# Patient Record
Sex: MALE | URBAN - METROPOLITAN AREA
[De-identification: names, ages, dates, MRNs, and addresses within clinical notes are randomized per-mention and may not be internally consistent; named-entity substitution may affect disease eponyms.]

---

## 2017-01-07 ENCOUNTER — EMERGENCY (EMERGENCY)
Facility: HOSPITAL | Age: 77
LOS: 0 days | Discharge: HOME | End: 2017-01-07
Admitting: EMERGENCY MEDICINE
Payer: MEDICARE

## 2017-01-07 DIAGNOSIS — Z91.81 HISTORY OF FALLING: ICD-10-CM

## 2017-01-07 DIAGNOSIS — M70.50 OTHER BURSITIS OF KNEE, UNSPECIFIED KNEE: ICD-10-CM

## 2017-01-07 DIAGNOSIS — I50.9 HEART FAILURE, UNSPECIFIED: ICD-10-CM

## 2017-01-07 DIAGNOSIS — M70.52 OTHER BURSITIS OF KNEE, LEFT KNEE: ICD-10-CM

## 2017-01-07 DIAGNOSIS — Z86.711 PERSONAL HISTORY OF PULMONARY EMBOLISM: ICD-10-CM

## 2017-01-07 DIAGNOSIS — S09.90XA UNSPECIFIED INJURY OF HEAD, INITIAL ENCOUNTER: ICD-10-CM

## 2017-01-07 DIAGNOSIS — C91.10 CHRONIC LYMPHOCYTIC LEUKEMIA OF B-CELL TYPE NOT HAVING ACHIEVED REMISSION: ICD-10-CM

## 2017-01-07 DIAGNOSIS — S42.254A NONDISPLACED FRACTURE OF GREATER TUBEROSITY OF RIGHT HUMERUS, INITIAL ENCOUNTER FOR CLOSED FRACTURE: ICD-10-CM
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
## 2017-01-07 DIAGNOSIS — S00.90XA UNSPECIFIED SUPERFICIAL INJURY OF UNSPECIFIED PART OF HEAD, INITIAL ENCOUNTER: ICD-10-CM

## 2017-01-07 DIAGNOSIS — S42.253A DISPLACED FRACTURE OF GREATER TUBEROSITY OF UNSPECIFIED HUMERUS, INITIAL ENCOUNTER FOR CLOSED FRACTURE: ICD-10-CM
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
## 2017-01-07 PROCEDURE — 70450 CT HEAD/BRAIN W/O DYE: CPT | Mod: 26

## 2017-01-07 PROCEDURE — 74177 CT ABD & PELVIS W/CONTRAST: CPT | Mod: 26

## 2017-01-07 PROCEDURE — 71260 CT THORAX DX C+: CPT | Mod: 26

## 2017-01-07 PROCEDURE — 72170 X-RAY EXAM OF PELVIS: CPT | Mod: 26

## 2017-01-07 PROCEDURE — 71010: CPT | Mod: 26

## 2017-01-07 PROCEDURE — 73060 X-RAY EXAM OF HUMERUS: CPT | Mod: 26,RT

## 2017-01-07 PROCEDURE — 73030 X-RAY EXAM OF SHOULDER: CPT | Mod: 26,RT

## 2017-01-07 PROCEDURE — 72125 CT NECK SPINE W/O DYE: CPT | Mod: 26

## 2017-01-07 PROCEDURE — 99285 EMERGENCY DEPT VISIT HI MDM: CPT

## 2017-01-07 PROCEDURE — 73562 X-RAY EXAM OF KNEE 3: CPT | Mod: 26,LT

## 2017-01-07 PROCEDURE — 93010 ELECTROCARDIOGRAM REPORT: CPT

## 2017-01-19 ENCOUNTER — OUTPATIENT (OUTPATIENT)
Dept: OUTPATIENT SERVICES | Facility: HOSPITAL | Age: 77
LOS: 1 days | Discharge: HOME | End: 2017-01-19

## 2017-01-25 DIAGNOSIS — D80.1 NONFAMILIAL HYPOGAMMAGLOBULINEMIA: ICD-10-CM

## 2017-02-17 ENCOUNTER — INPATIENT (INPATIENT)
Facility: HOSPITAL | Age: 77
LOS: 1 days | Discharge: HOME | End: 2017-02-19
Attending: FAMILY MEDICINE | Admitting: STUDENT IN AN ORGANIZED HEALTH CARE EDUCATION/TRAINING PROGRAM
Payer: MEDICARE

## 2017-02-17 VITALS — HEIGHT: 65 IN | WEIGHT: 190.04 LBS

## 2017-02-17 DIAGNOSIS — J44.9 CHRONIC OBSTRUCTIVE PULMONARY DISEASE, UNSPECIFIED: ICD-10-CM

## 2017-02-17 DIAGNOSIS — I48.91 UNSPECIFIED ATRIAL FIBRILLATION: ICD-10-CM

## 2017-02-17 DIAGNOSIS — C85.90 NON-HODGKIN LYMPHOMA, UNSPECIFIED, UNSPECIFIED SITE: ICD-10-CM

## 2017-02-17 LAB
ALBUMIN SERPL ELPH-MCNC: 3.3 G/DL — SIGNIFICANT CHANGE UP (ref 3.3–5)
ALBUMIN SERPL ELPH-MCNC: 3.4 G/DL — SIGNIFICANT CHANGE UP (ref 3.3–5)
ALP SERPL-CCNC: 120 U/L — SIGNIFICANT CHANGE UP (ref 40–120)
ALP SERPL-CCNC: 121 U/L — HIGH (ref 40–120)
ALT FLD-CCNC: 30 U/L — SIGNIFICANT CHANGE UP (ref 12–78)
ALT FLD-CCNC: 33 U/L — SIGNIFICANT CHANGE UP (ref 12–78)
ANION GAP SERPL CALC-SCNC: 10 MMOL/L — SIGNIFICANT CHANGE UP (ref 5–17)
ANION GAP SERPL CALC-SCNC: 11 MMOL/L — SIGNIFICANT CHANGE UP (ref 5–17)
ANION GAP SERPL CALC-SCNC: 9 MMOL/L — SIGNIFICANT CHANGE UP (ref 5–17)
ANISOCYTOSIS BLD QL: SLIGHT — SIGNIFICANT CHANGE UP
APTT BLD: 39.1 SEC — HIGH (ref 27.5–37.4)
APTT BLD: 42 SEC — HIGH (ref 27.5–37.4)
AST SERPL-CCNC: 27 U/L — SIGNIFICANT CHANGE UP (ref 15–37)
AST SERPL-CCNC: 30 U/L — SIGNIFICANT CHANGE UP (ref 15–37)
BASE EXCESS BLDV CALC-SCNC: -2.9 MMOL/L — LOW (ref -2–2)
BASOPHILS NFR BLD AUTO: 1 % — SIGNIFICANT CHANGE UP (ref 0–2)
BILIRUB SERPL-MCNC: 0.5 MG/DL — SIGNIFICANT CHANGE UP (ref 0.2–1.2)
BILIRUB SERPL-MCNC: 0.5 MG/DL — SIGNIFICANT CHANGE UP (ref 0.2–1.2)
BUN SERPL-MCNC: 14 MG/DL — SIGNIFICANT CHANGE UP (ref 7–23)
BUN SERPL-MCNC: 15 MG/DL — SIGNIFICANT CHANGE UP (ref 7–23)
BUN SERPL-MCNC: 17 MG/DL — SIGNIFICANT CHANGE UP (ref 7–23)
CALCIUM SERPL-MCNC: 8 MG/DL — LOW (ref 8.5–10.1)
CALCIUM SERPL-MCNC: 8.2 MG/DL — LOW (ref 8.5–10.1)
CALCIUM SERPL-MCNC: 8.2 MG/DL — LOW (ref 8.5–10.1)
CHLORIDE SERPL-SCNC: 103 MMOL/L — SIGNIFICANT CHANGE UP (ref 96–108)
CHLORIDE SERPL-SCNC: 106 MMOL/L — SIGNIFICANT CHANGE UP (ref 96–108)
CHLORIDE SERPL-SCNC: 106 MMOL/L — SIGNIFICANT CHANGE UP (ref 96–108)
CO2 SERPL-SCNC: 23 MMOL/L — SIGNIFICANT CHANGE UP (ref 22–31)
CREAT SERPL-MCNC: 1.1 MG/DL — SIGNIFICANT CHANGE UP (ref 0.5–1.3)
CREAT SERPL-MCNC: 1.13 MG/DL — SIGNIFICANT CHANGE UP (ref 0.5–1.3)
CREAT SERPL-MCNC: 1.26 MG/DL — SIGNIFICANT CHANGE UP (ref 0.5–1.3)
EOSINOPHIL NFR BLD AUTO: 3 % — SIGNIFICANT CHANGE UP (ref 0–6)
GLUCOSE SERPL-MCNC: 203 MG/DL — HIGH (ref 70–99)
GLUCOSE SERPL-MCNC: 257 MG/DL — HIGH (ref 70–99)
GLUCOSE SERPL-MCNC: 405 MG/DL — HIGH (ref 70–99)
HCO3 BLDV-SCNC: 23 MMOL/L — SIGNIFICANT CHANGE UP (ref 21–29)
HCT VFR BLD CALC: 43 % — SIGNIFICANT CHANGE UP (ref 39–50)
HGB BLD-MCNC: 14.3 G/DL — SIGNIFICANT CHANGE UP (ref 13–17)
HYPERCHROMIA BLD QL AUTO: SLIGHT — SIGNIFICANT CHANGE UP
HYPOCHROMIA BLD QL: SLIGHT — SIGNIFICANT CHANGE UP
INR BLD: 3.76 RATIO — HIGH (ref 0.88–1.16)
LACTATE SERPL-SCNC: 1.8 MMOL/L — SIGNIFICANT CHANGE UP (ref 0.7–2)
LYMPHOCYTES # BLD AUTO: 9 % — LOW (ref 13–44)
MANUAL DIF COMMENT BLD-IMP: SIGNIFICANT CHANGE UP
MANUAL SMEAR VERIFICATION: SIGNIFICANT CHANGE UP
MCHC RBC-ENTMCNC: 30.2 PG — SIGNIFICANT CHANGE UP (ref 27–34)
MCHC RBC-ENTMCNC: 33.3 GM/DL — SIGNIFICANT CHANGE UP (ref 32–36)
MCV RBC AUTO: 90.8 FL — SIGNIFICANT CHANGE UP (ref 80–100)
MICROCYTES BLD QL: SLIGHT — SIGNIFICANT CHANGE UP
MONOCYTES NFR BLD AUTO: 2 % — SIGNIFICANT CHANGE UP (ref 2–14)
NEUTROPHILS NFR BLD AUTO: 52 % — SIGNIFICANT CHANGE UP (ref 43–77)
NEUTS BAND # BLD: 2 % — SIGNIFICANT CHANGE UP (ref 0–8)
PCO2 BLDV: 46 MMHG — SIGNIFICANT CHANGE UP (ref 35–50)
PH BLDV: 7.32 — LOW (ref 7.35–7.45)
PLAT MORPH BLD: NORMAL — SIGNIFICANT CHANGE UP
PLATELET # BLD AUTO: 173 K/UL — SIGNIFICANT CHANGE UP (ref 150–400)
PO2 BLDV: 40 MMHG — SIGNIFICANT CHANGE UP (ref 25–45)
POIKILOCYTOSIS BLD QL AUTO: SLIGHT — SIGNIFICANT CHANGE UP
POLYCHROMASIA BLD QL SMEAR: SLIGHT — SIGNIFICANT CHANGE UP
POTASSIUM SERPL-MCNC: 4.3 MMOL/L — SIGNIFICANT CHANGE UP (ref 3.5–5.3)
POTASSIUM SERPL-MCNC: 4.8 MMOL/L — SIGNIFICANT CHANGE UP (ref 3.5–5.3)
POTASSIUM SERPL-MCNC: 5 MMOL/L — SIGNIFICANT CHANGE UP (ref 3.5–5.3)
POTASSIUM SERPL-SCNC: 4.3 MMOL/L — SIGNIFICANT CHANGE UP (ref 3.5–5.3)
POTASSIUM SERPL-SCNC: 4.8 MMOL/L — SIGNIFICANT CHANGE UP (ref 3.5–5.3)
POTASSIUM SERPL-SCNC: 5 MMOL/L — SIGNIFICANT CHANGE UP (ref 3.5–5.3)
PROT SERPL-MCNC: 6.3 GM/DL — SIGNIFICANT CHANGE UP (ref 6–8.3)
PROT SERPL-MCNC: 6.5 GM/DL — SIGNIFICANT CHANGE UP (ref 6–8.3)
PROTHROM AB SERPL-ACNC: 41.9 SEC — HIGH (ref 10–13.1)
RBC # BLD: 4.74 M/UL — SIGNIFICANT CHANGE UP (ref 4.2–5.8)
RBC # FLD: 13.3 % — SIGNIFICANT CHANGE UP (ref 10.3–14.5)
RBC BLD AUTO: NORMAL — SIGNIFICANT CHANGE UP
SAO2 % BLDV: 67 % — SIGNIFICANT CHANGE UP (ref 67–88)
SCHISTOCYTES BLD QL AUTO: SLIGHT — SIGNIFICANT CHANGE UP
SODIUM SERPL-SCNC: 137 MMOL/L — SIGNIFICANT CHANGE UP (ref 135–145)
SODIUM SERPL-SCNC: 138 MMOL/L — SIGNIFICANT CHANGE UP (ref 135–145)
SODIUM SERPL-SCNC: 139 MMOL/L — SIGNIFICANT CHANGE UP (ref 135–145)
TSH SERPL-MCNC: 0.35 UU/ML — LOW (ref 0.36–3.74)
VARIANT LYMPHS # BLD: 31 % — HIGH (ref 0–6)
WBC # BLD: 12.6 K/UL — HIGH (ref 3.8–10.5)
WBC # FLD AUTO: 12.6 K/UL — HIGH (ref 3.8–10.5)

## 2017-02-17 PROCEDURE — 71010: CPT | Mod: 26

## 2017-02-17 PROCEDURE — 93010 ELECTROCARDIOGRAM REPORT: CPT

## 2017-02-17 PROCEDURE — 99285 EMERGENCY DEPT VISIT HI MDM: CPT

## 2017-02-17 RX ORDER — VANCOMYCIN HCL 1 G
1000 VIAL (EA) INTRAVENOUS ONCE
Qty: 0 | Refills: 0 | Status: DISCONTINUED | OUTPATIENT
Start: 2017-02-17 | End: 2017-02-17

## 2017-02-17 RX ORDER — AMIODARONE HYDROCHLORIDE 400 MG/1
100 TABLET ORAL DAILY
Qty: 0 | Refills: 0 | Status: DISCONTINUED | OUTPATIENT
Start: 2017-02-17 | End: 2017-02-19

## 2017-02-17 RX ORDER — AMIODARONE HYDROCHLORIDE 400 MG/1
100 TABLET ORAL DAILY
Qty: 0 | Refills: 0 | Status: DISCONTINUED | OUTPATIENT
Start: 2017-02-17 | End: 2017-02-17

## 2017-02-17 RX ORDER — CEFEPIME 1 G/1
1000 INJECTION, POWDER, FOR SOLUTION INTRAMUSCULAR; INTRAVENOUS ONCE
Qty: 0 | Refills: 0 | Status: DISCONTINUED | OUTPATIENT
Start: 2017-02-17 | End: 2017-02-17

## 2017-02-17 RX ORDER — ATORVASTATIN CALCIUM 80 MG/1
10 TABLET, FILM COATED ORAL DAILY
Qty: 0 | Refills: 0 | Status: DISCONTINUED | OUTPATIENT
Start: 2017-02-17 | End: 2017-02-17

## 2017-02-17 RX ORDER — CEFEPIME 1 G/1
INJECTION, POWDER, FOR SOLUTION INTRAMUSCULAR; INTRAVENOUS
Qty: 0 | Refills: 0 | Status: DISCONTINUED | OUTPATIENT
Start: 2017-02-17 | End: 2017-02-17

## 2017-02-17 RX ORDER — CEFEPIME 1 G/1
1000 INJECTION, POWDER, FOR SOLUTION INTRAMUSCULAR; INTRAVENOUS EVERY 12 HOURS
Qty: 0 | Refills: 0 | Status: DISCONTINUED | OUTPATIENT
Start: 2017-02-17 | End: 2017-02-18

## 2017-02-17 RX ORDER — ATORVASTATIN CALCIUM 80 MG/1
10 TABLET, FILM COATED ORAL AT BEDTIME
Qty: 0 | Refills: 0 | Status: DISCONTINUED | OUTPATIENT
Start: 2017-02-17 | End: 2017-02-19

## 2017-02-17 RX ORDER — SODIUM CHLORIDE 9 MG/ML
500 INJECTION INTRAMUSCULAR; INTRAVENOUS; SUBCUTANEOUS
Qty: 0 | Refills: 0 | Status: DISCONTINUED | OUTPATIENT
Start: 2017-02-17 | End: 2017-02-18

## 2017-02-17 RX ORDER — IPRATROPIUM/ALBUTEROL SULFATE 18-103MCG
3 AEROSOL WITH ADAPTER (GRAM) INHALATION ONCE
Qty: 0 | Refills: 0 | Status: COMPLETED | OUTPATIENT
Start: 2017-02-17 | End: 2017-02-17

## 2017-02-17 RX ORDER — ALBUTEROL 90 UG/1
2 AEROSOL, METERED ORAL EVERY 6 HOURS
Qty: 0 | Refills: 0 | Status: DISCONTINUED | OUTPATIENT
Start: 2017-02-17 | End: 2017-02-18

## 2017-02-17 RX ORDER — SODIUM CHLORIDE 9 MG/ML
3 INJECTION INTRAMUSCULAR; INTRAVENOUS; SUBCUTANEOUS ONCE
Qty: 0 | Refills: 0 | Status: COMPLETED | OUTPATIENT
Start: 2017-02-17 | End: 2017-02-17

## 2017-02-17 RX ORDER — GABAPENTIN 400 MG/1
300 CAPSULE ORAL THREE TIMES A DAY
Qty: 0 | Refills: 0 | Status: DISCONTINUED | OUTPATIENT
Start: 2017-02-17 | End: 2017-02-19

## 2017-02-17 RX ORDER — CEFEPIME 1 G/1
2000 INJECTION, POWDER, FOR SOLUTION INTRAMUSCULAR; INTRAVENOUS ONCE
Qty: 0 | Refills: 0 | Status: DISCONTINUED | OUTPATIENT
Start: 2017-02-17 | End: 2017-02-17

## 2017-02-17 RX ORDER — VANCOMYCIN HCL 1 G
1000 VIAL (EA) INTRAVENOUS ONCE
Qty: 0 | Refills: 0 | Status: COMPLETED | OUTPATIENT
Start: 2017-02-17 | End: 2017-02-17

## 2017-02-17 RX ORDER — CEFEPIME 1 G/1
2000 INJECTION, POWDER, FOR SOLUTION INTRAMUSCULAR; INTRAVENOUS ONCE
Qty: 0 | Refills: 0 | Status: COMPLETED | OUTPATIENT
Start: 2017-02-17 | End: 2017-02-17

## 2017-02-17 RX ORDER — TIOTROPIUM BROMIDE 18 UG/1
1 CAPSULE ORAL; RESPIRATORY (INHALATION) DAILY
Qty: 0 | Refills: 0 | Status: DISCONTINUED | OUTPATIENT
Start: 2017-02-17 | End: 2017-02-18

## 2017-02-17 RX ORDER — SODIUM CHLORIDE 9 MG/ML
1500 INJECTION INTRAMUSCULAR; INTRAVENOUS; SUBCUTANEOUS ONCE
Qty: 0 | Refills: 0 | Status: COMPLETED | OUTPATIENT
Start: 2017-02-17 | End: 2017-02-17

## 2017-02-17 RX ORDER — LOSARTAN POTASSIUM 100 MG/1
100 TABLET, FILM COATED ORAL DAILY
Qty: 0 | Refills: 0 | Status: DISCONTINUED | OUTPATIENT
Start: 2017-02-17 | End: 2017-02-19

## 2017-02-17 RX ORDER — ACETAMINOPHEN 500 MG
650 TABLET ORAL ONCE
Qty: 0 | Refills: 0 | Status: COMPLETED | OUTPATIENT
Start: 2017-02-17 | End: 2017-02-17

## 2017-02-17 RX ORDER — INSULIN GLARGINE 100 [IU]/ML
15 INJECTION, SOLUTION SUBCUTANEOUS AT BEDTIME
Qty: 0 | Refills: 0 | Status: DISCONTINUED | OUTPATIENT
Start: 2017-02-17 | End: 2017-02-18

## 2017-02-17 RX ADMIN — CEFEPIME 100 MILLIGRAM(S): 1 INJECTION, POWDER, FOR SOLUTION INTRAMUSCULAR; INTRAVENOUS at 13:36

## 2017-02-17 RX ADMIN — SODIUM CHLORIDE 1500 MILLILITER(S): 9 INJECTION INTRAMUSCULAR; INTRAVENOUS; SUBCUTANEOUS at 10:40

## 2017-02-17 RX ADMIN — Medication 650 MILLIGRAM(S): at 14:39

## 2017-02-17 RX ADMIN — SODIUM CHLORIDE 3 MILLILITER(S): 9 INJECTION INTRAMUSCULAR; INTRAVENOUS; SUBCUTANEOUS at 13:37

## 2017-02-17 RX ADMIN — SODIUM CHLORIDE 3 MILLILITER(S): 9 INJECTION INTRAMUSCULAR; INTRAVENOUS; SUBCUTANEOUS at 10:35

## 2017-02-17 RX ADMIN — Medication 250 MILLIGRAM(S): at 14:39

## 2017-02-17 RX ADMIN — SODIUM CHLORIDE 2000 MILLILITER(S): 9 INJECTION INTRAMUSCULAR; INTRAVENOUS; SUBCUTANEOUS at 17:57

## 2017-02-17 RX ADMIN — Medication 3 MILLILITER(S): at 13:49

## 2017-02-17 RX ADMIN — Medication 125 MILLIGRAM(S): at 13:15

## 2017-02-17 NOTE — ED PROVIDER NOTE - OBJECTIVE STATEMENT
77 y/o male with PMhx of COPD presents to the ED c/o cough that started 2 weeks ago. Daughter at bedside states that pt usually has a cough but it has become worse starting 2 weeks ago. Pt takes nebulizer txs at home and received his flu shot this year. Pulmonologist Dr. Skinner. Pt was in the ED a few weeks ago after a fall. Currently pt has no other complaints and denies chest pain, abd pain and vomiting. PMD Dr. Orozco.

## 2017-02-17 NOTE — H&P ADULT - NSHPLABSRESULTS_GEN_ALL_CORE
CBC Full  -  ( 17 Feb 2017 10:36 )  WBC Count : 12.6 K/uL  Hemoglobin : 14.3 g/dL  Hematocrit : 43.0 %  Platelet Count - Automated : 173 K/uL    PTT - ( 17 Feb 2017 10:36 )  PTT:39.1 sec    17 Feb 2017 10:36    139    |  106    |  14     ----------------------------<  203    4.3     |  23     |  1.10     Ca    8.2        17 Feb 2017 10:36    TPro  6.3    /  Alb  3.3    /  TBili  0.5    /  DBili  x      /  AST  30     /  ALT  30     /  AlkPhos  120    17 Feb 2017 10:36

## 2017-02-17 NOTE — ED PROVIDER NOTE - NS ED MD SCRIBE ATTENDING SCRIBE SECTIONS
DISPOSITION/HISTORY OF PRESENT ILLNESS/PROGRESS NOTE/PAST MEDICAL/SURGICAL/SOCIAL HISTORY/REVIEW OF SYSTEMS/PHYSICAL EXAM/RESULTS

## 2017-02-17 NOTE — ED STATDOCS - MEDICAL DECISION MAKING DETAILS
I, Stan Todd DO, performed the initial face to face bedside interview with this patient regarding history of present illness and determined that the patient should be seen in the main ED.  The history, was documented by the scribe in my presence and I attest to the accuracy of the documentation.

## 2017-02-17 NOTE — ED PROVIDER NOTE - DETAILS:
I, Mariann Ryan, performed the initial face to face bedside interview with this patient regarding history of present illness, review of symptoms and relevant past medical, social and family history.  I completed an independent physical examination.  I was the initial provider who evaluated this patient. The history, relevant review of systems, past medical and surgical history, medical decision making, and physical examination was documented by the scribe in my presence and I attest to the accuracy of the documentation.

## 2017-02-17 NOTE — H&P ADULT - PMH
Atrial fibrillation, unspecified type    COPD (chronic obstructive pulmonary disease)  Lymphoma   Atrial fibrillation  Lymphoma, unspecified body region, unspecified lymphoma type

## 2017-02-17 NOTE — H&P ADULT - PROBLEM SELECTOR PLAN 1
- admit  - closely monitor  - albuterol/spiriva, will not give steroids since patient is not wheezing  - O2 support (not home O2 dependent or steroid dependent)

## 2017-02-17 NOTE — ED ADULT NURSE REASSESSMENT NOTE - NS ED NURSE REASSESS COMMENT FT1
pt received. alert and oriented. no signs of respiratory distress. breathing even and unlabored. denies SOB or chest pain at this time. resting comfortably. will monitor.

## 2017-02-17 NOTE — H&P ADULT - HISTORY OF PRESENT ILLNESS
Pt is a 75 y/o/m w/ past medical history of lymphoma (last chemo last year), COPD (not home O2, steroid dependance), atrial fibrillation (on coumadin) admitted 2/17 w/ cc of worsening cough and shortness of breath. Patient always has cough but last 2 weeks cough and breathing has worsened. Subjective fevers at home - wife has been having upper respiratory infection. Pt takes nebulizer txs at home and received his flu shot this year.  Currently patient sitting up. denies any HA, CP, SOB. comfortably resting. Labs and vitals reviewed.     tele: NSR, EKG: no acute ischemic changes. patient's medications reviewed.     Follow up in the AM: TSH and Liver enzymes in the setting of amiodarone use.

## 2017-02-17 NOTE — ED PROVIDER NOTE - CARE PLAN
Principal Discharge DX:	Chronic obstructive pulmonary disease with acute lower respiratory infection

## 2017-02-17 NOTE — ED STATDOCS - DETAILS:
I, Stan Todd DO, performed the initial face to face bedside interview with this patient regarding history of present illness and determined that the patient should be seen in the main ED.  The history, was documented by the scribe in my presence and I attest to the accuracy of the documentation.  I, Stan Todd, performed the initial face to face bedside interview with this patient regarding history of present illness, review of symptoms and relevant past medical, social and family history.  I completed an independent physical examination.  I was the initial provider who evaluated this patient.  The history, relevant review of systems, past medical and surgical history, medical decision making, and physical examination was documented by the scribe in my presence and I attest to the accuracy of the documentation.

## 2017-02-17 NOTE — ED STATDOCS - PROGRESS NOTE DETAILS
Lucy Sweeney: 75 y/o M PMHx Lymphoma, was on chemo, DM, presents to the ED c/o cough. The pt provides that he has cough associated with midsternal chest discomfort. No h/o abd pain, nvd, headache, chills, dizziness or urinary incontinence. Pt to be further evaluated in the main ed.

## 2017-02-17 NOTE — H&P ADULT - NSHPREVIEWOFSYSTEMS_GEN_ALL_CORE
REVIEW OF SYSTEMS:  General: NAD, hemodynamically stable, (-)  fever, (-) chills, (-) weakness  HEENT:  Eyes:  No visual loss, blurred vision, double vision or yellow sclerae. Ears, Nose, Throat:  No hearing loss, sneezing, congestion, runny nose or sore throat.  SKIN:  No rash or itching.  CARDIOVASCULAR:  No chest pain, chest pressure or chest discomfort. No palpitations or edema.  RESPIRATORY:  (+) cough  GASTROINTESTINAL:  No anorexia, nausea, vomiting or diarrhea. No abdominal pain or blood.  NEUROLOGICAL:  No headache, dizziness, syncope, paralysis, ataxia, numbness or tingling in the extremities. No change in bowel or bladder control.  MUSCULOSKELETAL:  No muscle, back pain, joint pain or stiffness.

## 2017-02-17 NOTE — H&P ADULT - NSHPPHYSICALEXAM_GEN_ALL_CORE
Physical Exam:   GENERAL APPEARANCE:  NAD, hemodynamically stable  T(C): 38.8, Max: 38.8 (02-17 @ 11:00)  HR: 98 (98 - 115)  BP: 103/66 (103/66 - 103/66)  RR: 18 (18 - 18)  SpO2: 94% (94% - 94%)  HEENT:  Head is normocephalic    Skin:  Warm and dry without any rash   NECK:  Supple without lymphadenopathy.   HEART:  Regular rate and rhythm. normal S1 and S2, No M/R/G  LUNGS:  Good ins/exp effort, no W/R/R/C  ABDOMEN:  Soft, nontender, nondistended with good bowel sounds heard  EXTREMITIES:  Without cyanosis, clubbing or edema.   NEUROLOGICAL:  Gross nonfocal

## 2017-02-18 DIAGNOSIS — Z90.79 ACQUIRED ABSENCE OF OTHER GENITAL ORGAN(S): Chronic | ICD-10-CM

## 2017-02-18 LAB
ANION GAP SERPL CALC-SCNC: 10 MMOL/L — SIGNIFICANT CHANGE UP (ref 5–17)
APPEARANCE UR: CLEAR — SIGNIFICANT CHANGE UP
BACTERIA # UR AUTO: (no result)
BILIRUB UR-MCNC: NEGATIVE — SIGNIFICANT CHANGE UP
BUN SERPL-MCNC: 22 MG/DL — SIGNIFICANT CHANGE UP (ref 7–23)
CALCIUM SERPL-MCNC: 8.4 MG/DL — LOW (ref 8.5–10.1)
CHLORIDE SERPL-SCNC: 107 MMOL/L — SIGNIFICANT CHANGE UP (ref 96–108)
CO2 SERPL-SCNC: 24 MMOL/L — SIGNIFICANT CHANGE UP (ref 22–31)
COLOR SPEC: YELLOW — SIGNIFICANT CHANGE UP
CREAT SERPL-MCNC: 1.21 MG/DL — SIGNIFICANT CHANGE UP (ref 0.5–1.3)
DIFF PNL FLD: (no result)
EPI CELLS # UR: SIGNIFICANT CHANGE UP
GLUCOSE SERPL-MCNC: 304 MG/DL — HIGH (ref 70–99)
GLUCOSE UR QL: 50 MG/DL
HBA1C BLD-MCNC: 9.6 % — HIGH (ref 4–5.6)
HCT VFR BLD CALC: 38.2 % — LOW (ref 39–50)
HGB BLD-MCNC: 12.8 G/DL — LOW (ref 13–17)
KETONES UR-MCNC: NEGATIVE — SIGNIFICANT CHANGE UP
LEUKOCYTE ESTERASE UR-ACNC: NEGATIVE — SIGNIFICANT CHANGE UP
MAGNESIUM SERPL-MCNC: 2.2 MG/DL — SIGNIFICANT CHANGE UP (ref 1.8–2.4)
MCHC RBC-ENTMCNC: 30.3 PG — SIGNIFICANT CHANGE UP (ref 27–34)
MCHC RBC-ENTMCNC: 33.4 GM/DL — SIGNIFICANT CHANGE UP (ref 32–36)
MCV RBC AUTO: 90.5 FL — SIGNIFICANT CHANGE UP (ref 80–100)
NITRITE UR-MCNC: NEGATIVE — SIGNIFICANT CHANGE UP
PH UR: 7 — SIGNIFICANT CHANGE UP (ref 4.8–8)
PHOSPHATE SERPL-MCNC: 3 MG/DL — SIGNIFICANT CHANGE UP (ref 2.5–4.5)
PLATELET # BLD AUTO: 159 K/UL — SIGNIFICANT CHANGE UP (ref 150–400)
POTASSIUM SERPL-MCNC: 4.3 MMOL/L — SIGNIFICANT CHANGE UP (ref 3.5–5.3)
POTASSIUM SERPL-SCNC: 4.3 MMOL/L — SIGNIFICANT CHANGE UP (ref 3.5–5.3)
PROT UR-MCNC: 30 MG/DL
RBC # BLD: 4.22 M/UL — SIGNIFICANT CHANGE UP (ref 4.2–5.8)
RBC # FLD: 13 % — SIGNIFICANT CHANGE UP (ref 10.3–14.5)
RBC CASTS # UR COMP ASSIST: SIGNIFICANT CHANGE UP /HPF (ref 0–4)
SODIUM SERPL-SCNC: 141 MMOL/L — SIGNIFICANT CHANGE UP (ref 135–145)
SP GR SPEC: 1.01 — SIGNIFICANT CHANGE UP (ref 1.01–1.02)
T3 SERPL-MCNC: 88 NG/DL — SIGNIFICANT CHANGE UP (ref 80–200)
T4 AB SER-ACNC: 13.3 UG/DL — HIGH (ref 4.6–12)
UROBILINOGEN FLD QL: NEGATIVE MG/DL — SIGNIFICANT CHANGE UP
WBC # BLD: 11.4 K/UL — HIGH (ref 3.8–10.5)
WBC # FLD AUTO: 11.4 K/UL — HIGH (ref 3.8–10.5)
WBC UR QL: NEGATIVE — SIGNIFICANT CHANGE UP

## 2017-02-18 PROCEDURE — 93010 ELECTROCARDIOGRAM REPORT: CPT

## 2017-02-18 RX ORDER — INSULIN LISPRO 100/ML
VIAL (ML) SUBCUTANEOUS
Qty: 0 | Refills: 0 | Status: DISCONTINUED | OUTPATIENT
Start: 2017-02-18 | End: 2017-02-19

## 2017-02-18 RX ORDER — INSULIN LISPRO 100/ML
VIAL (ML) SUBCUTANEOUS AT BEDTIME
Qty: 0 | Refills: 0 | Status: DISCONTINUED | OUTPATIENT
Start: 2017-02-18 | End: 2017-02-19

## 2017-02-18 RX ORDER — DEXTROSE 50 % IN WATER 50 %
25 SYRINGE (ML) INTRAVENOUS ONCE
Qty: 0 | Refills: 0 | Status: DISCONTINUED | OUTPATIENT
Start: 2017-02-18 | End: 2017-02-19

## 2017-02-18 RX ORDER — AZITHROMYCIN 500 MG/1
500 TABLET, FILM COATED ORAL DAILY
Qty: 0 | Refills: 0 | Status: DISCONTINUED | OUTPATIENT
Start: 2017-02-19 | End: 2017-02-19

## 2017-02-18 RX ORDER — INSULIN GLARGINE 100 [IU]/ML
15 INJECTION, SOLUTION SUBCUTANEOUS AT BEDTIME
Qty: 0 | Refills: 0 | Status: DISCONTINUED | OUTPATIENT
Start: 2017-02-18 | End: 2017-02-19

## 2017-02-18 RX ORDER — DEXTROSE 50 % IN WATER 50 %
1 SYRINGE (ML) INTRAVENOUS ONCE
Qty: 0 | Refills: 0 | Status: DISCONTINUED | OUTPATIENT
Start: 2017-02-18 | End: 2017-02-19

## 2017-02-18 RX ORDER — INSULIN LISPRO 100/ML
2 VIAL (ML) SUBCUTANEOUS ONCE
Qty: 0 | Refills: 0 | Status: COMPLETED | OUTPATIENT
Start: 2017-02-18 | End: 2017-02-18

## 2017-02-18 RX ORDER — GLUCAGON INJECTION, SOLUTION 0.5 MG/.1ML
1 INJECTION, SOLUTION SUBCUTANEOUS ONCE
Qty: 0 | Refills: 0 | Status: DISCONTINUED | OUTPATIENT
Start: 2017-02-18 | End: 2017-02-19

## 2017-02-18 RX ORDER — DEXTROSE 50 % IN WATER 50 %
12.5 SYRINGE (ML) INTRAVENOUS ONCE
Qty: 0 | Refills: 0 | Status: DISCONTINUED | OUTPATIENT
Start: 2017-02-18 | End: 2017-02-19

## 2017-02-18 RX ORDER — SODIUM CHLORIDE 9 MG/ML
1000 INJECTION, SOLUTION INTRAVENOUS
Qty: 0 | Refills: 0 | Status: DISCONTINUED | OUTPATIENT
Start: 2017-02-18 | End: 2017-02-19

## 2017-02-18 RX ORDER — INSULIN GLARGINE 100 [IU]/ML
15 INJECTION, SOLUTION SUBCUTANEOUS ONCE
Qty: 0 | Refills: 0 | Status: COMPLETED | OUTPATIENT
Start: 2017-02-18 | End: 2017-02-18

## 2017-02-18 RX ORDER — IPRATROPIUM/ALBUTEROL SULFATE 18-103MCG
3 AEROSOL WITH ADAPTER (GRAM) INHALATION EVERY 6 HOURS
Qty: 0 | Refills: 0 | Status: DISCONTINUED | OUTPATIENT
Start: 2017-02-18 | End: 2017-02-19

## 2017-02-18 RX ADMIN — CEFEPIME 100 MILLIGRAM(S): 1 INJECTION, POWDER, FOR SOLUTION INTRAMUSCULAR; INTRAVENOUS at 17:26

## 2017-02-18 RX ADMIN — INSULIN GLARGINE 15 UNIT(S): 100 INJECTION, SOLUTION SUBCUTANEOUS at 02:42

## 2017-02-18 RX ADMIN — Medication 200 MILLIGRAM(S): at 23:20

## 2017-02-18 RX ADMIN — Medication 3 MILLILITER(S): at 20:24

## 2017-02-18 RX ADMIN — GABAPENTIN 300 MILLIGRAM(S): 400 CAPSULE ORAL at 23:20

## 2017-02-18 RX ADMIN — CEFEPIME 100 MILLIGRAM(S): 1 INJECTION, POWDER, FOR SOLUTION INTRAMUSCULAR; INTRAVENOUS at 06:24

## 2017-02-18 RX ADMIN — Medication 1: at 23:21

## 2017-02-18 RX ADMIN — ATORVASTATIN CALCIUM 10 MILLIGRAM(S): 80 TABLET, FILM COATED ORAL at 01:18

## 2017-02-18 RX ADMIN — AMIODARONE HYDROCHLORIDE 100 MILLIGRAM(S): 400 TABLET ORAL at 06:23

## 2017-02-18 RX ADMIN — Medication 5: at 18:10

## 2017-02-18 RX ADMIN — Medication 2 UNIT(S): at 02:42

## 2017-02-18 RX ADMIN — INSULIN GLARGINE 15 UNIT(S): 100 INJECTION, SOLUTION SUBCUTANEOUS at 23:21

## 2017-02-18 RX ADMIN — LOSARTAN POTASSIUM 100 MILLIGRAM(S): 100 TABLET, FILM COATED ORAL at 06:24

## 2017-02-18 RX ADMIN — ALBUTEROL 2 PUFF(S): 90 AEROSOL, METERED ORAL at 11:25

## 2017-02-18 RX ADMIN — Medication 3: at 12:23

## 2017-02-18 RX ADMIN — TIOTROPIUM BROMIDE 1 CAPSULE(S): 18 CAPSULE ORAL; RESPIRATORY (INHALATION) at 11:26

## 2017-02-18 RX ADMIN — ATORVASTATIN CALCIUM 10 MILLIGRAM(S): 80 TABLET, FILM COATED ORAL at 23:20

## 2017-02-18 RX ADMIN — GABAPENTIN 300 MILLIGRAM(S): 400 CAPSULE ORAL at 06:24

## 2017-02-18 RX ADMIN — GABAPENTIN 300 MILLIGRAM(S): 400 CAPSULE ORAL at 13:51

## 2017-02-18 RX ADMIN — GABAPENTIN 300 MILLIGRAM(S): 400 CAPSULE ORAL at 01:17

## 2017-02-18 RX ADMIN — Medication 3: at 09:18

## 2017-02-18 NOTE — PROGRESS NOTE ADULT - PROBLEM SELECTOR PLAN 1
- Acute bronchitis  - No evidence of Pneumonia  - albuterol/spiriva on hold while on duoneb- will resume at discharge  - will not give steroids since patient is not wheezing  - O2 support (not home O2 dependent or steroid dependent)  - mucinex  - azithro for bronchitis 1/5- will dc cefepime

## 2017-02-18 NOTE — CONSULT NOTE ADULT - SUBJECTIVE AND OBJECTIVE BOX
HPI:    PAT seen & examine & full consult dictated.    PAST MEDICAL & SURGICAL HISTORY:  Lymphoma, unspecified body region, unspecified lymphoma type  Atrial fibrillation, unspecified type  COPD (chronic obstructive pulmonary disease): Lymphoma   Atrial fibrillation  H/O prostatectomy      MEDICATIONS  (STANDING):  sodium chloride 0.9% Bolus 500milliLiter(s) IV Bolus every 15 minutes  cefepime  IVPB 1000milliGRAM(s) IV Intermittent every 12 hours  ALBUTerol    90 MICROgram(s) HFA Inhaler 2Puff(s) Inhalation every 6 hours  tiotropium 18 MICROgram(s) Capsule 1Capsule(s) Inhalation daily  gabapentin 300milliGRAM(s) Oral three times a day  losartan 100milliGRAM(s) Oral daily  amiodarone    Tablet 100milliGRAM(s) Oral daily  atorvastatin 10milliGRAM(s) Oral at bedtime  insulin glargine Injectable (LANTUS) 15Unit(s) SubCutaneous at bedtime  insulin lispro (HumaLOG) corrective regimen sliding scale  SubCutaneous three times a day before meals  insulin lispro (HumaLOG) corrective regimen sliding scale  SubCutaneous at bedtime  dextrose 5%. 1000milliLiter(s) IV Continuous <Continuous>  dextrose 50% Injectable 12.5Gram(s) IV Push once  dextrose 50% Injectable 25Gram(s) IV Push once  dextrose 50% Injectable 25Gram(s) IV Push once    MEDICATIONS  (PRN):  dextrose Gel 1Dose(s) Oral once PRN Blood Glucose LESS THAN 70 milliGRAM(s)/deciliter  glucagon  Injectable 1milliGRAM(s) IntraMuscular once PRN Glucose LESS THAN 70 milligrams/deciliter      Allergies    Privigen (Other (Severe))    Intolerances        SOCIAL HISTORY: Denies tobacco, etoh abuse or illicit drug use    FAMILY HISTORY:      Vital Signs Last 24 Hrs  T(C): 37.1, Max: 38.8 (02-17 @ 11:00)  T(F): 98.7, Max: 101.8 (02-17 @ 11:00)  HR: 96 (66 - 115)  BP: 131/68 (103/66 - 151/96)  BP(mean): --  RR: 17 (16 - 18)  SpO2: 98% (94% - 100%)    REVIEW OF SYSTEMS:    CONSTITUTIONAL:  As per HPI.  HEENT:  Eyes:  No diplopia or blurred vision. ENT:  No earache, sore throat or runny nose.  CARDIOVASCULAR:  No pressure, squeezing, tightness, heaviness or aching about the chest, neck, axilla or epigastrium.  RESPIRATORY:  No cough, shortness of breath, PND or orthopnea.  GASTROINTESTINAL:  No nausea, vomiting or diarrhea.  GENITOURINARY:  No dysuria, frequency or urgency.  MUSCULOSKELETAL:  As per HPI.  SKIN:  No change in skin, hair or nails.  NEUROLOGIC:  No paresthesias, fasciculations, seizures or weakness.  PSYCHIATRIC:  No disorder of thought or mood.  ENDOCRINE:  No heat or cold intolerance, polyuria or polydipsia.  HEMATOLOGICAL:  No easy bruising or bleedings:  .     PHYSICAL EXAMINATION:    GENERAL APPEARANCE:  Pt. is not currently dyspneic, in no distress. Pt. is alert, oriented, and pleasant.  HEENT:  Pupils are normal and react normally. No icterus. Mucous membranes well colored.  NECK:  Supple. No lymphadenopathy. Jugular venous pressure not elevated. Carotids equal.   HEART:   The cardiac impulse has a normal quality. Regular. Normal S1 and S2. There are no murmurs, rubs or gallops noted  CHEST:  Chest is clear to auscultation. Normal respiratory effort.  ABDOMEN:  Soft and nontender.   EXTREMITIES:  There is no cyanosis, clubbing or edema.   SKIN:  No rash or significant lesions are noted.    LABS:                        12.8   11.4  )-----------( 159      ( 18 Feb 2017 05:55 )             38.2     18 Feb 2017 05:55    141    |  107    |  22     ----------------------------<  304    4.3     |  24     |  1.21     Ca    8.4        18 Feb 2017 05:55  Phos  3.0       18 Feb 2017 05:55  Mg     2.2       18 Feb 2017 05:55    TPro  6.5    /  Alb  3.4    /  TBili  0.5    /  DBili  x      /  AST  27     /  ALT  33     /  AlkPhos  121    17 Feb 2017 19:56    LIVER FUNCTIONS - ( 17 Feb 2017 19:56 )  Alb: 3.4 g/dL / Pro: 6.5 gm/dL / ALK PHOS: 121 U/L / ALT: 33 U/L / AST: 27 U/L / GGT: x           PT/INR - ( 17 Feb 2017 17:35 )   PT: 41.9 sec;   INR: 3.76 ratio         PTT - ( 17 Feb 2017 17:35 )  PTT:42.0 sec            RADIOLOGY & ADDITIONAL STUDIES:     CXR;    Impression: No active pulmonary disease.

## 2017-02-18 NOTE — CONSULT NOTE ADULT - ASSESSMENT
PROBLEMS;    Chronic obstructive pulmonary disease  Atrial fibrillation  Lymphoma    PLAN;    IV cefepime  aerosols  supportive care  discharge planning  dvt prophylasix

## 2017-02-18 NOTE — PROGRESS NOTE ADULT - SUBJECTIVE AND OBJECTIVE BOX
SUBJECTIVE:  No sob, no cp.  C/o cough with thick sputum has difficulty expectorating however after he is able to expectorate he feels significantly better.      REVIEW OF SYSTEMS:  All other review of systems is negative unless indicated above    Vital Signs Last 24 Hrs  T(C): 37.2, Max: 37.2 (02-18 @ 17:15)  T(F): 99, Max: 99 (02-18 @ 17:15)  HR: 71 (71 - 104)  BP: 132/75 (116/73 - 151/96)  BP(mean): --  RR: 18 (17 - 18)  SpO2: 99% (94% - 99%)    I&O's Summary    I & Os for current day (as of 18 Feb 2017 20:05)  =============================================  IN: 1000 ml / OUT: 0 ml / NET: 1000 ml      CAPILLARY BLOOD GLUCOSE  371 (18 Feb 2017 17:30)  267 (18 Feb 2017 12:15)  242 (18 Feb 2017 08:48)      PHYSICAL EXAM:  GENERAL APPEARANCE:  NAD, hemodynamically stable  HEENT:  Head is normocephalic    Skin:  Warm and dry without any rash   NECK:  Supple without lymphadenopathy.   HEART:  Regular rate and rhythm. normal S1 and S2, No M/R/G  LUNGS:  Good ins/exp effort, no W/R/R/C  ABDOMEN:  Soft, nontender, nondistended with good bowel sounds heard  EXTREMITIES:  Without cyanosis, clubbing or edema.   NEUROLOGICAL:  Gross nonfocal    MEDICATIONS:  MEDICATIONS  (STANDING):  gabapentin 300milliGRAM(s) Oral three times a day  losartan 100milliGRAM(s) Oral daily  amiodarone    Tablet 100milliGRAM(s) Oral daily  atorvastatin 10milliGRAM(s) Oral at bedtime  insulin glargine Injectable (LANTUS) 15Unit(s) SubCutaneous at bedtime  insulin lispro (HumaLOG) corrective regimen sliding scale  SubCutaneous three times a day before meals  insulin lispro (HumaLOG) corrective regimen sliding scale  SubCutaneous at bedtime  dextrose 5%. 1000milliLiter(s) IV Continuous <Continuous>  dextrose 50% Injectable 12.5Gram(s) IV Push once  dextrose 50% Injectable 25Gram(s) IV Push once  dextrose 50% Injectable 25Gram(s) IV Push once  ALBUTerol/ipratropium for Nebulization 3milliLiter(s) Nebulizer every 6 hours  guaiFENesin    Syrup 200milliGRAM(s) Oral every 4 hours      LABS: All Labs Reviewed:                        12.8   11.4  )-----------( 159      ( 18 Feb 2017 05:55 )             38.2     18 Feb 2017 05:55    141    |  107    |  22     ----------------------------<  304    4.3     |  24     |  1.21     Ca    8.4        18 Feb 2017 05:55  Phos  3.0       18 Feb 2017 05:55  Mg     2.2       18 Feb 2017 05:55    TPro  6.5    /  Alb  3.4    /  TBili  0.5    /  DBili  x      /  AST  27     /  ALT  33     /  AlkPhos  121    17 Feb 2017 19:56    PT/INR - ( 17 Feb 2017 17:35 )   PT: 41.9 sec;   INR: 3.76 ratio         PTT - ( 17 Feb 2017 17:35 )  PTT:42.0 sec      Blood Culture: 02-17 @ 10:36  Organism --  Gram Stain Blood -- Gram Stain --  Specimen Source .Blood Blood-Peripheral  Culture-Blood --      CXR:  Impression: No active pulmonary disease.

## 2017-02-19 VITALS
TEMPERATURE: 97 F | DIASTOLIC BLOOD PRESSURE: 62 MMHG | OXYGEN SATURATION: 92 % | RESPIRATION RATE: 18 BRPM | HEART RATE: 79 BPM | SYSTOLIC BLOOD PRESSURE: 108 MMHG

## 2017-02-19 PROCEDURE — 93010 ELECTROCARDIOGRAM REPORT: CPT

## 2017-02-19 RX ADMIN — Medication 1: at 08:55

## 2017-02-19 RX ADMIN — Medication 3 MILLILITER(S): at 10:08

## 2017-02-19 RX ADMIN — Medication 200 MILLIGRAM(S): at 06:42

## 2017-02-19 RX ADMIN — GABAPENTIN 300 MILLIGRAM(S): 400 CAPSULE ORAL at 06:42

## 2017-02-19 RX ADMIN — AZITHROMYCIN 500 MILLIGRAM(S): 500 TABLET, FILM COATED ORAL at 12:14

## 2017-02-19 RX ADMIN — GABAPENTIN 300 MILLIGRAM(S): 400 CAPSULE ORAL at 13:14

## 2017-02-19 RX ADMIN — Medication 200 MILLIGRAM(S): at 13:14

## 2017-02-19 RX ADMIN — Medication 4: at 12:13

## 2017-02-19 RX ADMIN — Medication 200 MILLIGRAM(S): at 09:45

## 2017-02-19 RX ADMIN — LOSARTAN POTASSIUM 100 MILLIGRAM(S): 100 TABLET, FILM COATED ORAL at 06:41

## 2017-02-19 RX ADMIN — AMIODARONE HYDROCHLORIDE 100 MILLIGRAM(S): 400 TABLET ORAL at 06:40

## 2017-02-19 NOTE — PROGRESS NOTE ADULT - SUBJECTIVE AND OBJECTIVE BOX
Subjective:    pat better, less sob.    MEDICATIONS  (STANDING):  gabapentin 300milliGRAM(s) Oral three times a day  losartan 100milliGRAM(s) Oral daily  amiodarone    Tablet 100milliGRAM(s) Oral daily  atorvastatin 10milliGRAM(s) Oral at bedtime  insulin glargine Injectable (LANTUS) 15Unit(s) SubCutaneous at bedtime  insulin lispro (HumaLOG) corrective regimen sliding scale  SubCutaneous three times a day before meals  insulin lispro (HumaLOG) corrective regimen sliding scale  SubCutaneous at bedtime  dextrose 5%. 1000milliLiter(s) IV Continuous <Continuous>  dextrose 50% Injectable 12.5Gram(s) IV Push once  dextrose 50% Injectable 25Gram(s) IV Push once  dextrose 50% Injectable 25Gram(s) IV Push once  azithromycin   Tablet 500milliGRAM(s) Oral daily  ALBUTerol/ipratropium for Nebulization 3milliLiter(s) Nebulizer every 6 hours  guaiFENesin    Syrup 200milliGRAM(s) Oral every 4 hours    MEDICATIONS  (PRN):  dextrose Gel 1Dose(s) Oral once PRN Blood Glucose LESS THAN 70 milliGRAM(s)/deciliter  glucagon  Injectable 1milliGRAM(s) IntraMuscular once PRN Glucose LESS THAN 70 milligrams/deciliter      Allergies    Privigen (Other (Severe))    Intolerances        Vital Signs Last 24 Hrs  T(C): 36.3, Max: 37.2 (02-18 @ 17:15)  T(F): 97.4, Max: 99 (02-18 @ 17:15)  HR: 79 (71 - 95)  BP: 108/62 (108/62 - 132/75)  BP(mean): --  RR: 18 (18 - 18)  SpO2: 92% (92% - 99%)    PHYSICAL EXAMINATION:    NECK:  Supple. No lymphadenopathy. Jugular venous pressure not elevated. Carotids equal.   HEART:   The cardiac impulse has a normal quality. Reg., Nl S1 and S2.  There are no murmurs, rubs or gallops noted  CHEST:  Chest is clear to auscultation. Normal respiratory effort.  ABDOMEN:  Soft and nontender.   EXTREMITIES:  There is no edema.       LABS:                        12.8   11.4  )-----------( 159      ( 18 Feb 2017 05:55 )             38.2     18 Feb 2017 05:55    141    |  107    |  22     ----------------------------<  304    4.3     |  24     |  1.21     Ca    8.4        18 Feb 2017 05:55  Phos  3.0       18 Feb 2017 05:55  Mg     2.2       18 Feb 2017 05:55    TPro  6.5    /  Alb  3.4    /  TBili  0.5    /  DBili  x      /  AST  27     /  ALT  33     /  AlkPhos  121    17 Feb 2017 19:56    PT/INR - ( 17 Feb 2017 17:35 )   PT: 41.9 sec;   INR: 3.76 ratio         PTT - ( 17 Feb 2017 17:35 )  PTT:42.0 sec

## 2017-02-19 NOTE — PROGRESS NOTE ADULT - ASSESSMENT
PROBLEMS;    Chronic obstructive pulmonary disease  Atrial fibrillation  Lymphoma    PLAN;    IV cefepime  aerosols  continue current rx  discharge planning  dvt prophylasix

## 2017-02-19 NOTE — DISCHARGE NOTE ADULT - PLAN OF CARE
resolution of bronchitis Take mucinex as needed to thin mucus  take 4 more days of azithromycin- next dose 2/20 10AM  use albuterol/ipratropium at home as needed

## 2017-02-19 NOTE — DISCHARGE NOTE ADULT - CARE PROVIDER_API CALL
Za Orozco), Medicine  [address]  [place] [address]  Phone: [phone]  Fax: [phone]

## 2017-02-19 NOTE — DISCHARGE NOTE ADULT - HOSPITAL COURSE
Pt is a 77 y/o/m w/ past medical history of lymphoma (last chemo last year), COPD (not home O2, steroid dependance), atrial fibrillation (on coumadin) admitted 2/17 w/ cc of worsening cough and shortness of breath. Patient always has cough but last 2 weeks cough and breathing has worsened. Subjective fevers at home - wife has been having upper respiratory infection. Pt takes nebulizer txs at home and received his flu shot this year.  Currently patient sitting up. denies any HA, CP, SOB. comfortably resting. Labs and vitals reviewed.     tele: NSR, EKG: no acute ischemic changes.     Pt had no signs of pneumonia.  sob resolved.  pt c/o cough with thick sputum c/w exacerbation of bronchitis.  On AM of discharge pt symptomatically improving.      Review of systems on day of discharge negative except as above    Vital Signs Last 24 Hrs  T(C): 36.3, Max: 37.2 (02-18 @ 17:15)  T(F): 97.4, Max: 99 (02-18 @ 17:15)  HR: 79 (71 - 95)  BP: 108/62 (108/62 - 132/75)  BP(mean): --  RR: 18 (18 - 18)  SpO2: 92% (92% - 99%)    GENERAL APPEARANCE:  NAD, hemodynamically stable  HEENT:  Head is normocephalic    Skin:  Warm and dry without any rash   NECK:  Supple without lymphadenopathy.   HEART:  Regular rate and rhythm. normal S1 and S2, No M/R/G  LUNGS:  Good ins/exp effort, no W/R/R/C  ABDOMEN:  Soft, nontender, nondistended with good bowel sounds heard  EXTREMITIES:  Without cyanosis, clubbing or edema.   NEUROLOGICAL:  Gross nonfocal        Assessment and Plan at discharge:   Problem/Plan - 1:  ·  Problem: Chronic obstructive pulmonary disease, unspecified COPD type.  Plan: - Acute bronchitis  - No evidence of Pneumonia  - duoneb- will resume at discharge  - will not give steroids since patient is not wheezing  - O2 support (not home O2 dependent or steroid dependent)  - mucinex  - azithro for bronchitis 1/5- will dc cefepime.     Problem/Plan - 2:  ·  Problem: Atrial fibrillation, unspecified type.  Plan: - coumadin/amiodaron.     Problem/Plan - 3:  ·  Problem: Lymphoma, unspecified body region, unspecified lymphoma type.  Plan: - follows up with Dr. Houser.

## 2017-02-19 NOTE — DISCHARGE NOTE ADULT - MEDICATION SUMMARY - MEDICATIONS TO TAKE
I will START or STAY ON the medications listed below when I get home from the hospital:    Cozaar 100 mg oral tablet  -- 1 tab(s) by mouth once a day  -- Indication: For bloos pressure    amiodarone 100 mg oral tablet  -- 100 milligram(s) by mouth once a day  -- Indication: For Afib    Coumadin 4 mg oral tablet  -- 1 tab(s) by mouth once a day  -- Indication: For Afib    gabapentin 300 mg oral capsule  -- 1 cap(s) by mouth 3 times a day  -- Indication: For pain    Lantus 100 units/mL subcutaneous solution  -- 15 unit(s) subcutaneous once a day  -- Indication: For diabetes    Lipitor 10 mg oral tablet  -- 1 tab(s) by mouth once a day  -- Indication: For CHolesterol    tenofovir 300 mg oral tablet  -- 1 tab(s) by mouth once a day  -- Indication: For Home med    ipratropium-albuterol 0.5 mg-2.5 mg/3 mLinhalation solution  -- 3 milliliter(s) inhaled 4 times a day  -- Indication: For COPD/bronchitis    Mucinex 600 mg oral tablet, extended release  -- 1 tab(s) by mouth every 12 hours  -- Medication should be taken with plenty of water.  Swallow whole.  Do not crush.    -- Indication: For bronchitis    montelukast 4 mg oral granule  -- 1 each by mouth once a day  -- Indication: For COPD (chronic obstructive pulmonary disease)    azithromycin 250 mg oral tablet  -- 1 tab(s) by mouth once a day  -- Do not take dairy products, antacids, or iron preparations within one hour of this medication.  Finish all this medication unless otherwise directed by prescriber.    -- Indication: For bronchitis

## 2017-02-19 NOTE — DISCHARGE NOTE ADULT - PATIENT PORTAL LINK FT
“You can access the [facility] Patient Portal, offered by [facility], by registering with the following website: [email or web address]”

## 2017-02-19 NOTE — DISCHARGE NOTE ADULT - NS AS DC VTE INSTRUCTION
Coumadin/Warfarin - Compliance.../Coumadin/Warfarin - Dietary Advice.../Coumadin/Warfarin - Follow-up monitoring.../Coumadin/Warfarin - Potential for adverse drug reactions and interactions

## 2017-02-19 NOTE — DISCHARGE NOTE ADULT - CARE PLAN
Principal Discharge DX:	Bronchitis, chronic obstructive w acute bronchitis  Goal:	resolution of bronchitis  Instructions for follow-up, activity and diet:	Take mucinex as needed to thin mucus  take 4 more days of azithromycin- next dose 2/20 10AM  use albuterol/ipratropium at home as needed

## 2017-02-20 RX ORDER — AZITHROMYCIN 500 MG/1
1 TABLET, FILM COATED ORAL
Qty: 4 | Refills: 0 | OUTPATIENT
Start: 2017-02-20 | End: 2017-02-24

## 2017-02-22 LAB
CULTURE RESULTS: SIGNIFICANT CHANGE UP
CULTURE RESULTS: SIGNIFICANT CHANGE UP
SPECIMEN SOURCE: SIGNIFICANT CHANGE UP
SPECIMEN SOURCE: SIGNIFICANT CHANGE UP

## 2017-02-23 DIAGNOSIS — J44.0 CHRONIC OBSTRUCTIVE PULMONARY DISEASE WITH (ACUTE) LOWER RESPIRATORY INFECTION: ICD-10-CM

## 2017-02-23 DIAGNOSIS — Z92.21 PERSONAL HISTORY OF ANTINEOPLASTIC CHEMOTHERAPY: ICD-10-CM

## 2017-02-23 DIAGNOSIS — Z88.8 ALLERGY STATUS TO OTHER DRUGS, MEDICAMENTS AND BIOLOGICAL SUBSTANCES: ICD-10-CM

## 2017-02-23 DIAGNOSIS — Z85.72 PERSONAL HISTORY OF NON-HODGKIN LYMPHOMAS: ICD-10-CM

## 2017-02-23 DIAGNOSIS — Z79.01 LONG TERM (CURRENT) USE OF ANTICOAGULANTS: ICD-10-CM

## 2017-02-23 DIAGNOSIS — I48.91 UNSPECIFIED ATRIAL FIBRILLATION: ICD-10-CM

## 2017-03-10 ENCOUNTER — APPOINTMENT (OUTPATIENT)
Dept: CARDIOLOGY | Facility: CLINIC | Age: 77
End: 2017-03-10

## 2017-03-16 ENCOUNTER — APPOINTMENT (OUTPATIENT)
Dept: CARDIOLOGY | Facility: CLINIC | Age: 77
End: 2017-03-16

## 2017-03-17 ENCOUNTER — APPOINTMENT (OUTPATIENT)
Dept: CARDIOLOGY | Facility: CLINIC | Age: 77
End: 2017-03-17

## 2017-03-24 ENCOUNTER — APPOINTMENT (OUTPATIENT)
Dept: CARDIOLOGY | Facility: CLINIC | Age: 77
End: 2017-03-24

## 2017-03-30 ENCOUNTER — APPOINTMENT (OUTPATIENT)
Dept: CARDIOLOGY | Facility: CLINIC | Age: 77
End: 2017-03-30

## 2017-04-13 ENCOUNTER — APPOINTMENT (OUTPATIENT)
Dept: CARDIOLOGY | Facility: CLINIC | Age: 77
End: 2017-04-13

## 2017-05-04 ENCOUNTER — APPOINTMENT (OUTPATIENT)
Dept: CARDIOLOGY | Facility: CLINIC | Age: 77
End: 2017-05-04

## 2017-05-10 ENCOUNTER — INPATIENT (INPATIENT)
Facility: HOSPITAL | Age: 77
LOS: 5 days | Discharge: HOME | End: 2017-05-16
Attending: FAMILY MEDICINE | Admitting: INTERNAL MEDICINE
Payer: MEDICARE

## 2017-05-10 VITALS
TEMPERATURE: 99 F | OXYGEN SATURATION: 90 % | HEART RATE: 114 BPM | RESPIRATION RATE: 22 BRPM | DIASTOLIC BLOOD PRESSURE: 55 MMHG | SYSTOLIC BLOOD PRESSURE: 89 MMHG

## 2017-05-10 DIAGNOSIS — Z90.79 ACQUIRED ABSENCE OF OTHER GENITAL ORGAN(S): Chronic | ICD-10-CM

## 2017-05-10 DIAGNOSIS — Z90.49 ACQUIRED ABSENCE OF OTHER SPECIFIED PARTS OF DIGESTIVE TRACT: Chronic | ICD-10-CM

## 2017-05-10 DIAGNOSIS — Z98.890 OTHER SPECIFIED POSTPROCEDURAL STATES: Chronic | ICD-10-CM

## 2017-05-10 LAB
ADD ON TEST-SPECIMEN IN LAB: SIGNIFICANT CHANGE UP
ALBUMIN SERPL ELPH-MCNC: 3.2 G/DL — LOW (ref 3.3–5)
ALP SERPL-CCNC: 110 U/L — SIGNIFICANT CHANGE UP (ref 40–120)
ALT FLD-CCNC: 29 U/L — SIGNIFICANT CHANGE UP (ref 12–78)
ANION GAP SERPL CALC-SCNC: 9 MMOL/L — SIGNIFICANT CHANGE UP (ref 5–17)
ANISOCYTOSIS BLD QL: SLIGHT — SIGNIFICANT CHANGE UP
AST SERPL-CCNC: 16 U/L — SIGNIFICANT CHANGE UP (ref 15–37)
BASE EXCESS BLDA CALC-SCNC: -3 MMOL/L — LOW (ref -2–2)
BASE EXCESS BLDV CALC-SCNC: 1 MMOL/L — SIGNIFICANT CHANGE UP (ref -2–2)
BASOPHILS # BLD AUTO: 0.2 K/UL — SIGNIFICANT CHANGE UP (ref 0–0.2)
BASOPHILS NFR BLD AUTO: 1 % — SIGNIFICANT CHANGE UP (ref 0–2)
BILIRUB SERPL-MCNC: 0.8 MG/DL — SIGNIFICANT CHANGE UP (ref 0.2–1.2)
BLOOD GAS COMMENTS ARTERIAL: SIGNIFICANT CHANGE UP
BUN SERPL-MCNC: 17 MG/DL — SIGNIFICANT CHANGE UP (ref 7–23)
CALCIUM SERPL-MCNC: 8 MG/DL — LOW (ref 8.5–10.1)
CHLORIDE SERPL-SCNC: 103 MMOL/L — SIGNIFICANT CHANGE UP (ref 96–108)
CO2 SERPL-SCNC: 27 MMOL/L — SIGNIFICANT CHANGE UP (ref 22–31)
CREAT SERPL-MCNC: 1.59 MG/DL — HIGH (ref 0.5–1.3)
EOSINOPHIL # BLD AUTO: 0.4 K/UL — SIGNIFICANT CHANGE UP (ref 0–0.5)
EOSINOPHIL NFR BLD AUTO: 2.7 % — SIGNIFICANT CHANGE UP (ref 0–6)
GAS PNL BLDA: SIGNIFICANT CHANGE UP
GLUCOSE SERPL-MCNC: 198 MG/DL — HIGH (ref 70–99)
HCO3 BLDA-SCNC: 22 MMOL/L — SIGNIFICANT CHANGE UP (ref 21–29)
HCO3 BLDV-SCNC: 27 MMOL/L — SIGNIFICANT CHANGE UP (ref 21–29)
HCT VFR BLD CALC: 36.9 % — LOW (ref 39–50)
HGB BLD-MCNC: 12.4 G/DL — LOW (ref 13–17)
INR BLD: 2.76 RATIO — HIGH (ref 0.88–1.16)
LACTATE SERPL-SCNC: 1.4 MMOL/L — SIGNIFICANT CHANGE UP (ref 0.7–2)
LYMPHOCYTES # BLD AUTO: 33 % — SIGNIFICANT CHANGE UP (ref 13–44)
LYMPHOCYTES # BLD AUTO: 5.3 K/UL — HIGH (ref 1–3.3)
MACROCYTES BLD QL: SLIGHT — SIGNIFICANT CHANGE UP
MANUAL DIF COMMENT BLD-IMP: SIGNIFICANT CHANGE UP
MCHC RBC-ENTMCNC: 30.6 PG — SIGNIFICANT CHANGE UP (ref 27–34)
MCHC RBC-ENTMCNC: 33.6 GM/DL — SIGNIFICANT CHANGE UP (ref 32–36)
MCV RBC AUTO: 91.3 FL — SIGNIFICANT CHANGE UP (ref 80–100)
MICROCYTES BLD QL: SLIGHT — SIGNIFICANT CHANGE UP
MONOCYTES # BLD AUTO: 1.3 K/UL — HIGH (ref 0–0.9)
MONOCYTES NFR BLD AUTO: 7.8 % — SIGNIFICANT CHANGE UP (ref 2–14)
NEUTROPHILS # BLD AUTO: 8.9 K/UL — HIGH (ref 1.8–7.4)
NEUTROPHILS NFR BLD AUTO: 55.5 % — SIGNIFICANT CHANGE UP (ref 43–77)
NEUTS BAND # BLD: 7 % — SIGNIFICANT CHANGE UP (ref 0–8)
PCO2 BLDA: 44 MMHG — SIGNIFICANT CHANGE UP (ref 32–46)
PCO2 BLDV: 53 MMHG — HIGH (ref 35–50)
PH BLDA: 7.33 — LOW (ref 7.35–7.45)
PH BLDV: 7.33 — LOW (ref 7.35–7.45)
PLAT MORPH BLD: NORMAL — SIGNIFICANT CHANGE UP
PLATELET # BLD AUTO: 108 K/UL — LOW (ref 150–400)
PLATELET COUNT - ESTIMATE: (no result)
PO2 BLDA: 90 — SIGNIFICANT CHANGE UP
PO2 BLDV: 53 MMHG — HIGH (ref 25–45)
POIKILOCYTOSIS BLD QL AUTO: SLIGHT — SIGNIFICANT CHANGE UP
POLYCHROMASIA BLD QL SMEAR: SLIGHT — SIGNIFICANT CHANGE UP
POTASSIUM SERPL-MCNC: 4.5 MMOL/L — SIGNIFICANT CHANGE UP (ref 3.5–5.3)
POTASSIUM SERPL-SCNC: 4.5 MMOL/L — SIGNIFICANT CHANGE UP (ref 3.5–5.3)
PROT SERPL-MCNC: 6 GM/DL — SIGNIFICANT CHANGE UP (ref 6–8.3)
PROTHROM AB SERPL-ACNC: 30.4 SEC — HIGH (ref 9.8–12.7)
RAPID RVP RESULT: DETECTED
RBC # BLD: 4.04 M/UL — LOW (ref 4.2–5.8)
RBC # FLD: 13.6 % — SIGNIFICANT CHANGE UP (ref 10.3–14.5)
RBC BLD AUTO: (no result)
RV+EV RNA SPEC QL NAA+PROBE: DETECTED
SAO2 % BLDA: 94 — SIGNIFICANT CHANGE UP
SAO2 % BLDV: 82 % — SIGNIFICANT CHANGE UP (ref 67–88)
SODIUM SERPL-SCNC: 139 MMOL/L — SIGNIFICANT CHANGE UP (ref 135–145)
TROPONIN I SERPL-MCNC: <0.015 NG/ML — SIGNIFICANT CHANGE UP (ref 0.01–0.04)
VARIANT LYMPHS # BLD: 4 % — SIGNIFICANT CHANGE UP (ref 0–6)
WBC # BLD: 15.9 K/UL — HIGH (ref 3.8–10.5)
WBC # FLD AUTO: 15.9 K/UL — HIGH (ref 3.8–10.5)

## 2017-05-10 PROCEDURE — 71010: CPT | Mod: 26

## 2017-05-10 PROCEDURE — 93010 ELECTROCARDIOGRAM REPORT: CPT

## 2017-05-10 PROCEDURE — 99285 EMERGENCY DEPT VISIT HI MDM: CPT

## 2017-05-10 RX ORDER — CEFEPIME 1 G/1
1000 INJECTION, POWDER, FOR SOLUTION INTRAMUSCULAR; INTRAVENOUS EVERY 8 HOURS
Qty: 0 | Refills: 0 | Status: DISCONTINUED | OUTPATIENT
Start: 2017-05-10 | End: 2017-05-10

## 2017-05-10 RX ORDER — ONDANSETRON 8 MG/1
4 TABLET, FILM COATED ORAL EVERY 6 HOURS
Qty: 0 | Refills: 0 | Status: DISCONTINUED | OUTPATIENT
Start: 2017-05-10 | End: 2017-05-16

## 2017-05-10 RX ORDER — ATORVASTATIN CALCIUM 80 MG/1
1 TABLET, FILM COATED ORAL
Qty: 0 | Refills: 0 | COMMUNITY

## 2017-05-10 RX ORDER — AMIODARONE HYDROCHLORIDE 400 MG/1
100 TABLET ORAL DAILY
Qty: 0 | Refills: 0 | Status: DISCONTINUED | OUTPATIENT
Start: 2017-05-10 | End: 2017-05-10

## 2017-05-10 RX ORDER — AMIODARONE HYDROCHLORIDE 400 MG/1
100 TABLET ORAL DAILY
Qty: 0 | Refills: 0 | Status: DISCONTINUED | OUTPATIENT
Start: 2017-05-10 | End: 2017-05-16

## 2017-05-10 RX ORDER — DEXTROSE 50 % IN WATER 50 %
12.5 SYRINGE (ML) INTRAVENOUS ONCE
Qty: 0 | Refills: 0 | Status: DISCONTINUED | OUTPATIENT
Start: 2017-05-10 | End: 2017-05-16

## 2017-05-10 RX ORDER — TENOFOVIR DISOPROXIL FUMARATE 300 MG/1
300 TABLET, FILM COATED ORAL DAILY
Qty: 0 | Refills: 0 | Status: DISCONTINUED | OUTPATIENT
Start: 2017-05-10 | End: 2017-05-16

## 2017-05-10 RX ORDER — ACETAMINOPHEN 500 MG
650 TABLET ORAL ONCE
Qty: 0 | Refills: 0 | Status: COMPLETED | OUTPATIENT
Start: 2017-05-10 | End: 2017-05-10

## 2017-05-10 RX ORDER — AZITHROMYCIN 500 MG/1
500 TABLET, FILM COATED ORAL ONCE
Qty: 0 | Refills: 0 | Status: COMPLETED | OUTPATIENT
Start: 2017-05-10 | End: 2017-05-10

## 2017-05-10 RX ORDER — SODIUM CHLORIDE 9 MG/ML
2000 INJECTION INTRAMUSCULAR; INTRAVENOUS; SUBCUTANEOUS ONCE
Qty: 0 | Refills: 0 | Status: COMPLETED | OUTPATIENT
Start: 2017-05-10 | End: 2017-05-10

## 2017-05-10 RX ORDER — WARFARIN SODIUM 2.5 MG/1
3.75 TABLET ORAL ONCE
Qty: 0 | Refills: 0 | Status: COMPLETED | OUTPATIENT
Start: 2017-05-10 | End: 2017-05-10

## 2017-05-10 RX ORDER — CEFEPIME 1 G/1
1000 INJECTION, POWDER, FOR SOLUTION INTRAMUSCULAR; INTRAVENOUS ONCE
Qty: 0 | Refills: 0 | Status: COMPLETED | OUTPATIENT
Start: 2017-05-10 | End: 2017-05-10

## 2017-05-10 RX ORDER — MONTELUKAST 4 MG/1
10 TABLET, CHEWABLE ORAL AT BEDTIME
Qty: 0 | Refills: 0 | Status: DISCONTINUED | OUTPATIENT
Start: 2017-05-10 | End: 2017-05-16

## 2017-05-10 RX ORDER — IPRATROPIUM/ALBUTEROL SULFATE 18-103MCG
3 AEROSOL WITH ADAPTER (GRAM) INHALATION EVERY 6 HOURS
Qty: 0 | Refills: 0 | Status: DISCONTINUED | OUTPATIENT
Start: 2017-05-10 | End: 2017-05-16

## 2017-05-10 RX ORDER — CEFEPIME 1 G/1
1000 INJECTION, POWDER, FOR SOLUTION INTRAMUSCULAR; INTRAVENOUS ONCE
Qty: 0 | Refills: 0 | Status: DISCONTINUED | OUTPATIENT
Start: 2017-05-10 | End: 2017-05-10

## 2017-05-10 RX ORDER — SODIUM CHLORIDE 9 MG/ML
3 INJECTION INTRAMUSCULAR; INTRAVENOUS; SUBCUTANEOUS EVERY 8 HOURS
Qty: 0 | Refills: 0 | Status: DISCONTINUED | OUTPATIENT
Start: 2017-05-10 | End: 2017-05-16

## 2017-05-10 RX ORDER — CEFEPIME 1 G/1
INJECTION, POWDER, FOR SOLUTION INTRAMUSCULAR; INTRAVENOUS
Qty: 0 | Refills: 0 | Status: DISCONTINUED | OUTPATIENT
Start: 2017-05-10 | End: 2017-05-10

## 2017-05-10 RX ORDER — INSULIN LISPRO 100/ML
VIAL (ML) SUBCUTANEOUS
Qty: 0 | Refills: 0 | Status: DISCONTINUED | OUTPATIENT
Start: 2017-05-10 | End: 2017-05-16

## 2017-05-10 RX ORDER — LOSARTAN POTASSIUM 100 MG/1
100 TABLET, FILM COATED ORAL DAILY
Qty: 0 | Refills: 0 | Status: DISCONTINUED | OUTPATIENT
Start: 2017-05-10 | End: 2017-05-16

## 2017-05-10 RX ORDER — IPRATROPIUM/ALBUTEROL SULFATE 18-103MCG
3 AEROSOL WITH ADAPTER (GRAM) INHALATION
Qty: 0 | Refills: 0 | COMMUNITY

## 2017-05-10 RX ORDER — METOPROLOL TARTRATE 50 MG
50 TABLET ORAL
Qty: 0 | Refills: 0 | Status: DISCONTINUED | OUTPATIENT
Start: 2017-05-10 | End: 2017-05-16

## 2017-05-10 RX ORDER — INSULIN GLARGINE 100 [IU]/ML
10 INJECTION, SOLUTION SUBCUTANEOUS AT BEDTIME
Qty: 0 | Refills: 0 | Status: DISCONTINUED | OUTPATIENT
Start: 2017-05-10 | End: 2017-05-10

## 2017-05-10 RX ORDER — SODIUM CHLORIDE 9 MG/ML
1000 INJECTION, SOLUTION INTRAVENOUS
Qty: 0 | Refills: 0 | Status: DISCONTINUED | OUTPATIENT
Start: 2017-05-10 | End: 2017-05-16

## 2017-05-10 RX ORDER — VANCOMYCIN HCL 1 G
1000 VIAL (EA) INTRAVENOUS ONCE
Qty: 0 | Refills: 0 | Status: DISCONTINUED | OUTPATIENT
Start: 2017-05-10 | End: 2017-05-10

## 2017-05-10 RX ORDER — VANCOMYCIN HCL 1 G
VIAL (EA) INTRAVENOUS
Qty: 0 | Refills: 0 | Status: DISCONTINUED | OUTPATIENT
Start: 2017-05-10 | End: 2017-05-16

## 2017-05-10 RX ORDER — VANCOMYCIN HCL 1 G
1250 VIAL (EA) INTRAVENOUS ONCE
Qty: 0 | Refills: 0 | Status: COMPLETED | OUTPATIENT
Start: 2017-05-10 | End: 2017-05-10

## 2017-05-10 RX ORDER — LIDOCAINE 4 G/100G
2 CREAM TOPICAL DAILY
Qty: 0 | Refills: 0 | Status: DISCONTINUED | OUTPATIENT
Start: 2017-05-10 | End: 2017-05-16

## 2017-05-10 RX ORDER — CEFTRIAXONE 500 MG/1
1 INJECTION, POWDER, FOR SOLUTION INTRAMUSCULAR; INTRAVENOUS ONCE
Qty: 0 | Refills: 0 | Status: COMPLETED | OUTPATIENT
Start: 2017-05-10 | End: 2017-05-10

## 2017-05-10 RX ORDER — GLUCAGON INJECTION, SOLUTION 0.5 MG/.1ML
1 INJECTION, SOLUTION SUBCUTANEOUS ONCE
Qty: 0 | Refills: 0 | Status: DISCONTINUED | OUTPATIENT
Start: 2017-05-10 | End: 2017-05-16

## 2017-05-10 RX ORDER — MONTELUKAST 4 MG/1
1 TABLET, CHEWABLE ORAL
Qty: 0 | Refills: 0 | COMMUNITY

## 2017-05-10 RX ORDER — CEFEPIME 1 G/1
1000 INJECTION, POWDER, FOR SOLUTION INTRAMUSCULAR; INTRAVENOUS EVERY 12 HOURS
Qty: 0 | Refills: 0 | Status: DISCONTINUED | OUTPATIENT
Start: 2017-05-11 | End: 2017-05-16

## 2017-05-10 RX ORDER — WARFARIN SODIUM 2.5 MG/1
1 TABLET ORAL
Qty: 0 | Refills: 0 | COMMUNITY

## 2017-05-10 RX ORDER — GABAPENTIN 400 MG/1
300 CAPSULE ORAL THREE TIMES A DAY
Qty: 0 | Refills: 0 | Status: DISCONTINUED | OUTPATIENT
Start: 2017-05-10 | End: 2017-05-16

## 2017-05-10 RX ORDER — DOCUSATE SODIUM 100 MG
100 CAPSULE ORAL THREE TIMES A DAY
Qty: 0 | Refills: 0 | Status: DISCONTINUED | OUTPATIENT
Start: 2017-05-10 | End: 2017-05-16

## 2017-05-10 RX ORDER — DEXTROSE 50 % IN WATER 50 %
1 SYRINGE (ML) INTRAVENOUS ONCE
Qty: 0 | Refills: 0 | Status: DISCONTINUED | OUTPATIENT
Start: 2017-05-10 | End: 2017-05-16

## 2017-05-10 RX ORDER — VANCOMYCIN HCL 1 G
1250 VIAL (EA) INTRAVENOUS EVERY 24 HOURS
Qty: 0 | Refills: 0 | Status: DISCONTINUED | OUTPATIENT
Start: 2017-05-11 | End: 2017-05-16

## 2017-05-10 RX ORDER — SIMVASTATIN 20 MG/1
10 TABLET, FILM COATED ORAL AT BEDTIME
Qty: 0 | Refills: 0 | Status: DISCONTINUED | OUTPATIENT
Start: 2017-05-10 | End: 2017-05-16

## 2017-05-10 RX ORDER — INSULIN GLARGINE 100 [IU]/ML
40 INJECTION, SOLUTION SUBCUTANEOUS
Qty: 0 | Refills: 0 | Status: DISCONTINUED | OUTPATIENT
Start: 2017-05-10 | End: 2017-05-16

## 2017-05-10 RX ORDER — AZITHROMYCIN 500 MG/1
500 TABLET, FILM COATED ORAL EVERY 24 HOURS
Qty: 0 | Refills: 0 | Status: DISCONTINUED | OUTPATIENT
Start: 2017-05-10 | End: 2017-05-11

## 2017-05-10 RX ORDER — CEFEPIME 1 G/1
INJECTION, POWDER, FOR SOLUTION INTRAMUSCULAR; INTRAVENOUS
Qty: 0 | Refills: 0 | Status: DISCONTINUED | OUTPATIENT
Start: 2017-05-10 | End: 2017-05-16

## 2017-05-10 RX ORDER — INSULIN GLARGINE 100 [IU]/ML
15 INJECTION, SOLUTION SUBCUTANEOUS
Qty: 0 | Refills: 0 | COMMUNITY

## 2017-05-10 RX ORDER — IPRATROPIUM/ALBUTEROL SULFATE 18-103MCG
3 AEROSOL WITH ADAPTER (GRAM) INHALATION
Qty: 0 | Refills: 0 | Status: COMPLETED | OUTPATIENT
Start: 2017-05-10 | End: 2017-05-10

## 2017-05-10 RX ADMIN — SODIUM CHLORIDE 3 MILLILITER(S): 9 INJECTION INTRAMUSCULAR; INTRAVENOUS; SUBCUTANEOUS at 21:30

## 2017-05-10 RX ADMIN — GABAPENTIN 300 MILLIGRAM(S): 400 CAPSULE ORAL at 21:49

## 2017-05-10 RX ADMIN — Medication 100 MILLIGRAM(S): at 21:49

## 2017-05-10 RX ADMIN — Medication 3 MILLILITER(S): at 19:40

## 2017-05-10 RX ADMIN — Medication 3 MILLILITER(S): at 10:25

## 2017-05-10 RX ADMIN — WARFARIN SODIUM 3.75 MILLIGRAM(S): 2.5 TABLET ORAL at 21:48

## 2017-05-10 RX ADMIN — Medication 1 TABLET(S): at 16:25

## 2017-05-10 RX ADMIN — AMIODARONE HYDROCHLORIDE 100 MILLIGRAM(S): 400 TABLET ORAL at 17:11

## 2017-05-10 RX ADMIN — MONTELUKAST 10 MILLIGRAM(S): 4 TABLET, CHEWABLE ORAL at 21:49

## 2017-05-10 RX ADMIN — CEFEPIME 100 MILLIGRAM(S): 1 INJECTION, POWDER, FOR SOLUTION INTRAMUSCULAR; INTRAVENOUS at 16:08

## 2017-05-10 RX ADMIN — AZITHROMYCIN 255 MILLIGRAM(S): 500 TABLET, FILM COATED ORAL at 11:15

## 2017-05-10 RX ADMIN — Medication 650 MILLIGRAM(S): at 11:10

## 2017-05-10 RX ADMIN — Medication 3 MILLILITER(S): at 14:29

## 2017-05-10 RX ADMIN — AZITHROMYCIN 255 MILLIGRAM(S): 500 TABLET, FILM COATED ORAL at 16:58

## 2017-05-10 RX ADMIN — LOSARTAN POTASSIUM 100 MILLIGRAM(S): 100 TABLET, FILM COATED ORAL at 21:49

## 2017-05-10 RX ADMIN — Medication 3 MILLILITER(S): at 10:45

## 2017-05-10 RX ADMIN — SODIUM CHLORIDE 2000 MILLILITER(S): 9 INJECTION INTRAMUSCULAR; INTRAVENOUS; SUBCUTANEOUS at 09:55

## 2017-05-10 RX ADMIN — Medication 100 MILLIGRAM(S): at 16:25

## 2017-05-10 RX ADMIN — Medication 2: at 17:12

## 2017-05-10 RX ADMIN — Medication 166.67 MILLIGRAM(S): at 16:13

## 2017-05-10 RX ADMIN — LIDOCAINE 2 PATCH: 4 CREAM TOPICAL at 18:46

## 2017-05-10 RX ADMIN — SODIUM CHLORIDE 3 MILLILITER(S): 9 INJECTION INTRAMUSCULAR; INTRAVENOUS; SUBCUTANEOUS at 16:58

## 2017-05-10 RX ADMIN — Medication 650 MILLIGRAM(S): at 10:25

## 2017-05-10 RX ADMIN — SIMVASTATIN 10 MILLIGRAM(S): 20 TABLET, FILM COATED ORAL at 21:49

## 2017-05-10 RX ADMIN — CEFTRIAXONE 100 GRAM(S): 500 INJECTION, POWDER, FOR SOLUTION INTRAMUSCULAR; INTRAVENOUS at 10:26

## 2017-05-10 RX ADMIN — GABAPENTIN 300 MILLIGRAM(S): 400 CAPSULE ORAL at 17:31

## 2017-05-10 RX ADMIN — Medication 3 MILLILITER(S): at 10:05

## 2017-05-10 RX ADMIN — INSULIN GLARGINE 40 UNIT(S): 100 INJECTION, SOLUTION SUBCUTANEOUS at 21:49

## 2017-05-10 RX ADMIN — TENOFOVIR DISOPROXIL FUMARATE 300 MILLIGRAM(S): 300 TABLET, FILM COATED ORAL at 17:11

## 2017-05-10 NOTE — ED PROVIDER NOTE - OBJECTIVE STATEMENT
76 y/o male with PMhx of PNA, afib, HLD, HTN, COPD, on coumadin presents to the ED c/o cough with SOB that became worse this morning. Family at bedside states that pt has been having subjective fevers for the last 3 days. Pt states that the cough is productive and reports chest pain and back pain with the coughing. Pt was hypoxic to 90% on room air in triage and hypotensive. Currently pt has no other complaints and denies abd pain, n/v/d, dysuria, and hematuria.

## 2017-05-10 NOTE — ED PROVIDER NOTE - FAMILY HISTORY
Mother  Still living? No  Family history of coronary arteriosclerosis, Age at diagnosis: Age Unknown     Father  Still living? No  Family history of liver cancer, Age at diagnosis: Age Unknown

## 2017-05-10 NOTE — H&P ADULT - HISTORY OF PRESENT ILLNESS
77 yr old  male w hx B cell -CLL stage 4/SLL, metastatic NHL, hypogammaglobulinemia (s/p IVIG last ?), AFfib on AC, HTN, HLD, CHF-diastolic, hx PE/DVT, COPD, hx recurrent PNA came to ED w family complaining of tactile temp and productive cough and SOB        There is a 3 day hx of tactile temps, cough productive of white sputum and SOB that progressed to the point that he had to come in today for evaluation.  Has associated chest pain across lower rib cage bilaterally and to back that occurs w cough  Denied recent travel hx or recent sick contacts        He was hypoxic to 90 at room air.  Patient feels a little improved after respiratory treatment and oxygen.        In the ED he received fluids and IV ceftiaxone and zithromax

## 2017-05-10 NOTE — ED ADULT NURSE NOTE - OBJECTIVE STATEMENT
Pt presents to ED c/o cough for 5 years worsening today. Pt's son he has a fever for the last 3 days.

## 2017-05-10 NOTE — H&P ADULT - NSHPPHYSICALEXAM_GEN_ALL_CORE
Vital Signs Last 24 Hrs  T(C): 37.2, Max: 38 (05-10 @ 09:59)  T(F): 98.9, Max: 100.4 (05-10 @ 09:59)  HR: 106 (106 - 114)  BP: 119/69 (89/55 - 119/69)  BP(mean): --  RR: 20 (20 - 22)  SpO2: 98% (90% - 98%) on 2 L     male lying in stretcher w HOB elevated >60 degrees, in mild resp distress wearing nasal cannula  HEENT; pupils reactive, anicteric sclera, dry oral mucosa  Neck: no bruit or JVD  Chest: scattered wheezes bilaterally no rales + occas rhonchi  CW + accessory muscle usage, mild soreness to palpation lower rib cage bilaterally  Cor  S1 + S2  Abd + BS soft, nontender, cannot appreciate organ size to percussion  Extrem no edema  MS nontender  SKin: no rash or lesions seen  breast//rectal deferred as not indicated by current complaint

## 2017-05-10 NOTE — PATIENT PROFILE ADULT. - FUNCTIONAL SCREEN CURRENT LEVEL: COMMUNICATION, MLM
(0) understands/communicates without difficulty (2) difficulty understanding (not related to language barrier)

## 2017-05-10 NOTE — H&P ADULT - NSHPLABSRESULTS_GEN_ALL_CORE
CXR no infiltrate seen    EKG   w/o acute ST-T changes    no additional information on telemetry CXR unofficial ? early RML?RLL infiltrate seen    EKG   w/o acute ST-T changes    no additional information on telemetry

## 2017-05-10 NOTE — H&P ADULT - ASSESSMENT
77 yr old  male w hx B cell -CLL stage 4/SLL, metastatic NHL, hypogammaglobulinemia (s/p IVIG last ?), AFfib on AC, HTN, HLD, CHF-diastolic, hx PE/DVT, COPD, hx recurrent PNA came to ED w family complaining of tactile temp and productive cough and SOB

## 2017-05-10 NOTE — ED PROVIDER NOTE - NS ED MD SCRIBE ATTENDING SCRIBE SECTIONS
PHYSICAL EXAM/DISPOSITION/REVIEW OF SYSTEMS/RESULTS/HISTORY OF PRESENT ILLNESS/PAST MEDICAL/SURGICAL/SOCIAL HISTORY/PROGRESS NOTE

## 2017-05-10 NOTE — ED ADULT NURSE NOTE - PMH
Atrial fibrillation, unspecified type    B-cell lymphoma, unspecified B-cell lymphoma type, unspecified body region  B cell CLL stage 4/SLL; met NHL  Chronic diastolic congestive heart failure    COPD (chronic obstructive pulmonary disease)  Lymphoma   Atrial fibrillation  DM type 2 (diabetes mellitus, type 2)    Essential hypertension    Hepatitis B virus infection, unspecified chronicity  retrovirals  Hypogammaglobulinemia  received IVIG

## 2017-05-10 NOTE — H&P ADULT - ATTENDING COMMENTS
1) Acute bronchitis poss PNA in pt at risk for G+ and G- as well as atypicals  A) Tactile temp, hypoxemia productive cough, SOB and leukocytosis  B) patient is immune compromised by NHL and hypogammaglobulinemia  C) hx recurrent PNAs  D) no recent IVIG  1. D/C ceftriaxone  2. cefepime Sat and q 12  3. vanco x 1  4. continue zithromax for atypicals  5. ID to see  6. Pulm to see  7. monitor O2 sats  8. nebs    2) Thrombocytopenia  A) has been documented in past  B) last plt in  150s  1. monitor plts    3) AF on AC  1. needs PT/INR  2. usual coumadin is 3.75 mg po daily except monday and froday when he gets 2.5 mg    4) hx CHF diastolic  A) LVEF 55-60% in     5) HTN  1. meds as ordered    6) HLD  1. statin    7) HBV retrovirals    8) COPD 1) Acute bronchitis prob early PNA ? R ML in pt at risk for G+ and G- as well as atypicals  A) Tactile temp, hypoxemia productive cough, SOB and leukocytosis w underlying COPD  B) patient is immune compromised by NHL and hypogammaglobulinemia  C) hx recurrent PNAs  D) no recent IVIG  1. D/C ceftriaxone  2. cefepime add  3. vanco x 1  4. continue zithromax for atypicals  5. ID to see  6. Pulm to see  7. monitor O2 sats  8. nebs    2) Thrombocytopenia  A) has been documented in past  B) last plt in  150s  1. monitor plts    3) AF on AC  1. needs PT/INR  2. usual coumadin is 3.75 mg po daily except monday and friday when he gets 2.5 mg    4) hx CHF diastolic  A) LVEF 55-60% in   1, usual meds    5) HTN  1. meds as ordered    6) HLD  1. statin ordered    7) HBV  on retroviral  1. continue tenofivir    8)sw re discharge planning    9) DVT w COumadin pending PT/INR 1) Acute bronchitis prob early PNA ? R ML in pt at risk for G+ and G- as well as atypicals  A) Tactile temp, hypoxemia productive cough, SOB and leukocytosis w underlying COPD  B) patient is immune compromised by NHL and hypogammaglobulinemia  C) hx recurrent PNAs  D) no recent IVIG  1. D/C ceftriaxone  2. cefepime add  3. vanco x 1  4. continue zithromax for atypicals// monitor QT (on EKG tomorrow)  5. ID to see  6. Pulm to see  7. monitor O2 sats  8. nebs    2) Thrombocytopenia  A) has been documented in past  B) last plt in  150s  1. monitor plts    3) AF on AC  1. needs PT/INR  2. usual coumadin is 3.75 mg po daily except monday and friday when he gets 2.5 mg    4) hx CHF diastolic  A) LVEF 55-60% in   1, usual meds    5) HTN  1. meds as ordered    6) HLD  1. statin ordered    7) HBV  on retroviral  1. continue tenofivir    8)sw re discharge planning    9) DVT w COumadin pending PT/INR

## 2017-05-10 NOTE — ED PROVIDER NOTE - MEDICAL DECISION MAKING DETAILS
76 y/o male presents with worsening SOB and cough, new hypoxia here and low grade fever. Will draw labs, cultures, CXR, abx and likely admit secondary to hypoxia. 78 y/o male presents with worsening SOB and cough, new hypoxia here and fever. Will draw labs, cultures, CXR, abx and likely admit secondary to hypoxia.

## 2017-05-10 NOTE — ED ADULT NURSE NOTE - LANGUAGE ASSISTANCE NEEDED
Pt's son Jim prefers to translate/No-Patient/Caregiver offered and refused free interpretation services.

## 2017-05-11 LAB
ANION GAP SERPL CALC-SCNC: 9 MMOL/L — SIGNIFICANT CHANGE UP (ref 5–17)
BUN SERPL-MCNC: 15 MG/DL — SIGNIFICANT CHANGE UP (ref 7–23)
CALCIUM SERPL-MCNC: 8.2 MG/DL — LOW (ref 8.5–10.1)
CHLORIDE SERPL-SCNC: 108 MMOL/L — SIGNIFICANT CHANGE UP (ref 96–108)
CO2 SERPL-SCNC: 26 MMOL/L — SIGNIFICANT CHANGE UP (ref 22–31)
CREAT SERPL-MCNC: 1.21 MG/DL — SIGNIFICANT CHANGE UP (ref 0.5–1.3)
GLUCOSE SERPL-MCNC: 91 MG/DL — SIGNIFICANT CHANGE UP (ref 70–99)
GRAM STN FLD: SIGNIFICANT CHANGE UP
HCT VFR BLD CALC: 36.5 % — LOW (ref 39–50)
HGB BLD-MCNC: 11.7 G/DL — LOW (ref 13–17)
MAGNESIUM SERPL-MCNC: 2.3 MG/DL — SIGNIFICANT CHANGE UP (ref 1.8–2.4)
MCHC RBC-ENTMCNC: 30 PG — SIGNIFICANT CHANGE UP (ref 27–34)
MCHC RBC-ENTMCNC: 32.1 GM/DL — SIGNIFICANT CHANGE UP (ref 32–36)
MCV RBC AUTO: 93.5 FL — SIGNIFICANT CHANGE UP (ref 80–100)
PLATELET # BLD AUTO: 116 K/UL — LOW (ref 150–400)
POTASSIUM SERPL-MCNC: 4.4 MMOL/L — SIGNIFICANT CHANGE UP (ref 3.5–5.3)
POTASSIUM SERPL-SCNC: 4.4 MMOL/L — SIGNIFICANT CHANGE UP (ref 3.5–5.3)
RBC # BLD: 3.9 M/UL — LOW (ref 4.2–5.8)
RBC # FLD: 14.3 % — SIGNIFICANT CHANGE UP (ref 10.3–14.5)
SODIUM SERPL-SCNC: 143 MMOL/L — SIGNIFICANT CHANGE UP (ref 135–145)
SPECIMEN SOURCE: SIGNIFICANT CHANGE UP
WBC # BLD: 13.1 K/UL — HIGH (ref 3.8–10.5)
WBC # FLD AUTO: 13.1 K/UL — HIGH (ref 3.8–10.5)

## 2017-05-11 PROCEDURE — 93010 ELECTROCARDIOGRAM REPORT: CPT

## 2017-05-11 RX ORDER — WARFARIN SODIUM 2.5 MG/1
3.75 TABLET ORAL
Qty: 0 | Refills: 0 | Status: DISCONTINUED | OUTPATIENT
Start: 2017-05-11 | End: 2017-05-13

## 2017-05-11 RX ORDER — IPRATROPIUM/ALBUTEROL SULFATE 18-103MCG
3 AEROSOL WITH ADAPTER (GRAM) INHALATION ONCE
Qty: 0 | Refills: 0 | Status: DISCONTINUED | OUTPATIENT
Start: 2017-05-11 | End: 2017-05-16

## 2017-05-11 RX ORDER — WARFARIN SODIUM 2.5 MG/1
2.5 TABLET ORAL
Qty: 0 | Refills: 0 | Status: COMPLETED | OUTPATIENT
Start: 2017-05-11 | End: 2017-05-12

## 2017-05-11 RX ADMIN — Medication 1 TABLET(S): at 12:13

## 2017-05-11 RX ADMIN — Medication 3 MILLILITER(S): at 07:57

## 2017-05-11 RX ADMIN — Medication 3 MILLILITER(S): at 19:37

## 2017-05-11 RX ADMIN — GABAPENTIN 300 MILLIGRAM(S): 400 CAPSULE ORAL at 21:20

## 2017-05-11 RX ADMIN — LOSARTAN POTASSIUM 100 MILLIGRAM(S): 100 TABLET, FILM COATED ORAL at 05:43

## 2017-05-11 RX ADMIN — CEFEPIME 100 MILLIGRAM(S): 1 INJECTION, POWDER, FOR SOLUTION INTRAMUSCULAR; INTRAVENOUS at 05:43

## 2017-05-11 RX ADMIN — Medication 50 MILLIGRAM(S): at 05:43

## 2017-05-11 RX ADMIN — Medication 4: at 12:11

## 2017-05-11 RX ADMIN — MONTELUKAST 10 MILLIGRAM(S): 4 TABLET, CHEWABLE ORAL at 21:21

## 2017-05-11 RX ADMIN — AMIODARONE HYDROCHLORIDE 100 MILLIGRAM(S): 400 TABLET ORAL at 05:43

## 2017-05-11 RX ADMIN — TENOFOVIR DISOPROXIL FUMARATE 300 MILLIGRAM(S): 300 TABLET, FILM COATED ORAL at 12:19

## 2017-05-11 RX ADMIN — Medication 600 MILLIGRAM(S): at 17:29

## 2017-05-11 RX ADMIN — Medication 3 MILLILITER(S): at 13:45

## 2017-05-11 RX ADMIN — GABAPENTIN 300 MILLIGRAM(S): 400 CAPSULE ORAL at 05:43

## 2017-05-11 RX ADMIN — Medication 50 MILLIGRAM(S): at 20:25

## 2017-05-11 RX ADMIN — GABAPENTIN 300 MILLIGRAM(S): 400 CAPSULE ORAL at 15:04

## 2017-05-11 RX ADMIN — LIDOCAINE 2 PATCH: 4 CREAM TOPICAL at 05:42

## 2017-05-11 RX ADMIN — Medication 100 MILLIGRAM(S): at 21:20

## 2017-05-11 RX ADMIN — LIDOCAINE 2 PATCH: 4 CREAM TOPICAL at 12:17

## 2017-05-11 RX ADMIN — SODIUM CHLORIDE 3 MILLILITER(S): 9 INJECTION INTRAMUSCULAR; INTRAVENOUS; SUBCUTANEOUS at 21:23

## 2017-05-11 RX ADMIN — Medication 100 MILLIGRAM(S): at 05:43

## 2017-05-11 RX ADMIN — Medication 166.67 MILLIGRAM(S): at 15:04

## 2017-05-11 RX ADMIN — INSULIN GLARGINE 40 UNIT(S): 100 INJECTION, SOLUTION SUBCUTANEOUS at 21:28

## 2017-05-11 RX ADMIN — Medication 100 MILLIGRAM(S): at 15:04

## 2017-05-11 RX ADMIN — CEFEPIME 100 MILLIGRAM(S): 1 INJECTION, POWDER, FOR SOLUTION INTRAMUSCULAR; INTRAVENOUS at 17:29

## 2017-05-11 RX ADMIN — WARFARIN SODIUM 3.75 MILLIGRAM(S): 2.5 TABLET ORAL at 21:21

## 2017-05-11 RX ADMIN — SODIUM CHLORIDE 3 MILLILITER(S): 9 INJECTION INTRAMUSCULAR; INTRAVENOUS; SUBCUTANEOUS at 13:55

## 2017-05-11 RX ADMIN — SODIUM CHLORIDE 3 MILLILITER(S): 9 INJECTION INTRAMUSCULAR; INTRAVENOUS; SUBCUTANEOUS at 05:41

## 2017-05-11 RX ADMIN — Medication 2: at 17:25

## 2017-05-11 RX ADMIN — Medication 3 MILLILITER(S): at 02:50

## 2017-05-11 RX ADMIN — SIMVASTATIN 10 MILLIGRAM(S): 20 TABLET, FILM COATED ORAL at 21:20

## 2017-05-11 NOTE — CONSULT NOTE ADULT - ASSESSMENT
77 yr old  male w hx B cell -CLL stage 4/SLL, metastatic NHL, hypogammaglobulinemia (s/p IVIG last ?), AFfib on AC, HTN, HLD, CHF-diastolic, hx PE/DVT, COPD, hx recurrent PNA came to ED w family complaining of tactile temp and productive cough and SOB        There is a 3 day hx of tactile temps, cough productive of white sputum and SOB that progressed to the point that he had to come in today for evaluation.  Has associated chest pain across lower rib cage bilaterally and to back that occurs w cough  Denied recent travel hx or recent sick contacts        He was hypoxic to 90 at room air.  Patient feels a little improved after respiratory treatment and oxygen.        In the ED he received fluids and IV ceftiaxone and zithromax (10 May 2017 12:14)  Recurrent pneumonia  non toxic and hemodynamically stable  Hypoxemia improved  r/o pseudomonas or MRSa with recurrent infections  Hypogamma on Ov Ig  Adeq oxygenation  mild bronchospasm  if not better will get ct chest  agree with broadspectrum antibiotic coverage  incentive  expectotrant  bronchodilator rx  monitor clinically  thank you

## 2017-05-11 NOTE — CONSULT NOTE ADULT - ASSESSMENT
77 yr old  male w hx B cell -CLL stage 4/SLL, metastatic NHL, hypogammaglobulinemia (s/p IVIG last ?), AFfib on AC, HTN, HLD, CHF-diastolic, hx PE/DVT, COPD, hx recurrent PNA admitted 5/10 with subjective fevers, productive cough, worsening sob X 3 days, reports productive cough with white sputum, nasal congestion, and pleuritic chest pain, No recent travel or sick contacts. Here afebrile, O2 saturation 90% on RA, wbc ct 15.9, xray showed infiltrates along RML/RML, RVP positive for entero/rhinovirus, pt was given IV rocephin/azithromycin then vancomycin/cefepime.     1. sepsis/hypoxic resp failure/viral syndrome/entero-rhinovirus/hcap/immunocompromised host/CHRISTINA    - improving    - agree with IV vancomycin 1250mg q24h, renally-dose adjusted, check trough prior to 4th dose for MRSA coverage, continue with IV cefepime, decrease dose to [id number] for resistant gram (-), MSSA coverage    - check sputum culture    - f/u cultures    - on bronchodilators for bronchospam/COPD    - supportive care    - monitor temps    - f/u CBC    -tolerating abx well so far; no side effects noted    -reason for abx use and side effects reviewed with patient; monitor BMP and vancomycin trough levels    2. other issues -  B cell -CLL stage 4/SLL, metastatic NHL, hypogammaglobulinemia (s/p IVIG last ?), AFfib on AC, HTN, HLD, CHF-diastolic, hx PE/DVT, COPD - care per medicine

## 2017-05-11 NOTE — CONSULT NOTE ADULT - SUBJECTIVE AND OBJECTIVE BOX
Patient is a 77y old  Male who presents with a chief complaint of cough/chest pain (10 May 2017 15:07)      HPI:  77 yr old  male w hx B cell -CLL stage 4/SLL, metastatic NHL, hypogammaglobulinemia (s/p IVIG last ?), AFfib on AC, HTN, HLD, CHF-diastolic, hx PE/DVT, COPD, hx recurrent PNA admitted 5/10 with subjective fevers, productive cough, worsening sob X 3 days, reports productive cough with white sputum, nasal congestion, and pleuritic chest pain, No recent travel or sick contacts. Here afebrile, O2 saturation 90% on RA, wbc ct 15.9, xray showed infiltrates along RML/RML, RVP positive for entero/rhinovirus, pt was given IV rocephin/azithromycin then vancomycin/cefepime.       PMH: as above    PSH: as above    Meds: per reconciliation sheet, noted below    MEDICATIONS  (STANDING):  sodium chloride 0.9% lock flush 3milliLiter(s) IV Push every 8 hours  insulin lispro (HumaLOG) corrective regimen sliding scale  SubCutaneous three times a day before meals  dextrose 5%. 1000milliLiter(s) IV Continuous <Continuous>  dextrose 50% Injectable 12.5Gram(s) IV Push once  docusate sodium 100milliGRAM(s) Oral three times a day  losartan 100milliGRAM(s) Oral daily  gabapentin 300milliGRAM(s) Oral three times a day  insulin glargine Injectable (LANTUS) 40Unit(s) SubCutaneous two times a day  simvastatin 10milliGRAM(s) Oral at bedtime  tenofovir 300milliGRAM(s) Oral daily  metoprolol 50milliGRAM(s) Oral two times a day  montelukast 10milliGRAM(s) Oral at bedtime  multivitamin Oral Tab/Cap - Peds 1Tablet(s) Oral daily  amiodarone    Tablet 100milliGRAM(s) Oral daily  ALBUTerol/ipratropium for Nebulization 3milliLiter(s) Nebulizer every 6 hours  vancomycin  IVPB  IV Intermittent   cefepime  IVPB  IV Intermittent   vancomycin  IVPB 1250milliGRAM(s) IV Intermittent every 24 hours  cefepime  IVPB 1000milliGRAM(s) IV Intermittent every 12 hours  lidocaine   Patch 2Patch Transdermal daily  ALBUTerol/ipratropium for Nebulization. 3milliLiter(s) Nebulizer once  guaiFENesin ER 600milliGRAM(s) Oral every 12 hours      Allergies    Privigen (Other (Severe))    Intolerances        Social: former smoker, no alcohol, no illegal drugs; no recent travel, no exposure to TB    Family history:  Family history of liver cancer (Father): father  85 liver CA  Family history of coronary arteriosclerosis (Mother): Mom  of MI age 68      ROS: no HA, no dizziness, no sore throat, no blurry vision, no CP, no palpitations, no abdominal pain, no diarrhea, no N/V, no dysuria, no leg pain, no claudication, no rash, no joint aches, no rectal pain or bleeding, no night sweats    Vital Signs Last 24 Hrs  T(C): 36.4, Max: 38 (05-10 @ 09:59)  T(F): 97.6, Max: 100.4 (05-10 @ 09:59)  HR: 83 (68 - 114)  BP: 131/87 (88/60 - 131/87)  BP(mean): --  RR: 18 (18 - 22)  SpO2: 100% (90% - 100%)      PE:  Constitutional: frail looking  HEENT: NC/AT, EOMI, PERRLA  Neck: supple  Back: no tenderness  Respiratory: decreased breath sounds, scattered rales along bases  Cardiovascular: S1S2 regular, no murmurs  Abdomen: soft, not tender, not distended, positive BS  Genitourinary: deferred  Rectal: deferred  Musculoskeletal: no muscle tenderness, no joint swelling or tenderness  Extremities: no pedal edema, stasis dermatitis  Neurological: no focal deficits  Skin: no rashes    Labs:                        11.7   13.1  )-----------( 116      ( 11 May 2017 07:48 )             36.5     -    143  |  108  |  15  ----------------------------<  91  4.4   |  26  |  1.21    Ca    8.2<L>      11 May 2017 07:48  Mg     2.3         TPro  6.0  /  Alb  3.2<L>  /  TBili  0.8  /  DBili  x   /  AST  16  /  ALT  29  /  AlkPhos  110  05-10     LIVER FUNCTIONS - ( 10 May 2017 10:11 )  Alb: 3.2 g/dL / Pro: 6.0 gm/dL / ALK PHOS: 110 U/L / ALT: 29 U/L / AST: 16 U/L / GGT: x                   Radiology:   EXAM:  CHEST SINGLE VIEW FRONTAL                            PROCEDURE DATE:  05/10/2017        INTERPRETATION:  History: Cough.    Chest:  one view.      Comparison: 2017    AP radiograph of the chest demonstrates faint developing infiltrates in   the RIGHT midlung and RIGHT lower lobe. The cardiac silhouette is normal   in size. Osseous structures are intact.    Impression:faint developing infiltrates in the RIGHT midlung and RIGHT   lower lobe        Advanced directives addressed: full resuscitation

## 2017-05-11 NOTE — PROGRESS NOTE ADULT - ASSESSMENT
*Acute Hypoxic Respiratory failure 2ndry to PNA for suspected G+ and G- as well as atypical with associated vitalsSyndrome  -continue Vanco and Cefepime  -Oxygen Via NC, monitor Pulse ox  -continue Nebs ATC and PRN  - check sputum culture  - f/u cultures  - ID consult appreciated   - Pulm consult appreciated       *Sepsis 2ndry to PNA  -in a patient who is immune compromised by NHL and hypogammaglobulinemia  -continue to monitor     *Thrombocytopenia  -last plt in  150s  -monitor plts    *AF on AC   - monitor PT/INR  -cotninue usual coumadin is 3.75 mg po daily except monday and friday when he gets 2.5 mg    *chronic Diastolic CHF -stable  -LVEF 55-60% in   -Miontor I&O, daily weights    *DM  -continue ISS and Lantus

## 2017-05-11 NOTE — CONSULT NOTE ADULT - SUBJECTIVE AND OBJECTIVE BOX
Patient is a 77y old  Male who presents with a chief complaint of cough/chest pain (10 May 2017 15:07)      HPI:  77 yr old  male w hx B cell -CLL stage 4/SLL, metastatic NHL, hypogammaglobulinemia (s/p IVIG last ?), AFfib on AC, HTN, HLD, CHF-diastolic, hx PE/DVT, COPD, hx recurrent PNA came to ED w family complaining of tactile temp and productive cough and SOB        There is a 3 day hx of tactile temps, cough productive of white sputum and SOB that progressed to the point that he had to come in today for evaluation.  Has associated chest pain across lower rib cage bilaterally and to back that occurs w cough  Denied recent travel hx or recent sick contacts        He was hypoxic to 90 at room air.  Patient feels a little improved after respiratory treatment and oxygen.        In the ED he received fluids and IV ceftiaxone and zithromax (10 May 2017 12:14)  seen and examined with , CNA at bedside  no cp  yellow sputum  no hemoptysis  not using any supplemental o2    PAST MEDICAL & SURGICAL HISTORY:  Hypogammaglobulinemia: received IVIG  Hepatitis B virus infection, unspecified chronicity: retrovirals  Chronic diastolic congestive heart failure  Essential hypertension  DM type 2 (diabetes mellitus, type 2)  B-cell lymphoma, unspecified B-cell lymphoma type, unspecified body region: B cell CLL stage 4/SLL; met NHL  NHL (non-Hodgkin&#x27;s lymphoma)  Lymphoma, unspecified body region, unspecified lymphoma type  Atrial fibrillation, unspecified type  COPD (chronic obstructive pulmonary disease): Lymphoma   Atrial fibrillation  History of esophageal dilatation  S/P cholecystectomy  H/O prostatectomy      PREVIOUS DIAGNOSTIC TESTING:      MEDICATIONS  (STANDING):  sodium chloride 0.9% lock flush 3milliLiter(s) IV Push every 8 hours  insulin lispro (HumaLOG) corrective regimen sliding scale  SubCutaneous three times a day before meals  dextrose 5%. 1000milliLiter(s) IV Continuous <Continuous>  dextrose 50% Injectable 12.5Gram(s) IV Push once  docusate sodium 100milliGRAM(s) Oral three times a day  losartan 100milliGRAM(s) Oral daily  gabapentin 300milliGRAM(s) Oral three times a day  insulin glargine Injectable (LANTUS) 40Unit(s) SubCutaneous two times a day  simvastatin 10milliGRAM(s) Oral at bedtime  tenofovir 300milliGRAM(s) Oral daily  metoprolol 50milliGRAM(s) Oral two times a day  montelukast 10milliGRAM(s) Oral at bedtime  multivitamin Oral Tab/Cap - Peds 1Tablet(s) Oral daily  amiodarone    Tablet 100milliGRAM(s) Oral daily  ALBUTerol/ipratropium for Nebulization 3milliLiter(s) Nebulizer every 6 hours  azithromycin  IVPB 500milliGRAM(s) IV Intermittent every 24 hours  vancomycin  IVPB  IV Intermittent   cefepime  IVPB  IV Intermittent   vancomycin  IVPB 1250milliGRAM(s) IV Intermittent every 24 hours  cefepime  IVPB 1000milliGRAM(s) IV Intermittent every 12 hours  lidocaine   Patch 2Patch Transdermal daily    MEDICATIONS  (PRN):  dextrose Gel 1Dose(s) Oral once PRN Blood Glucose LESS THAN 70 milliGRAM(s)/deciliter  glucagon  Injectable 1milliGRAM(s) IntraMuscular once PRN Glucose LESS THAN 70 milligrams/deciliter  ondansetron Injectable 4milliGRAM(s) IV Push every 6 hours PRN Nausea  aluminum hydroxide/magnesium hydroxide/simethicone Suspension 30milliLiter(s) Oral every 4 hours PRN Dyspepsia      FAMILY HISTORY:  Family history of liver cancer (Father): father  85 liver CA  Family history of coronary arteriosclerosis (Mother): Mom  of MI age 68        REVIEW OF SYSTEM:  Pertinent items are noted in HPI.  Constitutional pos, fevers, sweats and weight loss  throat, and face:  negative for epistaxis, nasal congestion, sore throat and   tinnitus  Respiratory:pos for cough, dyspnea on exertion, pleuritic chest pain  and wheezing  Cardiovascular:  negative for chest pain, dyspnea and palpitations  Gastrointestinal: negative for abdominal pain, diarrhea, nausea and vomiting  Genitourinary: negative for dysuria, frequency and urinary incontinence  Skin:  negative for redness, rash, pruritus, swelling, dryness and   fissures  Hematologic/lymphatic: negative for bleeding and easy bruising  Musculoskeletal: negative for arthralgias, back pain and muscle weakness  Neurological: negative for dizziness, headaches, seizures and tremors  Behavioral/Psych:  negative for mood change, depression, suicidal attempts      Vital Signs Last 24 Hrs  T(C): 36.4, Max: 38 (05-10 @ 09:59)  T(F): 97.6, Max: 100.4 (05-10 @ 09:59)  HR: 83 (68 - 114)  BP: 131/87 (88/60 - 131/87)  BP(mean): --  RR: 18 (18 - 22)  SpO2: 100% (90% - 100%)    I&O's Summary    I & Os for current day (as of 11 May 2017 08:13)  =============================================  IN: 50 ml / OUT: 0 ml / NET: 50 ml    PHYSICAL EXAM  General Appearance: cooperative, no acute distress,   HEENT: PERRL, conjunctiva clear, EOM's intact, non injected pharynx, no exudate, TM   normal  Neck: Supple, , no adenopathy, thyroid: not enlarged, no carotid bruit or JVD  Back: Symmetric, no  tenderness,no soft tissue tenderness  Lungs: bilateral lower lobe rhonchi, mild exp wheeze  Heart: Regular rate and rhythm, S1, S2 normal, no murmur, rub or gallop  Abdomen: Soft, non-tender, bowel sounds active , no hepatosplenomegaly  Extremities: no cyanosis or edema, no joint swelling  Skin: Skin color, texture normal, no rashes   Neurologic: Alert and oriented X3 , cranial nerves intact, sensory and motor normal,    ECG:    LABS:                          12.4   15.9  )-----------( 108      ( 10 May 2017 10:11 )             36.9     05-10    139  |  103  |  17  ----------------------------<  198<H>  4.5   |  27  |  1.59<H>    Ca    8.0<L>      10 May 2017 10:11    TPro  6.0  /  Alb  3.2<L>  /  TBili  0.8  /  DBili  x   /  AST  16  /  ALT  29  /  AlkPhos  110  05-10    CARDIAC MARKERS ( 10 May 2017 10:11 )  <0.015 ng/mL / x     / x     / x     / x              PT/INR - ( 10 May 2017 16:09 )   PT: 30.4 sec;   INR: 2.76 ratio             ABG - ( 10 May 2017 14:26 )  pH: 7.33  /  pCO2: 44    /  pO2: 90    / HCO3: 22    / Base Excess: -3    /  SaO2: 94              PROCEDURE DATE:  05/10/2017        INTERPRETATION:  History: Cough.    Chest:  one view.      Comparison: 2017    AP radiograph of the chest demonstrates faint developing infiltrates in   the RIGHT midlung and RIGHT lower lobe. The cardiac silhouette is normal   in size. Osseous structures are intact.    Impression:faint developing infiltrates in the RIGHT midlung and RIGHT   lower lobe            RADIOLOGY & ADDITIONAL STUDIES:

## 2017-05-12 LAB
ANION GAP SERPL CALC-SCNC: 10 MMOL/L — SIGNIFICANT CHANGE UP (ref 5–17)
BUN SERPL-MCNC: 17 MG/DL — SIGNIFICANT CHANGE UP (ref 7–23)
CALCIUM SERPL-MCNC: 8.2 MG/DL — LOW (ref 8.5–10.1)
CHLORIDE SERPL-SCNC: 105 MMOL/L — SIGNIFICANT CHANGE UP (ref 96–108)
CO2 SERPL-SCNC: 26 MMOL/L — SIGNIFICANT CHANGE UP (ref 22–31)
CREAT SERPL-MCNC: 1.18 MG/DL — SIGNIFICANT CHANGE UP (ref 0.5–1.3)
GLUCOSE SERPL-MCNC: 105 MG/DL — HIGH (ref 70–99)
HCT VFR BLD CALC: 37.1 % — LOW (ref 39–50)
HGB BLD-MCNC: 12 G/DL — LOW (ref 13–17)
INR BLD: 3.47 RATIO — HIGH (ref 0.88–1.16)
MAGNESIUM SERPL-MCNC: 2.3 MG/DL — SIGNIFICANT CHANGE UP (ref 1.6–2.6)
MCHC RBC-ENTMCNC: 30.4 PG — SIGNIFICANT CHANGE UP (ref 27–34)
MCHC RBC-ENTMCNC: 32.4 GM/DL — SIGNIFICANT CHANGE UP (ref 32–36)
MCV RBC AUTO: 93.8 FL — SIGNIFICANT CHANGE UP (ref 80–100)
PHOSPHATE SERPL-MCNC: 3 MG/DL — SIGNIFICANT CHANGE UP (ref 2.5–4.5)
PLATELET # BLD AUTO: 122 K/UL — LOW (ref 150–400)
POTASSIUM SERPL-MCNC: 4.4 MMOL/L — SIGNIFICANT CHANGE UP (ref 3.5–5.3)
POTASSIUM SERPL-SCNC: 4.4 MMOL/L — SIGNIFICANT CHANGE UP (ref 3.5–5.3)
PROTHROM AB SERPL-ACNC: 38.4 SEC — HIGH (ref 9.8–12.7)
RBC # BLD: 3.95 M/UL — LOW (ref 4.2–5.8)
RBC # FLD: 14.2 % — SIGNIFICANT CHANGE UP (ref 10.3–14.5)
SODIUM SERPL-SCNC: 141 MMOL/L — SIGNIFICANT CHANGE UP (ref 135–145)
WBC # BLD: 11.3 K/UL — HIGH (ref 3.8–10.5)
WBC # FLD AUTO: 11.3 K/UL — HIGH (ref 3.8–10.5)

## 2017-05-12 RX ORDER — ACETAMINOPHEN 500 MG
650 TABLET ORAL EVERY 6 HOURS
Qty: 0 | Refills: 0 | Status: DISCONTINUED | OUTPATIENT
Start: 2017-05-12 | End: 2017-05-15

## 2017-05-12 RX ORDER — BENZOCAINE AND MENTHOL 5; 1 G/100ML; G/100ML
1 LIQUID ORAL EVERY 4 HOURS
Qty: 0 | Refills: 0 | Status: DISCONTINUED | OUTPATIENT
Start: 2017-05-12 | End: 2017-05-16

## 2017-05-12 RX ORDER — SODIUM CHLORIDE 9 MG/ML
250 INJECTION INTRAMUSCULAR; INTRAVENOUS; SUBCUTANEOUS ONCE
Qty: 0 | Refills: 0 | Status: COMPLETED | OUTPATIENT
Start: 2017-05-12 | End: 2017-05-12

## 2017-05-12 RX ADMIN — Medication 100 MILLIGRAM(S): at 15:26

## 2017-05-12 RX ADMIN — SODIUM CHLORIDE 3 MILLILITER(S): 9 INJECTION INTRAMUSCULAR; INTRAVENOUS; SUBCUTANEOUS at 05:31

## 2017-05-12 RX ADMIN — MONTELUKAST 10 MILLIGRAM(S): 4 TABLET, CHEWABLE ORAL at 22:19

## 2017-05-12 RX ADMIN — Medication 50 MILLIGRAM(S): at 05:31

## 2017-05-12 RX ADMIN — GABAPENTIN 300 MILLIGRAM(S): 400 CAPSULE ORAL at 15:26

## 2017-05-12 RX ADMIN — SODIUM CHLORIDE 3 MILLILITER(S): 9 INJECTION INTRAMUSCULAR; INTRAVENOUS; SUBCUTANEOUS at 15:46

## 2017-05-12 RX ADMIN — Medication 100 MILLIGRAM(S): at 22:19

## 2017-05-12 RX ADMIN — Medication 600 MILLIGRAM(S): at 05:29

## 2017-05-12 RX ADMIN — Medication 600 MILLIGRAM(S): at 18:10

## 2017-05-12 RX ADMIN — Medication 3 MILLILITER(S): at 02:45

## 2017-05-12 RX ADMIN — Medication 166.67 MILLIGRAM(S): at 15:49

## 2017-05-12 RX ADMIN — Medication 3 MILLILITER(S): at 07:35

## 2017-05-12 RX ADMIN — SIMVASTATIN 10 MILLIGRAM(S): 20 TABLET, FILM COATED ORAL at 22:19

## 2017-05-12 RX ADMIN — Medication 1 TABLET(S): at 13:05

## 2017-05-12 RX ADMIN — LIDOCAINE 2 PATCH: 4 CREAM TOPICAL at 13:03

## 2017-05-12 RX ADMIN — Medication 4: at 17:19

## 2017-05-12 RX ADMIN — Medication 3 MILLILITER(S): at 13:36

## 2017-05-12 RX ADMIN — Medication 3 MILLILITER(S): at 19:47

## 2017-05-12 RX ADMIN — SODIUM CHLORIDE 500 MILLILITER(S): 9 INJECTION INTRAMUSCULAR; INTRAVENOUS; SUBCUTANEOUS at 09:55

## 2017-05-12 RX ADMIN — CEFEPIME 100 MILLIGRAM(S): 1 INJECTION, POWDER, FOR SOLUTION INTRAMUSCULAR; INTRAVENOUS at 05:28

## 2017-05-12 RX ADMIN — Medication 4: at 12:59

## 2017-05-12 RX ADMIN — GABAPENTIN 300 MILLIGRAM(S): 400 CAPSULE ORAL at 05:31

## 2017-05-12 RX ADMIN — TENOFOVIR DISOPROXIL FUMARATE 300 MILLIGRAM(S): 300 TABLET, FILM COATED ORAL at 13:03

## 2017-05-12 RX ADMIN — SODIUM CHLORIDE 3 MILLILITER(S): 9 INJECTION INTRAMUSCULAR; INTRAVENOUS; SUBCUTANEOUS at 22:20

## 2017-05-12 RX ADMIN — LOSARTAN POTASSIUM 100 MILLIGRAM(S): 100 TABLET, FILM COATED ORAL at 05:31

## 2017-05-12 RX ADMIN — LIDOCAINE 2 PATCH: 4 CREAM TOPICAL at 00:06

## 2017-05-12 RX ADMIN — GABAPENTIN 300 MILLIGRAM(S): 400 CAPSULE ORAL at 22:19

## 2017-05-12 RX ADMIN — Medication 100 MILLIGRAM(S): at 05:29

## 2017-05-12 RX ADMIN — AMIODARONE HYDROCHLORIDE 100 MILLIGRAM(S): 400 TABLET ORAL at 05:29

## 2017-05-12 RX ADMIN — CEFEPIME 100 MILLIGRAM(S): 1 INJECTION, POWDER, FOR SOLUTION INTRAMUSCULAR; INTRAVENOUS at 18:09

## 2017-05-12 RX ADMIN — INSULIN GLARGINE 40 UNIT(S): 100 INJECTION, SOLUTION SUBCUTANEOUS at 22:19

## 2017-05-12 RX ADMIN — INSULIN GLARGINE 40 UNIT(S): 100 INJECTION, SOLUTION SUBCUTANEOUS at 08:44

## 2017-05-12 NOTE — PROVIDER CONTACT NOTE (OTHER) - DATE AND TIME:
12-May-2017 14:10
10-May-2017 14:15
10-May-2017 14:19
10-May-2017 14:26
12-May-2017 11:15
12-May-2017 15:37
12-May-2017 17:06

## 2017-05-12 NOTE — PROGRESS NOTE ADULT - ASSESSMENT
*Acute Hypoxic Respiratory failure 2ndry to PNA for suspected G+ and G- as well as atypical with associated Viral syndrome  - +entero-rhinovirus  -continue Vanco and Cefepime  -Oxygen Via NC, monitor Pulse ox  -continue Nebs ATC and PRN  - check sputum culture  - Blood cultures - NGTD  - ID consult appreciated   - Pulm consult appreciated     *Sepsis 2ndry to PNA  -in a patient who is immune compromised by NHL and hypogammaglobulinemia  -continue to monitor     *Thrombocytopenia  -last plt in  150s  -monitor plts    *AF on AC with supratherapeutic INR  - monitor PT/INR  - continue usual coumadin dose 3.75 mg po daily except monday and friday when he gets 2.5 mg; hold for INR >3    *chronic Diastolic CHF -stable  -LVEF 55-60% in   -Miontor I&O, daily weights    *CLL  -Hx of IVIG transfusion  -FU Igg Level  -hemonc consult - Dr Ruiz    *DM  -continue ISS and Lantus

## 2017-05-12 NOTE — PROGRESS NOTE ADULT - ASSESSMENT
77 yr old  male w hx B cell -CLL stage 4/SLL, metastatic NHL, hypogammaglobulinemia (s/p IVIG last ?), AFfib on AC, HTN, HLD, CHF-diastolic, hx PE/DVT, COPD, hx recurrent PNA admitted 5/10 with subjective fevers, productive cough, worsening sob X 3 days, reports productive cough with white sputum, nasal congestion, and pleuritic chest pain, No recent travel or sick contacts. Here afebrile, O2 saturation 90% on RA, wbc ct 15.9, xray showed infiltrates along RML/RML, RVP positive for entero/rhinovirus, pt was given IV rocephin/azithromycin then vancomycin/cefepime.     1. sepsis/hypoxic resp failure/viral syndrome/entero-rhinovirus/hcap/immunocompromised host/CHRISTINA    - improving    - continue with IV vancomycin 1250mg q24h, renally-dose adjusted, check trough prior to 4th dose for MRSA coverage, continue with IV cefepime, decrease dose to [id number] for resistant gram (-), MSSA coverage, day#3      - continue with IV antibiotic regimen through weekend, if improves, will switch to oral regimen monday  - sputum cx nl resp manuel    - blood cx no growth    - on bronchodilators for bronchospam/COPD    - supportive care    - monitor temps    - f/u CBC    -tolerating abx well so far; no side effects noted    -reason for abx use and side effects reviewed with patient; monitor BMP and vancomycin trough levels    2. other issues -  B cell -CLL stage 4/SLL, metastatic NHL, hypogammaglobulinemia (s/p IVIG last ?), AFfib on AC, HTN, HLD, CHF-diastolic, hx PE/DVT, COPD - care per medicine

## 2017-05-12 NOTE — PROVIDER CONTACT NOTE (OTHER) - SITUATION
Called Dr. Kimble's office  regarding consult. office aware.
Call placed to  I.MARQUIS. office aware of consult.
Call placed to  Pulminology office aware of consult.
Maggie Canas  ALYSSA. aware of consult.
Patient blood pressure low. Manual obtain. Called Dr. Jolley and notified
Spoke with Dr. Jolley and let her know that INR is 3.4 holding coumadin today

## 2017-05-13 DIAGNOSIS — J18.9 PNEUMONIA, UNSPECIFIED ORGANISM: ICD-10-CM

## 2017-05-13 DIAGNOSIS — C91.10 CHRONIC LYMPHOCYTIC LEUKEMIA OF B-CELL TYPE NOT HAVING ACHIEVED REMISSION: ICD-10-CM

## 2017-05-13 DIAGNOSIS — D80.1 NONFAMILIAL HYPOGAMMAGLOBULINEMIA: ICD-10-CM

## 2017-05-13 LAB
CULTURE RESULTS: SIGNIFICANT CHANGE UP
HCT VFR BLD CALC: 35.4 % — LOW (ref 39–50)
HGB BLD-MCNC: 11.3 G/DL — LOW (ref 13–17)
INR BLD: 3.59 RATIO — HIGH (ref 0.88–1.16)
MCHC RBC-ENTMCNC: 30.4 PG — SIGNIFICANT CHANGE UP (ref 27–34)
MCHC RBC-ENTMCNC: 31.9 GM/DL — LOW (ref 32–36)
MCV RBC AUTO: 95.2 FL — SIGNIFICANT CHANGE UP (ref 80–100)
PLATELET # BLD AUTO: 122 K/UL — LOW (ref 150–400)
PROTHROM AB SERPL-ACNC: 39.8 SEC — HIGH (ref 9.8–12.7)
RBC # BLD: 3.72 M/UL — LOW (ref 4.2–5.8)
RBC # FLD: 14.4 % — SIGNIFICANT CHANGE UP (ref 10.3–14.5)
SPECIMEN SOURCE: SIGNIFICANT CHANGE UP
VANCOMYCIN TROUGH SERPL-MCNC: 12.1 UG/ML — SIGNIFICANT CHANGE UP (ref 10–20)
WBC # BLD: 8.9 K/UL — SIGNIFICANT CHANGE UP (ref 3.8–10.5)
WBC # FLD AUTO: 8.9 K/UL — SIGNIFICANT CHANGE UP (ref 3.8–10.5)

## 2017-05-13 PROCEDURE — 71010: CPT | Mod: 26

## 2017-05-13 PROCEDURE — 99223 1ST HOSP IP/OBS HIGH 75: CPT

## 2017-05-13 RX ADMIN — Medication 1 TABLET(S): at 11:46

## 2017-05-13 RX ADMIN — TENOFOVIR DISOPROXIL FUMARATE 300 MILLIGRAM(S): 300 TABLET, FILM COATED ORAL at 12:02

## 2017-05-13 RX ADMIN — SODIUM CHLORIDE 3 MILLILITER(S): 9 INJECTION INTRAMUSCULAR; INTRAVENOUS; SUBCUTANEOUS at 05:45

## 2017-05-13 RX ADMIN — Medication 166.67 MILLIGRAM(S): at 18:50

## 2017-05-13 RX ADMIN — INSULIN GLARGINE 40 UNIT(S): 100 INJECTION, SOLUTION SUBCUTANEOUS at 09:50

## 2017-05-13 RX ADMIN — BENZOCAINE AND MENTHOL 1 LOZENGE: 5; 1 LIQUID ORAL at 18:12

## 2017-05-13 RX ADMIN — INSULIN GLARGINE 40 UNIT(S): 100 INJECTION, SOLUTION SUBCUTANEOUS at 21:42

## 2017-05-13 RX ADMIN — Medication 3 MILLILITER(S): at 19:30

## 2017-05-13 RX ADMIN — AMIODARONE HYDROCHLORIDE 100 MILLIGRAM(S): 400 TABLET ORAL at 05:48

## 2017-05-13 RX ADMIN — CEFEPIME 100 MILLIGRAM(S): 1 INJECTION, POWDER, FOR SOLUTION INTRAMUSCULAR; INTRAVENOUS at 05:48

## 2017-05-13 RX ADMIN — Medication 600 MILLIGRAM(S): at 18:12

## 2017-05-13 RX ADMIN — SODIUM CHLORIDE 3 MILLILITER(S): 9 INJECTION INTRAMUSCULAR; INTRAVENOUS; SUBCUTANEOUS at 13:12

## 2017-05-13 RX ADMIN — LIDOCAINE 2 PATCH: 4 CREAM TOPICAL at 00:24

## 2017-05-13 RX ADMIN — LOSARTAN POTASSIUM 100 MILLIGRAM(S): 100 TABLET, FILM COATED ORAL at 09:53

## 2017-05-13 RX ADMIN — LIDOCAINE 2 PATCH: 4 CREAM TOPICAL at 23:43

## 2017-05-13 RX ADMIN — Medication 6: at 12:01

## 2017-05-13 RX ADMIN — Medication 100 MILLIGRAM(S): at 21:42

## 2017-05-13 RX ADMIN — Medication 3 MILLILITER(S): at 13:40

## 2017-05-13 RX ADMIN — MONTELUKAST 10 MILLIGRAM(S): 4 TABLET, CHEWABLE ORAL at 21:42

## 2017-05-13 RX ADMIN — LIDOCAINE 2 PATCH: 4 CREAM TOPICAL at 11:53

## 2017-05-13 RX ADMIN — Medication 50 MILLIGRAM(S): at 18:59

## 2017-05-13 RX ADMIN — GABAPENTIN 300 MILLIGRAM(S): 400 CAPSULE ORAL at 05:48

## 2017-05-13 RX ADMIN — Medication 3 MILLILITER(S): at 01:24

## 2017-05-13 RX ADMIN — BENZOCAINE AND MENTHOL 1 LOZENGE: 5; 1 LIQUID ORAL at 01:05

## 2017-05-13 RX ADMIN — Medication 3 MILLILITER(S): at 07:50

## 2017-05-13 RX ADMIN — SIMVASTATIN 10 MILLIGRAM(S): 20 TABLET, FILM COATED ORAL at 21:42

## 2017-05-13 RX ADMIN — Medication 100 MILLIGRAM(S): at 05:48

## 2017-05-13 RX ADMIN — Medication 600 MILLIGRAM(S): at 05:48

## 2017-05-13 RX ADMIN — GABAPENTIN 300 MILLIGRAM(S): 400 CAPSULE ORAL at 21:42

## 2017-05-13 RX ADMIN — Medication 100 MILLIGRAM(S): at 11:57

## 2017-05-13 RX ADMIN — GABAPENTIN 300 MILLIGRAM(S): 400 CAPSULE ORAL at 11:46

## 2017-05-13 RX ADMIN — SODIUM CHLORIDE 3 MILLILITER(S): 9 INJECTION INTRAMUSCULAR; INTRAVENOUS; SUBCUTANEOUS at 21:53

## 2017-05-13 RX ADMIN — BENZOCAINE AND MENTHOL 1 LOZENGE: 5; 1 LIQUID ORAL at 06:30

## 2017-05-13 RX ADMIN — Medication 50 MILLIGRAM(S): at 09:56

## 2017-05-13 RX ADMIN — CEFEPIME 100 MILLIGRAM(S): 1 INJECTION, POWDER, FOR SOLUTION INTRAMUSCULAR; INTRAVENOUS at 18:12

## 2017-05-13 NOTE — CONSULT NOTE ADULT - PROBLEM SELECTOR RECOMMENDATION 3
secondary to CLL/SLL  pt has severe reactions to all types of IVIG and so shall avoid IVIG.    shall check Immunoglobulin levels  IgA deficiency can cause severe transfusion reactions if anti IgA is present

## 2017-05-13 NOTE — CONSULT NOTE ADULT - SUBJECTIVE AND OBJECTIVE BOX
HPI:  77 yr old  male w hx B cell -CLL stage 4/SLL, metastatic NHL, hypogammaglobulinemia (s/p IVIG last ?), AFfib on AC, HTN, HLD, CHF-diastolic, hx PE/DVT, COPD, hx recurrent PNA came to ED w family complaining of tactile temp and productive cough and SOB        There is a 3 day hx of tactile temps, cough productive of white sputum and SOB that progressed to the point that he had to come in today for evaluation.  Has associated chest pain across lower rib cage bilaterally and to back that occurs w cough  Denied recent travel hx or recent sick contacts        He was hypoxic to 90 at room air.  Patient feels a little improved after respiratory treatment and oxygen.        In the ED he received fluids and IV ceftiaxone and zithromax (10 May 2017 12:14)      Patient is a 77y old  Male who presents with a chief complaint of cough/chest pain (10 May 2017 15:07)  This pt is known to our anticoagulation services SINCE .  Pt is normally stable on 23.75-25mg per week. hx of dvt/pe  and a.fib from .     Consulted by Dr. Evita Mcmanus  for VTE prophylaxis, risk stratification, and anticoagulation management.    PAST MEDICAL & SURGICAL HISTORY:  Hypogammaglobulinemia: received IVIG  Hepatitis B virus infection, unspecified chronicity: retrovirals  Chronic diastolic congestive heart failure  Essential hypertension  DM type 2 (diabetes mellitus, type 2)  B-cell lymphoma, unspecified B-cell lymphoma type, unspecified body region: B cell CLL stage 4/SLL; met NHL  NHL (non-Hodgkin&#x27;s lymphoma)  Lymphoma, unspecified body region, unspecified lymphoma type  Atrial fibrillation, unspecified type  COPD (chronic obstructive pulmonary disease): Lymphoma   Atrial fibrillation  History of esophageal dilatation  S/P cholecystectomy  H/O prostatectomy          IMPROVE VTE Risk Score: 6 high    [id number] Score: 5    IMPROVE Bleeding Risk Score: 2.5 mod    Falls Risk:   High (  )  Mod ( x )  Low (  )    crcl: 63.18  Pt seen at bedside oob in chair, wife present.  Informed them we will be managing his anticoagulation while he is in the hospital, questions answered will reinforce as needed.     Vital Signs Last 24 Hrs  T(C): 36.5, Max: 36.8 ( @ 20:59)  T(F): 97.7, Max: 98.3 (05-12 @ 20:59)  HR: 82 (59 - 101)  BP: 107/60 (102/66 - 109/57)  BP(mean): --  RR: 18 (17 - 18)  SpO2: 18% (18% - 100%)  FAMILY HISTORY:  Family history of liver cancer (Father): father  85 liver CA  Family history of coronary arteriosclerosis (Mother): Mom  of MI age 68    Denies any personal or familial history of clotting or bleeding disorders.    Allergies    Privigen (Other (Severe))    Intolerances        REVIEW OF SYSTEMS    (  )Fever	     (  )Constipation	(  )SOB				(  )Headache	(  )Dysuria  (  )Chills	     (  )Melena	(  )Dyspnea present on exertion	                    (  )Dizziness                    (  )Polyuria  (  )Nausea	     (  )Hematochezia	(  )Cough			                    (  )Syncope   	(  )Hematuria  (  )Vomiting    (  )Chest Pain	(  )Wheezing			(  )Weakness  (  )Diarrhea     (  )Palpitations	(  )Anorexia			(  )Myalgia    All other review of systems negative: Yes    PHYSICAL EXAM:    Constitutional: Appears Well    Neurological: A& O x 3    Skin: Warm    Respiratory and Thorax: normal effort; Breath sounds:rhonchi noted  	  Cardiovascular: S1, S2, regular, NMBR	    Gastrointestinal: BS + x 4Q, nontender	    Genitourinary:  Bladder nondistended, nontender    Musculoskeletal:   General Right:   no muscle/joint tenderness,   normal tone, no joint swelling,   ROM: limited/full	    General Left:   no muscle/joint tenderness,   normal tone, no joint swelling,   ROM: limited/full        Lower extrems:   Right: no calf tenderness              negative adrianna's sign               + pedal pulses, + le edema noted 1+    Left:   no calf tenderness              negative adrianna's sign               + pedal pulses, + le edema 1+                          11.3   8.9   )-----------( 122      ( 13 May 2017 04:59 )             35.4       05-12    141  |  105  |  17  ----------------------------<  105<H>  4.4   |  26  |  1.18    Ca    8.2<L>      12 May 2017 08:13  Phos  3.0     05-12  Mg     2.3     05-12        PT/INR - ( 13 May 2017 04:59 )   PT: 39.8 sec;   INR: 3.59 ratio         				    MEDICATIONS  (STANDING):  sodium chloride 0.9% lock flush 3milliLiter(s) IV Push every 8 hours  insulin lispro (HumaLOG) corrective regimen sliding scale  SubCutaneous three times a day before meals  dextrose 5%. 1000milliLiter(s) IV Continuous <Continuous>  dextrose 50% Injectable 12.5Gram(s) IV Push once  docusate sodium 100milliGRAM(s) Oral three times a day  losartan 100milliGRAM(s) Oral daily  gabapentin 300milliGRAM(s) Oral three times a day  insulin glargine Injectable (LANTUS) 40Unit(s) SubCutaneous two times a day  simvastatin 10milliGRAM(s) Oral at bedtime  tenofovir 300milliGRAM(s) Oral daily  metoprolol 50milliGRAM(s) Oral two times a day  montelukast 10milliGRAM(s) Oral at bedtime  multivitamin Oral Tab/Cap - Peds 1Tablet(s) Oral daily  amiodarone    Tablet 100milliGRAM(s) Oral daily  ALBUTerol/ipratropium for Nebulization 3milliLiter(s) Nebulizer every 6 hours  vancomycin  IVPB  IV Intermittent   cefepime  IVPB  IV Intermittent   vancomycin  IVPB 1250milliGRAM(s) IV Intermittent every 24 hours  cefepime  IVPB 1000milliGRAM(s) IV Intermittent every 12 hours  lidocaine   Patch 2Patch Transdermal daily  ALBUTerol/ipratropium for Nebulization. 3milliLiter(s) Nebulizer once  guaiFENesin ER 600milliGRAM(s) Oral every 12 hours  warfarin 3.75milliGRAM(s) Oral <User Schedule>          DVT Prophylaxis:  LMWH                   (  )  Heparin SQ           (  )  Coumadin             ( x )  Xarelto                  (  )  Eliquis                   (  )  Venodynes           (  )  Ambulation          (  x)  UFH                       (  )  Contraindicated  (  )

## 2017-05-13 NOTE — PROGRESS NOTE ADULT - ASSESSMENT
*Acute Hypoxic Respiratory failure 2ndry to PNA for suspected G+ and G- as well as atypical with associated Viral syndrome  - +entero-rhinovirus  -continue Vanco and Cefepime  - Oxygen Via NC, monitor Pulse ox  - continue Nebs ATC and PRN  - check sputum culture  - Blood cultures - NGTD  - ID consult appreciated   - Pulm consult appreciated   - FU repeat CXR    *Sepsis 2ndry to PNA  -in a patient who is immune compromised by NHL and hypogammaglobulinemia  -continue to monitor     *Thrombocytopenia  -last plt in  150s  -monitor plts    *AF on AC with supratherapeutic INR  - monitor PT/INR  - continue usual coumadin dose 3.75 mg po daily except monday and friday when he gets 2.5 mg; hold for INR >3  - AC services     *chronic Diastolic CHF -stable  -LVEF 55-60% in   -Miontor I&O, daily weights    *CLL  -Hx of IVIG transfusion  -FU Igg Level  -hemonc consult - Dr Ruiz     *DM  -continue ISS and Lantus

## 2017-05-13 NOTE — PROGRESS NOTE ADULT - ASSESSMENT
77 yr old  male w hx B cell -CLL stage 4/SLL, metastatic NHL, hypogammaglobulinemia (s/p IVIG last ?), AFfib on AC, HTN, HLD, CHF-diastolic, hx PE/DVT, COPD, hx recurrent PNA came to ED w family complaining of tactile temp and productive cough and SOB        There is a 3 day hx of tactile temps, cough productive of white sputum and SOB that progressed to the point that he had to come in today for evaluation.  Has associated chest pain across lower rib cage bilaterally and to back that occurs w cough  Denied recent travel hx or recent sick contacts        He was hypoxic to 90 at room air.  Patient feels a little improved after respiratory treatment and oxygen.        In the ED he received fluids and IV ceftiaxone and zithromax (10 May 2017 12:14)  Recurrent pneumonia  non toxic and hemodynamically stable  Hypoxemia improved  r/o pseudomonas or MRSa with recurrent infections  Hypogamma on Ov Ig  Adeq oxygenation  mild bronchospasm  if not better will get ct chest  agree with broadspectrum antibiotic coverage  incentive  expectotrant  bronchodilator rx  monitor clinically  check repeat cxr today  thank you, I shall fu as outpt

## 2017-05-13 NOTE — CONSULT NOTE ADULT - SUBJECTIVE AND OBJECTIVE BOX
Patient is a 77y old  Male who presents with a chief complaint of cough/chest pain diagnosed with pneumonia.       HPI:  77 yr old  male w hx B cell -CLL stage 4/SLL not currently on any treatment as per primary oncologist Dr Enamorado.  He also has hypogammaglobulinemia and has received IVIG of various types and has had immediate type hypersensitivity reactions to all of them and hence not reveived any further IVIG.    He has a history of recurrent penumonia    He also has AFfib on AC, HTN, HLD, CHF-diastolic, hx PE/DVT, COPD, hx recurrent PNA came to ED w family complaining of tactile temp and productive cough and SOB          There is a 3 day hx of tactile temps, cough productive of white sputum and SOB that progressed to the point that he had to come in for evaluation.    Has associated chest pain across lower rib cage bilaterally and to back that occurs w cough  Denied recent travel hx or recent sick contacts        He was hypoxic to 90 at room air.  Patient feels a little improved after respiratory treatment and oxygen.        In the ED he received fluids and IV ceftiaxone and zithromax (10 May 2017 12:14)  seen and examined with , CNA at bedside  no cp  yellow sputum  no hemoptysis  not using any supplemental o2    PAST MEDICAL & SURGICAL HISTORY:  CLL/SLL currently not on threatment.  Hypogammaglobulinemia: No longer on IVIG due to reactions.  Hepatitis B virus infection, unspecified chronicity: retrovirals  Chronic diastolic congestive heart failure  Essential hypertension  DM type 2 (diabetes mellitus, type 2)  B-cell lymphoma, unspecified B-cell lymphoma type, unspecified body region: B cell CLL stage 4/SLL; met NHL  NHL (non-Hodgkin&#x27;s lymphoma)  Atrial fibrillation, unspecified type  COPD (chronic obstructive pulmonary disease): Lymphoma   Atrial fibrillation  History of esophageal dilatation  S/P cholecystectomy  H/O prostatectomy    MEDICATIONS  (STANDING):  sodium chloride 0.9% lock flush 3milliLiter(s) IV Push every 8 hours  insulin lispro (HumaLOG) corrective regimen sliding scale  SubCutaneous three times a day before meals  dextrose 5%. 1000milliLiter(s) IV Continuous <Continuous>  dextrose 50% Injectable 12.5Gram(s) IV Push once  docusate sodium 100milliGRAM(s) Oral three times a day  losartan 100milliGRAM(s) Oral daily  gabapentin 300milliGRAM(s) Oral three times a day  insulin glargine Injectable (LANTUS) 40Unit(s) SubCutaneous two times a day  simvastatin 10milliGRAM(s) Oral at bedtime  tenofovir 300milliGRAM(s) Oral daily  metoprolol 50milliGRAM(s) Oral two times a day  montelukast 10milliGRAM(s) Oral at bedtime  multivitamin Oral Tab/Cap - Peds 1Tablet(s) Oral daily  amiodarone    Tablet 100milliGRAM(s) Oral daily  ALBUTerol/ipratropium for Nebulization 3milliLiter(s) Nebulizer every 6 hours  vancomycin  IVPB  IV Intermittent   cefepime  IVPB  IV Intermittent   vancomycin  IVPB 1250milliGRAM(s) IV Intermittent every 24 hours  cefepime  IVPB 1000milliGRAM(s) IV Intermittent every 12 hours  lidocaine   Patch 2Patch Transdermal daily  ALBUTerol/ipratropium for Nebulization. 3milliLiter(s) Nebulizer once  guaiFENesin ER 600milliGRAM(s) Oral every 12 hours  warfarin 3.75milliGRAM(s) Oral <User Schedule>      FAMILY HISTORY:  Family history of liver cancer (Father): father  85 liver CA  Family history of coronary arteriosclerosis (Mother): Mom  of MI age 68        REVIEW OF SYSTEM:  Pertinent items are noted in HPI.  Constitutional pos, fevers, sweats and weight loss  throat, and face:  negative for epistaxis, nasal congestion, sore throat and   tinnitus  Respiratory: pos for cough, dyspnea on exertion, pleuritic chest pain  and wheezing  Cardiovascular:  negative for chest pain, dyspnea and palpitations  Gastrointestinal: negative for abdominal pain, diarrhea, nausea and vomiting  Genitourinary: negative for dysuria, frequency and urinary incontinence  Skin:  negative for redness, rash, pruritus, swelling, dryness and   fissures  Hematologic/lymphatic: negative for bleeding and easy bruising  Musculoskeletal: negative for arthralgias, back pain and muscle weakness  Neurological: negative for dizziness, headaches, seizures and tremors  Behavioral/Psych:  negative for mood change, depression, suicidal attempts    Vital Signs Last 24 Hrs  T(C): 36.5, Max: 36.8 (05-12 @ 20:59)  T(F): 97.7, Max: 98.3 (05-12 @ 20:59)  HR: 82 (59 - 101)  BP: 107/60 (102/66 - 109/57)  BP(mean): --  RR: 18 (17 - 18)  SpO2: 18% (18% - 100%)        PHYSICAL EXAM  General Appearance: cooperative, no acute distress,   HEENT: PERRL, conjunctiva clear, EOM's intact, non injected pharynx, no exudate, TM   normal  Neck: Supple, , no adenopathy, thyroid: not enlarged, no carotid bruit or JVD  Back: Symmetric, no  tenderness,no soft tissue tenderness  Lungs: bilateral lower lobe rhonchi, mild exp wheeze  Heart: Regular rate and rhythm, S1, S2 normal, no murmur, rub or gallop  Abdomen: Soft, non-tender, bowel sounds active , no hepatosplenomegaly  Extremities: no cyanosis or edema, no joint swelling  Skin: Skin color, texture normal, no rashes   Neurologic: Alert and oriented X3 , cranial nerves intact, sensory and motor normal,    ECG:    LABS:                          12.4   15.9  )-----------( 108      ( 10 May 2017 10:11 )             36.9     05-10    139  |  103  |  17  ----------------------------<  198<H>  4.5   |  27  |  1.59<H>    Ca    8.0<L>      10 May 2017 10:11    TPro  6.0  /  Alb  3.2<L>  /  TBili  0.8  /  DBili  x   /  AST  16  /  ALT  29  /  AlkPhos  110  05-10    CARDIAC MARKERS ( 10 May 2017 10:11 )  <0.015 ng/mL / x     / x     / x     / x              PT/INR - ( 10 May 2017 16:09 )   PT: 30.4 sec;   INR: 2.76 ratio             ABG - ( 10 May 2017 14:26 )  pH: 7.33  /  pCO2: 44    /  pO2: 90    / HCO3: 22    / Base Excess: -3    /  SaO2: 94              PROCEDURE DATE:  05/10/2017        INTERPRETATION:  History: Cough.    Chest:  one view.      Comparison: 2017    AP radiograph of the chest demonstrates faint developing infiltrates in   the RIGHT midlung and RIGHT lower lobe. The cardiac silhouette is normal   in size. Osseous structures are intact.    Impression:faint developing infiltrates in the RIGHT midlung and RIGHT   lower lobe            RADIOLOGY & ADDITIONAL STUDIES:

## 2017-05-13 NOTE — CONSULT NOTE ADULT - ASSESSMENT
This is a 77 year old male admitted with Pneumonia. Hx of DVT/PE and A. Fib. on coumadin [id number] 5.  Pt has high thrombosis risk and requires anticoagulation treatment dose.   Pt has had supertherapeutic INRs while in hosp.  Pt is on antibiotics and not eating as he normally does.  He will require a lower dose then what he normally takes.    :hold coumadin until INR is less then 3.0 then will resume at a lower dose.  :daily cbc/pt, inr  :oob as tolerated  :thanks for consult will f/u

## 2017-05-14 LAB
INR BLD: 3.05 RATIO — HIGH (ref 0.88–1.16)
PLATELET # BLD AUTO: 136 K/UL — LOW (ref 150–400)
PROTHROM AB SERPL-ACNC: 33.7 SEC — HIGH (ref 9.8–12.7)

## 2017-05-14 PROCEDURE — 99233 SBSQ HOSP IP/OBS HIGH 50: CPT

## 2017-05-14 RX ADMIN — SODIUM CHLORIDE 3 MILLILITER(S): 9 INJECTION INTRAMUSCULAR; INTRAVENOUS; SUBCUTANEOUS at 05:57

## 2017-05-14 RX ADMIN — LOSARTAN POTASSIUM 100 MILLIGRAM(S): 100 TABLET, FILM COATED ORAL at 05:47

## 2017-05-14 RX ADMIN — Medication 100 MILLIGRAM(S): at 05:46

## 2017-05-14 RX ADMIN — GABAPENTIN 300 MILLIGRAM(S): 400 CAPSULE ORAL at 13:41

## 2017-05-14 RX ADMIN — Medication 2: at 13:42

## 2017-05-14 RX ADMIN — Medication 100 MILLIGRAM(S): at 21:50

## 2017-05-14 RX ADMIN — SODIUM CHLORIDE 3 MILLILITER(S): 9 INJECTION INTRAMUSCULAR; INTRAVENOUS; SUBCUTANEOUS at 21:51

## 2017-05-14 RX ADMIN — TENOFOVIR DISOPROXIL FUMARATE 300 MILLIGRAM(S): 300 TABLET, FILM COATED ORAL at 10:44

## 2017-05-14 RX ADMIN — CEFEPIME 100 MILLIGRAM(S): 1 INJECTION, POWDER, FOR SOLUTION INTRAMUSCULAR; INTRAVENOUS at 05:53

## 2017-05-14 RX ADMIN — GABAPENTIN 300 MILLIGRAM(S): 400 CAPSULE ORAL at 21:50

## 2017-05-14 RX ADMIN — Medication 50 MILLIGRAM(S): at 18:15

## 2017-05-14 RX ADMIN — INSULIN GLARGINE 40 UNIT(S): 100 INJECTION, SOLUTION SUBCUTANEOUS at 21:50

## 2017-05-14 RX ADMIN — Medication 2: at 18:10

## 2017-05-14 RX ADMIN — SIMVASTATIN 10 MILLIGRAM(S): 20 TABLET, FILM COATED ORAL at 21:50

## 2017-05-14 RX ADMIN — Medication 3 MILLILITER(S): at 20:59

## 2017-05-14 RX ADMIN — Medication 50 MILLIGRAM(S): at 05:47

## 2017-05-14 RX ADMIN — CEFEPIME 100 MILLIGRAM(S): 1 INJECTION, POWDER, FOR SOLUTION INTRAMUSCULAR; INTRAVENOUS at 18:11

## 2017-05-14 RX ADMIN — Medication 100 MILLIGRAM(S): at 13:42

## 2017-05-14 RX ADMIN — Medication 3 MILLILITER(S): at 08:00

## 2017-05-14 RX ADMIN — Medication 3 MILLILITER(S): at 16:49

## 2017-05-14 RX ADMIN — LIDOCAINE 2 PATCH: 4 CREAM TOPICAL at 21:51

## 2017-05-14 RX ADMIN — GABAPENTIN 300 MILLIGRAM(S): 400 CAPSULE ORAL at 05:47

## 2017-05-14 RX ADMIN — MONTELUKAST 10 MILLIGRAM(S): 4 TABLET, CHEWABLE ORAL at 21:50

## 2017-05-14 RX ADMIN — Medication 600 MILLIGRAM(S): at 05:45

## 2017-05-14 RX ADMIN — LIDOCAINE 2 PATCH: 4 CREAM TOPICAL at 10:37

## 2017-05-14 RX ADMIN — INSULIN GLARGINE 40 UNIT(S): 100 INJECTION, SOLUTION SUBCUTANEOUS at 08:02

## 2017-05-14 RX ADMIN — Medication 600 MILLIGRAM(S): at 18:12

## 2017-05-14 RX ADMIN — Medication 166.67 MILLIGRAM(S): at 18:50

## 2017-05-14 RX ADMIN — AMIODARONE HYDROCHLORIDE 100 MILLIGRAM(S): 400 TABLET ORAL at 05:45

## 2017-05-14 RX ADMIN — SODIUM CHLORIDE 3 MILLILITER(S): 9 INJECTION INTRAMUSCULAR; INTRAVENOUS; SUBCUTANEOUS at 18:58

## 2017-05-14 RX ADMIN — Medication 1 TABLET(S): at 10:43

## 2017-05-14 NOTE — PROGRESS NOTE ADULT - ASSESSMENT
*Acute Hypoxic Respiratory failure 2ndry to PNA for suspected G+ and G- as well as atypical with associated Viral syndrome  - +entero-rhinovirus  - continue Vanco and Cefepime  - Oxygen Via NC, monitor Pulse ox  - continue Nebs ATC and PRN  - check sputum culture  - Blood cultures - NGTD  - ID consult appreciated   - Pulm consult appreciated   - FU repeat CXR    *Sepsis 2ndry to PNA  -in a patient who is immune compromised by NHL and hypogammaglobulinemia  -continue to monitor     *AF on AC with supratherapeutic INR  - monitor PT/INR  - continue usual coumadin dose 3.75 mg po daily except monday and friday when he gets 2.5 mg; hold for INR >3  - AC services     *Hypogammaglobulinemia.  - FU IgG and IgA levels  - not on IVIG due to reactions to IVIG  - if IgA level is low , may consider IVIG with low IgA if available.   - Hemeonc consult appreciated     *Thrombocytopenia  -last plt in  150s  -monitor plts    *chronic Diastolic CHF -stable  -LVEF 55-60% in   -Miontor I&O, daily weights    *CLL  -hemonc consult appreciated - No indication for treatment at this time    *DM  -continue ISS and Lantus    *DVT ppx  -heparin SQ

## 2017-05-14 NOTE — PROGRESS NOTE ADULT - PROBLEM SELECTOR PLAN 3
IgG and IgA levels pending  not on IVIG due to reactions to IVIG  if IgA level is low , may consider IVIG with low IgA if available

## 2017-05-14 NOTE — PROGRESS NOTE ADULT - ASSESSMENT
This is a 77 year old male admitted with Pneumonia. Hx of DVT/PE and A. Fib. on coumadin [id number] 5.  Pt has high thrombosis risk and requires anticoagulation treatment dose.   Pt has had supertherapeutic INRs while in hosp.  Pt is on antibiotics and not eating as he normally does.  He will require a lower dose then what he normally takes.    5/14: INR 3.05 today, will hold for one more day then resume coumadin tomorrow- 2.5mg PO daily as per INR    Plan:  :hold coumadin until INR is less then 3.0 then will resume at a lower dose.  :daily cbc/pt, inr  :oob as tolerated    Will continue to follow

## 2017-05-15 LAB
ANION GAP SERPL CALC-SCNC: 9 MMOL/L — SIGNIFICANT CHANGE UP (ref 5–17)
BUN SERPL-MCNC: 20 MG/DL — SIGNIFICANT CHANGE UP (ref 7–23)
CALCIUM SERPL-MCNC: 8.2 MG/DL — LOW (ref 8.5–10.1)
CHLORIDE SERPL-SCNC: 106 MMOL/L — SIGNIFICANT CHANGE UP (ref 96–108)
CO2 SERPL-SCNC: 28 MMOL/L — SIGNIFICANT CHANGE UP (ref 22–31)
CREAT SERPL-MCNC: 1.17 MG/DL — SIGNIFICANT CHANGE UP (ref 0.5–1.3)
CULTURE RESULTS: SIGNIFICANT CHANGE UP
CULTURE RESULTS: SIGNIFICANT CHANGE UP
GLUCOSE SERPL-MCNC: 54 MG/DL — LOW (ref 70–99)
HCT VFR BLD CALC: 37.9 % — LOW (ref 39–50)
HGB BLD-MCNC: 12.2 G/DL — LOW (ref 13–17)
IGA FLD-MCNC: 14 MG/DL — LOW (ref 68–378)
IGG FLD-MCNC: 207 MG/DL — LOW (ref 694–1618)
IGG SERPL-MCNC: 235 MG/DL — LOW (ref 767–1590)
IGG1 SER-MCNC: 154 MG/DL — LOW (ref 341–894)
IGG2 SER-MCNC: 41 MG/DL — LOW (ref 171–632)
IGG3 SER-MCNC: 6.4 MG/DL — LOW (ref 18.4–106)
IGG4 SER-MCNC: 1.9 MG/DL — LOW (ref 2.4–121)
IGM SERPL-MCNC: 13 MG/DL — LOW (ref 40–230)
INR BLD: 1.87 RATIO — HIGH (ref 0.88–1.16)
KAPPA LC SER QL IFE: 11 MG/DL — HIGH (ref 0.33–1.94)
KAPPA/LAMBDA FREE LIGHT CHAIN RATIO, SERUM: 13.92 RATIO — HIGH (ref 0.26–1.65)
LAMBDA LC SER QL IFE: 0.79 MG/DL — SIGNIFICANT CHANGE UP (ref 0.57–2.63)
MAGNESIUM SERPL-MCNC: 2.3 MG/DL — SIGNIFICANT CHANGE UP (ref 1.6–2.6)
MCHC RBC-ENTMCNC: 30.4 PG — SIGNIFICANT CHANGE UP (ref 27–34)
MCHC RBC-ENTMCNC: 32.1 GM/DL — SIGNIFICANT CHANGE UP (ref 32–36)
MCV RBC AUTO: 94.6 FL — SIGNIFICANT CHANGE UP (ref 80–100)
PHOSPHATE SERPL-MCNC: 3.9 MG/DL — SIGNIFICANT CHANGE UP (ref 2.5–4.5)
PLATELET # BLD AUTO: 145 K/UL — LOW (ref 150–400)
POTASSIUM SERPL-MCNC: 4.1 MMOL/L — SIGNIFICANT CHANGE UP (ref 3.5–5.3)
POTASSIUM SERPL-SCNC: 4.1 MMOL/L — SIGNIFICANT CHANGE UP (ref 3.5–5.3)
PROTHROM AB SERPL-ACNC: 20.4 SEC — HIGH (ref 9.8–12.7)
RBC # BLD: 4.01 M/UL — LOW (ref 4.2–5.8)
RBC # FLD: 14.2 % — SIGNIFICANT CHANGE UP (ref 10.3–14.5)
SODIUM SERPL-SCNC: 143 MMOL/L — SIGNIFICANT CHANGE UP (ref 135–145)
SPECIMEN SOURCE: SIGNIFICANT CHANGE UP
SPECIMEN SOURCE: SIGNIFICANT CHANGE UP
WBC # BLD: 13.1 K/UL — HIGH (ref 3.8–10.5)
WBC # FLD AUTO: 13.1 K/UL — HIGH (ref 3.8–10.5)

## 2017-05-15 PROCEDURE — 99233 SBSQ HOSP IP/OBS HIGH 50: CPT

## 2017-05-15 RX ORDER — WARFARIN SODIUM 2.5 MG/1
5 TABLET ORAL ONCE
Qty: 0 | Refills: 0 | Status: COMPLETED | OUTPATIENT
Start: 2017-05-15 | End: 2017-05-15

## 2017-05-15 RX ORDER — ACETAMINOPHEN 500 MG
650 TABLET ORAL EVERY 6 HOURS
Qty: 0 | Refills: 0 | Status: DISCONTINUED | OUTPATIENT
Start: 2017-05-15 | End: 2017-05-16

## 2017-05-15 RX ADMIN — LIDOCAINE 2 PATCH: 4 CREAM TOPICAL at 11:06

## 2017-05-15 RX ADMIN — WARFARIN SODIUM 5 MILLIGRAM(S): 2.5 TABLET ORAL at 22:11

## 2017-05-15 RX ADMIN — Medication 3 MILLILITER(S): at 02:35

## 2017-05-15 RX ADMIN — Medication 3 MILLILITER(S): at 20:35

## 2017-05-15 RX ADMIN — Medication 100 MILLIGRAM(S): at 05:13

## 2017-05-15 RX ADMIN — INSULIN GLARGINE 40 UNIT(S): 100 INJECTION, SOLUTION SUBCUTANEOUS at 22:10

## 2017-05-15 RX ADMIN — INSULIN GLARGINE 40 UNIT(S): 100 INJECTION, SOLUTION SUBCUTANEOUS at 09:55

## 2017-05-15 RX ADMIN — Medication 166.67 MILLIGRAM(S): at 17:49

## 2017-05-15 RX ADMIN — Medication 3 MILLILITER(S): at 13:47

## 2017-05-15 RX ADMIN — Medication 100 MILLIGRAM(S): at 17:49

## 2017-05-15 RX ADMIN — BENZOCAINE AND MENTHOL 1 LOZENGE: 5; 1 LIQUID ORAL at 22:11

## 2017-05-15 RX ADMIN — Medication 2: at 17:49

## 2017-05-15 RX ADMIN — Medication 600 MILLIGRAM(S): at 17:48

## 2017-05-15 RX ADMIN — Medication 600 MILLIGRAM(S): at 05:13

## 2017-05-15 RX ADMIN — GABAPENTIN 300 MILLIGRAM(S): 400 CAPSULE ORAL at 22:12

## 2017-05-15 RX ADMIN — Medication 2: at 12:00

## 2017-05-15 RX ADMIN — MONTELUKAST 10 MILLIGRAM(S): 4 TABLET, CHEWABLE ORAL at 22:12

## 2017-05-15 RX ADMIN — SODIUM CHLORIDE 3 MILLILITER(S): 9 INJECTION INTRAMUSCULAR; INTRAVENOUS; SUBCUTANEOUS at 17:53

## 2017-05-15 RX ADMIN — Medication 100 MILLIGRAM(S): at 22:11

## 2017-05-15 RX ADMIN — Medication 1 TABLET(S): at 11:06

## 2017-05-15 RX ADMIN — CEFEPIME 100 MILLIGRAM(S): 1 INJECTION, POWDER, FOR SOLUTION INTRAMUSCULAR; INTRAVENOUS at 05:12

## 2017-05-15 RX ADMIN — SODIUM CHLORIDE 3 MILLILITER(S): 9 INJECTION INTRAMUSCULAR; INTRAVENOUS; SUBCUTANEOUS at 05:13

## 2017-05-15 RX ADMIN — AMIODARONE HYDROCHLORIDE 100 MILLIGRAM(S): 400 TABLET ORAL at 05:12

## 2017-05-15 RX ADMIN — CEFEPIME 100 MILLIGRAM(S): 1 INJECTION, POWDER, FOR SOLUTION INTRAMUSCULAR; INTRAVENOUS at 19:56

## 2017-05-15 RX ADMIN — GABAPENTIN 300 MILLIGRAM(S): 400 CAPSULE ORAL at 17:49

## 2017-05-15 RX ADMIN — SODIUM CHLORIDE 3 MILLILITER(S): 9 INJECTION INTRAMUSCULAR; INTRAVENOUS; SUBCUTANEOUS at 22:12

## 2017-05-15 RX ADMIN — Medication 50 MILLIGRAM(S): at 05:13

## 2017-05-15 RX ADMIN — SIMVASTATIN 10 MILLIGRAM(S): 20 TABLET, FILM COATED ORAL at 22:12

## 2017-05-15 RX ADMIN — Medication 50 MILLIGRAM(S): at 17:48

## 2017-05-15 RX ADMIN — LIDOCAINE 2 PATCH: 4 CREAM TOPICAL at 22:25

## 2017-05-15 RX ADMIN — GABAPENTIN 300 MILLIGRAM(S): 400 CAPSULE ORAL at 05:12

## 2017-05-15 RX ADMIN — Medication 3 MILLILITER(S): at 08:02

## 2017-05-15 RX ADMIN — LOSARTAN POTASSIUM 100 MILLIGRAM(S): 100 TABLET, FILM COATED ORAL at 05:59

## 2017-05-15 RX ADMIN — TENOFOVIR DISOPROXIL FUMARATE 300 MILLIGRAM(S): 300 TABLET, FILM COATED ORAL at 11:09

## 2017-05-15 NOTE — PROGRESS NOTE ADULT - ASSESSMENT
77 yr old  male w hx B cell -CLL stage 4/SLL, metastatic NHL, hypogammaglobulinemia (s/p IVIG last ?), AFfib on AC, HTN, HLD, CHF-diastolic, hx PE/DVT, COPD, hx recurrent PNA admitted 5/10 with subjective fevers, productive cough, worsening sob X 3 days, reports productive cough with white sputum, nasal congestion, and pleuritic chest pain, No recent travel or sick contacts. Here afebrile, O2 saturation 90% on RA, wbc ct 15.9, xray showed infiltrates along RML/RML, RVP positive for entero/rhinovirus, pt was given IV rocephin/azithromycin then vancomycin/cefepime.     1. sepsis/hypoxic resp failure/viral syndrome/entero-rhinovirus/hcap/immunocompromised host/CHRISTINA    - improving    - continue with IV vancomycin 1250mg q24h, renally-dose adjusted, check trough prior to 4th dose for MRSA coverage, continue with IV cefepime, decrease dose to [id number] for resistant gram (-), MSSA coverage, day#6    - continue with IV antibiotic regimen today, switch to oral ceftin 500mg q12h tomorrow to complete 4 more days for 10 day course    - sputum cx nl resp manuel    - blood cx no growth    - on bronchodilators for bronchospam/COPD    - immunoglobulins pending, may need further IVIG    - supportive care    - monitor temps    - f/u CBC    -tolerating abx well so far; no side effects noted    -reason for abx use and side effects reviewed with patient; monitor BMP and vancomycin trough levels    2. other issues -  B cell -CLL stage 4/SLL, metastatic NHL, hypogammaglobulinemia (s/p IVIG last ?), AFfib on AC, HTN, HLD, CHF-diastolic, hx PE/DVT, COPD - care per medicine

## 2017-05-15 NOTE — PROGRESS NOTE ADULT - ASSESSMENT
*Acute Hypoxic Respiratory failure 2ndry to PNA for suspected G+ and G- as well as atypical with associated Viral syndrome  - +entero-rhinovirus  - continue Vanco and Cefepime  - Oxygen Via NC, monitor Pulse ox  - continue Nebs ATC and PRN  - check sputum culture  - Blood cultures - NGTD  - ID consult appreciated switch to oral ceftin 500mg q12h tomorrow to complete 4 more days for 10 day course  - Pulm consult appreciated     *Sepsis 2ndry to PNA  -in a patient who is immune compromised by NHL and hypogammaglobulinemia  -continue to monitor     *AF on AC with subtherapeutic INR  - monitor PT/INR  - continue coumadin - hold for INR >3  - patients usual home regimen of coumadin dose 3.75 mg po daily except monday and friday when he gets 2.5 mg; - AC services     *Hypogammaglobulinemia.  - FU IgG and IgA levels  - not on IVIG due to reactions to IVIG in the past  - if IgA level is low , may consider IVIG with low IgA if available.   - Hemeonc consult appreciated     *Thrombocytopenia  -last plt in  150s  -monitor plts    *chronic Diastolic CHF -stable  -LVEF 55-60% in   -Miontor I&O, daily weights    *CLL  -hemonc consult appreciated - No indication for treatment at this time    *DM  -continue ISS and Lantus    *DVT ppx  -heparin SQ

## 2017-05-15 NOTE — PROGRESS NOTE ADULT - ASSESSMENT
This is a 77 year old male admitted with Pneumonia. Hx of DVT/PE and A. Fib. on coumadin [id number] 5.  Pt has high thrombosis risk and requires anticoagulation treatment dose.   Pt has had supertherapeutic INRs while in hosp.  Pt is on antibiotics and not eating as he normally does.  He will require a lower dose then what he normally takes.    5/14: INR 3.05 today, will hold for one more day then resume coumadin tomorrow- 2.5mg PO daily as per INR  5-15 brisk drop inr 1.87 from 3.05 yesterday.   Plan:  : coumadin 5mg today adjust with inr  :oob as tolerated    Will continue to follow

## 2017-05-16 ENCOUNTER — TRANSCRIPTION ENCOUNTER (OUTPATIENT)
Age: 77
End: 2017-05-16

## 2017-05-16 VITALS — OXYGEN SATURATION: 92 %

## 2017-05-16 LAB
ANION GAP SERPL CALC-SCNC: 10 MMOL/L — SIGNIFICANT CHANGE UP (ref 5–17)
BUN SERPL-MCNC: 31 MG/DL — HIGH (ref 7–23)
CALCIUM SERPL-MCNC: 7.9 MG/DL — LOW (ref 8.5–10.1)
CHLORIDE SERPL-SCNC: 106 MMOL/L — SIGNIFICANT CHANGE UP (ref 96–108)
CO2 SERPL-SCNC: 24 MMOL/L — SIGNIFICANT CHANGE UP (ref 22–31)
CREAT SERPL-MCNC: 1.22 MG/DL — SIGNIFICANT CHANGE UP (ref 0.5–1.3)
GLUCOSE SERPL-MCNC: 161 MG/DL — HIGH (ref 70–99)
HCT VFR BLD CALC: 35.9 % — LOW (ref 39–50)
HGB BLD-MCNC: 11.9 G/DL — LOW (ref 13–17)
INR BLD: 1.73 RATIO — HIGH (ref 0.88–1.16)
MCHC RBC-ENTMCNC: 30.5 PG — SIGNIFICANT CHANGE UP (ref 27–34)
MCHC RBC-ENTMCNC: 33.2 GM/DL — SIGNIFICANT CHANGE UP (ref 32–36)
MCV RBC AUTO: 91.9 FL — SIGNIFICANT CHANGE UP (ref 80–100)
PLATELET # BLD AUTO: 126 K/UL — LOW (ref 150–400)
POTASSIUM SERPL-MCNC: 4.7 MMOL/L — SIGNIFICANT CHANGE UP (ref 3.5–5.3)
POTASSIUM SERPL-SCNC: 4.7 MMOL/L — SIGNIFICANT CHANGE UP (ref 3.5–5.3)
PROTHROM AB SERPL-ACNC: 18.9 SEC — HIGH (ref 9.8–12.7)
RBC # BLD: 3.91 M/UL — LOW (ref 4.2–5.8)
RBC # FLD: 14 % — SIGNIFICANT CHANGE UP (ref 10.3–14.5)
SODIUM SERPL-SCNC: 140 MMOL/L — SIGNIFICANT CHANGE UP (ref 135–145)
WBC # BLD: 9.9 K/UL — SIGNIFICANT CHANGE UP (ref 3.8–10.5)
WBC # FLD AUTO: 9.9 K/UL — SIGNIFICANT CHANGE UP (ref 3.8–10.5)

## 2017-05-16 PROCEDURE — 71010: CPT | Mod: 26

## 2017-05-16 PROCEDURE — 99233 SBSQ HOSP IP/OBS HIGH 50: CPT

## 2017-05-16 RX ORDER — WARFARIN SODIUM 2.5 MG/1
1 TABLET ORAL
Qty: 0 | Refills: 0 | COMMUNITY
Start: 2017-05-16

## 2017-05-16 RX ORDER — ALBUTEROL 90 UG/1
2 AEROSOL, METERED ORAL
Qty: 0 | Refills: 0 | COMMUNITY

## 2017-05-16 RX ORDER — WARFARIN SODIUM 2.5 MG/1
1 TABLET ORAL
Qty: 0 | Refills: 0 | COMMUNITY

## 2017-05-16 RX ORDER — WARFARIN SODIUM 2.5 MG/1
3.75 TABLET ORAL
Qty: 0 | Refills: 0 | COMMUNITY

## 2017-05-16 RX ORDER — WARFARIN SODIUM 2.5 MG/1
5 TABLET ORAL DAILY
Qty: 0 | Refills: 0 | Status: DISCONTINUED | OUTPATIENT
Start: 2017-05-16 | End: 2017-05-16

## 2017-05-16 RX ORDER — CEFOXITIN 1 G/1
1 INJECTION, POWDER, FOR SOLUTION INTRAVENOUS
Qty: 8 | Refills: 0 | OUTPATIENT
Start: 2017-05-16 | End: 2017-05-20

## 2017-05-16 RX ADMIN — CEFEPIME 100 MILLIGRAM(S): 1 INJECTION, POWDER, FOR SOLUTION INTRAMUSCULAR; INTRAVENOUS at 05:39

## 2017-05-16 RX ADMIN — GABAPENTIN 300 MILLIGRAM(S): 400 CAPSULE ORAL at 05:40

## 2017-05-16 RX ADMIN — Medication 100 MILLIGRAM(S): at 05:40

## 2017-05-16 RX ADMIN — TENOFOVIR DISOPROXIL FUMARATE 300 MILLIGRAM(S): 300 TABLET, FILM COATED ORAL at 11:57

## 2017-05-16 RX ADMIN — Medication 3 MILLILITER(S): at 08:37

## 2017-05-16 RX ADMIN — AMIODARONE HYDROCHLORIDE 100 MILLIGRAM(S): 400 TABLET ORAL at 05:40

## 2017-05-16 RX ADMIN — Medication 50 MILLIGRAM(S): at 05:40

## 2017-05-16 RX ADMIN — SODIUM CHLORIDE 3 MILLILITER(S): 9 INJECTION INTRAMUSCULAR; INTRAVENOUS; SUBCUTANEOUS at 05:44

## 2017-05-16 RX ADMIN — INSULIN GLARGINE 40 UNIT(S): 100 INJECTION, SOLUTION SUBCUTANEOUS at 09:10

## 2017-05-16 RX ADMIN — LOSARTAN POTASSIUM 100 MILLIGRAM(S): 100 TABLET, FILM COATED ORAL at 05:40

## 2017-05-16 RX ADMIN — Medication 650 MILLIGRAM(S): at 09:12

## 2017-05-16 RX ADMIN — Medication 3 MILLILITER(S): at 13:50

## 2017-05-16 RX ADMIN — LIDOCAINE 2 PATCH: 4 CREAM TOPICAL at 11:56

## 2017-05-16 RX ADMIN — Medication 600 MILLIGRAM(S): at 05:40

## 2017-05-16 RX ADMIN — Medication 1 TABLET(S): at 11:56

## 2017-05-16 NOTE — DISCHARGE NOTE ADULT - PATIENT PORTAL LINK FT
“You can access the [facility] Patient Portal, offered by [facility], by registering with the following website: [email or web address]”

## 2017-05-16 NOTE — PROGRESS NOTE ADULT - ASSESSMENT
This is a 77 year old male admitted with Pneumonia. Hx of DVT/PE and A. Fib. on coumadin [id number] 5.  Pt has high thrombosis risk and requires anticoagulation treatment dose.   Pt has had supertherapeutic INRs while in hosp.  Pt is on antibiotics and not eating as he normally does.  He will require a lower dose then what he normally takes.    5/14: INR 3.05 today, will hold for one more day then resume coumadin tomorrow- 2.5mg PO daily as per INR  5-15 brisk drop inr 1.87 from 3.05 yesterday.   Plan:  : coumadin 5mg today adjust with inr  :oob as tolerated  : pt given instruction for coumadin dosing for the next three days.  Will  f/u on Friday in the anticoagulation center.    Will continue to follow

## 2017-05-16 NOTE — DISCHARGE NOTE ADULT - PLAN OF CARE
Acute Hypoxic Respiratory failure 2ndry to PNA D/C patient home ceftin 500mg q12h to complete 4 more daysFU with Pulm in 3-4 days Hypogammaglobulinemia with Low IGG and IGA levels; FU with Dr Houser in 3-4 days Hypogammaglobulinemia with hx of CLL continue to monitor; Discussed with Dr Houser and no further infusions at this time

## 2017-05-16 NOTE — DISCHARGE NOTE ADULT - MEDICATION SUMMARY - MEDICATIONS TO TAKE
I will START or STAY ON the medications listed below when I get home from the hospital:    Cozaar 100 mg oral tablet  -- 1 tab(s) by mouth once a day  -- Indication: For Hypertension    amiodarone 100 mg oral tablet  -- 100 milligram(s) by mouth once a day  -- Indication: For AFIB    Coumadin 5 mg oral tablet  -- 1 tab(s) by mouth once a day X 2 days then take 3.75mg X 1 day. then follow up with  services  -- Indication: For AFIB    gabapentin 300 mg oral capsule  -- 1 cap(s) by mouth 3 times a day  -- Indication: For Neuropathy    Lantus  -- 40 unit(s) subcutaneous 2 times a day  -- Indication: For DM type 2 (diabetes mellitus, type 2)    simvastatin 10 mg oral tablet  -- 1 tab(s) by mouth once a day (at bedtime)  -- Indication: For Hyperlipidemia    tenofovir 300 mg oral tablet  -- 1 tab(s) by mouth once a day  -- Indication: For Hep B    metoprolol tartrate 50 mg oral tablet  -- 1 tab(s) by mouth 2 times a day  -- Indication: For HTN    albuterol 2.5 mg/3 mL (0.083%) inhalation solution  -- 3 milliliter(s) inhaled once a day  -- Indication: For COPD    Ceftin 500 mg oral tablet  -- 1 tab(s) by mouth every 12 hours  -- Finish all this medication unless otherwise directed by prescriber.  Medication should be taken with plenty of water.  Take with food or milk.    -- Indication: For PNEUMONIA    montelukast 10 mg oral tablet  -- 1 tab(s) by mouth once a day (at bedtime)  -- Indication: For COPD    multivitamin  -- 1 tab(s) by mouth once a day  -- Indication: For Vitamin

## 2017-05-16 NOTE — DISCHARGE NOTE ADULT - HOSPITAL COURSE
HOSPITALIST PROGRESS NOTE:  SUBJECTIVE:  PCP: PCP  Dr. Ernesto  Pulm  Dr. Hourizadeh  Onc  Dr. Houser  ID  Dr. Junior  Chief Complaint: Patient is a 77y old  Male who presents with a chief complaint of cough/chest pain (10 May 2017 15:07)    HPI:  77 yr old  male w hx B cell -CLL stage 4/SLL, metastatic NHL, hypogammaglobulinemia (s/p IVIG last ?), AFfib on AC, HTN, HLD, CHF-diastolic, hx PE/DVT, COPD, hx recurrent PNA came to ED w family complaining of tactile temp and productive cough and SOB. There is a 3 day hx of tactile temps, cough productive of white sputum and SOB that progressed to the point that he had to come in today for evaluation.  Has associated chest pain across lower rib cage bilaterally and to back that occurs w cough. Denied recent travel hx or recent sick contacts. He was hypoxic to 90 at room air.  Patient feels a little improved after respiratory treatment and oxygen.In the ED he received fluids and IV ceftiaxone and zithromax (10 May 2017 12:14)    5/11: continues to have pleuritic chest pain and cough. dyspnea improving. No other overnight events.   5/12: Improving. Still having a lot of cough. No other events overnight   5/13: Translating services used ID [id number] - Patient has no complaints. would like something for the persistent cough.    5/14:  Patient has no complaints. Cough improving. No dyspnea or wheezing. Seen by [facility] this morning.   5/15:  Patient has no complaints. No CP,dyspnea. Still having cough. No palpitations  5/16:  Patient has no complaints. Denies any dyspnea, CP,  wheezing or palpitations; Family at bedside translated    *Acute Hypoxic Respiratory failure 2ndry to PNA for suspected G+ and G- as well as atypical with associated Viral syndrome  - +entero-rhinovirus  - continue Vanco and Cefepime  - Oxygen Via NC, monitor Pulse ox  - continue Nebs ATC and PRN  - check sputum culture  - Blood cultures - NGTD  - ID consult appreciated switch to oral ceftin 500mg q12h tomorrow to complete 4 more days for 10 day course  - Pulm consult appreciated     *Sepsis 2ndry to PNA  -in a patient who is immune compromised by NHL and hypogammaglobulinemia  -continue to monitor     *AF on AC with subtherapeutic INR  - monitor PT/INR  - continue coumadin - hold for INR >3   - patients usual home regimen of coumadin dose 3.75 mg po daily except monday and friday when he gets 2.5 mg; - AC services     *Hypogammaglobulinemia.  - IgG and IgA levels - low  - not on IVIG due to reactions to IVIG in the past  - if IgA level is low , may consider IVIG with low IgA if available.   - Hemeonc consult appreciated     *Thrombocytopenia  -last plt in  150s  -monitor plts    *chronic Diastolic CHF -stable  -LVEF 55-60% in   -Miontor I&O, daily weights    *CLL  -hemonc consult appreciated - No indication for treatment at this time    *DM  -continue ISS and Lantus    *DVT ppx  -heparin SQ    *Discharge Instructions/Follow up/Pending labs:  D/C patient home ceftin 500mg q12h to complete 4 more; FU with Pulm in 3-4 days  Hypogammaglobulinemia with Low IGG and IGA levels; FU with Dr Houser in 3-4 days    Message left for Dr Orozco  Total Time: 45 minutes

## 2017-05-16 NOTE — PROGRESS NOTE ADULT - ASSESSMENT
*Acute Hypoxic Respiratory failure 2ndry to PNA for suspected G+ and G- as well as atypical with associated Viral syndrome  - +entero-rhinovirus  - continue Vanco and Cefepime  - Oxygen Via NC, monitor Pulse ox  - continue Nebs ATC and PRN  - check sputum culture  - Blood cultures - NGTD  - ID consult appreciated switch to oral ceftin 500mg q12h tomorrow to complete 4 more days for 10 day course  - Pulm consult appreciated     *Sepsis 2ndry to PNA  -in a patient who is immune compromised by NHL and hypogammaglobulinemia  -continue to monitor     *AF on AC with subtherapeutic INR  - monitor PT/INR  - continue coumadin - hold for INR >3   - patients usual home regimen of coumadin dose 3.75 mg po daily except monday and friday when he gets 2.5 mg; - AC services     *Hypogammaglobulinemia.  - IgG and IgA levels - low  - not on IVIG due to reactions to IVIG in the past  - if IgA level is low , may consider IVIG with low IgA if available.   - Hemeonc consult appreciated     *Thrombocytopenia  -last plt in  150s  -monitor plts    *chronic Diastolic CHF -stable  -LVEF 55-60% in   -Miontor I&O, daily weights    *CLL  -hemonc consult appreciated - No indication for treatment at this time    *DM  -continue ISS and Lantus    *DVT ppx  -heparin SQ    *Discharge Instructions/Follow up/Pending labs:  D/C patient home ceftin 500mg q12h to complete 4 more; FU with Pulm in 3-4 days  Hypogammaglobulinemia with Low IGG and IGA levels; FU with Dr Houser in 3-4 days    Message left for Dr Orozco  Total Time: 45 minutes *Acute Hypoxic Respiratory failure 2ndry to PNA for suspected G+ and G- as well as atypical with associated Viral syndrome  - +entero-rhinovirus  - continue Vanco and Cefepime  - Oxygen Via NC, monitor Pulse ox  - continue Nebs ATC and PRN  - check sputum culture  - Blood cultures - NGTD  - ID consult appreciated switch to oral ceftin 500mg q12h tomorrow to complete 4 more days for 10 day course  - Pulm consult appreciated     *Sepsis 2ndry to PNA  -in a patient who is immune compromised by NHL and hypogammaglobulinemia  -continue to monitor     *AF on AC with subtherapeutic INR  - monitor PT/INR  - continue coumadin - hold for INR >3   - patients usual home regimen of coumadin dose 3.75 mg po daily except monday and friday when he gets 2.5 mg; - AC services     *Hypogammaglobulinemia.  - not on IVIG due to multiple reactions to IVIG in the past  - Hemeonc consult appreciated   - IgG and IgA levels - low - Discussed with Dr Houser, no need for IVIG transfusion    *Thrombocytopenia  -last plt in  150s  -monitor plts    *chronic Diastolic CHF -stable  -LVEF 55-60% in   -Miontor I&O, daily weights    *CLL  -hemonc consult appreciated - No indication for treatment at this time    *DM  -continue ISS and Lantus    *DVT ppx  -heparin SQ    *Discharge Instructions/Follow up/Pending labs:  D/C patient home ceftin 500mg q12h to complete 4 more; FU with Pulm in 3-4 days    Message left for Dr Orozco  Total Time: 45 minutes

## 2017-05-16 NOTE — DISCHARGE NOTE ADULT - ADDITIONAL INSTRUCTIONS
*Discharge Instructions/Follow up/Pending labs:  D/C patient home ceftin 500mg q12h to complete 4 more; FU with Pulm in 3-4 days  Hypogammaglobulinemia with Low IGG and IGA levels; FU with Dr Houser in 3-4 days  AFIB - Coumadin 5mg PO X 2 days then 3.75mg X 1 day- FU with AC services *Discharge Instructions/Follow up/Pending labs:  D/C patient home ceftin 500mg q12h to complete 4 more; FU with Pulm in 3-4 days  AFIB - Coumadin 5mg PO X 2 days then 3.75mg X 1 day- FU with AC services

## 2017-05-16 NOTE — PROGRESS NOTE ADULT - SUBJECTIVE AND OBJECTIVE BOX
HOSPITALIST PROGRESS NOTE:  SUBJECTIVE:  PCP: PCP  Dr. Ernesto  Pulm  Dr. Hourizadeh  Onc  Dr. Corrina Junior  Chief Complaint: Patient is a 77y old  Male who presents with a chief complaint of cough/chest pain (10 May 2017 15:07)    HPI:  77 yr old  male w hx B cell -CLL stage 4/SLL, metastatic NHL, hypogammaglobulinemia (s/p IVIG last ?), AFfib on AC, HTN, HLD, CHF-diastolic, hx PE/DVT, COPD, hx recurrent PNA came to ED w family complaining of tactile temp and productive cough and SOB. There is a 3 day hx of tactile temps, cough productive of white sputum and SOB that progressed to the point that he had to come in today for evaluation.  Has associated chest pain across lower rib cage bilaterally and to back that occurs w cough. Denied recent travel hx or recent sick contacts. He was hypoxic to 90 at room air.  Patient feels a little improved after respiratory treatment and oxygen.In the ED he received fluids and IV ceftiaxone and zithromax (10 May 2017 12:14)    5/11: continues to have pleuritic chest pain and cough. dyspnea improving. No other overnight events.     Allergies:  Privigen (Other (Severe))    REVIEW OF SYSTEMS:  See HPI. All other review of systems is negative unless indicated above.     OBJECTIVE  Physical Exam:  Vital Signs:    Vital Signs Last 24 Hrs  T(C): 36.6, Max: 36.7 (05-11 @ 11:34)  T(F): 97.8, Max: 98 (05-11 @ 11:34)  HR: 109 (68 - 109)  BP: 105/63 (105/63 - 131/87)  BP(mean): --  RR: 18 (18 - 19)  SpO2: 91% (91% - 100%)  I&O's Summary    I & Os for current day (as of 11 May 2017 17:55)  =============================================  IN: 50 ml / OUT: 0 ml / NET: 50 ml      Constitutional: NAD, awake and alert, well-developed   male lying in stretcher w HOB elevated >60 degrees, in mild resp distress wearing nasal cannula  HEENT; pupils reactive, anicteric sclera, dry oral mucosa  Neck: no bruit or JVD  Chest: scattered wheezes bilaterally no rales + occas rhonchi  CW + accessory muscle usage, mild soreness to palpation lower rib cage bilaterally  Cor  S1 + S2  Abd + BS soft, nontender, cannot appreciate organ size to percussion  Extrem no edema  MS nontender  SKin: no rash or lesions seen  breast//rectal deferred as not indicated by current complaint    MEDICATIONS  (STANDING):  sodium chloride 0.9% lock flush 3milliLiter(s) IV Push every 8 hours  insulin lispro (HumaLOG) corrective regimen sliding scale  SubCutaneous three times a day before meals  dextrose 5%. 1000milliLiter(s) IV Continuous <Continuous>  dextrose 50% Injectable 12.5Gram(s) IV Push once  docusate sodium 100milliGRAM(s) Oral three times a day  losartan 100milliGRAM(s) Oral daily  gabapentin 300milliGRAM(s) Oral three times a day  insulin glargine Injectable (LANTUS) 40Unit(s) SubCutaneous two times a day  simvastatin 10milliGRAM(s) Oral at bedtime  tenofovir 300milliGRAM(s) Oral daily  metoprolol 50milliGRAM(s) Oral two times a day  montelukast 10milliGRAM(s) Oral at bedtime  multivitamin Oral Tab/Cap - Peds 1Tablet(s) Oral daily  amiodarone    Tablet 100milliGRAM(s) Oral daily  ALBUTerol/ipratropium for Nebulization 3milliLiter(s) Nebulizer every 6 hours  vancomycin  IVPB  IV Intermittent   cefepime  IVPB  IV Intermittent   vancomycin  IVPB 1250milliGRAM(s) IV Intermittent every 24 hours  cefepime  IVPB 1000milliGRAM(s) IV Intermittent every 12 hours  lidocaine   Patch 2Patch Transdermal daily  ALBUTerol/ipratropium for Nebulization. 3milliLiter(s) Nebulizer once  guaiFENesin ER 600milliGRAM(s) Oral every 12 hours      LABS: All Labs Reviewed:                        11.7   13.1  )-----------( 116      ( 11 May 2017 07:48 )             36.5     05-11    143  |  108  |  15  ----------------------------<  91  4.4   |  26  |  1.21    Ca    8.2<L>      11 May 2017 07:48  Mg     2.3     05-11    TPro  6.0  /  Alb  3.2<L>  /  TBili  0.8  /  DBili  x   /  AST  16  /  ALT  29  /  AlkPhos  110  05-10    PT/INR - ( 10 May 2017 16:09 )   PT: 30.4 sec;   INR: 2.76 ratio           CARDIAC MARKERS ( 10 May 2017 10:11 )  <0.015 ng/mL / x     / x     / x     / x          Blood Culture:   05-11 @ 09:40  Organism --  Gram Stain Blood -- Gram Stain   Moderate polymorphonuclear leukocytes per low power field  Few Squamous epithelial cells per low power field  Moderate Gram Positive Cocci in Clusters per oil power field  Few Gram Positive Cocci in Pairs and Chains per oil power field  Few Gram Neg  Specimen Source .Sputum Sputum  Culture-Blood --    05-10 @ 10:27  Organism --  Gram Stain Blood -- Gram Stain --  Specimen Source .Blood None  Culture-Blood --    05-10 @ 10:11  Organism --  Gram Stain Blood -- Gram Stain --  Specimen Source .Blood None  Culture-Blood --        RADIOLOGY/EKG:    EXAM:  CHEST SINGLE VIEW FRONTAL  05/10/2017    Impression:faint developing infiltrates in the RIGHT midlung and RIGHT   lower lobe
Patient is a 77y old  Male who presents with a chief complaint of cough/chest pain diagnosed with pneumonia.       HPI:  77 yr old  male w hx B cell -CLL stage 4/SLL not currently on any treatment as per primary oncologist Dr Enamorado.  He also has hypogammaglobulinemia and has received IVIG of various types and has had immediate type hypersensitivity reactions to all of them and hence not reveived any further IVIG.    He has a history of recurrent penumonia    He also has AFfib on AC, HTN, HLD, CHF-diastolic, hx PE/DVT, COPD, hx recurrent PNA came to ED w family complaining of tactile temp and productive cough and SOB          There is a 3 day hx of tactile temps, cough productive of white sputum and SOB that progressed to the point that he had to come in for evaluation.    Has associated chest pain across lower rib cage bilaterally and to back that occurs w cough    Denied recent travel hx or recent sick contacts        He was hypoxic to 90 at room air.  Patient feels a little improved after respiratory treatment and oxygen.        In the ED he received fluids and IV ceftiaxone and zithromax (10 May 2017 12:14)  seen and examined with , CNA at bedside  no cp  yellow sputum  no hemoptysis  not using any supplemental o2    Feeling better today. not febrile   still producing sputum.    PAST MEDICAL & SURGICAL HISTORY:  CLL/SLL currently not on threatment.  Hypogammaglobulinemia: No longer on IVIG due to reactions.  Hepatitis B virus infection, unspecified chronicity: retrovirals  Chronic diastolic congestive heart failure  Essential hypertension  DM type 2 (diabetes mellitus, type 2)  B-cell lymphoma, unspecified B-cell lymphoma type, unspecified body region: B cell CLL stage 4/SLL; met NHL  NHL (non-Hodgkin&#x27;s lymphoma)  Atrial fibrillation, unspecified type  COPD (chronic obstructive pulmonary disease): Lymphoma   Atrial fibrillation  History of esophageal dilatation  S/P cholecystectomy  H/O prostatectomy    MEDICATIONS  (STANDING):  sodium chloride 0.9% lock flush 3milliLiter(s) IV Push every 8 hours  insulin lispro (HumaLOG) corrective regimen sliding scale  SubCutaneous three times a day before meals  dextrose 5%. 1000milliLiter(s) IV Continuous <Continuous>  dextrose 50% Injectable 12.5Gram(s) IV Push once  docusate sodium 100milliGRAM(s) Oral three times a day  losartan 100milliGRAM(s) Oral daily  gabapentin 300milliGRAM(s) Oral three times a day  insulin glargine Injectable (LANTUS) 40Unit(s) SubCutaneous two times a day  simvastatin 10milliGRAM(s) Oral at bedtime  tenofovir 300milliGRAM(s) Oral daily  metoprolol 50milliGRAM(s) Oral two times a day  montelukast 10milliGRAM(s) Oral at bedtime  multivitamin Oral Tab/Cap - Peds 1Tablet(s) Oral daily  amiodarone    Tablet 100milliGRAM(s) Oral daily  ALBUTerol/ipratropium for Nebulization 3milliLiter(s) Nebulizer every 6 hours  vancomycin  IVPB  IV Intermittent   cefepime  IVPB  IV Intermittent   vancomycin  IVPB 1250milliGRAM(s) IV Intermittent every 24 hours  cefepime  IVPB 1000milliGRAM(s) IV Intermittent every 12 hours  lidocaine   Patch 2Patch Transdermal daily  ALBUTerol/ipratropium for Nebulization. 3milliLiter(s) Nebulizer once  guaiFENesin ER 600milliGRAM(s) Oral every 12 hours      FAMILY HISTORY:  Family history of liver cancer (Father): father  85 liver CA  Family history of coronary arteriosclerosis (Mother): Mom  of MI age 68        REVIEW OF SYSTEM:  Pertinent items are noted in HPI.  Respiratory: pos for cough, dyspnea on exertion, pleuritic chest pain  and wheezing  Cardiovascular:  negative for chest pain, dyspnea and palpitations  Gastrointestinal: negative for abdominal pain, diarrhea, nausea and vomiting  Genitourinary: negative for dysuria, frequency and urinary incontinence  Skin:  negative for redness, rash, pruritus, swelling, dryness and   fissures  Hematologic/lymphatic: negative for bleeding and easy bruising  Musculoskeletal: negative for arthralgias, back pain and muscle weakness  Neurological: negative for dizziness, headaches, seizures and tremors  Behavioral/Psych:  negative for mood change, depression, suicidal attempts    Vital Signs Last 24 Hrs  T(C): 36.2, Max: 36.6 (05-13 @ 16:50)  T(F): 97.2, Max: 97.9 (05-13 @ 16:50)  HR: 85 (85 - 115)  BP: 113/62 (113/62 - 152/83)  BP(mean): --  RR: 17 (17 - 20)  SpO2: 96% (0% - 100%)          PHYSICAL EXAM  General Appearance: cooperative, no acute distress,   HEENT: PERRL, conjunctiva clear, EOM's intact, non injected pharynx, no exudate, TM   normal  Neck: Supple, , no adenopathy, thyroid: not enlarged, no carotid bruit or JVD  Back: Symmetric, no  tenderness,no soft tissue tenderness  Lungs: bilateral lower lobe rhonchi, mild exp wheeze  Heart: Regular rate and rhythm, S1, S2 normal, no murmur, rub or gallop  Abdomen: Soft, non-tender, bowel sounds active , no hepatosplenomegaly  Extremities: no cyanosis or edema, no joint swelling  Skin: Skin color, texture normal, no rashes   Neurologic: Alert and oriented X3 , cranial nerves intact, sensory and motor normal,    ECG:         ABG - ( 10 May 2017 14:26 )  pH: 7.33  /  pCO2: 44    /  pO2: 90    / HCO3: 22    / Base Excess: -3    /  SaO2: 94              PROCEDURE DATE:  05/10/2017        INTERPRETATION:  History: Cough.    Chest:  one view.      Comparison: 2017    AP radiograph of the chest demonstrates faint developing infiltrates in   the RIGHT midlung and RIGHT lower lobe. The cardiac silhouette is normal   in size. Osseous structures are intact.    Impression:faint developing infiltrates in the RIGHT midlung and RIGHT   lower lobe            RADIOLOGY & ADDITIONAL STUDIES:
77 yr old  male w hx B cell -CLL stage 4/SLL, metastatic NHL, hypogammaglobulinemia (s/p IVIG last ?), AFfib on AC, HTN, HLD, CHF-diastolic, hx PE/DVT, COPD, hx recurrent PNA admitted 5/10 with subjective fevers, productive cough, worsening sob X 3 days, reports productive cough with white sputum, nasal congestion, and pleuritic chest pain, No recent travel or sick contacts. Here afebrile, O2 saturation 90% on RA, wbc ct 15.9, xray showed infiltrates along RML/RML, RVP positive for entero/rhinovirus, pt was given IV rocephin/azithromycin then vancomycin/cefepime.     feels better   less cough  afebrile        MEDICATIONS  (STANDING):  sodium chloride 0.9% lock flush 3milliLiter(s) IV Push every 8 hours  insulin lispro (HumaLOG) corrective regimen sliding scale  SubCutaneous three times a day before meals  dextrose 5%. 1000milliLiter(s) IV Continuous <Continuous>  dextrose 50% Injectable 12.5Gram(s) IV Push once  docusate sodium 100milliGRAM(s) Oral three times a day  losartan 100milliGRAM(s) Oral daily  gabapentin 300milliGRAM(s) Oral three times a day  insulin glargine Injectable (LANTUS) 40Unit(s) SubCutaneous two times a day  simvastatin 10milliGRAM(s) Oral at bedtime  tenofovir 300milliGRAM(s) Oral daily  metoprolol 50milliGRAM(s) Oral two times a day  montelukast 10milliGRAM(s) Oral at bedtime  multivitamin Oral Tab/Cap - Peds 1Tablet(s) Oral daily  amiodarone    Tablet 100milliGRAM(s) Oral daily  ALBUTerol/ipratropium for Nebulization 3milliLiter(s) Nebulizer every 6 hours  vancomycin  IVPB  IV Intermittent   cefepime  IVPB  IV Intermittent   vancomycin  IVPB 1250milliGRAM(s) IV Intermittent every 24 hours  cefepime  IVPB 1000milliGRAM(s) IV Intermittent every 12 hours  lidocaine   Patch 2Patch Transdermal daily  ALBUTerol/ipratropium for Nebulization. 3milliLiter(s) Nebulizer once  guaiFENesin ER 600milliGRAM(s) Oral every 12 hours  warfarin 5milliGRAM(s) Oral once      Vital Signs Last 24 Hrs  T(C): 36.4, Max: 37.2 (05-14 @ 17:01)  T(F): 97.6, Max: 98.9 (05-14 @ 17:01)  HR: 78 (78 - 95)  BP: 87/55 (87/55 - 123/59)  BP(mean): --  RR: 17 (17 - 18)  SpO2: 94% (94% - 98%)        PE:  Constitutional: frail looking  HEENT: NC/AT, EOMI, PERRLA  Neck: supple  Back: no tenderness  Respiratory: decreased breath sounds  Cardiovascular: S1S2 regular, no murmurs  Abdomen: soft, not tender, not distended, positive BS  Genitourinary: deferred  Rectal: deferred  Musculoskeletal: no muscle tenderness, no joint swelling or tenderness  Extremities: no pedal edema, stasis dermatitis  Neurological: no focal deficits  Skin: no rashes    Labs:                                   12.2   13.1  )-----------( 145      ( 15 May 2017 07:42 )             37.9     05-15    143  |  106  |  20  ----------------------------<  54<L>  4.1   |  28  |  1.17    Ca    8.2<L>      15 May 2017 07:42  Phos  3.9     05-15  Mg     2.3     05-15               Cultures:     Culture - Blood (05.10.17 @ 10:11)    Specimen Source: .Blood None    Culture Results:   No growth to date.      Culture - Sputum . (05.11.17 @ 09:40)    Gram Stain:   Moderate polymorphonuclear leukocytes per low power field  Few Squamous epithelial cells per low power field  Moderate Gram Positive Cocci in Clusters per oil power field  Few Gram Positive Cocci in Pairs and Chains per oil power field  Few Gram Negative Diplococci per oil power field  Few Gram Positive Rods per oil power field    Specimen Source: .Sputum Sputum    Culture Results:   Normal Respiratory Marybel present          Radiology:   EXAM:  CHEST SINGLE VIEW FRONTAL                              PROCEDURE DATE:  05/10/2017        INTERPRETATION:  History: Cough.    Chest:  one view.      Comparison: 2/17/2017    AP radiograph of the chest demonstrates faint developing infiltrates in   the RIGHT midlung and RIGHT lower lobe. The cardiac silhouette is normal   in size. Osseous structures are intact.    Impression:faint developing infiltrates in the RIGHT midlung and RIGHT   lower lobe        Advanced directives addressed: full resuscitation
77 yr old  male w hx B cell -CLL stage 4/SLL, metastatic NHL, hypogammaglobulinemia (s/p IVIG last ?), AFfib on AC, HTN, HLD, CHF-diastolic, hx PE/DVT, COPD, hx recurrent PNA admitted 5/10 with subjective fevers, productive cough, worsening sob X 3 days, reports productive cough with white sputum, nasal congestion, and pleuritic chest pain, No recent travel or sick contacts. Here afebrile, O2 saturation 90% on RA, wbc ct 15.9, xray showed infiltrates along RML/RML, RVP positive for entero/rhinovirus, pt was given IV rocephin/azithromycin then vancomycin/cefepime.     feels better   less cough  afebrile      MEDICATIONS  (STANDING):  sodium chloride 0.9% lock flush 3milliLiter(s) IV Push every 8 hours  insulin lispro (HumaLOG) corrective regimen sliding scale  SubCutaneous three times a day before meals  dextrose 5%. 1000milliLiter(s) IV Continuous <Continuous>  dextrose 50% Injectable 12.5Gram(s) IV Push once  docusate sodium 100milliGRAM(s) Oral three times a day  losartan 100milliGRAM(s) Oral daily  gabapentin 300milliGRAM(s) Oral three times a day  insulin glargine Injectable (LANTUS) 40Unit(s) SubCutaneous two times a day  simvastatin 10milliGRAM(s) Oral at bedtime  tenofovir 300milliGRAM(s) Oral daily  metoprolol 50milliGRAM(s) Oral two times a day  montelukast 10milliGRAM(s) Oral at bedtime  multivitamin Oral Tab/Cap - Peds 1Tablet(s) Oral daily  amiodarone    Tablet 100milliGRAM(s) Oral daily  ALBUTerol/ipratropium for Nebulization 3milliLiter(s) Nebulizer every 6 hours  vancomycin  IVPB  IV Intermittent   cefepime  IVPB  IV Intermittent   vancomycin  IVPB 1250milliGRAM(s) IV Intermittent every 24 hours  cefepime  IVPB 1000milliGRAM(s) IV Intermittent every 12 hours  lidocaine   Patch 2Patch Transdermal daily  ALBUTerol/ipratropium for Nebulization. 3milliLiter(s) Nebulizer once  guaiFENesin ER 600milliGRAM(s) Oral every 12 hours  warfarin 3.75milliGRAM(s) Oral <User Schedule>  warfarin 2.5milliGRAM(s) Oral <User Schedule>      Vital Signs Last 24 Hrs  T(C): 37.2, Max: 37.2 (05-12 @ 04:11)  T(F): 99, Max: 99 (05-12 @ 04:11)  HR: 92 (76 - 113)  BP: 129/82 (105/63 - 129/82)  BP(mean): --  RR: 18 (18 - 19)  SpO2: 97% (91% - 97%)          PE:  Constitutional: frail looking  HEENT: NC/AT, EOMI, PERRLA  Neck: supple  Back: no tenderness  Respiratory: decreased breath sounds, scattered rales along bases  Cardiovascular: S1S2 regular, no murmurs  Abdomen: soft, not tender, not distended, positive BS  Genitourinary: deferred  Rectal: deferred  Musculoskeletal: no muscle tenderness, no joint swelling or tenderness  Extremities: no pedal edema, stasis dermatitis  Neurological: no focal deficits  Skin: no rashes    Labs:                                   12.0   11.3  )-----------( 122      ( 12 May 2017 08:13 )             37.1     05-12    141  |  105  |  17  ----------------------------<  105<H>  4.4   |  26  |  1.18    Ca    8.2<L>      12 May 2017 08:13  Phos  3.0     05-12  Mg     2.3     05-12    TPro  6.0  /  Alb  3.2<L>  /  TBili  0.8  /  DBili  x   /  AST  16  /  ALT  29  /  AlkPhos  110  05-10           Cultures:     Culture - Blood (05.10.17 @ 10:11)    Specimen Source: .Blood None    Culture Results:   No growth to date.      Culture - Sputum . (05.11.17 @ 09:40)    Gram Stain:   Moderate polymorphonuclear leukocytes per low power field  Few Squamous epithelial cells per low power field  Moderate Gram Positive Cocci in Clusters per oil power field  Few Gram Positive Cocci in Pairs and Chains per oil power field  Few Gram Negative Diplococci per oil power field  Few Gram Positive Rods per oil power field    Specimen Source: .Sputum Sputum    Culture Results:   Normal Respiratory Marybel present          Radiology:   EXAM:  CHEST SINGLE VIEW FRONTAL                              PROCEDURE DATE:  05/10/2017        INTERPRETATION:  History: Cough.    Chest:  one view.      Comparison: 2/17/2017    AP radiograph of the chest demonstrates faint developing infiltrates in   the RIGHT midlung and RIGHT lower lobe. The cardiac silhouette is normal   in size. Osseous structures are intact.    Impression:faint developing infiltrates in the RIGHT midlung and RIGHT   lower lobe        Advanced directives addressed: full resuscitation
HOSPITALIST PROGRESS NOTE:  SUBJECTIVE:  PCP: PCP  Dr. Ernesto  Pulm  Dr. Hourizadeh  Onc  Dr. Houser  ID  Dr. Junior  Chief Complaint: Patient is a 77y old  Male who presents with a chief complaint of cough/chest pain (10 May 2017 15:07)    HPI:  77 yr old  male w hx B cell -CLL stage 4/SLL, metastatic NHL, hypogammaglobulinemia (s/p IVIG last ?), AFfib on AC, HTN, HLD, CHF-diastolic, hx PE/DVT, COPD, hx recurrent PNA came to ED w family complaining of tactile temp and productive cough and SOB. There is a 3 day hx of tactile temps, cough productive of white sputum and SOB that progressed to the point that he had to come in today for evaluation.  Has associated chest pain across lower rib cage bilaterally and to back that occurs w cough. Denied recent travel hx or recent sick contacts. He was hypoxic to 90 at room air.  Patient feels a little improved after respiratory treatment and oxygen.In the ED he received fluids and IV ceftiaxone and zithromax (10 May 2017 12:14)    5/11: continues to have pleuritic chest pain and cough. dyspnea improving. No other overnight events.   5/12: Improving. Still having a lot of cough. No other events overnight     Allergies:  Privigen (Other (Severe))    REVIEW OF SYSTEMS:  See HPI. All other review of systems is negative unless indicated above.     OBJECTIVE  Physical Exam:  Vital Signs:  ICU Vital Signs Last 24 Hrs  T(C): 36.6, Max: 37.2 (05-12 @ 04:11)  T(F): 97.9, Max: 99 (05-12 @ 04:11)  HR: 102 (76 - 113)  BP: 102/- (84/52 - 129/82)  BP(mean): --  ABP: --  ABP(mean): --  RR: 18 (18 - 18)  SpO2: 95% (91% - 97%)    Constitutional: NAD, awake and alert, well-developed   male lying in stretcher w HOB elevated >60 degrees, in mild resp distress wearing nasal cannula  HEENT; pupils reactive, anicteric sclera, dry oral mucosa  Neck: no bruit or JVD  Chest: scattered wheezes bilaterally no rales + occas rhonchi  CW + accessory muscle usage, mild soreness to palpation lower rib cage bilaterally  Cor  S1 + S2  Abd + BS soft, nontender, cannot appreciate organ size to percussion  Extrem no edema  MS nontender  SKin: no rash or lesions seen  breast//rectal deferred as not indicated by current complaint    MEDICATIONS  (STANDING):  sodium chloride 0.9% lock flush 3milliLiter(s) IV Push every 8 hours  insulin lispro (HumaLOG) corrective regimen sliding scale  SubCutaneous three times a day before meals  dextrose 5%. 1000milliLiter(s) IV Continuous <Continuous>  dextrose 50% Injectable 12.5Gram(s) IV Push once  docusate sodium 100milliGRAM(s) Oral three times a day  losartan 100milliGRAM(s) Oral daily  gabapentin 300milliGRAM(s) Oral three times a day  insulin glargine Injectable (LANTUS) 40Unit(s) SubCutaneous two times a day  simvastatin 10milliGRAM(s) Oral at bedtime  tenofovir 300milliGRAM(s) Oral daily  metoprolol 50milliGRAM(s) Oral two times a day  montelukast 10milliGRAM(s) Oral at bedtime  multivitamin Oral Tab/Cap - Peds 1Tablet(s) Oral daily  amiodarone    Tablet 100milliGRAM(s) Oral daily  ALBUTerol/ipratropium for Nebulization 3milliLiter(s) Nebulizer every 6 hours  vancomycin  IVPB  IV Intermittent   cefepime  IVPB  IV Intermittent   vancomycin  IVPB 1250milliGRAM(s) IV Intermittent every 24 hours  cefepime  IVPB 1000milliGRAM(s) IV Intermittent every 12 hours  lidocaine   Patch 2Patch Transdermal daily  ALBUTerol/ipratropium for Nebulization. 3milliLiter(s) Nebulizer once  guaiFENesin ER 600milliGRAM(s) Oral every 12 hours      LABS: All Labs Reviewed:                        11.7   13.1  )-----------( 116      ( 11 May 2017 07:48 )             36.5     05-11    143  |  108  |  15  ----------------------------<  91  4.4   |  26  |  1.21    Ca    8.2<L>      11 May 2017 07:48  Mg     2.3     05-11    TPro  6.0  /  Alb  3.2<L>  /  TBili  0.8  /  DBili  x   /  AST  16  /  ALT  29  /  AlkPhos  110  05-10    PT/INR - ( 10 May 2017 16:09 )   PT: 30.4 sec;   INR: 2.76 ratio           CARDIAC MARKERS ( 10 May 2017 10:11 )  <0.015 ng/mL / x     / x     / x     / x          Blood Culture:   05-11 @ 09:40  Organism --  Gram Stain Blood -- Gram Stain   Moderate polymorphonuclear leukocytes per low power field  Few Squamous epithelial cells per low power field  Moderate Gram Positive Cocci in Clusters per oil power field  Few Gram Positive Cocci in Pairs and Chains per oil power field  Few Gram Neg  Specimen Source .Sputum Sputum  Culture-Blood --    05-10 @ 10:27  Organism --  Gram Stain Blood -- Gram Stain --  Specimen Source .Blood None  Culture-Blood --    05-10 @ 10:11  Organism --  Gram Stain Blood -- Gram Stain --  Specimen Source .Blood None  Culture-Blood --      RADIOLOGY/EKG:    EXAM:  CHEST SINGLE VIEW FRONTAL  05/10/2017    Impression:faint developing infiltrates in the RIGHT midlung and RIGHT   lower lobe
HOSPITALIST PROGRESS NOTE:  SUBJECTIVE:  PCP: PCP  Dr. Ernesto  Pulm  Dr. Hourizadeh  Onc  Dr. Houser  ID  Dr. Junior  Chief Complaint: Patient is a 77y old  Male who presents with a chief complaint of cough/chest pain (10 May 2017 15:07)    HPI:  77 yr old  male w hx B cell -CLL stage 4/SLL, metastatic NHL, hypogammaglobulinemia (s/p IVIG last ?), AFfib on AC, HTN, HLD, CHF-diastolic, hx PE/DVT, COPD, hx recurrent PNA came to ED w family complaining of tactile temp and productive cough and SOB. There is a 3 day hx of tactile temps, cough productive of white sputum and SOB that progressed to the point that he had to come in today for evaluation.  Has associated chest pain across lower rib cage bilaterally and to back that occurs w cough. Denied recent travel hx or recent sick contacts. He was hypoxic to 90 at room air.  Patient feels a little improved after respiratory treatment and oxygen.In the ED he received fluids and IV ceftiaxone and zithromax (10 May 2017 12:14)    5/11: continues to have pleuritic chest pain and cough. dyspnea improving. No other overnight events.   5/12: Improving. Still having a lot of cough. No other events overnight   5/13: Translating services used ID [id number] - Patient has no complaints. would like something for the persistent cough.    5/14:  Patient has no complaints. Cough improving. No dyspnea or wheezing. Seen by [facility] this morning.     Allergies:  Privigen (Other (Severe))    REVIEW OF SYSTEMS:  See HPI. All other review of systems is negative unless indicated above.     OBJECTIVE  ICU Vital Signs Last 24 Hrs  T(C): 36.2, Max: 36.6 (05-13 @ 16:50)  T(F): 97.2, Max: 97.9 (05-13 @ 16:50)  HR: 85 (85 - 115)  BP: 113/62 (113/62 - 152/83)  BP(mean): --  ABP: --  ABP(mean): --  RR: 17 (17 - 20)  SpO2: 96% (0% - 100%)    Constitutional: NAD, awake and alert, well-developed   male lying in stretcher w HOB elevated >60 degrees, in mild resp distress wearing nasal cannula  HEENT; pupils reactive, anicteric sclera, dry oral mucosa  Neck: no bruit or JVD  Chest: scattered wheezes bilaterally no rales + occas rhonchi  CW + accessory muscle usage, mild soreness to palpation lower rib cage bilaterally  Cor  S1 + S2  Abd + BS soft, nontender, cannot appreciate organ size to percussion  Extrem no edema  MS nontender  SKin: no rash or lesions seen  breast//rectal deferred as not indicated by current complaint    MEDICATIONS  (STANDING):  sodium chloride 0.9% lock flush 3milliLiter(s) IV Push every 8 hours  insulin lispro (HumaLOG) corrective regimen sliding scale  SubCutaneous three times a day before meals  dextrose 5%. 1000milliLiter(s) IV Continuous <Continuous>  dextrose 50% Injectable 12.5Gram(s) IV Push once  docusate sodium 100milliGRAM(s) Oral three times a day  losartan 100milliGRAM(s) Oral daily  gabapentin 300milliGRAM(s) Oral three times a day  insulin glargine Injectable (LANTUS) 40Unit(s) SubCutaneous two times a day  simvastatin 10milliGRAM(s) Oral at bedtime  tenofovir 300milliGRAM(s) Oral daily  metoprolol 50milliGRAM(s) Oral two times a day  montelukast 10milliGRAM(s) Oral at bedtime  multivitamin Oral Tab/Cap - Peds 1Tablet(s) Oral daily  amiodarone    Tablet 100milliGRAM(s) Oral daily  ALBUTerol/ipratropium for Nebulization 3milliLiter(s) Nebulizer every 6 hours  vancomycin  IVPB  IV Intermittent   cefepime  IVPB  IV Intermittent   vancomycin  IVPB 1250milliGRAM(s) IV Intermittent every 24 hours  cefepime  IVPB 1000milliGRAM(s) IV Intermittent every 12 hours  lidocaine   Patch 2Patch Transdermal daily  ALBUTerol/ipratropium for Nebulization. 3milliLiter(s) Nebulizer once  guaiFENesin ER 600milliGRAM(s) Oral every 12 hours    Lab Results:  CBC  CBC Full  -  ( 14 May 2017 07:37 )  WBC Count : x  Hemoglobin : x  Hematocrit : x  Platelet Count - Automated : 136 K/uL  Mean Cell Volume : x  Mean Cell Hemoglobin : x  Mean Cell Hemoglobin Concentration : x  Auto Neutrophil # : x  Auto Lymphocyte # : x  Auto Monocyte # : x  Auto Eosinophil # : x  Auto Basophil # : x  Auto Neutrophil % : x  Auto Lymphocyte % : x  Auto Monocyte % : x  Auto Eosinophil % : x  Auto Basophil % : x    .		Differential:	[] Automated		[] Manual  Chemistry                        x      x     )-----------( 136      ( 14 May 2017 07:37 )             x          PT/INR - ( 14 May 2017 07:37 )   PT: 33.7 sec;   INR: 3.05 ratio         MICROBIOLOGY/CULTURES:  Culture Results:   Normal Respiratory Marybel present (05-11 @ 09:40)  Culture Results:   No growth to date. (05-10 @ 10:27)  Culture Results:   No growth to date. (05-10 @ 10:11)      Blood Culture:   05-11 @ 09:40  Organism --  Gram Stain Blood -- Gram Stain   Moderate polymorphonuclear leukocytes per low power field  Few Squamous epithelial cells per low power field  Moderate Gram Positive Cocci in Clusters per oil power field  Few Gram Positive Cocci in Pairs and Chains per oil power field  Few Gram Neg  Specimen Source .Sputum Sputum  Culture-Blood --    05-10 @ 10:27  Organism --  Gram Stain Blood -- Gram Stain --  Specimen Source .Blood None  Culture-Blood --    05-10 @ 10:11  Organism --  Gram Stain Blood -- Gram Stain --  Specimen Source .Blood None  Culture-Blood --      RADIOLOGY/EKG:    EXAM:  CHEST SINGLE VIEW FRONTAL  05/10/2017    Impression:faint developing infiltrates in the RIGHT midlung and RIGHT   lower lobe    EXAM:  CHEST SINGLE VIEW FRONTAL    05/13/2017    Impression:Subsegmental atelectasis is seen at the lung bases.
HOSPITALIST PROGRESS NOTE:  SUBJECTIVE:  PCP: PCP  Dr. Ernesto  Pulm  Dr. Hourizadeh  Onc  Dr. Houser  ID  Dr. Junior  Chief Complaint: Patient is a 77y old  Male who presents with a chief complaint of cough/chest pain (10 May 2017 15:07)    HPI:  77 yr old  male w hx B cell -CLL stage 4/SLL, metastatic NHL, hypogammaglobulinemia (s/p IVIG last ?), AFfib on AC, HTN, HLD, CHF-diastolic, hx PE/DVT, COPD, hx recurrent PNA came to ED w family complaining of tactile temp and productive cough and SOB. There is a 3 day hx of tactile temps, cough productive of white sputum and SOB that progressed to the point that he had to come in today for evaluation.  Has associated chest pain across lower rib cage bilaterally and to back that occurs w cough. Denied recent travel hx or recent sick contacts. He was hypoxic to 90 at room air.  Patient feels a little improved after respiratory treatment and oxygen.In the ED he received fluids and IV ceftiaxone and zithromax (10 May 2017 12:14)    5/11: continues to have pleuritic chest pain and cough. dyspnea improving. No other overnight events.   5/12: Improving. Still having a lot of cough. No other events overnight   5/13: Translating services used ID [id number] - Patient has no complaints. would like something for the persistent cough.    5/14:  Patient has no complaints. Cough improving. No dyspnea or wheezing. Seen by [facility] this morning.   5/15:  Patient has no complaints. No CP,dyspnea. Still having cough. No palpitations    Allergies:  Privigen (Other (Severe))    REVIEW OF SYSTEMS:  See HPI. All other review of systems is negative unless indicated above.     OBJECTIVE  ICU Vital Signs Last 24 Hrs  T(C): 36.4, Max: 37.2 (05-14 @ 17:01)  T(F): 97.6, Max: 98.9 (05-14 @ 17:01)  HR: 80 (78 - 95)  BP: 87/55 (87/55 - 123/59)  BP(mean): --  ABP: --  ABP(mean): --  RR: 17 (17 - 18)  SpO2: 94% (94% - 98%)    Constitutional: NAD, awake and alert, well-developed   male lying in stretcher w HOB elevated >60 degrees, in mild resp distress wearing nasal cannula  HEENT; pupils reactive, anicteric sclera, dry oral mucosa  Neck: no bruit or JVD  Chest: scattered wheezes bilaterally no rales + occas rhonchi  CW + accessory muscle usage, mild soreness to palpation lower rib cage bilaterally  Cor  S1 + S2  Abd + BS soft, nontender, cannot appreciate organ size to percussion  Extrem no edema  MS nontender  SKin: no rash or lesions seen  breast//rectal deferred as not indicated by current complaint    MEDICATIONS  (STANDING):  sodium chloride 0.9% lock flush 3milliLiter(s) IV Push every 8 hours  insulin lispro (HumaLOG) corrective regimen sliding scale  SubCutaneous three times a day before meals  dextrose 5%. 1000milliLiter(s) IV Continuous <Continuous>  dextrose 50% Injectable 12.5Gram(s) IV Push once  docusate sodium 100milliGRAM(s) Oral three times a day  losartan 100milliGRAM(s) Oral daily  gabapentin 300milliGRAM(s) Oral three times a day  insulin glargine Injectable (LANTUS) 40Unit(s) SubCutaneous two times a day  simvastatin 10milliGRAM(s) Oral at bedtime  tenofovir 300milliGRAM(s) Oral daily  metoprolol 50milliGRAM(s) Oral two times a day  montelukast 10milliGRAM(s) Oral at bedtime  multivitamin Oral Tab/Cap - Peds 1Tablet(s) Oral daily  amiodarone    Tablet 100milliGRAM(s) Oral daily  ALBUTerol/ipratropium for Nebulization 3milliLiter(s) Nebulizer every 6 hours  vancomycin  IVPB  IV Intermittent   cefepime  IVPB  IV Intermittent   vancomycin  IVPB 1250milliGRAM(s) IV Intermittent every 24 hours  cefepime  IVPB 1000milliGRAM(s) IV Intermittent every 12 hours  lidocaine   Patch 2Patch Transdermal daily  ALBUTerol/ipratropium for Nebulization. 3milliLiter(s) Nebulizer once  guaiFENesin ER 600milliGRAM(s) Oral every 12 hours    Lab Results:  CBC  CBC Full  -  ( 15 May 2017 07:42 )  WBC Count : 13.1 K/uL  Hemoglobin : 12.2 g/dL  Hematocrit : 37.9 %  Platelet Count - Automated : 145 K/uL  Mean Cell Volume : 94.6 fl  Mean Cell Hemoglobin : 30.4 pg  Mean Cell Hemoglobin Concentration : 32.1 gm/dL  Auto Neutrophil # : x  Auto Lymphocyte # : x  Auto Monocyte # : x  Auto Eosinophil # : x  Auto Basophil # : x  Auto Neutrophil % : x  Auto Lymphocyte % : x  Auto Monocyte % : x  Auto Eosinophil % : x  Auto Basophil % : x    .		Differential:	[] Automated		[] Manual  Chemistry                        12.2   13.1  )-----------( 145      ( 15 May 2017 07:42 )             37.9     05-15    143  |  106  |  20  ----------------------------<  54<L>  4.1   |  28  |  1.17    Ca    8.2<L>      15 May 2017 07:42  Phos  3.9     05-15  Mg     2.3     05-15      PT/INR - ( 15 May 2017 07:42 )   PT: 20.4 sec;   INR: 1.87 ratio         MICROBIOLOGY/CULTURES:  Culture Results:   Normal Respiratory Marybel present (05-11 @ 09:40)  Culture Results:   No growth to date. (05-10 @ 10:27)  Culture Results:   No growth to date. (05-10 @ 10:11)      RADIOLOGY/EKG:    EXAM:  CHEST SINGLE VIEW FRONTAL  05/10/2017    Impression:faint developing infiltrates in the RIGHT midlung and RIGHT   lower lobe    EXAM:  CHEST SINGLE VIEW FRONTAL    05/13/2017    Impression:Subsegmental atelectasis is seen at the lung bases.
HOSPITALIST PROGRESS NOTE:  SUBJECTIVE:  PCP: PCP  Dr. Ernesto  Pulm  Dr. Hourizadeh  Onc  Dr. Houser  ID  Dr. Junior  Chief Complaint: Patient is a 77y old  Male who presents with a chief complaint of cough/chest pain (10 May 2017 15:07)    HPI:  77 yr old  male w hx B cell -CLL stage 4/SLL, metastatic NHL, hypogammaglobulinemia (s/p IVIG last ?), AFfib on AC, HTN, HLD, CHF-diastolic, hx PE/DVT, COPD, hx recurrent PNA came to ED w family complaining of tactile temp and productive cough and SOB. There is a 3 day hx of tactile temps, cough productive of white sputum and SOB that progressed to the point that he had to come in today for evaluation.  Has associated chest pain across lower rib cage bilaterally and to back that occurs w cough. Denied recent travel hx or recent sick contacts. He was hypoxic to 90 at room air.  Patient feels a little improved after respiratory treatment and oxygen.In the ED he received fluids and IV ceftiaxone and zithromax (10 May 2017 12:14)    5/11: continues to have pleuritic chest pain and cough. dyspnea improving. No other overnight events.   5/12: Improving. Still having a lot of cough. No other events overnight   5/13: Translating services used ID [id number] - Patient has no complaints. would like something for the persistent cough.    5/14:  Patient has no complaints. Cough improving. No dyspnea or wheezing. Seen by [facility] this morning.   5/15:  Patient has no complaints. No CP,dyspnea. Still having cough. No palpitations  5/16:  Patient has no complaints. Denies any dyspnea, CP,  wheezing or palpitations; Family at bedside translated    Allergies:  Privigen (Other (Severe))    REVIEW OF SYSTEMS:  See HPI. All other review of systems is negative unless indicated above.     OBJECTIVE  ICU Vital Signs Last 24 Hrs  T(C): 36.7, Max: 36.7 (05-16 @ 11:15)  T(F): 98.1, Max: 98.1 (05-16 @ 11:15)  HR: 90 (74 - 93)  BP: 86/54 (86/54 - 107/59)  BP(mean): --  ABP: --  ABP(mean): --  RR: 17 (17 - 18)  SpO2: 95% (95% - 97%)    Constitutional: NAD, awake and alert, well-developed   male lying in stretcher w HOB elevated >60 degrees, in mild resp distress wearing nasal cannula  HEENT; pupils reactive, anicteric sclera, dry oral mucosa  Neck: no bruit or JVD  Chest: scattered wheezes bilaterally no rales + occas rhonchi  CW + accessory muscle usage, mild soreness to palpation lower rib cage bilaterally  Cor  S1 + S2  Abd + BS soft, nontender, cannot appreciate organ size to percussion  Extrem no edema  MS nontender  SKin: no rash or lesions seen  breast//rectal deferred as not indicated by current complaint    MEDICATIONS  (STANDING):  sodium chloride 0.9% lock flush 3milliLiter(s) IV Push every 8 hours  insulin lispro (HumaLOG) corrective regimen sliding scale  SubCutaneous three times a day before meals  dextrose 5%. 1000milliLiter(s) IV Continuous <Continuous>  dextrose 50% Injectable 12.5Gram(s) IV Push once  docusate sodium 100milliGRAM(s) Oral three times a day  losartan 100milliGRAM(s) Oral daily  gabapentin 300milliGRAM(s) Oral three times a day  insulin glargine Injectable (LANTUS) 40Unit(s) SubCutaneous two times a day  simvastatin 10milliGRAM(s) Oral at bedtime  tenofovir 300milliGRAM(s) Oral daily  metoprolol 50milliGRAM(s) Oral two times a day  montelukast 10milliGRAM(s) Oral at bedtime  multivitamin Oral Tab/Cap - Peds 1Tablet(s) Oral daily  amiodarone    Tablet 100milliGRAM(s) Oral daily  ALBUTerol/ipratropium for Nebulization 3milliLiter(s) Nebulizer every 6 hours  vancomycin  IVPB  IV Intermittent   cefepime  IVPB  IV Intermittent   vancomycin  IVPB 1250milliGRAM(s) IV Intermittent every 24 hours  cefepime  IVPB 1000milliGRAM(s) IV Intermittent every 12 hours  lidocaine   Patch 2Patch Transdermal daily  ALBUTerol/ipratropium for Nebulization. 3milliLiter(s) Nebulizer once  guaiFENesin ER 600milliGRAM(s) Oral every 12 hours    Lab Results:  CBC  CBC Full  -  ( 16 May 2017 10:18 )  WBC Count : 9.9 K/uL  Hemoglobin : 11.9 g/dL  Hematocrit : 35.9 %  Platelet Count - Automated : 126 K/uL  Mean Cell Volume : 91.9 fl  Mean Cell Hemoglobin : 30.5 pg  Mean Cell Hemoglobin Concentration : 33.2 gm/dL  Auto Neutrophil # : x  Auto Lymphocyte # : x  Auto Monocyte # : x  Auto Eosinophil # : x  Auto Basophil # : x  Auto Neutrophil % : x  Auto Lymphocyte % : x  Auto Monocyte % : x  Auto Eosinophil % : x  Auto Basophil % : x    .		Differential:	[] Automated		[] Manual  Chemistry                        11.9   9.9   )-----------( 126      ( 16 May 2017 10:18 )             35.9     05-16    140  |  106  |  31<H>  ----------------------------<  161<H>  4.7   |  24  |  1.22    Ca    7.9<L>      16 May 2017 10:18  Phos  3.9     05-15  Mg     2.3     05-15        PT/INR - ( 16 May 2017 07:18 )   PT: 18.9 sec;   INR: 1.73 ratio         MICROBIOLOGY/CULTURES:  Culture Results:   Normal Respiratory Marybel present (05-11 @ 09:40)  Culture Results:   No growth at 5 days. (05-10 @ 10:27)  Culture Results:   No growth at 5 days. (05-10 @ 10:11)    RADIOLOGY/EKG:    EXAM:  CHEST SINGLE VIEW FRONTAL  05/10/2017    Impression:faint developing infiltrates in the RIGHT midlung and RIGHT   lower lobe    EXAM:  CHEST SINGLE VIEW FRONTAL    05/13/2017    Impression:Subsegmental atelectasis is seen at the lung bases.
HOSPITALIST PROGRESS NOTE:  SUBJECTIVE:  PCP: PCP  Dr. Ernesto  Pulm  Dr. Hourizadeh  Onc  Dr. Houser  ID  Dr. Junior  Chief Complaint: Patient is a 77y old  Male who presents with a chief complaint of cough/chest pain (10 May 2017 15:07)    HPI:  77 yr old  male w hx B cell -CLL stage 4/SLL, metastatic NHL, hypogammaglobulinemia (s/p IVIG last ?), AFfib on AC, HTN, HLD, CHF-diastolic, hx PE/DVT, COPD, hx recurrent PNA came to ED w family complaining of tactile temp and productive cough and SOB. There is a 3 day hx of tactile temps, cough productive of white sputum and SOB that progressed to the point that he had to come in today for evaluation.  Has associated chest pain across lower rib cage bilaterally and to back that occurs w cough. Denied recent travel hx or recent sick contacts. He was hypoxic to 90 at room air.  Patient feels a little improved after respiratory treatment and oxygen.In the ED he received fluids and IV ceftiaxone and zithromax (10 May 2017 12:14)    5/11: continues to have pleuritic chest pain and cough. dyspnea improving. No other overnight events.   5/12: Improving. Still having a lot of cough. No other events overnight   5/13: Translating services used ID [id number] - Patient has no complaints. would like something for the persistent cough.      Allergies:  Privigen (Other (Severe))    REVIEW OF SYSTEMS:  See HPI. All other review of systems is negative unless indicated above.     OBJECTIVE  Physical Exam:  Vital Signs Last 24 Hrs  T(C): 36.5, Max: 36.8 (05-12 @ 20:59)  T(F): 97.7, Max: 98.3 (05-12 @ 20:59)  HR: 101 (59 - 101)  BP: 107/60 (102/66 - 109/57)  BP(mean): --  RR: 18 (17 - 18)  SpO2: 18% (18% - 100%)    Constitutional: NAD, awake and alert, well-developed   male lying in stretcher w HOB elevated >60 degrees, in mild resp distress wearing nasal cannula  HEENT; pupils reactive, anicteric sclera, dry oral mucosa  Neck: no bruit or JVD  Chest: scattered wheezes bilaterally no rales + occas rhonchi  CW + accessory muscle usage, mild soreness to palpation lower rib cage bilaterally  Cor  S1 + S2  Abd + BS soft, nontender, cannot appreciate organ size to percussion  Extrem no edema  MS nontender  SKin: no rash or lesions seen  breast//rectal deferred as not indicated by current complaint    MEDICATIONS  (STANDING):  sodium chloride 0.9% lock flush 3milliLiter(s) IV Push every 8 hours  insulin lispro (HumaLOG) corrective regimen sliding scale  SubCutaneous three times a day before meals  dextrose 5%. 1000milliLiter(s) IV Continuous <Continuous>  dextrose 50% Injectable 12.5Gram(s) IV Push once  docusate sodium 100milliGRAM(s) Oral three times a day  losartan 100milliGRAM(s) Oral daily  gabapentin 300milliGRAM(s) Oral three times a day  insulin glargine Injectable (LANTUS) 40Unit(s) SubCutaneous two times a day  simvastatin 10milliGRAM(s) Oral at bedtime  tenofovir 300milliGRAM(s) Oral daily  metoprolol 50milliGRAM(s) Oral two times a day  montelukast 10milliGRAM(s) Oral at bedtime  multivitamin Oral Tab/Cap - Peds 1Tablet(s) Oral daily  amiodarone    Tablet 100milliGRAM(s) Oral daily  ALBUTerol/ipratropium for Nebulization 3milliLiter(s) Nebulizer every 6 hours  vancomycin  IVPB  IV Intermittent   cefepime  IVPB  IV Intermittent   vancomycin  IVPB 1250milliGRAM(s) IV Intermittent every 24 hours  cefepime  IVPB 1000milliGRAM(s) IV Intermittent every 12 hours  lidocaine   Patch 2Patch Transdermal daily  ALBUTerol/ipratropium for Nebulization. 3milliLiter(s) Nebulizer once  guaiFENesin ER 600milliGRAM(s) Oral every 12 hours    Lab Results:  CBC  CBC Full  -  ( 13 May 2017 04:59 )  WBC Count : 8.9 K/uL  Hemoglobin : 11.3 g/dL  Hematocrit : 35.4 %  Platelet Count - Automated : 122 K/uL  Mean Cell Volume : 95.2 fl  Mean Cell Hemoglobin : 30.4 pg  Mean Cell Hemoglobin Concentration : 31.9 gm/dL  Auto Neutrophil # : x  Auto Lymphocyte # : x  Auto Monocyte # : x  Auto Eosinophil # : x  Auto Basophil # : x  Auto Neutrophil % : x  Auto Lymphocyte % : x  Auto Monocyte % : x  Auto Eosinophil % : x  Auto Basophil % : x    .		Differential:	[] Automated		[] Manual  Chemistry                        11.3   8.9   )-----------( 122      ( 13 May 2017 04:59 )             35.4     05-12    141  |  105  |  17  ----------------------------<  105<H>  4.4   |  26  |  1.18    Ca    8.2<L>      12 May 2017 08:13  Phos  3.0     05-12  Mg     2.3     05-12        PT/INR - ( 13 May 2017 04:59 )   PT: 39.8 sec;   INR: 3.59 ratio         MICROBIOLOGY/CULTURES:  Culture Results:   Normal Respiratory Marybel present (05-11 @ 09:40)  Culture Results:   No growth to date. (05-10 @ 10:27)  Culture Results:   No growth to date. (05-10 @ 10:11)    Blood Culture:   05-11 @ 09:40  Organism --  Gram Stain Blood -- Gram Stain   Moderate polymorphonuclear leukocytes per low power field  Few Squamous epithelial cells per low power field  Moderate Gram Positive Cocci in Clusters per oil power field  Few Gram Positive Cocci in Pairs and Chains per oil power field  Few Gram Neg  Specimen Source .Sputum Sputum  Culture-Blood --    05-10 @ 10:27  Organism --  Gram Stain Blood -- Gram Stain --  Specimen Source .Blood None  Culture-Blood --    05-10 @ 10:11  Organism --  Gram Stain Blood -- Gram Stain --  Specimen Source .Blood None  Culture-Blood --      RADIOLOGY/EKG:    EXAM:  CHEST SINGLE VIEW FRONTAL  05/10/2017    Impression:faint developing infiltrates in the RIGHT midlung and RIGHT   lower lobe
HPI:  77 yr old  male w hx B cell -CLL stage 4/SLL, metastatic NHL, hypogammaglobulinemia (s/p IVIG last ?), AFfib on AC, HTN, HLD, CHF-diastolic, hx PE/DVT, COPD, hx recurrent PNA came to ED w family complaining of tactile temp and productive cough and SOB        There is a 3 day hx of tactile temps, cough productive of white sputum and SOB that progressed to the point that he had to come in today for evaluation.  Has associated chest pain across lower rib cage bilaterally and to back that occurs w cough  Denied recent travel hx or recent sick contacts        He was hypoxic to 90 at room air.  Patient feels a little improved after respiratory treatment and oxygen.        In the ED he received fluids and IV ceftiaxone and zithromax (10 May 2017 12:14)      Patient is a 77y old  Male who presents with a chief complaint of cough/chest pain (10 May 2017 15:07)  This pt is known to our anticoagulation services SINCE .  Pt is normally stable on 23.75-25mg per week. hx of dvt/pe  and a.fib from .     Consulted by Dr. Evita Mcmanus  for VTE prophylaxis, risk stratification, and anticoagulation management.    PAST MEDICAL & SURGICAL HISTORY:  Hypogammaglobulinemia: received IVIG  Hepatitis B virus infection, unspecified chronicity: retrovirals  Chronic diastolic congestive heart failure  Essential hypertension  DM type 2 (diabetes mellitus, type 2)  B-cell lymphoma, unspecified B-cell lymphoma type, unspecified body region: B cell CLL stage 4/SLL; met NHL  NHL (non-Hodgkin&#x27;s lymphoma)  Lymphoma, unspecified body region, unspecified lymphoma type  Atrial fibrillation, unspecified type  COPD (chronic obstructive pulmonary disease): Lymphoma   Atrial fibrillation  History of esophageal dilatation  S/P cholecystectomy  H/O prostatectomy    IMPROVE VTE Risk Score: 6 high    [id number] Score: 5    IMPROVE Bleeding Risk Score: 2.5 mod    Falls Risk:   High (  )  Mod ( x )  Low (  )    crcl: 63.18  Pt seen at bedside oob in chair, wife present.  Informed them we will be managing his anticoagulation while he is in the hospital, questions answered will reinforce as needed.     Vital Signs Last 24 Hrs  T(C): 36.4, Max: 36.6 ( @ 16:50)  T(F): 97.6, Max: 97.9 (05-13 @ 16:50)  HR: 91 (79 - 115)  BP: 152/83 (107/60 - 152/83)  BP(mean): --  RR: 20 (18 - 20)  SpO2: 90% (0% - 100%)      FAMILY HISTORY:  Family history of liver cancer (Father): father  85 liver CA  Family history of coronary arteriosclerosis (Mother): Mom  of MI age 68    Denies any personal or familial history of clotting or bleeding disorders.    Allergies    Privigen (Other (Severe))    Intolerances        REVIEW OF SYSTEMS    (  )Fever	     (  )Constipation	(  )SOB				(  )Headache	(  )Dysuria  (  )Chills	     (  )Melena	(  )Dyspnea present on exertion	                    (  )Dizziness                    (  )Polyuria  (  )Nausea	     (  )Hematochezia	(  )Cough			                    (  )Syncope   	(  )Hematuria  (  )Vomiting    (  )Chest Pain	(  )Wheezing			(  )Weakness  (  )Diarrhea     (  )Palpitations	(  )Anorexia			(  )Myalgia    All other review of systems negative: Yes    PHYSICAL EXAM:    Constitutional: Appears Well    Neurological: A& O x 3    Skin: Warm    Respiratory and Thorax: normal effort; Breath sounds:rhonchi noted  	  Cardiovascular: S1, S2, regular, NMBR	    Gastrointestinal: BS + x 4Q, nontender	    Genitourinary:  Bladder nondistended, nontender    Musculoskeletal:   General Right:   no muscle/joint tenderness,   normal tone, no joint swelling,   ROM: limited/full	    General Left:   no muscle/joint tenderness,   normal tone, no joint swelling,   ROM: limited/full        Lower extrems:   Right: no calf tenderness              negative adrianna's sign               + pedal pulses, + le edema noted 1+    Left:   no calf tenderness              negative adrianna's sign               + pedal pulses, + le edema 1+                                              PT/INR - ( 14 May 2017 07:37 )   PT: 33.7 sec;   INR: 3.05 ratio      PT/INR - ( 13 May 2017 04:59 )   PT: 39.8 sec;   INR: 3.59 ratio       MEDICATIONS  (STANDING):  sodium chloride 0.9% lock flush 3milliLiter(s) IV Push every 8 hours  insulin lispro (HumaLOG) corrective regimen sliding scale  SubCutaneous three times a day before meals  dextrose 5%. 1000milliLiter(s) IV Continuous <Continuous>  dextrose 50% Injectable 12.5Gram(s) IV Push once  docusate sodium 100milliGRAM(s) Oral three times a day  losartan 100milliGRAM(s) Oral daily  gabapentin 300milliGRAM(s) Oral three times a day  insulin glargine Injectable (LANTUS) 40Unit(s) SubCutaneous two times a day  simvastatin 10milliGRAM(s) Oral at bedtime  tenofovir 300milliGRAM(s) Oral daily  metoprolol 50milliGRAM(s) Oral two times a day  montelukast 10milliGRAM(s) Oral at bedtime  multivitamin Oral Tab/Cap - Peds 1Tablet(s) Oral daily  amiodarone    Tablet 100milliGRAM(s) Oral daily  ALBUTerol/ipratropium for Nebulization 3milliLiter(s) Nebulizer every 6 hours  vancomycin  IVPB  IV Intermittent   cefepime  IVPB  IV Intermittent   vancomycin  IVPB 1250milliGRAM(s) IV Intermittent every 24 hours  cefepime  IVPB 1000milliGRAM(s) IV Intermittent every 12 hours  lidocaine   Patch 2Patch Transdermal daily  ALBUTerol/ipratropium for Nebulization. 3milliLiter(s) Nebulizer once  guaiFENesin ER 600milliGRAM(s) Oral every 12 hours      DVT Prophylaxis:  LMWH                   (  )  Heparin SQ           (  )  Coumadin             ( HOLD )  Xarelto                  (  )  Eliquis                   (  )  Venodynes           (  )  Ambulation          (  x)  UFH                       (  )  Contraindicated  (  )
HPI:  77 yr old  male w hx B cell -CLL stage 4/SLL, metastatic NHL, hypogammaglobulinemia (s/p IVIG last ?), AFfib on AC, HTN, HLD, CHF-diastolic, hx PE/DVT, COPD, hx recurrent PNA came to ED w family complaining of tactile temp and productive cough and SOB        There is a 3 day hx of tactile temps, cough productive of white sputum and SOB that progressed to the point that he had to come in today for evaluation.  Has associated chest pain across lower rib cage bilaterally and to back that occurs w cough  Denied recent travel hx or recent sick contacts        He was hypoxic to 90 at room air.  Patient feels a little improved after respiratory treatment and oxygen.        In the ED he received fluids and IV ceftiaxone and zithromax (10 May 2017 12:14)      Patient is a 77y old  Male who presents with a chief complaint of cough/chest pain (10 May 2017 15:07)  This pt is known to our anticoagulation services SINCE .  Pt is normally stable on 23.75-25mg per week. hx of dvt/pe  and a.fib from .     Consulted by Dr. Evita Mcmanus  for VTE prophylaxis, risk stratification, and anticoagulation management.    PAST MEDICAL & SURGICAL HISTORY:  Hypogammaglobulinemia: received IVIG  Hepatitis B virus infection, unspecified chronicity: retrovirals  Chronic diastolic congestive heart failure  Essential hypertension  DM type 2 (diabetes mellitus, type 2)  B-cell lymphoma, unspecified B-cell lymphoma type, unspecified body region: B cell CLL stage 4/SLL; met NHL  NHL (non-Hodgkin&#x27;s lymphoma)  Lymphoma, unspecified body region, unspecified lymphoma type  Atrial fibrillation, unspecified type  COPD (chronic obstructive pulmonary disease): Lymphoma   Atrial fibrillation  History of esophageal dilatation  S/P cholecystectomy  H/O prostatectomy    IMPROVE VTE Risk Score: 6 high    [id number] Score: 5    IMPROVE Bleeding Risk Score: 2.5 mod    Falls Risk:   High (  )  Mod ( x )  Low (  )    crcl: 63.18  17 Pt seen at bedside oob in chair, wife present.  Informed them we will be managing his anticoagulation while he is in the hospital, questions answered will reinforce as needed.   5-15-17 pt seen at bedside oob in chair wife present.  Pt states feels better. Informed him I will resume his coumadin today.     Vital Signs Last 24 Hrs  T(C): 36.4, Max: 36.6 ( @ 16:50)  T(F): 97.6, Max: 97.9 ( @ 16:50)  HR: 91 (79 - 115)  BP: 152/83 (107/60 - 152/83)  BP(mean): --  RR: 20 (18 - 20)  SpO2: 90% (0% - 100%)      FAMILY HISTORY:  Family history of liver cancer (Father): father  85 liver CA  Family history of coronary arteriosclerosis (Mother): Mom  of MI age 68    Denies any personal or familial history of clotting or bleeding disorders.    Allergies    Privigen (Other (Severe))    Intolerances        REVIEW OF SYSTEMS    (  )Fever	     (  )Constipation	(  )SOB				(  )Headache	(  )Dysuria  (  )Chills	     (  )Melena	(  )Dyspnea present on exertion	                    (  )Dizziness                    (  )Polyuria  (  )Nausea	     (  )Hematochezia	(  )Cough			                    (  )Syncope   	(  )Hematuria  (  )Vomiting    (  )Chest Pain	(  )Wheezing			(  )Weakness  (  )Diarrhea     (  )Palpitations	(  )Anorexia			(  )Myalgia    All other review of systems negative: Yes    PHYSICAL EXAM:    Constitutional: Appears Well    Neurological: A& O x 3    Skin: Warm    Respiratory and Thorax: normal effort; Breath sounds:rhonchi noted  	  Cardiovascular: S1, S2, regular, NMBR	    Gastrointestinal: BS + x 4Q, nontender	    Genitourinary:  Bladder nondistended, nontender    Musculoskeletal:   General Right:   no muscle/joint tenderness,   normal tone, no joint swelling,   ROM: limited/full	    General Left:   no muscle/joint tenderness,   normal tone, no joint swelling,   ROM: limited/full        Lower extrems:   Right: no calf tenderness              negative adrianna's sign               + pedal pulses, + le edema noted 1+    Left:   no calf tenderness              negative adrianna's sign               + pedal pulses, + le edema 1+                                                      12.2   13.1  )-----------( 145      ( 15 May 2017 07:42 )             37.9       05-15    143  |  106  |  20  ----------------------------<  54<L>  4.1   |  28  |  1.17    Ca    8.2<L>      15 May 2017 07:42  Phos  3.9     05-15  Mg     2.3     05-15        PT/INR - ( 15 May 2017 07:42 )   PT: 20.4 sec;   INR: 1.87 ratio              PT/INR - ( 14 May 2017 07:37 )   PT: 33.7 sec;   INR: 3.05 ratio      PT/INR - ( 13 May 2017 04:59 )   PT: 39.8 sec;   INR: 3.59 ratio       MEDICATIONS  (STANDING):  sodium chloride 0.9% lock flush 3milliLiter(s) IV Push every 8 hours  insulin lispro (HumaLOG) corrective regimen sliding scale  SubCutaneous three times a day before meals  dextrose 5%. 1000milliLiter(s) IV Continuous <Continuous>  dextrose 50% Injectable 12.5Gram(s) IV Push once  docusate sodium 100milliGRAM(s) Oral three times a day  losartan 100milliGRAM(s) Oral daily  gabapentin 300milliGRAM(s) Oral three times a day  insulin glargine Injectable (LANTUS) 40Unit(s) SubCutaneous two times a day  simvastatin 10milliGRAM(s) Oral at bedtime  tenofovir 300milliGRAM(s) Oral daily  metoprolol 50milliGRAM(s) Oral two times a day  montelukast 10milliGRAM(s) Oral at bedtime  multivitamin Oral Tab/Cap - Peds 1Tablet(s) Oral daily  amiodarone    Tablet 100milliGRAM(s) Oral daily  ALBUTerol/ipratropium for Nebulization 3milliLiter(s) Nebulizer every 6 hours  vancomycin  IVPB  IV Intermittent   cefepime  IVPB  IV Intermittent   vancomycin  IVPB 1250milliGRAM(s) IV Intermittent every 24 hours  cefepime  IVPB 1000milliGRAM(s) IV Intermittent every 12 hours  lidocaine   Patch 2Patch Transdermal daily  ALBUTerol/ipratropium for Nebulization. 3milliLiter(s) Nebulizer once  guaiFENesin ER 600milliGRAM(s) Oral every 12 hours  warfarin 5milliGRAM(s) Oral once    DVT Prophylaxis:  LMWH                   (  )  Heparin SQ           (  )  Coumadin             ( x )  Xarelto                  (  )  Eliquis                   (  )  Venodynes           (  )  Ambulation          (  x)  UFH                       (  )  Contraindicated  (  )
HPI:  77 yr old  male w hx B cell -CLL stage 4/SLL, metastatic NHL, hypogammaglobulinemia (s/p IVIG last ?), AFfib on AC, HTN, HLD, CHF-diastolic, hx PE/DVT, COPD, hx recurrent PNA came to ED w family complaining of tactile temp and productive cough and SOB        There is a 3 day hx of tactile temps, cough productive of white sputum and SOB that progressed to the point that he had to come in today for evaluation.  Has associated chest pain across lower rib cage bilaterally and to back that occurs w cough  Denied recent travel hx or recent sick contacts        He was hypoxic to 90 at room air.  Patient feels a little improved after respiratory treatment and oxygen.        In the ED he received fluids and IV ceftiaxone and zithromax (10 May 2017 12:14)      Patient is a 77y old  Male who presents with a chief complaint of cough/chest pain (10 May 2017 15:07)  This pt is known to our anticoagulation services SINCE .  Pt is normally stable on 23.75-25mg per week. hx of dvt/pe  and a.fib from .     Consulted by Dr. Evita Mcmanus  for VTE prophylaxis, risk stratification, and anticoagulation management.    PAST MEDICAL & SURGICAL HISTORY:  Hypogammaglobulinemia: received IVIG  Hepatitis B virus infection, unspecified chronicity: retrovirals  Chronic diastolic congestive heart failure  Essential hypertension  DM type 2 (diabetes mellitus, type 2)  B-cell lymphoma, unspecified B-cell lymphoma type, unspecified body region: B cell CLL stage 4/SLL; met NHL  NHL (non-Hodgkin&#x27;s lymphoma)  Lymphoma, unspecified body region, unspecified lymphoma type  Atrial fibrillation, unspecified type  COPD (chronic obstructive pulmonary disease): Lymphoma   Atrial fibrillation  History of esophageal dilatation  S/P cholecystectomy  H/O prostatectomy    IMPROVE VTE Risk Score: 6 high    [id number] Score: 5    IMPROVE Bleeding Risk Score: 2.5 mod    Falls Risk:   High (  )  Mod ( x )  Low (  )    crcl: 63.18  17 Pt seen at bedside oob in chair, wife present.  Informed them we will be managing his anticoagulation while he is in the hospital, questions answered will reinforce as needed.   5-15-17 pt seen at bedside oob in chair wife present.  Pt states feels better. Informed him I will resume his coumadin today.   17 pt seen at bedside oob in chair wife present. RN  present.  used vesara for translation. Pt possibly going home today.  given instructions to take 5mg today and tomorrow of coumadin and 3.75 on Thursday will f/u in the anticoagulation clinic on Friday.  He v/u of the above instructions.  Vital Signs Last 24 Hrs  T(C): 36.4, Max: 36.6 ( @ 16:50)  T(F): 97.6, Max: 97.9 (05 @ 16:50)  HR: 91 (79 - 115)  BP: 152/83 (107/60 - 152/83)  BP(mean): --  RR: 20 (18 - 20)  SpO2: 90% (0% - 100%)      FAMILY HISTORY:  Family history of liver cancer (Father): father  85 liver CA  Family history of coronary arteriosclerosis (Mother): Mom  of MI age 68    Denies any personal or familial history of clotting or bleeding disorders.    Allergies    Privigen (Other (Severe))    Intolerances        REVIEW OF SYSTEMS    (  )Fever	     (  )Constipation	(  )SOB				(  )Headache	(  )Dysuria  (  )Chills	     (  )Melena	(  )Dyspnea present on exertion	                    (  )Dizziness                    (  )Polyuria  (  )Nausea	     (  )Hematochezia	(  )Cough			                    (  )Syncope   	(  )Hematuria  (  )Vomiting    (  )Chest Pain	(  )Wheezing			(  )Weakness  (  )Diarrhea     (  )Palpitations	(  )Anorexia			(  )Myalgia    All other review of systems negative: Yes    PHYSICAL EXAM:    Constitutional: Appears Well    Neurological: A& O x 3    Skin: Warm    Respiratory and Thorax: normal effort; Breath sounds:rhonchi noted  	  Cardiovascular: S1, S2, regular, NMBR	    Gastrointestinal: BS + x 4Q, nontender	    Genitourinary:  Bladder nondistended, nontender    Musculoskeletal:   General Right:   no muscle/joint tenderness,   normal tone, no joint swelling,   ROM: limited/full	    General Left:   no muscle/joint tenderness,   normal tone, no joint swelling,   ROM: limited/full        Lower extrems:   Right: no calf tenderness              negative adrianna's sign               + pedal pulses, + le edema noted 1+    Left:   no calf tenderness              negative adrianna's sign               + pedal pulses, + le edema 1+                           12.2   13.1  )-----------( 145      ( 15 May 2017 07:42 )             37.9       05-15    143  |  106  |  20  ----------------------------<  54<L>  4.1   |  28  |  1.17    Ca    8.2<L>      15 May 2017 07:42  Phos  3.9     05-15  Mg     2.3     05-15                                                    12.2   13.1  )-----------( 145      ( 15 May 2017 07:42 )             37.9       05-15    143  |  106  |  20  ----------------------------<  54<L>  4.1   |  28  |  1.17    Ca    8.2<L>      15 May 2017 07:42  Phos  3.9     05-15  Mg     2.3     05-15    PT/INR - ( 16 May 2017 07:18 )   PT: 18.9 sec;   INR: 1.73 ratio     PT/INR - ( 15 May 2017 07:42 )   PT: 20.4 sec;   INR: 1.87 ratio              PT/INR - ( 14 May 2017 07:37 )   PT: 33.7 sec;   INR: 3.05 ratio      PT/INR - ( 13 May 2017 04:59 )   PT: 39.8 sec;   INR: 3.59 ratio                             12.2   13.1  )-----------( 145      ( 15 May 2017 07:42 )             37.9       05-15    143  |  106  |  20  ----------------------------<  54<L>  4.1   |  28  |  1.17    Ca    8.2<L>      15 May 2017 07:42  Phos  3.9     05-15  Mg     2.3     05-15  MEDICATIONS  (STANDING):  sodium chloride 0.9% lock flush 3milliLiter(s) IV Push every 8 hours  insulin lispro (HumaLOG) corrective regimen sliding scale  SubCutaneous three times a day before meals  dextrose 5%. 1000milliLiter(s) IV Continuous <Continuous>  dextrose 50% Injectable 12.5Gram(s) IV Push once  docusate sodium 100milliGRAM(s) Oral three times a day  losartan 100milliGRAM(s) Oral daily  gabapentin 300milliGRAM(s) Oral three times a day  insulin glargine Injectable (LANTUS) 40Unit(s) SubCutaneous two times a day  simvastatin 10milliGRAM(s) Oral at bedtime  tenofovir 300milliGRAM(s) Oral daily  metoprolol 50milliGRAM(s) Oral two times a day  montelukast 10milliGRAM(s) Oral at bedtime  multivitamin Oral Tab/Cap - Peds 1Tablet(s) Oral daily  amiodarone    Tablet 100milliGRAM(s) Oral daily  ALBUTerol/ipratropium for Nebulization 3milliLiter(s) Nebulizer every 6 hours  vancomycin  IVPB  IV Intermittent   cefepime  IVPB  IV Intermittent   vancomycin  IVPB 1250milliGRAM(s) IV Intermittent every 24 hours  cefepime  IVPB 1000milliGRAM(s) IV Intermittent every 12 hours  lidocaine   Patch 2Patch Transdermal daily  ALBUTerol/ipratropium for Nebulization. 3milliLiter(s) Nebulizer once  guaiFENesin ER 600milliGRAM(s) Oral every 12 hours  warfarin 5milliGRAM(s) Oral daily    DVT Prophylaxis:  LMWH                   (  )  Heparin SQ           (  )  Coumadin             ( x )  Xarelto                  (  )  Eliquis                   (  )  Venodynes           (  )  Ambulation          (  x)  UFH                       (  )  Contraindicated  (  )
Patient is a 77y old  Male who presents with a chief complaint of cough/chest pain (10 May 2017 15:07)      HPI:  77 yr old  male w hx B cell -CLL stage 4/SLL, metastatic NHL, hypogammaglobulinemia (s/p IVIG last ?), AFfib on AC, HTN, HLD, CHF-diastolic, hx PE/DVT, COPD, hx recurrent PNA came to ED w family complaining of tactile temp and productive cough and SOB        There is a 3 day hx of tactile temps, cough productive of white sputum and SOB that progressed to the point that he had to come in today for evaluation.  Has associated chest pain across lower rib cage bilaterally and to back that occurs w cough  Denied recent travel hx or recent sick contacts        He was hypoxic to 90 at room air.  Patient feels a little improved after respiratory treatment and oxygen.        In the ED he received fluids and IV ceftiaxone and zithromax (10 May 2017 12:14)  seen and examined with , CNA at bedside  no cp  yellow sputum  no hemoptysis  not using any supplemental o2      feels better  his data rev with pt by  on the fkloor  also his son was notofied reg status yesterday in my office  OOb in chair      slept better last night  less congested  no cp  decresaed cough    feels better  resting comf  no use o2  data rev  dec cough and ciongestion  PAST MEDICAL & SURGICAL HISTORY:  Hypogammaglobulinemia: received IVIG  Hepatitis B virus infection, unspecified chronicity: retrovirals  Chronic diastolic congestive heart failure  Essential hypertension  DM type 2 (diabetes mellitus, type 2)  B-cell lymphoma, unspecified B-cell lymphoma type, unspecified body region: B cell CLL stage 4/SLL; met NHL  NHL (non-Hodgkin&#x27;s lymphoma)  Lymphoma, unspecified body region, unspecified lymphoma type  Atrial fibrillation, unspecified type  COPD (chronic obstructive pulmonary disease): Lymphoma   Atrial fibrillation  History of esophageal dilatation  S/P cholecystectomy  H/O prostatectomy      PREVIOUS DIAGNOSTIC TESTING:        MEDICATIONS  (STANDING):  sodium chloride 0.9% lock flush 3milliLiter(s) IV Push every 8 hours  insulin lispro (HumaLOG) corrective regimen sliding scale  SubCutaneous three times a day before meals  dextrose 5%. 1000milliLiter(s) IV Continuous <Continuous>  dextrose 50% Injectable 12.5Gram(s) IV Push once  docusate sodium 100milliGRAM(s) Oral three times a day  losartan 100milliGRAM(s) Oral daily  gabapentin 300milliGRAM(s) Oral three times a day  insulin glargine Injectable (LANTUS) 40Unit(s) SubCutaneous two times a day  simvastatin 10milliGRAM(s) Oral at bedtime  tenofovir 300milliGRAM(s) Oral daily  metoprolol 50milliGRAM(s) Oral two times a day  montelukast 10milliGRAM(s) Oral at bedtime  multivitamin Oral Tab/Cap - Peds 1Tablet(s) Oral daily  amiodarone    Tablet 100milliGRAM(s) Oral daily  ALBUTerol/ipratropium for Nebulization 3milliLiter(s) Nebulizer every 6 hours  vancomycin  IVPB  IV Intermittent   cefepime  IVPB  IV Intermittent   vancomycin  IVPB 1250milliGRAM(s) IV Intermittent every 24 hours  cefepime  IVPB 1000milliGRAM(s) IV Intermittent every 12 hours  lidocaine   Patch 2Patch Transdermal daily  ALBUTerol/ipratropium for Nebulization. 3milliLiter(s) Nebulizer once  guaiFENesin ER 600milliGRAM(s) Oral every 12 hours    MEDICATIONS  (PRN):  dextrose Gel 1Dose(s) Oral once PRN Blood Glucose LESS THAN 70 milliGRAM(s)/deciliter  glucagon  Injectable 1milliGRAM(s) IntraMuscular once PRN Glucose LESS THAN 70 milligrams/deciliter  ondansetron Injectable 4milliGRAM(s) IV Push every 6 hours PRN Nausea  aluminum hydroxide/magnesium hydroxide/simethicone Suspension 30milliLiter(s) Oral every 4 hours PRN Dyspepsia  benzocaine 15 mG/menthol 3.6 mG Lozenge 1Lozenge Oral every 4 hours PRN Sore Throat  HYDROcodone/homatropine Syrup 5milliLiter(s) Oral every 6 hours PRN Cough  acetaminophen   Tablet. 650milliGRAM(s) Oral every 6 hours PRN Mild Pain (1 - 3)        FAMILY HISTORY:  Family history of liver cancer (Father): father  85 liver CA  Family history of coronary arteriosclerosis (Mother): Mom  of MI age 68        REVIEW OF SYSTEM:  Pertinent items are noted in HPI.  Constitutional pos, fevers, sweats and weight loss  throat, and face:  negative for epistaxis, nasal congestion, sore throat and   tinnitus  Respiratory:pos for cough, dyspnea on exertion, pleuritic chest pain  and wheezing  Cardiovascular:  negative for chest pain, dyspnea and palpitations  Gastrointestinal: negative for abdominal pain, diarrhea, nausea and vomiting  Genitourinary: negative for dysuria, frequency and urinary incontinence  Skin:  negative for redness, rash, pruritus, swelling, dryness and   fissures  Hematologic/lymphatic: negative for bleeding and easy bruising  Musculoskeletal: negative for arthralgias, back pain and muscle weakness  Neurological: negative for dizziness, headaches, seizures and tremors  Behavioral/Psych:  negative for mood change, depression, suicidal attempts    Vital Signs Last 24 Hrs  T(C): 36.5, Max: 36.6 (05-15 @ 17:15)  T(F): 97.7, Max: 97.9 (05-15 @ 17:15)  HR: 93 (74 - 93)  BP: 99/59 (87/55 - 107/59)  BP(mean): --  RR: 18 (17 - 18)  SpO2: 97% (94% - 97%)    I&O's Summary    I & Os for current day (as of 11 May 2017 08:13)  =============================================  IN: 50 ml / OUT: 0 ml / NET: 50 ml    PHYSICAL EXAM  General Appearance: cooperative, no acute distress,   HEENT: PERRL, conjunctiva clear, EOM's intact, non injected pharynx, no exudate, TM   normal  Neck: Supple, , no adenopathy, thyroid: not enlarged, no carotid bruit or JVD  Back: Symmetric, no  tenderness,no soft tissue tenderness  Lungs: bilateral lower lobe rhonchi, mild exp wheeze improved  Heart: Regular rate and rhythm, S1, S2 normal, no murmur, rub or gallop  Abdomen: Soft, non-tender, bowel sounds active , no hepatosplenomegaly  Extremities: no cyanosis or edema, no joint swelling  Skin: Skin color, texture normal, no rashes   Neurologic: Alert and oriented X3 , cranial nerves intact, sensory and motor normal,    ECG:    LABS:                          12.2   13.1  )-----------( 145      ( 15 May 2017 07:42 )             37.9                             12.4   15.9  )-----------( 108      ( 10 May 2017 10:11 )             36.9     05-10    139  |  103  |  17  ----------------------------<  198<H>  4.5   |  27  |  1.59<H>  05-15    143  |  106  |  20  ----------------------------<  54<L>  4.1   |  28  |  1.17    Ca    8.2<L>      15 May 2017 07:42  Phos  3.9     05-15  Mg     2.3     05-15    05-15    143  |  106  |  20  ----------------------------<  54<L>  4.1   |  28  |  1.17    Ca    8.2<L>      15 May 2017 07:42  Phos  3.9     05-15  Mg     2.3     05-15    05-15    143  |  106  |  20  ----------------------------<  54<L>  4.1   |  28  |  1.17    Ca    8.2<L>      15 May 2017 07:42  Phos  3.9     05-15  Mg     2.3     05-15      Ca    8.0<L>      10 May 2017 10:11    TPro  6.0  /  Alb  3.2<L>  /  TBili  0.8  /  DBili  x   /  AST  16  /  ALT  29  /  AlkPhos  110  05-10    CARDIAC MARKERS ( 10 May 2017 10:11 )  <0.015 ng/mL / x     / x     / x     / x              PT/INR - ( 10 May 2017 16:09 )   PT: 30.4 sec;   INR: 2.76 ratio             ABG - ( 10 May 2017 14:26 )  pH: 7.33  /  pCO2: 44    /  pO2: 90    / HCO3: 22    / Base Excess: -3    /  SaO2: 94              PROCEDURE DATE:  05/10/2017        INTERPRETATION:  History: Cough.    Chest:  one view.      Comparison: 2017    AP radiograph of the chest demonstrates faint developing infiltrates in   the RIGHT midlung and RIGHT lower lobe. The cardiac silhouette is normal   in size. Osseous structures are intact.    Impression:faint developing infiltrates in the RIGHT midlung and RIGHT   lower lobe      cxr noted and doscussed      RADIOLOGY & ADDITIONAL STUDIES:
Patient is a 77y old  Male who presents with a chief complaint of cough/chest pain (10 May 2017 15:07)      HPI:  77 yr old  male w hx B cell -CLL stage 4/SLL, metastatic NHL, hypogammaglobulinemia (s/p IVIG last ?), AFfib on AC, HTN, HLD, CHF-diastolic, hx PE/DVT, COPD, hx recurrent PNA came to ED w family complaining of tactile temp and productive cough and SOB        There is a 3 day hx of tactile temps, cough productive of white sputum and SOB that progressed to the point that he had to come in today for evaluation.  Has associated chest pain across lower rib cage bilaterally and to back that occurs w cough  Denied recent travel hx or recent sick contacts        He was hypoxic to 90 at room air.  Patient feels a little improved after respiratory treatment and oxygen.        In the ED he received fluids and IV ceftiaxone and zithromax (10 May 2017 12:14)  seen and examined with , CNA at bedside  no cp  yellow sputum  no hemoptysis  not using any supplemental o2      feels better  his data rev with pt by  on the fkloor  also his son was notofied reg status yesterday in my office  OOb in chair      slept better last night  less congested  no cp  decresaed cough  PAST MEDICAL & SURGICAL HISTORY:  Hypogammaglobulinemia: received IVIG  Hepatitis B virus infection, unspecified chronicity: retrovirals  Chronic diastolic congestive heart failure  Essential hypertension  DM type 2 (diabetes mellitus, type 2)  B-cell lymphoma, unspecified B-cell lymphoma type, unspecified body region: B cell CLL stage 4/SLL; met NHL  NHL (non-Hodgkin&#x27;s lymphoma)  Lymphoma, unspecified body region, unspecified lymphoma type  Atrial fibrillation, unspecified type  COPD (chronic obstructive pulmonary disease): Lymphoma   Atrial fibrillation  History of esophageal dilatation  S/P cholecystectomy  H/O prostatectomy      PREVIOUS DIAGNOSTIC TESTING:      MEDICATIONS  (STANDING):  sodium chloride 0.9% lock flush 3milliLiter(s) IV Push every 8 hours  insulin lispro (HumaLOG) corrective regimen sliding scale  SubCutaneous three times a day before meals  dextrose 5%. 1000milliLiter(s) IV Continuous <Continuous>  dextrose 50% Injectable 12.5Gram(s) IV Push once  docusate sodium 100milliGRAM(s) Oral three times a day  losartan 100milliGRAM(s) Oral daily  gabapentin 300milliGRAM(s) Oral three times a day  insulin glargine Injectable (LANTUS) 40Unit(s) SubCutaneous two times a day  simvastatin 10milliGRAM(s) Oral at bedtime  tenofovir 300milliGRAM(s) Oral daily  metoprolol 50milliGRAM(s) Oral two times a day  montelukast 10milliGRAM(s) Oral at bedtime  multivitamin Oral Tab/Cap - Peds 1Tablet(s) Oral daily  amiodarone    Tablet 100milliGRAM(s) Oral daily  ALBUTerol/ipratropium for Nebulization 3milliLiter(s) Nebulizer every 6 hours  vancomycin  IVPB  IV Intermittent   cefepime  IVPB  IV Intermittent   vancomycin  IVPB 1250milliGRAM(s) IV Intermittent every 24 hours  cefepime  IVPB 1000milliGRAM(s) IV Intermittent every 12 hours  lidocaine   Patch 2Patch Transdermal daily  ALBUTerol/ipratropium for Nebulization. 3milliLiter(s) Nebulizer once  guaiFENesin ER 600milliGRAM(s) Oral every 12 hours  warfarin 3.75milliGRAM(s) Oral <User Schedule>  warfarin 2.5milliGRAM(s) Oral <User Schedule>    MEDICATIONS  (PRN):  dextrose Gel 1Dose(s) Oral once PRN Blood Glucose LESS THAN 70 milliGRAM(s)/deciliter  glucagon  Injectable 1milliGRAM(s) IntraMuscular once PRN Glucose LESS THAN 70 milligrams/deciliter  ondansetron Injectable 4milliGRAM(s) IV Push every 6 hours PRN Nausea  aluminum hydroxide/magnesium hydroxide/simethicone Suspension 30milliLiter(s) Oral every 4 hours PRN Dyspepsia      FAMILY HISTORY:  Family history of liver cancer (Father): father  85 liver CA  Family history of coronary arteriosclerosis (Mother): Mom  of MI age 68        REVIEW OF SYSTEM:  Pertinent items are noted in HPI.  Constitutional pos, fevers, sweats and weight loss  throat, and face:  negative for epistaxis, nasal congestion, sore throat and   tinnitus  Respiratory:pos for cough, dyspnea on exertion, pleuritic chest pain  and wheezing  Cardiovascular:  negative for chest pain, dyspnea and palpitations  Gastrointestinal: negative for abdominal pain, diarrhea, nausea and vomiting  Genitourinary: negative for dysuria, frequency and urinary incontinence  Skin:  negative for redness, rash, pruritus, swelling, dryness and   fissures  Hematologic/lymphatic: negative for bleeding and easy bruising  Musculoskeletal: negative for arthralgias, back pain and muscle weakness  Neurological: negative for dizziness, headaches, seizures and tremors  Behavioral/Psych:  negative for mood change, depression, suicidal attempts    Vital Signs Last 24 Hrs  T(C): 37.2, Max: 37.2 ( @ 04:11)  T(F): 99, Max: 99 ( @ 04:11)  HR: 92 (76 - 113)  BP: 129/82 (105/63 - 129/82)  BP(mean): --  RR: 18 (18 - 19)  SpO2: 97% (91% - 97%)    I&O's Summary    I & Os for current day (as of 11 May 2017 08:13)  =============================================  IN: 50 ml / OUT: 0 ml / NET: 50 ml    PHYSICAL EXAM  General Appearance: cooperative, no acute distress,   HEENT: PERRL, conjunctiva clear, EOM's intact, non injected pharynx, no exudate, TM   normal  Neck: Supple, , no adenopathy, thyroid: not enlarged, no carotid bruit or JVD  Back: Symmetric, no  tenderness,no soft tissue tenderness  Lungs: bilateral lower lobe rhonchi, mild exp wheeze  Heart: Regular rate and rhythm, S1, S2 normal, no murmur, rub or gallop  Abdomen: Soft, non-tender, bowel sounds active , no hepatosplenomegaly  Extremities: no cyanosis or edema, no joint swelling  Skin: Skin color, texture normal, no rashes   Neurologic: Alert and oriented X3 , cranial nerves intact, sensory and motor normal,    ECG:    LABS:                          12.4   15.9  )-----------( 108      ( 10 May 2017 10:11 )             36.9     05-10    139  |  103  |  17  ----------------------------<  198<H>  4.5   |  27  |  1.59<H>    Ca    8.0<L>      10 May 2017 10:11    TPro  6.0  /  Alb  3.2<L>  /  TBili  0.8  /  DBili  x   /  AST  16  /  ALT  29  /  AlkPhos  110  05-10    CARDIAC MARKERS ( 10 May 2017 10:11 )  <0.015 ng/mL / x     / x     / x     / x              PT/INR - ( 10 May 2017 16:09 )   PT: 30.4 sec;   INR: 2.76 ratio             ABG - ( 10 May 2017 14:26 )  pH: 7.33  /  pCO2: 44    /  pO2: 90    / HCO3: 22    / Base Excess: -3    /  SaO2: 94              PROCEDURE DATE:  05/10/2017        INTERPRETATION:  History: Cough.    Chest:  one view.      Comparison: 2017    AP radiograph of the chest demonstrates faint developing infiltrates in   the RIGHT midlung and RIGHT lower lobe. The cardiac silhouette is normal   in size. Osseous structures are intact.    Impression:faint developing infiltrates in the RIGHT midlung and RIGHT   lower lobe      cxr noted and doscussed      RADIOLOGY & ADDITIONAL STUDIES:
Patient is a 77y old  Male who presents with a chief complaint of cough/chest pain (10 May 2017 15:07)      HPI:  77 yr old  male w hx B cell -CLL stage 4/SLL, metastatic NHL, hypogammaglobulinemia (s/p IVIG last ?), AFfib on AC, HTN, HLD, CHF-diastolic, hx PE/DVT, COPD, hx recurrent PNA came to ED w family complaining of tactile temp and productive cough and SOB        There is a 3 day hx of tactile temps, cough productive of white sputum and SOB that progressed to the point that he had to come in today for evaluation.  Has associated chest pain across lower rib cage bilaterally and to back that occurs w cough  Denied recent travel hx or recent sick contacts        He was hypoxic to 90 at room air.  Patient feels a little improved after respiratory treatment and oxygen.        In the ED he received fluids and IV ceftiaxone and zithromax (10 May 2017 12:14)  seen and examined with , CNA at bedside  no cp  yellow sputum  no hemoptysis  not using any supplemental o2      feels better  his data rev with pt by  on the fkloor  also his son was notofied reg status yesterday in my office  OOb in chair    PAST MEDICAL & SURGICAL HISTORY:  Hypogammaglobulinemia: received IVIG  Hepatitis B virus infection, unspecified chronicity: retrovirals  Chronic diastolic congestive heart failure  Essential hypertension  DM type 2 (diabetes mellitus, type 2)  B-cell lymphoma, unspecified B-cell lymphoma type, unspecified body region: B cell CLL stage 4/SLL; met NHL  NHL (non-Hodgkin&#x27;s lymphoma)  Lymphoma, unspecified body region, unspecified lymphoma type  Atrial fibrillation, unspecified type  COPD (chronic obstructive pulmonary disease): Lymphoma   Atrial fibrillation  History of esophageal dilatation  S/P cholecystectomy  H/O prostatectomy      PREVIOUS DIAGNOSTIC TESTING:      MEDICATIONS  (STANDING):  sodium chloride 0.9% lock flush 3milliLiter(s) IV Push every 8 hours  insulin lispro (HumaLOG) corrective regimen sliding scale  SubCutaneous three times a day before meals  dextrose 5%. 1000milliLiter(s) IV Continuous <Continuous>  dextrose 50% Injectable 12.5Gram(s) IV Push once  docusate sodium 100milliGRAM(s) Oral three times a day  losartan 100milliGRAM(s) Oral daily  gabapentin 300milliGRAM(s) Oral three times a day  insulin glargine Injectable (LANTUS) 40Unit(s) SubCutaneous two times a day  simvastatin 10milliGRAM(s) Oral at bedtime  tenofovir 300milliGRAM(s) Oral daily  metoprolol 50milliGRAM(s) Oral two times a day  montelukast 10milliGRAM(s) Oral at bedtime  multivitamin Oral Tab/Cap - Peds 1Tablet(s) Oral daily  amiodarone    Tablet 100milliGRAM(s) Oral daily  ALBUTerol/ipratropium for Nebulization 3milliLiter(s) Nebulizer every 6 hours  vancomycin  IVPB  IV Intermittent   cefepime  IVPB  IV Intermittent   vancomycin  IVPB 1250milliGRAM(s) IV Intermittent every 24 hours  cefepime  IVPB 1000milliGRAM(s) IV Intermittent every 12 hours  lidocaine   Patch 2Patch Transdermal daily  ALBUTerol/ipratropium for Nebulization. 3milliLiter(s) Nebulizer once  guaiFENesin ER 600milliGRAM(s) Oral every 12 hours  warfarin 3.75milliGRAM(s) Oral <User Schedule>  warfarin 2.5milliGRAM(s) Oral <User Schedule>    MEDICATIONS  (PRN):  dextrose Gel 1Dose(s) Oral once PRN Blood Glucose LESS THAN 70 milliGRAM(s)/deciliter  glucagon  Injectable 1milliGRAM(s) IntraMuscular once PRN Glucose LESS THAN 70 milligrams/deciliter  ondansetron Injectable 4milliGRAM(s) IV Push every 6 hours PRN Nausea  aluminum hydroxide/magnesium hydroxide/simethicone Suspension 30milliLiter(s) Oral every 4 hours PRN Dyspepsia      FAMILY HISTORY:  Family history of liver cancer (Father): father  85 liver CA  Family history of coronary arteriosclerosis (Mother): Mom  of MI age 68        REVIEW OF SYSTEM:  Pertinent items are noted in HPI.  Constitutional pos, fevers, sweats and weight loss  throat, and face:  negative for epistaxis, nasal congestion, sore throat and   tinnitus  Respiratory:pos for cough, dyspnea on exertion, pleuritic chest pain  and wheezing  Cardiovascular:  negative for chest pain, dyspnea and palpitations  Gastrointestinal: negative for abdominal pain, diarrhea, nausea and vomiting  Genitourinary: negative for dysuria, frequency and urinary incontinence  Skin:  negative for redness, rash, pruritus, swelling, dryness and   fissures  Hematologic/lymphatic: negative for bleeding and easy bruising  Musculoskeletal: negative for arthralgias, back pain and muscle weakness  Neurological: negative for dizziness, headaches, seizures and tremors  Behavioral/Psych:  negative for mood change, depression, suicidal attempts    Vital Signs Last 24 Hrs  T(C): 37.2, Max: 37.2 ( @ 04:11)  T(F): 99, Max: 99 ( @ 04:11)  HR: 92 (76 - 113)  BP: 129/82 (105/63 - 129/82)  BP(mean): --  RR: 18 (18 - 19)  SpO2: 97% (91% - 97%)    I&O's Summary    I & Os for current day (as of 11 May 2017 08:13)  =============================================  IN: 50 ml / OUT: 0 ml / NET: 50 ml    PHYSICAL EXAM  General Appearance: cooperative, no acute distress,   HEENT: PERRL, conjunctiva clear, EOM's intact, non injected pharynx, no exudate, TM   normal  Neck: Supple, , no adenopathy, thyroid: not enlarged, no carotid bruit or JVD  Back: Symmetric, no  tenderness,no soft tissue tenderness  Lungs: bilateral lower lobe rhonchi, mild exp wheeze  Heart: Regular rate and rhythm, S1, S2 normal, no murmur, rub or gallop  Abdomen: Soft, non-tender, bowel sounds active , no hepatosplenomegaly  Extremities: no cyanosis or edema, no joint swelling  Skin: Skin color, texture normal, no rashes   Neurologic: Alert and oriented X3 , cranial nerves intact, sensory and motor normal,    ECG:    LABS:                          12.4   15.9  )-----------( 108      ( 10 May 2017 10:11 )             36.9     05-10    139  |  103  |  17  ----------------------------<  198<H>  4.5   |  27  |  1.59<H>    Ca    8.0<L>      10 May 2017 10:11    TPro  6.0  /  Alb  3.2<L>  /  TBili  0.8  /  DBili  x   /  AST  16  /  ALT  29  /  AlkPhos  110  05-10    CARDIAC MARKERS ( 10 May 2017 10:11 )  <0.015 ng/mL / x     / x     / x     / x              PT/INR - ( 10 May 2017 16:09 )   PT: 30.4 sec;   INR: 2.76 ratio             ABG - ( 10 May 2017 14:26 )  pH: 7.33  /  pCO2: 44    /  pO2: 90    / HCO3: 22    / Base Excess: -3    /  SaO2: 94              PROCEDURE DATE:  05/10/2017        INTERPRETATION:  History: Cough.    Chest:  one view.      Comparison: 2017    AP radiograph of the chest demonstrates faint developing infiltrates in   the RIGHT midlung and RIGHT lower lobe. The cardiac silhouette is normal   in size. Osseous structures are intact.    Impression:faint developing infiltrates in the RIGHT midlung and RIGHT   lower lobe            RADIOLOGY & ADDITIONAL STUDIES:

## 2017-05-16 NOTE — DISCHARGE NOTE ADULT - CARE PLAN
Principal Discharge DX:	Pneumonia due to infectious organism, unspecified laterality, unspecified part of lung  Goal:	Acute Hypoxic Respiratory failure 2ndry to PNA  Instructions for follow-up, activity and diet:	D/C patient home ceftin 500mg q12h to complete 4 more daysFU with Pulm in 3-4 days  Secondary Diagnosis:	Hypogammaglobulinemia  Goal:	Hypogammaglobulinemia with Low IGG and IGA levels;  Instructions for follow-up, activity and diet:	FU with Dr Houser in 3-4 days Principal Discharge DX:	Pneumonia due to infectious organism, unspecified laterality, unspecified part of lung  Goal:	Acute Hypoxic Respiratory failure 2ndry to PNA  Instructions for follow-up, activity and diet:	D/C patient home ceftin 500mg q12h to complete 4 more daysFU with Pulm in 3-4 days  Secondary Diagnosis:	Hypogammaglobulinemia  Goal:	Hypogammaglobulinemia with hx of CLL  Instructions for follow-up, activity and diet:	continue to monitor; Discussed with Dr Houser and no further infusions at this time

## 2017-05-16 NOTE — DISCHARGE NOTE ADULT - OTHER SIGNIFICANT FINDINGS
Lab Results:  CBC  CBC Full  -  ( 16 May 2017 10:18 )  WBC Count : 9.9 K/uL  Hemoglobin : 11.9 g/dL  Hematocrit : 35.9 %  Platelet Count - Automated : 126 K/uL  Mean Cell Volume : 91.9 fl  Mean Cell Hemoglobin : 30.5 pg  Mean Cell Hemoglobin Concentration : 33.2 gm/dL  Auto Neutrophil # : x  Auto Lymphocyte # : x  Auto Monocyte # : x  Auto Eosinophil # : x  Auto Basophil # : x  Auto Neutrophil % : x  Auto Lymphocyte % : x  Auto Monocyte % : x  Auto Eosinophil % : x  Auto Basophil % : x    .		Differential:	[] Automated		[] Manual  Chemistry                        11.9   9.9   )-----------( 126      ( 16 May 2017 10:18 )             35.9     05-16    140  |  106  |  31<H>  ----------------------------<  161<H>  4.7   |  24  |  1.22    Ca    7.9<L>      16 May 2017 10:18  Phos  3.9     05-15  Mg     2.3     05-15        PT/INR - ( 16 May 2017 07:18 )   PT: 18.9 sec;   INR: 1.73 ratio         MICROBIOLOGY/CULTURES:  Culture Results:   Normal Respiratory Marybel present (05-11 @ 09:40)  Culture Results:   No growth at 5 days. (05-10 @ 10:27)  Culture Results:   No growth at 5 days. (05-10 @ 10:11)    RADIOLOGY/EKG:    EXAM:  CHEST SINGLE VIEW FRONTAL  05/10/2017    Impression:faint developing infiltrates in the RIGHT midlung and RIGHT   lower lobe    EXAM:  CHEST SINGLE VIEW FRONTAL    05/13/2017    Impression:Subsegmental atelectasis is seen at the lung bases.

## 2017-05-16 NOTE — PROGRESS NOTE ADULT - PROVIDER SPECIALTY LIST ADULT
Anticoag Management
Hospitalist
Infectious Disease
Infectious Disease
Pulmonology
Hospitalist
Heme/Onc

## 2017-05-16 NOTE — DISCHARGE NOTE ADULT - CARE PROVIDER_API CALL
Za Orozco), Medicine  [address]  [place] [address]  Phone: [phone]  Fax: [phone]    KELLY Lakhani (), Pulmonary Disease; Sleep Medicine  [address] [place] [address]  Phone: [phone]  Fax: [phone]    Ale Houser), Hematology; Internal Medicine; Medical Oncology  [address] [place] [id number]  Phone: [phone]  Fax: [phone]

## 2017-05-16 NOTE — PROGRESS NOTE ADULT - ASSESSMENT
77 yr old  male w hx B cell -CLL stage 4/SLL, metastatic NHL, hypogammaglobulinemia (s/p IVIG last ?), AFfib on AC, HTN, HLD, CHF-diastolic, hx PE/DVT, COPD, hx recurrent PNA came to ED w family complaining of tactile temp and productive cough and SOB        There is a 3 day hx of tactile temps, cough productive of white sputum and SOB that progressed to the point that he had to come in today for evaluation.  Has associated chest pain across lower rib cage bilaterally and to back that occurs w cough  Denied recent travel hx or recent sick contacts        He was hypoxic to 90 at room air.  Patient feels a little improved after respiratory treatment and oxygen.        In the ED he received fluids and IV ceftiaxone and zithromax (10 May 2017 12:14)  Recurrent pneumonia  non toxic and hemodynamically stable  Hypoxemia improved  r/o pseudomonas or MRSa with recurrent infections  Hypogamma on Ov Ig  Adeq oxygenation  mild bronchospasm  if not better will get ct chest  agree with broadspectrum antibiotic coverage  incentive  expectotrant  bronchodilator rx  monitor clinically  check repeat cxr today  mobilize OOB  will need IV IG therapy   fu labs

## 2017-05-19 ENCOUNTER — APPOINTMENT (OUTPATIENT)
Dept: CARDIOLOGY | Facility: CLINIC | Age: 77
End: 2017-05-19

## 2017-05-19 DIAGNOSIS — I48.91 UNSPECIFIED ATRIAL FIBRILLATION: ICD-10-CM

## 2017-05-19 DIAGNOSIS — Z88.8 ALLERGY STATUS TO OTHER DRUGS, MEDICAMENTS AND BIOLOGICAL SUBSTANCES STATUS: ICD-10-CM

## 2017-05-19 DIAGNOSIS — Z90.49 ACQUIRED ABSENCE OF OTHER SPECIFIED PARTS OF DIGESTIVE TRACT: ICD-10-CM

## 2017-05-19 DIAGNOSIS — J16.8 PNEUMONIA DUE TO OTHER SPECIFIED INFECTIOUS ORGANISMS: ICD-10-CM

## 2017-05-19 DIAGNOSIS — E78.5 HYPERLIPIDEMIA, UNSPECIFIED: ICD-10-CM

## 2017-05-19 DIAGNOSIS — I11.0 HYPERTENSIVE HEART DISEASE WITH HEART FAILURE: ICD-10-CM

## 2017-05-19 DIAGNOSIS — Z86.711 PERSONAL HISTORY OF PULMONARY EMBOLISM: ICD-10-CM

## 2017-05-19 DIAGNOSIS — J96.01 ACUTE RESPIRATORY FAILURE WITH HYPOXIA: ICD-10-CM

## 2017-05-19 DIAGNOSIS — C85.90 NON-HODGKIN LYMPHOMA, UNSPECIFIED, UNSPECIFIED SITE: ICD-10-CM

## 2017-05-19 DIAGNOSIS — I50.32 CHRONIC DIASTOLIC (CONGESTIVE) HEART FAILURE: ICD-10-CM

## 2017-05-19 DIAGNOSIS — D69.6 THROMBOCYTOPENIA, UNSPECIFIED: ICD-10-CM

## 2017-05-19 DIAGNOSIS — A41.9 SEPSIS, UNSPECIFIED ORGANISM: ICD-10-CM

## 2017-05-19 DIAGNOSIS — J44.9 CHRONIC OBSTRUCTIVE PULMONARY DISEASE, UNSPECIFIED: ICD-10-CM

## 2017-05-19 DIAGNOSIS — Z86.718 PERSONAL HISTORY OF OTHER VENOUS THROMBOSIS AND EMBOLISM: ICD-10-CM

## 2017-05-19 DIAGNOSIS — J18.9 PNEUMONIA, UNSPECIFIED ORGANISM: ICD-10-CM

## 2017-05-19 DIAGNOSIS — B19.10 UNSPECIFIED VIRAL HEPATITIS B WITHOUT HEPATIC COMA: ICD-10-CM

## 2017-05-19 DIAGNOSIS — B97.89 OTHER VIRAL AGENTS AS THE CAUSE OF DISEASES CLASSIFIED ELSEWHERE: ICD-10-CM

## 2017-05-19 DIAGNOSIS — D80.1 NONFAMILIAL HYPOGAMMAGLOBULINEMIA: ICD-10-CM

## 2017-05-19 DIAGNOSIS — B34.1 ENTEROVIRUS INFECTION, UNSPECIFIED: ICD-10-CM

## 2017-05-19 DIAGNOSIS — N17.9 ACUTE KIDNEY FAILURE, UNSPECIFIED: ICD-10-CM

## 2017-05-19 DIAGNOSIS — C91.10 CHRONIC LYMPHOCYTIC LEUKEMIA OF B-CELL TYPE NOT HAVING ACHIEVED REMISSION: ICD-10-CM

## 2017-05-26 ENCOUNTER — APPOINTMENT (OUTPATIENT)
Dept: CARDIOLOGY | Facility: CLINIC | Age: 77
End: 2017-05-26

## 2017-06-02 ENCOUNTER — APPOINTMENT (OUTPATIENT)
Dept: CARDIOLOGY | Facility: CLINIC | Age: 77
End: 2017-06-02

## 2017-06-08 ENCOUNTER — INPATIENT (INPATIENT)
Facility: HOSPITAL | Age: 77
LOS: 3 days | Discharge: HOME HEALTH | End: 2017-06-12
Attending: HOSPITALIST | Admitting: INTERNAL MEDICINE
Payer: MEDICARE

## 2017-06-08 VITALS
HEART RATE: 95 BPM | TEMPERATURE: 101 F | DIASTOLIC BLOOD PRESSURE: 81 MMHG | OXYGEN SATURATION: 96 % | SYSTOLIC BLOOD PRESSURE: 135 MMHG | HEIGHT: 68 IN | RESPIRATION RATE: 20 BRPM | WEIGHT: 190.04 LBS

## 2017-06-08 DIAGNOSIS — Z90.79 ACQUIRED ABSENCE OF OTHER GENITAL ORGAN(S): Chronic | ICD-10-CM

## 2017-06-08 DIAGNOSIS — J18.1 LOBAR PNEUMONIA, UNSPECIFIED ORGANISM: ICD-10-CM

## 2017-06-08 DIAGNOSIS — J15.6 PNEUMONIA DUE TO OTHER GRAM-NEGATIVE BACTERIA: ICD-10-CM

## 2017-06-08 DIAGNOSIS — I48.91 UNSPECIFIED ATRIAL FIBRILLATION: ICD-10-CM

## 2017-06-08 DIAGNOSIS — Z90.49 ACQUIRED ABSENCE OF OTHER SPECIFIED PARTS OF DIGESTIVE TRACT: Chronic | ICD-10-CM

## 2017-06-08 DIAGNOSIS — Z98.890 OTHER SPECIFIED POSTPROCEDURAL STATES: Chronic | ICD-10-CM

## 2017-06-08 DIAGNOSIS — R79.1 ABNORMAL COAGULATION PROFILE: ICD-10-CM

## 2017-06-08 DIAGNOSIS — I10 ESSENTIAL (PRIMARY) HYPERTENSION: ICD-10-CM

## 2017-06-08 DIAGNOSIS — B19.10 UNSPECIFIED VIRAL HEPATITIS B WITHOUT HEPATIC COMA: ICD-10-CM

## 2017-06-08 DIAGNOSIS — Z29.9 ENCOUNTER FOR PROPHYLACTIC MEASURES, UNSPECIFIED: ICD-10-CM

## 2017-06-08 DIAGNOSIS — E11.9 TYPE 2 DIABETES MELLITUS WITHOUT COMPLICATIONS: ICD-10-CM

## 2017-06-08 DIAGNOSIS — J44.9 CHRONIC OBSTRUCTIVE PULMONARY DISEASE, UNSPECIFIED: ICD-10-CM

## 2017-06-08 LAB
ALBUMIN SERPL ELPH-MCNC: 3.3 G/DL — SIGNIFICANT CHANGE UP (ref 3.3–5)
ALP SERPL-CCNC: 118 U/L — SIGNIFICANT CHANGE UP (ref 40–120)
ALT FLD-CCNC: 19 U/L — SIGNIFICANT CHANGE UP (ref 12–78)
ANION GAP SERPL CALC-SCNC: 8 MMOL/L — SIGNIFICANT CHANGE UP (ref 5–17)
APPEARANCE UR: CLEAR — SIGNIFICANT CHANGE UP
APTT BLD: 34.8 SEC — SIGNIFICANT CHANGE UP (ref 27.5–37.4)
AST SERPL-CCNC: 15 U/L — SIGNIFICANT CHANGE UP (ref 15–37)
BASOPHILS # BLD AUTO: 0.4 K/UL — HIGH (ref 0–0.2)
BASOPHILS NFR BLD AUTO: 1 % — SIGNIFICANT CHANGE UP (ref 0–2)
BILIRUB SERPL-MCNC: 0.6 MG/DL — SIGNIFICANT CHANGE UP (ref 0.2–1.2)
BILIRUB UR-MCNC: NEGATIVE — SIGNIFICANT CHANGE UP
BUN SERPL-MCNC: 17 MG/DL — SIGNIFICANT CHANGE UP (ref 7–23)
CALCIUM SERPL-MCNC: 8 MG/DL — LOW (ref 8.5–10.1)
CHLORIDE SERPL-SCNC: 104 MMOL/L — SIGNIFICANT CHANGE UP (ref 96–108)
CO2 SERPL-SCNC: 23 MMOL/L — SIGNIFICANT CHANGE UP (ref 22–31)
COLOR SPEC: YELLOW — SIGNIFICANT CHANGE UP
CREAT SERPL-MCNC: 1.32 MG/DL — HIGH (ref 0.5–1.3)
DIFF PNL FLD: NEGATIVE — SIGNIFICANT CHANGE UP
EOSINOPHIL # BLD AUTO: 0.4 K/UL — SIGNIFICANT CHANGE UP (ref 0–0.5)
EOSINOPHIL NFR BLD AUTO: 2 % — SIGNIFICANT CHANGE UP (ref 0–6)
GLUCOSE SERPL-MCNC: 321 MG/DL — HIGH (ref 70–99)
GLUCOSE UR QL: 1000 MG/DL
HCT VFR BLD CALC: 38.1 % — LOW (ref 39–50)
HGB BLD-MCNC: 12.6 G/DL — LOW (ref 13–17)
INR BLD: 3.8 RATIO — HIGH (ref 0.88–1.16)
INR BLD: 3.98 RATIO — HIGH (ref 0.88–1.16)
KETONES UR-MCNC: NEGATIVE — SIGNIFICANT CHANGE UP
LACTATE SERPL-SCNC: 0.8 MMOL/L — SIGNIFICANT CHANGE UP (ref 0.7–2)
LACTATE SERPL-SCNC: 2.3 MMOL/L — HIGH (ref 0.7–2)
LEUKOCYTE ESTERASE UR-ACNC: NEGATIVE — SIGNIFICANT CHANGE UP
LIDOCAIN IGE QN: 150 U/L — SIGNIFICANT CHANGE UP (ref 73–393)
LYMPHOCYTES # BLD AUTO: 31 % — SIGNIFICANT CHANGE UP (ref 13–44)
LYMPHOCYTES # BLD AUTO: 5.6 K/UL — HIGH (ref 1–3.3)
MCHC RBC-ENTMCNC: 30.6 PG — SIGNIFICANT CHANGE UP (ref 27–34)
MCHC RBC-ENTMCNC: 33.2 GM/DL — SIGNIFICANT CHANGE UP (ref 32–36)
MCV RBC AUTO: 92.3 FL — SIGNIFICANT CHANGE UP (ref 80–100)
MONOCYTES # BLD AUTO: 2 K/UL — HIGH (ref 0–0.9)
MONOCYTES NFR BLD AUTO: 8 % — SIGNIFICANT CHANGE UP (ref 2–14)
NEUTROPHILS # BLD AUTO: 7.4 K/UL — SIGNIFICANT CHANGE UP (ref 1.8–7.4)
NEUTROPHILS NFR BLD AUTO: 41 % — LOW (ref 43–77)
NITRITE UR-MCNC: NEGATIVE — SIGNIFICANT CHANGE UP
PH UR: 7 — SIGNIFICANT CHANGE UP (ref 5–8)
PLATELET # BLD AUTO: 102 K/UL — LOW (ref 150–400)
POTASSIUM SERPL-MCNC: 4.8 MMOL/L — SIGNIFICANT CHANGE UP (ref 3.5–5.3)
POTASSIUM SERPL-SCNC: 4.8 MMOL/L — SIGNIFICANT CHANGE UP (ref 3.5–5.3)
PROT SERPL-MCNC: 6.1 GM/DL — SIGNIFICANT CHANGE UP (ref 6–8.3)
PROT UR-MCNC: 30 MG/DL
PROTHROM AB SERPL-ACNC: 42.2 SEC — HIGH (ref 9.8–12.7)
PROTHROM AB SERPL-ACNC: 44.2 SEC — HIGH (ref 9.8–12.7)
RBC # BLD: 4.13 M/UL — LOW (ref 4.2–5.8)
RBC # FLD: 13.9 % — SIGNIFICANT CHANGE UP (ref 10.3–14.5)
SODIUM SERPL-SCNC: 135 MMOL/L — SIGNIFICANT CHANGE UP (ref 135–145)
SP GR SPEC: 1 — LOW (ref 1.01–1.02)
UROBILINOGEN FLD QL: 1 MG/DL
WBC # BLD: 15.8 K/UL — HIGH (ref 3.8–10.5)
WBC # FLD AUTO: 15.8 K/UL — HIGH (ref 3.8–10.5)

## 2017-06-08 PROCEDURE — 71010: CPT | Mod: 26

## 2017-06-08 PROCEDURE — 93010 ELECTROCARDIOGRAM REPORT: CPT

## 2017-06-08 PROCEDURE — 99285 EMERGENCY DEPT VISIT HI MDM: CPT

## 2017-06-08 RX ORDER — TENOFOVIR DISOPROXIL FUMARATE 300 MG/1
300 TABLET, FILM COATED ORAL DAILY
Qty: 0 | Refills: 0 | Status: DISCONTINUED | OUTPATIENT
Start: 2017-06-09 | End: 2017-06-12

## 2017-06-08 RX ORDER — LOSARTAN POTASSIUM 100 MG/1
100 TABLET, FILM COATED ORAL DAILY
Qty: 0 | Refills: 0 | Status: DISCONTINUED | OUTPATIENT
Start: 2017-06-08 | End: 2017-06-09

## 2017-06-08 RX ORDER — DEXTROSE 50 % IN WATER 50 %
25 SYRINGE (ML) INTRAVENOUS ONCE
Qty: 0 | Refills: 0 | Status: DISCONTINUED | OUTPATIENT
Start: 2017-06-08 | End: 2017-06-12

## 2017-06-08 RX ORDER — INSULIN GLARGINE 100 [IU]/ML
40 INJECTION, SOLUTION SUBCUTANEOUS
Qty: 0 | Refills: 0 | COMMUNITY

## 2017-06-08 RX ORDER — SIMVASTATIN 20 MG/1
10 TABLET, FILM COATED ORAL AT BEDTIME
Qty: 0 | Refills: 0 | Status: DISCONTINUED | OUTPATIENT
Start: 2017-06-08 | End: 2017-06-12

## 2017-06-08 RX ORDER — DOCUSATE SODIUM 100 MG
100 CAPSULE ORAL THREE TIMES A DAY
Qty: 0 | Refills: 0 | Status: DISCONTINUED | OUTPATIENT
Start: 2017-06-08 | End: 2017-06-12

## 2017-06-08 RX ORDER — VANCOMYCIN HCL 1 G
1000 VIAL (EA) INTRAVENOUS ONCE
Qty: 0 | Refills: 0 | Status: COMPLETED | OUTPATIENT
Start: 2017-06-08 | End: 2017-06-08

## 2017-06-08 RX ORDER — INSULIN GLARGINE 100 [IU]/ML
12 INJECTION, SOLUTION SUBCUTANEOUS AT BEDTIME
Qty: 0 | Refills: 0 | Status: DISCONTINUED | OUTPATIENT
Start: 2017-06-08 | End: 2017-06-12

## 2017-06-08 RX ORDER — ACETAMINOPHEN 500 MG
650 TABLET ORAL EVERY 6 HOURS
Qty: 0 | Refills: 0 | Status: DISCONTINUED | OUTPATIENT
Start: 2017-06-08 | End: 2017-06-12

## 2017-06-08 RX ORDER — DEXTROSE 50 % IN WATER 50 %
1 SYRINGE (ML) INTRAVENOUS ONCE
Qty: 0 | Refills: 0 | Status: DISCONTINUED | OUTPATIENT
Start: 2017-06-08 | End: 2017-06-12

## 2017-06-08 RX ORDER — AMIODARONE HYDROCHLORIDE 400 MG/1
100 TABLET ORAL DAILY
Qty: 0 | Refills: 0 | Status: DISCONTINUED | OUTPATIENT
Start: 2017-06-08 | End: 2017-06-12

## 2017-06-08 RX ORDER — SODIUM CHLORIDE 9 MG/ML
1000 INJECTION INTRAMUSCULAR; INTRAVENOUS; SUBCUTANEOUS ONCE
Qty: 0 | Refills: 0 | Status: COMPLETED | OUTPATIENT
Start: 2017-06-08 | End: 2017-06-08

## 2017-06-08 RX ORDER — ONDANSETRON 8 MG/1
4 TABLET, FILM COATED ORAL EVERY 6 HOURS
Qty: 0 | Refills: 0 | Status: DISCONTINUED | OUTPATIENT
Start: 2017-06-08 | End: 2017-06-12

## 2017-06-08 RX ORDER — METOPROLOL TARTRATE 50 MG
50 TABLET ORAL
Qty: 0 | Refills: 0 | Status: DISCONTINUED | OUTPATIENT
Start: 2017-06-08 | End: 2017-06-11

## 2017-06-08 RX ORDER — SODIUM CHLORIDE 9 MG/ML
1000 INJECTION, SOLUTION INTRAVENOUS
Qty: 0 | Refills: 0 | Status: DISCONTINUED | OUTPATIENT
Start: 2017-06-08 | End: 2017-06-12

## 2017-06-08 RX ORDER — GLUCAGON INJECTION, SOLUTION 0.5 MG/.1ML
1 INJECTION, SOLUTION SUBCUTANEOUS ONCE
Qty: 0 | Refills: 0 | Status: DISCONTINUED | OUTPATIENT
Start: 2017-06-08 | End: 2017-06-12

## 2017-06-08 RX ORDER — SENNA PLUS 8.6 MG/1
2 TABLET ORAL AT BEDTIME
Qty: 0 | Refills: 0 | Status: DISCONTINUED | OUTPATIENT
Start: 2017-06-08 | End: 2017-06-12

## 2017-06-08 RX ORDER — MONTELUKAST 4 MG/1
10 TABLET, CHEWABLE ORAL AT BEDTIME
Qty: 0 | Refills: 0 | Status: DISCONTINUED | OUTPATIENT
Start: 2017-06-08 | End: 2017-06-12

## 2017-06-08 RX ORDER — PIPERACILLIN AND TAZOBACTAM 4; .5 G/20ML; G/20ML
3.38 INJECTION, POWDER, LYOPHILIZED, FOR SOLUTION INTRAVENOUS EVERY 8 HOURS
Qty: 0 | Refills: 0 | Status: DISCONTINUED | OUTPATIENT
Start: 2017-06-08 | End: 2017-06-12

## 2017-06-08 RX ORDER — INSULIN LISPRO 100/ML
VIAL (ML) SUBCUTANEOUS
Qty: 0 | Refills: 0 | Status: DISCONTINUED | OUTPATIENT
Start: 2017-06-08 | End: 2017-06-12

## 2017-06-08 RX ORDER — PIPERACILLIN AND TAZOBACTAM 4; .5 G/20ML; G/20ML
3.38 INJECTION, POWDER, LYOPHILIZED, FOR SOLUTION INTRAVENOUS ONCE
Qty: 0 | Refills: 0 | Status: COMPLETED | OUTPATIENT
Start: 2017-06-08 | End: 2017-06-08

## 2017-06-08 RX ORDER — IPRATROPIUM/ALBUTEROL SULFATE 18-103MCG
3 AEROSOL WITH ADAPTER (GRAM) INHALATION EVERY 6 HOURS
Qty: 0 | Refills: 0 | Status: DISCONTINUED | OUTPATIENT
Start: 2017-06-08 | End: 2017-06-12

## 2017-06-08 RX ORDER — INSULIN GLARGINE 100 [IU]/ML
20 INJECTION, SOLUTION SUBCUTANEOUS AT BEDTIME
Qty: 0 | Refills: 0 | Status: DISCONTINUED | OUTPATIENT
Start: 2017-06-08 | End: 2017-06-08

## 2017-06-08 RX ORDER — GABAPENTIN 400 MG/1
300 CAPSULE ORAL THREE TIMES A DAY
Qty: 0 | Refills: 0 | Status: DISCONTINUED | OUTPATIENT
Start: 2017-06-08 | End: 2017-06-12

## 2017-06-08 RX ORDER — DEXTROSE 50 % IN WATER 50 %
12.5 SYRINGE (ML) INTRAVENOUS ONCE
Qty: 0 | Refills: 0 | Status: DISCONTINUED | OUTPATIENT
Start: 2017-06-08 | End: 2017-06-12

## 2017-06-08 RX ADMIN — PIPERACILLIN AND TAZOBACTAM 25 GRAM(S): 4; .5 INJECTION, POWDER, LYOPHILIZED, FOR SOLUTION INTRAVENOUS at 23:01

## 2017-06-08 RX ADMIN — SODIUM CHLORIDE 4000 MILLILITER(S): 9 INJECTION INTRAMUSCULAR; INTRAVENOUS; SUBCUTANEOUS at 12:58

## 2017-06-08 RX ADMIN — GABAPENTIN 300 MILLIGRAM(S): 400 CAPSULE ORAL at 22:47

## 2017-06-08 RX ADMIN — SIMVASTATIN 10 MILLIGRAM(S): 20 TABLET, FILM COATED ORAL at 22:46

## 2017-06-08 RX ADMIN — Medication 50 MILLIGRAM(S): at 22:47

## 2017-06-08 RX ADMIN — SODIUM CHLORIDE 3000 MILLILITER(S): 9 INJECTION INTRAMUSCULAR; INTRAVENOUS; SUBCUTANEOUS at 13:00

## 2017-06-08 RX ADMIN — Medication 250 MILLIGRAM(S): at 16:08

## 2017-06-08 RX ADMIN — PIPERACILLIN AND TAZOBACTAM 200 GRAM(S): 4; .5 INJECTION, POWDER, LYOPHILIZED, FOR SOLUTION INTRAVENOUS at 12:58

## 2017-06-08 RX ADMIN — SODIUM CHLORIDE 3000 MILLILITER(S): 9 INJECTION INTRAMUSCULAR; INTRAVENOUS; SUBCUTANEOUS at 12:34

## 2017-06-08 RX ADMIN — INSULIN GLARGINE 12 UNIT(S): 100 INJECTION, SOLUTION SUBCUTANEOUS at 22:47

## 2017-06-08 RX ADMIN — MONTELUKAST 10 MILLIGRAM(S): 4 TABLET, CHEWABLE ORAL at 22:47

## 2017-06-08 RX ADMIN — Medication 650 MILLIGRAM(S): at 19:55

## 2017-06-08 NOTE — H&P ADULT - PROBLEM SELECTOR PLAN 1
~admit to Medicine  ~cont. supplemental O2  ~f/u w/ Pulmonary  ~etiology unclear bacterial vs. viral  ~cont. IV abx  ~f/u w/ ID   ~f/u PAN C+S  ~cont. anti-pyretics  ~cont. anti-tussives

## 2017-06-08 NOTE — H&P ADULT - NSHPPHYSICALEXAM_GEN_ALL_CORE
Vital Signs Last 24 Hrs  T(C): 37.7, Max: 38.8 (06-08 @ 12:36)  T(F): 99.9, Max: 101.8 (06-08 @ 12:36)  HR: 100 (95 - 100)  BP: 116/88 (116/88 - 135/81)  RR: 18 (18 - 20)  SpO2: 94% (94% - 99%)

## 2017-06-08 NOTE — ED PROVIDER NOTE - OBJECTIVE STATEMENT
76 y/o male with PMHx of Afib, DM, Lymphoma, CHF, HTN presents to the ED with wife c/o cough x1 week and fever x1day. Pt states that the cough has been worsening. Currently pt has no other complaints at this time and denies HA or neck pain. no chest pain or sob. no abd pain. no n/v/d. no urinary f/u/d. no back pain. no motor or sensory deficits. denies drug use. no recent travel. no rash. no other acute issues symptoms or concerns

## 2017-06-08 NOTE — ED PROVIDER NOTE - NEUROLOGICAL, MLM
CN II - XII intact, EOMI, PERRL, no papilledema, 5/5 muscle strength x 4 extremities, no sensory deficits, 2+ dtr globally, negative babinski, no ataxic gait, normal CLOTILDE and FNT, normal romberg

## 2017-06-08 NOTE — ED ADULT NURSE REASSESSMENT NOTE - NS ED NURSE REASSESS COMMENT FT1
Pt alert and oriented. Resting on stretcher. VS as charted. Family at bedside. 2L NC placed on pt as per hospitalist. No acute distress noted. Pt febrile, pt medicated as per orders. Report given to Yana STOCK.

## 2017-06-08 NOTE — H&P ADULT - PROBLEM SELECTOR PLAN 5
~FS qAC/HS  ~cont. Basal/Bolus insulin regimen ~FS qAC/HS  ~cont. Basal/Bolus insulin regimen  ~advance Basal rate as appropriate ~cont. Losartan 100mg po daily

## 2017-06-08 NOTE — H&P ADULT - ASSESSMENT
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 Of note the patient was admitted from 5/10 - 5/16/17 when he was treated for acute hypoxic respiratory failure due to pneumonia. In the ED the patietn was given Zosyn 3.375g IV x 1, Vancomycin 1g IV x 1. Labs -> WBC 15.8, Hgb/Hct 12.6/38.1, , Blasts 1%, INR 3.8, LA 2.3 -> 0.8, . 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 The patient denies any recent sick contacts, or denies recent travel. Of note the patient was admitted from 5/10 - 5/16/17 when he was treated for acute hypoxic respiratory failure due to pneumonia. In the ED the patietn was given Zosyn 3.375g IV x 1, Vancomycin 1g IV x 1. Labs -> WBC 15.8, Hgb/Hct 12.6/38.1, , Blasts 1%, INR 3.8, LA 2.3 -> 0.8, .

## 2017-06-08 NOTE — H&P ADULT - PROBLEM SELECTOR PLAN 4
~cont. Losartan 100mg po daily ~INR is 3.8  ~will hold tonight's dose  ~please followup repeat INR on 6/9 and dose appropriately  ~f/u w/ anticoagulation services

## 2017-06-08 NOTE — PATIENT PROFILE ADULT. - PMH
Atrial fibrillation, unspecified type    B-cell lymphoma, unspecified B-cell lymphoma type, unspecified body region  CLL stage 4/SLL; met NHL  Chronic diastolic congestive heart failure    COPD (chronic obstructive pulmonary disease)    DM type 2 (diabetes mellitus, type 2)    Essential hypertension    Hepatitis B virus infection, unspecified chronicity  retrovirals  Hypogammaglobulinemia  received IVIG

## 2017-06-08 NOTE — H&P ADULT - PROBLEM SELECTOR PLAN 6
~cont. Tenofovir 300mg po daily ~FS qAC/HS  ~cont. Basal/Bolus insulin regimen  ~advance Basal rate as appropriate

## 2017-06-08 NOTE — ED PROVIDER NOTE - NS ED MD SCRIBE ATTENDING SCRIBE SECTIONS
PROGRESS NOTE/RESULTS/DISPOSITION/REVIEW OF SYSTEMS/PAST MEDICAL/SURGICAL/SOCIAL HISTORY/PHYSICAL EXAM/HISTORY OF PRESENT ILLNESS

## 2017-06-08 NOTE — ED ADULT NURSE NOTE - ED STAT RN HANDOFF DETAILS
Pt alert and oriented. VS as charted. No acute distress noted at this time. Family at bedside. Report given to Yana STOCK.

## 2017-06-08 NOTE — H&P ADULT - HISTORY OF PRESENT ILLNESS
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 Of note the patient was admitted from 5/10 - 5/16/17 when he was treated for acute hypoxic respiratory failure due to pneumonia. In the ED the patietn was given Zosyn 3.375g IV x 1, Vancomycin 1g IV x 1. Labs -> WBC 15.8, Hgb/Hct 12.6/38.1, , Blasts 1%, INR 3.8, LA 2.3 -> 0.8, . 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 The patient denies any recent sick contacts, or denies recent travel. Of note the patient was admitted from 5/10 - 5/16/17 when he was treated for acute hypoxic respiratory failure due to pneumonia. In the ED the patietn was given Zosyn 3.375g IV x 1, Vancomycin 1g IV x 1. Labs -> WBC 15.8, Hgb/Hct 12.6/38.1, , Blasts 1%, INR 3.8, LA 2.3 -> 0.8, . 78 y/o M PMHx significant for Atrial fibrillation (on VKA therapy), B-cell CLL stage 4/SLL not currently on any treatment, hx of hypogammaglobulinemia s/p failed IVIG infusions 2/2 to immediate type hypersensitivity reactions, chronic diastolic congestive heart failure (LVEF 55-60% in ), COPD, DM type 2, Essential hypertension, Hepatitis B, hx of PE/DVT, and recurrent PNA who presents to the ED for further evaluation of progressively worsening cough over the past week, and notable intermittent subjective fevers today Tmax in the ED was 101.8'F). The patient denies any recent sick contacts, or denies recent travel. Of note the patient was admitted from 5/10 - 5/16/17 when he was treated for acute hypoxic respiratory failure due to pneumonia. In the ED the patient was given Zosyn 3.375g IV x 1, Vancomycin 1g IV x 1. Labs -> WBC 15.8, Hgb/Hct 12.6/38.1, , Blasts 1%, INR 3.8, LA 2.3 -> 0.8, . 76 y/o M PMHx significant for Atrial fibrillation (on VKA therapy), B-cell CLL stage 4/SLL not currently on any treatment, hx of hypogammaglobulinemia s/p failed IVIG infusions 2/2 to immediate type hypersensitivity reactions, chronic diastolic congestive heart failure (LVEF 55-60% in ), COPD, DM type 2, Essential hypertension, Hepatitis B, hx of PE/DVT, and recurrent PNA who presents to the ED for further evaluation of progressively worsening productive cough over the past week, and notable intermittent subjective fevers today Tmax in the ED was 101.8'F). The patient denies any recent sick contacts, or denies recent travel but states that since discharge his "cough" has not improved "much." Of note the patient was admitted from 5/10 - 5/16/17 when he was treated for acute hypoxic respiratory failure due to pneumonia. In the ED the patient was given Zosyn 3.375g IV x 1, Vancomycin 1g IV x 1. Labs -> WBC 15.8, Hgb/Hct 12.6/38.1, , Blasts 1%, INR 3.8, LA 2.3 -> 0.8, .

## 2017-06-08 NOTE — H&P ADULT - PMH
Atrial fibrillation, unspecified type    B-cell lymphoma, unspecified B-cell lymphoma type, unspecified body region  B cell CLL stage 4/SLL; met NHL  Chronic diastolic congestive heart failure    COPD (chronic obstructive pulmonary disease)  Lymphoma   Atrial fibrillation  DM type 2 (diabetes mellitus, type 2)    Essential hypertension    Hepatitis B virus infection, unspecified chronicity  retrovirals  Hypogammaglobulinemia  received IVIG Atrial fibrillation, unspecified type    B-cell lymphoma, unspecified B-cell lymphoma type, unspecified body region  CLL stage 4/SLL; met NHL  Chronic diastolic congestive heart failure    COPD (chronic obstructive pulmonary disease)    DM type 2 (diabetes mellitus, type 2)    Essential hypertension    Hepatitis B virus infection, unspecified chronicity  retrovirals  Hypogammaglobulinemia  received IVIG

## 2017-06-08 NOTE — ED ADULT NURSE REASSESSMENT NOTE - NS ED NURSE REASSESS COMMENT FT1
Dr. Subramanian made aware of temp of 100.4, new orders acknowledged and carried out Dr. Subramanian made aware of temp of 100.4

## 2017-06-09 ENCOUNTER — APPOINTMENT (OUTPATIENT)
Dept: CARDIOLOGY | Facility: CLINIC | Age: 77
End: 2017-06-09

## 2017-06-09 DIAGNOSIS — D80.1 NONFAMILIAL HYPOGAMMAGLOBULINEMIA: ICD-10-CM

## 2017-06-09 LAB
ALBUMIN SERPL ELPH-MCNC: 3.3 G/DL — SIGNIFICANT CHANGE UP (ref 3.3–5)
ALP SERPL-CCNC: 104 U/L — SIGNIFICANT CHANGE UP (ref 40–120)
ALT FLD-CCNC: 18 U/L — SIGNIFICANT CHANGE UP (ref 12–78)
ANION GAP SERPL CALC-SCNC: 7 MMOL/L — SIGNIFICANT CHANGE UP (ref 5–17)
ANISOCYTOSIS BLD QL: SLIGHT — SIGNIFICANT CHANGE UP
AST SERPL-CCNC: 15 U/L — SIGNIFICANT CHANGE UP (ref 15–37)
BASOPHILS # BLD AUTO: 0.3 K/UL — HIGH (ref 0–0.2)
BASOPHILS NFR BLD AUTO: 1.7 % — SIGNIFICANT CHANGE UP (ref 0–2)
BILIRUB SERPL-MCNC: 0.7 MG/DL — SIGNIFICANT CHANGE UP (ref 0.2–1.2)
BUN SERPL-MCNC: 14 MG/DL — SIGNIFICANT CHANGE UP (ref 7–23)
CALCIUM SERPL-MCNC: 8.2 MG/DL — LOW (ref 8.5–10.1)
CHLORIDE SERPL-SCNC: 107 MMOL/L — SIGNIFICANT CHANGE UP (ref 96–108)
CO2 SERPL-SCNC: 27 MMOL/L — SIGNIFICANT CHANGE UP (ref 22–31)
CREAT SERPL-MCNC: 1.16 MG/DL — SIGNIFICANT CHANGE UP (ref 0.5–1.3)
CULTURE RESULTS: NO GROWTH — SIGNIFICANT CHANGE UP
EOSINOPHIL # BLD AUTO: 0.7 K/UL — HIGH (ref 0–0.5)
EOSINOPHIL NFR BLD AUTO: 4 % — SIGNIFICANT CHANGE UP (ref 0–6)
GLUCOSE SERPL-MCNC: 126 MG/DL — HIGH (ref 70–99)
HBA1C BLD-MCNC: 9.2 % — HIGH (ref 4–5.6)
HCT VFR BLD CALC: 37.4 % — LOW (ref 39–50)
HGB BLD-MCNC: 12.8 G/DL — LOW (ref 13–17)
INR BLD: 2.69 RATIO — HIGH (ref 0.88–1.16)
INR BLD: 3.27 RATIO — HIGH (ref 0.88–1.16)
LYMPHOCYTES # BLD AUTO: 55 % — HIGH (ref 13–44)
LYMPHOCYTES # BLD AUTO: 8 K/UL — HIGH (ref 1–3.3)
MAGNESIUM SERPL-MCNC: 2.1 MG/DL — SIGNIFICANT CHANGE UP (ref 1.6–2.6)
MANUAL DIF COMMENT BLD-IMP: SIGNIFICANT CHANGE UP
MCHC RBC-ENTMCNC: 31.1 PG — SIGNIFICANT CHANGE UP (ref 27–34)
MCHC RBC-ENTMCNC: 34.2 GM/DL — SIGNIFICANT CHANGE UP (ref 32–36)
MCV RBC AUTO: 90.8 FL — SIGNIFICANT CHANGE UP (ref 80–100)
MONOCYTES # BLD AUTO: 1.4 K/UL — HIGH (ref 0–0.9)
MONOCYTES NFR BLD AUTO: 7 % — SIGNIFICANT CHANGE UP (ref 2–14)
NEUTROPHILS # BLD AUTO: 5.3 K/UL — SIGNIFICANT CHANGE UP (ref 1.8–7.4)
NEUTROPHILS NFR BLD AUTO: 33 % — LOW (ref 43–77)
NEUTS BAND # BLD: 1 % — SIGNIFICANT CHANGE UP (ref 0–8)
OVALOCYTES BLD QL SMEAR: SLIGHT — SIGNIFICANT CHANGE UP
PLAT MORPH BLD: NORMAL — SIGNIFICANT CHANGE UP
PLATELET # BLD AUTO: 111 K/UL — LOW (ref 150–400)
POIKILOCYTOSIS BLD QL AUTO: SLIGHT — SIGNIFICANT CHANGE UP
POLYCHROMASIA BLD QL SMEAR: SLIGHT — SIGNIFICANT CHANGE UP
POTASSIUM SERPL-MCNC: 3.9 MMOL/L — SIGNIFICANT CHANGE UP (ref 3.5–5.3)
POTASSIUM SERPL-SCNC: 3.9 MMOL/L — SIGNIFICANT CHANGE UP (ref 3.5–5.3)
PROT SERPL-MCNC: 5.9 GM/DL — LOW (ref 6–8.3)
PROTHROM AB SERPL-ACNC: 29.7 SEC — HIGH (ref 9.8–12.7)
PROTHROM AB SERPL-ACNC: 36.2 SEC — HIGH (ref 9.8–12.7)
RBC # BLD: 4.12 M/UL — LOW (ref 4.2–5.8)
RBC # FLD: 13.8 % — SIGNIFICANT CHANGE UP (ref 10.3–14.5)
RBC BLD AUTO: SIGNIFICANT CHANGE UP
SODIUM SERPL-SCNC: 141 MMOL/L — SIGNIFICANT CHANGE UP (ref 135–145)
SPECIMEN SOURCE: SIGNIFICANT CHANGE UP
WBC # BLD: 15.7 K/UL — HIGH (ref 3.8–10.5)
WBC # FLD AUTO: 15.7 K/UL — HIGH (ref 3.8–10.5)

## 2017-06-09 PROCEDURE — 71250 CT THORAX DX C-: CPT | Mod: 26

## 2017-06-09 PROCEDURE — 70486 CT MAXILLOFACIAL W/O DYE: CPT | Mod: 26

## 2017-06-09 PROCEDURE — 99223 1ST HOSP IP/OBS HIGH 75: CPT

## 2017-06-09 RX ORDER — LOSARTAN POTASSIUM 100 MG/1
50 TABLET, FILM COATED ORAL DAILY
Qty: 0 | Refills: 0 | Status: DISCONTINUED | OUTPATIENT
Start: 2017-06-10 | End: 2017-06-11

## 2017-06-09 RX ORDER — IPRATROPIUM/ALBUTEROL SULFATE 18-103MCG
3 AEROSOL WITH ADAPTER (GRAM) INHALATION ONCE
Qty: 0 | Refills: 0 | Status: COMPLETED | OUTPATIENT
Start: 2017-06-09 | End: 2017-06-09

## 2017-06-09 RX ADMIN — Medication 2: at 17:17

## 2017-06-09 RX ADMIN — AMIODARONE HYDROCHLORIDE 100 MILLIGRAM(S): 400 TABLET ORAL at 06:15

## 2017-06-09 RX ADMIN — GABAPENTIN 300 MILLIGRAM(S): 400 CAPSULE ORAL at 14:56

## 2017-06-09 RX ADMIN — Medication 1 TABLET(S): at 11:33

## 2017-06-09 RX ADMIN — Medication 3: at 12:10

## 2017-06-09 RX ADMIN — Medication 50 MILLIGRAM(S): at 18:20

## 2017-06-09 RX ADMIN — PIPERACILLIN AND TAZOBACTAM 25 GRAM(S): 4; .5 INJECTION, POWDER, LYOPHILIZED, FOR SOLUTION INTRAVENOUS at 06:18

## 2017-06-09 RX ADMIN — TENOFOVIR DISOPROXIL FUMARATE 300 MILLIGRAM(S): 300 TABLET, FILM COATED ORAL at 12:12

## 2017-06-09 RX ADMIN — SIMVASTATIN 10 MILLIGRAM(S): 20 TABLET, FILM COATED ORAL at 22:30

## 2017-06-09 RX ADMIN — PIPERACILLIN AND TAZOBACTAM 25 GRAM(S): 4; .5 INJECTION, POWDER, LYOPHILIZED, FOR SOLUTION INTRAVENOUS at 14:56

## 2017-06-09 RX ADMIN — Medication 200 MILLIGRAM(S): at 14:56

## 2017-06-09 RX ADMIN — LOSARTAN POTASSIUM 100 MILLIGRAM(S): 100 TABLET, FILM COATED ORAL at 06:18

## 2017-06-09 RX ADMIN — MONTELUKAST 10 MILLIGRAM(S): 4 TABLET, CHEWABLE ORAL at 22:30

## 2017-06-09 RX ADMIN — Medication 200 MILLIGRAM(S): at 18:19

## 2017-06-09 RX ADMIN — GABAPENTIN 300 MILLIGRAM(S): 400 CAPSULE ORAL at 06:18

## 2017-06-09 RX ADMIN — INSULIN GLARGINE 12 UNIT(S): 100 INJECTION, SOLUTION SUBCUTANEOUS at 22:30

## 2017-06-09 RX ADMIN — GABAPENTIN 300 MILLIGRAM(S): 400 CAPSULE ORAL at 22:30

## 2017-06-09 RX ADMIN — PIPERACILLIN AND TAZOBACTAM 25 GRAM(S): 4; .5 INJECTION, POWDER, LYOPHILIZED, FOR SOLUTION INTRAVENOUS at 22:30

## 2017-06-09 NOTE — CONSULT NOTE ADULT - ASSESSMENT
78 y/o M PMHx significant for Atrial fibrillation (on VKA therapy), B-cell CLL stage 4/SLL not currently on any treatment, hx of hypogammaglobulinemia s/p failed IVIG infusions 2/2 to immediate type hypersensitivity reactions, chronic diastolic congestive heart failure (LVEF 55-60% in ), COPD, DM type 2, Essential hypertension, Hepatitis B, hx of PE/DVT, and recurrent PNA who presents admitted 6/8 with progressively worsening productive cough over the past week with fevers with subjective fevers, patient recently admitted from 5/10 - 5/16/17 when he was treated for acute hypoxic respiratory failure/pna, in ER pt febrile to 101.8, wbc ct 15, xray with RLL PNA but improved since prior imaging 5/16, pt reports last IVIG 1-2 months ago.     1. sepsis/RLL PNA/HCAP/immunocompromised host/CHRISTINA    - agree with IV zosyn 3.375q8h for resistant gram (-)/anaroebic coverage, #2    - f/u cultures    - onc eval for possible IVIG ?    - monitor temps    - on viread for hep b treatment    - f/u cbc    - supportive care    - -tolerating abx well so far; no side effects noted    -reason for abx use and side effects reviewed with patient    2. 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

## 2017-06-09 NOTE — CONSULT NOTE ADULT - SUBJECTIVE AND OBJECTIVE BOX
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, hx of hypogammaglobulinemia s/p failed IVIG infusions 2/2 to immediate type hypersensitivity reactions, chronic diastolic congestive heart failure (LVEF 55-60% in ), COPD, DM type 2, Essential hypertension, Hepatitis B, hx of PE/DVT, and recurrent PNA who presents admitted  with progressively worsening productive cough over the past week with fevers with subjective fevers, patient recently admitted from 5/10 - 17 when he was treated for acute hypoxic respiratory failure/pna, in ER pt febrile to 101.8, wbc ct 15, xray with RLL PNA but improved since prior imaging , pt reports last IVIG 1-2 months ago.       PMH: as above    PSH: as above    Meds: per reconciliation sheet, noted below    MEDICATIONS  (STANDING):  multivitamin 1Tablet(s) Oral daily  montelukast 10milliGRAM(s) Oral at bedtime  metoprolol 50milliGRAM(s) Oral two times a day  simvastatin 10milliGRAM(s) Oral at bedtime  losartan 100milliGRAM(s) Oral daily  amiodarone    Tablet 100milliGRAM(s) Oral daily  gabapentin 300milliGRAM(s) Oral three times a day  tenofovir 300milliGRAM(s) Oral daily  insulin lispro (HumaLOG) corrective regimen sliding scale  SubCutaneous three times a day before meals  dextrose 5%. 1000milliLiter(s) IV Continuous <Continuous>  dextrose 50% Injectable 12.5Gram(s) IV Push once  dextrose 50% Injectable 25Gram(s) IV Push once  dextrose 50% Injectable 25Gram(s) IV Push once  piperacillin/tazobactam IVPB. 3.375Gram(s) IV Intermittent every 8 hours  insulin glargine Injectable (LANTUS) 12Unit(s) SubCutaneous at bedtime  ALBUTerol/ipratropium for Nebulization. 3milliLiter(s) Nebulizer once      Allergies    Privigen (Other (Severe))    Intolerances        Social: no smoking, no alcohol, no illegal drugs; no recent travel, no exposure to TB    Family history:  Family history of liver cancer (Father): father  85 liver CA  Family history of coronary arteriosclerosis (Mother): Mom  of MI age 68      ROS:  no HA, no dizziness, no sore throat, no blurry vision, no CP, no palpitations, no SOB, no cough, no abdominal pain, no diarrhea, no N/V, no dysuria, no leg pain, no claudication, no rash, no joint aches, no rectal pain or bleeding, no night sweats    Vital Signs Last 24 Hrs  T(C): 36.9, Max: 38.8 ( @ 12:36)  T(F): 98.4, Max: 101.8 ( @ 12:36)  HR: 92 (92 - 107)  BP: 96/59 (96/59 - 139/67)  BP(mean): --  RR: 18 (17 - 20)  SpO2: 94% (94% - 100%)      PE:  Constitutional: frail looking  HEENT: NC/AT, EOMI, PERRLA  Neck: supple  Back: no tenderness  Respiratory: scattered rhonchi R lung base, decreased breath sounds  Cardiovascular: S1S2 regular, no murmurs  Abdomen: soft, not tender, not distended, positive BS  Genitourinary: deferred  Rectal: deferred  Musculoskeletal: no muscle tenderness, no joint swelling or tenderness  Extremities: no pedal edema  Neurological:  no focal deficits  Skin: no rashes    Labs:                        12.8   15.7  )-----------( 111      ( 2017 05:39 )             37.4         141  |  107  |  14  ----------------------------<  126<H>  3.9   |  27  |  1.16    Ca    8.2<L>      2017 05:39  Mg     2.1         TPro  5.9<L>  /  Alb  3.3  /  TBili  0.7  /  DBili  x   /  AST  15  /  ALT  18  /  AlkPhos  104       LIVER FUNCTIONS - ( 2017 05:39 )  Alb: 3.3 g/dL / Pro: 5.9 gm/dL / ALK PHOS: 104 U/L / ALT: 18 U/L / AST: 15 U/L / GGT: x           Urinalysis Basic - ( 2017 12:16 )    Color: Yellow / Appearance: Clear / S.005 / pH: x  Gluc: x / Ketone: Negative  / Bili: Negative / Urobili: 1 mg/dL   Blood: x / Protein: 30 mg/dL / Nitrite: Negative   Leuk Esterase: Negative / RBC: x / WBC 0-2   Sq Epi: x / Non Sq Epi: x / Bacteria: Occasional            Radiology: EXAM:  CHEST SINGLE VIEW FRONTAL                            PROCEDURE DATE:  2017        INTERPRETATION:  Clinical Indication: Cough. Shortness of breath.    Technique: Single Frontal view of the chest    Comparison: May 16, 2017    Findings/  IMPRESSION:      Mild interval improvement in the previously visualized patchy airspace   opacity at the right lung base. Lungs are otherwise clear. No pleural   effusions. Cardiomediastinal silhouette is indeterminate in size due to   technique. Bones are stable. Chronic right humeral neck fracture.    Advanced directives addressed: full resuscitation

## 2017-06-09 NOTE — CONSULT NOTE ADULT - SUBJECTIVE AND OBJECTIVE BOX
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 The patient denies any recent sick contacts, or denies recent travel but states that since discharge his "cough" has not improved "much." Of note the patient was admitted from 5/10 - 17 when he was treated for acute hypoxic respiratory failure due to pneumonia.     Patient familiar to our service- have been managing coumadin as outpatient. Usual dose is 3.75mg daily except 2.5mg on 's.  INR's have been relatively stable. Currently supratherapeutic in setting of acute infection (pneumonia) and antibiotic treatment with Zosyn.      Patient is a 77y old  Male who presents with a chief complaint of coughing history of pneumonia fever for 4 days and worse cough (2017 21:44)      Consulted by Dr. Subramanian for VTE prophylaxis, risk stratification, and anticoagulation management.    PAST MEDICAL & SURGICAL HISTORY:  Hypogammaglobulinemia: received IVIG  Hepatitis B virus infection, unspecified chronicity: retrovirals  Chronic diastolic congestive heart failure  Essential hypertension  DM type 2 (diabetes mellitus, type 2)  B-cell lymphoma, unspecified B-cell lymphoma type, unspecified body region: CLL stage 4/SLL; met NHL  NHL (non-Hodgkin&#x27;s lymphoma)  Lymphoma, unspecified body region, unspecified lymphoma type  Atrial fibrillation, unspecified type  COPD (chronic obstructive pulmonary disease)  History of esophageal dilatation  S/P cholecystectomy  H/O prostatectomy      BMI:  29.3    CrCl: 39.7    [id number] Score: 6 (high)    IMPROVE Bleeding Risk Score: 4.5 (mod/high)    Falls Risk:   High (  )  Mod (  )  Low ( x )    : Patient seen walking in hallway with RN. No obvious FRANZ. Discussed Coumadin and INR elevated at 3.27. Currently on Zosyn IV- explained that this may impact INR and can elevate. Will hold coumadin. Spoke to wife in room and explained INR and coumadin being held.      FAMILY HISTORY:  Family history of liver cancer (Father): father  85 liver CA  Family history of coronary arteriosclerosis (Mother): Mom  of MI age 68    Denies any personal or familial history of clotting or bleeding disorders.    Allergies    Privigen (Other (Severe))    Intolerances      REVIEW OF SYSTEMS    (  )Fever	     (  )Constipation	(  )SOB				(  )Headache	(  )Dysuria  (  )Chills	     (  )Melena	(  )Dyspnea present on exertion	                    (  )Dizziness                    (  )Polyuria  (  )Nausea	     (  )Hematochezia	(  )Cough			                    (  )Syncope   	(  )Hematuria  (  )Vomiting    (  )Chest Pain	(  )Wheezing			(  )Weakness  (  )Diarrhea     (  )Palpitations	(  )Anorexia			(  )Myalgia    All other review of systems negative: Yes    Unable to obtain review of systems due to:      PHYSICAL EXAM:    Constitutional: Appears Well    Neurological: A& O x 3    Skin: Warm    Respiratory and Thorax: normal effort; Breath sounds: decreased  	  Cardiovascular: S1, S2, regular, NMBR	    Gastrointestinal: BS + x 4Q, nontender	    Genitourinary:  Bladder nondistended, nontender    Musculoskeletal:   General Right:   no muscle/joint tenderness,   normal tone, no joint swelling,   ROM: full	    General Left:   no muscle/joint tenderness,   normal tone, no joint swelling,   ROM: full      Lower extrems:   Right: no calf tenderness              negative adrianna's sign               + pedal pulses    Left:   no calf tenderness              negative adrianna's sign               + pedal pulses                          12.8   15.7  )-----------( 111      ( 2017 05:39 )             37.4           141  |  107  |  14  ----------------------------<  126<H>  3.9   |  27  |  1.16    Ca    8.2<L>      2017 05:39  Mg     2.1         TPro  5.9<L>  /  Alb  3.3  /  TBili  0.7  /  DBili  x   /  AST  15  /  ALT  18  /  AlkPhos  104  06-      PT/INR - ( 2017 05:39 )   PT: 36.2 sec;   INR: 3.27 ratio         PTT - ( 2017 12:16 )  PTT:34.8 sec				    MEDICATIONS  (STANDING):  multivitamin 1Tablet(s) Oral daily  montelukast 10milliGRAM(s) Oral at bedtime  metoprolol 50milliGRAM(s) Oral two times a day  simvastatin 10milliGRAM(s) Oral at bedtime  losartan 100milliGRAM(s) Oral daily  amiodarone    Tablet 100milliGRAM(s) Oral daily  gabapentin 300milliGRAM(s) Oral three times a day  tenofovir 300milliGRAM(s) Oral daily  insulin lispro (HumaLOG) corrective regimen sliding scale  SubCutaneous three times a day before meals  dextrose 5%. 1000milliLiter(s) IV Continuous <Continuous>  dextrose 50% Injectable 12.5Gram(s) IV Push once  dextrose 50% Injectable 25Gram(s) IV Push once  dextrose 50% Injectable 25Gram(s) IV Push once  piperacillin/tazobactam IVPB. 3.375Gram(s) IV Intermittent every 8 hours  insulin glargine Injectable (LANTUS) 12Unit(s) SubCutaneous at bedtime  guaiFENesin    Syrup 200milliGRAM(s) Oral every 6 hours      Vital Signs Last 24 Hrs  T(C): 36.8, Max: 38.7 (06-08 @ 19:45)  T(F): 98.3, Max: 101.6 (06-08 @ 19:45)  HR: 91 (91 - 107)  BP: 110/61 (96/59 - 139/67)  BP(mean): --  RR: 18 (17 - 20)  SpO2: 96% (94% - 100%)    DVT Prophylaxis:  LMWH                   (  )  Heparin SQ           (  )  Coumadin             (hold  )  Xarelto                  (  )  Eliquis                   (  )  Venodynes           (x  )  Ambulation          ( x )  UFH                       (  )  Contraindicated  (  )

## 2017-06-09 NOTE — CONSULT NOTE ADULT - SUBJECTIVE AND OBJECTIVE BOX
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 The patient denies any recent sick contacts, or denies recent travel but states that since discharge his "cough" has not improved "much." Of note the patient was admitted from 5/10 - 17 when he was treated for acute hypoxic respiratory failure due to pneumonia. In the ED the patient was given Zosyn 3.375g IV x 1, Vancomycin 1g IV x 1. Labs -> WBC 15.8, Hgb/Hct 12.6/38.1, , Blasts 1%, INR 3.8, LA 2.3 -> 0.8, . (2017 17:59)  pt is seen and examined by RN staff and Dr Boss at bedside  feels better today  no hemoptysis  last IV IG 2 months ago    PAST MEDICAL & SURGICAL HISTORY:  Hypogammaglobulinemia: received IVIG  Hepatitis B virus infection, unspecified chronicity: retrovirals  Chronic diastolic congestive heart failure  Essential hypertension  DM type 2 (diabetes mellitus, type 2)  B-cell lymphoma, unspecified B-cell lymphoma type, unspecified body region: CLL stage 4/SLL; met NHL  NHL (non-Hodgkin&#x27;s lymphoma)  Lymphoma, unspecified body region, unspecified lymphoma type  Atrial fibrillation, unspecified type  COPD (chronic obstructive pulmonary disease)  History of esophageal dilatation  S/P cholecystectomy  H/O prostatectomy      PREVIOUS DIAGNOSTIC TESTING:      MEDICATIONS  (STANDING):  multivitamin 1Tablet(s) Oral daily  montelukast 10milliGRAM(s) Oral at bedtime  metoprolol 50milliGRAM(s) Oral two times a day  simvastatin 10milliGRAM(s) Oral at bedtime  losartan 100milliGRAM(s) Oral daily  amiodarone    Tablet 100milliGRAM(s) Oral daily  gabapentin 300milliGRAM(s) Oral three times a day  tenofovir 300milliGRAM(s) Oral daily  insulin lispro (HumaLOG) corrective regimen sliding scale  SubCutaneous three times a day before meals  dextrose 5%. 1000milliLiter(s) IV Continuous <Continuous>  dextrose 50% Injectable 12.5Gram(s) IV Push once  dextrose 50% Injectable 25Gram(s) IV Push once  dextrose 50% Injectable 25Gram(s) IV Push once  piperacillin/tazobactam IVPB. 3.375Gram(s) IV Intermittent every 8 hours  insulin glargine Injectable (LANTUS) 12Unit(s) SubCutaneous at bedtime    MEDICATIONS  (PRN):  acetaminophen   Tablet 650milliGRAM(s) Oral every 6 hours PRN For Temp greater than 38 C (100.4 F)  acetaminophen   Tablet. 650milliGRAM(s) Oral every 6 hours PRN Mild Pain (1 - 3)  ondansetron Injectable 4milliGRAM(s) IV Push every 6 hours PRN Nausea  senna 2Tablet(s) Oral at bedtime PRN Constipation  docusate sodium 100milliGRAM(s) Oral three times a day PRN Constipation  dextrose Gel 1Dose(s) Oral once PRN Blood Glucose LESS THAN 70 milliGRAM(s)/deciliter  glucagon  Injectable 1milliGRAM(s) IntraMuscular once PRN Glucose LESS THAN 70 milligrams/deciliter  guaiFENesin/dextromethorphan  Syrup 10milliLiter(s) Oral every 6 hours PRN Cough  ALBUTerol/ipratropium for Nebulization 3milliLiter(s) Nebulizer every 6 hours PRN Shortness of Breath and/or Wheezing      FAMILY HISTORY:  Family history of liver cancer (Father): father  85 liver CA  Family history of coronary arteriosclerosis (Mother): Mom  of MI age 68      SOCIAL HISTORY:  ***    REVIEW OF SYSTEM:  Pertinent items are noted in HPI.  Constitutional negative for chills,pos  fevers, sweats and weight loss  throat, and face:  negative for epistaxis, nasal congestion, sore throat and   tinnitus  Respiratory:pos for cough, dyspnea on exertion, pleuritic chest pain  and wheezing  Cardiovascular:  negative for chest pain, dyspnea and palpitations  Gastrointestinal: negative for abdominal pain, diarrhea, nausea and vomiting  Genitourinary: negative for dysuria, frequency and urinary incontinence  Skin:  negative for redness, rash, pruritus, swelling, dryness and   fissures  Hematologic/lymphatic: negative for bleeding and easy bruising  Musculoskeletal: negative for arthralgias, back pain and muscle weakness  Neurological: negative for dizziness, headaches, seizures and tremors  Behavioral/Psych:  negative for mood change, depression, suicidal attempts    Allergic/Immunologic: negative for anaphylaxis, angioedema and urticaria    Vital Signs Last 24 Hrs  T(C): 36.9, Max: 38.8 ( @ 12:36)  T(F): 98.4, Max: 101.8 ( @ 12:36)  HR: 92 (92 - 107)  BP: 96/59 (96/59 - 139/67)  BP(mean): --  RR: 18 (17 - 20)  SpO2: 94% (94% - 100%)    I&O's Summary    PHYSICAL EXAM  General Appearance: cooperative, no acute distress,   HEENT: PERRL, conjunctiva clear, EOM's intact, non injected pharynx, no exudate, TM   normal  Neck: Supple, , no adenopathy, thyroid: not enlarged, no carotid bruit or JVD  Back: Symmetric, no  tenderness,no soft tissue tenderness  Lungs: lower lobe rhonchi, mild end exp wheeze  Heart: Regular rate and rhythm, S1, S2 normal, no murmur, rub or gallop  Abdomen: Soft, non-tender, bowel sounds active , no hepatosplenomegaly  Extremities: no cyanosis or edema, no joint swelling  Skin: Skin color, texture normal, no rashes   Neurologic: Alert and oriented X3 , cranial nerves intact, sensory and motor normal,    ECG:    LABS:                          12.8   15.7  )-----------( 111      ( 2017 05:39 )             37.4     -    141  |  107  |  14  ----------------------------<  126<H>  3.9   |  27  |  1.16    Ca    8.2<L>      2017 05:39  Mg     2.1         TPro  5.9<L>  /  Alb  3.3  /  TBili  0.7  /  DBili  x   /  AST  15  /  ALT  18  /  AlkPhos  104  06-09    CARDIAC MARKERS ( 2017 12:16 )  <0.015 ng/mL / x     / x     / x     / x            Pro BNP  445  @ 12:16  D Dimer  --  @ 12:16    PT/INR - ( 2017 05:39 )   PT: 36.2 sec;   INR: 3.27 ratio         PTT - ( 2017 12:16 )  PTT:34.8 sec  Urinalysis Basic - ( 2017 12:16 )    Color: Yellow / Appearance: Clear / S.005 / pH: x  Gluc: x / Ketone: Negative  / Bili: Negative / Urobili: 1 mg/dL   Blood: x / Protein: 30 mg/dL / Nitrite: Negative   Leuk Esterase: Negative / RBC: x / WBC 0-2   Sq Epi: x / Non Sq Epi: x / Bacteria: Occasional        PROCEDURE DATE:  2017        INTERPRETATION:  Clinical Indication: Cough. Shortness of breath.    Technique: Single Frontal view of the chest    Comparison: May 16, 2017    Findings/  IMPRESSION:      Mild interval improvement in the previously visualized patchy airspace   opacity at the right lung base. Lungs are otherwise clear. No pleural   effusions. Cardiomediastinal silhouette is indeterminate in size due to   technique. Bones are stable. Chronic right humeral neck fracture.    RADIOLOGY & ADDITIONAL STUDIES:

## 2017-06-09 NOTE — CONSULT NOTE ADULT - ASSESSMENT
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 The patient denies any recent sick contacts, or denies recent travel but states that since discharge his "cough" has not improved "much." Of note the patient was admitted from 5/10 - 5/16/17 when he was treated for acute hypoxic respiratory failure due to pneumonia. In the ED the patient was given Zosyn 3.375g IV x 1, Vancomycin 1g IV x 1. Labs -> WBC 15.8, Hgb/Hct 12.6/38.1, , Blasts 1%, INR 3.8, LA 2.3 -> 0.8, .    RLL pneumonia  COPD  mild bronchospasm  Immunoglobulin deficiency  needs antibiotics  get ct scan chest  bronchodialtor rx  sputum cx  mobilize OOB

## 2017-06-09 NOTE — DIETITIAN INITIAL EVALUATION ADULT. - OTHER INFO
Nutrition consult for unintentional weight loss, however current weight is patients baseline with no significant changes as per spouse/son. PO intake good ~75% intake noted. PTA followed liberal diet "watched simple carbohydrates, and did not add salt to foods". Labs: HgA1c pending  (-165mg/dl). Skin intact, w/ no edema at this time. Denies any difficulty chewing or swallowing. Primary language [place]- Son at bedside and translated. Diet instruction provided on DASH/TLC diet. Suggest adding Consistent carbohydrate diet w/ no snacks secondary to history of DM. RD contact information provided for further questions and encouraged to order meals at  for optimal intake.

## 2017-06-09 NOTE — CONSULT NOTE ADULT - ASSESSMENT
This is a 77 year old male presenting with pneumonia, history of AF, DVT, PE with [id number] score 6- high risk thrombosis and mod risk bleeding due to cancer- ALL and age.    6/9:  Supratherapeutic INR 3.98 yesterday, dose held, and today 3.27, will hold Coumadin again today.    Plan:  INR 3.27, hold Coumadin tonight 6/9  Daily PT/INR  Daily CBC/BMP  Venodynes  Enc ambulation    Thank you for this consult, will continue to follow.

## 2017-06-10 DIAGNOSIS — C85.10 UNSPECIFIED B-CELL LYMPHOMA, UNSPECIFIED SITE: ICD-10-CM

## 2017-06-10 LAB
ANION GAP SERPL CALC-SCNC: 8 MMOL/L — SIGNIFICANT CHANGE UP (ref 5–17)
BUN SERPL-MCNC: 19 MG/DL — SIGNIFICANT CHANGE UP (ref 7–23)
CALCIUM SERPL-MCNC: 8.6 MG/DL — SIGNIFICANT CHANGE UP (ref 8.5–10.1)
CHLORIDE SERPL-SCNC: 107 MMOL/L — SIGNIFICANT CHANGE UP (ref 96–108)
CO2 SERPL-SCNC: 27 MMOL/L — SIGNIFICANT CHANGE UP (ref 22–31)
CREAT SERPL-MCNC: 1.23 MG/DL — SIGNIFICANT CHANGE UP (ref 0.5–1.3)
GLUCOSE SERPL-MCNC: 147 MG/DL — HIGH (ref 70–99)
HCT VFR BLD CALC: 35.6 % — LOW (ref 39–50)
HGB BLD-MCNC: 12.3 G/DL — LOW (ref 13–17)
INR BLD: 2.17 RATIO — HIGH (ref 0.88–1.16)
MCHC RBC-ENTMCNC: 31.3 PG — SIGNIFICANT CHANGE UP (ref 27–34)
MCHC RBC-ENTMCNC: 34.5 GM/DL — SIGNIFICANT CHANGE UP (ref 32–36)
MCV RBC AUTO: 90.7 FL — SIGNIFICANT CHANGE UP (ref 80–100)
PLATELET # BLD AUTO: 111 K/UL — LOW (ref 150–400)
POTASSIUM SERPL-MCNC: 4 MMOL/L — SIGNIFICANT CHANGE UP (ref 3.5–5.3)
POTASSIUM SERPL-SCNC: 4 MMOL/L — SIGNIFICANT CHANGE UP (ref 3.5–5.3)
PROTHROM AB SERPL-ACNC: 23.8 SEC — HIGH (ref 9.8–12.7)
RBC # BLD: 3.92 M/UL — LOW (ref 4.2–5.8)
RBC # FLD: 13.9 % — SIGNIFICANT CHANGE UP (ref 10.3–14.5)
SODIUM SERPL-SCNC: 142 MMOL/L — SIGNIFICANT CHANGE UP (ref 135–145)
WBC # BLD: 10.7 K/UL — HIGH (ref 3.8–10.5)
WBC # FLD AUTO: 10.7 K/UL — HIGH (ref 3.8–10.5)

## 2017-06-10 PROCEDURE — 99233 SBSQ HOSP IP/OBS HIGH 50: CPT

## 2017-06-10 RX ORDER — WARFARIN SODIUM 2.5 MG/1
2.5 TABLET ORAL ONCE
Qty: 0 | Refills: 0 | Status: COMPLETED | OUTPATIENT
Start: 2017-06-10 | End: 2017-06-10

## 2017-06-10 RX ORDER — INSULIN LISPRO 100/ML
VIAL (ML) SUBCUTANEOUS AT BEDTIME
Qty: 0 | Refills: 0 | Status: DISCONTINUED | OUTPATIENT
Start: 2017-06-10 | End: 2017-06-12

## 2017-06-10 RX ORDER — WARFARIN SODIUM 2.5 MG/1
3.75 TABLET ORAL DAILY
Qty: 0 | Refills: 0 | Status: DISCONTINUED | OUTPATIENT
Start: 2017-06-10 | End: 2017-06-10

## 2017-06-10 RX ADMIN — GABAPENTIN 300 MILLIGRAM(S): 400 CAPSULE ORAL at 05:30

## 2017-06-10 RX ADMIN — GABAPENTIN 300 MILLIGRAM(S): 400 CAPSULE ORAL at 13:32

## 2017-06-10 RX ADMIN — Medication 2: at 17:14

## 2017-06-10 RX ADMIN — Medication 1 TABLET(S): at 13:32

## 2017-06-10 RX ADMIN — Medication 1: at 21:27

## 2017-06-10 RX ADMIN — Medication 4: at 13:31

## 2017-06-10 RX ADMIN — Medication 200 MILLIGRAM(S): at 13:31

## 2017-06-10 RX ADMIN — Medication 50 MILLIGRAM(S): at 17:15

## 2017-06-10 RX ADMIN — Medication 200 MILLIGRAM(S): at 17:16

## 2017-06-10 RX ADMIN — INSULIN GLARGINE 12 UNIT(S): 100 INJECTION, SOLUTION SUBCUTANEOUS at 21:27

## 2017-06-10 RX ADMIN — PIPERACILLIN AND TAZOBACTAM 25 GRAM(S): 4; .5 INJECTION, POWDER, LYOPHILIZED, FOR SOLUTION INTRAVENOUS at 05:30

## 2017-06-10 RX ADMIN — SIMVASTATIN 10 MILLIGRAM(S): 20 TABLET, FILM COATED ORAL at 21:27

## 2017-06-10 RX ADMIN — Medication 200 MILLIGRAM(S): at 05:30

## 2017-06-10 RX ADMIN — Medication 200 MILLIGRAM(S): at 23:12

## 2017-06-10 RX ADMIN — PIPERACILLIN AND TAZOBACTAM 25 GRAM(S): 4; .5 INJECTION, POWDER, LYOPHILIZED, FOR SOLUTION INTRAVENOUS at 21:26

## 2017-06-10 RX ADMIN — Medication 1: at 08:36

## 2017-06-10 RX ADMIN — AMIODARONE HYDROCHLORIDE 100 MILLIGRAM(S): 400 TABLET ORAL at 06:09

## 2017-06-10 RX ADMIN — MONTELUKAST 10 MILLIGRAM(S): 4 TABLET, CHEWABLE ORAL at 21:27

## 2017-06-10 RX ADMIN — GABAPENTIN 300 MILLIGRAM(S): 400 CAPSULE ORAL at 21:27

## 2017-06-10 RX ADMIN — WARFARIN SODIUM 2.5 MILLIGRAM(S): 2.5 TABLET ORAL at 21:27

## 2017-06-10 RX ADMIN — PIPERACILLIN AND TAZOBACTAM 25 GRAM(S): 4; .5 INJECTION, POWDER, LYOPHILIZED, FOR SOLUTION INTRAVENOUS at 13:33

## 2017-06-10 RX ADMIN — TENOFOVIR DISOPROXIL FUMARATE 300 MILLIGRAM(S): 300 TABLET, FILM COATED ORAL at 13:33

## 2017-06-10 NOTE — CONSULT NOTE ADULT - PROBLEM SELECTOR RECOMMENDATION 3
recurrent due to hypogammaglobinemia and bronchiectesis  on antibiotics  ? role of prophylactic antibiotics

## 2017-06-10 NOTE — PROVIDER CONTACT NOTE (OTHER) - ACTION/TREATMENT ORDERED:
MD came to assess patient with RN.  phone used ID #[id number]. Encouraged patient to drink water. BP rechecked and now 105/70. Will continue to monitor. Will provide education in Thai for meds.

## 2017-06-10 NOTE — CONSULT NOTE ADULT - SUBJECTIVE AND OBJECTIVE BOX
Patient is a 77y old  Male who presents with a chief complaint of cough/chest pain diagnosed with pneumonia.       HPI:  77 yr old  male w hx B cell -CLL stage 4/SLL not currently on any treatment as per primary oncologist Dr Enamorado.  He also has hypogammaglobulinemia and has received IVIG of various types and has had immediate type hypersensitivity reactions to all of them and hence not reveived any further IVIG.    He has a history of recurrent pneumonia and is frequently admitted for this.    He also has AFfib on AC, HTN, HLD, CHF-diastolic, hx PE/DVT, COPD, hx recurrent PNA came to ED w family complaining of tactile temp and productive cough and SOB    he was admitted with few days of cough and increased shortness of breath.    PAST MEDICAL & SURGICAL HISTORY:  CLL/SLL currently not on threatment.  Hypogammaglobulinemia: No longer on IVIG due to reactions.  Hepatitis B virus infection, unspecified chronicity: retrovirals  Chronic diastolic congestive heart failure  Essential hypertension  DM type 2 (diabetes mellitus, type 2)  B-cell lymphoma, unspecified B-cell lymphoma type, unspecified body region: B cell CLL stage 4/SLL; met NHL  NHL (non-Hodgkin&#x27;s lymphoma)  Atrial fibrillation, unspecified type  COPD (chronic obstructive pulmonary disease): Lymphoma   Atrial fibrillation  History of esophageal dilatation  S/P cholecystectomy  H/O prostatectomy    MEDICATIONS  (STANDING):  multivitamin 1Tablet(s) Oral daily  montelukast 10milliGRAM(s) Oral at bedtime  metoprolol 50milliGRAM(s) Oral two times a day  simvastatin 10milliGRAM(s) Oral at bedtime  amiodarone    Tablet 100milliGRAM(s) Oral daily  gabapentin 300milliGRAM(s) Oral three times a day  tenofovir 300milliGRAM(s) Oral daily  insulin lispro (HumaLOG) corrective regimen sliding scale  SubCutaneous three times a day before meals  dextrose 5%. 1000milliLiter(s) IV Continuous <Continuous>  dextrose 50% Injectable 12.5Gram(s) IV Push once  dextrose 50% Injectable 25Gram(s) IV Push once  dextrose 50% Injectable 25Gram(s) IV Push once  piperacillin/tazobactam IVPB. 3.375Gram(s) IV Intermittent every 8 hours  insulin glargine Injectable (LANTUS) 12Unit(s) SubCutaneous at bedtime  guaiFENesin    Syrup 200milliGRAM(s) Oral every 6 hours  losartan 50milliGRAM(s) Oral daily  warfarin 2.5milliGRAM(s) Oral once        FAMILY HISTORY:  Family history of liver cancer (Father): father  85 liver CA  Family history of coronary arteriosclerosis (Mother): Mom  of MI age 68        REVIEW OF SYSTEM:  Pertinent items are noted in HPI.  Constitutional , sweats and weight loss  throat, and face:  negative for epistaxis, nasal congestion, sore throat and   tinnitus  Respiratory: pos for cough, dyspnea on exertion, pleuritic chest pain  and wheezing  Cardiovascular:  negative for chest pain, dyspnea and palpitations  Gastrointestinal: negative for abdominal pain, diarrhea, nausea and vomiting  Genitourinary: negative for dysuria, frequency and urinary incontinence  Skin:  negative for redness, rash, pruritus, swelling, dryness and   fissures  Hematologic/lymphatic: negative for bleeding and easy bruising  Musculoskeletal: negative for arthralgias, back pain and muscle weakness  Neurological: negative for dizziness, headaches, seizures and tremors  Behavioral/Psych:  negative for mood change, depression, suicidal attempts    Vital Signs Last 24 Hrs  T(C): 36.6, Max: 37.2 (06-09 @ 21:18)  T(F): 97.8, Max: 99 (06-09 @ 21:18)  HR: 91 (75 - 91)  BP: 105/70 (85/52 - 110/61)  BP(mean): --  RR: 18 (18 - 18)  SpO2: 96% (94% - 96%)          PHYSICAL EXAM  General Appearance: cooperative, no acute distress,   HEENT: PERRL, conjunctiva clear, EOM's intact, non injected pharynx, no exudate, TM   normal  Neck: Supple, , no adenopathy, thyroid: not enlarged, no carotid bruit or JVD  Lungs: right lower lobe rhonchi and crackles, mild exp wheeze  Heart: Regular rate and rhythm, S1, S2 normal, no murmur, rub or gallop  Abdomen: Soft, non-tender, bowel sounds active , no hepatosplenomegaly  Extremities: no cyanosis or edema, no joint swelling  Skin: Skin color, texture normal, no rashes   Neurologic: Alert and oriented X3 , cranial nerves intact, sensory and motor normal,    EC.3   10.7  )-----------( 111      ( 10 Víctor 2017 05:52 )             35.6     06-10    142  |  107  |  19  ----------------------------<  147<H>  4.0   |  27  |  1.23    Ca    8.6      10 Víctor 2017 05:52  Mg     2.1     -    TPro  5.9<L>  /  Alb  3.3  /  TBili  0.7  /  DBili  x   /  AST  15  /  ALT  18  /  AlkPhos  104  06-    LIVER FUNCTIONS - ( 2017 05:39 )  Alb: 3.3 g/dL / Pro: 5.9 gm/dL / ALK PHOS: 104 U/L / ALT: 18 U/L / AST: 15 U/L / GGT: x           Last IgG level 207 decreased.        RADIOLOGY & ADDITIONAL STUDIES:    right LL pneumonia

## 2017-06-10 NOTE — CONSULT NOTE ADULT - PROBLEM SELECTOR RECOMMENDATION 9
secondary to longstanding CLL/SLL  the patient has had reactions to all forms of IVIG so shall not administer at this time  DW Dr Houser

## 2017-06-11 LAB
ANION GAP SERPL CALC-SCNC: 6 MMOL/L — SIGNIFICANT CHANGE UP (ref 5–17)
BUN SERPL-MCNC: 19 MG/DL — SIGNIFICANT CHANGE UP (ref 7–23)
CALCIUM SERPL-MCNC: 8.2 MG/DL — LOW (ref 8.5–10.1)
CHLORIDE SERPL-SCNC: 104 MMOL/L — SIGNIFICANT CHANGE UP (ref 96–108)
CO2 SERPL-SCNC: 28 MMOL/L — SIGNIFICANT CHANGE UP (ref 22–31)
CREAT SERPL-MCNC: 1.48 MG/DL — HIGH (ref 0.5–1.3)
GLUCOSE SERPL-MCNC: 269 MG/DL — HIGH (ref 70–99)
HCT VFR BLD CALC: 37.5 % — LOW (ref 39–50)
HGB BLD-MCNC: 12.5 G/DL — LOW (ref 13–17)
INR BLD: 1.95 RATIO — HIGH (ref 0.88–1.16)
MCHC RBC-ENTMCNC: 30.7 PG — SIGNIFICANT CHANGE UP (ref 27–34)
MCHC RBC-ENTMCNC: 33.3 GM/DL — SIGNIFICANT CHANGE UP (ref 32–36)
MCV RBC AUTO: 92.1 FL — SIGNIFICANT CHANGE UP (ref 80–100)
PLATELET # BLD AUTO: 121 K/UL — LOW (ref 150–400)
POTASSIUM SERPL-MCNC: 4.7 MMOL/L — SIGNIFICANT CHANGE UP (ref 3.5–5.3)
POTASSIUM SERPL-SCNC: 4.7 MMOL/L — SIGNIFICANT CHANGE UP (ref 3.5–5.3)
PROTHROM AB SERPL-ACNC: 21.4 SEC — HIGH (ref 9.8–12.7)
RBC # BLD: 4.07 M/UL — LOW (ref 4.2–5.8)
RBC # FLD: 14.1 % — SIGNIFICANT CHANGE UP (ref 10.3–14.5)
SODIUM SERPL-SCNC: 138 MMOL/L — SIGNIFICANT CHANGE UP (ref 135–145)
WBC # BLD: 10.9 K/UL — HIGH (ref 3.8–10.5)
WBC # FLD AUTO: 10.9 K/UL — HIGH (ref 3.8–10.5)

## 2017-06-11 PROCEDURE — 99233 SBSQ HOSP IP/OBS HIGH 50: CPT

## 2017-06-11 RX ORDER — METOPROLOL TARTRATE 50 MG
25 TABLET ORAL
Qty: 0 | Refills: 0 | Status: DISCONTINUED | OUTPATIENT
Start: 2017-06-11 | End: 2017-06-12

## 2017-06-11 RX ORDER — WARFARIN SODIUM 2.5 MG/1
3.75 TABLET ORAL DAILY
Qty: 0 | Refills: 0 | Status: DISCONTINUED | OUTPATIENT
Start: 2017-06-11 | End: 2017-06-12

## 2017-06-11 RX ORDER — LOSARTAN POTASSIUM 100 MG/1
25 TABLET, FILM COATED ORAL DAILY
Qty: 0 | Refills: 0 | Status: DISCONTINUED | OUTPATIENT
Start: 2017-06-12 | End: 2017-06-12

## 2017-06-11 RX ADMIN — PIPERACILLIN AND TAZOBACTAM 25 GRAM(S): 4; .5 INJECTION, POWDER, LYOPHILIZED, FOR SOLUTION INTRAVENOUS at 21:37

## 2017-06-11 RX ADMIN — PIPERACILLIN AND TAZOBACTAM 25 GRAM(S): 4; .5 INJECTION, POWDER, LYOPHILIZED, FOR SOLUTION INTRAVENOUS at 13:37

## 2017-06-11 RX ADMIN — AMIODARONE HYDROCHLORIDE 100 MILLIGRAM(S): 400 TABLET ORAL at 05:28

## 2017-06-11 RX ADMIN — Medication 200 MILLIGRAM(S): at 05:28

## 2017-06-11 RX ADMIN — TENOFOVIR DISOPROXIL FUMARATE 300 MILLIGRAM(S): 300 TABLET, FILM COATED ORAL at 11:50

## 2017-06-11 RX ADMIN — PIPERACILLIN AND TAZOBACTAM 25 GRAM(S): 4; .5 INJECTION, POWDER, LYOPHILIZED, FOR SOLUTION INTRAVENOUS at 05:28

## 2017-06-11 RX ADMIN — GABAPENTIN 300 MILLIGRAM(S): 400 CAPSULE ORAL at 05:28

## 2017-06-11 RX ADMIN — SIMVASTATIN 10 MILLIGRAM(S): 20 TABLET, FILM COATED ORAL at 21:35

## 2017-06-11 RX ADMIN — Medication 1: at 08:28

## 2017-06-11 RX ADMIN — WARFARIN SODIUM 2.5 MILLIGRAM(S): 2.5 TABLET ORAL at 21:42

## 2017-06-11 RX ADMIN — Medication 1: at 21:36

## 2017-06-11 RX ADMIN — Medication 1 TABLET(S): at 11:49

## 2017-06-11 RX ADMIN — GABAPENTIN 300 MILLIGRAM(S): 400 CAPSULE ORAL at 13:37

## 2017-06-11 RX ADMIN — LOSARTAN POTASSIUM 50 MILLIGRAM(S): 100 TABLET, FILM COATED ORAL at 05:28

## 2017-06-11 RX ADMIN — Medication 200 MILLIGRAM(S): at 11:50

## 2017-06-11 RX ADMIN — Medication 50 MILLIGRAM(S): at 05:28

## 2017-06-11 RX ADMIN — INSULIN GLARGINE 12 UNIT(S): 100 INJECTION, SOLUTION SUBCUTANEOUS at 21:36

## 2017-06-11 RX ADMIN — MONTELUKAST 10 MILLIGRAM(S): 4 TABLET, CHEWABLE ORAL at 21:35

## 2017-06-11 RX ADMIN — GABAPENTIN 300 MILLIGRAM(S): 400 CAPSULE ORAL at 21:36

## 2017-06-11 RX ADMIN — Medication 3: at 11:49

## 2017-06-11 RX ADMIN — Medication 3: at 17:44

## 2017-06-11 RX ADMIN — Medication 200 MILLIGRAM(S): at 17:12

## 2017-06-11 NOTE — PROGRESS NOTE ADULT - PROBLEM SELECTOR PROBLEM 1
R/O HCAP (healthcare-associated pneumonia)

## 2017-06-11 NOTE — PROGRESS NOTE ADULT - PROBLEM SELECTOR PLAN 6
low BP  lower dose of losartan 100--> 50
low BP  lower dose of losartan 100--> 50
low BP, worsening of renal function  lower dose of losartan 100--> 50--> 25

## 2017-06-11 NOTE — PROGRESS NOTE ADULT - PROBLEM SELECTOR PLAN 5
~INR is 3.8--> 3.2  ~will hold tonight's dose  ~f/u w/ anticoagulation services
~INR is 3.8--> 3.2--. 2.17  ~anticoagulation services input appretiated  - restart coumadin
~INR is 3.8--> 3.2--. 2.17--> 1.95  ~anticoagulation services input appreciated  - daily coumadin as per AC

## 2017-06-11 NOTE — PROGRESS NOTE ADULT - PROBLEM SELECTOR PLAN 9
oncology consult for possible role of IGG

## 2017-06-11 NOTE — PROGRESS NOTE ADULT - PROBLEM SELECTOR PROBLEM 8
Hepatitis B virus infection, unspecified chronicity

## 2017-06-11 NOTE — PROGRESS NOTE ADULT - PROBLEM SELECTOR PLAN 3
~cont. nebs prn  ~cont. Montelukast  ~cont. supplemental O2

## 2017-06-11 NOTE — PROGRESS NOTE ADULT - PROBLEM SELECTOR PROBLEM 4
Atrial fibrillation, unspecified type

## 2017-06-11 NOTE — PROGRESS NOTE ADULT - PROBLEM SELECTOR PLAN 4
~cont. VKA therapy per protocol  ~cont. Amiodarone  ~cont. Metoprolol

## 2017-06-11 NOTE — PROGRESS NOTE ADULT - PROBLEM SELECTOR PLAN 7
~FS qAC/HS  ~cont. Basal/Bolus insulin regimen  ~advance Basal rate as appropriate
~FS qAC/HS  ~cont. Basal/Bolus insulin regimen  - add bedtime coverage
~FS qAC/HS  ~cont. Basal/Bolus insulin regimen  - add bedtime coverage

## 2017-06-11 NOTE — PROGRESS NOTE ADULT - PROBLEM SELECTOR PLAN 1
risk of resistant bacteria, GNR  - IV zosyn  - ID and pulmonology consult input appretiated  - mucolytics, nebs, blood cx, sputum cx
risk of resistant bacteria, GNR  - IV zosyn  - ID and pulmonology consult input appreciated  - mucolytics, nebs, blood cx, sputum cx
risk of resistant bacteria, GNR  - IV zosyn  - ID and pulmonology consult input appreciated  - mucolytics, nebs, blood cx, sputum cx  - WBC trending down  - check O2 sats on ambulation

## 2017-06-12 VITALS
DIASTOLIC BLOOD PRESSURE: 61 MMHG | SYSTOLIC BLOOD PRESSURE: 118 MMHG | OXYGEN SATURATION: 93 % | TEMPERATURE: 98 F | HEART RATE: 90 BPM | RESPIRATION RATE: 18 BRPM

## 2017-06-12 LAB
ANION GAP SERPL CALC-SCNC: 8 MMOL/L — SIGNIFICANT CHANGE UP (ref 5–17)
BASOPHILS # BLD AUTO: 0.1 K/UL — SIGNIFICANT CHANGE UP (ref 0–0.2)
BASOPHILS NFR BLD AUTO: 1.5 % — SIGNIFICANT CHANGE UP (ref 0–2)
BUN SERPL-MCNC: 20 MG/DL — SIGNIFICANT CHANGE UP (ref 7–23)
CALCIUM SERPL-MCNC: 8.2 MG/DL — LOW (ref 8.5–10.1)
CHLORIDE SERPL-SCNC: 104 MMOL/L — SIGNIFICANT CHANGE UP (ref 96–108)
CO2 SERPL-SCNC: 28 MMOL/L — SIGNIFICANT CHANGE UP (ref 22–31)
CREAT SERPL-MCNC: 1.44 MG/DL — HIGH (ref 0.5–1.3)
EOSINOPHIL # BLD AUTO: 0.6 K/UL — HIGH (ref 0–0.5)
EOSINOPHIL NFR BLD AUTO: 5.9 % — SIGNIFICANT CHANGE UP (ref 0–6)
GLUCOSE SERPL-MCNC: 161 MG/DL — HIGH (ref 70–99)
HCT VFR BLD CALC: 35.1 % — LOW (ref 39–50)
HGB BLD-MCNC: 11.8 G/DL — LOW (ref 13–17)
INR BLD: 2 RATIO — HIGH (ref 0.88–1.16)
LYMPHOCYTES # BLD AUTO: 4.9 K/UL — HIGH (ref 1–3.3)
LYMPHOCYTES # BLD AUTO: 50.9 % — HIGH (ref 13–44)
MCHC RBC-ENTMCNC: 31.3 PG — SIGNIFICANT CHANGE UP (ref 27–34)
MCHC RBC-ENTMCNC: 33.7 GM/DL — SIGNIFICANT CHANGE UP (ref 32–36)
MCV RBC AUTO: 92.8 FL — SIGNIFICANT CHANGE UP (ref 80–100)
MONOCYTES # BLD AUTO: 0.8 K/UL — SIGNIFICANT CHANGE UP (ref 0–0.9)
MONOCYTES NFR BLD AUTO: 8 % — SIGNIFICANT CHANGE UP (ref 2–14)
NEUTROPHILS # BLD AUTO: 3.3 K/UL — SIGNIFICANT CHANGE UP (ref 1.8–7.4)
NEUTROPHILS NFR BLD AUTO: 33.8 % — LOW (ref 43–77)
PLATELET # BLD AUTO: 116 K/UL — LOW (ref 150–400)
POTASSIUM SERPL-MCNC: 4.4 MMOL/L — SIGNIFICANT CHANGE UP (ref 3.5–5.3)
POTASSIUM SERPL-SCNC: 4.4 MMOL/L — SIGNIFICANT CHANGE UP (ref 3.5–5.3)
PROTHROM AB SERPL-ACNC: 21.9 SEC — HIGH (ref 9.8–12.7)
RBC # BLD: 3.78 M/UL — LOW (ref 4.2–5.8)
RBC # FLD: 13.7 % — SIGNIFICANT CHANGE UP (ref 10.3–14.5)
SODIUM SERPL-SCNC: 140 MMOL/L — SIGNIFICANT CHANGE UP (ref 135–145)
WBC # BLD: 9.7 K/UL — SIGNIFICANT CHANGE UP (ref 3.8–10.5)
WBC # FLD AUTO: 9.7 K/UL — SIGNIFICANT CHANGE UP (ref 3.8–10.5)

## 2017-06-12 RX ORDER — WARFARIN SODIUM 2.5 MG/1
1.5 TABLET ORAL
Qty: 0 | Refills: 0 | COMMUNITY
Start: 2017-06-12

## 2017-06-12 RX ORDER — DOCUSATE SODIUM 100 MG
1 CAPSULE ORAL
Qty: 0 | Refills: 0 | COMMUNITY
Start: 2017-06-12

## 2017-06-12 RX ORDER — CEFOXITIN 1 G/1
1 INJECTION, POWDER, FOR SOLUTION INTRAVENOUS
Qty: 30 | Refills: 0 | OUTPATIENT
Start: 2017-06-12 | End: 2017-06-19

## 2017-06-12 RX ORDER — LOSARTAN POTASSIUM 100 MG/1
1 TABLET, FILM COATED ORAL
Qty: 30 | Refills: 0 | OUTPATIENT
Start: 2017-06-12 | End: 2017-07-12

## 2017-06-12 RX ORDER — ALBUTEROL 90 UG/1
3 AEROSOL, METERED ORAL
Qty: 0 | Refills: 0 | COMMUNITY

## 2017-06-12 RX ORDER — SENNA PLUS 8.6 MG/1
2 TABLET ORAL
Qty: 0 | Refills: 0 | COMMUNITY
Start: 2017-06-12

## 2017-06-12 RX ORDER — WARFARIN SODIUM 2.5 MG/1
1 TABLET ORAL
Qty: 0 | Refills: 0 | COMMUNITY

## 2017-06-12 RX ORDER — WARFARIN SODIUM 2.5 MG/1
1.5 TABLET ORAL
Qty: 0 | Refills: 0 | COMMUNITY

## 2017-06-12 RX ORDER — METOPROLOL TARTRATE 50 MG
1 TABLET ORAL
Qty: 0 | Refills: 0 | COMMUNITY

## 2017-06-12 RX ORDER — WARFARIN SODIUM 2.5 MG/1
1 TABLET ORAL
Qty: 0 | Refills: 0 | COMMUNITY
Start: 2017-06-12

## 2017-06-12 RX ORDER — WARFARIN SODIUM 2.5 MG/1
3.75 TABLET ORAL
Qty: 0 | Refills: 0 | COMMUNITY
Start: 2017-06-12

## 2017-06-12 RX ORDER — LOSARTAN POTASSIUM 100 MG/1
1 TABLET, FILM COATED ORAL
Qty: 0 | Refills: 0 | COMMUNITY

## 2017-06-12 RX ORDER — ALBUTEROL 90 UG/1
0 AEROSOL, METERED ORAL
Qty: 0 | Refills: 0 | COMMUNITY

## 2017-06-12 RX ADMIN — Medication 200 MILLIGRAM(S): at 05:02

## 2017-06-12 RX ADMIN — Medication 1 TABLET(S): at 12:34

## 2017-06-12 RX ADMIN — TENOFOVIR DISOPROXIL FUMARATE 300 MILLIGRAM(S): 300 TABLET, FILM COATED ORAL at 12:34

## 2017-06-12 RX ADMIN — Medication 200 MILLIGRAM(S): at 00:02

## 2017-06-12 RX ADMIN — GABAPENTIN 300 MILLIGRAM(S): 400 CAPSULE ORAL at 05:02

## 2017-06-12 RX ADMIN — Medication 200 MILLIGRAM(S): at 12:34

## 2017-06-12 RX ADMIN — PIPERACILLIN AND TAZOBACTAM 25 GRAM(S): 4; .5 INJECTION, POWDER, LYOPHILIZED, FOR SOLUTION INTRAVENOUS at 05:01

## 2017-06-12 RX ADMIN — Medication: at 12:15

## 2017-06-12 RX ADMIN — AMIODARONE HYDROCHLORIDE 100 MILLIGRAM(S): 400 TABLET ORAL at 05:01

## 2017-06-12 RX ADMIN — Medication 1: at 08:46

## 2017-06-12 NOTE — DISCHARGE NOTE ADULT - HOSPITAL COURSE
78 y/o M PMHx significant for Atrial fibrillation (on VKA therapy), B-cell CLL stage 4/SLL not currently on any treatment, hx of hypogammaglobulinemia s/p failed IVIG infusions 2/2 to immediate type hypersensitivity reactions, chronic diastolic congestive heart failure (LVEF 55-60% in ), COPD, DM type 2, Essential hypertension, Hepatitis B, hx of PE/DVT, and recurrent PNA who admitted on 6/8/17 with  progressively worsening productive cough over the past week, and notable intermittent subjective fevers today Tmax in the ED was 101.8'F). The patient denies any recent sick contacts, or denies recent travel but states that since discharge his "cough" has not improved "much."     Patient was admitted from 5/10 - 5/16/17 when he was treated for acute hypoxic respiratory failure due to pneumonia. In the ED the patient was given Zosyn 3.375g IV x 1, Vancomycin 1g IV x 1. Labs -> WBC 15.8, Hgb/Hct 12.6/38.1, , Blasts 1%, INR 3.8, LA 2.3 -> 0.8, .     6/9 - Patient seen and examined at bedside earlier today, reports productive cough with thick mucus, dyspnea, and pleuritic chest pain, denies dizziness, HA, abd pain, other sx  6/10 cough and dyspnea improving, pleuritic CP better, denies abd pain, other sx, afebrile  6/11 cough minimal, denies dyspnea , denies pleuritic chest pain, afebrile, tolerates Abx  6/12 - minimal cough, denies dyspnea, O2 sat on ambulation wnl,      Review of system- Rest of the review of system are negative except mentioned in HPI  Vital Signs Last 24 Hrs  T(C): 36.9, Max: 37.1 (06-11 @ 12:46)  T(F): 98.5, Max: 98.7 (06-11 @ 12:46)  HR: 90 (79 - 100)  BP: 118/61 (90/57 - 133/60)  BP(mean): --  RR: 18 (18 - 18)  SpO2: 93% (91% - 94%)  PHYSICAL EXAM:  GENERAL: NAD  NERVOUS SYSTEM:  Alert & Oriented X3, non- focal exam, Motor Strength 5/5 B/L upper and lower extremities; DTRs 2+ intact and symmetric  HEAD:  Atraumatic, Normocephalic  EYES: EOMI, PERRLA, conjunctiva and sclera clear  HEENT: Moist mucous membranes  NECK: Supple, No JVD  CHEST/LUNG: BS decreased b/l, occasional wheezing, No rales, no rhonchi.   HEART: Regular rate and rhythm; No murmurs, rubs, or gallops  ABDOMEN: Soft, Nontender, Nondistended; Bowel sounds present  GENITOURINARY- Voiding, no suprapubic tenderness  EXTREMITIES:  2+ Peripheral Pulses, No clubbing, cyanosis, or edema  MUSCULOSKELETAL:- No muscle tenderness, Muscle tone normal, No joint tenderness, no Joint swelling, Joint range of motion-normal  SKIN-no rash, no lesion    Problem/Plan - 1:  ·  Problem: R/O HCAP (healthcare-associated pneumonia).  Plan: risk of resistant bacteria, GNR  - IV zosyn--. PO ceftin 500 bid for 7 days than once a day  - ID and pulmonology consult input appreciated - may need long term prophylactic abx  - mucolytics, nebs, blood cx, sputum cx  - WBC trending down--> wnl  - check O2 sats on ambulation. 92%    Problem/Plan - 2:  ·  Problem: R/O Acute recurrent pansinusitis.  Plan: c/w IV abx--> PO  as per ID.     Problem/Plan - 3:  ·  Problem: COPD (chronic obstructive pulmonary disease).  Plan: ~cont. nebs prn  ~cont. Montelukast  ~cont. supplemental O2.     Problem/Plan - 4:  ·  Problem: Atrial fibrillation, unspecified type.  Plan: ~cont. VKA therapy per protocol  ~cont. Amiodarone  ~cont. Metoprolol.     Problem/Plan - 5:  ·  Problem: Supratherapeutic INR.  Plan: ~INR is 3.8--> 3.2--. 2.17--> 1.95--> 2  ~anticoagulation services input appreciated  - daily coumadin as per AC.     Problem/Plan - 6:  Problem: Essential hypertension. Plan: low BP, worsening of renal function  lower dose of losartan 100--> 50--> 25.  metoprolol 25 BID    Problem/Plan - 7:  ·  Problem: DM type 2 (diabetes mellitus, type 2).  Plan: ~FS qAC/HS  ~cont. Basal/Bolus insulin regimen  - add bedtime coverage.     Problem/Plan - 8:  ·  Problem: Hepatitis B virus infection, unspecified chronicity.  Plan: ~cont. Tenofovir 300mg po daily.     Problem/Plan - 9:  ·  Problem: Hypogammaglobulinemia.  Plan: oncology consult for possible role of IGG.     CKD stage 3  - monitor as o/p, repeat BMP in 1 week    Attending Attestation:   Disposition - medically optimized to be discharged home with close follow up with PCP, oncology , pulmonology within 1 week  complete antibiotics  return to ED if fever, abdominal pain, nausea, vomiting, chest pain, dyspnea  Discharge plan discussed with patient, RN  Patient advised to follow up with PCP within 3-7 days  time spend 40 min 76 y/o M PMHx significant for Atrial fibrillation (on VKA therapy), B-cell CLL stage 4/SLL not currently on any treatment, hx of hypogammaglobulinemia s/p failed IVIG infusions 2/2 to immediate type hypersensitivity reactions, chronic diastolic congestive heart failure (LVEF 55-60% in ), COPD, DM type 2, Essential hypertension, Hepatitis B, hx of PE/DVT, and recurrent PNA who admitted on 6/8/17 with  progressively worsening productive cough over the past week, and notable intermittent subjective fevers today Tmax in the ED was 101.8'F). The patient denies any recent sick contacts, or denies recent travel but states that since discharge his "cough" has not improved "much."     Patient was admitted from 5/10 - 5/16/17 when he was treated for acute hypoxic respiratory failure due to pneumonia. In the ED the patient was given Zosyn 3.375g IV x 1, Vancomycin 1g IV x 1. Labs -> WBC 15.8, Hgb/Hct 12.6/38.1, , Blasts 1%, INR 3.8, LA 2.3 -> 0.8, .     6/9 - Patient seen and examined at bedside earlier today, reports productive cough with thick mucus, dyspnea, and pleuritic chest pain, denies dizziness, HA, abd pain, other sx  6/10 cough and dyspnea improving, pleuritic CP better, denies abd pain, other sx, afebrile  6/11 cough minimal, denies dyspnea , denies pleuritic chest pain, afebrile, tolerates Abx  6/12 - minimal cough, denies dyspnea, O2 sat on ambulation wnl,      Review of system- Rest of the review of system are negative except mentioned in HPI  Vital Signs Last 24 Hrs  T(C): 36.9, Max: 37.1 (06-11 @ 12:46)  T(F): 98.5, Max: 98.7 (06-11 @ 12:46)  HR: 90 (79 - 100)  BP: 118/61 (90/57 - 133/60)  BP(mean): --  RR: 18 (18 - 18)  SpO2: 93% (91% - 94%)  PHYSICAL EXAM:  GENERAL: NAD  NERVOUS SYSTEM:  Alert & Oriented X3, non- focal exam, Motor Strength 5/5 B/L upper and lower extremities; DTRs 2+ intact and symmetric  HEAD:  Atraumatic, Normocephalic  EYES: EOMI, PERRLA, conjunctiva and sclera clear  HEENT: Moist mucous membranes  NECK: Supple, No JVD  CHEST/LUNG: BS decreased b/l, occasional wheezing, No rales, no rhonchi.   HEART: Regular rate and rhythm; No murmurs, rubs, or gallops  ABDOMEN: Soft, Nontender, Nondistended; Bowel sounds present  GENITOURINARY- Voiding, no suprapubic tenderness  EXTREMITIES:  2+ Peripheral Pulses, No clubbing, cyanosis, or edema  MUSCULOSKELETAL:- No muscle tenderness, Muscle tone normal, No joint tenderness, no Joint swelling, Joint range of motion-normal  SKIN-no rash, no lesion    Problem/Plan - 1:  ·  Problem: R/O HCAP (healthcare-associated pneumonia).  Plan: Sepsis was present on admission   risk of resistant bacteria, GNR  - IV zosyn--. PO ceftin 500 bid for 7 days than once a day  - ID and pulmonology consult input appreciated - may need long term prophylactic abx  - mucolytics, nebs, blood cx, sputum cx  - WBC trending down--> wnl  - check O2 sats on ambulation. 92%    Problem/Plan - 2:  ·  Problem: R/O Acute recurrent pansinusitis.  Plan: c/w IV abx--> PO  as per ID.     Problem/Plan - 3:  ·  Problem: COPD (chronic obstructive pulmonary disease).  Plan: ~cont. nebs prn  ~cont. Montelukast  ~cont. supplemental O2.     Problem/Plan - 4:  ·  Problem: Atrial fibrillation, unspecified type.  Plan: ~cont. VKA therapy per protocol  ~cont. Amiodarone  ~cont. Metoprolol.     Problem/Plan - 5:  ·  Problem: Supratherapeutic INR.  Plan: ~INR is 3.8--> 3.2--. 2.17--> 1.95--> 2  ~anticoagulation services input appreciated  - daily coumadin as per AC.     Problem/Plan - 6:  Problem: Essential hypertension. Plan: low BP, worsening of renal function  lower dose of losartan 100--> 50--> 25.  metoprolol 25 BID    Problem/Plan - 7:  ·  Problem: DM type 2 (diabetes mellitus, type 2).  Plan: ~FS qAC/HS  ~cont. Basal/Bolus insulin regimen  - add bedtime coverage.     Problem/Plan - 8:  ·  Problem: Hepatitis B virus infection, unspecified chronicity.  Plan: ~cont. Tenofovir 300mg po daily.     Problem/Plan - 9:  ·  Problem: Hypogammaglobulinemia.  Plan: oncology consult for possible role of IGG.     CKD stage 3  - monitor as o/p, repeat BMP in 1 week    Attending Attestation:   Disposition - medically optimized to be discharged home with close follow up with PCP, oncology , pulmonology within 1 week  complete antibiotics  return to ED if fever, abdominal pain, nausea, vomiting, chest pain, dyspnea  Discharge plan discussed with patient, RN  Patient advised to follow up with PCP within 3-7 days  time spend 40 min

## 2017-06-12 NOTE — DISCHARGE NOTE ADULT - PLAN OF CARE
resolve complete antibiotics, follow up with oncologist and PCP within 1 week for further management and recommendations follow up with oncologist take coumadin daily follow up with AC services continue home medications lower dose of losartan 25 mg daily and metopolrol 25 twice a day monitor renal function, repeat BMP in 1 week

## 2017-06-12 NOTE — DISCHARGE NOTE ADULT - OTHER SIGNIFICANT FINDINGS
Complete Blood Count + Automated Diff in AM (06.12.17 @ 05:45)    WBC Count: 9.7 K/uL    RBC Count: 3.78 M/uL    Hemoglobin: 11.8 g/dL    Hematocrit: 35.1 %    Mean Cell Volume: 92.8 fl    Mean Cell Hemoglobin: 31.3 pg    Mean Cell Hemoglobin Conc: 33.7 gm/dL    Red Cell Distrib Width: 13.7 %    Platelet Count - Automated: 116 K/uL    Auto Neutrophil #: 3.3 K/uL    Auto Lymphocyte #: 4.9 K/uL    Auto Monocyte #: 0.8 K/uL    Auto Eosinophil #: 0.6 K/uL    Auto Basophil #: 0.1 K/uL    Auto Neutrophil %: 33.8: Differential percentages must be correlated with absolute numbers for  clinical significance. %    Auto Lymphocyte %: 50.9 %    Auto Monocyte %: 8.0 %    Auto Eosinophil %: 5.9 %    Auto Basophil %: 1.5 %    Basic Metabolic Panel in AM (06.12.17 @ 05:45)    Sodium, Serum: 140 mmol/L    Potassium, Serum: 4.4 mmol/L    Chloride, Serum: 104 mmol/L    Carbon Dioxide, Serum: 28 mmol/L    Anion Gap, Serum: 8 mmol/L    Blood Urea Nitrogen, Serum: 20 mg/dL    Creatinine, Serum: 1.44 mg/dL    Glucose, Serum: 161 mg/dL    Calcium, Total Serum: 8.2 mg/dL      Prothrombin Time and INR, Plasma (06.12.17 @ 05:45)    Prothrombin Time, Plasma: 21.9: Effective March 21st, the reference range for PT has changed. sec    INR: 2.00 ratio    Hemoglobin A1C, Whole Blood (06.09.17 @ 05:39)    Hemoglobin A1C, Whole Blood: 9.2:     RADIOLOGY & ADDITIONAL TESTS:  CT CHEST        06/09/2017    Right lower and right middle lobe pneumonia with endobronchial spread.  Diffuse mediastinal, bilateral axillary, and upper abdominal   lymphadenopathy consistent with lymphoma.  CT SINUSES        6/09/2017    o prior similar studies are available for review.    There is a large amount of peripheral mucosal inflammation within the   maxillary sinuses and a moderate amount of mucosal inflammation within   the frontal ethmoid and sphenoid sinuses. There is obstruction of the   bilateral ostiomeatal units, sphenoethmoidal recesses, and obstruction of   the left frontal outflow tract.  No hyperdense secretions are identified.     The orbits are unremarkable.      The deep facial spaces are intact.   No radiopaque foreign body is seen.    The nasopharynx is symmetric.  The central skull base is intact.  The   visualized intracranial contents appear unremarkable.         0.9 x 0.5 cm calcified sebaceous cyst left frontal scalp.      IMPRESSION:       Extensive pansinusitis as above.

## 2017-06-12 NOTE — DISCHARGE NOTE ADULT - CARE PROVIDER_API CALL
Za Orozco), Medicine  [address]  [place] [address]  Phone: [phone]  Fax: [phone]    KELLY Lakhani (), Pulmonary Disease; Sleep Medicine  [address] [place] [address]  Phone: [phone]  Fax: [phone]    Ioana Kimble), Hematology; Internal Medicine; Medical Oncology  [address] [place] [address]  Phone: [phone]  Fax: [phone]

## 2017-06-12 NOTE — DISCHARGE NOTE ADULT - MEDICATION SUMMARY - MEDICATIONS TO CHANGE
I will SWITCH the dose or number of times a day I take the medications listed below when I get home from the hospital:    Cozaar 100 mg oral tablet  -- 1 tab(s) by mouth once a day    metoprolol tartrate 50 mg oral tablet  -- 1 tab(s) by mouth 2 times a day    warfarin 2.5 mg oral tablet  -- 1 tab(s) by mouth once a week MONDAY

## 2017-06-12 NOTE — PROGRESS NOTE ADULT - ASSESSMENT
78 y/o M PMHx significant for Atrial fibrillation (on VKA therapy), B-cell CLL stage 4/SLL not currently on any treatment, hx of hypogammaglobulinemia s/p failed IVIG infusions 2/2 to immediate type hypersensitivity reactions, chronic diastolic congestive heart failure (LVEF 55-60% in ), COPD, DM type 2, Essential hypertension, Hepatitis B, hx of PE/DVT, and recurrent PNA who admitted on 6/8/17 with  progressively worsening productive cough over the past week, and notable intermittent subjective fevers today Tmax in the ED was 101.8'F
This is a 77 year old male presenting with pneumonia, history of AF, DVT, PE with [id number] score 6- high risk thrombosis and mod risk bleeding due to cancer- ALL and age.    6/9:  Supratherapeutic INR 3.98 yesterday, dose held, and today 3.27, will hold Coumadin again today.  6/10: INR 2.19 will resume coumadin today, takes 3.75mg all days and 2.5 one day a week. Given current IV abx- will give 2.5mg tonight and reassess INR tomorrow am.  6/11: INR 1.95- possibel to go home today- discussed to continue regular dose 3.75mg daily and come into clinic this week with wife.  6/12 INR  2.0 will conton 3.75mg daily repeat inr in anticoagulation office on thursday.  Plan:  Coumadin 3.75mg PO daily (hold for INR 3.0 or >)  repeat  PT/INR Thursday in office    Venodynes  Enc ambulation    Will continue to follow.
This is a 77 year old male presenting with pneumonia, history of AF, DVT, PE with [id number] score 6- high risk thrombosis and mod risk bleeding due to cancer- ALL and age.    6/9:  Supratherapeutic INR 3.98 yesterday, dose held, and today 3.27, will hold Coumadin again today.  6/10: INR 2.19 will resume coumadin today, takes 3.75mg all days and 2.5 one day a week. Given current IV abx- will give 2.5mg tonight and reassess INR tomorrow am.    Plan:  Coumadin 2.5mg PO tonight  Daily PT/INR  Daily CBC/BMP  Venodynes  Enc ambulation    Thank you for this consult, will continue to follow.
This is a 77 year old male presenting with pneumonia, history of AF, DVT, PE with [id number]-[id number] score 6- high risk thrombosis and mod risk bleeding due to cancer- ALL and age.    6/9:  Supratherapeutic INR 3.98 yesterday, dose held, and today 3.27, will hold Coumadin again today.  6/10: INR 2.19 will resume coumadin today, takes 3.75mg all days and 2.5 one day a week. Given current IV abx- will give 2.5mg tonight and reassess INR tomorrow am.  6/11: INR 1.95- possibel to go home today- discussed to continue regular dose 3.75mg daily and come into clinic this week with wife.    Plan:  Coumadin 3.75mg PO daily (hold for INR 3.0 or >)  Daily PT/INR  Daily CBC/BMP  Venodynes  Enc ambulation    Will continue to follow.

## 2017-06-12 NOTE — DISCHARGE NOTE ADULT - CARE PLAN
Principal Discharge DX:	Pneumonia of right lower lobe due to infectious organism  Goal:	resolve  Instructions for follow-up, activity and diet:	complete antibiotics, follow up with oncologist and PCP within 1 week for further management and recommendations  Secondary Diagnosis:	B-cell lymphoma, unspecified B-cell lymphoma type, unspecified body region  Instructions for follow-up, activity and diet:	follow up with oncologist  Secondary Diagnosis:	Atrial fibrillation, unspecified type  Instructions for follow-up, activity and diet:	take coumadin daily follow up with [facility] services  Secondary Diagnosis:	DM type 2 (diabetes mellitus, type 2)  Instructions for follow-up, activity and diet:	continue home medications  Secondary Diagnosis:	COPD (chronic obstructive pulmonary disease)  Instructions for follow-up, activity and diet:	continue home medications  Secondary Diagnosis:	Essential hypertension  Instructions for follow-up, activity and diet:	lower dose of losartan 25 mg daily and metopolrol 25 twice a day  Secondary Diagnosis:	CKD (chronic kidney disease) stage 3, GFR 30-59 ml/min  Instructions for follow-up, activity and diet:	monitor renal function, repeat BMP in 1 week

## 2017-06-12 NOTE — PROGRESS NOTE ADULT - SUBJECTIVE AND OBJECTIVE BOX
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 The patient denies any recent sick contacts, or denies recent travel but states that since discharge his "cough" has not improved "much." Of note the patient was admitted from 5/10 - 17 when he was treated for acute hypoxic respiratory failure due to pneumonia.     Patient familiar to our service- have been managing coumadin as outpatient. Usual dose is 3.75mg daily except 2.5mg on 's.  INR's have been relatively stable. Currently supratherapeutic in setting of acute infection (pneumonia) and antibiotic treatment with Zosyn.      Patient is a 77y old  Male who presents with a chief complaint of coughing history of pneumonia fever for 4 days and worse cough (2017 21:44)      Consulted by Dr. Subramanian for VTE prophylaxis, risk stratification, and anticoagulation management.    PAST MEDICAL & SURGICAL HISTORY:  Hypogammaglobulinemia: received IVIG  Hepatitis B virus infection, unspecified chronicity: retrovirals  Chronic diastolic congestive heart failure  Essential hypertension  DM type 2 (diabetes mellitus, type 2)  B-cell lymphoma, unspecified B-cell lymphoma type, unspecified body region: CLL stage 4/SLL; met NHL  NHL (non-Hodgkin&#x27;s lymphoma)  Lymphoma, unspecified body region, unspecified lymphoma type  Atrial fibrillation, unspecified type  COPD (chronic obstructive pulmonary disease)  History of esophageal dilatation  S/P cholecystectomy  H/O prostatectomy      BMI:  29.3    CrCl: 39.7    [id number] Score: 6 (high)    IMPROVE Bleeding Risk Score: 4.5 (mod/high)    Falls Risk:   High (  )  Mod (  )  Low ( x )    : Patient seen walking in hallway with RN. No obvious FRANZ. Discussed Coumadin and INR elevated at 3.27. Currently on Zosyn IV- explained that this may impact INR and can elevate. Will hold coumadin. Spoke to wife in room and explained INR and coumadin being held.  6/10: Patient seen at bedside- resting but arousable. Explained briefly that INR is within range and will resume regular coumadin schedule.    FAMILY HISTORY:  Family history of liver cancer (Father): father  85 liver CA  Family history of coronary arteriosclerosis (Mother): Mom  of MI age 68    Denies any personal or familial history of clotting or bleeding disorders.    Allergies    Privigen (Other (Severe))    Intolerances      REVIEW OF SYSTEMS    (  )Fever	     (  )Constipation	(  )SOB				(  )Headache	(  )Dysuria  (  )Chills	     (  )Melena	(  )Dyspnea present on exertion	                    (  )Dizziness                    (  )Polyuria  (  )Nausea	     (  )Hematochezia	(  )Cough			                    (  )Syncope   	(  )Hematuria  (  )Vomiting    (  )Chest Pain	(  )Wheezing			(  )Weakness  (  )Diarrhea     (  )Palpitations	(  )Anorexia			(  )Myalgia    All other review of systems negative: Yes    Unable to obtain review of systems due to:      PHYSICAL EXAM:    Constitutional: Appears Well    Neurological: A& O x 3    Skin: Warm    Respiratory and Thorax: normal effort; Breath sounds: decreased  	  Cardiovascular: S1, S2, regular, NMBR	    Gastrointestinal: BS + x 4Q, nontender	    Genitourinary:  Bladder nondistended, nontender    Musculoskeletal:   General Right:   no muscle/joint tenderness,   normal tone, no joint swelling,   ROM: full	    General Left:   no muscle/joint tenderness,   normal tone, no joint swelling,   ROM: full      Lower extrems:   Right: no calf tenderness              negative adrianna's sign               + pedal pulses    Left:   no calf tenderness              negative adrianna's sign               + pedal pulses                          12.3   10.7  )-----------( 111      ( 10 Víctor 2017 05:52 )             35.6       06-10    142  |  107  |  19  ----------------------------<  147<H>  4.0   |  27  |  1.23    Ca    8.6      10 Víctor 2017 05:52  Mg     2.1     06-09    TPro  5.9<L>  /  Alb  3.3  /  TBili  0.7  /  DBili  x   /  AST  15  /  ALT  18  /  AlkPhos  104  06-09      PT/INR - ( 10 Víctor 2017 05:52 )   PT: 23.8 sec;   INR: 2.17 ratio      PT/INR - ( 2017 05:39 )   PT: 36.2 sec;   INR: 3.27 ratio         MEDICATIONS  (STANDING):  multivitamin 1Tablet(s) Oral daily  montelukast 10milliGRAM(s) Oral at bedtime  metoprolol 50milliGRAM(s) Oral two times a day  simvastatin 10milliGRAM(s) Oral at bedtime  amiodarone    Tablet 100milliGRAM(s) Oral daily  gabapentin 300milliGRAM(s) Oral three times a day  tenofovir 300milliGRAM(s) Oral daily  insulin lispro (HumaLOG) corrective regimen sliding scale  SubCutaneous three times a day before meals  dextrose 5%. 1000milliLiter(s) IV Continuous <Continuous>  dextrose 50% Injectable 12.5Gram(s) IV Push once  dextrose 50% Injectable 25Gram(s) IV Push once  dextrose 50% Injectable 25Gram(s) IV Push once  piperacillin/tazobactam IVPB. 3.375Gram(s) IV Intermittent every 8 hours  insulin glargine Injectable (LANTUS) 12Unit(s) SubCutaneous at bedtime  guaiFENesin    Syrup 200milliGRAM(s) Oral every 6 hours  losartan 50milliGRAM(s) Oral daily  warfarin 2.5milliGRAM(s) Oral once      Vital Signs Last 24 Hrs  T(C): 36.6, Max: 37.2 ( @ 21:18)  T(F): 97.8, Max: 99 ( @ 21:18)  HR: 91 (75 - 91)  BP: 105/70 (85/52 - 110/61)  BP(mean): --  RR: 18 (18 - 18)  SpO2: 96% (94% - 96%)    DVT Prophylaxis:  LMWH                   (  )  Heparin SQ           (  )  Coumadin             (x )  Xarelto                  (  )  Eliquis                   (  )  Venodynes           (x  )  Ambulation          ( x )  UFH                       (  )  Contraindicated  (  )
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 The patient denies any recent sick contacts, or denies recent travel but states that since discharge his "cough" has not improved "much." Of note the patient was admitted from 5/10 - 17 when he was treated for acute hypoxic respiratory failure due to pneumonia.     Patient familiar to our service- have been managing coumadin as outpatient. Usual dose is 3.75mg daily except 2.5mg on 's.  INR's have been relatively stable. Currently supratherapeutic in setting of acute infection (pneumonia) and antibiotic treatment with Zosyn.      Patient is a 77y old  Male who presents with a chief complaint of coughing history of pneumonia fever for 4 days and worse cough (2017 21:44)      Consulted by Dr. Subramanian for VTE prophylaxis, risk stratification, and anticoagulation management.    PAST MEDICAL & SURGICAL HISTORY:  Hypogammaglobulinemia: received IVIG  Hepatitis B virus infection, unspecified chronicity: retrovirals  Chronic diastolic congestive heart failure  Essential hypertension  DM type 2 (diabetes mellitus, type 2)  B-cell lymphoma, unspecified B-cell lymphoma type, unspecified body region: CLL stage 4/SLL; met NHL  NHL (non-Hodgkin&#x27;s lymphoma)  Lymphoma, unspecified body region, unspecified lymphoma type  Atrial fibrillation, unspecified type  COPD (chronic obstructive pulmonary disease)  History of esophageal dilatation  S/P cholecystectomy  H/O prostatectomy      BMI:  29.3    CrCl: 39.7    [id number] Score: 6 (high)    IMPROVE Bleeding Risk Score: 4.5 (mod/high)    Falls Risk:   High (  )  Mod (  )  Low ( x )    : Patient seen walking in hallway with RN. No obvious FRANZ. Discussed Coumadin and INR elevated at 3.27. Currently on Zosyn IV- explained that this may impact INR and can elevate. Will hold coumadin. Spoke to wife in room and explained INR and coumadin being held.  6/10: Patient seen at bedside- resting but arousable. Explained briefly that INR is within range and will resume regular coumadin schedule.  : Patient seen ambulating to - discussed anticoagulation. May be going home today- reiterated to take 3.75mg daily and see us in clinic early this week. Patient verbalized understanding.  17 pt seen at bedside dressed to go home wife present.  used  phone # [phone].  Discussed his dose of 3.75mg daily til repeat inr on Thursday this week at 11 am in anticoagulation office.  they verbalized understanding.       FAMILY HISTORY:  Family history of liver cancer (Father): father  85 liver CA  Family history of coronary arteriosclerosis (Mother): Mom  of MI age 68    Denies any personal or familial history of clotting or bleeding disorders.    Allergies    Privigen (Other (Severe))    Intolerances      REVIEW OF SYSTEMS    (  )Fever	     (  )Constipation	(  )SOB				(  )Headache	(  )Dysuria  (  )Chills	     (  )Melena	(  )Dyspnea present on exertion	                    (  )Dizziness                    (  )Polyuria  (  )Nausea	     (  )Hematochezia	(  )Cough			                    (  )Syncope   	(  )Hematuria  (  )Vomiting    (  )Chest Pain	(  )Wheezing			(  )Weakness  (  )Diarrhea     (  )Palpitations	(  )Anorexia			(  )Myalgia    All other review of systems negative: Yes      PHYSICAL EXAM:    Constitutional: Appears Well    Neurological: A& O x 3    Skin: Warm    Respiratory and Thorax: normal effort; Breath sounds: decreased  	  Cardiovascular: S1, S2, regular, NMBR	    Gastrointestinal: BS + x 4Q, nontender	    Genitourinary:  Bladder nondistended, nontender    Musculoskeletal:   General Right:   no muscle/joint tenderness,   normal tone, no joint swelling,   ROM: full	    General Left:   no muscle/joint tenderness,   normal tone, no joint swelling,   ROM: full      Lower extrems:   Right: no calf tenderness              negative adrianna's sign               + pedal pulses    Left:   no calf tenderness              negative adrianna's sign               + pedal pulses                        11.8   9.7   )-----------( 116      ( 2017 05:45 )             35.1       06-12    140  |  104  |  20  ----------------------------<  161<H>  4.4   |  28  |  1.44<H>    Ca    8.2<L>      2017 05:45                            12.3   10.7  )-----------( 111      ( 10 Víctor 2017 05:52 )             35.6       06-10    142  |  107  |  19  ----------------------------<  147<H>  4.0   |  27  |  1.23    Ca    8.6      10 Víctor 2017 05:52    PT/INR - ( 2017 05:45 )   PT: 21.9 sec;   INR: 2.00 ratio      PT/INR - ( 2017 05:41 )   PT: 21.4 sec;   INR: 1.95 ratio         PT/INR - ( 10 Víctor 2017 05:52 )   PT: 23.8 sec;   INR: 2.17 ratio      PT/INR - ( 2017 05:39 )   PT: 36.2 sec;   INR: 3.27 ratio         MEDICATIONS  (STANDING):  multivitamin 1Tablet(s) Oral daily  montelukast 10milliGRAM(s) Oral at bedtime  simvastatin 10milliGRAM(s) Oral at bedtime  amiodarone    Tablet 100milliGRAM(s) Oral daily  gabapentin 300milliGRAM(s) Oral three times a day  tenofovir 300milliGRAM(s) Oral daily  insulin lispro (HumaLOG) corrective regimen sliding scale  SubCutaneous three times a day before meals  dextrose 5%. 1000milliLiter(s) IV Continuous <Continuous>  dextrose 50% Injectable 12.5Gram(s) IV Push once  dextrose 50% Injectable 25Gram(s) IV Push once  dextrose 50% Injectable 25Gram(s) IV Push once  piperacillin/tazobactam IVPB. 3.375Gram(s) IV Intermittent every 8 hours  insulin glargine Injectable (LANTUS) 12Unit(s) SubCutaneous at bedtime  guaiFENesin    Syrup 200milliGRAM(s) Oral every 6 hours  insulin lispro (HumaLOG) corrective regimen sliding scale  SubCutaneous at bedtime  warfarin 3.75milliGRAM(s) Oral daily  losartan 25milliGRAM(s) Oral daily  metoprolol 25milliGRAM(s) Oral two times a day      Vital Signs Last 24 Hrs  T(C): 36.7, Max: 37.1 (-11 @ 12:46)  T(F): 98, Max: 98.7 (-11 @ 12:46)  HR: 100 (79 - 100)  BP: 90/57 (90/57 - 133/60)  BP(mean): --  RR: 18 (18 - 18)  SpO2: 94% (91% - 94%)    DVT Prophylaxis:  LMWH                   (  )  Heparin SQ           (  )  Coumadin             (x )  Xarelto                  (  )  Eliquis                   (  )  Venodynes           (x  )  Ambulation          ( x )  UFH                       (  )  Contraindicated  (  )
CC: Coughing and fever    HPI:  78 y/o M PMHx significant for Atrial fibrillation (on VKA therapy), B-cell CLL stage 4/SLL not currently on any treatment, hx of hypogammaglobulinemia s/p failed IVIG infusions 2/2 to immediate type hypersensitivity reactions, chronic diastolic congestive heart failure (LVEF 55-60% in ), COPD, DM type 2, Essential hypertension, Hepatitis B, hx of PE/DVT, and recurrent PNA who presents to the ED for further evaluation of progressively worsening productive cough over the past week, and notable intermittent subjective fevers today Tmax in the ED was 101.8'F). The patient denies any recent sick contacts, or denies recent travel but states that since discharge his "cough" has not improved "much." Of note the patient was admitted from 5/10 - 17 when he was treated for acute hypoxic respiratory failure due to pneumonia.     Patient familiar to our service- have been managing coumadin as outpatient. Usual dose is 3.75mg daily except 2.5mg on 's.  INR's have been relatively stable. Currently supratherapeutic in setting of acute infection (pneumonia) and antibiotic treatment with Zosyn.      Patient is a 77y old  Male who presents with a chief complaint of coughing history of pneumonia fever for 4 days and worse cough (2017 21:44)      Consulted by Dr. Subramanian for VTE prophylaxis, risk stratification, and anticoagulation management.    PAST MEDICAL & SURGICAL HISTORY:  Hypogammaglobulinemia: received IVIG  Hepatitis B virus infection, unspecified chronicity: retrovirals  Chronic diastolic congestive heart failure  Essential hypertension  DM type 2 (diabetes mellitus, type 2)  B-cell lymphoma, unspecified B-cell lymphoma type, unspecified body region: CLL stage 4/SLL; met NHL  NHL (non-Hodgkin&#x27;s lymphoma)  Lymphoma, unspecified body region, unspecified lymphoma type  Atrial fibrillation, unspecified type  COPD (chronic obstructive pulmonary disease)  History of esophageal dilatation  S/P cholecystectomy  H/O prostatectomy      BMI:  29.3    CrCl: 39.7    [id number] Score: 6 (high)    IMPROVE Bleeding Risk Score: 4.5 (mod/high)    Falls Risk:   High (  )  Mod (  )  Low ( x )    : Patient seen walking in hallway with RN. No obvious FRANZ. Discussed Coumadin and INR elevated at 3.27. Currently on Zosyn IV- explained that this may impact INR and can elevate. Will hold coumadin. Spoke to wife in room and explained INR and coumadin being held.  6/10: Patient seen at bedside- resting but arousable. Explained briefly that INR is within range and will resume regular coumadin schedule.  : Patient seen ambulating to - discussed anticoagulation. May be going home today- reiterated to take 2.75mg daily and see us in clinic early this week. Patient verbalized understanding.    FAMILY HISTORY:  Family history of liver cancer (Father): father  85 liver CA  Family history of coronary arteriosclerosis (Mother): Mom  of MI age 68    Denies any personal or familial history of clotting or bleeding disorders.    Allergies    Privigen (Other (Severe))    Intolerances      REVIEW OF SYSTEMS    (  )Fever	     (  )Constipation	(  )SOB				(  )Headache	(  )Dysuria  (  )Chills	     (  )Melena	(  )Dyspnea present on exertion	                    (  )Dizziness                    (  )Polyuria  (  )Nausea	     (  )Hematochezia	(  )Cough			                    (  )Syncope   	(  )Hematuria  (  )Vomiting    (  )Chest Pain	(  )Wheezing			(  )Weakness  (  )Diarrhea     (  )Palpitations	(  )Anorexia			(  )Myalgia    All other review of systems negative: Yes    Unable to obtain review of systems due to:      PHYSICAL EXAM:    Constitutional: Appears Well    Neurological: A& O x 3    Skin: Warm    Respiratory and Thorax: normal effort; Breath sounds: decreased  	  Cardiovascular: S1, S2, regular, NMBR	    Gastrointestinal: BS + x 4Q, nontender	    Genitourinary:  Bladder nondistended, nontender    Musculoskeletal:   General Right:   no muscle/joint tenderness,   normal tone, no joint swelling,   ROM: full	    General Left:   no muscle/joint tenderness,   normal tone, no joint swelling,   ROM: full      Lower extrems:   Right: no calf tenderness              negative adrianna's sign               + pedal pulses    Left:   no calf tenderness              negative adrianna's sign               + pedal pulses                           12.3   10.7  )-----------( 111      ( 10 Víctor 2017 05:52 )             35.6       06-10    142  |  107  |  19  ----------------------------<  147<H>  4.0   |  27  |  1.23    Ca    8.6      10 Víctor 2017 05:52        PT/INR - ( 2017 05:41 )   PT: 21.4 sec;   INR: 1.95 ratio         PT/INR - ( 10 Víctor 2017 05:52 )   PT: 23.8 sec;   INR: 2.17 ratio      PT/INR - ( 2017 05:39 )   PT: 36.2 sec;   INR: 3.27 ratio         MEDICATIONS  (STANDING):  multivitamin 1Tablet(s) Oral daily  montelukast 10milliGRAM(s) Oral at bedtime  metoprolol 50milliGRAM(s) Oral two times a day  simvastatin 10milliGRAM(s) Oral at bedtime  amiodarone    Tablet 100milliGRAM(s) Oral daily  gabapentin 300milliGRAM(s) Oral three times a day  tenofovir 300milliGRAM(s) Oral daily  insulin lispro (HumaLOG) corrective regimen sliding scale  SubCutaneous three times a day before meals  dextrose 5%. 1000milliLiter(s) IV Continuous <Continuous>  dextrose 50% Injectable 12.5Gram(s) IV Push once  dextrose 50% Injectable 25Gram(s) IV Push once  dextrose 50% Injectable 25Gram(s) IV Push once  piperacillin/tazobactam IVPB. 3.375Gram(s) IV Intermittent every 8 hours  insulin glargine Injectable (LANTUS) 12Unit(s) SubCutaneous at bedtime  guaiFENesin    Syrup 200milliGRAM(s) Oral every 6 hours  losartan 50milliGRAM(s) Oral daily  insulin lispro (HumaLOG) corrective regimen sliding scale  SubCutaneous at bedtime  warfarin 3.75milliGRAM(s) Oral daily      Vital Signs Last 24 Hrs  T(C): 36.2, Max: 37.2 (-10 @ 20:20)  T(F): 97.2, Max: 98.9 (-10 @ 20:20)  HR: 79 (79 - 97)  BP: 106/63 (80/50 - 112/69)  BP(mean): --  RR: 18 (18 - 18)  SpO2: 99% (93% - 99%)    DVT Prophylaxis:  LMWH                   (  )  Heparin SQ           (  )  Coumadin             (x )  Xarelto                  (  )  Eliquis                   (  )  Venodynes           (x  )  Ambulation          ( x )  UFH                       (  )  Contraindicated  (  )
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 The patient denies any recent sick contacts, or denies recent travel but states that since discharge his "cough" has not improved "much."     Patient was admitted from 5/10 - 17 when he was treated for acute hypoxic respiratory failure due to pneumonia. In the ED the patient was given Zosyn 3.375g IV x 1, Vancomycin 1g IV x 1. Labs -> WBC 15.8, Hgb/Hct 12.6/38.1, , Blasts 1%, INR 3.8, LA 2.3 -> 0.8, .     6/9 - Patient seen and examined at bedside earlier today, reports productive cough with thick mucus, dyspnea, and pleuritic chest pain, denies dizziness, HA, abd pain, other sx  /10 cough and dyspnea improving, pleuritic CP better, denies abd pain, other sx, afebrile    Review of system- Rest of the review of system are negative except mentioned in HPI    Vital Signs Last 24 Hrs  T(C): 36.5, Max: 37.2 (- @ 21:18)  T(F): 97.7, Max: 99 (- @ 21:18)  HR: 90 (75 - 91)  BP: 101/63 (85/52 - 108/66)  BP(mean): --  RR: 18 (18 - 18)  SpO2: 93% (93% - 96%)  Daily     Daily   Daily Weight in k.5 (2017 09:25)    PHYSICAL EXAM:  GENERAL: NAD  NERVOUS SYSTEM:  Alert & Oriented X3, non- focal exam, Motor Strength 5/5 B/L upper and lower extremities; DTRs 2+ intact and symmetric  HEAD:  Atraumatic, Normocephalic  EYES: EOMI, PERRLA, conjunctiva and sclera clear  HEENT: Moist mucous membranes  NECK: Supple, No JVD  CHEST/LUNG: BS decreased b/l, + wheezing, No rales, no rhonchi.   HEART: Regular rate and rhythm; No murmurs, rubs, or gallops  ABDOMEN: Soft, Nontender, Nondistended; Bowel sounds present  GENITOURINARY- Voiding, no suprapubic tenderness  EXTREMITIES:  2+ Peripheral Pulses, No clubbing, cyanosis, or edema  MUSCULOSKELETAL:- No muscle tenderness, Muscle tone normal, No joint tenderness, no Joint swelling, Joint range of motion-normal  SKIN-no rash, no lesion    LABS:  06-10    142  |  107  |  19  ----------------------------<  147<H>  4.0   |  27  |  1.23    Ca    8.6      10 Víctor 2017 05:52  Mg     2.1         TPro  5.9<L>  /  Alb  3.3  /  TBili  0.7  /  DBili  x   /  AST  15  /  ALT  18  /  AlkPhos  104                              12.3   10.7  )-----------( 111      ( 10 Víctor 2017 05:52 )             35.6             LIVER FUNCTIONS - ( 2017 05:39 )  Alb: 3.3 g/dL / Pro: 5.9 gm/dL / ALK PHOS: 104 U/L / ALT: 18 U/L / AST: 15 U/L / GGT: x             PT/INR - ( 10 Víctor 2017 05:52 )   PT: 23.8 sec;   INR: 2.17 ratio                                     12.8   15.7  )-----------( 111      ( 2017 05:39 )             37.4         141  |  107  |  14  ----------------------------<  126<H>  3.9   |  27  |  1.16    Ca    8.2<L>      2017 05:39  Mg     2.1         TPro  5.9<L>  /  Alb  3.3  /  TBili  0.7  /  DBili  x   /  AST  15  /  ALT  18  /  AlkPhos  104  -    PT/INR - ( 2017 05:39 )   PT: 36.2 sec;   INR: 3.27 ratio         PTT - ( 2017 12:16 )  PTT:34.8 sec  CARDIAC MARKERS ( 2017 12:16 )  <0.015 ng/mL / x     / x     / x     / x          Urinalysis Basic - ( 2017 12:16 )    Color: Yellow / Appearance: Clear / S.005 / pH: x  Gluc: x / Ketone: Negative  / Bili: Negative / Urobili: 1 mg/dL   Blood: x / Protein: 30 mg/dL / Nitrite: Negative   Leuk Esterase: Negative / RBC: x / WBC 0-2   Sq Epi: x / Non Sq Epi: x / Bacteria: Occasional    CAPILLARY BLOOD GLUCOSE  349 (10 Víctor 2017 12:42)  164 (10 Víctor 2017 08:26)  196 (2017 21:18)  237 (2017 16:37)      CAPILLARY BLOOD GLUCOSE  237 (2017 16:37)  279 (2017 11:54)  148 (2017 08:09)  165 (2017 22:24)  187 (2017 20:12)        RECENT CULTURES:  Culture - Blood (17 @ 12:16)    Specimen Source: .Blood Blood    Culture Results:   No growth to date.      RADIOLOGY & ADDITIONAL TESTS:  CT CHEST        2017    Right lower and right middle lobe pneumonia with endobronchial spread.  Diffuse mediastinal, bilateral axillary, and upper abdominal   lymphadenopathy consistent with lymphoma.  CT SINUSES        2017    o prior similar studies are available for review.    There is a large amount of peripheral mucosal inflammation within the   maxillary sinuses and a moderate amount of mucosal inflammation within   the frontal ethmoid and sphenoid sinuses. There is obstruction of the   bilateral ostiomeatal units, sphenoethmoidal recesses, and obstruction of   the left frontal outflow tract.  No hyperdense secretions are identified.     The orbits are unremarkable.      The deep facial spaces are intact.   No radiopaque foreign body is seen.    The nasopharynx is symmetric.  The central skull base is intact.  The   visualized intracranial contents appear unremarkable.         0.9 x 0.5 cm calcified sebaceous cyst left frontal scalp.      IMPRESSION:       Extensive pansinusitis as above.    Current medications:  multivitamin 1Tablet(s) Oral daily  montelukast 10milliGRAM(s) Oral at bedtime  metoprolol 50milliGRAM(s) Oral two times a day  simvastatin 10milliGRAM(s) Oral at bedtime  acetaminophen   Tablet 650milliGRAM(s) Oral every 6 hours PRN  acetaminophen   Tablet. 650milliGRAM(s) Oral every 6 hours PRN  ondansetron Injectable 4milliGRAM(s) IV Push every 6 hours PRN  senna 2Tablet(s) Oral at bedtime PRN  docusate sodium 100milliGRAM(s) Oral three times a day PRN  losartan 100milliGRAM(s) Oral daily  amiodarone    Tablet 100milliGRAM(s) Oral daily  gabapentin 300milliGRAM(s) Oral three times a day  tenofovir 300milliGRAM(s) Oral daily  insulin lispro (HumaLOG) corrective regimen sliding scale  SubCutaneous three times a day before meals  dextrose 5%. 1000milliLiter(s) IV Continuous <Continuous>  dextrose Gel 1Dose(s) Oral once PRN  dextrose 50% Injectable 12.5Gram(s) IV Push once  dextrose 50% Injectable 25Gram(s) IV Push once  dextrose 50% Injectable 25Gram(s) IV Push once  glucagon  Injectable 1milliGRAM(s) IntraMuscular once PRN  ALBUTerol/ipratropium for Nebulization 3milliLiter(s) Nebulizer every 6 hours PRN  piperacillin/tazobactam IVPB. 3.375Gram(s) IV Intermittent every 8 hours  insulin glargine Injectable (LANTUS) 12Unit(s) SubCutaneous at bedtime  guaiFENesin    Syrup 200milliGRAM(s) Oral every 6 hours  HYDROcodone/homatropine Syrup 10milliLiter(s) Oral at bedtime PRN
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 The patient denies any recent sick contacts, or denies recent travel but states that since discharge his "cough" has not improved "much."     Patient was admitted from 5/10 - 17 when he was treated for acute hypoxic respiratory failure due to pneumonia. In the ED the patient was given Zosyn 3.375g IV x 1, Vancomycin 1g IV x 1. Labs -> WBC 15.8, Hgb/Hct 12.6/38.1, , Blasts 1%, INR 3.8, LA 2.3 -> 0.8, .     6/9 - Patient seen and examined at bedside earlier today, reports productive cough with thick mucus, dyspnea, and pleuritic chest pain, denies dizziness, HA, abd pain, other sx  6/10 cough and dyspnea improving, pleuritic CP better, denies abd pain, other sx, afebrile   cough minimal, denies dyspnea , denies pleuritic chest pain, afebrile, tolerates Abx    Review of system- Rest of the review of system are negative except mentioned in HPI    Vital Signs Last 24 Hrs  T(C): 36.2, Max: 37.2 (-10 @ 20:20)  T(F): 97.2, Max: 98.9 (06-10 @ 20:20)  HR: 79 (79 - 97)  BP: 106/63 (80/50 - 112/69)  BP(mean): --  RR: 18 (18 - 18)  SpO2: 99% (93% - 99%)  Daily Weight in k.5 (2017 09:25)    PHYSICAL EXAM:  GENERAL: NAD  NERVOUS SYSTEM:  Alert & Oriented X3, non- focal exam, Motor Strength 5/5 B/L upper and lower extremities; DTRs 2+ intact and symmetric  HEAD:  Atraumatic, Normocephalic  EYES: EOMI, PERRLA, conjunctiva and sclera clear  HEENT: Moist mucous membranes  NECK: Supple, No JVD  CHEST/LUNG: BS decreased b/l, occasional wheezing, No rales, no rhonchi.   HEART: Regular rate and rhythm; No murmurs, rubs, or gallops  ABDOMEN: Soft, Nontender, Nondistended; Bowel sounds present  GENITOURINARY- Voiding, no suprapubic tenderness  EXTREMITIES:  2+ Peripheral Pulses, No clubbing, cyanosis, or edema  MUSCULOSKELETAL:- No muscle tenderness, Muscle tone normal, No joint tenderness, no Joint swelling, Joint range of motion-normal  SKIN-no rash, no lesion    LABS:      138  |  104  |  19  ----------------------------<  269<H>  4.7   |  28  |  1.48<H>    Ca    8.2<L>      2017 10:44                          12.5   10.9  )-----------( 121      ( 2017 10:44 )             37.5       PT/INR - ( 2017 05:41 )   PT: 21.4 sec;   INR: 1.95 ratio         06-10    142  |  107  |  19  ----------------------------<  147<H>  4.0   |  27  |  1.23    Ca    8.6      10 Víctor 2017 05:52  Mg     2.1         TPro  5.9<L>  /  Alb  3.3  /  TBili  0.7  /  DBili  x   /  AST  15  /  ALT  18  /  AlkPhos  104                              12.3   10.7  )-----------( 111      ( 10 Víctor 2017 05:52 )             35.6             LIVER FUNCTIONS - ( 2017 05:39 )  Alb: 3.3 g/dL / Pro: 5.9 gm/dL / ALK PHOS: 104 U/L / ALT: 18 U/L / AST: 15 U/L / GGT: x             PT/INR - ( 10 Víctor 2017 05:52 )   PT: 23.8 sec;   INR: 2.17 ratio                                     12.8   15.7  )-----------( 111      ( 2017 05:39 )             37.4         141  |  107  |  14  ----------------------------<  126<H>  3.9   |  27  |  1.16    Ca    8.2<L>      2017 05:39  Mg     2.1         TPro  5.9<L>  /  Alb  3.3  /  TBili  0.7  /  DBili  x   /  AST  15  /  ALT  18  /  AlkPhos  104      PT/INR - ( 2017 05:39 )   PT: 36.2 sec;   INR: 3.27 ratio         PTT - ( 2017 12:16 )  PTT:34.8 sec  CARDIAC MARKERS ( 2017 12:16 )  <0.015 ng/mL / x     / x     / x     / x          Urinalysis Basic - ( 2017 12:16 )    Color: Yellow / Appearance: Clear / S.005 / pH: x  Gluc: x / Ketone: Negative  / Bili: Negative / Urobili: 1 mg/dL   Blood: x / Protein: 30 mg/dL / Nitrite: Negative   Leuk Esterase: Negative / RBC: x / WBC 0-2   Sq Epi: x / Non Sq Epi: x / Bacteria: Occasional    CAPILLARY BLOOD GLUCOSE  349 (10 Víctor 2017 12:42)  164 (10 Víctor 2017 08:26)  196 (2017 21:18)  237 (2017 16:37)      CAPILLARY BLOOD GLUCOSE  237 (2017 16:37)  279 (2017 11:54)  148 (2017 08:09)  165 (2017 22:24)  187 (2017 20:12)        RECENT CULTURES:  Culture - Blood (17 @ 12:16)    Specimen Source: .Blood Blood    Culture Results:   No growth to date.      RADIOLOGY & ADDITIONAL TESTS:  CT CHEST        2017    Right lower and right middle lobe pneumonia with endobronchial spread.  Diffuse mediastinal, bilateral axillary, and upper abdominal   lymphadenopathy consistent with lymphoma.  CT SINUSES        2017    o prior similar studies are available for review.    There is a large amount of peripheral mucosal inflammation within the   maxillary sinuses and a moderate amount of mucosal inflammation within   the frontal ethmoid and sphenoid sinuses. There is obstruction of the   bilateral ostiomeatal units, sphenoethmoidal recesses, and obstruction of   the left frontal outflow tract.  No hyperdense secretions are identified.     The orbits are unremarkable.      The deep facial spaces are intact.   No radiopaque foreign body is seen.    The nasopharynx is symmetric.  The central skull base is intact.  The   visualized intracranial contents appear unremarkable.         0.9 x 0.5 cm calcified sebaceous cyst left frontal scalp.      IMPRESSION:       Extensive pansinusitis as above.    Current medications:  multivitamin 1Tablet(s) Oral daily  montelukast 10milliGRAM(s) Oral at bedtime  metoprolol 50milliGRAM(s) Oral two times a day  simvastatin 10milliGRAM(s) Oral at bedtime  acetaminophen   Tablet 650milliGRAM(s) Oral every 6 hours PRN  acetaminophen   Tablet. 650milliGRAM(s) Oral every 6 hours PRN  ondansetron Injectable 4milliGRAM(s) IV Push every 6 hours PRN  senna 2Tablet(s) Oral at bedtime PRN  docusate sodium 100milliGRAM(s) Oral three times a day PRN  losartan 100milliGRAM(s) Oral daily  amiodarone    Tablet 100milliGRAM(s) Oral daily  gabapentin 300milliGRAM(s) Oral three times a day  tenofovir 300milliGRAM(s) Oral daily  insulin lispro (HumaLOG) corrective regimen sliding scale  SubCutaneous three times a day before meals  dextrose 5%. 1000milliLiter(s) IV Continuous <Continuous>  dextrose Gel 1Dose(s) Oral once PRN  dextrose 50% Injectable 12.5Gram(s) IV Push once  dextrose 50% Injectable 25Gram(s) IV Push once  dextrose 50% Injectable 25Gram(s) IV Push once  glucagon  Injectable 1milliGRAM(s) IntraMuscular once PRN  ALBUTerol/ipratropium for Nebulization 3milliLiter(s) Nebulizer every 6 hours PRN  piperacillin/tazobactam IVPB. 3.375Gram(s) IV Intermittent every 8 hours  insulin glargine Injectable (LANTUS) 12Unit(s) SubCutaneous at bedtime  guaiFENesin    Syrup 200milliGRAM(s) Oral every 6 hours  HYDROcodone/homatropine Syrup 10milliLiter(s) Oral at bedtime PRN
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 The patient denies any recent sick contacts, or denies recent travel but states that since discharge his "cough" has not improved "much."     Patient was admitted from 5/10 - 17 when he was treated for acute hypoxic respiratory failure due to pneumonia. In the ED the patient was given Zosyn 3.375g IV x 1, Vancomycin 1g IV x 1. Labs -> WBC 15.8, Hgb/Hct 12.6/38.1, , Blasts 1%, INR 3.8, LA 2.3 -> 0.8, .     6/9 - Patient seen and examined at bedside earlier today, reports productive cough with thick mucus, dyspnea, and pleuritic chest pain, denies dizziness, HA, abd pain, other sx    Review of system- Rest of the review of system are negative except mentioned in HPI    OBJECTIVE:   T(C): 36.8, Max: 38.7 (-08 @ 19:45)  HR: 91 (91 - 107)  BP: 110/61 (96/59 - 139/67)  RR: 18 (17 - 20)  SpO2: 96% (94% - 100%)  Wt(kg): --  Daily     Daily Weight in k.5 (2017 09:25)    PHYSICAL EXAM:  GENERAL: NAD  NERVOUS SYSTEM:  Alert & Oriented X3, non- focal exam, Motor Strength 5/5 B/L upper and lower extremities; DTRs 2+ intact and symmetric  HEAD:  Atraumatic, Normocephalic  EYES: EOMI, PERRLA, conjunctiva and sclera clear  HEENT: Moist mucous membranes  NECK: Supple, No JVD  CHEST/LUNG: BS decreased b/l, + wheezing, No rales, no rhonchi.   HEART: Regular rate and rhythm; No murmurs, rubs, or gallops  ABDOMEN: Soft, Nontender, Nondistended; Bowel sounds present  GENITOURINARY- Voiding, no suprapubic tenderness  EXTREMITIES:  2+ Peripheral Pulses, No clubbing, cyanosis, or edema  MUSCULOSKELETAL:- No muscle tenderness, Muscle tone normal, No joint tenderness, no Joint swelling, Joint range of motion-normal  SKIN-no rash, no lesion    LABS:                        12.8   15.7  )-----------( 111      ( 2017 05:39 )             37.4         141  |  107  |  14  ----------------------------<  126<H>  3.9   |  27  |  1.16    Ca    8.2<L>      2017 05:39  Mg     2.1         TPro  5.9<L>  /  Alb  3.3  /  TBili  0.7  /  DBili  x   /  AST  15  /  ALT  18  /  AlkPhos  104  -09    PT/INR - ( 2017 05:39 )   PT: 36.2 sec;   INR: 3.27 ratio         PTT - ( 2017 12:16 )  PTT:34.8 sec  CARDIAC MARKERS ( 2017 12:16 )  <0.015 ng/mL / x     / x     / x     / x          Urinalysis Basic - ( 2017 12:16 )    Color: Yellow / Appearance: Clear / S.005 / pH: x  Gluc: x / Ketone: Negative  / Bili: Negative / Urobili: 1 mg/dL   Blood: x / Protein: 30 mg/dL / Nitrite: Negative   Leuk Esterase: Negative / RBC: x / WBC 0-2   Sq Epi: x / Non Sq Epi: x / Bacteria: Occasional        CAPILLARY BLOOD GLUCOSE  237 (2017 16:37)  279 (2017 11:54)  148 (2017 08:09)  165 (2017 22:24)  187 (2017 20:12)        RECENT CULTURES:  Culture - Blood (17 @ 12:16)    Specimen Source: .Blood Blood    Culture Results:   No growth to date.      RADIOLOGY & ADDITIONAL TESTS:  CT CHEST        2017    Right lower and right middle lobe pneumonia with endobronchial spread.  Diffuse mediastinal, bilateral axillary, and upper abdominal   lymphadenopathy consistent with lymphoma.  CT SINUSES        2017    o prior similar studies are available for review.    There is a large amount of peripheral mucosal inflammation within the   maxillary sinuses and a moderate amount of mucosal inflammation within   the frontal ethmoid and sphenoid sinuses. There is obstruction of the   bilateral ostiomeatal units, sphenoethmoidal recesses, and obstruction of   the left frontal outflow tract.  No hyperdense secretions are identified.     The orbits are unremarkable.      The deep facial spaces are intact.   No radiopaque foreign body is seen.    The nasopharynx is symmetric.  The central skull base is intact.  The   visualized intracranial contents appear unremarkable.         0.9 x 0.5 cm calcified sebaceous cyst left frontal scalp.      IMPRESSION:       Extensive pansinusitis as above.    Current medications:  multivitamin 1Tablet(s) Oral daily  montelukast 10milliGRAM(s) Oral at bedtime  metoprolol 50milliGRAM(s) Oral two times a day  simvastatin 10milliGRAM(s) Oral at bedtime  acetaminophen   Tablet 650milliGRAM(s) Oral every 6 hours PRN  acetaminophen   Tablet. 650milliGRAM(s) Oral every 6 hours PRN  ondansetron Injectable 4milliGRAM(s) IV Push every 6 hours PRN  senna 2Tablet(s) Oral at bedtime PRN  docusate sodium 100milliGRAM(s) Oral three times a day PRN  losartan 100milliGRAM(s) Oral daily  amiodarone    Tablet 100milliGRAM(s) Oral daily  gabapentin 300milliGRAM(s) Oral three times a day  tenofovir 300milliGRAM(s) Oral daily  insulin lispro (HumaLOG) corrective regimen sliding scale  SubCutaneous three times a day before meals  dextrose 5%. 1000milliLiter(s) IV Continuous <Continuous>  dextrose Gel 1Dose(s) Oral once PRN  dextrose 50% Injectable 12.5Gram(s) IV Push once  dextrose 50% Injectable 25Gram(s) IV Push once  dextrose 50% Injectable 25Gram(s) IV Push once  glucagon  Injectable 1milliGRAM(s) IntraMuscular once PRN  ALBUTerol/ipratropium for Nebulization 3milliLiter(s) Nebulizer every 6 hours PRN  piperacillin/tazobactam IVPB. 3.375Gram(s) IV Intermittent every 8 hours  insulin glargine Injectable (LANTUS) 12Unit(s) SubCutaneous at bedtime  guaiFENesin    Syrup 200milliGRAM(s) Oral every 6 hours  HYDROcodone/homatropine Syrup 10milliLiter(s) Oral at bedtime PRN

## 2017-06-12 NOTE — DISCHARGE NOTE ADULT - MEDICATION SUMMARY - MEDICATIONS TO TAKE
I will START or STAY ON the medications listed below when I get home from the hospital:    losartan 25 mg oral tablet  -- 1 tab(s) by mouth once a day  -- Indication: For Essential hypertension    amiodarone 100 mg oral tablet  -- 100 milligram(s) by mouth once a day  -- Indication: For Atrial fibrillation, unspecified type    warfarin  -- 3.75 milligram(s) by mouth once a day (at bedtime)  1.5 tabs of 2.5 mg of warfarin, cehck INR in 2-3 days  -- Indication: For Atrial fibrillation, unspecified type    gabapentin 300 mg oral capsule  -- 1 cap(s) by mouth 3 times a day  -- Indication: For Pain    Lantus  -- 30 unit(s) subcutaneous 2 times a day  -- Indication: For DM type 2 (diabetes mellitus, type 2)    simvastatin 10 mg oral tablet  -- 1 tab(s) by mouth once a day (at bedtime)  -- Indication: For High cholesterol    tenofovir 300 mg oral tablet  -- 1 tab(s) by mouth once a day  -- Indication: For B-cell lymphoma, unspecified B-cell lymphoma type, unspecified body region    metoprolol tartrate 50 mg oral tablet  -- 0.5 tab(s) by mouth 2 times a day  -- Indication: For Essential hypertension    Ceftin 500 mg oral tablet  -- 1 tab(s) by mouth 2 times a day, and than 1 tab once a day  -- Finish all this medication unless otherwise directed by prescriber.  Medication should be taken with plenty of water.  Take with food or milk.    -- Indication: For Pneumonia    guaiFENesin 100 mg/5 mL oral liquid  -- 10 milliliter(s) by mouth every 6 hours  -- Indication: For Pneumonia    docusate sodium 100 mg oral capsule  -- 1 cap(s) by mouth 3 times a day, As needed, Constipation  -- Indication: For COnstipation    senna oral tablet  -- 2 tab(s) by mouth once a day (at bedtime), As needed, Constipation  -- Indication: For COnstipation    montelukast 10 mg oral tablet  -- 1 tab(s) by mouth once a day (at bedtime)  -- Indication: For COPD (chronic obstructive pulmonary disease)    multivitamin  -- 1 tab(s) by mouth once a day  -- Indication: For vitamins I will START or STAY ON the medications listed below when I get home from the hospital:    amiodarone 100 mg oral tablet  -- 100 milligram(s) by mouth once a day  -- Indication: For Atrial fibrillation, unspecified type    warfarin  -- 3.75 milligram(s) by mouth once a day (at bedtime)  1.5 tabs of 2.5 mg of warfarin, cehck INR in 2-3 days  -- Indication: For Atrial fibrillation, unspecified type    gabapentin 300 mg oral capsule  -- 1 cap(s) by mouth 3 times a day  -- Indication: For Pain    Lantus  -- 30 unit(s) subcutaneous 2 times a day  -- Indication: For DM type 2 (diabetes mellitus, type 2)    simvastatin 10 mg oral tablet  -- 1 tab(s) by mouth once a day (at bedtime)  -- Indication: For High cholesterol    tenofovir 300 mg oral tablet  -- 1 tab(s) by mouth once a day  -- Indication: For B-cell lymphoma, unspecified B-cell lymphoma type, unspecified body region    metoprolol tartrate 50 mg oral tablet  -- 0.5 tab(s) by mouth 2 times a day  -- Indication: For Essential hypertension    docusate sodium 100 mg oral capsule  -- 1 cap(s) by mouth 3 times a day, As needed, Constipation  -- Indication: For COnstipation    montelukast 10 mg oral tablet  -- 1 tab(s) by mouth once a day (at bedtime)  -- Indication: For COPD (chronic obstructive pulmonary disease)    multivitamin  -- 1 tab(s) by mouth once a day  -- Indication: For vitamins

## 2017-06-12 NOTE — DISCHARGE NOTE ADULT - PATIENT PORTAL LINK FT
“You can access the [facility] Patient Portal, offered by [facility], by registering with the following website: [email or web address]”

## 2017-06-12 NOTE — CDI QUERY NOTE - NSCDIOTHERTXTBX_GEN_ALL_CORE_HH
As per progress notes patient presents with Pneumonia, Tmax in .8, wbc 15.8, LA 2.3, .  Sepsis documented by ID.  please agree or disagree with sepsis and specify if it was present on admission.

## 2017-06-12 NOTE — DISCHARGE NOTE ADULT - SECONDARY DIAGNOSIS.
B-cell lymphoma, unspecified B-cell lymphoma type, unspecified body region Atrial fibrillation, unspecified type DM type 2 (diabetes mellitus, type 2) COPD (chronic obstructive pulmonary disease) Essential hypertension CKD (chronic kidney disease) stage 3, GFR 30-59 ml/min

## 2017-06-15 ENCOUNTER — APPOINTMENT (OUTPATIENT)
Dept: CARDIOLOGY | Facility: CLINIC | Age: 77
End: 2017-06-15

## 2017-06-15 DIAGNOSIS — Z79.01 LONG TERM (CURRENT) USE OF ANTICOAGULANTS: ICD-10-CM
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
## 2017-06-15 DIAGNOSIS — Z79.4 LONG TERM (CURRENT) USE OF INSULIN: ICD-10-CM

## 2017-06-15 DIAGNOSIS — C91.10 CHRONIC LYMPHOCYTIC LEUKEMIA OF B-CELL TYPE NOT HAVING ACHIEVED REMISSION: ICD-10-CM

## 2017-06-15 DIAGNOSIS — D80.1 NONFAMILIAL HYPOGAMMAGLOBULINEMIA: ICD-10-CM

## 2017-06-15 DIAGNOSIS — Z90.49 ACQUIRED ABSENCE OF OTHER SPECIFIED PARTS OF DIGESTIVE TRACT: ICD-10-CM

## 2017-06-15 DIAGNOSIS — B19.10 UNSPECIFIED VIRAL HEPATITIS B WITHOUT HEPATIC COMA: ICD-10-CM

## 2017-06-15 DIAGNOSIS — Z79.2 LONG TERM (CURRENT) USE OF ANTIBIOTICS: ICD-10-CM

## 2017-06-15 DIAGNOSIS — J44.0 CHRONIC OBSTRUCTIVE PULMONARY DISEASE WITH (ACUTE) LOWER RESPIRATORY INFECTION: ICD-10-CM

## 2017-06-15 DIAGNOSIS — J15.6 PNEUMONIA DUE TO OTHER GRAM-NEGATIVE BACTERIA: ICD-10-CM

## 2017-06-15 DIAGNOSIS — R79.1 ABNORMAL COAGULATION PROFILE: ICD-10-CM

## 2017-06-15 DIAGNOSIS — Z88.8 ALLERGY STATUS TO OTHER DRUGS, MEDICAMENTS AND BIOLOGICAL SUBSTANCES STATUS: ICD-10-CM

## 2017-06-15 DIAGNOSIS — J98.01 ACUTE BRONCHOSPASM: ICD-10-CM

## 2017-06-15 DIAGNOSIS — I48.91 UNSPECIFIED ATRIAL FIBRILLATION: ICD-10-CM

## 2017-06-15 DIAGNOSIS — A41.9 SEPSIS, UNSPECIFIED ORGANISM: ICD-10-CM

## 2017-06-15 DIAGNOSIS — Z86.711 PERSONAL HISTORY OF PULMONARY EMBOLISM: ICD-10-CM

## 2017-06-15 DIAGNOSIS — Z87.01 PERSONAL HISTORY OF PNEUMONIA (RECURRENT): ICD-10-CM

## 2017-06-15 DIAGNOSIS — R05 COUGH: ICD-10-CM

## 2017-06-15 DIAGNOSIS — Z90.79 ACQUIRED ABSENCE OF OTHER GENITAL ORGAN(S): ICD-10-CM

## 2017-06-15 DIAGNOSIS — E11.9 TYPE 2 DIABETES MELLITUS WITHOUT COMPLICATIONS: ICD-10-CM

## 2017-06-15 DIAGNOSIS — I50.32 CHRONIC DIASTOLIC (CONGESTIVE) HEART FAILURE: ICD-10-CM

## 2017-06-15 DIAGNOSIS — Z86.718 PERSONAL HISTORY OF OTHER VENOUS THROMBOSIS AND EMBOLISM: ICD-10-CM

## 2017-06-15 DIAGNOSIS — I11.0 HYPERTENSIVE HEART DISEASE WITH HEART FAILURE: ICD-10-CM

## 2017-06-15 PROBLEM — J44.9 CHRONIC OBSTRUCTIVE PULMONARY DISEASE, UNSPECIFIED: Chronic | Status: ACTIVE | Noted: 2017-02-17

## 2017-06-22 ENCOUNTER — APPOINTMENT (OUTPATIENT)
Dept: CARDIOLOGY | Facility: CLINIC | Age: 77
End: 2017-06-22
Payer: MEDICARE

## 2017-06-22 PROCEDURE — 99213 OFFICE O/P EST LOW 20 MIN: CPT

## 2017-06-22 PROCEDURE — 36416 COLLJ CAPILLARY BLOOD SPEC: CPT

## 2017-06-22 PROCEDURE — 85610 PROTHROMBIN TIME: CPT | Mod: QW

## 2017-06-30 ENCOUNTER — APPOINTMENT (OUTPATIENT)
Dept: CARDIOLOGY | Facility: CLINIC | Age: 77
End: 2017-06-30

## 2017-07-14 ENCOUNTER — APPOINTMENT (OUTPATIENT)
Dept: CARDIOLOGY | Facility: CLINIC | Age: 77
End: 2017-07-14
Payer: MEDICARE

## 2017-07-14 PROCEDURE — 36416 COLLJ CAPILLARY BLOOD SPEC: CPT

## 2017-07-14 PROCEDURE — 99213 OFFICE O/P EST LOW 20 MIN: CPT

## 2017-07-14 PROCEDURE — 85610 PROTHROMBIN TIME: CPT | Mod: QW

## 2017-08-04 ENCOUNTER — APPOINTMENT (OUTPATIENT)
Dept: CARDIOLOGY | Facility: CLINIC | Age: 77
End: 2017-08-04
Payer: MEDICARE

## 2017-08-04 PROCEDURE — 36416 COLLJ CAPILLARY BLOOD SPEC: CPT

## 2017-08-04 PROCEDURE — 85610 PROTHROMBIN TIME: CPT | Mod: QW

## 2017-08-04 PROCEDURE — 99213 OFFICE O/P EST LOW 20 MIN: CPT

## 2017-08-07 ENCOUNTER — APPOINTMENT (OUTPATIENT)
Dept: CARDIOLOGY | Facility: CLINIC | Age: 77
End: 2017-08-07
Payer: MEDICARE

## 2017-08-07 PROCEDURE — 36416 COLLJ CAPILLARY BLOOD SPEC: CPT

## 2017-08-07 PROCEDURE — 85610 PROTHROMBIN TIME: CPT | Mod: QW

## 2017-08-07 PROCEDURE — 99213 OFFICE O/P EST LOW 20 MIN: CPT

## 2017-08-14 ENCOUNTER — APPOINTMENT (OUTPATIENT)
Dept: CARDIOLOGY | Facility: CLINIC | Age: 77
End: 2017-08-14
Payer: MEDICARE

## 2017-08-14 PROCEDURE — 99213 OFFICE O/P EST LOW 20 MIN: CPT

## 2017-08-14 PROCEDURE — 85610 PROTHROMBIN TIME: CPT | Mod: QW

## 2017-08-14 PROCEDURE — 36416 COLLJ CAPILLARY BLOOD SPEC: CPT

## 2017-08-22 ENCOUNTER — APPOINTMENT (OUTPATIENT)
Dept: CARDIOLOGY | Facility: CLINIC | Age: 77
End: 2017-08-22
Payer: MEDICARE

## 2017-08-22 PROCEDURE — 99213 OFFICE O/P EST LOW 20 MIN: CPT

## 2017-08-22 PROCEDURE — 85610 PROTHROMBIN TIME: CPT | Mod: QW

## 2017-08-22 PROCEDURE — 36416 COLLJ CAPILLARY BLOOD SPEC: CPT

## 2017-09-05 ENCOUNTER — APPOINTMENT (OUTPATIENT)
Dept: CARDIOLOGY | Facility: CLINIC | Age: 77
End: 2017-09-05
Payer: MEDICARE

## 2017-09-05 PROCEDURE — 85610 PROTHROMBIN TIME: CPT | Mod: QW

## 2017-09-05 PROCEDURE — 99213 OFFICE O/P EST LOW 20 MIN: CPT

## 2017-09-05 PROCEDURE — 36416 COLLJ CAPILLARY BLOOD SPEC: CPT

## 2017-09-12 ENCOUNTER — APPOINTMENT (OUTPATIENT)
Dept: CARDIOLOGY | Facility: CLINIC | Age: 77
End: 2017-09-12

## 2017-09-12 ENCOUNTER — INPATIENT (INPATIENT)
Facility: HOSPITAL | Age: 77
LOS: 13 days | Discharge: HOME | End: 2017-09-26
Attending: FAMILY MEDICINE | Admitting: INTERNAL MEDICINE
Payer: MEDICARE

## 2017-09-12 VITALS — WEIGHT: 164.02 LBS | HEIGHT: 65 IN

## 2017-09-12 DIAGNOSIS — Z98.890 OTHER SPECIFIED POSTPROCEDURAL STATES: Chronic | ICD-10-CM

## 2017-09-12 DIAGNOSIS — I48.91 UNSPECIFIED ATRIAL FIBRILLATION: ICD-10-CM

## 2017-09-12 DIAGNOSIS — Z86.711 PERSONAL HISTORY OF PULMONARY EMBOLISM: ICD-10-CM

## 2017-09-12 DIAGNOSIS — B34.9 VIRAL INFECTION, UNSPECIFIED: ICD-10-CM

## 2017-09-12 DIAGNOSIS — Z90.49 ACQUIRED ABSENCE OF OTHER SPECIFIED PARTS OF DIGESTIVE TRACT: Chronic | ICD-10-CM

## 2017-09-12 DIAGNOSIS — Z90.79 ACQUIRED ABSENCE OF OTHER GENITAL ORGAN(S): Chronic | ICD-10-CM

## 2017-09-12 DIAGNOSIS — B19.10 UNSPECIFIED VIRAL HEPATITIS B WITHOUT HEPATIC COMA: ICD-10-CM

## 2017-09-12 DIAGNOSIS — E11.9 TYPE 2 DIABETES MELLITUS WITHOUT COMPLICATIONS: ICD-10-CM

## 2017-09-12 DIAGNOSIS — J18.9 PNEUMONIA, UNSPECIFIED ORGANISM: ICD-10-CM

## 2017-09-12 DIAGNOSIS — I10 ESSENTIAL (PRIMARY) HYPERTENSION: ICD-10-CM

## 2017-09-12 DIAGNOSIS — C85.10 UNSPECIFIED B-CELL LYMPHOMA, UNSPECIFIED SITE: ICD-10-CM

## 2017-09-12 DIAGNOSIS — I50.32 CHRONIC DIASTOLIC (CONGESTIVE) HEART FAILURE: ICD-10-CM

## 2017-09-12 DIAGNOSIS — J44.9 CHRONIC OBSTRUCTIVE PULMONARY DISEASE, UNSPECIFIED: ICD-10-CM

## 2017-09-12 LAB
ALBUMIN SERPL ELPH-MCNC: 3.6 G/DL — SIGNIFICANT CHANGE UP (ref 3.3–5)
ALP SERPL-CCNC: 124 U/L — HIGH (ref 40–120)
ALT FLD-CCNC: 39 U/L — SIGNIFICANT CHANGE UP (ref 12–78)
ANION GAP SERPL CALC-SCNC: 6 MMOL/L — SIGNIFICANT CHANGE UP (ref 5–17)
ANISOCYTOSIS BLD QL: SLIGHT — SIGNIFICANT CHANGE UP
APPEARANCE UR: CLEAR — SIGNIFICANT CHANGE UP
APTT BLD: 32.8 SEC — SIGNIFICANT CHANGE UP (ref 27.5–37.4)
AST SERPL-CCNC: 29 U/L — SIGNIFICANT CHANGE UP (ref 15–37)
BACTERIA # UR AUTO: (no result)
BASO STIPL BLD QL SMEAR: SLIGHT — SIGNIFICANT CHANGE UP
BASOPHILS # BLD AUTO: 0.2 K/UL — SIGNIFICANT CHANGE UP (ref 0–0.2)
BILIRUB SERPL-MCNC: 0.7 MG/DL — SIGNIFICANT CHANGE UP (ref 0.2–1.2)
BILIRUB UR-MCNC: NEGATIVE — SIGNIFICANT CHANGE UP
BUN SERPL-MCNC: 16 MG/DL — SIGNIFICANT CHANGE UP (ref 7–23)
CALCIUM SERPL-MCNC: 8.3 MG/DL — LOW (ref 8.5–10.1)
CHLORIDE SERPL-SCNC: 104 MMOL/L — SIGNIFICANT CHANGE UP (ref 96–108)
CO2 SERPL-SCNC: 27 MMOL/L — SIGNIFICANT CHANGE UP (ref 22–31)
COLOR SPEC: YELLOW — SIGNIFICANT CHANGE UP
CREAT SERPL-MCNC: 1.3 MG/DL — SIGNIFICANT CHANGE UP (ref 0.5–1.3)
DACRYOCYTES BLD QL SMEAR: SLIGHT — SIGNIFICANT CHANGE UP
DIFF PNL FLD: NEGATIVE — SIGNIFICANT CHANGE UP
ELLIPTOCYTES BLD QL SMEAR: SLIGHT — SIGNIFICANT CHANGE UP
EOSINOPHIL # BLD AUTO: 0.5 K/UL — SIGNIFICANT CHANGE UP (ref 0–0.5)
EOSINOPHIL NFR BLD AUTO: 1 % — SIGNIFICANT CHANGE UP (ref 0–6)
EPI CELLS # UR: (no result)
GLUCOSE SERPL-MCNC: 243 MG/DL — HIGH (ref 70–99)
GLUCOSE UR QL: NEGATIVE MG/DL — SIGNIFICANT CHANGE UP
HCT VFR BLD CALC: 39.7 % — SIGNIFICANT CHANGE UP (ref 39–50)
HGB BLD-MCNC: 13.2 G/DL — SIGNIFICANT CHANGE UP (ref 13–17)
HYPERCHROMIA BLD QL AUTO: SLIGHT — SIGNIFICANT CHANGE UP
INR BLD: 1.83 RATIO — HIGH (ref 0.88–1.16)
KETONES UR-MCNC: NEGATIVE — SIGNIFICANT CHANGE UP
LACTATE SERPL-SCNC: 1.6 MMOL/L — SIGNIFICANT CHANGE UP (ref 0.7–2)
LEUKOCYTE ESTERASE UR-ACNC: (no result)
LYMPHOCYTES # BLD AUTO: 54 % — HIGH (ref 13–44)
LYMPHOCYTES # BLD AUTO: 8.3 K/UL — HIGH (ref 1–3.3)
LYMPHOCYTES # SPEC AUTO: 1 % — HIGH (ref 0–0)
MACROCYTES BLD QL: SLIGHT — SIGNIFICANT CHANGE UP
MANUAL DIF COMMENT BLD-IMP: SIGNIFICANT CHANGE UP
MCHC RBC-ENTMCNC: 30 PG — SIGNIFICANT CHANGE UP (ref 27–34)
MCHC RBC-ENTMCNC: 33.4 GM/DL — SIGNIFICANT CHANGE UP (ref 32–36)
MCV RBC AUTO: 89.9 FL — SIGNIFICANT CHANGE UP (ref 80–100)
MICROCYTES BLD QL: SLIGHT — SIGNIFICANT CHANGE UP
MONOCYTES # BLD AUTO: 1.5 K/UL — HIGH (ref 0–0.9)
MONOCYTES NFR BLD AUTO: 7 % — SIGNIFICANT CHANGE UP (ref 2–14)
NEUTROPHILS # BLD AUTO: 5.4 K/UL — SIGNIFICANT CHANGE UP (ref 1.8–7.4)
NEUTROPHILS NFR BLD AUTO: 35 % — LOW (ref 43–77)
NEUTS BAND # BLD: 1 % — SIGNIFICANT CHANGE UP (ref 0–8)
NITRITE UR-MCNC: NEGATIVE — SIGNIFICANT CHANGE UP
OVALOCYTES BLD QL SMEAR: SLIGHT — SIGNIFICANT CHANGE UP
PH UR: 7 — SIGNIFICANT CHANGE UP (ref 5–8)
PLAT MORPH BLD: NORMAL — SIGNIFICANT CHANGE UP
PLATELET # BLD AUTO: 126 K/UL — LOW (ref 150–400)
POIKILOCYTOSIS BLD QL AUTO: SLIGHT — SIGNIFICANT CHANGE UP
POLYCHROMASIA BLD QL SMEAR: SLIGHT — SIGNIFICANT CHANGE UP
POTASSIUM SERPL-MCNC: 4.5 MMOL/L — SIGNIFICANT CHANGE UP (ref 3.5–5.3)
POTASSIUM SERPL-SCNC: 4.5 MMOL/L — SIGNIFICANT CHANGE UP (ref 3.5–5.3)
PROT SERPL-MCNC: 6 GM/DL — SIGNIFICANT CHANGE UP (ref 6–8.3)
PROT UR-MCNC: 15 MG/DL
PROTHROM AB SERPL-ACNC: 20 SEC — HIGH (ref 9.8–12.7)
RAPID RVP RESULT: DETECTED
RBC # BLD: 4.42 M/UL — SIGNIFICANT CHANGE UP (ref 4.2–5.8)
RBC # FLD: 14.6 % — HIGH (ref 10.3–14.5)
RBC BLD AUTO: (no result)
RBC CASTS # UR COMP ASSIST: SIGNIFICANT CHANGE UP /HPF (ref 0–4)
RV+EV RNA SPEC QL NAA+PROBE: DETECTED
SODIUM SERPL-SCNC: 137 MMOL/L — SIGNIFICANT CHANGE UP (ref 135–145)
SP GR SPEC: 1 — LOW (ref 1.01–1.02)
UROBILINOGEN FLD QL: 1 MG/DL
VARIANT LYMPHS # BLD: 1 % — SIGNIFICANT CHANGE UP (ref 0–6)
WBC # BLD: 15.9 K/UL — HIGH (ref 3.8–10.5)
WBC # FLD AUTO: 15.9 K/UL — HIGH (ref 3.8–10.5)
WBC UR QL: SIGNIFICANT CHANGE UP

## 2017-09-12 PROCEDURE — 71010: CPT | Mod: 26

## 2017-09-12 PROCEDURE — 99285 EMERGENCY DEPT VISIT HI MDM: CPT

## 2017-09-12 PROCEDURE — 93010 ELECTROCARDIOGRAM REPORT: CPT

## 2017-09-12 RX ORDER — SODIUM CHLORIDE 9 MG/ML
3 INJECTION INTRAMUSCULAR; INTRAVENOUS; SUBCUTANEOUS ONCE
Qty: 0 | Refills: 0 | Status: COMPLETED | OUTPATIENT
Start: 2017-09-12 | End: 2017-09-12

## 2017-09-12 RX ORDER — GABAPENTIN 400 MG/1
300 CAPSULE ORAL THREE TIMES A DAY
Qty: 0 | Refills: 0 | Status: DISCONTINUED | OUTPATIENT
Start: 2017-09-12 | End: 2017-09-23

## 2017-09-12 RX ORDER — IPRATROPIUM/ALBUTEROL SULFATE 18-103MCG
3 AEROSOL WITH ADAPTER (GRAM) INHALATION
Qty: 0 | Refills: 0 | Status: COMPLETED | OUTPATIENT
Start: 2017-09-12 | End: 2017-09-12

## 2017-09-12 RX ORDER — IPRATROPIUM/ALBUTEROL SULFATE 18-103MCG
3 AEROSOL WITH ADAPTER (GRAM) INHALATION EVERY 6 HOURS
Qty: 0 | Refills: 0 | Status: DISCONTINUED | OUTPATIENT
Start: 2017-09-12 | End: 2017-09-23

## 2017-09-12 RX ORDER — DOCUSATE SODIUM 100 MG
100 CAPSULE ORAL THREE TIMES A DAY
Qty: 0 | Refills: 0 | Status: DISCONTINUED | OUTPATIENT
Start: 2017-09-12 | End: 2017-09-23

## 2017-09-12 RX ORDER — ENOXAPARIN SODIUM 100 MG/ML
90 INJECTION SUBCUTANEOUS EVERY 12 HOURS
Qty: 0 | Refills: 0 | Status: DISCONTINUED | OUTPATIENT
Start: 2017-09-12 | End: 2017-09-15

## 2017-09-12 RX ORDER — ACETAMINOPHEN 500 MG
650 TABLET ORAL ONCE
Qty: 0 | Refills: 0 | Status: COMPLETED | OUTPATIENT
Start: 2017-09-12 | End: 2017-09-12

## 2017-09-12 RX ORDER — SIMVASTATIN 20 MG/1
10 TABLET, FILM COATED ORAL AT BEDTIME
Qty: 0 | Refills: 0 | Status: DISCONTINUED | OUTPATIENT
Start: 2017-09-12 | End: 2017-09-23

## 2017-09-12 RX ORDER — GLUCAGON INJECTION, SOLUTION 0.5 MG/.1ML
1 INJECTION, SOLUTION SUBCUTANEOUS ONCE
Qty: 0 | Refills: 0 | Status: DISCONTINUED | OUTPATIENT
Start: 2017-09-12 | End: 2017-09-23

## 2017-09-12 RX ORDER — SODIUM CHLORIDE 9 MG/ML
2500 INJECTION INTRAMUSCULAR; INTRAVENOUS; SUBCUTANEOUS ONCE
Qty: 0 | Refills: 0 | Status: COMPLETED | OUTPATIENT
Start: 2017-09-12 | End: 2017-09-12

## 2017-09-12 RX ORDER — INSULIN LISPRO 100/ML
VIAL (ML) SUBCUTANEOUS
Qty: 0 | Refills: 0 | Status: DISCONTINUED | OUTPATIENT
Start: 2017-09-12 | End: 2017-09-14

## 2017-09-12 RX ORDER — TENOFOVIR DISOPROXIL FUMARATE 300 MG/1
300 TABLET, FILM COATED ORAL DAILY
Qty: 0 | Refills: 0 | Status: DISCONTINUED | OUTPATIENT
Start: 2017-09-12 | End: 2017-09-23

## 2017-09-12 RX ORDER — AMIODARONE HYDROCHLORIDE 400 MG/1
100 TABLET ORAL DAILY
Qty: 0 | Refills: 0 | Status: DISCONTINUED | OUTPATIENT
Start: 2017-09-12 | End: 2017-09-12

## 2017-09-12 RX ORDER — METOPROLOL TARTRATE 50 MG
25 TABLET ORAL
Qty: 0 | Refills: 0 | Status: DISCONTINUED | OUTPATIENT
Start: 2017-09-12 | End: 2017-09-23

## 2017-09-12 RX ORDER — CEFEPIME 1 G/1
1000 INJECTION, POWDER, FOR SOLUTION INTRAMUSCULAR; INTRAVENOUS ONCE
Qty: 0 | Refills: 0 | Status: COMPLETED | OUTPATIENT
Start: 2017-09-12 | End: 2017-09-12

## 2017-09-12 RX ORDER — SODIUM CHLORIDE 9 MG/ML
1000 INJECTION INTRAMUSCULAR; INTRAVENOUS; SUBCUTANEOUS
Qty: 0 | Refills: 0 | Status: DISCONTINUED | OUTPATIENT
Start: 2017-09-12 | End: 2017-09-12

## 2017-09-12 RX ORDER — DEXTROSE 50 % IN WATER 50 %
1 SYRINGE (ML) INTRAVENOUS ONCE
Qty: 0 | Refills: 0 | Status: DISCONTINUED | OUTPATIENT
Start: 2017-09-12 | End: 2017-09-23

## 2017-09-12 RX ORDER — SODIUM CHLORIDE 9 MG/ML
1000 INJECTION, SOLUTION INTRAVENOUS
Qty: 0 | Refills: 0 | Status: DISCONTINUED | OUTPATIENT
Start: 2017-09-12 | End: 2017-09-23

## 2017-09-12 RX ORDER — DEXTROSE 50 % IN WATER 50 %
12.5 SYRINGE (ML) INTRAVENOUS ONCE
Qty: 0 | Refills: 0 | Status: DISCONTINUED | OUTPATIENT
Start: 2017-09-12 | End: 2017-09-23

## 2017-09-12 RX ORDER — CEFEPIME 1 G/1
1000 INJECTION, POWDER, FOR SOLUTION INTRAMUSCULAR; INTRAVENOUS EVERY 24 HOURS
Qty: 0 | Refills: 0 | Status: DISCONTINUED | OUTPATIENT
Start: 2017-09-12 | End: 2017-09-13

## 2017-09-12 RX ORDER — AMIODARONE HYDROCHLORIDE 400 MG/1
100 TABLET ORAL DAILY
Qty: 0 | Refills: 0 | Status: DISCONTINUED | OUTPATIENT
Start: 2017-09-12 | End: 2017-09-23

## 2017-09-12 RX ORDER — WARFARIN SODIUM 2.5 MG/1
2.5 TABLET ORAL DAILY
Qty: 0 | Refills: 0 | Status: COMPLETED | OUTPATIENT
Start: 2017-09-12 | End: 2017-09-14

## 2017-09-12 RX ORDER — VANCOMYCIN HCL 1 G
1000 VIAL (EA) INTRAVENOUS ONCE
Qty: 0 | Refills: 0 | Status: COMPLETED | OUTPATIENT
Start: 2017-09-12 | End: 2017-09-12

## 2017-09-12 RX ORDER — ACETAMINOPHEN 500 MG
650 TABLET ORAL EVERY 6 HOURS
Qty: 0 | Refills: 0 | Status: DISCONTINUED | OUTPATIENT
Start: 2017-09-12 | End: 2017-09-23

## 2017-09-12 RX ORDER — DEXTROSE 50 % IN WATER 50 %
25 SYRINGE (ML) INTRAVENOUS ONCE
Qty: 0 | Refills: 0 | Status: DISCONTINUED | OUTPATIENT
Start: 2017-09-12 | End: 2017-09-23

## 2017-09-12 RX ADMIN — Medication 250 MILLIGRAM(S): at 13:50

## 2017-09-12 RX ADMIN — CEFEPIME 100 MILLIGRAM(S): 1 INJECTION, POWDER, FOR SOLUTION INTRAMUSCULAR; INTRAVENOUS at 13:10

## 2017-09-12 RX ADMIN — WARFARIN SODIUM 2.5 MILLIGRAM(S): 2.5 TABLET ORAL at 22:28

## 2017-09-12 RX ADMIN — Medication 4: at 22:49

## 2017-09-12 RX ADMIN — Medication 3 MILLILITER(S): at 19:47

## 2017-09-12 RX ADMIN — SODIUM CHLORIDE 2500 MILLILITER(S): 9 INJECTION INTRAMUSCULAR; INTRAVENOUS; SUBCUTANEOUS at 12:40

## 2017-09-12 RX ADMIN — Medication 3 MILLILITER(S): at 14:41

## 2017-09-12 RX ADMIN — Medication 100 MILLIGRAM(S): at 22:28

## 2017-09-12 RX ADMIN — Medication 3 MILLILITER(S): at 12:35

## 2017-09-12 RX ADMIN — SODIUM CHLORIDE 3 MILLILITER(S): 9 INJECTION INTRAMUSCULAR; INTRAVENOUS; SUBCUTANEOUS at 12:40

## 2017-09-12 RX ADMIN — ENOXAPARIN SODIUM 90 MILLIGRAM(S): 100 INJECTION SUBCUTANEOUS at 22:27

## 2017-09-12 RX ADMIN — Medication 100 MILLIGRAM(S): at 22:29

## 2017-09-12 RX ADMIN — SIMVASTATIN 10 MILLIGRAM(S): 20 TABLET, FILM COATED ORAL at 22:28

## 2017-09-12 RX ADMIN — Medication 650 MILLIGRAM(S): at 13:10

## 2017-09-12 RX ADMIN — CEFEPIME 100 MILLIGRAM(S): 1 INJECTION, POWDER, FOR SOLUTION INTRAMUSCULAR; INTRAVENOUS at 22:27

## 2017-09-12 RX ADMIN — Medication 25 MILLIGRAM(S): at 22:28

## 2017-09-12 RX ADMIN — GABAPENTIN 300 MILLIGRAM(S): 400 CAPSULE ORAL at 22:28

## 2017-09-12 RX ADMIN — Medication 3 MILLILITER(S): at 16:12

## 2017-09-12 NOTE — ED PROVIDER NOTE - MEDICAL DECISION MAKING DETAILS
Plan labs, IVF, CXR, antibiotics, sepsis workup.  Suspect PNA, likely admission Plan labs, IVF, CXR, antibiotics, sepsis workup.  Suspect PNA, likely admission  Hx of CLL.  Multiple PNAs in the past.  Will do broad spectrum coverage.  Hx of CHF, tolerating fluids for now.

## 2017-09-12 NOTE — H&P ADULT - PROBLEM SELECTOR PLAN 3
- Exacerbated 2/2 to viral illness  - Continue with supplemental O2  - Standing/Prn Nebs - Exacerbated 2/2 to viral illness  - Continue with supplemental O2  - Solumedrol 60mg BID  - Standing/Prn Nebs - Continue with supplemental O2  - Standing/Prn Nebs

## 2017-09-12 NOTE — H&P ADULT - HISTORY OF PRESENT ILLNESS
77 year old Icelandic-speaking male with past medical history of Atrial Fibrillation, 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 Cough is productive in nature with yellowish/whitish sputum. Associated symptoms include SOB and Low-grade Fevers (~100s). Denies sick contacts.    In the ED, patient was empirically treated with IV Vanc, Zosyn, and 2.5L IV NS. RVP positive for Entero/Rhinovirus. 77 year old [place]-speaking male with past medical history of Atrial Fibrillation on coumadin, 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 Cough is productive in nature with yellowish/whitish sputum. Associated symptoms include SOB and Low-grade Fevers (~100s). Denies sick contacts.    In the ED, patient was empirically treated with IV Vanc, Zosyn, and 2.5L IV NS. RVP positive for Entero/Rhinovirus.

## 2017-09-12 NOTE — H&P ADULT - NSHPREVIEWOFSYSTEMS_GEN_ALL_CORE
REVIEW OF SYSTEMS:    CONSTITUTIONAL: +Fever, No weakness, chills, or night sweats  EYES/ENT: No visual changes;  No vertigo or throat pain   NECK: No pain or stiffness  RESPIRATORY: +SOB, +Cough, +Wheezes  CARDIOVASCULAR: No chest pain or palpitations  GASTROINTESTINAL: No abdominal or epigastric pain. No nausea, vomiting, or hematemesis; No diarrhea or constipation. No melena or hematochezia.  GENITOURINARY: No dysuria, frequency or hematuria  NEUROLOGICAL: No numbness or weakness  SKIN: No itching, burning, rashes, or lesions   All other review of systems is negative unless indicated above.

## 2017-09-12 NOTE — H&P ADULT - PROBLEM SELECTOR PLAN 10
- RVP positive for Entero/Rhinovirus  - Given patient is immunocompromised, will continue ABX coverage; can't rule out PNA/HCAP/GNR even if the cxr is negative  -cont cefepime and vanco  - ID Consult  -If not improving, then do CT chest

## 2017-09-12 NOTE — ED ADULT TRIAGE NOTE - CHIEF COMPLAINT QUOTE
patient reports fever x one week, harsh productive cough which now has turned into coughing fit with shortness of breath

## 2017-09-12 NOTE — H&P ADULT - PROBLEM SELECTOR PLAN 6
- 2DEcho (03/16): EF 55-60%  - Continue BB  - No evidence of acute exacerbation  - repeat 2dEcho - 2DEcho (03/16): EF 55-60%  - Continue BB  - No evidence of acute exacerbation  - Repeat 2dEcho

## 2017-09-12 NOTE — ED PROVIDER NOTE - CONSTITUTIONAL, MLM
normal... Febrile but non-toxic, well nourished, awake, alert, oriented to person, place, time/situation and in no apparent distress.

## 2017-09-12 NOTE — ED PROVIDER NOTE - CARE PLAN
Principal Discharge DX:	Pneumonia Principal Discharge DX:	Pneumonia  Secondary Diagnosis:	Enterovirus infection

## 2017-09-12 NOTE — H&P ADULT - PROBLEM SELECTOR PLAN 1
- RVP positive for Entero/Rhinovirus  - Supportive Care  - IVF - RVP positive for Entero/Rhinovirus  - Given patient is immunocompromised, will continue ABX coverage  - ID Consult  - Pulm Consult  - Supportive Care  - IVF - RVP positive for Entero/Rhinovirus  - Given patient is immunocompromised, will continue ABX coverage  - ID Consult  - Pulm Consult  - Supportive Care

## 2017-09-12 NOTE — H&P ADULT - NSHPPHYSICALEXAM_GEN_ALL_CORE
T(C): 36.9 (09-12-17 @ 16:10), Max: 38 (09-12-17 @ 12:31)  HR: 90 (09-12-17 @ 16:10) (90 - 101)  BP: 133/70 (09-12-17 @ 16:10) (133/70 - 138/75)  RR: 16 (09-12-17 @ 16:10) (16 - 18)  SpO2: 93% (09-12-17 @ 16:10) (92% - 93%)    PHYSICAL EXAM:  GENERAL: NAD, well-groomed, well-developed  HEAD:  NC/AT  EYES: EOMI, PERRL, no scleral icterus  HEENT: Moist mucous membranes  NECK: Supple, No JVD  CNS:  Alert & Oriented X3, Motor Strength 5/5 B/L upper and lower extremities  LUNG: Expiratory Wheezes, Fine Crackles at bases of lungs  HEART: RRR; No murmurs, rubs, or gallops  ABDOMEN: +BS, ST/ND/NT  EXTREMITIES:  2+ Peripheral Pulses, No clubbing, cyanosis, or edema  MUSCULOSKELTAL- Joints normal ROM, no Muscle or joint tenderness

## 2017-09-12 NOTE — ED PROVIDER NOTE - OBJECTIVE STATEMENT
78 y/o male with PMHx of HTN, AFib, non-Hodgkin's B-cell lymphoma (not on chemo), T2DM, Hepatitis B, s/p prostatectomy, s/p cholecystectomy presents to the ED c/o cough with fever for more than a week.  +yellow sputum with cough.  +SOB.  Dizziness is associated with cough.  Denies N/V  He was admitted to [facility] recently for PNA.  Nonsmoker.

## 2017-09-12 NOTE — H&P ADULT - NSHPLABSRESULTS_GEN_ALL_CORE
13.2   15.9  )-----------( 126      ( 12 Sep 2017 12:40 )             39.7       CBC Full  -  ( 12 Sep 2017 12:40 )  WBC Count : 15.9 K/uL  Hemoglobin : 13.2 g/dL  Hematocrit : 39.7 %  Platelet Count - Automated : 126 K/uL  Mean Cell Volume : 89.9 fl  Mean Cell Hemoglobin : 30.0 pg  Mean Cell Hemoglobin Concentration : 33.4 gm/dL  Auto Neutrophil # : 5.4 K/uL  Auto Lymphocyte # : 8.3 K/uL  Auto Monocyte # : 1.5 K/uL  Auto Eosinophil # : 0.5 K/uL  Auto Basophil # : 0.2 K/uL  Auto Neutrophil % : 35.0 %  Auto Lymphocyte % : 54.0 %  Auto Monocyte % : 7.0 %  Auto Eosinophil % : 1.0 %  Auto Basophil % : x          137  |  104  |  16  ----------------------------<  243<H>  4.5   |  27  |  1.30    Ca    8.3<L>      12 Sep 2017 12:40    TPro  6.0  /  Alb  3.6  /  TBili  0.7  /  DBili  x   /  AST  29  /  ALT  39  /  AlkPhos  124<H>        LIVER FUNCTIONS - ( 12 Sep 2017 12:40 )  Alb: 3.6 g/dL / Pro: 6.0 gm/dL / ALK PHOS: 124 U/L / ALT: 39 U/L / AST: 29 U/L / GGT: x             PT/INR - ( 12 Sep 2017 12:40 )   PT: 20.0 sec;   INR: 1.83 ratio         PTT - ( 12 Sep 2017 12:40 )  PTT:32.8 sec          Urinalysis Basic - ( 12 Sep 2017 15:24 )    Color: Yellow / Appearance: Clear / S.005 / pH: x  Gluc: x / Ketone: Negative  / Bili: Negative / Urobili: 1 mg/dL   Blood: x / Protein: 15 mg/dL / Nitrite: Negative   Leuk Esterase: Trace / RBC: 0-2 /HPF / WBC 3-5   Sq Epi: x / Non Sq Epi: Moderate / Bacteria: Few            MEDICATIONS  (STANDING):  amiodarone    Tablet 100 milliGRAM(s) Oral daily  warfarin 2.5 milliGRAM(s) Oral daily  gabapentin 300 milliGRAM(s) Oral three times a day  simvastatin 10 milliGRAM(s) Oral at bedtime  tenofovir 300 milliGRAM(s) Oral daily  metoprolol 25 milliGRAM(s) Oral two times a day  docusate sodium 100 milliGRAM(s) Oral three times a day  dextrose 5%. 1000 milliLiter(s) (50 mL/Hr) IV Continuous <Continuous>  cefepime  IVPB 1000 milliGRAM(s) IV Intermittent every 24 hours  ALBUTerol/ipratropium for Nebulization 3 milliLiter(s) Nebulizer every 6 hours

## 2017-09-12 NOTE — H&P ADULT - NSHPOUTPATIENTPROVIDERS_GEN_ALL_CORE
PCP:  H/O: Dr. Smith PCP: Za Bosch  H/O: Dr. Smith  Pulm: Petros  Cardio: Dg PCP: Za Orozco  H/O: Dr. Smith  Pulm: Petros  Cardio: Dg

## 2017-09-12 NOTE — H&P ADULT - ASSESSMENT
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

## 2017-09-12 NOTE — ED PROVIDER NOTE - RESPIRATORY, MLM
+cough, increased work of breathing.  +Congestion in left lower lung.  Breath sounds clear and equal bilaterally.

## 2017-09-12 NOTE — ED ADULT NURSE NOTE - OBJECTIVE STATEMENT
Pt received with report of cough and fever at home. Pt reports that he has been having FRANZ. Pt denies chest pain, but reports pain with strong coughing.  Pt O2 sat in the 80's upon arrival with O2 via nc and neb tx as ordered. Some relief noted.  Low grade fever noted with tylenol admin. Relief noted.

## 2017-09-12 NOTE — H&P ADULT - PROBLEM SELECTOR PLAN 2
- [id number]  - Continue Amiodarone   - Continue Warfarin for Anticoagulation  - INR noted to be subtherapeutic - [id number]  - Continue Lopressor, Amiodarone   - Continue Warfarin for Anticoagulation  - INR noted to be subtherapeutic - [id number]  - Continue Lopressor, Amiodarone   - Continue Warfarin for Anticoagulation  - INR noted to be subtherapeutic  - cover with Lovenox till therapeutic

## 2017-09-12 NOTE — PATIENT PROFILE ADULT. - URINARY CATHETER
Pt seen and examined at bedside without complaints.   Dressing removed to expose abdominal wound. No purulent or bloody discharge from wound. +Granulation tissue. Wet to dry dressing placed with gauze. External dressing with gauze and abdominal pad.   Pt tolerated well.    Thegeovanny FULTON no

## 2017-09-13 LAB
BASOPHILS # BLD AUTO: SIGNIFICANT CHANGE UP K/UL (ref 0–0.2)
BASOPHILS NFR BLD AUTO: 0 % — SIGNIFICANT CHANGE UP (ref 0–2)
CHOLEST SERPL-MCNC: 74 MG/DL — SIGNIFICANT CHANGE UP (ref 10–199)
CULTURE RESULTS: NO GROWTH — SIGNIFICANT CHANGE UP
EOSINOPHIL # BLD AUTO: SIGNIFICANT CHANGE UP K/UL (ref 0–0.5)
EOSINOPHIL NFR BLD AUTO: 6 % — SIGNIFICANT CHANGE UP (ref 0–6)
HBA1C BLD-MCNC: 10.3 % — HIGH (ref 4–5.6)
HCT VFR BLD CALC: 36.5 % — LOW (ref 39–50)
HDLC SERPL-MCNC: 22 MG/DL — LOW (ref 40–125)
HGB BLD-MCNC: 12.4 G/DL — LOW (ref 13–17)
INR BLD: 1.9 RATIO — HIGH (ref 0.88–1.16)
LIPID PNL WITH DIRECT LDL SERPL: 35 MG/DL — SIGNIFICANT CHANGE UP
LYMPHOCYTES # BLD AUTO: 34 % — SIGNIFICANT CHANGE UP (ref 13–44)
LYMPHOCYTES # BLD AUTO: HIGH K/UL (ref 1–3.3)
LYMPHOCYTES # SPEC AUTO: 18 % — HIGH (ref 0–0)
MAGNESIUM SERPL-MCNC: 2 MG/DL — SIGNIFICANT CHANGE UP (ref 1.6–2.6)
MANUAL DIF COMMENT BLD-IMP: SIGNIFICANT CHANGE UP
MCHC RBC-ENTMCNC: 30 PG — SIGNIFICANT CHANGE UP (ref 27–34)
MCHC RBC-ENTMCNC: 33.9 GM/DL — SIGNIFICANT CHANGE UP (ref 32–36)
MCV RBC AUTO: 88.5 FL — SIGNIFICANT CHANGE UP (ref 80–100)
MONOCYTES # BLD AUTO: HIGH K/UL (ref 0–0.9)
MONOCYTES NFR BLD AUTO: 5 % — SIGNIFICANT CHANGE UP (ref 2–14)
NEUTROPHILS # BLD AUTO: SIGNIFICANT CHANGE UP K/UL (ref 1.8–7.4)
NEUTROPHILS NFR BLD AUTO: 35 % — LOW (ref 43–77)
NEUTS BAND # BLD: 2 % — SIGNIFICANT CHANGE UP (ref 0–8)
OVALOCYTES BLD QL SMEAR: SLIGHT — SIGNIFICANT CHANGE UP
PHOSPHATE SERPL-MCNC: 3.4 MG/DL — SIGNIFICANT CHANGE UP (ref 2.5–4.5)
PLAT MORPH BLD: NORMAL — SIGNIFICANT CHANGE UP
PLATELET # BLD AUTO: 112 K/UL — LOW (ref 150–400)
PROTHROM AB SERPL-ACNC: 20.8 SEC — HIGH (ref 9.8–12.7)
RBC # BLD: 4.13 M/UL — LOW (ref 4.2–5.8)
RBC # FLD: 14.5 % — SIGNIFICANT CHANGE UP (ref 10.3–14.5)
RBC BLD AUTO: (no result)
SMUDGE CELLS # BLD: PRESENT — SIGNIFICANT CHANGE UP
SPECIMEN SOURCE: SIGNIFICANT CHANGE UP
TOTAL CHOLESTEROL/HDL RATIO MEASUREMENT: 3.4 RATIO — SIGNIFICANT CHANGE UP (ref 3.4–9.6)
TRIGL SERPL-MCNC: 84 MG/DL — SIGNIFICANT CHANGE UP (ref 10–149)
TSH SERPL-MCNC: 1.32 UIU/ML — SIGNIFICANT CHANGE UP (ref 0.36–3.74)
WBC # BLD: 15.9 K/UL — HIGH (ref 3.8–10.5)
WBC # FLD AUTO: 15.9 K/UL — HIGH (ref 3.8–10.5)

## 2017-09-13 RX ORDER — INSULIN GLARGINE 100 [IU]/ML
10 INJECTION, SOLUTION SUBCUTANEOUS AT BEDTIME
Qty: 0 | Refills: 0 | Status: DISCONTINUED | OUTPATIENT
Start: 2017-09-13 | End: 2017-09-14

## 2017-09-13 RX ORDER — INSULIN LISPRO 100/ML
20 VIAL (ML) SUBCUTANEOUS ONCE
Qty: 0 | Refills: 0 | Status: COMPLETED | OUTPATIENT
Start: 2017-09-13 | End: 2017-09-13

## 2017-09-13 RX ORDER — INSULIN LISPRO 100/ML
7 VIAL (ML) SUBCUTANEOUS ONCE
Qty: 0 | Refills: 0 | Status: COMPLETED | OUTPATIENT
Start: 2017-09-13 | End: 2017-09-13

## 2017-09-13 RX ADMIN — ENOXAPARIN SODIUM 90 MILLIGRAM(S): 100 INJECTION SUBCUTANEOUS at 22:34

## 2017-09-13 RX ADMIN — WARFARIN SODIUM 2.5 MILLIGRAM(S): 2.5 TABLET ORAL at 22:33

## 2017-09-13 RX ADMIN — Medication 20 UNIT(S): at 17:38

## 2017-09-13 RX ADMIN — Medication 25 MILLIGRAM(S): at 05:12

## 2017-09-13 RX ADMIN — GABAPENTIN 300 MILLIGRAM(S): 400 CAPSULE ORAL at 13:56

## 2017-09-13 RX ADMIN — Medication 3 MILLILITER(S): at 19:27

## 2017-09-13 RX ADMIN — Medication 100 MILLIGRAM(S): at 05:13

## 2017-09-13 RX ADMIN — Medication 25 MILLIGRAM(S): at 17:38

## 2017-09-13 RX ADMIN — GABAPENTIN 300 MILLIGRAM(S): 400 CAPSULE ORAL at 22:33

## 2017-09-13 RX ADMIN — Medication 40 MILLIGRAM(S): at 22:33

## 2017-09-13 RX ADMIN — INSULIN GLARGINE 10 UNIT(S): 100 INJECTION, SOLUTION SUBCUTANEOUS at 22:57

## 2017-09-13 RX ADMIN — Medication 7 UNIT(S): at 22:34

## 2017-09-13 RX ADMIN — SIMVASTATIN 10 MILLIGRAM(S): 20 TABLET, FILM COATED ORAL at 22:33

## 2017-09-13 RX ADMIN — Medication 3 MILLILITER(S): at 08:06

## 2017-09-13 RX ADMIN — Medication 100 MILLIGRAM(S): at 22:33

## 2017-09-13 RX ADMIN — ENOXAPARIN SODIUM 90 MILLIGRAM(S): 100 INJECTION SUBCUTANEOUS at 12:08

## 2017-09-13 RX ADMIN — Medication 100 MILLIGRAM(S): at 13:56

## 2017-09-13 RX ADMIN — GABAPENTIN 300 MILLIGRAM(S): 400 CAPSULE ORAL at 05:13

## 2017-09-13 RX ADMIN — Medication 650 MILLIGRAM(S): at 16:17

## 2017-09-13 RX ADMIN — Medication 40 MILLIGRAM(S): at 13:57

## 2017-09-13 RX ADMIN — AMIODARONE HYDROCHLORIDE 100 MILLIGRAM(S): 400 TABLET ORAL at 05:10

## 2017-09-13 RX ADMIN — TENOFOVIR DISOPROXIL FUMARATE 300 MILLIGRAM(S): 300 TABLET, FILM COATED ORAL at 14:19

## 2017-09-13 RX ADMIN — Medication 100 MILLIGRAM(S): at 05:12

## 2017-09-13 NOTE — PROGRESS NOTE ADULT - SUBJECTIVE AND OBJECTIVE BOX
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 Cough is productive in nature with yellowish/whitish sputum. Associated symptoms include SOB and Low-grade Fevers (~100s).         Vital Signs Last 24 Hrs  T(C): 37.1 (13 Sep 2017 04:46), Max: 38 (12 Sep 2017 12:31)  T(F): 98.8 (13 Sep 2017 04:46), Max: 100.4 (12 Sep 2017 12:31)  HR: 88 (13 Sep 2017 04:46) (88 - 109)  BP: 140/68 (13 Sep 2017 04:46) (128/72 - 150/76)  BP(mean): --  RR: 16 (12 Sep 2017 22:10) (16 - 18)  SpO2: 94% (13 Sep 2017 04:46) (92% - 100%)        PHYSICAL EXAM  General Appearance: cooperative, no acute distress,   HEENT: PERRL, conjunctiva clear, EOM's intact, non injected pharynx,  Neck: Supple, , mild adenopathy,   Lungs: Clear to auscultation bilateral,no adventitious breath sounds, prolonged   expiratory phase, few scattered rhonchi  Heart: Regular rate and rhythm, S1, S2 normal, no murmur, rub or gallop  Abdomen: Soft, non-tender, bowel sounds active , no hepatosplenomegaly  Extremities: no cyanosis or edema, no joint swelling  Skin: Skin color, texture normal, no rashes   Neurologic: Alert and oriented X3 , cranial nerves intact, sensory and motor normal,    ECG:    LABS:                          12.4   15.9  )-----------( 112      ( 13 Sep 2017 06:07 )             36.5     09-12    137  |  104  |  16  ----------------------------<  243<H>  4.5   |  27  |  1.30    Ca    8.3<L>      12 Sep 2017 12:40  Phos  3.4     09-13  Mg     2.0     09-13    TPro  6.0  /  Alb  3.6  /  TBili  0.7  /  DBili  x   /  AST  29  /  ALT  39  /  AlkPhos  124<H>  09-12    A/P:     * Viral PNA  - start steroids, c/w nebs  - dc cefepime no need for abx this is viral  - mild thrombocytopenia probab consumption    * AF: stable, in NSR  - on low dose ami    * h/o chr CLL Patient is a 77y old  Male who presents with a chief complaint of CC: Cough x 2 weeks       HPI:  77 year old Senegalese-speaking male with past medical history of Atrial Fibrillation on coumadin, 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 Cough is productive in nature with yellowish/whitish sputum. Associated symptoms include SOB and Low-grade Fevers (~100s).         Vital Signs Last 24 Hrs  T(C): 37.1 (13 Sep 2017 04:46), Max: 38 (12 Sep 2017 12:31)  T(F): 98.8 (13 Sep 2017 04:46), Max: 100.4 (12 Sep 2017 12:31)  HR: 88 (13 Sep 2017 04:46) (88 - 109)  BP: 140/68 (13 Sep 2017 04:46) (128/72 - 150/76)  BP(mean): --  RR: 16 (12 Sep 2017 22:10) (16 - 18)  SpO2: 94% (13 Sep 2017 04:46) (92% - 100%)        PHYSICAL EXAM  General Appearance: cooperative, no acute distress,   HEENT: PERRL, conjunctiva clear, EOM's intact, non injected pharynx,  Neck: Supple, , mild adenopathy,   Lungs: Clear to auscultation bilateral,no adventitious breath sounds, prolonged   expiratory phase, few scattered rhonchi  Heart: Regular rate and rhythm, S1, S2 normal, no murmur, rub or gallop  Abdomen: Soft, non-tender, bowel sounds active , no hepatosplenomegaly  Extremities: no cyanosis or edema, no joint swelling  Skin: Skin color, texture normal, no rashes   Neurologic: Alert and oriented X3 , cranial nerves intact, sensory and motor normal,    ECG:    LABS:                          12.4   15.9  )-----------( 112      ( 13 Sep 2017 06:07 )             36.5     09-12    137  |  104  |  16  ----------------------------<  243<H>  4.5   |  27  |  1.30    Ca    8.3<L>      12 Sep 2017 12:40  Phos  3.4     09-13  Mg     2.0     09-13    TPro  6.0  /  Alb  3.6  /  TBili  0.7  /  DBili  x   /  AST  29  /  ALT  39  /  AlkPhos  124<H>  09-12    A/P:     * Viral PNA  - start steroids, c/w nebs  - dc cefepime no need for abx this is viral  - mild thrombocytopenia probab consumption    * AF: stable, in NSR  - on low dose ami  - INR 1.9    * h/o chr CLL

## 2017-09-14 LAB
ANION GAP SERPL CALC-SCNC: 10 MMOL/L — SIGNIFICANT CHANGE UP (ref 5–17)
BUN SERPL-MCNC: 30 MG/DL — HIGH (ref 7–23)
CALCIUM SERPL-MCNC: 8.2 MG/DL — LOW (ref 8.5–10.1)
CHLORIDE SERPL-SCNC: 101 MMOL/L — SIGNIFICANT CHANGE UP (ref 96–108)
CO2 SERPL-SCNC: 24 MMOL/L — SIGNIFICANT CHANGE UP (ref 22–31)
CREAT SERPL-MCNC: 1.55 MG/DL — HIGH (ref 0.5–1.3)
GLUCOSE SERPL-MCNC: 522 MG/DL — CRITICAL HIGH (ref 70–99)
GLUCOSE SERPL-MCNC: 571 MG/DL — CRITICAL HIGH (ref 70–99)
HCT VFR BLD CALC: 36.1 % — LOW (ref 39–50)
HGB BLD-MCNC: 12.2 G/DL — LOW (ref 13–17)
INR BLD: 1.92 RATIO — HIGH (ref 0.88–1.16)
MCHC RBC-ENTMCNC: 30 PG — SIGNIFICANT CHANGE UP (ref 27–34)
MCHC RBC-ENTMCNC: 33.8 GM/DL — SIGNIFICANT CHANGE UP (ref 32–36)
MCV RBC AUTO: 88.9 FL — SIGNIFICANT CHANGE UP (ref 80–100)
PLATELET # BLD AUTO: 115 K/UL — LOW (ref 150–400)
POTASSIUM SERPL-MCNC: 4.7 MMOL/L — SIGNIFICANT CHANGE UP (ref 3.5–5.3)
POTASSIUM SERPL-SCNC: 4.7 MMOL/L — SIGNIFICANT CHANGE UP (ref 3.5–5.3)
PROTHROM AB SERPL-ACNC: 21 SEC — HIGH (ref 9.8–12.7)
RBC # BLD: 4.06 M/UL — LOW (ref 4.2–5.8)
RBC # FLD: 14.7 % — HIGH (ref 10.3–14.5)
SODIUM SERPL-SCNC: 135 MMOL/L — SIGNIFICANT CHANGE UP (ref 135–145)
WBC # BLD: 17 K/UL — HIGH (ref 3.8–10.5)
WBC # FLD AUTO: 17 K/UL — HIGH (ref 3.8–10.5)

## 2017-09-14 RX ORDER — INSULIN LISPRO 100/ML
VIAL (ML) SUBCUTANEOUS AT BEDTIME
Qty: 0 | Refills: 0 | Status: DISCONTINUED | OUTPATIENT
Start: 2017-09-14 | End: 2017-09-15

## 2017-09-14 RX ORDER — INSULIN LISPRO 100/ML
VIAL (ML) SUBCUTANEOUS
Qty: 0 | Refills: 0 | Status: DISCONTINUED | OUTPATIENT
Start: 2017-09-14 | End: 2017-09-15

## 2017-09-14 RX ORDER — INSULIN GLARGINE 100 [IU]/ML
20 INJECTION, SOLUTION SUBCUTANEOUS AT BEDTIME
Qty: 0 | Refills: 0 | Status: DISCONTINUED | OUTPATIENT
Start: 2017-09-14 | End: 2017-09-15

## 2017-09-14 RX ORDER — INSULIN LISPRO 100/ML
6 VIAL (ML) SUBCUTANEOUS ONCE
Qty: 0 | Refills: 0 | Status: COMPLETED | OUTPATIENT
Start: 2017-09-14 | End: 2017-09-14

## 2017-09-14 RX ORDER — INSULIN LISPRO 100/ML
2 VIAL (ML) SUBCUTANEOUS
Qty: 0 | Refills: 0 | Status: DISCONTINUED | OUTPATIENT
Start: 2017-09-14 | End: 2017-09-15

## 2017-09-14 RX ADMIN — Medication 2 UNIT(S): at 18:39

## 2017-09-14 RX ADMIN — Medication 100 MILLIGRAM(S): at 05:59

## 2017-09-14 RX ADMIN — Medication 4: at 08:42

## 2017-09-14 RX ADMIN — Medication 6 UNIT(S): at 03:41

## 2017-09-14 RX ADMIN — Medication 40 MILLIGRAM(S): at 21:29

## 2017-09-14 RX ADMIN — ENOXAPARIN SODIUM 90 MILLIGRAM(S): 100 INJECTION SUBCUTANEOUS at 21:28

## 2017-09-14 RX ADMIN — Medication 40 MILLIGRAM(S): at 05:59

## 2017-09-14 RX ADMIN — SIMVASTATIN 10 MILLIGRAM(S): 20 TABLET, FILM COATED ORAL at 21:29

## 2017-09-14 RX ADMIN — Medication 25 MILLIGRAM(S): at 05:59

## 2017-09-14 RX ADMIN — Medication 3 MILLILITER(S): at 14:08

## 2017-09-14 RX ADMIN — GABAPENTIN 300 MILLIGRAM(S): 400 CAPSULE ORAL at 15:18

## 2017-09-14 RX ADMIN — Medication 25 MILLIGRAM(S): at 18:40

## 2017-09-14 RX ADMIN — Medication 3 MILLILITER(S): at 19:14

## 2017-09-14 RX ADMIN — Medication 40 MILLIGRAM(S): at 15:18

## 2017-09-14 RX ADMIN — Medication 12: at 18:37

## 2017-09-14 RX ADMIN — Medication 100 MILLIGRAM(S): at 21:29

## 2017-09-14 RX ADMIN — AMIODARONE HYDROCHLORIDE 100 MILLIGRAM(S): 400 TABLET ORAL at 05:58

## 2017-09-14 RX ADMIN — TENOFOVIR DISOPROXIL FUMARATE 300 MILLIGRAM(S): 300 TABLET, FILM COATED ORAL at 12:20

## 2017-09-14 RX ADMIN — Medication 6: at 12:20

## 2017-09-14 RX ADMIN — Medication 100 MILLIGRAM(S): at 15:18

## 2017-09-14 RX ADMIN — WARFARIN SODIUM 2.5 MILLIGRAM(S): 2.5 TABLET ORAL at 21:29

## 2017-09-14 RX ADMIN — ENOXAPARIN SODIUM 90 MILLIGRAM(S): 100 INJECTION SUBCUTANEOUS at 12:20

## 2017-09-14 RX ADMIN — Medication 3 MILLILITER(S): at 08:23

## 2017-09-14 RX ADMIN — GABAPENTIN 300 MILLIGRAM(S): 400 CAPSULE ORAL at 05:59

## 2017-09-14 RX ADMIN — GABAPENTIN 300 MILLIGRAM(S): 400 CAPSULE ORAL at 21:29

## 2017-09-14 RX ADMIN — INSULIN GLARGINE 20 UNIT(S): 100 INJECTION, SOLUTION SUBCUTANEOUS at 21:29

## 2017-09-14 RX ADMIN — Medication 8: at 21:29

## 2017-09-14 NOTE — PROGRESS NOTE ADULT - SUBJECTIVE AND OBJECTIVE BOX
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 Cough is productive in nature with yellowish/whitish sputum. Associated symptoms include SOB and Low-grade Fevers (~100s). Denies sick contacts.    In the ED, patient was empirically treated with IV Vanc, Zosyn, and 2.5L IV NS. RVP positive for Entero/Rhinovirus. (12 Sep 2017 16:42)  pt is well known to me from multiple prior admissions   at bedside  no hemoptysis  no cp      Patient states he is feeling better today  Discussed the +Rhinovirus in nasal swab and let them know that the current treatment is supportive  States that he has been playing with his grandchildren and one of them had similar symptoms      PAST MEDICAL & SURGICAL HISTORY:  Hypogammaglobulinemia: received IVIG  Hepatitis B virus infection, unspecified chronicity: retrovirals  Chronic diastolic congestive heart failure  Essential hypertension  DM type 2 (diabetes mellitus, type 2)  B-cell lymphoma, unspecified B-cell lymphoma type, unspecified body region: CLL stage 4/SLL; met NHL  Atrial fibrillation, unspecified type  COPD (chronic obstructive pulmonary disease)  History of esophageal dilatation  S/P cholecystectomy  H/O prostatectomy      PREVIOUS DIAGNOSTIC TESTING:      MEDICATIONS  (STANDING):  amiodarone    Tablet 100 milliGRAM(s) Oral daily  warfarin 2.5 milliGRAM(s) Oral daily  gabapentin 300 milliGRAM(s) Oral three times a day  simvastatin 10 milliGRAM(s) Oral at bedtime  tenofovir 300 milliGRAM(s) Oral daily  metoprolol 25 milliGRAM(s) Oral two times a day  docusate sodium 100 milliGRAM(s) Oral three times a day  dextrose 5%. 1000 milliLiter(s) (50 mL/Hr) IV Continuous <Continuous>  cefepime  IVPB 1000 milliGRAM(s) IV Intermittent every 24 hours  ALBUTerol/ipratropium for Nebulization 3 milliLiter(s) Nebulizer every 6 hours  enoxaparin Injectable 90 milliGRAM(s) SubCutaneous every 12 hours  insulin lispro (HumaLOG) corrective regimen sliding scale   SubCutaneous three times a day before meals  dextrose 50% Injectable 12.5 Gram(s) IV Push once  dextrose 50% Injectable 25 Gram(s) IV Push once  dextrose 50% Injectable 25 Gram(s) IV Push once    MEDICATIONS  (PRN):  acetaminophen   Tablet 650 milliGRAM(s) Oral every 6 hours PRN For Temp greater than 38 C (100.4 F)  dextrose Gel 1 Dose(s) Oral once PRN Blood Glucose LESS THAN 70 milliGRAM(s)/deciliter  glucagon  Injectable 1 milliGRAM(s) IntraMuscular once PRN Glucose LESS THAN 70 milligrams/deciliter  guaiFENesin   Syrup  (Sugar-Free) 100 milliGRAM(s) Oral every 6 hours PRN Cough      FAMILY HISTORY:  Family history of liver cancer (Father): father  85 liver CA  Family history of coronary arteriosclerosis (Mother): Mom  of MI age 68      SOCIAL HISTORY:  ***    REVIEW OF SYSTEM:  Pertinent items are noted in HPI.  Constitutional negative for chills,pos fevers, no sweats and weight loss  throat, and face:  negative for epistaxis, nasal congestion, sore throat and   tinnitus  Respiratory: pos for cough, dyspnea on exertion, pleuritic chest pain  and wheezing  Cardiovascular:  negative for chest pain, pos dyspnea and palpitations  Gastrointestinal: negative for abdominal pain, diarrhea, nausea and vomiting  Genitourinary: negative for dysuria, frequency and urinary incontinence  Skin:  negative for redness, rash, pruritus, swelling, dryness and   fissures  Hematologic/lymphatic: negative for bleeding and easy bruising  Musculoskeletal: negative for arthralgias, back pain and muscle weakness  Neurological: negative for dizziness, headaches, seizures and tremors  Behavioral/Psych:  negative for mood change, depression, suicidal attempts    Allergic/Immunologic: negative for anaphylaxis, angioedema and urticaria    Vital Signs Last 24 Hrs  T(C): 37.1 (13 Sep 2017 04:46), Max: 38 (12 Sep 2017 12:31)  T(F): 98.8 (13 Sep 2017 04:46), Max: 100.4 (12 Sep 2017 12:31)  HR: 88 (13 Sep 2017 04:46) (88 - 109)  BP: 140/68 (13 Sep 2017 04:46) (128/72 - 150/76)  BP(mean): --  RR: 16 (12 Sep 2017 22:10) (16 - 18)  SpO2: 94% (13 Sep 2017 04:46) (92% - 100%)    I&O's Summary    PHYSICAL EXAM  General Appearance: cooperative, no acute distress,   HEENT: PERRL, conjunctiva clear, EOM's intact, non injected pharynx,  Neck: Supple, , mild adenopathy,   Lungs: Clear to auscultation bilateral,no adventitious breath sounds, prolonged   expiratory phase, few scattered rhonchi  Heart: Regular rate and rhythm, S1, S2 normal, no murmur, rub or gallop  Abdomen: Soft, non-tender, bowel sounds active , no hepatosplenomegaly  Extremities: no cyanosis or edema, no joint swelling  Skin: Skin color, texture normal, no rashes   Neurologic: Alert and oriented X3 , cranial nerves intact, sensory and motor normal,    ECG:    LABS:                          12.2   17.0  )-----------( 115      ( 14 Sep 2017 06:08 )             36.1     -12    137  |  104  |  16  ----------------------------<  243<H>  4.5   |  27  |  1.30    Ca    8.3<L>      12 Sep 2017 12:40  Phos  3.4     -  Mg     2.0     -    TPro  6.0  /  Alb  3.6  /  TBili  0.7  /  DBili  x   /  AST  29  /  ALT  39  /  AlkPhos  124<H>  12              PT/INR - ( 13 Sep 2017 06:07 )   PT: 20.8 sec;   INR: 1.90 ratio         PTT - ( 12 Sep 2017 12:40 )  PTT:32.8 sec  Urinalysis Basic - ( 12 Sep 2017 15:24 )    Color: Yellow / Appearance: Clear / S.005 / pH: x  Gluc: x / Ketone: Negative  / Bili: Negative / Urobili: 1 mg/dL   Blood: x / Protein: 15 mg/dL / Nitrite: Negative   Leuk Esterase: Trace / RBC: 0-2 /HPF / WBC 3-5   Sq Epi: x / Non Sq Epi: Moderate / Bacteria: Few            RADIOLOGY & ADDITIONAL STUDIES:    < from: Xray Chest 1 View AP/PA. (17 @ 13:25) >  FINDINGS /   IMPRESSION:     There is no acute consolidation.  There are no pleural effusion. Cardiac   and mediastinal structures are within normal limits.    There are chronic   deformity of right lower RIBs.     < end of copied text >

## 2017-09-14 NOTE — PROGRESS NOTE ADULT - SUBJECTIVE AND OBJECTIVE BOX
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 Cough is productive in nature with yellowish/whitish sputum. Associated symptoms include SOB and Low-grade Fevers (~100s).     9/14: still complains of intermittent whezzing and cob.cough        Vital Signs Last 24 Hrs  T(C): 37.1 (13 Sep 2017 04:46), Max: 38 (12 Sep 2017 12:31)  T(F): 98.8 (13 Sep 2017 04:46), Max: 100.4 (12 Sep 2017 12:31)  HR: 88 (13 Sep 2017 04:46) (88 - 109)  BP: 140/68 (13 Sep 2017 04:46) (128/72 - 150/76)  BP(mean): --  RR: 16 (12 Sep 2017 22:10) (16 - 18)  SpO2: 94% (13 Sep 2017 04:46) (92% - 100%)        PHYSICAL EXAM  General Appearance: cooperative, no acute distress,   HEENT: PERRL, conjunctiva clear, EOM's intact, non injected pharynx,  Neck: Supple, , mild adenopathy,   Lungs: decreased breath sounds b/l,    expiratory phase, few scattered rhonchi  Heart: Regular rate and rhythm, S1, S2 normal, no murmur, rub or gallop  Abdomen: Soft, non-tender, bowel sounds active , no hepatosplenomegaly  Extremities: no cyanosis or edema, no joint swelling  Skin: Skin color, texture normal, no rashes   Neurologic: Alert and oriented X3 , cranial nerves intact, sensory and motor normal,    ECG:    LABS:                          12.4   15.9  )-----------( 112      ( 13 Sep 2017 06:07 )             36.5     09-12    137  |  104  |  16  ----------------------------<  243<H>  4.5   |  27  |  1.30    Ca    8.3<L>      12 Sep 2017 12:40  Phos  3.4     09-13  Mg     2.0     09-13    TPro  6.0  /  Alb  3.6  /  TBili  0.7  /  DBili  x   /  AST  29  /  ALT  39  /  AlkPhos  124<H>  09-12    A/P:     * Viral PNA/EV.RV  - conservative treatment, no abx  - cont steroids, c/w nebs  - mild thrombocytopenia probab consumption  - Leukocytosis accelerated by steroids  - increase lantus in light of steroids    * AF: stable, in NSR  - on low dose ami  - INR 1.9    * h/o chr CLL  outpt treatment

## 2017-09-14 NOTE — PROGRESS NOTE ADULT - ASSESSMENT
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 Cough is productive in nature with yellowish/whitish sputum. Associated symptoms include SOB and Low-grade Fevers (~100s). Denies sick contacts.  Hx recurrent pneumonia and Bronchitis  CLL with Immunoglobulin deficiency  Acute URI now with bronchospasm  Non toxic  mild Bronchospasm  COPD  Hx mucus plugging    9/14  Patient improving today with steroids, bronchodilators/nebulizers  Viral swab + RVP thus far, no bacterial positivity   Recommend to continues mobilization as tolerated and patient to receive IV IG rx as outpt

## 2017-09-14 NOTE — PROGRESS NOTE ADULT - SUBJECTIVE AND OBJECTIVE BOX
Notified by RN that patient having BGM's in the 400's.  Patient is curretly on IV solumedrol.  Consider adjusting insulin regiment while in steroids given increasing BGMs.  Additional humalog was given  overnight to BGM in 400's. Notified by RN that patient having BGM's in the 400's.  Patient is curretly on IV solumedrol.  Consider adjusting insulin regiment while on steroids given increasing BGMs.  Additional humalog 6 units was given overnight for BGM in 400's.

## 2017-09-15 LAB
ANION GAP SERPL CALC-SCNC: 10 MMOL/L — SIGNIFICANT CHANGE UP (ref 5–17)
BUN SERPL-MCNC: 32 MG/DL — HIGH (ref 7–23)
CALCIUM SERPL-MCNC: 8.3 MG/DL — LOW (ref 8.5–10.1)
CHLORIDE SERPL-SCNC: 104 MMOL/L — SIGNIFICANT CHANGE UP (ref 96–108)
CO2 SERPL-SCNC: 24 MMOL/L — SIGNIFICANT CHANGE UP (ref 22–31)
CREAT SERPL-MCNC: 1.44 MG/DL — HIGH (ref 0.5–1.3)
GLUCOSE SERPL-MCNC: 342 MG/DL — HIGH (ref 70–99)
HCT VFR BLD CALC: 34 % — LOW (ref 39–50)
HGB BLD-MCNC: 11.4 G/DL — LOW (ref 13–17)
INR BLD: 2.24 RATIO — HIGH (ref 0.88–1.16)
MCHC RBC-ENTMCNC: 30.4 PG — SIGNIFICANT CHANGE UP (ref 27–34)
MCHC RBC-ENTMCNC: 33.6 GM/DL — SIGNIFICANT CHANGE UP (ref 32–36)
MCV RBC AUTO: 90.7 FL — SIGNIFICANT CHANGE UP (ref 80–100)
PLATELET # BLD AUTO: 132 K/UL — LOW (ref 150–400)
POTASSIUM SERPL-MCNC: 4.4 MMOL/L — SIGNIFICANT CHANGE UP (ref 3.5–5.3)
POTASSIUM SERPL-SCNC: 4.4 MMOL/L — SIGNIFICANT CHANGE UP (ref 3.5–5.3)
PROTHROM AB SERPL-ACNC: 24.6 SEC — HIGH (ref 9.8–12.7)
RBC # BLD: 3.75 M/UL — LOW (ref 4.2–5.8)
RBC # FLD: 14.5 % — SIGNIFICANT CHANGE UP (ref 10.3–14.5)
SODIUM SERPL-SCNC: 138 MMOL/L — SIGNIFICANT CHANGE UP (ref 135–145)
WBC # BLD: 25.7 K/UL — HIGH (ref 3.8–10.5)
WBC # FLD AUTO: 25.7 K/UL — HIGH (ref 3.8–10.5)

## 2017-09-15 PROCEDURE — 71010: CPT | Mod: 26

## 2017-09-15 RX ORDER — INSULIN LISPRO 100/ML
VIAL (ML) SUBCUTANEOUS AT BEDTIME
Qty: 0 | Refills: 0 | Status: DISCONTINUED | OUTPATIENT
Start: 2017-09-15 | End: 2017-09-23

## 2017-09-15 RX ORDER — INSULIN LISPRO 100/ML
10 VIAL (ML) SUBCUTANEOUS ONCE
Qty: 0 | Refills: 0 | Status: COMPLETED | OUTPATIENT
Start: 2017-09-15 | End: 2017-09-15

## 2017-09-15 RX ORDER — WARFARIN SODIUM 2.5 MG/1
3 TABLET ORAL ONCE
Qty: 0 | Refills: 0 | Status: COMPLETED | OUTPATIENT
Start: 2017-09-15 | End: 2017-09-15

## 2017-09-15 RX ORDER — INSULIN LISPRO 100/ML
4 VIAL (ML) SUBCUTANEOUS
Qty: 0 | Refills: 0 | Status: DISCONTINUED | OUTPATIENT
Start: 2017-09-15 | End: 2017-09-22

## 2017-09-15 RX ORDER — INSULIN LISPRO 100/ML
VIAL (ML) SUBCUTANEOUS
Qty: 0 | Refills: 0 | Status: DISCONTINUED | OUTPATIENT
Start: 2017-09-15 | End: 2017-09-23

## 2017-09-15 RX ORDER — INSULIN GLARGINE 100 [IU]/ML
10 INJECTION, SOLUTION SUBCUTANEOUS ONCE
Qty: 0 | Refills: 0 | Status: COMPLETED | OUTPATIENT
Start: 2017-09-15 | End: 2017-09-15

## 2017-09-15 RX ORDER — INSULIN GLARGINE 100 [IU]/ML
30 INJECTION, SOLUTION SUBCUTANEOUS AT BEDTIME
Qty: 0 | Refills: 0 | Status: DISCONTINUED | OUTPATIENT
Start: 2017-09-15 | End: 2017-09-17

## 2017-09-15 RX ADMIN — Medication 10 UNIT(S): at 00:42

## 2017-09-15 RX ADMIN — Medication 100 MILLIGRAM(S): at 05:52

## 2017-09-15 RX ADMIN — Medication 3 MILLILITER(S): at 13:42

## 2017-09-15 RX ADMIN — Medication 4: at 21:16

## 2017-09-15 RX ADMIN — Medication 25 MILLIGRAM(S): at 05:53

## 2017-09-15 RX ADMIN — Medication 3 MILLILITER(S): at 20:57

## 2017-09-15 RX ADMIN — Medication 12: at 18:38

## 2017-09-15 RX ADMIN — GABAPENTIN 300 MILLIGRAM(S): 400 CAPSULE ORAL at 21:12

## 2017-09-15 RX ADMIN — Medication 40 MILLIGRAM(S): at 05:53

## 2017-09-15 RX ADMIN — TENOFOVIR DISOPROXIL FUMARATE 300 MILLIGRAM(S): 300 TABLET, FILM COATED ORAL at 18:37

## 2017-09-15 RX ADMIN — Medication 100 MILLIGRAM(S): at 13:18

## 2017-09-15 RX ADMIN — WARFARIN SODIUM 3 MILLIGRAM(S): 2.5 TABLET ORAL at 21:13

## 2017-09-15 RX ADMIN — Medication 2 UNIT(S): at 13:22

## 2017-09-15 RX ADMIN — SIMVASTATIN 10 MILLIGRAM(S): 20 TABLET, FILM COATED ORAL at 21:12

## 2017-09-15 RX ADMIN — Medication 4 UNIT(S): at 18:39

## 2017-09-15 RX ADMIN — Medication 25 MILLIGRAM(S): at 18:38

## 2017-09-15 RX ADMIN — INSULIN GLARGINE 30 UNIT(S): 100 INJECTION, SOLUTION SUBCUTANEOUS at 21:14

## 2017-09-15 RX ADMIN — Medication: at 13:23

## 2017-09-15 RX ADMIN — AMIODARONE HYDROCHLORIDE 100 MILLIGRAM(S): 400 TABLET ORAL at 05:53

## 2017-09-15 RX ADMIN — INSULIN GLARGINE 10 UNIT(S): 100 INJECTION, SOLUTION SUBCUTANEOUS at 14:09

## 2017-09-15 RX ADMIN — Medication 3 MILLILITER(S): at 07:37

## 2017-09-15 RX ADMIN — Medication 100 MILLIGRAM(S): at 21:13

## 2017-09-15 RX ADMIN — Medication 40 MILLIGRAM(S): at 14:08

## 2017-09-15 RX ADMIN — Medication 10: at 10:23

## 2017-09-15 RX ADMIN — GABAPENTIN 300 MILLIGRAM(S): 400 CAPSULE ORAL at 13:18

## 2017-09-15 RX ADMIN — Medication 2 UNIT(S): at 10:25

## 2017-09-15 RX ADMIN — GABAPENTIN 300 MILLIGRAM(S): 400 CAPSULE ORAL at 05:52

## 2017-09-15 NOTE — PROVIDER CONTACT NOTE (CRITICAL VALUE NOTIFICATION) - SITUATION
Dr. Delgadillo made aware of bedtime bgm (516, [phone]). lab confirmed with above result. 20 units of Lantus as pr MD order and 8 units of Humalog as per sliding scale given. As per Dr. Delgadillo recheck in 2 hours.
Already addressed with MD as per day DAYAMI Tong. orders received

## 2017-09-15 NOTE — PROVIDER CONTACT NOTE (CRITICAL VALUE NOTIFICATION) - ACTION/TREATMENT ORDERED:
Recheck of bgm was 427, [phone]. Dr. Beharry made aware. No stat glucose lab as per Dr. Beharry. Orders received for Humalog.

## 2017-09-15 NOTE — PROGRESS NOTE ADULT - SUBJECTIVE AND OBJECTIVE BOX
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 Cough is productive in nature with yellowish/whitish sputum. Associated symptoms include SOB and Low-grade Fevers (~100s).     9/14: still complains of intermittent wheezing and cob.cough  9/15: dyspnea slowly improving although wbc rising quickly on steroids.         Vital Signs Last 24 Hrs  T(C): 37.1 (13 Sep 2017 04:46), Max: 38 (12 Sep 2017 12:31)  T(F): 98.8 (13 Sep 2017 04:46), Max: 100.4 (12 Sep 2017 12:31)  HR: 88 (13 Sep 2017 04:46) (88 - 109)  BP: 140/68 (13 Sep 2017 04:46) (128/72 - 150/76)  BP(mean): --  RR: 16 (12 Sep 2017 22:10) (16 - 18)  SpO2: 94% (13 Sep 2017 04:46) (92% - 100%)        PHYSICAL EXAM  General Appearance: cooperative, no acute distress,   HEENT: PERRL, conjunctiva clear, EOM's intact, non injected pharynx,  Neck: Supple, , mild adenopathy,   Lungs: decreased breath sounds b/l,    expiratory phase, few scattered rhonchi  Heart: Regular rate and rhythm, S1, S2 normal, no murmur, rub or gallop  Abdomen: Soft, non-tender, bowel sounds active , no hepatosplenomegaly  Extremities: no cyanosis or edema, no joint swelling  Skin: Skin color, texture normal, no rashes   Neurologic: Alert and oriented X3 , cranial nerves intact, sensory and motor normal,    ECG:    LABS:                          12.4   15.9  )-----------( 112      ( 13 Sep 2017 06:07 )             36.5     09-12    137  |  104  |  16  ----------------------------<  243<H>  4.5   |  27  |  1.30    Ca    8.3<L>      12 Sep 2017 12:40  Phos  3.4     09-13  Mg     2.0     09-13    TPro  6.0  /  Alb  3.6  /  TBili  0.7  /  DBili  x   /  AST  29  /  ALT  39  /  AlkPhos  124<H>  09-12    A/P:     * Viral PNA/EV.RV  - conservative treatment, no abx  - cont steroids, c/w nebs  - Worsening LLL atelectasis, rising WBC count, obtain CT chest non contrast  - mild thrombocytopenia probable consumption  - Leukocytosis accelerated by steroids  - increase lantus in light of steroids, premeal coverage/iss    * AF: stable, in NSR  - on low dose amio  - INR 2.24, stop lovenox and resume coumadin    * h/o chr CLL  outpt treatment

## 2017-09-15 NOTE — PHYSICAL THERAPY INITIAL EVALUATION ADULT - PLANNED THERAPY INTERVENTIONS, PT EVAL
Pt independent with amb and transfers and is nto a skilled PT candidate. D/C/ PT. Eval, amb, transfers x 15".

## 2017-09-15 NOTE — PROGRESS NOTE ADULT - ASSESSMENT
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 Cough is productive in nature with yellowish/whitish sputum. Associated symptoms include SOB and Low-grade Fevers (~100s). Denies sick contacts.  Hx recurrent pneumonia and Bronchitis  CLL with Immunoglobulin deficiency  Acute URI now with bronchospasm  Non toxic  mild Bronchospasm  COPD  Hx mucus plugging    9/14  Patient improving today with steroids, bronchodilators/nebulizers  Viral swab + RVP thus far, no bacterial positivity   Recommend to continues mobilization as tolerated and patient to receive IV IG rx as outpt  9/15  worsened leukocytosis on steroids  clinically improved  re eval with cxr today  cont with nebs  fu cultures

## 2017-09-15 NOTE — PROGRESS NOTE ADULT - SUBJECTIVE AND OBJECTIVE BOX
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 Cough is productive in nature with yellowish/whitish sputum. Associated symptoms include SOB and Low-grade Fevers (~100s). Denies sick contacts.    In the ED, patient was empirically treated with IV Vanc, Zosyn, and 2.5L IV NS. RVP positive for Entero/Rhinovirus. (12 Sep 2017 16:42)  pt is well known to me from multiple prior admissions   at bedside  no hemoptysis  no cp      Patient states he is feeling better today  Discussed the +Rhinovirus in nasal swab and let them know that the current treatment is supportive  States that he has been playing with his grandchildren and one of them had similar symptoms    9/15  sitting OOB in chair  no difficulty breathing  decreased cough and congestion  PAST MEDICAL & SURGICAL HISTORY:  Hypogammaglobulinemia: received IVIG  Hepatitis B virus infection, unspecified chronicity: retrovirals  Chronic diastolic congestive heart failure  Essential hypertension  DM type 2 (diabetes mellitus, type 2)  B-cell lymphoma, unspecified B-cell lymphoma type, unspecified body region: CLL stage 4/SLL; met NHL  Atrial fibrillation, unspecified type  COPD (chronic obstructive pulmonary disease)  History of esophageal dilatation  S/P cholecystectomy  H/O prostatectomy      PREVIOUS DIAGNOSTIC TESTING:      MEDICATIONS  (STANDING):  amiodarone    Tablet 100 milliGRAM(s) Oral daily  warfarin 2.5 milliGRAM(s) Oral daily  gabapentin 300 milliGRAM(s) Oral three times a day  simvastatin 10 milliGRAM(s) Oral at bedtime  tenofovir 300 milliGRAM(s) Oral daily  metoprolol 25 milliGRAM(s) Oral two times a day  docusate sodium 100 milliGRAM(s) Oral three times a day  dextrose 5%. 1000 milliLiter(s) (50 mL/Hr) IV Continuous <Continuous>  cefepime  IVPB 1000 milliGRAM(s) IV Intermittent every 24 hours  ALBUTerol/ipratropium for Nebulization 3 milliLiter(s) Nebulizer every 6 hours  enoxaparin Injectable 90 milliGRAM(s) SubCutaneous every 12 hours  insulin lispro (HumaLOG) corrective regimen sliding scale   SubCutaneous three times a day before meals  dextrose 50% Injectable 12.5 Gram(s) IV Push once  dextrose 50% Injectable 25 Gram(s) IV Push once  dextrose 50% Injectable 25 Gram(s) IV Push once    MEDICATIONS  (PRN):  acetaminophen   Tablet 650 milliGRAM(s) Oral every 6 hours PRN For Temp greater than 38 C (100.4 F)  dextrose Gel 1 Dose(s) Oral once PRN Blood Glucose LESS THAN 70 milliGRAM(s)/deciliter  glucagon  Injectable 1 milliGRAM(s) IntraMuscular once PRN Glucose LESS THAN 70 milligrams/deciliter  guaiFENesin   Syrup  (Sugar-Free) 100 milliGRAM(s) Oral every 6 hours PRN Cough      FAMILY HISTORY:  Family history of liver cancer (Father): father  85 liver CA  Family history of coronary arteriosclerosis (Mother): Mom  of MI age 68      SOCIAL HISTORY:  ***    REVIEW OF SYSTEM:  Pertinent items are noted in HPI.  Constitutional negative for chills,pos fevers, no sweats and weight loss  throat, and face:  negative for epistaxis, nasal congestion, sore throat and   tinnitus  Respiratory: pos for cough, dyspnea on exertion, pleuritic chest pain  and wheezing  Cardiovascular:  negative for chest pain, pos dyspnea and palpitations  Gastrointestinal: negative for abdominal pain, diarrhea, nausea and vomiting  Genitourinary: negative for dysuria, frequency and urinary incontinence  Skin:  negative for redness, rash, pruritus, swelling, dryness and   fissures  Hematologic/lymphatic: negative for bleeding and easy bruising  Musculoskeletal: negative for arthralgias, back pain and muscle weakness  Neurological: negative for dizziness, headaches, seizures and tremors  Behavioral/Psych:  negative for mood change, depression, suicidal attempts    Allergic/Immunologic: negative for anaphylaxis, angioedema and urticaria    Vital Signs Last 24 Hrs  T(C): 36.7 (15 Sep 2017 05:14), Max: 36.7 (15 Sep 2017 05:14)  T(F): 98 (15 Sep 2017 05:14), Max: 98 (15 Sep 2017 05:14)  HR: 92 (15 Sep 2017 07:40) (80 - 92)  BP: 104/60 (15 Sep 2017 05:14) (104/60 - 105/46)  BP(mean): --  RR: 17 (14 Sep 2017 11:19) (17 - 17)  SpO2: 97% (15 Sep 2017 05:14) (95% - 97%)    I&O's Summary    PHYSICAL EXAM  General Appearance: cooperative, no acute distress,   HEENT: PERRL, conjunctiva clear, EOM's intact, non injected pharynx,  Neck: Supple, , mild adenopathy,   Lungs: Clear to auscultation bilateral,no adventitious breath sounds, prolonged   expiratory phase, few scattered rhonchi, improved bronchospasm since admission  Heart: Regular rate and rhythm, S1, S2 normal, no murmur, rub or gallop  Abdomen: Soft, non-tender, bowel sounds active , no hepatosplenomegaly  Extremities: no cyanosis or edema, no joint swelling  Skin: Skin color, texture normal, no rashes   Neurologic: Alert and oriented X3 , cranial nerves intact, sensory and motor normal,    ECG:    LABS:                        11.4   25.7  )-----------( 132      ( 15 Sep 2017 06:13 )             34.0   09-15    138  |  104  |  32<H>  ----------------------------<  342<H>  4.4   |  24  |  1.44<H>    Ca    8.3<L>      15 Sep 2017 06:13                              12.2   17.0  )-----------( 115      ( 14 Sep 2017 06:08 )             36.1     09-12    137  |  104  |  16  ----------------------------<  243<H>  4.5   |  27  |  1.30    Ca    8.3<L>      12 Sep 2017 12:40  Phos  3.4       Mg     2.0         TPro  6.0  /  Alb  3.6  /  TBili  0.7  /  DBili  x   /  AST  29  /  ALT  39  /  AlkPhos  124<H>  09-12              PT/INR - ( 13 Sep 2017 06:07 )   PT: 20.8 sec;   INR: 1.90 ratio         PTT - ( 12 Sep 2017 12:40 )  PTT:32.8 sec  Urinalysis Basic - ( 12 Sep 2017 15:24 )    Color: Yellow / Appearance: Clear / S.005 / pH: x  Gluc: x / Ketone: Negative  / Bili: Negative / Urobili: 1 mg/dL   Blood: x / Protein: 15 mg/dL / Nitrite: Negative   Leuk Esterase: Trace / RBC: 0-2 /HPF / WBC 3-5   Sq Epi: x / Non Sq Epi: Moderate / Bacteria: Few            RADIOLOGY & ADDITIONAL STUDIES:    < from: Xray Chest 1 View AP/PA. (17 @ 13:25) >  FINDINGS /   IMPRESSION:     There is no acute consolidation.  There are no pleural effusion. Cardiac   and mediastinal structures are within normal limits.    There are chronic   deformity of right lower RIBs.     < end of copied text >

## 2017-09-16 LAB
ANION GAP SERPL CALC-SCNC: 6 MMOL/L — SIGNIFICANT CHANGE UP (ref 5–17)
BUN SERPL-MCNC: 35 MG/DL — HIGH (ref 7–23)
CALCIUM SERPL-MCNC: 8.1 MG/DL — LOW (ref 8.5–10.1)
CHLORIDE SERPL-SCNC: 105 MMOL/L — SIGNIFICANT CHANGE UP (ref 96–108)
CO2 SERPL-SCNC: 28 MMOL/L — SIGNIFICANT CHANGE UP (ref 22–31)
CREAT SERPL-MCNC: 1.22 MG/DL — SIGNIFICANT CHANGE UP (ref 0.5–1.3)
GLUCOSE SERPL-MCNC: 268 MG/DL — HIGH (ref 70–99)
HCT VFR BLD CALC: 35.2 % — LOW (ref 39–50)
HGB BLD-MCNC: 11.8 G/DL — LOW (ref 13–17)
INR BLD: 2.37 RATIO — HIGH (ref 0.88–1.16)
MCHC RBC-ENTMCNC: 30.1 PG — SIGNIFICANT CHANGE UP (ref 27–34)
MCHC RBC-ENTMCNC: 33.6 GM/DL — SIGNIFICANT CHANGE UP (ref 32–36)
MCV RBC AUTO: 89.5 FL — SIGNIFICANT CHANGE UP (ref 80–100)
PLATELET # BLD AUTO: 133 K/UL — LOW (ref 150–400)
POTASSIUM SERPL-MCNC: 4.6 MMOL/L — SIGNIFICANT CHANGE UP (ref 3.5–5.3)
POTASSIUM SERPL-SCNC: 4.6 MMOL/L — SIGNIFICANT CHANGE UP (ref 3.5–5.3)
PROTHROM AB SERPL-ACNC: 26 SEC — HIGH (ref 9.8–12.7)
RBC # BLD: 3.93 M/UL — LOW (ref 4.2–5.8)
RBC # FLD: 14.8 % — HIGH (ref 10.3–14.5)
SODIUM SERPL-SCNC: 139 MMOL/L — SIGNIFICANT CHANGE UP (ref 135–145)
WBC # BLD: 24.8 K/UL — HIGH (ref 3.8–10.5)
WBC # FLD AUTO: 24.8 K/UL — HIGH (ref 3.8–10.5)

## 2017-09-16 PROCEDURE — 71250 CT THORAX DX C-: CPT | Mod: 26

## 2017-09-16 RX ORDER — INSULIN GLARGINE 100 [IU]/ML
15 INJECTION, SOLUTION SUBCUTANEOUS EVERY MORNING
Qty: 0 | Refills: 0 | Status: DISCONTINUED | OUTPATIENT
Start: 2017-09-16 | End: 2017-09-16

## 2017-09-16 RX ORDER — CEFEPIME 1 G/1
2000 INJECTION, POWDER, FOR SOLUTION INTRAMUSCULAR; INTRAVENOUS ONCE
Qty: 0 | Refills: 0 | Status: COMPLETED | OUTPATIENT
Start: 2017-09-16 | End: 2017-09-16

## 2017-09-16 RX ORDER — WARFARIN SODIUM 2.5 MG/1
2.5 TABLET ORAL ONCE
Qty: 0 | Refills: 0 | Status: COMPLETED | OUTPATIENT
Start: 2017-09-16 | End: 2017-09-16

## 2017-09-16 RX ORDER — CEFEPIME 1 G/1
INJECTION, POWDER, FOR SOLUTION INTRAMUSCULAR; INTRAVENOUS
Qty: 0 | Refills: 0 | Status: DISCONTINUED | OUTPATIENT
Start: 2017-09-16 | End: 2017-09-20

## 2017-09-16 RX ORDER — CEFEPIME 1 G/1
2000 INJECTION, POWDER, FOR SOLUTION INTRAMUSCULAR; INTRAVENOUS EVERY 12 HOURS
Qty: 0 | Refills: 0 | Status: DISCONTINUED | OUTPATIENT
Start: 2017-09-16 | End: 2017-09-20

## 2017-09-16 RX ADMIN — Medication 12: at 12:09

## 2017-09-16 RX ADMIN — Medication 25 MILLIGRAM(S): at 17:57

## 2017-09-16 RX ADMIN — Medication 40 MILLIGRAM(S): at 05:31

## 2017-09-16 RX ADMIN — CEFEPIME 100 MILLIGRAM(S): 1 INJECTION, POWDER, FOR SOLUTION INTRAMUSCULAR; INTRAVENOUS at 12:13

## 2017-09-16 RX ADMIN — GABAPENTIN 300 MILLIGRAM(S): 400 CAPSULE ORAL at 14:06

## 2017-09-16 RX ADMIN — Medication 12: at 17:33

## 2017-09-16 RX ADMIN — Medication 100 MILLIGRAM(S): at 05:33

## 2017-09-16 RX ADMIN — Medication 100 MILLIGRAM(S): at 21:38

## 2017-09-16 RX ADMIN — TENOFOVIR DISOPROXIL FUMARATE 300 MILLIGRAM(S): 300 TABLET, FILM COATED ORAL at 12:14

## 2017-09-16 RX ADMIN — Medication 4 UNIT(S): at 17:34

## 2017-09-16 RX ADMIN — Medication 100 MILLIGRAM(S): at 17:58

## 2017-09-16 RX ADMIN — Medication 6: at 08:47

## 2017-09-16 RX ADMIN — Medication 4 UNIT(S): at 08:47

## 2017-09-16 RX ADMIN — WARFARIN SODIUM 2.5 MILLIGRAM(S): 2.5 TABLET ORAL at 21:38

## 2017-09-16 RX ADMIN — INSULIN GLARGINE 30 UNIT(S): 100 INJECTION, SOLUTION SUBCUTANEOUS at 21:39

## 2017-09-16 RX ADMIN — AMIODARONE HYDROCHLORIDE 100 MILLIGRAM(S): 400 TABLET ORAL at 05:32

## 2017-09-16 RX ADMIN — Medication 6: at 21:39

## 2017-09-16 RX ADMIN — Medication 25 MILLIGRAM(S): at 05:31

## 2017-09-16 RX ADMIN — GABAPENTIN 300 MILLIGRAM(S): 400 CAPSULE ORAL at 21:38

## 2017-09-16 RX ADMIN — INSULIN GLARGINE 15 UNIT(S): 100 INJECTION, SOLUTION SUBCUTANEOUS at 12:08

## 2017-09-16 RX ADMIN — CEFEPIME 100 MILLIGRAM(S): 1 INJECTION, POWDER, FOR SOLUTION INTRAMUSCULAR; INTRAVENOUS at 21:38

## 2017-09-16 RX ADMIN — SIMVASTATIN 10 MILLIGRAM(S): 20 TABLET, FILM COATED ORAL at 21:38

## 2017-09-16 RX ADMIN — Medication 3 MILLILITER(S): at 11:32

## 2017-09-16 RX ADMIN — GABAPENTIN 300 MILLIGRAM(S): 400 CAPSULE ORAL at 05:31

## 2017-09-16 RX ADMIN — Medication 4 UNIT(S): at 12:10

## 2017-09-16 RX ADMIN — Medication 3 MILLILITER(S): at 19:53

## 2017-09-16 NOTE — PROGRESS NOTE ADULT - SUBJECTIVE AND OBJECTIVE BOX
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 Cough is productive in nature with yellowish/whitish sputum. Associated symptoms include SOB and Low-grade Fevers (~100s).     9/14: still complains of intermittent wheezing and cob.cough  9/15: dyspnea slowly improving although wbc rising quickly on steroids.   9/16: slow to improve although wheezing has dissipated        Vital Signs Last 24 Hrs  T(C): 37.1 (13 Sep 2017 04:46), Max: 38 (12 Sep 2017 12:31)  T(F): 98.8 (13 Sep 2017 04:46), Max: 100.4 (12 Sep 2017 12:31)  HR: 88 (13 Sep 2017 04:46) (88 - 109)  BP: 140/68 (13 Sep 2017 04:46) (128/72 - 150/76)  BP(mean): --  RR: 16 (12 Sep 2017 22:10) (16 - 18)  SpO2: 94% (13 Sep 2017 04:46) (92% - 100%)        PHYSICAL EXAM  General Appearance: cooperative, no acute distress,   HEENT: PERRL, conjunctiva clear, EOM's intact, non injected pharynx,  Neck: Supple, , mild adenopathy,   Lungs: decreased breath sounds b/l,    expiratory phase, few scattered rhonchi  Heart: Regular rate and rhythm, S1, S2 normal, no murmur, rub or gallop  Abdomen: Soft, non-tender, bowel sounds active , no hepatosplenomegaly  Extremities: no cyanosis or edema, no joint swelling  Skin: Skin color, texture normal, no rashes   Neurologic: Alert and oriented X3 , cranial nerves intact, sensory and motor normal,    ECG:    LABS:                          12.4   15.9  )-----------( 112      ( 13 Sep 2017 06:07 )             36.5     09-12    137  |  104  |  16  ----------------------------<  243<H>  4.5   |  27  |  1.30    Ca    8.3<L>      12 Sep 2017 12:40  Phos  3.4     09-13  Mg     2.0     09-13    TPro  6.0  /  Alb  3.6  /  TBili  0.7  /  DBili  x   /  AST  29  /  ALT  39  /  AlkPhos  124<H>  09-12    A/P:     * Viral PNA/EV.RV/HCAP  - Multifocal pna, immunocompromised patient, hx of CLL/Rituxan  - Start cefepime to cover GNR and other resistant organisms, ID consult, cultures, D/W pulm  - reduce steroids/taper , c/w nebs  - Worsening LLL atelectasis, rising WBC count, obtain CT chest non contrast  - mild thrombocytopenia probable consumption  - Leukocytosis accelerated by steroids and pna  - increase lantus in light of steroids, premeal coverage/iss    * AF: stable, in NSR  - on low dose amio  - INR 2.24, stop lovenox and resume coumadin    * h/o chr CLL  outpt treatment HPI:  77 year old Faroese-speaking male with past medical history of Atrial Fibrillation on coumadin, 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 Cough is productive in nature with yellowish/whitish sputum. Associated symptoms include SOB and Low-grade Fevers (~100s).     9/14: still complains of intermittent wheezing and cob.cough  9/15: dyspnea slowly improving although wbc rising quickly on steroids.   9/16: slow to improve although wheezing has dissipated        Vital Signs Last 24 Hrs  T(C): 37.1 (13 Sep 2017 04:46), Max: 38 (12 Sep 2017 12:31)  T(F): 98.8 (13 Sep 2017 04:46), Max: 100.4 (12 Sep 2017 12:31)  HR: 88 (13 Sep 2017 04:46) (88 - 109)  BP: 140/68 (13 Sep 2017 04:46) (128/72 - 150/76)  BP(mean): --  RR: 16 (12 Sep 2017 22:10) (16 - 18)  SpO2: 94% (13 Sep 2017 04:46) (92% - 100%)        PHYSICAL EXAM  General Appearance: cooperative, no acute distress,   HEENT: PERRL, conjunctiva clear, EOM's intact, non injected pharynx,  Neck: Supple, , mild adenopathy,   Lungs: decreased breath sounds b/l,    expiratory phase, few scattered rhonchi  Heart: Regular rate and rhythm, S1, S2 normal, no murmur, rub or gallop  Abdomen: Soft, non-tender, bowel sounds active , no hepatosplenomegaly  Extremities: no cyanosis or edema, no joint swelling  Skin: Skin color, texture normal, no rashes   Neurologic: Alert and oriented X3 , cranial nerves intact, sensory and motor normal,    ECG:    LABS:                          12.4   15.9  )-----------( 112      ( 13 Sep 2017 06:07 )             36.5     09-12    137  |  104  |  16  ----------------------------<  243<H>  4.5   |  27  |  1.30    Ca    8.3<L>      12 Sep 2017 12:40  Phos  3.4     09-13  Mg     2.0     09-13    TPro  6.0  /  Alb  3.6  /  TBili  0.7  /  DBili  x   /  AST  29  /  ALT  39  /  AlkPhos  124<H>  09-12    A/P:     * Viral PNA/EV.RV/HCAP  - Multifocal pna, immunocompromised patient, hx of CLL/Rituxan  - Start cefepime to cover suspected GNR and other resistant organisms, ID consult, cultures, D/W pulm  - reduce steroids/taper , c/w nebs  - Worsening LLL atelectasis, rising WBC count, obtain CT chest non contrast  - mild thrombocytopenia probable consumption  - Leukocytosis accelerated by steroids and pna  - increase lantus in light of steroids, premeal coverage/iss    * AF: stable, in NSR  - on low dose amio  - INR 2.24, stop lovenox and resume coumadin    * h/o chr CLL  outpt treatment

## 2017-09-16 NOTE — PROGRESS NOTE ADULT - ASSESSMENT
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 Cough is productive in nature with yellowish/whitish sputum. Associated symptoms include SOB and Low-grade Fevers (~100s). Denies sick contacts.  Hx recurrent pneumonia and Bronchitis  CLL with Immunoglobulin deficiency  Acute URI now with bronchospasm  Non toxic  mild Bronchospasm  COPD  Hx mucus plugging    9/14  Patient improving today with steroids, bronchodilators/nebulizers  Viral swab + RVP thus far, no bacterial positivity   Recommend to continues mobilization as tolerated and patient to receive IV IG rx as outpt    9/15  worsened leukocytosis on steroids  clinically improved  re eval with cxr today  cont with nebs  fu cultures    9/16  Patient continues to feel clinically better but leukocytosis persistent  Discussed with primary team who have started Abx for the persistent opacity.  Seems to be improving but given his immunocompromised state, agree with abx. If this does not improve, may need bronchoscopy with BAL to assess airways/parenchyma further.

## 2017-09-16 NOTE — PROGRESS NOTE ADULT - SUBJECTIVE AND OBJECTIVE BOX
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 Cough is productive in nature with yellowish/whitish sputum. Associated symptoms include SOB and Low-grade Fevers (~100s). Denies sick contacts.    In the ED, patient was empirically treated with IV Vanc, Zosyn, and 2.5L IV NS. RVP positive for Entero/Rhinovirus. (12 Sep 2017 16:42)  pt is well known to me from multiple prior admissions   at bedside  no hemoptysis  no cp      Patient states he is feeling better today  Discussed the +Rhinovirus in nasal swab and let them know that the current treatment is supportive  States that he has been playing with his grandchildren and one of them had similar symptoms    9/15  sitting OOB in chair  no difficulty breathing  decreased cough and congestion      Persistent leukocytosis but patient endorses feeling better      PAST MEDICAL & SURGICAL HISTORY:  Hypogammaglobulinemia: received IVIG  Hepatitis B virus infection, unspecified chronicity: retrovirals  Chronic diastolic congestive heart failure  Essential hypertension  DM type 2 (diabetes mellitus, type 2)  B-cell lymphoma, unspecified B-cell lymphoma type, unspecified body region: CLL stage 4/SLL; met NHL  Atrial fibrillation, unspecified type  COPD (chronic obstructive pulmonary disease)  History of esophageal dilatation  S/P cholecystectomy  H/O prostatectomy      PREVIOUS DIAGNOSTIC TESTING:      MEDICATIONS  (STANDING):  amiodarone    Tablet 100 milliGRAM(s) Oral daily  warfarin 2.5 milliGRAM(s) Oral daily  gabapentin 300 milliGRAM(s) Oral three times a day  simvastatin 10 milliGRAM(s) Oral at bedtime  tenofovir 300 milliGRAM(s) Oral daily  metoprolol 25 milliGRAM(s) Oral two times a day  docusate sodium 100 milliGRAM(s) Oral three times a day  dextrose 5%. 1000 milliLiter(s) (50 mL/Hr) IV Continuous <Continuous>  cefepime  IVPB 1000 milliGRAM(s) IV Intermittent every 24 hours  ALBUTerol/ipratropium for Nebulization 3 milliLiter(s) Nebulizer every 6 hours  enoxaparin Injectable 90 milliGRAM(s) SubCutaneous every 12 hours  insulin lispro (HumaLOG) corrective regimen sliding scale   SubCutaneous three times a day before meals  dextrose 50% Injectable 12.5 Gram(s) IV Push once  dextrose 50% Injectable 25 Gram(s) IV Push once  dextrose 50% Injectable 25 Gram(s) IV Push once    MEDICATIONS  (PRN):  acetaminophen   Tablet 650 milliGRAM(s) Oral every 6 hours PRN For Temp greater than 38 C (100.4 F)  dextrose Gel 1 Dose(s) Oral once PRN Blood Glucose LESS THAN 70 milliGRAM(s)/deciliter  glucagon  Injectable 1 milliGRAM(s) IntraMuscular once PRN Glucose LESS THAN 70 milligrams/deciliter  guaiFENesin   Syrup  (Sugar-Free) 100 milliGRAM(s) Oral every 6 hours PRN Cough      FAMILY HISTORY:  Family history of liver cancer (Father): father  85 liver CA  Family history of coronary arteriosclerosis (Mother): Mom  of MI age 68      SOCIAL HISTORY:  ***    REVIEW OF SYSTEM:  Pertinent items are noted in HPI.  Constitutional negative for chills,pos fevers, no sweats and weight loss  throat, and face:  negative for epistaxis, nasal congestion, sore throat and   tinnitus  Respiratory: pos for cough, dyspnea on exertion, pleuritic chest pain  and wheezing  Cardiovascular:  negative for chest pain, pos dyspnea and palpitations  Gastrointestinal: negative for abdominal pain, diarrhea, nausea and vomiting  Genitourinary: negative for dysuria, frequency and urinary incontinence  Skin:  negative for redness, rash, pruritus, swelling, dryness and   fissures  Hematologic/lymphatic: negative for bleeding and easy bruising  Musculoskeletal: negative for arthralgias, back pain and muscle weakness  Neurological: negative for dizziness, headaches, seizures and tremors  Behavioral/Psych:  negative for mood change, depression, suicidal attempts    Allergic/Immunologic: negative for anaphylaxis, angioedema and urticaria    Vital Signs Last 24 Hrs  T(C): 36.7 (15 Sep 2017 05:14), Max: 36.7 (15 Sep 2017 05:14)  T(F): 98 (15 Sep 2017 05:14), Max: 98 (15 Sep 2017 05:14)  HR: 92 (15 Sep 2017 07:40) (80 - 92)  BP: 104/60 (15 Sep 2017 05:14) (104/60 - 105/46)  BP(mean): --  RR: 17 (14 Sep 2017 11:19) (17 - 17)  SpO2: 97% (15 Sep 2017 05:14) (95% - 97%)    I&O's Summary    PHYSICAL EXAM  General Appearance: cooperative, no acute distress,   HEENT: PERRL, conjunctiva clear, EOM's intact, non injected pharynx,  Neck: Supple, , mild adenopathy,   Lungs: Clear to auscultation bilateral,no adventitious breath sounds, prolonged   expiratory phase, few scattered rhonchi, improved bronchospasm since admission  Heart: Regular rate and rhythm, S1, S2 normal, no murmur, rub or gallop  Abdomen: Soft, non-tender, bowel sounds active , no hepatosplenomegaly  Extremities: no cyanosis or edema, no joint swelling  Skin: Skin color, texture normal, no rashes   Neurologic: Alert and oriented X3 , cranial nerves intact, sensory and motor normal,    ECG:    LABS:                                   11.8   24.8  )-----------( 133      ( 16 Sep 2017 05:55 )             35.2     09-12    137  |  104  |  16  ----------------------------<  243<H>  4.5   |  27  |  1.30    Ca    8.3<L>      12 Sep 2017 12:40  Phos  3.4     09-13  Mg     2.0     -13    TPro  6.0  /  Alb  3.6  /  TBili  0.7  /  DBili  x   /  AST  29  /  ALT  39  /  AlkPhos  124<H>  09-12              PT/INR - ( 13 Sep 2017 06:07 )   PT: 20.8 sec;   INR: 1.90 ratio         PTT - ( 12 Sep 2017 12:40 )  PTT:32.8 sec  Urinalysis Basic - ( 12 Sep 2017 15:24 )    Color: Yellow / Appearance: Clear / S.005 / pH: x  Gluc: x / Ketone: Negative  / Bili: Negative / Urobili: 1 mg/dL   Blood: x / Protein: 15 mg/dL / Nitrite: Negative   Leuk Esterase: Trace / RBC: 0-2 /HPF / WBC 3-5   Sq Epi: x / Non Sq Epi: Moderate / Bacteria: Few            RADIOLOGY & ADDITIONAL STUDIES:  CT Chest reviewed  IMPRESSION:     Improving right lower right middle lobe pneumonia. There is new   nonspecific groundglass opacity in the left upper lobe. Increasing   bibasilar linear and platelike atelectasis.    Multiple enlarged lymph nodes in the chest and upper abdomen consistent   with lymphoma.

## 2017-09-17 LAB
CULTURE RESULTS: SIGNIFICANT CHANGE UP
CULTURE RESULTS: SIGNIFICANT CHANGE UP
HCT VFR BLD CALC: 34.5 % — LOW (ref 39–50)
HGB BLD-MCNC: 12 G/DL — LOW (ref 13–17)
INR BLD: 2.59 RATIO — HIGH (ref 0.88–1.16)
MCHC RBC-ENTMCNC: 31.1 PG — SIGNIFICANT CHANGE UP (ref 27–34)
MCHC RBC-ENTMCNC: 34.9 GM/DL — SIGNIFICANT CHANGE UP (ref 32–36)
MCV RBC AUTO: 89 FL — SIGNIFICANT CHANGE UP (ref 80–100)
PLATELET # BLD AUTO: 131 K/UL — LOW (ref 150–400)
PROTHROM AB SERPL-ACNC: 28.5 SEC — HIGH (ref 9.8–12.7)
RBC # BLD: 3.87 M/UL — LOW (ref 4.2–5.8)
RBC # FLD: 14.4 % — SIGNIFICANT CHANGE UP (ref 10.3–14.5)
SPECIMEN SOURCE: SIGNIFICANT CHANGE UP
SPECIMEN SOURCE: SIGNIFICANT CHANGE UP
WBC # BLD: 25.6 K/UL — HIGH (ref 3.8–10.5)
WBC # FLD AUTO: 25.6 K/UL — HIGH (ref 3.8–10.5)

## 2017-09-17 RX ORDER — INSULIN GLARGINE 100 [IU]/ML
15 INJECTION, SOLUTION SUBCUTANEOUS ONCE
Qty: 0 | Refills: 0 | Status: COMPLETED | OUTPATIENT
Start: 2017-09-17 | End: 2017-09-17

## 2017-09-17 RX ORDER — WARFARIN SODIUM 2.5 MG/1
3 TABLET ORAL DAILY
Qty: 0 | Refills: 0 | Status: COMPLETED | OUTPATIENT
Start: 2017-09-17 | End: 2017-09-19

## 2017-09-17 RX ORDER — INSULIN GLARGINE 100 [IU]/ML
40 INJECTION, SOLUTION SUBCUTANEOUS AT BEDTIME
Qty: 0 | Refills: 0 | Status: DISCONTINUED | OUTPATIENT
Start: 2017-09-17 | End: 2017-09-22

## 2017-09-17 RX ADMIN — Medication 8: at 12:15

## 2017-09-17 RX ADMIN — GABAPENTIN 300 MILLIGRAM(S): 400 CAPSULE ORAL at 05:56

## 2017-09-17 RX ADMIN — Medication 25 MILLIGRAM(S): at 17:15

## 2017-09-17 RX ADMIN — Medication 25 MILLIGRAM(S): at 05:57

## 2017-09-17 RX ADMIN — Medication 100 MILLIGRAM(S): at 17:15

## 2017-09-17 RX ADMIN — CEFEPIME 100 MILLIGRAM(S): 1 INJECTION, POWDER, FOR SOLUTION INTRAMUSCULAR; INTRAVENOUS at 17:15

## 2017-09-17 RX ADMIN — Medication 100 MILLIGRAM(S): at 05:56

## 2017-09-17 RX ADMIN — AMIODARONE HYDROCHLORIDE 100 MILLIGRAM(S): 400 TABLET ORAL at 05:55

## 2017-09-17 RX ADMIN — Medication 3 MILLILITER(S): at 20:55

## 2017-09-17 RX ADMIN — CEFEPIME 100 MILLIGRAM(S): 1 INJECTION, POWDER, FOR SOLUTION INTRAMUSCULAR; INTRAVENOUS at 05:56

## 2017-09-17 RX ADMIN — TENOFOVIR DISOPROXIL FUMARATE 300 MILLIGRAM(S): 300 TABLET, FILM COATED ORAL at 11:24

## 2017-09-17 RX ADMIN — Medication 2: at 22:18

## 2017-09-17 RX ADMIN — Medication 4 UNIT(S): at 12:16

## 2017-09-17 RX ADMIN — Medication 3 MILLILITER(S): at 09:26

## 2017-09-17 RX ADMIN — GABAPENTIN 300 MILLIGRAM(S): 400 CAPSULE ORAL at 15:35

## 2017-09-17 RX ADMIN — WARFARIN SODIUM 3 MILLIGRAM(S): 2.5 TABLET ORAL at 22:18

## 2017-09-17 RX ADMIN — INSULIN GLARGINE 40 UNIT(S): 100 INJECTION, SOLUTION SUBCUTANEOUS at 22:19

## 2017-09-17 RX ADMIN — Medication 4 UNIT(S): at 08:01

## 2017-09-17 RX ADMIN — Medication 100 MILLIGRAM(S): at 22:23

## 2017-09-17 RX ADMIN — Medication 3 MILLILITER(S): at 02:14

## 2017-09-17 RX ADMIN — INSULIN GLARGINE 15 UNIT(S): 100 INJECTION, SOLUTION SUBCUTANEOUS at 11:02

## 2017-09-17 RX ADMIN — Medication 4 UNIT(S): at 17:15

## 2017-09-17 RX ADMIN — GABAPENTIN 300 MILLIGRAM(S): 400 CAPSULE ORAL at 22:18

## 2017-09-17 RX ADMIN — Medication 100 MILLIGRAM(S): at 15:35

## 2017-09-17 RX ADMIN — SIMVASTATIN 10 MILLIGRAM(S): 20 TABLET, FILM COATED ORAL at 22:18

## 2017-09-17 RX ADMIN — Medication 8: at 17:15

## 2017-09-17 RX ADMIN — Medication 2: at 08:03

## 2017-09-17 RX ADMIN — Medication 40 MILLIGRAM(S): at 05:56

## 2017-09-17 RX ADMIN — Medication 3 MILLILITER(S): at 14:43

## 2017-09-17 NOTE — PROGRESS NOTE ADULT - SUBJECTIVE AND OBJECTIVE BOX
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 Cough is productive in nature with yellowish/whitish sputum. Associated symptoms include SOB and Low-grade Fevers (~100s).     9/14: still complains of intermittent wheezing and cob.cough  9/15: dyspnea slowly improving although wbc rising quickly on steroids.   9/16: slow to improve although wheezing has dissipated  9/17: status quo        Vital Signs Last 24 Hrs  T(C): 37.1 (13 Sep 2017 04:46), Max: 38 (12 Sep 2017 12:31)  T(F): 98.8 (13 Sep 2017 04:46), Max: 100.4 (12 Sep 2017 12:31)  HR: 88 (13 Sep 2017 04:46) (88 - 109)  BP: 140/68 (13 Sep 2017 04:46) (128/72 - 150/76)  BP(mean): --  RR: 16 (12 Sep 2017 22:10) (16 - 18)  SpO2: 94% (13 Sep 2017 04:46) (92% - 100%)        PHYSICAL EXAM  General Appearance: cooperative, no acute distress,   HEENT: PERRL, conjunctiva clear, EOM's intact, non injected pharynx,  Neck: Supple, , mild adenopathy,   Lungs: decreased breath sounds b/l,    expiratory phase, few scattered rhonchi  Heart: Regular rate and rhythm, S1, S2 normal, no murmur, rub or gallop  Abdomen: Soft, non-tender, bowel sounds active , no hepatosplenomegaly  Extremities: no cyanosis or edema, no joint swelling  Skin: Skin color, texture normal, no rashes   Neurologic: Alert and oriented X3 , cranial nerves intact, sensory and motor normal,    ECG:    LABS:                          12.4   15.9  )-----------( 112      ( 13 Sep 2017 06:07 )             36.5     09-12    137  |  104  |  16  ----------------------------<  243<H>  4.5   |  27  |  1.30    Ca    8.3<L>      12 Sep 2017 12:40  Phos  3.4     09-13  Mg     2.0     09-13    TPro  6.0  /  Alb  3.6  /  TBili  0.7  /  DBili  x   /  AST  29  /  ALT  39  /  AlkPhos  124<H>  09-12    A/P:     * Viral PNA/EV.RV/HCAP  - Multifocal pna, immunocompromised patient, hx of CLL/Rituxan  - Start cefepime to cover suspected GNR and other resistant organisms, ID consult, cultures, D/W pulm; agree with addotion of doxy  - reduce steroids/taper , c/w nebs  - mild thrombocytopenia probable consumption  - Leukocytosis accelerated by steroids and pna  - increase lantus in light of steroids, premeal coverage/iss    * AF: stable, in NSR  - on low dose amio  - INR 2.5, cont coumadin    * h/o chr CLL  outpt treatment

## 2017-09-17 NOTE — PROGRESS NOTE ADULT - ASSESSMENT
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 Pneumonia in this patient will treat as health care associated pneumonia given hospitalized greater than 48 hours  - follow up cultures   - oxygen and nebs as needed   - iv hydration and supportive care   - patient at risk for gram negative rods and other resistant bacteria   - day #2 cefepime and doxycycline  - tolerating antibiotics without rashes or side effects   2. 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

## 2017-09-17 NOTE — PROGRESS NOTE ADULT - SUBJECTIVE AND OBJECTIVE BOX
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), hx of hypogammaglobulinemia s/p failed IVIG infusions 2/2 to immediate type hypersensitivity reactions, HFpEF (LVEF 55-60% in 03/2016), COPD, T2DM, HTN, Hepatitis B, Hx PE/DVT, and Recurrent PNA, now admitted on 9/12 for evaluation of productive cough, mild shortness of breath and fever; initially thought to be viral infection, however, now developing leukocytosis, still short of breath even while wearing oxygen, imaging consistent with pneumonia  Today 9/17 patient doing better, breathing off oxygen    MEDICATIONS  (STANDING):  amiodarone    Tablet 100 milliGRAM(s) Oral daily  gabapentin 300 milliGRAM(s) Oral three times a day  simvastatin 10 milliGRAM(s) Oral at bedtime  tenofovir 300 milliGRAM(s) Oral daily  metoprolol 25 milliGRAM(s) Oral two times a day  docusate sodium 100 milliGRAM(s) Oral three times a day  dextrose 5%. 1000 milliLiter(s) (50 mL/Hr) IV Continuous <Continuous>  ALBUTerol/ipratropium for Nebulization 3 milliLiter(s) Nebulizer every 6 hours  dextrose 50% Injectable 12.5 Gram(s) IV Push once  dextrose 50% Injectable 25 Gram(s) IV Push once  dextrose 50% Injectable 25 Gram(s) IV Push once  insulin lispro Injectable (HumaLOG) 4 Unit(s) SubCutaneous three times a day before meals  insulin lispro (HumaLOG) corrective regimen sliding scale   SubCutaneous three times a day before meals  insulin lispro (HumaLOG) corrective regimen sliding scale   SubCutaneous at bedtime  cefepime  IVPB 2000 milliGRAM(s) IV Intermittent every 12 hours  cefepime  IVPB      doxycycline hyclate Capsule 100 milliGRAM(s) Oral every 12 hours  warfarin 3 milliGRAM(s) Oral daily  insulin glargine Injectable (LANTUS) 40 Unit(s) SubCutaneous at bedtime  insulin glargine Injectable (LANTUS) 15 Unit(s) SubCutaneous once    MEDICATIONS  (PRN):  acetaminophen   Tablet 650 milliGRAM(s) Oral every 6 hours PRN For Temp greater than 38 C (100.4 F)  dextrose Gel 1 Dose(s) Oral once PRN Blood Glucose LESS THAN 70 milliGRAM(s)/deciliter  glucagon  Injectable 1 milliGRAM(s) IntraMuscular once PRN Glucose LESS THAN 70 milligrams/deciliter  guaiFENesin   Syrup  (Sugar-Free) 100 milliGRAM(s) Oral every 6 hours PRN Cough      Vital Signs Last 24 Hrs  T(C): 36.3 (17 Sep 2017 05:06), Max: 36.9 (16 Sep 2017 17:46)  T(F): 97.4 (17 Sep 2017 05:06), Max: 98.5 (16 Sep 2017 17:46)  HR: 75 (17 Sep 2017 05:06) (69 - 83)  BP: 120/67 (17 Sep 2017 05:06) (120/67 - 135/66)  BP(mean): --  RR: 18 (16 Sep 2017 17:46) (18 - 18)  SpO2: 93% (17 Sep 2017 05:06) (93% - 97%)    Physical Exam:          Constitutional: frail looking  HEENT: NC/AT, EOMI, PERRLA  Neck: supple  Respiratory: bilateral rhonchi  Cardiovascular: S1S2 regular, no murmurs  Abdomen: soft, not tender, not distended, positive BS  Genitourinary: deferred  Rectal: deferred  Musculoskeletal: no muscle tenderness, no joint swelling or tenderness  Neurological: AxOx3, moving all extremities, no focal deficits  Skin: no rashes                          11.8   24.8  )-----------( 133      ( 16 Sep 2017 05:55 )             35.2     09-16    139  |  105  |  35<H>  ----------------------------<  268<H>  4.6   |  28  |  1.22    Ca    8.1<L>      16 Sep 2017 05:55       Labs:                        12.0   25.6  )-----------( 131      ( 17 Sep 2017 05:57 )             34.5     09-16    139  |  105  |  35<H>  ----------------------------<  268<H>  4.6   |  28  |  1.22    Ca    8.1<L>      16 Sep 2017 05:55             Cultures:         Radiology:< from: CT Chest No Cont (09.16.17 @ 08:20) >    EXAM:  CT CHEST                            PROCEDURE DATE:  09/16/2017          INTERPRETATION:  CT CHEST    HISTORY:  Left lower lobe atelectasis, chest pain, shortness of breath        TECHNIQUE: Noncontrast CT of the chest was performed. [place]   sagittal images were reconstructed. This study was performed using   automatic exposure control (radiation dose reduction software) to obtain   a diagnostic image quality scan with patient dose as low as reasonably   achievable.    COMPARISON: Chest x-ray 22 hours prior, chest CT 6/9/2017    FINDINGS:    LUNGS, AIRWAYS: The central airways are patent. Improving right middle   and right lower lobe pneumonia, newly resolved. Bibasilar areas of linear   platelike atelectasis have increased. There is new groundglass airspace   opacity at the left upper lobe.    PLEURA: No pleural abnormality.    HEART AND VESSELS: Normal heart size. No pericardial effusion. Coronary   artery calcifications are present. Normal caliber thoracic aorta.    MEDIASTINUM AND JEFFREY: Stable mediastinal and bilateral axillary   adenopathy.    UPPER ABDOMEN: Left adrenal adenoma again noted. Retroperitoneal and   mesenteric adenopathy again partially visualized. Nodular surface contour   of the liver suggestive of cirrhotic change.    BONES AND SOFT TISSUES: Chronic bilateral rib deformities again noted.    IMPRESSION:     Improving right lower right middle lobe pneumonia. There is new   nonspecific groundglass opacity in the left upper lobe. Increasing   bibasilar linear and platelike atelectasis.    Multiple enlarged lymph nodes in the chest and upper abdomen consistent   with lymphoma.    < end of copied text >      Advanced directive addressed: full resuscitation

## 2017-09-18 LAB
ANION GAP SERPL CALC-SCNC: 8 MMOL/L — SIGNIFICANT CHANGE UP (ref 5–17)
ANISOCYTOSIS BLD QL: SLIGHT — SIGNIFICANT CHANGE UP
BUN SERPL-MCNC: 40 MG/DL — HIGH (ref 7–23)
CALCIUM SERPL-MCNC: 8 MG/DL — LOW (ref 8.5–10.1)
CHLORIDE SERPL-SCNC: 106 MMOL/L — SIGNIFICANT CHANGE UP (ref 96–108)
CO2 SERPL-SCNC: 27 MMOL/L — SIGNIFICANT CHANGE UP (ref 22–31)
CREAT SERPL-MCNC: 1.18 MG/DL — SIGNIFICANT CHANGE UP (ref 0.5–1.3)
DACRYOCYTES BLD QL SMEAR: SLIGHT — SIGNIFICANT CHANGE UP
ELLIPTOCYTES BLD QL SMEAR: SLIGHT — SIGNIFICANT CHANGE UP
GLUCOSE SERPL-MCNC: 87 MG/DL — SIGNIFICANT CHANGE UP (ref 70–99)
HCT VFR BLD CALC: 37.9 % — LOW (ref 39–50)
HCT VFR BLD CALC: 39.4 % — SIGNIFICANT CHANGE UP (ref 39–50)
HGB BLD-MCNC: 12.2 G/DL — LOW (ref 13–17)
HGB BLD-MCNC: 13 G/DL — SIGNIFICANT CHANGE UP (ref 13–17)
HYPERCHROMIA BLD QL AUTO: SLIGHT — SIGNIFICANT CHANGE UP
INR BLD: 2.43 RATIO — HIGH (ref 0.88–1.16)
LYMPHOCYTES # BLD AUTO: 45 % — HIGH (ref 13–44)
LYMPHOCYTES # SPEC AUTO: 5 % — HIGH (ref 0–0)
MACROCYTES BLD QL: SLIGHT — SIGNIFICANT CHANGE UP
MANUAL DIF COMMENT BLD-IMP: SIGNIFICANT CHANGE UP
MCHC RBC-ENTMCNC: 29.2 PG — SIGNIFICANT CHANGE UP (ref 27–34)
MCHC RBC-ENTMCNC: 30.2 PG — SIGNIFICANT CHANGE UP (ref 27–34)
MCHC RBC-ENTMCNC: 32.2 GM/DL — SIGNIFICANT CHANGE UP (ref 32–36)
MCHC RBC-ENTMCNC: 33.1 GM/DL — SIGNIFICANT CHANGE UP (ref 32–36)
MCV RBC AUTO: 90.7 FL — SIGNIFICANT CHANGE UP (ref 80–100)
MCV RBC AUTO: 91.2 FL — SIGNIFICANT CHANGE UP (ref 80–100)
MICROCYTES BLD QL: SLIGHT — SIGNIFICANT CHANGE UP
MONOCYTES NFR BLD AUTO: 3 % — SIGNIFICANT CHANGE UP (ref 2–14)
NEUTROPHILS # BLD AUTO: SIGNIFICANT CHANGE UP K/UL (ref 1.8–7.4)
NEUTROPHILS NFR BLD AUTO: 41 % — LOW (ref 43–77)
OVALOCYTES BLD QL SMEAR: SLIGHT — SIGNIFICANT CHANGE UP
PLAT MORPH BLD: NORMAL — SIGNIFICANT CHANGE UP
PLATELET # BLD AUTO: 128 K/UL — LOW (ref 150–400)
PLATELET # BLD AUTO: 147 K/UL — LOW (ref 150–400)
POIKILOCYTOSIS BLD QL AUTO: SLIGHT — SIGNIFICANT CHANGE UP
POLYCHROMASIA BLD QL SMEAR: SLIGHT — SIGNIFICANT CHANGE UP
POTASSIUM SERPL-MCNC: 3.6 MMOL/L — SIGNIFICANT CHANGE UP (ref 3.5–5.3)
POTASSIUM SERPL-SCNC: 3.6 MMOL/L — SIGNIFICANT CHANGE UP (ref 3.5–5.3)
PROTHROM AB SERPL-ACNC: 26.7 SEC — HIGH (ref 9.8–12.7)
RBC # BLD: 4.18 M/UL — LOW (ref 4.2–5.8)
RBC # BLD: 4.32 M/UL — SIGNIFICANT CHANGE UP (ref 4.2–5.8)
RBC # FLD: 14.7 % — HIGH (ref 10.3–14.5)
RBC # FLD: 14.7 % — HIGH (ref 10.3–14.5)
RBC BLD AUTO: (no result)
SODIUM SERPL-SCNC: 141 MMOL/L — SIGNIFICANT CHANGE UP (ref 135–145)
VARIANT LYMPHS # BLD: 6 % — SIGNIFICANT CHANGE UP (ref 0–6)
WBC # BLD: 30.7 K/UL — HIGH (ref 3.8–10.5)
WBC # BLD: 31 K/UL — HIGH (ref 3.8–10.5)
WBC # FLD AUTO: 30.7 K/UL — HIGH (ref 3.8–10.5)
WBC # FLD AUTO: 31 K/UL — HIGH (ref 3.8–10.5)

## 2017-09-18 RX ADMIN — CEFEPIME 100 MILLIGRAM(S): 1 INJECTION, POWDER, FOR SOLUTION INTRAMUSCULAR; INTRAVENOUS at 17:28

## 2017-09-18 RX ADMIN — AMIODARONE HYDROCHLORIDE 100 MILLIGRAM(S): 400 TABLET ORAL at 05:27

## 2017-09-18 RX ADMIN — SIMVASTATIN 10 MILLIGRAM(S): 20 TABLET, FILM COATED ORAL at 22:12

## 2017-09-18 RX ADMIN — WARFARIN SODIUM 3 MILLIGRAM(S): 2.5 TABLET ORAL at 22:12

## 2017-09-18 RX ADMIN — GABAPENTIN 300 MILLIGRAM(S): 400 CAPSULE ORAL at 14:14

## 2017-09-18 RX ADMIN — Medication 3 MILLILITER(S): at 07:37

## 2017-09-18 RX ADMIN — Medication 25 MILLIGRAM(S): at 05:28

## 2017-09-18 RX ADMIN — Medication 3 MILLILITER(S): at 20:00

## 2017-09-18 RX ADMIN — GABAPENTIN 300 MILLIGRAM(S): 400 CAPSULE ORAL at 05:27

## 2017-09-18 RX ADMIN — Medication 100 MILLIGRAM(S): at 05:27

## 2017-09-18 RX ADMIN — Medication 4 UNIT(S): at 17:27

## 2017-09-18 RX ADMIN — Medication 100 MILLIGRAM(S): at 22:12

## 2017-09-18 RX ADMIN — GABAPENTIN 300 MILLIGRAM(S): 400 CAPSULE ORAL at 22:12

## 2017-09-18 RX ADMIN — Medication 100 MILLIGRAM(S): at 05:28

## 2017-09-18 RX ADMIN — Medication 100 MILLIGRAM(S): at 17:28

## 2017-09-18 RX ADMIN — Medication 25 MILLIGRAM(S): at 17:28

## 2017-09-18 RX ADMIN — TENOFOVIR DISOPROXIL FUMARATE 300 MILLIGRAM(S): 300 TABLET, FILM COATED ORAL at 12:08

## 2017-09-18 RX ADMIN — Medication 6: at 22:12

## 2017-09-18 RX ADMIN — Medication 4 UNIT(S): at 08:16

## 2017-09-18 RX ADMIN — Medication 30 MILLIGRAM(S): at 05:26

## 2017-09-18 RX ADMIN — Medication 3 MILLILITER(S): at 13:59

## 2017-09-18 RX ADMIN — Medication 4 UNIT(S): at 12:08

## 2017-09-18 RX ADMIN — Medication 8: at 17:27

## 2017-09-18 RX ADMIN — Medication 8: at 12:07

## 2017-09-18 RX ADMIN — INSULIN GLARGINE 40 UNIT(S): 100 INJECTION, SOLUTION SUBCUTANEOUS at 22:13

## 2017-09-18 RX ADMIN — CEFEPIME 100 MILLIGRAM(S): 1 INJECTION, POWDER, FOR SOLUTION INTRAMUSCULAR; INTRAVENOUS at 05:28

## 2017-09-18 NOTE — PROGRESS NOTE ADULT - SUBJECTIVE AND OBJECTIVE BOX
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), hx of hypogammaglobulinemia s/p failed IVIG infusions 2/2 to immediate type hypersensitivity reactions, HFpEF (LVEF 55-60% in 03/2016), COPD, T2DM, HTN, Hepatitis B, Hx PE/DVT, and Recurrent PNA, now admitted on 9/12 for evaluation of productive cough, mild shortness of breath and fever; initially thought to be viral infection, however, now developing leukocytosis, still short of breath even while wearing oxygen, imaging consistent with pneumonia  Today 9/17 patient doing better, breathing off oxygen  Today 9/18 patient doing better, asking to go home    MEDICATIONS  (STANDING):  amiodarone    Tablet 100 milliGRAM(s) Oral daily  gabapentin 300 milliGRAM(s) Oral three times a day  simvastatin 10 milliGRAM(s) Oral at bedtime  tenofovir 300 milliGRAM(s) Oral daily  metoprolol 25 milliGRAM(s) Oral two times a day  docusate sodium 100 milliGRAM(s) Oral three times a day  dextrose 5%. 1000 milliLiter(s) (50 mL/Hr) IV Continuous <Continuous>  ALBUTerol/ipratropium for Nebulization 3 milliLiter(s) Nebulizer every 6 hours  dextrose 50% Injectable 12.5 Gram(s) IV Push once  dextrose 50% Injectable 25 Gram(s) IV Push once  dextrose 50% Injectable 25 Gram(s) IV Push once  insulin lispro Injectable (HumaLOG) 4 Unit(s) SubCutaneous three times a day before meals  insulin lispro (HumaLOG) corrective regimen sliding scale   SubCutaneous three times a day before meals  insulin lispro (HumaLOG) corrective regimen sliding scale   SubCutaneous at bedtime  cefepime  IVPB 2000 milliGRAM(s) IV Intermittent every 12 hours  cefepime  IVPB      doxycycline hyclate Capsule 100 milliGRAM(s) Oral every 12 hours  warfarin 3 milliGRAM(s) Oral daily  insulin glargine Injectable (LANTUS) 40 Unit(s) SubCutaneous at bedtime    MEDICATIONS  (PRN):  acetaminophen   Tablet 650 milliGRAM(s) Oral every 6 hours PRN For Temp greater than 38 C (100.4 F)  dextrose Gel 1 Dose(s) Oral once PRN Blood Glucose LESS THAN 70 milliGRAM(s)/deciliter  glucagon  Injectable 1 milliGRAM(s) IntraMuscular once PRN Glucose LESS THAN 70 milligrams/deciliter  guaiFENesin   Syrup  (Sugar-Free) 100 milliGRAM(s) Oral every 6 hours PRN Cough      Vital Signs Last 24 Hrs  T(C): 36.8 (18 Sep 2017 10:52), Max: 36.9 (17 Sep 2017 18:05)  T(F): 98.2 (18 Sep 2017 10:52), Max: 98.5 (17 Sep 2017 18:05)  HR: 104 (18 Sep 2017 10:52) (86 - 105)  BP: 111/64 (18 Sep 2017 10:52) (111/64 - 150/79)  BP(mean): 77 (17 Sep 2017 18:05) (77 - 77)  RR: 18 (18 Sep 2017 10:52) (18 - 18)  SpO2: 99% (18 Sep 2017 10:52) (88% - 99%)    Physical Exam:          Constitutional: frail looking  HEENT: NC/AT, EOMI, PERRLA  Neck: supple  Respiratory: bilateral rhonchi  Cardiovascular: S1S2 regular, no murmurs  Abdomen: soft, not tender, not distended, positive BS  Genitourinary: deferred  Rectal: deferred  Musculoskeletal: no muscle tenderness, no joint swelling or tenderness  Neurological: AxOx3, moving all extremities, no focal deficits  Skin: no rashes      Labs:                        12.2   30.7  )-----------( 147      ( 18 Sep 2017 11:26 )             37.9     09-18    141  |  106  |  40<H>  ----------------------------<  87  3.6   |  27  |  1.18    Ca    8.0<L>      18 Sep 2017 06:06             Cultures:                             11.8   24.8  )-----------( 133      ( 16 Sep 2017 05:55 )             35.2     09-16    139  |  105  |  35<H>  ----------------------------<  268<H>  4.6   |  28  |  1.22    Ca    8.1<L>      16 Sep 2017 05:55       Labs:                        12.0   25.6  )-----------( 131      ( 17 Sep 2017 05:57 )             34.5     09-16    139  |  105  |  35<H>  ----------------------------<  268<H>  4.6   |  28  |  1.22    Ca    8.1<L>      16 Sep 2017 05:55             Cultures:         Radiology:< from: CT Chest No Cont (09.16.17 @ 08:20) >    EXAM:  CT CHEST                            PROCEDURE DATE:  09/16/2017          INTERPRETATION:  CT CHEST    HISTORY:  Left lower lobe atelectasis, chest pain, shortness of breath        TECHNIQUE: Noncontrast CT of the chest was performed. [place]   sagittal images were reconstructed. This study was performed using   automatic exposure control (radiation dose reduction software) to obtain   a diagnostic image quality scan with patient dose as low as reasonably   achievable.    COMPARISON: Chest x-ray 22 hours prior, chest CT 6/9/2017    FINDINGS:    LUNGS, AIRWAYS: The central airways are patent. Improving right middle   and right lower lobe pneumonia, newly resolved. Bibasilar areas of linear   platelike atelectasis have increased. There is new groundglass airspace   opacity at the left upper lobe.    PLEURA: No pleural abnormality.    HEART AND VESSELS: Normal heart size. No pericardial effusion. Coronary   artery calcifications are present. Normal caliber thoracic aorta.    MEDIASTINUM AND JEFFREY: Stable mediastinal and bilateral axillary   adenopathy.    UPPER ABDOMEN: Left adrenal adenoma again noted. Retroperitoneal and   mesenteric adenopathy again partially visualized. Nodular surface contour   of the liver suggestive of cirrhotic change.    BONES AND SOFT TISSUES: Chronic bilateral rib deformities again noted.    IMPRESSION:     Improving right lower right middle lobe pneumonia. There is new   nonspecific groundglass opacity in the left upper lobe. Increasing   bibasilar linear and platelike atelectasis.    Multiple enlarged lymph nodes in the chest and upper abdomen consistent   with lymphoma.    < end of copied text >      Advanced directive addressed: full resuscitation

## 2017-09-18 NOTE — PROGRESS NOTE ADULT - ASSESSMENT
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 Pneumonia in this patient will treat as health care associated pneumonia given hospitalized greater than 48 hours  - follow up cultures   - oxygen and nebs as needed   - iv hydration and supportive care   - patient at risk for gram negative rods and other resistant bacteria   - day #3 cefepime and doxycycline  - tolerating antibiotics without rashes or side effects   2. 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

## 2017-09-18 NOTE — PROGRESS NOTE ADULT - SUBJECTIVE AND OBJECTIVE BOX
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 Cough is productive in nature with yellowish/whitish sputum. Associated symptoms include SOB and Low-grade Fevers (~100s). Denies sick contacts.    In the ED, patient was empirically treated with IV Vanc, Zosyn, and 2.5L IV NS. RVP positive for Entero/Rhinovirus. (12 Sep 2017 16:42)  pt is well known to me from multiple prior admissions   at bedside  no hemoptysis  no cp      Patient states he is feeling better today  Discussed the +Rhinovirus in nasal swab and let them know that the current treatment is supportive  States that he has been playing with his grandchildren and one of them had similar symptoms    9/15  sitting OOB in chair  no difficulty breathing  decreased cough and congestion      Persistent leukocytosis but patient endorses feeling better      Patient denies any dyspnea today  No acute events occurred overnight    PAST MEDICAL & SURGICAL HISTORY:  Hypogammaglobulinemia: received IVIG  Hepatitis B virus infection, unspecified chronicity: retrovirals  Chronic diastolic congestive heart failure  Essential hypertension  DM type 2 (diabetes mellitus, type 2)  B-cell lymphoma, unspecified B-cell lymphoma type, unspecified body region: CLL stage 4/SLL; met NHL  Atrial fibrillation, unspecified type  COPD (chronic obstructive pulmonary disease)  History of esophageal dilatation  S/P cholecystectomy  H/O prostatectomy      PREVIOUS DIAGNOSTIC TESTING:      MEDICATIONS  (STANDING):  amiodarone    Tablet 100 milliGRAM(s) Oral daily  warfarin 2.5 milliGRAM(s) Oral daily  gabapentin 300 milliGRAM(s) Oral three times a day  simvastatin 10 milliGRAM(s) Oral at bedtime  tenofovir 300 milliGRAM(s) Oral daily  metoprolol 25 milliGRAM(s) Oral two times a day  docusate sodium 100 milliGRAM(s) Oral three times a day  dextrose 5%. 1000 milliLiter(s) (50 mL/Hr) IV Continuous <Continuous>  cefepime  IVPB 1000 milliGRAM(s) IV Intermittent every 24 hours  ALBUTerol/ipratropium for Nebulization 3 milliLiter(s) Nebulizer every 6 hours  enoxaparin Injectable 90 milliGRAM(s) SubCutaneous every 12 hours  insulin lispro (HumaLOG) corrective regimen sliding scale   SubCutaneous three times a day before meals  dextrose 50% Injectable 12.5 Gram(s) IV Push once  dextrose 50% Injectable 25 Gram(s) IV Push once  dextrose 50% Injectable 25 Gram(s) IV Push once    MEDICATIONS  (PRN):  acetaminophen   Tablet 650 milliGRAM(s) Oral every 6 hours PRN For Temp greater than 38 C (100.4 F)  dextrose Gel 1 Dose(s) Oral once PRN Blood Glucose LESS THAN 70 milliGRAM(s)/deciliter  glucagon  Injectable 1 milliGRAM(s) IntraMuscular once PRN Glucose LESS THAN 70 milligrams/deciliter  guaiFENesin   Syrup  (Sugar-Free) 100 milliGRAM(s) Oral every 6 hours PRN Cough      FAMILY HISTORY:  Family history of liver cancer (Father): father  85 liver CA  Family history of coronary arteriosclerosis (Mother): Mom  of MI age 68      SOCIAL HISTORY:   Denies any occupational exposures    REVIEW OF SYSTEM:  Pertinent items are noted in HPI.  Constitutional negative for chills,pos fevers, no sweats and weight loss  throat, and face:  negative for epistaxis, nasal congestion, sore throat and   tinnitus  Respiratory: pos for cough, dyspnea on exertion, pleuritic chest pain  and wheezing  Cardiovascular:  negative for chest pain, pos dyspnea and palpitations  Gastrointestinal: negative for abdominal pain, diarrhea, nausea and vomiting  Genitourinary: negative for dysuria, frequency and urinary incontinence  Skin:  negative for redness, rash, pruritus, swelling, dryness and   fissures  Hematologic/lymphatic: negative for bleeding and easy bruising  Musculoskeletal: negative for arthralgias, back pain and muscle weakness  Neurological: negative for dizziness, headaches, seizures and tremors  Behavioral/Psych:  negative for mood change, depression, suicidal attempts    Allergic/Immunologic: negative for anaphylaxis, angioedema and urticaria    Vital Signs Last 24 Hrs  T(C): 36.7 (15 Sep 2017 05:14), Max: 36.7 (15 Sep 2017 05:14)  T(F): 98 (15 Sep 2017 05:14), Max: 98 (15 Sep 2017 05:14)  HR: 92 (15 Sep 2017 07:40) (80 - 92)  BP: 104/60 (15 Sep 2017 05:14) (104/60 - 105/46)  BP(mean): --  RR: 17 (14 Sep 2017 11:19) (17 - 17)  SpO2: 97% (15 Sep 2017 05:14) (95% - 97%)    I&O's Summary    PHYSICAL EXAM  General Appearance: cooperative, no acute distress,   HEENT: PERRL, conjunctiva clear, EOM's intact, non injected pharynx,  Neck: Supple, , mild adenopathy,   Lungs: Clear to auscultation bilateral,no adventitious breath sounds, prolonged   expiratory phase, few scattered rhonchi, improved bronchospasm since admission  Heart: Regular rate and rhythm, S1, S2 normal, no murmur, rub or gallop  Abdomen: Soft, non-tender, bowel sounds active , no hepatosplenomegaly  Extremities: no cyanosis or edema, no joint swelling  Skin: Skin color, texture normal, no rashes   Neurologic: Alert and oriented X3 , cranial nerves intact, sensory and motor normal,    ECG:    LABS:                               13.0   31.0  )-----------( 128      ( 18 Sep 2017 06:06 )             39.4     09-18    141  |  106  |  40<H>  ----------------------------<  87  3.6   |  27  |  1.18    Ca    8.0<L>      18 Sep 2017 06:06         PTT - ( 12 Sep 2017 12:40 )  PTT:32.8 sec  Urinalysis Basic - ( 12 Sep 2017 15:24 )    Color: Yellow / Appearance: Clear / S.005 / pH: x  Gluc: x / Ketone: Negative  / Bili: Negative / Urobili: 1 mg/dL   Blood: x / Protein: 15 mg/dL / Nitrite: Negative   Leuk Esterase: Trace / RBC: 0-2 /HPF / WBC 3-5   Sq Epi: x / Non Sq Epi: Moderate / Bacteria: Few            RADIOLOGY & ADDITIONAL STUDIES:  CT Chest reviewed  IMPRESSION:     Improving right lower right middle lobe pneumonia. There is new   nonspecific groundglass opacity in the left upper lobe. Increasing   bibasilar linear and platelike atelectasis.    Multiple enlarged lymph nodes in the chest and upper abdomen consistent   with lymphoma.

## 2017-09-18 NOTE — PROGRESS NOTE ADULT - SUBJECTIVE AND OBJECTIVE BOX
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 Cough is productive in nature with yellowish/whitish sputum. Associated symptoms include SOB and Low-grade Fevers (~100s).     9/14: still complains of intermittent wheezing and cob.cough  9/15: dyspnea slowly improving although wbc rising quickly on steroids.   9/16: slow to improve although wheezing has dissipated  9/17: status quo  9/18: leukocytosis worsening although patient endorses some improvement.         Vital Signs Last 24 Hrs  T(C): 37.1 (13 Sep 2017 04:46), Max: 38 (12 Sep 2017 12:31)  T(F): 98.8 (13 Sep 2017 04:46), Max: 100.4 (12 Sep 2017 12:31)  HR: 88 (13 Sep 2017 04:46) (88 - 109)  BP: 140/68 (13 Sep 2017 04:46) (128/72 - 150/76)  BP(mean): --  RR: 16 (12 Sep 2017 22:10) (16 - 18)  SpO2: 94% (13 Sep 2017 04:46) (92% - 100%)        PHYSICAL EXAM  General Appearance: cooperative, no acute distress,   HEENT: PERRL, conjunctiva clear, EOM's intact, non injected pharynx,  Neck: Supple, , mild adenopathy,   Lungs: decreased breath sounds b/l,    expiratory phase, few scattered rhonchi  Heart: Regular rate and rhythm, S1, S2 normal, no murmur, rub or gallop  Abdomen: Soft, non-tender, bowel sounds active , no hepatosplenomegaly  Extremities: no cyanosis or edema, no joint swelling  Skin: Skin color, texture normal, no rashes   Neurologic: Alert and oriented X3 , cranial nerves intact, sensory and motor normal,    ECG:    LABS:                          12.4   15.9  )-----------( 112      ( 13 Sep 2017 06:07 )             36.5     09-12    137  |  104  |  16  ----------------------------<  243<H>  4.5   |  27  |  1.30    Ca    8.3<L>      12 Sep 2017 12:40  Phos  3.4     09-13  Mg     2.0     09-13    TPro  6.0  /  Alb  3.6  /  TBili  0.7  /  DBili  x   /  AST  29  /  ALT  39  /  AlkPhos  124<H>  09-12    A/P:     * Viral PNA/EV.RV/HCAP  - Worsening leukocytosis->? Leukamoid reaction? will send Leukocyte alkaline phosphatase.   - Hematology consult- smudge cells present.   - Multifocal pna, immunocompromised patient, hx of CLL/Rituxan  - Start cefepime to cover suspected GNR and other resistant organisms, ID consult, cultures, D/W pulm; agree with addotion of doxy  - Hold steroids. , c/w nebs  - mild thrombocytopenia probable consumption  - cont current lantus dose, premeal coverage/iss    * AF: stable, in NSR  - on low dose amio  - INR 2.4, cont coumadin    * h/o chr CLL  outpt treatment

## 2017-09-18 NOTE — PROGRESS NOTE ADULT - ASSESSMENT
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 Cough is productive in nature with yellowish/whitish sputum. Associated symptoms include SOB and Low-grade Fevers (~100s). Denies sick contacts.  Hx recurrent pneumonia and Bronchitis  CLL with Immunoglobulin deficiency  Acute URI now with bronchospasm  Non toxic  mild Bronchospasm  COPD  Hx mucus plugging    9/14  Patient improving today with steroids, bronchodilators/nebulizers  Viral swab + RVP thus far, no bacterial positivity   Recommend to continues mobilization as tolerated and patient to receive IV IG rx as outpt    9/15  worsened leukocytosis on steroids  clinically improved  re eval with cxr today  cont with nebs  fu cultures    9/16  Patient continues to feel clinically better but leukocytosis persistent  Discussed with primary team who have started Abx for the persistent opacity.  Seems to be improving but given his immunocompromised state, agree with abx. If this does not improve, may need bronchoscopy with BAL to assess airways/parenchyma further.      9/18  - From a dyspnea standpoint, patient feels good at rest but develops slight discomfort on exertion  - Blood cultures were negative, Rhinovirus +. Source is still TBD.  -Leukocytosis worsening; patient on Cefepime/Doxycycline. Smudge cells still present on smear  - May consider involving hematology

## 2017-09-19 LAB
ANISOCYTOSIS BLD QL: SLIGHT — SIGNIFICANT CHANGE UP
ELLIPTOCYTES BLD QL SMEAR: SLIGHT — SIGNIFICANT CHANGE UP
HCT VFR BLD CALC: 36.9 % — LOW (ref 39–50)
HGB BLD-MCNC: 12.2 G/DL — LOW (ref 13–17)
INR BLD: 1.98 RATIO — HIGH (ref 0.88–1.16)
LYMPHOCYTES # BLD AUTO: 65 % — HIGH (ref 13–44)
MACROCYTES BLD QL: SLIGHT — SIGNIFICANT CHANGE UP
MANUAL DIF COMMENT BLD-IMP: SIGNIFICANT CHANGE UP
MCHC RBC-ENTMCNC: 29.6 PG — SIGNIFICANT CHANGE UP (ref 27–34)
MCHC RBC-ENTMCNC: 33 GM/DL — SIGNIFICANT CHANGE UP (ref 32–36)
MCV RBC AUTO: 89.8 FL — SIGNIFICANT CHANGE UP (ref 80–100)
MONOCYTES NFR BLD AUTO: 3 % — SIGNIFICANT CHANGE UP (ref 2–14)
NEUTROPHILS NFR BLD AUTO: 26 % — LOW (ref 43–77)
PLAT MORPH BLD: NORMAL — SIGNIFICANT CHANGE UP
PLATELET # BLD AUTO: 129 K/UL — LOW (ref 150–400)
PLATELET COUNT - ESTIMATE: (no result)
POIKILOCYTOSIS BLD QL AUTO: SLIGHT — SIGNIFICANT CHANGE UP
POLYCHROMASIA BLD QL SMEAR: SLIGHT — SIGNIFICANT CHANGE UP
PROTHROM AB SERPL-ACNC: 21.7 SEC — HIGH (ref 9.8–12.7)
RBC # BLD: 4.11 M/UL — LOW (ref 4.2–5.8)
RBC # FLD: 14.2 % — SIGNIFICANT CHANGE UP (ref 10.3–14.5)
RBC BLD AUTO: (no result)
VARIANT LYMPHS # BLD: 6 % — SIGNIFICANT CHANGE UP (ref 0–6)
WBC # BLD: 30.5 K/UL — HIGH (ref 3.8–10.5)
WBC # FLD AUTO: 30.5 K/UL — HIGH (ref 3.8–10.5)

## 2017-09-19 PROCEDURE — 71010: CPT | Mod: 26

## 2017-09-19 RX ORDER — WARFARIN SODIUM 2.5 MG/1
1 TABLET ORAL ONCE
Qty: 0 | Refills: 0 | Status: COMPLETED | OUTPATIENT
Start: 2017-09-19 | End: 2017-09-19

## 2017-09-19 RX ORDER — ACETAMINOPHEN 500 MG
650 TABLET ORAL ONCE
Qty: 0 | Refills: 0 | Status: COMPLETED | OUTPATIENT
Start: 2017-09-19 | End: 2017-09-19

## 2017-09-19 RX ADMIN — Medication 2: at 22:46

## 2017-09-19 RX ADMIN — GABAPENTIN 300 MILLIGRAM(S): 400 CAPSULE ORAL at 22:50

## 2017-09-19 RX ADMIN — CEFEPIME 100 MILLIGRAM(S): 1 INJECTION, POWDER, FOR SOLUTION INTRAMUSCULAR; INTRAVENOUS at 17:08

## 2017-09-19 RX ADMIN — Medication 100 MILLIGRAM(S): at 14:32

## 2017-09-19 RX ADMIN — GABAPENTIN 300 MILLIGRAM(S): 400 CAPSULE ORAL at 06:43

## 2017-09-19 RX ADMIN — INSULIN GLARGINE 40 UNIT(S): 100 INJECTION, SOLUTION SUBCUTANEOUS at 22:45

## 2017-09-19 RX ADMIN — TENOFOVIR DISOPROXIL FUMARATE 300 MILLIGRAM(S): 300 TABLET, FILM COATED ORAL at 14:32

## 2017-09-19 RX ADMIN — SIMVASTATIN 10 MILLIGRAM(S): 20 TABLET, FILM COATED ORAL at 22:49

## 2017-09-19 RX ADMIN — Medication 3 MILLILITER(S): at 20:48

## 2017-09-19 RX ADMIN — GABAPENTIN 300 MILLIGRAM(S): 400 CAPSULE ORAL at 14:32

## 2017-09-19 RX ADMIN — Medication 100 MILLIGRAM(S): at 06:41

## 2017-09-19 RX ADMIN — AMIODARONE HYDROCHLORIDE 100 MILLIGRAM(S): 400 TABLET ORAL at 06:41

## 2017-09-19 RX ADMIN — Medication 3 MILLILITER(S): at 14:11

## 2017-09-19 RX ADMIN — WARFARIN SODIUM 3 MILLIGRAM(S): 2.5 TABLET ORAL at 22:48

## 2017-09-19 RX ADMIN — Medication 650 MILLIGRAM(S): at 22:51

## 2017-09-19 RX ADMIN — Medication 4 UNIT(S): at 11:55

## 2017-09-19 RX ADMIN — Medication 4: at 11:55

## 2017-09-19 RX ADMIN — Medication 4 UNIT(S): at 08:01

## 2017-09-19 RX ADMIN — CEFEPIME 100 MILLIGRAM(S): 1 INJECTION, POWDER, FOR SOLUTION INTRAMUSCULAR; INTRAVENOUS at 06:41

## 2017-09-19 RX ADMIN — WARFARIN SODIUM 1 MILLIGRAM(S): 2.5 TABLET ORAL at 22:47

## 2017-09-19 RX ADMIN — Medication 100 MILLIGRAM(S): at 06:42

## 2017-09-19 RX ADMIN — Medication 650 MILLIGRAM(S): at 17:20

## 2017-09-19 RX ADMIN — Medication 100 MILLIGRAM(S): at 17:08

## 2017-09-19 RX ADMIN — Medication 100 MILLIGRAM(S): at 22:44

## 2017-09-19 RX ADMIN — Medication 25 MILLIGRAM(S): at 17:12

## 2017-09-19 RX ADMIN — Medication 3 MILLILITER(S): at 08:55

## 2017-09-19 RX ADMIN — Medication 4 UNIT(S): at 17:08

## 2017-09-19 RX ADMIN — Medication 25 MILLIGRAM(S): at 06:41

## 2017-09-19 NOTE — CHART NOTE - NSCHARTNOTEFT_GEN_A_CORE
Called by nursing for fever of 102F. 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 Pt seen at bedside. Pt c/o an occasional productive cough that comes and goes. Pt denies CP, urinary symptoms or other complaints. Pt's WBC count has been consistently elevated since admission. Pt currently on cefepime and ceftriaxone, followed by ID. New blood cx, stat CXR ordered. Tylenol 650mg given.    Vital Signs Last 24 Hrs  T(C): 37.4 (19 Sep 2017 21:43), Max: 38.9 (19 Sep 2017 17:25)  T(F): 99.4 (19 Sep 2017 21:43), Max: 102 (19 Sep 2017 17:25)  HR: 103 (19 Sep 2017 21:43) (82 - 110)  BP: 116/66 (19 Sep 2017 21:43) (101/62 - 125/69)  BP(mean): 77 (19 Sep 2017 05:12) (77 - 77)  RR: --  SpO2: 93% (19 Sep 2017 21:43) (90% - 94%)    GEN: Pt sitting in chair NAD  LUNGS: RLL/RML crackles  HEART: S1S2+, slightly tachycardic  ABD: NT/ND  NEURO: AOx3    #Fever  >Pt c/o an occasional productive cough that comes and goes  >Pt denies CP, urinary symptoms or other complaints.  >Pt's WBC count has been consistently elevated (15.9 ->30.5) since 9/12/17  >Pt currently on cefepime and ceftriaxone, followed by ID.  -F/U new blood cx  -F/U CXR Called by nursing for fever of 102F. 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 Pt seen at bedside. Pt c/o an occasional productive cough that comes and goes. Pt denies CP, urinary symptoms or other complaints. Pt's WBC count has been consistently elevated since admission. Pt currently on cefepime and ceftriaxone, followed by ID. New blood cx, stat CXR ordered. Tylenol 650mg given.    Vital Signs Last 24 Hrs  T(C): 37.4 (19 Sep 2017 21:43), Max: 38.9 (19 Sep 2017 17:25)  T(F): 99.4 (19 Sep 2017 21:43), Max: 102 (19 Sep 2017 17:25)  HR: 103 (19 Sep 2017 21:43) (82 - 110)  BP: 116/66 (19 Sep 2017 21:43) (101/62 - 125/69)  BP(mean): 77 (19 Sep 2017 05:12) (77 - 77)  RR: --  SpO2: 93% (19 Sep 2017 21:43) (90% - 94%)    GEN: Pt sitting in chair NAD  LUNGS: RLL/RML crackles  HEART: S1S2+, slightly tachycardic  ABD: NT/ND  NEURO: AOx3    #Fever  >Pt c/o an occasional productive cough that comes and goes  >Pt denies CP, urinary symptoms or other complaints.  >Pt's WBC count has been consistently elevated (15.9 ->30.5) since 9/12/17  >Pt currently on cefepime and doxycycline, followed by ID.  -F/U new blood cx  -F/U CXR

## 2017-09-19 NOTE — DIETITIAN INITIAL EVALUATION ADULT. - OTHER INFO
Pt with good po intake and appetite ~100% of meals consumed. Denies any difficulty chewing or swallowing. No skin breakdown or edema noted. Lab: 9/12/17-HgA1C 10.3 (indicating uncontrolled DM). Diet instruction provided on Consistent carbohydrate diet nutrition therapy ([place] citizen).  [place] citizen speaking aide provided assistance with translation. Discussed risks of uncontrolled DM and strategies to promote glycemic control. Will follow up prn.

## 2017-09-19 NOTE — PROGRESS NOTE ADULT - SUBJECTIVE AND OBJECTIVE BOX
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 Cough is productive in nature with yellowish/whitish sputum. Associated symptoms include SOB and Low-grade Fevers (~100s). Denies sick contacts.    In the ED, patient was empirically treated with IV Vanc, Zosyn, and 2.5L IV NS. RVP positive for Entero/Rhinovirus. (12 Sep 2017 16:42)  pt is well known to me from multiple prior admissions   at bedside  no hemoptysis  no cp      Patient states he is feeling better today  Discussed the +Rhinovirus in nasal swab and let them know that the current treatment is supportive  States that he has been playing with his grandchildren and one of them had similar symptoms    9/15  sitting OOB in chair  no difficulty breathing  decreased cough and congestion      Persistent leukocytosis but patient endorses feeling better      Patient denies any dyspnea today  No acute events occurred overnight    feels better  decreased cough  he wants to go home  no cp  non toxic    PAST MEDICAL & SURGICAL HISTORY:  Hypogammaglobulinemia: received IVIG  Hepatitis B virus infection, unspecified chronicity: retrovirals  Chronic diastolic congestive heart failure  Essential hypertension  DM type 2 (diabetes mellitus, type 2)  B-cell lymphoma, unspecified B-cell lymphoma type, unspecified body region: CLL stage 4/SLL; met NHL  Atrial fibrillation, unspecified type  COPD (chronic obstructive pulmonary disease)  History of esophageal dilatation  S/P cholecystectomy  H/O prostatectomy      PREVIOUS DIAGNOSTIC TESTING:      MEDICATIONS  (STANDING):  amiodarone    Tablet 100 milliGRAM(s) Oral daily  warfarin 2.5 milliGRAM(s) Oral daily  gabapentin 300 milliGRAM(s) Oral three times a day  simvastatin 10 milliGRAM(s) Oral at bedtime  tenofovir 300 milliGRAM(s) Oral daily  metoprolol 25 milliGRAM(s) Oral two times a day  docusate sodium 100 milliGRAM(s) Oral three times a day  dextrose 5%. 1000 milliLiter(s) (50 mL/Hr) IV Continuous <Continuous>  cefepime  IVPB 1000 milliGRAM(s) IV Intermittent every 24 hours  ALBUTerol/ipratropium for Nebulization 3 milliLiter(s) Nebulizer every 6 hours  enoxaparin Injectable 90 milliGRAM(s) SubCutaneous every 12 hours  insulin lispro (HumaLOG) corrective regimen sliding scale   SubCutaneous three times a day before meals  dextrose 50% Injectable 12.5 Gram(s) IV Push once  dextrose 50% Injectable 25 Gram(s) IV Push once  dextrose 50% Injectable 25 Gram(s) IV Push once    MEDICATIONS  (PRN):  acetaminophen   Tablet 650 milliGRAM(s) Oral every 6 hours PRN For Temp greater than 38 C (100.4 F)  dextrose Gel 1 Dose(s) Oral once PRN Blood Glucose LESS THAN 70 milliGRAM(s)/deciliter  glucagon  Injectable 1 milliGRAM(s) IntraMuscular once PRN Glucose LESS THAN 70 milligrams/deciliter  guaiFENesin   Syrup  (Sugar-Free) 100 milliGRAM(s) Oral every 6 hours PRN Cough      FAMILY HISTORY:  Family history of liver cancer (Father): father  85 liver CA  Family history of coronary arteriosclerosis (Mother): Mom  of MI age 68      SOCIAL HISTORY:   Denies any occupational exposures    REVIEW OF SYSTEM:  Pertinent items are noted in HPI.  Constitutional negative for chills,pos fevers, no sweats and weight loss  throat, and face:  negative for epistaxis, nasal congestion, sore throat and   tinnitus  Respiratory: pos for cough, dyspnea on exertion, pleuritic chest pain  and wheezing  Cardiovascular:  negative for chest pain, pos dyspnea and palpitations  Gastrointestinal: negative for abdominal pain, diarrhea, nausea and vomiting  Genitourinary: negative for dysuria, frequency and urinary incontinence  Skin:  negative for redness, rash, pruritus, swelling, dryness and   fissures  Hematologic/lymphatic: negative for bleeding and easy bruising  Musculoskeletal: negative for arthralgias, back pain and muscle weakness  Neurological: negative for dizziness, headaches, seizures and tremors  Behavioral/Psych:  negative for mood change, depression, suicidal attempts    Allergic/Immunologic: negative for anaphylaxis, angioedema and urticaria    Vital Signs Last 24 Hrs  T(C): 36.7 (15 Sep 2017 05:14), Max: 36.7 (15 Sep 2017 05:14)  T(F): 98 (15 Sep 2017 05:14), Max: 98 (15 Sep 2017 05:14)  HR: 92 (15 Sep 2017 07:40) (80 - 92)  BP: 104/60 (15 Sep 2017 05:14) (104/60 - 105/46)  BP(mean): --  RR: 17 (14 Sep 2017 11:19) (17 - 17)  SpO2: 97% (15 Sep 2017 05:14) (95% - 97%)    I&O's Summary    PHYSICAL EXAM  General Appearance: cooperative, no acute distress,   HEENT: PERRL, conjunctiva clear, EOM's intact, non injected pharynx,  Neck: Supple, , mild adenopathy,   Lungs: Clear to auscultation bilateral,no adventitious breath sounds, prolonged   expiratory phase, few scattered rhonchi, improved bronchospasm since admission  Heart: Regular rate and rhythm, S1, S2 normal, no murmur, rub or gallop  Abdomen: Soft, non-tender, bowel sounds active , no hepatosplenomegaly  Extremities: no cyanosis or edema, no joint swelling  Skin: Skin color, texture normal, no rashes   Neurologic: Alert and oriented X3 , cranial nerves intact, sensory and motor normal,    ECG:    LABS:                               13.0   31.0  )-----------( 128      ( 18 Sep 2017 06:06 )             39.4     09-18    141  |  106  |  40<H>  ----------------------------<  87  3.6   |  27  |  1.18    Ca    8.0<L>      18 Sep 2017 06:06         PTT - ( 12 Sep 2017 12:40 )  PTT:32.8 sec  Urinalysis Basic - ( 12 Sep 2017 15:24 )    Color: Yellow / Appearance: Clear / S.005 / pH: x  Gluc: x / Ketone: Negative  / Bili: Negative / Urobili: 1 mg/dL   Blood: x / Protein: 15 mg/dL / Nitrite: Negative   Leuk Esterase: Trace / RBC: 0-2 /HPF / WBC 3-5   Sq Epi: x / Non Sq Epi: Moderate / Bacteria: Few            RADIOLOGY & ADDITIONAL STUDIES:  CT Chest reviewed  IMPRESSION:     Improving right lower right middle lobe pneumonia. There is new   nonspecific groundglass opacity in the left upper lobe. Increasing   bibasilar linear and platelike atelectasis.    Multiple enlarged lymph nodes in the chest and upper abdomen consistent   with lymphoma.

## 2017-09-19 NOTE — CONSULT NOTE ADULT - ASSESSMENT
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 Cough is productive in nature with yellowish/whitish sputum. Associated symptoms include SOB and Low-grade Fevers (~100s). Denies sick contacts.  Hx recurrent pneumonia and Bronchitis  CLL with Immunoglobulin deficiency  Acute URI now with bronchospasm  Non toxic  mild Bronchospasm  COPD  Hx mucus plugging      plan  steroids  bronchodilator rx  nebulizer treatment  mobilize OOB  IV IG rx as outpt
CLL /SLL  Lymphocytosis  Hypogammaglobulinemia  Adenopathy  Recurrent pneumonia    A/P    antibiotics  FU heme out patient  Possible immune based therapy in future
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 Pneumonia in this patient will treat as health care associated pneumonia given hospitalized greater than 48 hours  - follow up cultures   - oxygen and nebs as needed   - iv hydration and supportive care   - patient at risk for gram negative rods and other resistant bacteria   - agree with cefepime as ordered  - will add doxycycline to cover resistant and atypical bacteria  2. 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

## 2017-09-19 NOTE — PROGRESS NOTE ADULT - ASSESSMENT
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 Cough is productive in nature with yellowish/whitish sputum. Associated symptoms include SOB and Low-grade Fevers (~100s). Denies sick contacts.  Hx recurrent pneumonia and Bronchitis  CLL with Immunoglobulin deficiency  Acute URI now with bronchospasm  Non toxic  mild Bronchospasm  COPD  Hx mucus plugging    9/14  Patient improving today with steroids, bronchodilators/nebulizers  Viral swab + RVP thus far, no bacterial positivity   Recommend to continues mobilization as tolerated and patient to receive IV IG rx as outpt    9/15  worsened leukocytosis on steroids  clinically improved  re eval with cxr today  cont with nebs  fu cultures    9/16  Patient continues to feel clinically better but leukocytosis persistent  Discussed with primary team who have started Abx for the persistent opacity.  Seems to be improving but given his immunocompromised state, agree with abx. If this does not improve, may need bronchoscopy with BAL to assess airways/parenchyma further.      9/18  - From a dyspnea standpoint, patient feels good at rest but develops slight discomfort on exertion  - Blood cultures were negative, Rhinovirus +. Source is still TBD.  -Leukocytosis worsening; patient on Cefepime/Doxycycline. Smudge cells still present on smear  - May consider involving hematology    9/19  complete course of antibiotics  stable / improved resp status  will need to resume IV IG therapy as outpt  supplemental o2 on exertion  DC planning as per primary team

## 2017-09-19 NOTE — PROGRESS NOTE ADULT - SUBJECTIVE AND OBJECTIVE BOX
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 Cough is productive in nature with yellowish/whitish sputum. Associated symptoms include SOB and Low-grade Fevers (~100s).     9/14: still complains of intermittent wheezing and cob.cough  9/15: dyspnea slowly improving although wbc rising quickly on steroids.   9/16: slow to improve although wheezing has dissipated  9/17: status quo  9/18: leukocytosis worsening although patient endorses some improvement.   9/19: status quo. however patient feels well.         Vital Signs Last 24 Hrs  T(C): 37.1 (13 Sep 2017 04:46), Max: 38 (12 Sep 2017 12:31)  T(F): 98.8 (13 Sep 2017 04:46), Max: 100.4 (12 Sep 2017 12:31)  HR: 88 (13 Sep 2017 04:46) (88 - 109)  BP: 140/68 (13 Sep 2017 04:46) (128/72 - 150/76)  BP(mean): --  RR: 16 (12 Sep 2017 22:10) (16 - 18)  SpO2: 94% (13 Sep 2017 04:46) (92% - 100%)        PHYSICAL EXAM  General Appearance: cooperative, no acute distress,   HEENT: PERRL, conjunctiva clear, EOM's intact, non injected pharynx,  Neck: Supple, , mild adenopathy,   Lungs: decreased breath sounds b/l,    expiratory phase, few scattered rhonchi  Heart: Regular rate and rhythm, S1, S2 normal, no murmur, rub or gallop  Abdomen: Soft, non-tender, bowel sounds active , no hepatosplenomegaly  Extremities: no cyanosis or edema, no joint swelling  Skin: Skin color, texture normal, no rashes   Neurologic: Alert and oriented X3 , cranial nerves intact, sensory and motor normal,    ECG:    LABS:                          12.4   15.9  )-----------( 112      ( 13 Sep 2017 06:07 )             36.5     09-12    137  |  104  |  16  ----------------------------<  243<H>  4.5   |  27  |  1.30    Ca    8.3<L>      12 Sep 2017 12:40  Phos  3.4     09-13  Mg     2.0     09-13    TPro  6.0  /  Alb  3.6  /  TBili  0.7  /  DBili  x   /  AST  29  /  ALT  39  /  AlkPhos  124<H>  09-12    A/P:     * Viral PNA/EV.RV/HCAP  - Worsening leukocytosis->? Leukamoid reaction? will send Leukocyte alkaline phosphatase. pending  - Hematology consult- smudge cells present. outpt chemo  - Multifocal pna, immunocompromised patient, hx of CLL/Rituxan  - Start cefepime to cover suspected GNR and other resistant organisms, ID consult, cultures, D/W pulm; agree with addition of doxy  - Hold steroids. , c/w nebs  - mild thrombocytopenia probable consumption  - cont current lantus dose, premeal coverage/iss    * AF: stable, in NSR  - on low dose amio  - INR 1.9, 4mg coumadin tonite    * h/o chr CLL  outpt treatment

## 2017-09-20 LAB
ANION GAP SERPL CALC-SCNC: 6 MMOL/L — SIGNIFICANT CHANGE UP (ref 5–17)
ANISOCYTOSIS BLD QL: SLIGHT — SIGNIFICANT CHANGE UP
BUN SERPL-MCNC: 30 MG/DL — HIGH (ref 7–23)
CALCIUM SERPL-MCNC: 8.2 MG/DL — LOW (ref 8.5–10.1)
CHLORIDE SERPL-SCNC: 107 MMOL/L — SIGNIFICANT CHANGE UP (ref 96–108)
CO2 SERPL-SCNC: 26 MMOL/L — SIGNIFICANT CHANGE UP (ref 22–31)
CREAT SERPL-MCNC: 1.08 MG/DL — SIGNIFICANT CHANGE UP (ref 0.5–1.3)
ELLIPTOCYTES BLD QL SMEAR: SLIGHT — SIGNIFICANT CHANGE UP
EOSINOPHIL NFR BLD AUTO: 2 % — SIGNIFICANT CHANGE UP (ref 0–6)
GLUCOSE SERPL-MCNC: 103 MG/DL — HIGH (ref 70–99)
HCT VFR BLD CALC: 39.9 % — SIGNIFICANT CHANGE UP (ref 39–50)
HGB BLD-MCNC: 12.9 G/DL — LOW (ref 13–17)
HYPOCHROMIA BLD QL: SLIGHT — SIGNIFICANT CHANGE UP
INR BLD: 1.72 RATIO — HIGH (ref 0.88–1.16)
LAP WBC-ACNC: SIGNIFICANT CHANGE UP
LYMPHOCYTES # BLD AUTO: 67 % — HIGH (ref 13–44)
MACROCYTES BLD QL: SLIGHT — SIGNIFICANT CHANGE UP
MANUAL DIF COMMENT BLD-IMP: SIGNIFICANT CHANGE UP
MCHC RBC-ENTMCNC: 29.2 PG — SIGNIFICANT CHANGE UP (ref 27–34)
MCHC RBC-ENTMCNC: 32.4 GM/DL — SIGNIFICANT CHANGE UP (ref 32–36)
MCV RBC AUTO: 90 FL — SIGNIFICANT CHANGE UP (ref 80–100)
MONOCYTES NFR BLD AUTO: 2 % — SIGNIFICANT CHANGE UP (ref 2–14)
NEUTROPHILS # BLD AUTO: SIGNIFICANT CHANGE UP K/UL (ref 1.8–7.4)
NEUTROPHILS NFR BLD AUTO: 24 % — LOW (ref 43–77)
NEUTS BAND # BLD: 2 % — SIGNIFICANT CHANGE UP (ref 0–8)
PLAT MORPH BLD: NORMAL — SIGNIFICANT CHANGE UP
PLATELET # BLD AUTO: 125 K/UL — LOW (ref 150–400)
PLATELET COUNT - ESTIMATE: (no result)
POIKILOCYTOSIS BLD QL AUTO: SLIGHT — SIGNIFICANT CHANGE UP
POLYCHROMASIA BLD QL SMEAR: SLIGHT — SIGNIFICANT CHANGE UP
POTASSIUM SERPL-MCNC: 3.9 MMOL/L — SIGNIFICANT CHANGE UP (ref 3.5–5.3)
POTASSIUM SERPL-SCNC: 3.9 MMOL/L — SIGNIFICANT CHANGE UP (ref 3.5–5.3)
PROTHROM AB SERPL-ACNC: 18.8 SEC — HIGH (ref 9.8–12.7)
RBC # BLD: 4.43 M/UL — SIGNIFICANT CHANGE UP (ref 4.2–5.8)
RBC # FLD: 14.4 % — SIGNIFICANT CHANGE UP (ref 10.3–14.5)
RBC BLD AUTO: (no result)
SMUDGE CELLS # BLD: PRESENT — SIGNIFICANT CHANGE UP
SODIUM SERPL-SCNC: 139 MMOL/L — SIGNIFICANT CHANGE UP (ref 135–145)
VARIANT LYMPHS # BLD: 3 % — SIGNIFICANT CHANGE UP (ref 0–6)
WBC # BLD: 30.3 K/UL — HIGH (ref 3.8–10.5)
WBC # FLD AUTO: 30.3 K/UL — HIGH (ref 3.8–10.5)

## 2017-09-20 RX ORDER — WARFARIN SODIUM 2.5 MG/1
5 TABLET ORAL ONCE
Qty: 0 | Refills: 0 | Status: COMPLETED | OUTPATIENT
Start: 2017-09-20 | End: 2017-09-20

## 2017-09-20 RX ORDER — VANCOMYCIN HCL 1 G
1000 VIAL (EA) INTRAVENOUS ONCE
Qty: 0 | Refills: 0 | Status: COMPLETED | OUTPATIENT
Start: 2017-09-20 | End: 2017-09-20

## 2017-09-20 RX ORDER — MEROPENEM 1 G/30ML
500 INJECTION INTRAVENOUS EVERY 8 HOURS
Qty: 0 | Refills: 0 | Status: DISCONTINUED | OUTPATIENT
Start: 2017-09-20 | End: 2017-09-23

## 2017-09-20 RX ORDER — VANCOMYCIN HCL 1 G
500 VIAL (EA) INTRAVENOUS EVERY 12 HOURS
Qty: 0 | Refills: 0 | Status: DISCONTINUED | OUTPATIENT
Start: 2017-09-20 | End: 2017-09-22

## 2017-09-20 RX ADMIN — SIMVASTATIN 10 MILLIGRAM(S): 20 TABLET, FILM COATED ORAL at 21:49

## 2017-09-20 RX ADMIN — Medication 250 MILLIGRAM(S): at 13:54

## 2017-09-20 RX ADMIN — GABAPENTIN 300 MILLIGRAM(S): 400 CAPSULE ORAL at 07:05

## 2017-09-20 RX ADMIN — Medication 4 UNIT(S): at 10:23

## 2017-09-20 RX ADMIN — Medication 100 MILLIGRAM(S): at 07:06

## 2017-09-20 RX ADMIN — MEROPENEM 200 MILLIGRAM(S): 1 INJECTION INTRAVENOUS at 21:47

## 2017-09-20 RX ADMIN — Medication 25 MILLIGRAM(S): at 07:03

## 2017-09-20 RX ADMIN — WARFARIN SODIUM 5 MILLIGRAM(S): 2.5 TABLET ORAL at 21:49

## 2017-09-20 RX ADMIN — Medication 3 MILLILITER(S): at 13:30

## 2017-09-20 RX ADMIN — Medication 3 MILLILITER(S): at 20:33

## 2017-09-20 RX ADMIN — Medication 2: at 21:50

## 2017-09-20 RX ADMIN — CEFEPIME 100 MILLIGRAM(S): 1 INJECTION, POWDER, FOR SOLUTION INTRAMUSCULAR; INTRAVENOUS at 07:03

## 2017-09-20 RX ADMIN — GABAPENTIN 300 MILLIGRAM(S): 400 CAPSULE ORAL at 21:49

## 2017-09-20 RX ADMIN — Medication 25 MILLIGRAM(S): at 17:12

## 2017-09-20 RX ADMIN — Medication 3 MILLILITER(S): at 07:48

## 2017-09-20 RX ADMIN — Medication 100 MILLIGRAM(S): at 07:03

## 2017-09-20 RX ADMIN — AMIODARONE HYDROCHLORIDE 100 MILLIGRAM(S): 400 TABLET ORAL at 07:02

## 2017-09-20 RX ADMIN — Medication 100 MILLIGRAM(S): at 16:50

## 2017-09-20 RX ADMIN — Medication 100 MILLIGRAM(S): at 21:48

## 2017-09-20 RX ADMIN — Medication 100 MILLIGRAM(S): at 11:16

## 2017-09-20 RX ADMIN — TENOFOVIR DISOPROXIL FUMARATE 300 MILLIGRAM(S): 300 TABLET, FILM COATED ORAL at 11:16

## 2017-09-20 RX ADMIN — Medication 4 UNIT(S): at 11:20

## 2017-09-20 RX ADMIN — Medication 4 UNIT(S): at 16:46

## 2017-09-20 RX ADMIN — Medication 650 MILLIGRAM(S): at 16:46

## 2017-09-20 RX ADMIN — GABAPENTIN 300 MILLIGRAM(S): 400 CAPSULE ORAL at 13:54

## 2017-09-20 RX ADMIN — INSULIN GLARGINE 40 UNIT(S): 100 INJECTION, SOLUTION SUBCUTANEOUS at 21:49

## 2017-09-20 NOTE — PROGRESS NOTE ADULT - SUBJECTIVE AND OBJECTIVE BOX
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 Cough is productive in nature with yellowish/whitish sputum. Associated symptoms include SOB and Low-grade Fevers (~100s).     9/14: still complains of intermittent wheezing and cob.cough  9/15: dyspnea slowly improving although wbc rising quickly on steroids.   9/16: slow to improve although wheezing has dissipated  9/17: status quo  9/18: leukocytosis worsening although patient endorses some improvement.   9/19: status quo. however patient feels well.   9/20: fever spike 102, no change in resp status        Vital Signs Last 24 Hrs  T(C): 37.1 (13 Sep 2017 04:46), Max: 38 (12 Sep 2017 12:31)  T(F): 98.8 (13 Sep 2017 04:46), Max: 100.4 (12 Sep 2017 12:31)  HR: 88 (13 Sep 2017 04:46) (88 - 109)  BP: 140/68 (13 Sep 2017 04:46) (128/72 - 150/76)  BP(mean): --  RR: 16 (12 Sep 2017 22:10) (16 - 18)  SpO2: 94% (13 Sep 2017 04:46) (92% - 100%)        PHYSICAL EXAM  General Appearance: cooperative, no acute distress,   HEENT: PERRL, conjunctiva clear, EOM's intact, non injected pharynx,  Neck: Supple, , mild adenopathy,   Lungs: decreased breath sounds b/l,    expiratory phase, few scattered rhonchi  Heart: Regular rate and rhythm, S1, S2 normal, no murmur, rub or gallop  Abdomen: Soft, non-tender, bowel sounds active , no hepatosplenomegaly  Extremities: no cyanosis or edema, no joint swelling  Skin: Skin color, texture normal, no rashes   Neurologic: Alert and oriented X3 , cranial nerves intact, sensory and motor normal,    ECG:    LABS:                          12.4   15.9  )-----------( 112      ( 13 Sep 2017 06:07 )             36.5     09-12    137  |  104  |  16  ----------------------------<  243<H>  4.5   |  27  |  1.30    Ca    8.3<L>      12 Sep 2017 12:40  Phos  3.4     09-13  Mg     2.0     09-13    TPro  6.0  /  Alb  3.6  /  TBili  0.7  /  DBili  x   /  AST  29  /  ALT  39  /  AlkPhos  124<H>  09-12    A/P:     * Viral PNA/EV.RV/HCAP  - Worsening leukocytosis-> modify abx to vanco/meropenem for broader coverage, suspected GNR/anarobes/resistant bacteria  - Hematology consult- smudge cells present. outpt chemo  - Multifocal pna, immunocompromised patient, hx of CLL/Rituxan  - Hold steroids. , c/w nebs  - mild thrombocytopenia probable consumption  - cont current lantus dose, premeal coverage/iss    * AF: stable, in NSR  - on low dose amio  - INR 1.7, 5mg coumadin tonite    * h/o chr CLL  outpt treatment

## 2017-09-20 NOTE — PROGRESS NOTE ADULT - SUBJECTIVE AND OBJECTIVE BOX
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), hx of hypogammaglobulinemia s/p failed IVIG infusions 2/2 to immediate type hypersensitivity reactions, HFpEF (LVEF 55-60% in 03/2016), COPD, T2DM, HTN, Hepatitis B, Hx PE/DVT, and Recurrent PNA, now admitted on 9/12 for evaluation of productive cough, mild shortness of breath and fever; initially thought to be viral infection, however, now developing leukocytosis, still short of breath even while wearing oxygen, imaging consistent with pneumonia  Today 9/17 patient doing better, breathing off oxygen  Today 9/18 patient doing better, asking to go home  Today 9/20 patient states he feels better, but had fever to 102 late evening yesterday    MEDICATIONS  (STANDING):  amiodarone    Tablet 100 milliGRAM(s) Oral daily  gabapentin 300 milliGRAM(s) Oral three times a day  simvastatin 10 milliGRAM(s) Oral at bedtime  tenofovir 300 milliGRAM(s) Oral daily  metoprolol 25 milliGRAM(s) Oral two times a day  docusate sodium 100 milliGRAM(s) Oral three times a day  dextrose 5%. 1000 milliLiter(s) (50 mL/Hr) IV Continuous <Continuous>  ALBUTerol/ipratropium for Nebulization 3 milliLiter(s) Nebulizer every 6 hours  dextrose 50% Injectable 12.5 Gram(s) IV Push once  dextrose 50% Injectable 25 Gram(s) IV Push once  dextrose 50% Injectable 25 Gram(s) IV Push once  insulin lispro Injectable (HumaLOG) 4 Unit(s) SubCutaneous three times a day before meals  insulin lispro (HumaLOG) corrective regimen sliding scale   SubCutaneous three times a day before meals  insulin lispro (HumaLOG) corrective regimen sliding scale   SubCutaneous at bedtime  insulin glargine Injectable (LANTUS) 40 Unit(s) SubCutaneous at bedtime  warfarin 5 milliGRAM(s) Oral once  vancomycin  IVPB 500 milliGRAM(s) IV Intermittent every 12 hours  meropenem IVPB 500 milliGRAM(s) IV Intermittent every 8 hours    MEDICATIONS  (PRN):  acetaminophen   Tablet 650 milliGRAM(s) Oral every 6 hours PRN For Temp greater than 38 C (100.4 F)  dextrose Gel 1 Dose(s) Oral once PRN Blood Glucose LESS THAN 70 milliGRAM(s)/deciliter  glucagon  Injectable 1 milliGRAM(s) IntraMuscular once PRN Glucose LESS THAN 70 milligrams/deciliter  guaiFENesin   Syrup  (Sugar-Free) 100 milliGRAM(s) Oral every 6 hours PRN Cough      Vital Signs Last 24 Hrs  T(C): 37.6 (20 Sep 2017 11:27), Max: 38.9 (19 Sep 2017 17:25)  T(F): 99.7 (20 Sep 2017 11:27), Max: 102 (19 Sep 2017 17:25)  HR: 86 (20 Sep 2017 14:12) (80 - 110)  BP: 111/74 (20 Sep 2017 11:27) (111/74 - 125/69)  BP(mean): --  RR: 18 (20 Sep 2017 11:27) (18 - 18)  SpO2: 95% (20 Sep 2017 11:27) (93% - 95%)    Physical Exam:          Constitutional: frail looking  HEENT: NC/AT, EOMI, PERRLA  Neck: supple  Respiratory: bilateral rhonchi  Cardiovascular: S1S2 regular, no murmurs  Abdomen: soft, not tender, not distended, positive BS  Genitourinary: deferred  Rectal: deferred  Musculoskeletal: no muscle tenderness, no joint swelling or tenderness  Neurological: AxOx3, moving all extremities, no focal deficits  Skin: no rashes      Labs:    Labs:                        12.9   30.3  )-----------( 125      ( 20 Sep 2017 06:20 )             39.9     09-20    139  |  107  |  30<H>  ----------------------------<  103<H>  3.9   |  26  |  1.08    Ca    8.2<L>      20 Sep 2017 06:20             Cultures:                             12.2   30.7  )-----------( 147      ( 18 Sep 2017 11:26 )             37.9     09-18    141  |  106  |  40<H>  ----------------------------<  87  3.6   |  27  |  1.18    Ca    8.0<L>      18 Sep 2017 06:06             Cultures:                             11.8   24.8  )-----------( 133      ( 16 Sep 2017 05:55 )             35.2     09-16    139  |  105  |  35<H>  ----------------------------<  268<H>  4.6   |  28  |  1.22    Ca    8.1<L>      16 Sep 2017 05:55       Labs:                        12.0   25.6  )-----------( 131      ( 17 Sep 2017 05:57 )             34.5     09-16    139  |  105  |  35<H>  ----------------------------<  268<H>  4.6   |  28  |  1.22    Ca    8.1<L>      16 Sep 2017 05:55             Cultures:         Radiology:< from: CT Chest No Cont (09.16.17 @ 08:20) >    EXAM:  CT CHEST                            PROCEDURE DATE:  09/16/2017          INTERPRETATION:  CT CHEST    HISTORY:  Left lower lobe atelectasis, chest pain, shortness of breath        TECHNIQUE: Noncontrast CT of the chest was performed. [place]   sagittal images were reconstructed. This study was performed using   automatic exposure control (radiation dose reduction software) to obtain   a diagnostic image quality scan with patient dose as low as reasonably   achievable.    COMPARISON: Chest x-ray 22 hours prior, chest CT 6/9/2017    FINDINGS:    LUNGS, AIRWAYS: The central airways are patent. Improving right middle   and right lower lobe pneumonia, newly resolved. Bibasilar areas of linear   platelike atelectasis have increased. There is new groundglass airspace   opacity at the left upper lobe.    PLEURA: No pleural abnormality.    HEART AND VESSELS: Normal heart size. No pericardial effusion. Coronary   artery calcifications are present. Normal caliber thoracic aorta.    MEDIASTINUM AND JEFFREY: Stable mediastinal and bilateral axillary   adenopathy.    UPPER ABDOMEN: Left adrenal adenoma again noted. Retroperitoneal and   mesenteric adenopathy again partially visualized. Nodular surface contour   of the liver suggestive of cirrhotic change.    BONES AND SOFT TISSUES: Chronic bilateral rib deformities again noted.    IMPRESSION:     Improving right lower right middle lobe pneumonia. There is new   nonspecific groundglass opacity in the left upper lobe. Increasing   bibasilar linear and platelike atelectasis.    Multiple enlarged lymph nodes in the chest and upper abdomen consistent   with lymphoma.    < end of copied text >      Advanced directive addressed: full resuscitation

## 2017-09-20 NOTE — PROGRESS NOTE ADULT - ASSESSMENT
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 Cough is productive in nature with yellowish/whitish sputum. Associated symptoms include SOB and Low-grade Fevers (~100s). Denies sick contacts.  Hx recurrent pneumonia and Bronchitis  CLL with Immunoglobulin deficiency  Acute URI now with bronchospasm  Non toxic  mild Bronchospasm  COPD  Hx mucus plugging    9/14  Patient improving today with steroids, bronchodilators/nebulizers  Viral swab + RVP thus far, no bacterial positivity   Recommend to continues mobilization as tolerated and patient to receive IV IG rx as outpt    9/15  worsened leukocytosis on steroids  clinically improved  re eval with cxr today  cont with nebs  fu cultures    9/16  Patient continues to feel clinically better but leukocytosis persistent  Discussed with primary team who have started Abx for the persistent opacity.  Seems to be improving but given his immunocompromised state, agree with abx. If this does not improve, may need bronchoscopy with BAL to assess airways/parenchyma further.      9/18  - From a dyspnea standpoint, patient feels good at rest but develops slight discomfort on exertion  - Blood cultures were negative, Rhinovirus +. Source is still TBD.  -Leukocytosis worsening; patient on Cefepime/Doxycycline. Smudge cells still present on smear  - May consider involving hematology    9/19  complete course of antibiotics  stable / improved resp status  will need to resume IV IG therapy as outpt  supplemental o2 on exertion    9/20  Patient was complaining of generalized weakness, Leukocytosis and thrombocytopenia still present. H/O evaluated patient and recommended antibiotics.  Etiology of the fevers is multifactorial, could be undulating from underlying malignancy history but will continue to watch the pattern. Spoke with primary team; will start Vancomycin for broader coverage. If fevers still continue bronchoscopy is an option.

## 2017-09-20 NOTE — PROGRESS NOTE ADULT - ASSESSMENT
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 Pneumonia in this patient will treat as health care associated pneumonia given hospitalized greater than 48 hours  - follow up cultures   - oxygen and nebs as needed   - iv hydration and supportive care   - patient at risk for gram negative rods and other resistant bacteria   - persistent leukocytosis and not clearing on clinical exam, will change cefepime and doxycycline to meropenem  - will add vancomycin to treat resistant bacteria   - check vancomycin trough prior to fourth dose   2. 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

## 2017-09-20 NOTE — PROGRESS NOTE ADULT - SUBJECTIVE AND OBJECTIVE BOX
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 Cough is productive in nature with yellowish/whitish sputum. Associated symptoms include SOB and Low-grade Fevers (~100s). Denies sick contacts.    In the ED, patient was empirically treated with IV Vanc, Zosyn, and 2.5L IV NS. RVP positive for Entero/Rhinovirus. (12 Sep 2017 16:42)  pt is well known to me from multiple prior admissions   at bedside  no hemoptysis  no cp      Patient states he is feeling better today  Discussed the +Rhinovirus in nasal swab and let them know that the current treatment is supportive  States that he has been playing with his grandchildren and one of them had similar symptoms    9/15  sitting OOB in chair  no difficulty breathing  decreased cough and congestion      Persistent leukocytosis but patient endorses feeling better      Patient denies any dyspnea today  No acute events occurred overnight    feels better  decreased cough  he wants to go home  no cp  non toxic      Overnight, patient was noted to spike fevers  This morning, he states he feels generalized weakness  Denies chest pain, pleuritic chest pain, abdominal pain, no urinary symptoms, no headaches    PAST MEDICAL & SURGICAL HISTORY:  Hypogammaglobulinemia: received IVIG  Hepatitis B virus infection, unspecified chronicity: retrovirals  Chronic diastolic congestive heart failure  Essential hypertension  DM type 2 (diabetes mellitus, type 2)  B-cell lymphoma, unspecified B-cell lymphoma type, unspecified body region: CLL stage 4/SLL; met NHL  Atrial fibrillation, unspecified type  COPD (chronic obstructive pulmonary disease)  History of esophageal dilatation  S/P cholecystectomy  H/O prostatectomy      PREVIOUS DIAGNOSTIC TESTING:      MEDICATIONS  (STANDING):  amiodarone    Tablet 100 milliGRAM(s) Oral daily  warfarin 2.5 milliGRAM(s) Oral daily  gabapentin 300 milliGRAM(s) Oral three times a day  simvastatin 10 milliGRAM(s) Oral at bedtime  tenofovir 300 milliGRAM(s) Oral daily  metoprolol 25 milliGRAM(s) Oral two times a day  docusate sodium 100 milliGRAM(s) Oral three times a day  dextrose 5%. 1000 milliLiter(s) (50 mL/Hr) IV Continuous <Continuous>  cefepime  IVPB 1000 milliGRAM(s) IV Intermittent every 24 hours  ALBUTerol/ipratropium for Nebulization 3 milliLiter(s) Nebulizer every 6 hours  enoxaparin Injectable 90 milliGRAM(s) SubCutaneous every 12 hours  insulin lispro (HumaLOG) corrective regimen sliding scale   SubCutaneous three times a day before meals  dextrose 50% Injectable 12.5 Gram(s) IV Push once  dextrose 50% Injectable 25 Gram(s) IV Push once  dextrose 50% Injectable 25 Gram(s) IV Push once    MEDICATIONS  (PRN):  acetaminophen   Tablet 650 milliGRAM(s) Oral every 6 hours PRN For Temp greater than 38 C (100.4 F)  dextrose Gel 1 Dose(s) Oral once PRN Blood Glucose LESS THAN 70 milliGRAM(s)/deciliter  glucagon  Injectable 1 milliGRAM(s) IntraMuscular once PRN Glucose LESS THAN 70 milligrams/deciliter  guaiFENesin   Syrup  (Sugar-Free) 100 milliGRAM(s) Oral every 6 hours PRN Cough      FAMILY HISTORY:  Family history of liver cancer (Father): father  85 liver CA  Family history of coronary arteriosclerosis (Mother): Mom  of MI age 68      SOCIAL HISTORY:   Denies any occupational exposures    REVIEW OF SYSTEM:  Pertinent items are noted in HPI.  Constitutional negative for chills,pos fevers, no sweats and weight loss  throat, and face:  negative for epistaxis, nasal congestion, sore throat and   tinnitus  Respiratory: pos for cough, dyspnea on exertion, pleuritic chest pain  and wheezing  Cardiovascular:  negative for chest pain, pos dyspnea and palpitations  Gastrointestinal: negative for abdominal pain, diarrhea, nausea and vomiting  Genitourinary: negative for dysuria, frequency and urinary incontinence  Skin:  negative for redness, rash, pruritus, swelling, dryness and   fissures  Hematologic/lymphatic: negative for bleeding and easy bruising  Musculoskeletal: negative for arthralgias, back pain and muscle weakness  Neurological: negative for dizziness, headaches, seizures and tremors  Behavioral/Psych:  negative for mood change, depression, suicidal attempts    Allergic/Immunologic: negative for anaphylaxis, angioedema and urticaria    Vital Signs Last 24 Hrs  T(C): 36.7 (15 Sep 2017 05:14), Max: 36.7 (15 Sep 2017 05:14)  T(F): 98 (15 Sep 2017 05:14), Max: 98 (15 Sep 2017 05:14)  HR: 92 (15 Sep 2017 07:40) (80 - 92)  BP: 104/60 (15 Sep 2017 05:14) (104/60 - 105/46)  BP(mean): --  RR: 17 (14 Sep 2017 11:19) (17 - 17)  SpO2: 97% (15 Sep 2017 05:14) (95% - 97%)    I&O's Summary    PHYSICAL EXAM  General Appearance: cooperative, no acute distress,   HEENT: PERRL, conjunctiva clear, EOM's intact, non injected pharynx,  Neck: Supple, , mild adenopathy,   Lungs: Clear to auscultation bilateral,no adventitious breath sounds, prolonged   expiratory phase, few scattered rhonchi, improved bronchospasm since admission  Heart: Regular rate and rhythm, S1, S2 normal, no murmur, rub or gallop  Abdomen: Soft, non-tender, bowel sounds active , no hepatosplenomegaly  Extremities: no cyanosis or edema, no joint swelling  Skin: Skin color, texture normal, no rashes   Neurologic: Alert and oriented X3 , cranial nerves intact, sensory and motor normal,    ECG:    LABS:                               13.0   31.0  )-----------( 128      ( 18 Sep 2017 06:06 )             39.4     09-18    141  |  106  |  40<H>  ----------------------------<  87  3.6   |  27  |  1.18    Ca    8.0<L>      18 Sep 2017 06:06         PTT - ( 12 Sep 2017 12:40 )  PTT:32.8 sec  Urinalysis Basic - ( 12 Sep 2017 15:24 )    Color: Yellow / Appearance: Clear / S.005 / pH: x  Gluc: x / Ketone: Negative  / Bili: Negative / Urobili: 1 mg/dL   Blood: x / Protein: 15 mg/dL / Nitrite: Negative   Leuk Esterase: Trace / RBC: 0-2 /HPF / WBC 3-5   Sq Epi: x / Non Sq Epi: Moderate / Bacteria: Few            RADIOLOGY & ADDITIONAL STUDIES:  CT Chest reviewed  IMPRESSION:     Improving right lower right middle lobe pneumonia. There is new   nonspecific groundglass opacity in the left upper lobe. Increasing   bibasilar linear and platelike atelectasis.    Multiple enlarged lymph nodes in the chest and upper abdomen consistent   with lymphoma.

## 2017-09-21 LAB
ANION GAP SERPL CALC-SCNC: 7 MMOL/L — SIGNIFICANT CHANGE UP (ref 5–17)
BUN SERPL-MCNC: 20 MG/DL — SIGNIFICANT CHANGE UP (ref 7–23)
CALCIUM SERPL-MCNC: 7.7 MG/DL — LOW (ref 8.5–10.1)
CHLORIDE SERPL-SCNC: 106 MMOL/L — SIGNIFICANT CHANGE UP (ref 96–108)
CO2 SERPL-SCNC: 26 MMOL/L — SIGNIFICANT CHANGE UP (ref 22–31)
CREAT SERPL-MCNC: 1.05 MG/DL — SIGNIFICANT CHANGE UP (ref 0.5–1.3)
CULTURE RESULTS: SIGNIFICANT CHANGE UP
CULTURE RESULTS: SIGNIFICANT CHANGE UP
GLUCOSE SERPL-MCNC: 104 MG/DL — HIGH (ref 70–99)
HCT VFR BLD CALC: 36.8 % — LOW (ref 39–50)
HGB BLD-MCNC: 12.3 G/DL — LOW (ref 13–17)
INR BLD: 1.63 RATIO — HIGH (ref 0.88–1.16)
MCHC RBC-ENTMCNC: 29.7 PG — SIGNIFICANT CHANGE UP (ref 27–34)
MCHC RBC-ENTMCNC: 33.4 GM/DL — SIGNIFICANT CHANGE UP (ref 32–36)
MCV RBC AUTO: 89 FL — SIGNIFICANT CHANGE UP (ref 80–100)
PLATELET # BLD AUTO: 112 K/UL — LOW (ref 150–400)
POTASSIUM SERPL-MCNC: 3.9 MMOL/L — SIGNIFICANT CHANGE UP (ref 3.5–5.3)
POTASSIUM SERPL-SCNC: 3.9 MMOL/L — SIGNIFICANT CHANGE UP (ref 3.5–5.3)
PROTHROM AB SERPL-ACNC: 17.8 SEC — HIGH (ref 9.8–12.7)
RBC # BLD: 4.14 M/UL — LOW (ref 4.2–5.8)
RBC # FLD: 14.6 % — HIGH (ref 10.3–14.5)
SODIUM SERPL-SCNC: 139 MMOL/L — SIGNIFICANT CHANGE UP (ref 135–145)
SPECIMEN SOURCE: SIGNIFICANT CHANGE UP
SPECIMEN SOURCE: SIGNIFICANT CHANGE UP
WBC # BLD: 23.9 K/UL — HIGH (ref 3.8–10.5)
WBC # FLD AUTO: 23.9 K/UL — HIGH (ref 3.8–10.5)

## 2017-09-21 PROCEDURE — 93306 TTE W/DOPPLER COMPLETE: CPT | Mod: 26

## 2017-09-21 RX ORDER — AMIODARONE HYDROCHLORIDE 400 MG/1
100 TABLET ORAL
Qty: 0 | Refills: 0 | COMMUNITY

## 2017-09-21 RX ORDER — WARFARIN SODIUM 2.5 MG/1
5 TABLET ORAL DAILY
Qty: 0 | Refills: 0 | Status: DISCONTINUED | OUTPATIENT
Start: 2017-09-21 | End: 2017-09-23

## 2017-09-21 RX ORDER — METOPROLOL TARTRATE 50 MG
0.5 TABLET ORAL
Qty: 0 | Refills: 0 | COMMUNITY

## 2017-09-21 RX ORDER — INSULIN GLARGINE 100 [IU]/ML
30 INJECTION, SOLUTION SUBCUTANEOUS
Qty: 0 | Refills: 0 | COMMUNITY

## 2017-09-21 RX ORDER — ENOXAPARIN SODIUM 100 MG/ML
90 INJECTION SUBCUTANEOUS EVERY 12 HOURS
Qty: 0 | Refills: 0 | Status: DISCONTINUED | OUTPATIENT
Start: 2017-09-21 | End: 2017-09-23

## 2017-09-21 RX ADMIN — Medication 100 MILLIGRAM(S): at 06:05

## 2017-09-21 RX ADMIN — Medication 100 MILLIGRAM(S): at 22:23

## 2017-09-21 RX ADMIN — GABAPENTIN 300 MILLIGRAM(S): 400 CAPSULE ORAL at 22:23

## 2017-09-21 RX ADMIN — AMIODARONE HYDROCHLORIDE 100 MILLIGRAM(S): 400 TABLET ORAL at 11:40

## 2017-09-21 RX ADMIN — Medication 4 UNIT(S): at 11:42

## 2017-09-21 RX ADMIN — Medication 25 MILLIGRAM(S): at 06:05

## 2017-09-21 RX ADMIN — INSULIN GLARGINE 40 UNIT(S): 100 INJECTION, SOLUTION SUBCUTANEOUS at 22:23

## 2017-09-21 RX ADMIN — ENOXAPARIN SODIUM 90 MILLIGRAM(S): 100 INJECTION SUBCUTANEOUS at 11:39

## 2017-09-21 RX ADMIN — Medication 4 UNIT(S): at 08:16

## 2017-09-21 RX ADMIN — GABAPENTIN 300 MILLIGRAM(S): 400 CAPSULE ORAL at 06:06

## 2017-09-21 RX ADMIN — MEROPENEM 200 MILLIGRAM(S): 1 INJECTION INTRAVENOUS at 14:20

## 2017-09-21 RX ADMIN — Medication 3 MILLILITER(S): at 13:32

## 2017-09-21 RX ADMIN — MEROPENEM 200 MILLIGRAM(S): 1 INJECTION INTRAVENOUS at 22:24

## 2017-09-21 RX ADMIN — MEROPENEM 200 MILLIGRAM(S): 1 INJECTION INTRAVENOUS at 05:28

## 2017-09-21 RX ADMIN — Medication 25 MILLIGRAM(S): at 17:29

## 2017-09-21 RX ADMIN — Medication 100 MILLIGRAM(S): at 17:29

## 2017-09-21 RX ADMIN — WARFARIN SODIUM 5 MILLIGRAM(S): 2.5 TABLET ORAL at 22:23

## 2017-09-21 RX ADMIN — GABAPENTIN 300 MILLIGRAM(S): 400 CAPSULE ORAL at 14:20

## 2017-09-21 RX ADMIN — ENOXAPARIN SODIUM 90 MILLIGRAM(S): 100 INJECTION SUBCUTANEOUS at 22:24

## 2017-09-21 RX ADMIN — SIMVASTATIN 10 MILLIGRAM(S): 20 TABLET, FILM COATED ORAL at 22:23

## 2017-09-21 RX ADMIN — TENOFOVIR DISOPROXIL FUMARATE 300 MILLIGRAM(S): 300 TABLET, FILM COATED ORAL at 11:42

## 2017-09-21 RX ADMIN — Medication 4 UNIT(S): at 17:28

## 2017-09-21 RX ADMIN — Medication 3 MILLILITER(S): at 02:22

## 2017-09-21 RX ADMIN — Medication 3 MILLILITER(S): at 20:26

## 2017-09-21 RX ADMIN — Medication 100 MILLIGRAM(S): at 14:20

## 2017-09-21 RX ADMIN — Medication 3 MILLILITER(S): at 08:16

## 2017-09-21 RX ADMIN — Medication 6: at 11:42

## 2017-09-21 NOTE — PROGRESS NOTE ADULT - SUBJECTIVE AND OBJECTIVE BOX
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), hx of hypogammaglobulinemia s/p failed IVIG infusions 2/2 to immediate type hypersensitivity reactions, HFpEF (LVEF 55-60% in 03/2016), COPD, T2DM, HTN, Hepatitis B, Hx PE/DVT, and Recurrent PNA, now admitted on 9/12 for evaluation of productive cough, mild shortness of breath and fever; initially thought to be viral infection, however, now developing leukocytosis, still short of breath even while wearing oxygen, imaging consistent with pneumonia  Today 9/17 patient doing better, breathing off oxygen  Today 9/18 patient doing better, asking to go home  Today 9/20 patient states he feels better, but had fever to 102 late evening yesterday  Today 9/21 patient still with productive cough, had low grade fevers overnite    MEDICATIONS  (STANDING):  amiodarone    Tablet 100 milliGRAM(s) Oral daily  gabapentin 300 milliGRAM(s) Oral three times a day  simvastatin 10 milliGRAM(s) Oral at bedtime  tenofovir 300 milliGRAM(s) Oral daily  metoprolol 25 milliGRAM(s) Oral two times a day  docusate sodium 100 milliGRAM(s) Oral three times a day  dextrose 5%. 1000 milliLiter(s) (50 mL/Hr) IV Continuous <Continuous>  ALBUTerol/ipratropium for Nebulization 3 milliLiter(s) Nebulizer every 6 hours  dextrose 50% Injectable 12.5 Gram(s) IV Push once  dextrose 50% Injectable 25 Gram(s) IV Push once  dextrose 50% Injectable 25 Gram(s) IV Push once  insulin lispro Injectable (HumaLOG) 4 Unit(s) SubCutaneous three times a day before meals  insulin lispro (HumaLOG) corrective regimen sliding scale   SubCutaneous three times a day before meals  insulin lispro (HumaLOG) corrective regimen sliding scale   SubCutaneous at bedtime  insulin glargine Injectable (LANTUS) 40 Unit(s) SubCutaneous at bedtime  vancomycin  IVPB 500 milliGRAM(s) IV Intermittent every 12 hours  meropenem IVPB 500 milliGRAM(s) IV Intermittent every 8 hours  enoxaparin Injectable 90 milliGRAM(s) SubCutaneous every 12 hours  warfarin 5 milliGRAM(s) Oral daily    MEDICATIONS  (PRN):  acetaminophen   Tablet 650 milliGRAM(s) Oral every 6 hours PRN For Temp greater than 38 C (100.4 F)  dextrose Gel 1 Dose(s) Oral once PRN Blood Glucose LESS THAN 70 milliGRAM(s)/deciliter  glucagon  Injectable 1 milliGRAM(s) IntraMuscular once PRN Glucose LESS THAN 70 milligrams/deciliter  guaiFENesin   Syrup  (Sugar-Free) 100 milliGRAM(s) Oral every 6 hours PRN Cough      Vital Signs Last 24 Hrs  T(C): 37.2 (21 Sep 2017 10:46), Max: 38.1 (20 Sep 2017 16:31)  T(F): 99 (21 Sep 2017 10:46), Max: 100.5 (20 Sep 2017 16:31)  HR: 89 (21 Sep 2017 10:46) (86 - 119)  BP: 103/51 (21 Sep 2017 10:46) (94/52 - 123/69)  BP(mean): --  RR: 16 (21 Sep 2017 10:46) (16 - 20)  SpO2: 94% (21 Sep 2017 10:46) (91% - 95%)    Physical Exam:              Constitutional: frail looking  HEENT: NC/AT, EOMI, PERRLA  Neck: supple  Respiratory: bilateral rhonchi  Cardiovascular: S1S2 regular, no murmurs  Abdomen: soft, not tender, not distended, positive BS  Genitourinary: deferred  Rectal: deferred  Musculoskeletal: no muscle tenderness, no joint swelling or tenderness  Neurological: AxOx3, moving all extremities, no focal deficits  Skin: no rashes      Labs:    Labs:                        12.3   23.9  )-----------( 112      ( 21 Sep 2017 07:22 )             36.8     09-20    139  |  107  |  30<H>  ----------------------------<  103<H>  3.9   |  26  |  1.08    Ca    8.2<L>      20 Sep 2017 06:20             Cultures:         Labs:                        12.9   30.3  )-----------( 125      ( 20 Sep 2017 06:20 )             39.9     09-20    139  |  107  |  30<H>  ----------------------------<  103<H>  3.9   |  26  |  1.08    Ca    8.2<L>      20 Sep 2017 06:20             Cultures:                             12.2   30.7  )-----------( 147      ( 18 Sep 2017 11:26 )             37.9     09-18    141  |  106  |  40<H>  ----------------------------<  87  3.6   |  27  |  1.18    Ca    8.0<L>      18 Sep 2017 06:06             Cultures:                             11.8   24.8  )-----------( 133      ( 16 Sep 2017 05:55 )             35.2     09-16    139  |  105  |  35<H>  ----------------------------<  268<H>  4.6   |  28  |  1.22    Ca    8.1<L>      16 Sep 2017 05:55       Labs:                        12.0   25.6  )-----------( 131      ( 17 Sep 2017 05:57 )             34.5     09-16    139  |  105  |  35<H>  ----------------------------<  268<H>  4.6   |  28  |  1.22    Ca    8.1<L>      16 Sep 2017 05:55             Cultures:         Radiology:< from: CT Chest No Cont (09.16.17 @ 08:20) >    EXAM:  CT CHEST                            PROCEDURE DATE:  09/16/2017          INTERPRETATION:  CT CHEST    HISTORY:  Left lower lobe atelectasis, chest pain, shortness of breath        TECHNIQUE: Noncontrast CT of the chest was performed. [place]   sagittal images were reconstructed. This study was performed using   automatic exposure control (radiation dose reduction software) to obtain   a diagnostic image quality scan with patient dose as low as reasonably   achievable.    COMPARISON: Chest x-ray 22 hours prior, chest CT 6/9/2017    FINDINGS:    LUNGS, AIRWAYS: The central airways are patent. Improving right middle   and right lower lobe pneumonia, newly resolved. Bibasilar areas of linear   platelike atelectasis have increased. There is new groundglass airspace   opacity at the left upper lobe.    PLEURA: No pleural abnormality.    HEART AND VESSELS: Normal heart size. No pericardial effusion. Coronary   artery calcifications are present. Normal caliber thoracic aorta.    MEDIASTINUM AND JEFFREY: Stable mediastinal and bilateral axillary   adenopathy.    UPPER ABDOMEN: Left adrenal adenoma again noted. Retroperitoneal and   mesenteric adenopathy again partially visualized. Nodular surface contour   of the liver suggestive of cirrhotic change.    BONES AND SOFT TISSUES: Chronic bilateral rib deformities again noted.    IMPRESSION:     Improving right lower right middle lobe pneumonia. There is new   nonspecific groundglass opacity in the left upper lobe. Increasing   bibasilar linear and platelike atelectasis.    Multiple enlarged lymph nodes in the chest and upper abdomen consistent   with lymphoma.    < end of copied text >      Advanced directive addressed: full resuscitation

## 2017-09-21 NOTE — PROGRESS NOTE ADULT - ASSESSMENT
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 Pneumonia in this patient will treat as health care associated pneumonia given hospitalized greater than 48 hours  - follow up cultures   - oxygen and nebs as needed   - iv hydration and supportive care   - patient at risk for gram negative rods and other resistant bacteria   - day #2 meropenem and vancomycin  - tolerating antibiotics without rashes or side effects    - check vancomycin trough prior to fourth dose   2. 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

## 2017-09-22 LAB
HCT VFR BLD CALC: 36 % — LOW (ref 39–50)
HGB BLD-MCNC: 12.7 G/DL — LOW (ref 13–17)
INR BLD: 1.85 RATIO — HIGH (ref 0.88–1.16)
MCHC RBC-ENTMCNC: 31.6 PG — SIGNIFICANT CHANGE UP (ref 27–34)
MCHC RBC-ENTMCNC: 35.2 GM/DL — SIGNIFICANT CHANGE UP (ref 32–36)
MCV RBC AUTO: 89.7 FL — SIGNIFICANT CHANGE UP (ref 80–100)
PLATELET # BLD AUTO: 102 K/UL — LOW (ref 150–400)
PROTHROM AB SERPL-ACNC: 20.2 SEC — HIGH (ref 9.8–12.7)
RBC # BLD: 4.01 M/UL — LOW (ref 4.2–5.8)
RBC # FLD: 14.4 % — SIGNIFICANT CHANGE UP (ref 10.3–14.5)
VANCOMYCIN TROUGH SERPL-MCNC: 6.9 UG/ML — LOW (ref 10–20)
WBC # BLD: 24.5 K/UL — HIGH (ref 3.8–10.5)
WBC # FLD AUTO: 24.5 K/UL — HIGH (ref 3.8–10.5)

## 2017-09-22 RX ORDER — INSULIN GLARGINE 100 [IU]/ML
20 INJECTION, SOLUTION SUBCUTANEOUS AT BEDTIME
Qty: 0 | Refills: 0 | Status: DISCONTINUED | OUTPATIENT
Start: 2017-09-22 | End: 2017-09-23

## 2017-09-22 RX ORDER — VANCOMYCIN HCL 1 G
750 VIAL (EA) INTRAVENOUS EVERY 12 HOURS
Qty: 0 | Refills: 0 | Status: DISCONTINUED | OUTPATIENT
Start: 2017-09-22 | End: 2017-09-23

## 2017-09-22 RX ADMIN — Medication 150 MILLIGRAM(S): at 17:28

## 2017-09-22 RX ADMIN — GABAPENTIN 300 MILLIGRAM(S): 400 CAPSULE ORAL at 05:46

## 2017-09-22 RX ADMIN — SIMVASTATIN 10 MILLIGRAM(S): 20 TABLET, FILM COATED ORAL at 22:37

## 2017-09-22 RX ADMIN — INSULIN GLARGINE 20 UNIT(S): 100 INJECTION, SOLUTION SUBCUTANEOUS at 22:50

## 2017-09-22 RX ADMIN — MEROPENEM 200 MILLIGRAM(S): 1 INJECTION INTRAVENOUS at 13:03

## 2017-09-22 RX ADMIN — WARFARIN SODIUM 5 MILLIGRAM(S): 2.5 TABLET ORAL at 22:37

## 2017-09-22 RX ADMIN — Medication 3 MILLILITER(S): at 09:06

## 2017-09-22 RX ADMIN — Medication 25 MILLIGRAM(S): at 05:46

## 2017-09-22 RX ADMIN — Medication 100 MILLIGRAM(S): at 06:33

## 2017-09-22 RX ADMIN — Medication 6: at 17:29

## 2017-09-22 RX ADMIN — Medication 100 MILLIGRAM(S): at 11:10

## 2017-09-22 RX ADMIN — GABAPENTIN 300 MILLIGRAM(S): 400 CAPSULE ORAL at 22:37

## 2017-09-22 RX ADMIN — MEROPENEM 200 MILLIGRAM(S): 1 INJECTION INTRAVENOUS at 05:46

## 2017-09-22 RX ADMIN — GABAPENTIN 300 MILLIGRAM(S): 400 CAPSULE ORAL at 13:03

## 2017-09-22 RX ADMIN — Medication 100 MILLIGRAM(S): at 13:03

## 2017-09-22 RX ADMIN — Medication 3 MILLILITER(S): at 19:33

## 2017-09-22 RX ADMIN — Medication 100 MILLIGRAM(S): at 05:46

## 2017-09-22 RX ADMIN — MEROPENEM 200 MILLIGRAM(S): 1 INJECTION INTRAVENOUS at 22:37

## 2017-09-22 RX ADMIN — ENOXAPARIN SODIUM 90 MILLIGRAM(S): 100 INJECTION SUBCUTANEOUS at 22:37

## 2017-09-22 RX ADMIN — Medication 25 MILLIGRAM(S): at 17:29

## 2017-09-22 RX ADMIN — TENOFOVIR DISOPROXIL FUMARATE 300 MILLIGRAM(S): 300 TABLET, FILM COATED ORAL at 11:10

## 2017-09-22 RX ADMIN — Medication 650 MILLIGRAM(S): at 22:51

## 2017-09-22 RX ADMIN — ENOXAPARIN SODIUM 90 MILLIGRAM(S): 100 INJECTION SUBCUTANEOUS at 11:07

## 2017-09-22 RX ADMIN — Medication 100 MILLIGRAM(S): at 17:28

## 2017-09-22 RX ADMIN — Medication 100 MILLIGRAM(S): at 22:37

## 2017-09-22 RX ADMIN — Medication 3 MILLILITER(S): at 14:03

## 2017-09-22 RX ADMIN — AMIODARONE HYDROCHLORIDE 100 MILLIGRAM(S): 400 TABLET ORAL at 11:08

## 2017-09-22 NOTE — PROGRESS NOTE ADULT - ASSESSMENT
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 Pneumonia in this patient will treat as health care associated pneumonia given hospitalized greater than 48 hours  - follow up cultures   - oxygen and nebs as needed   - iv hydration and supportive care   - patient at risk for gram negative rods and other resistant bacteria   - day #3 meropenem and vancomycin  - tolerating antibiotics without rashes or side effects    - will increase vancomycin dose, subtherapeutic vanco trough  - will have bronchoscopy  2. 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

## 2017-09-22 NOTE — PROGRESS NOTE ADULT - SUBJECTIVE AND OBJECTIVE BOX
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), hx of hypogammaglobulinemia s/p failed IVIG infusions 2/2 to immediate type hypersensitivity reactions, HFpEF (LVEF 55-60% in 03/2016), COPD, T2DM, HTN, Hepatitis B, Hx PE/DVT, and Recurrent PNA, now admitted on 9/12 for evaluation of productive cough, mild shortness of breath and fever; initially thought to be viral infection, however, now developing leukocytosis, still short of breath even while wearing oxygen, imaging consistent with pneumonia  Today 9/17 patient doing better, breathing off oxygen  Today 9/18 patient doing better, asking to go home  Today 9/20 patient states he feels better, but had fever to 102 late evening yesterday  Today 9/21 patient still with productive cough, had low grade fevers overnite  Today 9/22 patient has cough and fever, still with elevate wbc    MEDICATIONS  (STANDING):  amiodarone    Tablet 100 milliGRAM(s) Oral daily  gabapentin 300 milliGRAM(s) Oral three times a day  simvastatin 10 milliGRAM(s) Oral at bedtime  tenofovir 300 milliGRAM(s) Oral daily  metoprolol 25 milliGRAM(s) Oral two times a day  docusate sodium 100 milliGRAM(s) Oral three times a day  dextrose 5%. 1000 milliLiter(s) (50 mL/Hr) IV Continuous <Continuous>  ALBUTerol/ipratropium for Nebulization 3 milliLiter(s) Nebulizer every 6 hours  dextrose 50% Injectable 12.5 Gram(s) IV Push once  dextrose 50% Injectable 25 Gram(s) IV Push once  dextrose 50% Injectable 25 Gram(s) IV Push once  insulin lispro (HumaLOG) corrective regimen sliding scale   SubCutaneous three times a day before meals  insulin lispro (HumaLOG) corrective regimen sliding scale   SubCutaneous at bedtime  meropenem IVPB 500 milliGRAM(s) IV Intermittent every 8 hours  enoxaparin Injectable 90 milliGRAM(s) SubCutaneous every 12 hours  warfarin 5 milliGRAM(s) Oral daily  insulin glargine Injectable (LANTUS) 20 Unit(s) SubCutaneous at bedtime  vancomycin  IVPB 750 milliGRAM(s) IV Intermittent every 12 hours    MEDICATIONS  (PRN):  acetaminophen   Tablet 650 milliGRAM(s) Oral every 6 hours PRN For Temp greater than 38 C (100.4 F)  dextrose Gel 1 Dose(s) Oral once PRN Blood Glucose LESS THAN 70 milliGRAM(s)/deciliter  glucagon  Injectable 1 milliGRAM(s) IntraMuscular once PRN Glucose LESS THAN 70 milligrams/deciliter  guaiFENesin   Syrup  (Sugar-Free) 100 milliGRAM(s) Oral every 6 hours PRN Cough      Vital Signs Last 24 Hrs  T(C): 36.6 (22 Sep 2017 11:15), Max: 37.8 (21 Sep 2017 16:32)  T(F): 97.8 (22 Sep 2017 11:15), Max: 100 (21 Sep 2017 16:32)  HR: 94 (22 Sep 2017 14:16) (90 - 112)  BP: 106/62 (22 Sep 2017 11:15) (94/62 - 114/64)  BP(mean): --  RR: 18 (22 Sep 2017 11:15) (16 - 18)  SpO2: 95% (22 Sep 2017 11:15) (95% - 97%)    Physical Exam:        Constitutional: frail looking  HEENT: NC/AT, EOMI, PERRLA  Neck: supple  Respiratory: bilateral rhonchi  Cardiovascular: S1S2 regular, no murmurs  Abdomen: soft, not tender, not distended, positive BS  Genitourinary: deferred  Rectal: deferred  Musculoskeletal: no muscle tenderness, no joint swelling or tenderness  Neurological: AxOx3, moving all extremities, no focal deficits  Skin: no rashes      Labs:    Labs:             Labs:                        12.7   24.5  )-----------( 102      ( 22 Sep 2017 04:53 )             36.0     09-21    139  |  106  |  20  ----------------------------<  104<H>  3.9   |  26  |  1.05    Ca    7.7<L>      21 Sep 2017 07:22         Vancomycin Level, Trough: 6.9 ug/mL (09-22 @ 04:53)      Cultures:                      12.3   23.9  )-----------( 112      ( 21 Sep 2017 07:22 )             36.8     09-20    139  |  107  |  30<H>  ----------------------------<  103<H>  3.9   |  26  |  1.08    Ca    8.2<L>      20 Sep 2017 06:20             Cultures:         Labs:                        12.9   30.3  )-----------( 125      ( 20 Sep 2017 06:20 )             39.9     09-20    139  |  107  |  30<H>  ----------------------------<  103<H>  3.9   |  26  |  1.08    Ca    8.2<L>      20 Sep 2017 06:20             Cultures:                             12.2   30.7  )-----------( 147      ( 18 Sep 2017 11:26 )             37.9     09-18    141  |  106  |  40<H>  ----------------------------<  87  3.6   |  27  |  1.18    Ca    8.0<L>      18 Sep 2017 06:06             Cultures:                             11.8   24.8  )-----------( 133      ( 16 Sep 2017 05:55 )             35.2     09-16    139  |  105  |  35<H>  ----------------------------<  268<H>  4.6   |  28  |  1.22    Ca    8.1<L>      16 Sep 2017 05:55       Labs:                        12.0   25.6  )-----------( 131      ( 17 Sep 2017 05:57 )             34.5     09-16    139  |  105  |  35<H>  ----------------------------<  268<H>  4.6   |  28  |  1.22    Ca    8.1<L>      16 Sep 2017 05:55             Cultures:         Radiology:< from: CT Chest No Cont (09.16.17 @ 08:20) >    EXAM:  CT CHEST                            PROCEDURE DATE:  09/16/2017          INTERPRETATION:  CT CHEST    HISTORY:  Left lower lobe atelectasis, chest pain, shortness of breath        TECHNIQUE: Noncontrast CT of the chest was performed. [place]   sagittal images were reconstructed. This study was performed using   automatic exposure control (radiation dose reduction software) to obtain   a diagnostic image quality scan with patient dose as low as reasonably   achievable.    COMPARISON: Chest x-ray 22 hours prior, chest CT 6/9/2017    FINDINGS:    LUNGS, AIRWAYS: The central airways are patent. Improving right middle   and right lower lobe pneumonia, newly resolved. Bibasilar areas of linear   platelike atelectasis have increased. There is new groundglass airspace   opacity at the left upper lobe.    PLEURA: No pleural abnormality.    HEART AND VESSELS: Normal heart size. No pericardial effusion. Coronary   artery calcifications are present. Normal caliber thoracic aorta.    MEDIASTINUM AND JEFFREY: Stable mediastinal and bilateral axillary   adenopathy.    UPPER ABDOMEN: Left adrenal adenoma again noted. Retroperitoneal and   mesenteric adenopathy again partially visualized. Nodular surface contour   of the liver suggestive of cirrhotic change.    BONES AND SOFT TISSUES: Chronic bilateral rib deformities again noted.    IMPRESSION:     Improving right lower right middle lobe pneumonia. There is new   nonspecific groundglass opacity in the left upper lobe. Increasing   bibasilar linear and platelike atelectasis.    Multiple enlarged lymph nodes in the chest and upper abdomen consistent   with lymphoma.    < end of copied text >      Advanced directive addressed: full resuscitation

## 2017-09-22 NOTE — PROGRESS NOTE ADULT - SUBJECTIVE AND OBJECTIVE BOX
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 Cough is productive in nature with yellowish/whitish sputum. Associated symptoms include SOB and Low-grade Fevers (~100s). Denies sick contacts.    In the ED, patient was empirically treated with IV Vanc, Zosyn, and 2.5L IV NS. RVP positive for Entero/Rhinovirus. (12 Sep 2017 16:42)  pt is well known to me from multiple prior admissions   at bedside  no hemoptysis  no cp      Patient states he is feeling better today  Discussed the +Rhinovirus in nasal swab and let them know that the current treatment is supportive  States that he has been playing with his grandchildren and one of them had similar symptoms    9/15  sitting OOB in chair  no difficulty breathing  decreased cough and congestion      Persistent leukocytosis but patient endorses feeling better      Patient denies any dyspnea today  No acute events occurred overnight      feels better  decreased cough  he wants to go home  no cp  non toxic      Overnight, patient was noted to spike fevers  This morning, he states he feels generalized weakness  Denies chest pain, pleuritic chest pain, abdominal pain, no urinary symptoms, no headaches      Patient continues to have leukocytosis  States he feels weak but not more dyspneic  Overall, denies any change in his symptoms    PAST MEDICAL & SURGICAL HISTORY:  Hypogammaglobulinemia: received IVIG  Hepatitis B virus infection, unspecified chronicity: retrovirals  Chronic diastolic congestive heart failure  Essential hypertension  DM type 2 (diabetes mellitus, type 2)  B-cell lymphoma, unspecified B-cell lymphoma type, unspecified body region: CLL stage 4/SLL; met NHL  Atrial fibrillation, unspecified type  COPD (chronic obstructive pulmonary disease)  History of esophageal dilatation  S/P cholecystectomy  H/O prostatectomy      PREVIOUS DIAGNOSTIC TESTING:      MEDICATIONS  (STANDING):  amiodarone    Tablet 100 milliGRAM(s) Oral daily  warfarin 2.5 milliGRAM(s) Oral daily  gabapentin 300 milliGRAM(s) Oral three times a day  simvastatin 10 milliGRAM(s) Oral at bedtime  tenofovir 300 milliGRAM(s) Oral daily  metoprolol 25 milliGRAM(s) Oral two times a day  docusate sodium 100 milliGRAM(s) Oral three times a day  dextrose 5%. 1000 milliLiter(s) (50 mL/Hr) IV Continuous <Continuous>  cefepime  IVPB 1000 milliGRAM(s) IV Intermittent every 24 hours  ALBUTerol/ipratropium for Nebulization 3 milliLiter(s) Nebulizer every 6 hours  enoxaparin Injectable 90 milliGRAM(s) SubCutaneous every 12 hours  insulin lispro (HumaLOG) corrective regimen sliding scale   SubCutaneous three times a day before meals  dextrose 50% Injectable 12.5 Gram(s) IV Push once  dextrose 50% Injectable 25 Gram(s) IV Push once  dextrose 50% Injectable 25 Gram(s) IV Push once    MEDICATIONS  (PRN):  acetaminophen   Tablet 650 milliGRAM(s) Oral every 6 hours PRN For Temp greater than 38 C (100.4 F)  dextrose Gel 1 Dose(s) Oral once PRN Blood Glucose LESS THAN 70 milliGRAM(s)/deciliter  glucagon  Injectable 1 milliGRAM(s) IntraMuscular once PRN Glucose LESS THAN 70 milligrams/deciliter  guaiFENesin   Syrup  (Sugar-Free) 100 milliGRAM(s) Oral every 6 hours PRN Cough      FAMILY HISTORY:  Family history of liver cancer (Father): father  85 liver CA  Family history of coronary arteriosclerosis (Mother): Mom  of MI age 68      SOCIAL HISTORY:   Denies any occupational exposures    REVIEW OF SYSTEM:  Pertinent items are noted in HPI.  Constitutional negative for chills,pos fevers, no sweats and weight loss  throat, and face:  negative for epistaxis, nasal congestion, sore throat and   tinnitus  Respiratory: pos for cough, dyspnea on exertion, pleuritic chest pain  and wheezing  Cardiovascular:  negative for chest pain, pos dyspnea and palpitations  Gastrointestinal: negative for abdominal pain, diarrhea, nausea and vomiting  Genitourinary: negative for dysuria, frequency and urinary incontinence  Skin:  negative for redness, rash, pruritus, swelling, dryness and   fissures  Hematologic/lymphatic: negative for bleeding and easy bruising  Musculoskeletal: negative for arthralgias, back pain and muscle weakness  Neurological: negative for dizziness, headaches, seizures and tremors  Behavioral/Psych:  negative for mood change, depression, suicidal attempts    Allergic/Immunologic: negative for anaphylaxis, angioedema and urticaria    Vital Signs Last 24 Hrs  T(C): 36.7 (15 Sep 2017 05:14), Max: 36.7 (15 Sep 2017 05:14)  T(F): 98 (15 Sep 2017 05:14), Max: 98 (15 Sep 2017 05:14)  HR: 92 (15 Sep 2017 07:40) (80 - 92)  BP: 104/60 (15 Sep 2017 05:14) (104/60 - 105/46)  BP(mean): --  RR: 17 (14 Sep 2017 11:19) (17 - 17)  SpO2: 97% (15 Sep 2017 05:14) (95% - 97%)    I&O's Summary    PHYSICAL EXAM  General Appearance: cooperative, no acute distress,   HEENT: PERRL, conjunctiva clear, EOM's intact, non injected pharynx,  Neck: Supple, , mild adenopathy,   Lungs: Clear to auscultation bilateral,no adventitious breath sounds, prolonged   expiratory phase, few scattered rhonchi, improved bronchospasm since admission  Heart: Regular rate and rhythm, S1, S2 normal, no murmur, rub or gallop  Abdomen: Soft, non-tender, bowel sounds active , no hepatosplenomegaly  Extremities: no cyanosis or edema, no joint swelling  Skin: Skin color, texture normal, no rashes   Neurologic: Alert and oriented X3 , cranial nerves intact, sensory and motor normal,    ECG:    LABS:                               13.0   31.0  )-----------( 128      ( 18 Sep 2017 06:06 )             39.4     09-18    141  |  106  |  40<H>  ----------------------------<  87  3.6   |  27  |  1.18    Ca    8.0<L>      18 Sep 2017 06:06         PTT - ( 12 Sep 2017 12:40 )  PTT:32.8 sec  Urinalysis Basic - ( 12 Sep 2017 15:24 )    Color: Yellow / Appearance: Clear / S.005 / pH: x  Gluc: x / Ketone: Negative  / Bili: Negative / Urobili: 1 mg/dL   Blood: x / Protein: 15 mg/dL / Nitrite: Negative   Leuk Esterase: Trace / RBC: 0-2 /HPF / WBC 3-5   Sq Epi: x / Non Sq Epi: Moderate / Bacteria: Few            RADIOLOGY & ADDITIONAL STUDIES:  CT Chest reviewed  IMPRESSION:     Improving right lower right middle lobe pneumonia. There is new   nonspecific groundglass opacity in the left upper lobe. Increasing   bibasilar linear and platelike atelectasis.    Multiple enlarged lymph nodes in the chest and upper abdomen consistent   with lymphoma.

## 2017-09-22 NOTE — PROGRESS NOTE ADULT - SUBJECTIVE AND OBJECTIVE BOX
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 Cough is productive in nature with yellowish/whitish sputum. Associated symptoms include SOB and Low-grade Fevers (~100s).     9/14: still complains of intermittent wheezing and cob.cough  9/15: dyspnea slowly improving although wbc rising quickly on steroids.   9/16: slow to improve although wheezing has dissipated  9/17: status quo  9/18: leukocytosis worsening although patient endorses some improvement.   9/19: status quo. however patient feels well.   9/20: fever spike 102, no change in resp status  9/21: afebrile, no complaints, leukocytosis regressing  9/22: low grade temp 100 ,tachycardic        Vital Signs Last 24 Hrs  T(C): 37.1 (13 Sep 2017 04:46), Max: 38 (12 Sep 2017 12:31)  T(F): 98.8 (13 Sep 2017 04:46), Max: 100.4 (12 Sep 2017 12:31)  HR: 88 (13 Sep 2017 04:46) (88 - 109)  BP: 140/68 (13 Sep 2017 04:46) (128/72 - 150/76)  BP(mean): --  RR: 16 (12 Sep 2017 22:10) (16 - 18)  SpO2: 94% (13 Sep 2017 04:46) (92% - 100%)        PHYSICAL EXAM  General Appearance: cooperative, no acute distress,   HEENT: PERRL, conjunctiva clear, EOM's intact, non injected pharynx,  Neck: Supple, , mild adenopathy,   Lungs: adventitious lung sounds at bases at bases  expiratory phase, few scattered rhonchi  Heart: Regular rate and rhythm, S1, S2 normal, no murmur, rub or gallop  Abdomen: Soft, non-tender, bowel sounds active , no hepatosplenomegaly  Extremities: no cyanosis or edema, no joint swelling  Skin: Skin color, texture normal, no rashes   Neurologic: Alert and oriented X3 , cranial nerves intact, sensory and motor normal,    ECG:    LABS:                          12.4   15.9  )-----------( 112      ( 13 Sep 2017 06:07 )             36.5     09-12    137  |  104  |  16  ----------------------------<  243<H>  4.5   |  27  |  1.30    Ca    8.3<L>      12 Sep 2017 12:40  Phos  3.4     09-13  Mg     2.0     09-13    TPro  6.0  /  Alb  3.6  /  TBili  0.7  /  DBili  x   /  AST  29  /  ALT  39  /  AlkPhos  124<H>  09-12    A/P:     * Viral PNA/EV.RV/HCAP  - leukocytosis regressing-> modify abx to vanco/meropenem for broader coverage, suspected GNR/anarobes/resistant bacteria  - npo after NM for bronch in am  - Multifocal pna, immunocompromised patient, hx of CLL/Rituxan  - Hold steroids. , c/w nebs  - mild thrombocytopenia probable consumption  - reduce lantus by 1/2 (pt npo)  premeal coverage/iss    * AF: stable, in NSR  - on low dose amio  - INR 1.8, 5mg coumadin add lovenox bridge    * h/o chr CLL  outpt treatment

## 2017-09-22 NOTE — PROGRESS NOTE ADULT - ASSESSMENT
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 Cough is productive in nature with yellowish/whitish sputum. Associated symptoms include SOB and Low-grade Fevers (~100s). Denies sick contacts.  Hx recurrent pneumonia and Bronchitis  CLL with Immunoglobulin deficiency  Acute URI now with bronchospasm  Non toxic  mild Bronchospasm  COPD  Hx mucus plugging    9/14  Patient improving today with steroids, bronchodilators/nebulizers  Viral swab + RVP thus far, no bacterial positivity   Recommend to continues mobilization as tolerated and patient to receive IV IG rx as outpt    9/15  worsened leukocytosis on steroids  clinically improved  re eval with cxr today  cont with nebs  fu cultures    9/16  Patient continues to feel clinically better but leukocytosis persistent  Discussed with primary team who have started Abx for the persistent opacity.  Seems to be improving but given his immunocompromised state, agree with abx. If this does not improve, may need bronchoscopy with BAL to assess airways/parenchyma further.      9/18  - From a dyspnea standpoint, patient feels good at rest but develops slight discomfort on exertion  - Blood cultures were negative, Rhinovirus +. Source is still TBD.  -Leukocytosis worsening; patient on Cefepime/Doxycycline. Smudge cells still present on smear  - May consider involving hematology    9/19  complete course of antibiotics  stable / improved resp status  will need to resume IV IG therapy as outpt  supplemental o2 on exertion    9/20  Patient was complaining of generalized weakness, Leukocytosis and thrombocytopenia still present. H/O evaluated patient and recommended antibiotics.  Etiology of the fevers is multifactorial, could be undulating from underlying malignancy history but will continue to watch the pattern. Spoke with primary team; will start Vancomycin for broader coverage. If fevers still continue bronchoscopy is an option.     9/22  Patient feels mildly weak, has some dyspnea on exertion.  Leukocytosis is still present today, CT chest shows multifocal patchy opacities with underlying bronchiectasis.  I have discussed a bronchoscopy with the patient and his family and they are in agreement  Consent has been obtained  Please keep patient NPO past midnight

## 2017-09-23 ENCOUNTER — RESULT REVIEW (OUTPATIENT)
Age: 77
End: 2017-09-23

## 2017-09-23 LAB
ANION GAP SERPL CALC-SCNC: 8 MMOL/L — SIGNIFICANT CHANGE UP (ref 5–17)
B PERT IGG+IGM PNL SER: (no result)
BUN SERPL-MCNC: 18 MG/DL — SIGNIFICANT CHANGE UP (ref 7–23)
CALCIUM SERPL-MCNC: 7.6 MG/DL — LOW (ref 8.5–10.1)
CHLORIDE SERPL-SCNC: 104 MMOL/L — SIGNIFICANT CHANGE UP (ref 96–108)
CO2 SERPL-SCNC: 27 MMOL/L — SIGNIFICANT CHANGE UP (ref 22–31)
COLOR FLD: SIGNIFICANT CHANGE UP
COMMENT - FLUIDS: SIGNIFICANT CHANGE UP
CREAT SERPL-MCNC: 1.15 MG/DL — SIGNIFICANT CHANGE UP (ref 0.5–1.3)
EOSINOPHIL # FLD: 17 % — SIGNIFICANT CHANGE UP
FLUID INTAKE SUBSTANCE CLASS: SIGNIFICANT CHANGE UP
FLUID SEGMENTED GRANULOCYTES: 67 % — SIGNIFICANT CHANGE UP
GLUCOSE SERPL-MCNC: 103 MG/DL — HIGH (ref 70–99)
GRAM STN FLD: SIGNIFICANT CHANGE UP
HCT VFR BLD CALC: 35.6 % — LOW (ref 39–50)
HGB BLD-MCNC: 12 G/DL — LOW (ref 13–17)
INR BLD: 1.9 RATIO — HIGH (ref 0.88–1.16)
LYMPHOCYTES # FLD: 4 % — SIGNIFICANT CHANGE UP
MCHC RBC-ENTMCNC: 30.4 PG — SIGNIFICANT CHANGE UP (ref 27–34)
MCHC RBC-ENTMCNC: 33.8 GM/DL — SIGNIFICANT CHANGE UP (ref 32–36)
MCV RBC AUTO: 90 FL — SIGNIFICANT CHANGE UP (ref 80–100)
MONOS+MACROS # FLD: 12 % — SIGNIFICANT CHANGE UP
PLATELET # BLD AUTO: 81 K/UL — LOW (ref 150–400)
POTASSIUM SERPL-MCNC: 3.7 MMOL/L — SIGNIFICANT CHANGE UP (ref 3.5–5.3)
POTASSIUM SERPL-SCNC: 3.7 MMOL/L — SIGNIFICANT CHANGE UP (ref 3.5–5.3)
PROTHROM AB SERPL-ACNC: 20.8 SEC — HIGH (ref 9.8–12.7)
RBC # BLD: 3.96 M/UL — LOW (ref 4.2–5.8)
RBC # FLD: 14.4 % — SIGNIFICANT CHANGE UP (ref 10.3–14.5)
RCV VOL RI: HIGH /UL (ref 0–5)
SODIUM SERPL-SCNC: 139 MMOL/L — SIGNIFICANT CHANGE UP (ref 135–145)
SPECIMEN SOURCE FLD: SIGNIFICANT CHANGE UP
SPECIMEN SOURCE: SIGNIFICANT CHANGE UP
TOTAL NUCLEATED CELL COUNT, BODY FLUID: HIGH /UL (ref 0–5)
TUBE TYPE: SIGNIFICANT CHANGE UP
WBC # BLD: 16.6 K/UL — HIGH (ref 3.8–10.5)
WBC # FLD AUTO: 16.6 K/UL — HIGH (ref 3.8–10.5)

## 2017-09-23 PROCEDURE — 88108 CYTOPATH CONCENTRATE TECH: CPT | Mod: 26

## 2017-09-23 PROCEDURE — 88188 FLOWCYTOMETRY/READ 9-15: CPT

## 2017-09-23 PROCEDURE — 88305 TISSUE EXAM BY PATHOLOGIST: CPT | Mod: 26

## 2017-09-23 PROCEDURE — 88312 SPECIAL STAINS GROUP 1: CPT | Mod: 26

## 2017-09-23 RX ORDER — VANCOMYCIN HCL 1 G
750 VIAL (EA) INTRAVENOUS EVERY 12 HOURS
Qty: 0 | Refills: 0 | Status: DISCONTINUED | OUTPATIENT
Start: 2017-09-23 | End: 2017-09-26

## 2017-09-23 RX ORDER — DEXTROSE 50 % IN WATER 50 %
1 SYRINGE (ML) INTRAVENOUS ONCE
Qty: 0 | Refills: 0 | Status: DISCONTINUED | OUTPATIENT
Start: 2017-09-23 | End: 2017-09-26

## 2017-09-23 RX ORDER — ACETAMINOPHEN 500 MG
650 TABLET ORAL EVERY 6 HOURS
Qty: 0 | Refills: 0 | Status: DISCONTINUED | OUTPATIENT
Start: 2017-09-23 | End: 2017-09-26

## 2017-09-23 RX ORDER — DEXTROSE 50 % IN WATER 50 %
25 SYRINGE (ML) INTRAVENOUS ONCE
Qty: 0 | Refills: 0 | Status: DISCONTINUED | OUTPATIENT
Start: 2017-09-23 | End: 2017-09-26

## 2017-09-23 RX ORDER — SODIUM CHLORIDE 9 MG/ML
1000 INJECTION, SOLUTION INTRAVENOUS
Qty: 0 | Refills: 0 | Status: DISCONTINUED | OUTPATIENT
Start: 2017-09-23 | End: 2017-09-26

## 2017-09-23 RX ORDER — MEROPENEM 1 G/30ML
500 INJECTION INTRAVENOUS EVERY 8 HOURS
Qty: 0 | Refills: 0 | Status: DISCONTINUED | OUTPATIENT
Start: 2017-09-23 | End: 2017-09-26

## 2017-09-23 RX ORDER — INSULIN LISPRO 100/ML
VIAL (ML) SUBCUTANEOUS
Qty: 0 | Refills: 0 | Status: DISCONTINUED | OUTPATIENT
Start: 2017-09-23 | End: 2017-09-26

## 2017-09-23 RX ORDER — IPRATROPIUM/ALBUTEROL SULFATE 18-103MCG
3 AEROSOL WITH ADAPTER (GRAM) INHALATION ONCE
Qty: 0 | Refills: 0 | Status: COMPLETED | OUTPATIENT
Start: 2017-09-23 | End: 2017-09-23

## 2017-09-23 RX ORDER — DOCUSATE SODIUM 100 MG
100 CAPSULE ORAL THREE TIMES A DAY
Qty: 0 | Refills: 0 | Status: DISCONTINUED | OUTPATIENT
Start: 2017-09-23 | End: 2017-09-26

## 2017-09-23 RX ORDER — INSULIN LISPRO 100/ML
VIAL (ML) SUBCUTANEOUS AT BEDTIME
Qty: 0 | Refills: 0 | Status: DISCONTINUED | OUTPATIENT
Start: 2017-09-23 | End: 2017-09-26

## 2017-09-23 RX ORDER — METOPROLOL TARTRATE 50 MG
25 TABLET ORAL
Qty: 0 | Refills: 0 | Status: DISCONTINUED | OUTPATIENT
Start: 2017-09-23 | End: 2017-09-26

## 2017-09-23 RX ORDER — WARFARIN SODIUM 2.5 MG/1
5 TABLET ORAL DAILY
Qty: 0 | Refills: 0 | Status: DISCONTINUED | OUTPATIENT
Start: 2017-09-23 | End: 2017-09-23

## 2017-09-23 RX ORDER — DEXTROSE 50 % IN WATER 50 %
12.5 SYRINGE (ML) INTRAVENOUS ONCE
Qty: 0 | Refills: 0 | Status: DISCONTINUED | OUTPATIENT
Start: 2017-09-23 | End: 2017-09-26

## 2017-09-23 RX ORDER — GLUCAGON INJECTION, SOLUTION 0.5 MG/.1ML
1 INJECTION, SOLUTION SUBCUTANEOUS ONCE
Qty: 0 | Refills: 0 | Status: DISCONTINUED | OUTPATIENT
Start: 2017-09-23 | End: 2017-09-26

## 2017-09-23 RX ORDER — LACTOBACILLUS ACIDOPHILUS 100MM CELL
1 CAPSULE ORAL EVERY 12 HOURS
Qty: 0 | Refills: 0 | Status: DISCONTINUED | OUTPATIENT
Start: 2017-09-23 | End: 2017-09-26

## 2017-09-23 RX ORDER — SIMVASTATIN 20 MG/1
10 TABLET, FILM COATED ORAL AT BEDTIME
Qty: 0 | Refills: 0 | Status: DISCONTINUED | OUTPATIENT
Start: 2017-09-23 | End: 2017-09-26

## 2017-09-23 RX ORDER — IPRATROPIUM/ALBUTEROL SULFATE 18-103MCG
3 AEROSOL WITH ADAPTER (GRAM) INHALATION EVERY 6 HOURS
Qty: 0 | Refills: 0 | Status: DISCONTINUED | OUTPATIENT
Start: 2017-09-23 | End: 2017-09-26

## 2017-09-23 RX ORDER — ENOXAPARIN SODIUM 100 MG/ML
90 INJECTION SUBCUTANEOUS EVERY 12 HOURS
Qty: 0 | Refills: 0 | Status: DISCONTINUED | OUTPATIENT
Start: 2017-09-23 | End: 2017-09-25

## 2017-09-23 RX ORDER — GABAPENTIN 400 MG/1
300 CAPSULE ORAL THREE TIMES A DAY
Qty: 0 | Refills: 0 | Status: DISCONTINUED | OUTPATIENT
Start: 2017-09-23 | End: 2017-09-26

## 2017-09-23 RX ORDER — INSULIN GLARGINE 100 [IU]/ML
20 INJECTION, SOLUTION SUBCUTANEOUS AT BEDTIME
Qty: 0 | Refills: 0 | Status: DISCONTINUED | OUTPATIENT
Start: 2017-09-23 | End: 2017-09-23

## 2017-09-23 RX ORDER — INSULIN GLARGINE 100 [IU]/ML
35 INJECTION, SOLUTION SUBCUTANEOUS
Qty: 0 | Refills: 0 | Status: DISCONTINUED | OUTPATIENT
Start: 2017-09-23 | End: 2017-09-25

## 2017-09-23 RX ORDER — METOPROLOL TARTRATE 50 MG
2.5 TABLET ORAL ONCE
Qty: 0 | Refills: 0 | Status: COMPLETED | OUTPATIENT
Start: 2017-09-23 | End: 2017-09-23

## 2017-09-23 RX ORDER — ENOXAPARIN SODIUM 100 MG/ML
90 INJECTION SUBCUTANEOUS EVERY 12 HOURS
Qty: 0 | Refills: 0 | Status: DISCONTINUED | OUTPATIENT
Start: 2017-09-23 | End: 2017-09-23

## 2017-09-23 RX ORDER — ONDANSETRON 8 MG/1
4 TABLET, FILM COATED ORAL ONCE
Qty: 0 | Refills: 0 | Status: DISCONTINUED | OUTPATIENT
Start: 2017-09-23 | End: 2017-09-23

## 2017-09-23 RX ORDER — SODIUM CHLORIDE 9 MG/ML
1000 INJECTION INTRAMUSCULAR; INTRAVENOUS; SUBCUTANEOUS
Qty: 0 | Refills: 0 | Status: DISCONTINUED | OUTPATIENT
Start: 2017-09-23 | End: 2017-09-23

## 2017-09-23 RX ORDER — WARFARIN SODIUM 2.5 MG/1
5 TABLET ORAL DAILY
Qty: 0 | Refills: 0 | Status: DISCONTINUED | OUTPATIENT
Start: 2017-09-23 | End: 2017-09-25

## 2017-09-23 RX ORDER — TENOFOVIR DISOPROXIL FUMARATE 300 MG/1
300 TABLET, FILM COATED ORAL DAILY
Qty: 0 | Refills: 0 | Status: DISCONTINUED | OUTPATIENT
Start: 2017-09-23 | End: 2017-09-26

## 2017-09-23 RX ADMIN — INSULIN GLARGINE 35 UNIT(S): 100 INJECTION, SOLUTION SUBCUTANEOUS at 22:06

## 2017-09-23 RX ADMIN — MEROPENEM 200 MILLIGRAM(S): 1 INJECTION INTRAVENOUS at 05:42

## 2017-09-23 RX ADMIN — Medication 650 MILLIGRAM(S): at 22:08

## 2017-09-23 RX ADMIN — Medication 1 APPLICATION(S): at 17:08

## 2017-09-23 RX ADMIN — Medication 3 MILLILITER(S): at 19:40

## 2017-09-23 RX ADMIN — WARFARIN SODIUM 5 MILLIGRAM(S): 2.5 TABLET ORAL at 22:09

## 2017-09-23 RX ADMIN — Medication 3 MILLILITER(S): at 02:06

## 2017-09-23 RX ADMIN — TENOFOVIR DISOPROXIL FUMARATE 300 MILLIGRAM(S): 300 TABLET, FILM COATED ORAL at 17:00

## 2017-09-23 RX ADMIN — SIMVASTATIN 10 MILLIGRAM(S): 20 TABLET, FILM COATED ORAL at 22:07

## 2017-09-23 RX ADMIN — SODIUM CHLORIDE 75 MILLILITER(S): 9 INJECTION INTRAMUSCULAR; INTRAVENOUS; SUBCUTANEOUS at 10:13

## 2017-09-23 RX ADMIN — Medication 150 MILLIGRAM(S): at 17:01

## 2017-09-23 RX ADMIN — Medication 4: at 17:10

## 2017-09-23 RX ADMIN — GABAPENTIN 300 MILLIGRAM(S): 400 CAPSULE ORAL at 22:07

## 2017-09-23 RX ADMIN — Medication 2.5 MILLIGRAM(S): at 10:14

## 2017-09-23 RX ADMIN — Medication 1200 MILLIGRAM(S): at 17:08

## 2017-09-23 RX ADMIN — Medication 100 MILLIGRAM(S): at 22:06

## 2017-09-23 RX ADMIN — MEROPENEM 200 MILLIGRAM(S): 1 INJECTION INTRAVENOUS at 22:06

## 2017-09-23 RX ADMIN — Medication 150 MILLIGRAM(S): at 06:16

## 2017-09-23 RX ADMIN — Medication 25 MILLIGRAM(S): at 17:00

## 2017-09-23 RX ADMIN — ENOXAPARIN SODIUM 90 MILLIGRAM(S): 100 INJECTION SUBCUTANEOUS at 17:17

## 2017-09-23 RX ADMIN — Medication 1 TABLET(S): at 17:00

## 2017-09-23 RX ADMIN — Medication 3 MILLILITER(S): at 10:24

## 2017-09-23 NOTE — PROGRESS NOTE ADULT - ASSESSMENT
SPECIMENS SENT:  Bronchoalveolar lavage sent for cytology, cell count, fungal smear/culture, afb, C&S, flow cytometry in RPMI medium.  FINDINGS: Bronchomalacia, no endobronchial masses, thick secretions, some return was sanguinous. SPECIMENS SENT:  Bronchoalveolar lavage sent for cytology, cell count, fungal smear/culture, afb, C&S, flow cytometry in RPMI medium.  FINDINGS: Bronchomalacia, no endobronchial masses, thick secretions, some return was sanguinous.   Patient was also noted to desaturate when he sleeps so should be evaluated for underlying ERNST and will likely need nocturnal BiPAP

## 2017-09-23 NOTE — PROGRESS NOTE ADULT - SUBJECTIVE AND OBJECTIVE BOX
BRONCHOSCOPY PROCEDURE NOTE    PROCEDURE PERFORMED: Fiberoptic bronchoscopy with BAL  SURGEON: Imtiaz Salazar MD                    CONSCIOUS SEDATION:  1. Demerol 25 mg IV push.  2. Versed 3 mg IV push.  ANESTHESIA: Per MAR  ANESTHESIOLOGIST:Dr. Osmar Anders    DESCRIPTION OF PROCEDURE: After appropriate consent was obtained from the patient, all risks and benefits were explained to him. He was brought to the operating room, time out was performed,  bite block was in place and anesthesia was administered. An adequate level of sedation was achieved. At this point, an Olympus bronchofiberscope was inserted oropharynx and vocal cords were seen 2% lidocaine was administered X 2 and scope was instered into the trachea. Systematic inspection was then carried out of the entire tracheobronchial tree. The bronchoscope was then advanced to the right middle lobe and the bronchoscope was wedged into the medial segment. Bronchoalveolar lavage was then performed at this segment utilizing 60 mL of saline. The bronchoscope was then advanced to the right lower lobe and the bronchoscope was wedged into the posterior segment. Bronchoalveolar lavage was then performed at this segment utilizing 60 mL of saline. The bronchoscope was then advanced to the left upper lobe and the bronchoscope was wedged into the apical-posterior segment. Bronchoalveolar lavage was then performed at this segment utilizing 60 mL of saline. . The area was then inspected for any acute hemorrhage, but none were seen. The bronchoscope was withdrawn. The patient tolerated the procedure well. BRONCHOSCOPY PROCEDURE NOTE    PROCEDURE PERFORMED: Fiberoptic bronchoscopy with BAL  SURGEON: Imtiaz Salazar MD                      ANESTHESIA: Per MAR  ANESTHESIOLOGIST:Dr. Osmar Anders    DESCRIPTION OF PROCEDURE: After appropriate consent was obtained from the patient, all risks and benefits were explained to him. He was brought to the operating room, time out was performed,  bite block was in place and anesthesia was administered. An adequate level of sedation was achieved. At this point, an Olympus bronchofiberscope was inserted oropharynx and vocal cords were seen 2% lidocaine was administered X 2 and scope was instered into the trachea. Systematic inspection was then carried out of the entire tracheobronchial tree. The bronchoscope was then advanced to the right middle lobe and the bronchoscope was wedged into the medial segment. Bronchoalveolar lavage was then performed at this segment utilizing 60 mL of saline. The bronchoscope was then advanced to the right lower lobe and the bronchoscope was wedged into the posterior segment. Bronchoalveolar lavage was then performed at this segment utilizing 60 mL of saline. The bronchoscope was then advanced to the left upper lobe and the bronchoscope was wedged into the apical-posterior segment. Bronchoalveolar lavage was then performed at this segment utilizing 60 mL of saline. . The area was then inspected for any acute hemorrhage, but none were seen. The bronchoscope was withdrawn. The patient tolerated the procedure well.

## 2017-09-23 NOTE — PROGRESS NOTE ADULT - ASSESSMENT
* Viral PNA/EV.RV/HCAP  - leukocytosis regressing-> modify abx to vanco/meropenem for broader coverage, suspected GNR/anarobes/resistant bacteria  - Multifocal pna, immunocompromised patient, hx of CLL/Rituxan  - Hold steroids. , c/w nebs  - mild thrombocytopenia probable consumption  - S/P Bronchoscopy (9/23)   - reduce lantus by 1/2 (pt npo)  premeal coverage/iss    * AF: stable, in NSR  - on low dose amio  - INR 1.8, 5mg coumadin add lovenox bridge    * h/o chr CLL  outpt treatment * Viral and Bacterial PNA (HCAP) with COPD Exacerbation  - RVP +for Entero and Rhino Virus  - leukocytosis regressing-> modify abx to vanco/meropenem #4 for broader coverage, suspected GNR/anarobes/resistant bacteria  - Multifocal pna, immunocompromised patient, hx of CLL/Rituxan  - S/p steroids - hold for now , c/w nebs  - S/P Bronchoscopy (9/23) - Bronchomalacia, no endobronchial masses, thick secretions  - FU Bronchoalveolar lavage sent for cytology, cell count, fungal smear/culture, afb, C&S, flow cytometry  - Pulm consult appreciated - outpatient eval for  underlying ERNST and will likely need nocturnal BiPAP    *mild thrombocytopenia probable consumption  -continue to monitor    *h/o chr CLL/SLL  outpt treatment    * AF stable, in NSR; Hx of DVT/PE  - on low dose amio and Metoprolol  - INR 1.9, 5mg coumadin add lovenox bridge    *DM  - continue Lantus 35U SQ Q12H  - premeal coverage/iss    *HTN  -continue Home meds    *Hepatitis B Infection  cont. Tenofovir 300mg po daily.    *DVTppx  -Coumadin and Lovenox

## 2017-09-23 NOTE — PROGRESS NOTE ADULT - SUBJECTIVE AND OBJECTIVE BOX
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 Cough is productive in nature with yellowish/whitish sputum. Associated symptoms include SOB and Low-grade Fevers (~100s). Denies sick contacts.  In the ED, patient was empirically treated with IV Vanc, Zosyn, and 2.5L IV NS. RVP positive for Entero/Rhinovirus. (12 Sep 2017 16:42)  9/14: still complains of intermittent wheezing and cob.cough  9/15: dyspnea slowly improving although wbc rising quickly on steroids.   9/18: leukocytosis worsening although patient endorses some improvement.   9/20: fever spike 102, no change in resp status  9/21: afebrile, no complaints, leukocytosis regressing  9/22: low grade temp 100 ,tachycardic    9/23: Above reviewed       Allergies:  Privigen (Other (Severe))    REVIEW OF SYSTEMS:  See HPI. All other review of systems is negative unless indicated above.     OBJECTIVE  Physical Exam:  Vital Signs:    Vital Signs Last 24 Hrs  T(C): 37 (23 Sep 2017 09:30), Max: 38 (22 Sep 2017 22:45)  T(F): 98.6 (23 Sep 2017 09:30), Max: 100.4 (22 Sep 2017 22:45)  HR: 106 (23 Sep 2017 10:30) (94 - 129)  BP: 124/70 (23 Sep 2017 10:30) (101/66 - 194/91)  BP(mean): --  RR: 25 (23 Sep 2017 10:30) (18 - 33)  SpO2: 94% (23 Sep 2017 10:30) (91% - 100%)  I&O's Summary    23 Sep 2017 07:01  -  23 Sep 2017 11:20  --------------------------------------------------------  IN: 100 mL / OUT: 0 mL / NET: 100 mL    General Appearance: cooperative, no acute distress,   HEENT: PERRL, conjunctiva clear, EOM's intact, non injected pharynx,  Neck: Supple, , mild adenopathy,   Lungs: adventitious lung sounds at bases at bases  expiratory phase, few scattered rhonchi  Heart: Regular rate and rhythm, S1, S2 normal, no murmur, rub or gallop  Abdomen: Soft, non-tender, bowel sounds active , no hepatosplenomegaly  Extremities: no cyanosis or edema, no joint swelling  Skin: Skin color, texture normal, no rashes   Neurologic: Alert and oriented X3 , cranial nerves intact, sensory and motor normal,  Breast: Deferred  Rectal: Deferred    MEDICATIONS  (STANDING):      LABS: All Labs Reviewed:                        12.0   16.6  )-----------( 81       ( 23 Sep 2017 06:47 )             35.6     09-23    139  |  104  |  18  ----------------------------<  103<H>  3.7   |  27  |  1.15    Ca    7.6<L>      23 Sep 2017 06:47      PT/INR - ( 23 Sep 2017 06:47 )   PT: 20.8 sec;   INR: 1.90 ratio         RADIOLOGY/EKG: HOSPITALIST PROGRESS NOTE:  SUBJECTIVE:  PCP: PCP: Za Orozco  H/O: Dr. 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 Cough is productive in nature with yellowish/whitish sputum. Associated symptoms include SOB and Low-grade Fevers (~100s). Denies sick contacts.  In the ED, patient was empirically treated with IV Vanc, Zosyn, and 2.5L IV NS. RVP positive for Entero/Rhinovirus. (12 Sep 2017 16:42)  9/14: still complains of intermittent wheezing and cob.cough  9/15: dyspnea slowly improving although wbc rising quickly on steroids.   9/18: leukocytosis worsening although patient endorses some improvement.   9/20: fever spike 102, no change in resp status  9/21: afebrile, no complaints, leukocytosis regressing  9/22: low grade temp 100 ,tachycardic    9/23: Above reviewed. breathing much improved after bronchoscopy.    Allergies:  Privigen (Other (Severe))    REVIEW OF SYSTEMS:  See HPI. All other review of systems is negative unless indicated above.     OBJECTIVE  Physical Exam:  ICU Vital Signs Last 24 Hrs  T(C): 38 (23 Sep 2017 11:31), Max: 38 (22 Sep 2017 22:45)  T(F): 100.4 (23 Sep 2017 11:31), Max: 100.4 (22 Sep 2017 22:45)  HR: 109 (23 Sep 2017 11:31) (98 - 129)  BP: 97/55 (23 Sep 2017 11:31) (97/55 - 194/91)  BP(mean): --  ABP: --  ABP(mean): --  RR: 18 (23 Sep 2017 11:31) (18 - 33)  SpO2: 95% (23 Sep 2017 11:31) (91% - 100%)    General Appearance: cooperative, no acute distress,   HEENT: PERRL, conjunctiva clear, EOM's intact, non injected pharynx,  Neck: Supple, , mild adenopathy,   Lungs: adventitious lung sounds at bases at bases  expiratory phase, few scattered rhonchi  Heart: Regular rate and rhythm, S1, S2 normal, no murmur, rub or gallop  Abdomen: Soft, non-tender, bowel sounds active , no hepatosplenomegaly  Extremities: no cyanosis or edema, no joint swelling  Skin: Skin color, texture normal, no rashes   Neurologic: Alert and oriented X3 , cranial nerves intact, sensory and motor normal,  Breast: Deferred  Rectal: Deferred    MEDICATIONS  (STANDING):      LABS: All Labs Reviewed:                        12.0   16.6  )-----------( 81       ( 23 Sep 2017 06:47 )             35.6     09-23    139  |  104  |  18  ----------------------------<  103<H>  3.7   |  27  |  1.15    Ca    7.6<L>      23 Sep 2017 06:47      PT/INR - ( 23 Sep 2017 06:47 )   PT: 20.8 sec;   INR: 1.90 ratio         RADIOLOGY/EKG:    < from: Xray Chest 1 View AP/PA. (09.19.17 @ 23:39) >    FINDINGS/  IMPRESSION:          Tiny bilateral pleural effusions are present. Bibasilar atelectasis is   present. Cardiomediastinal silhouette is intact.      < end of copied text >    < from: CT Chest No Cont (09.16.17 @ 08:20) >    IMPRESSION:     Improving right lower right middle lobe pneumonia. There is new   nonspecific groundglass opacity in the left upper lobe. Increasing   bibasilar linear and platelike atelectasis.    Multiple enlarged lymph nodes in the chest and upper abdomen consistent   with lymphoma.    < end of copied text >

## 2017-09-24 LAB
HCT VFR BLD CALC: 33.7 % — LOW (ref 39–50)
HGB BLD-MCNC: 11.2 G/DL — LOW (ref 13–17)
INR BLD: 2.34 RATIO — HIGH (ref 0.88–1.16)
MCHC RBC-ENTMCNC: 29.7 PG — SIGNIFICANT CHANGE UP (ref 27–34)
MCHC RBC-ENTMCNC: 33.3 GM/DL — SIGNIFICANT CHANGE UP (ref 32–36)
MCV RBC AUTO: 89.3 FL — SIGNIFICANT CHANGE UP (ref 80–100)
NIGHT BLUE STAIN TISS: SIGNIFICANT CHANGE UP
PLATELET # BLD AUTO: 87 K/UL — LOW (ref 150–400)
PROTHROM AB SERPL-ACNC: 25.7 SEC — HIGH (ref 9.8–12.7)
RBC # BLD: 3.78 M/UL — LOW (ref 4.2–5.8)
RBC # FLD: 14.2 % — SIGNIFICANT CHANGE UP (ref 10.3–14.5)
SPECIMEN SOURCE: SIGNIFICANT CHANGE UP
WBC # BLD: 14.3 K/UL — HIGH (ref 3.8–10.5)
WBC # FLD AUTO: 14.3 K/UL — HIGH (ref 3.8–10.5)

## 2017-09-24 RX ADMIN — Medication 3 MILLILITER(S): at 08:11

## 2017-09-24 RX ADMIN — MEROPENEM 200 MILLIGRAM(S): 1 INJECTION INTRAVENOUS at 05:51

## 2017-09-24 RX ADMIN — WARFARIN SODIUM 5 MILLIGRAM(S): 2.5 TABLET ORAL at 22:55

## 2017-09-24 RX ADMIN — Medication 1 TABLET(S): at 17:29

## 2017-09-24 RX ADMIN — Medication 100 MILLIGRAM(S): at 05:51

## 2017-09-24 RX ADMIN — TENOFOVIR DISOPROXIL FUMARATE 300 MILLIGRAM(S): 300 TABLET, FILM COATED ORAL at 11:12

## 2017-09-24 RX ADMIN — INSULIN GLARGINE 35 UNIT(S): 100 INJECTION, SOLUTION SUBCUTANEOUS at 22:51

## 2017-09-24 RX ADMIN — Medication 4: at 12:00

## 2017-09-24 RX ADMIN — MEROPENEM 200 MILLIGRAM(S): 1 INJECTION INTRAVENOUS at 13:27

## 2017-09-24 RX ADMIN — Medication 150 MILLIGRAM(S): at 17:28

## 2017-09-24 RX ADMIN — INSULIN GLARGINE 35 UNIT(S): 100 INJECTION, SOLUTION SUBCUTANEOUS at 09:28

## 2017-09-24 RX ADMIN — GABAPENTIN 300 MILLIGRAM(S): 400 CAPSULE ORAL at 05:52

## 2017-09-24 RX ADMIN — Medication 1 APPLICATION(S): at 05:51

## 2017-09-24 RX ADMIN — Medication 100 MILLIGRAM(S): at 22:56

## 2017-09-24 RX ADMIN — Medication 1200 MILLIGRAM(S): at 17:29

## 2017-09-24 RX ADMIN — SIMVASTATIN 10 MILLIGRAM(S): 20 TABLET, FILM COATED ORAL at 22:52

## 2017-09-24 RX ADMIN — Medication 25 MILLIGRAM(S): at 17:30

## 2017-09-24 RX ADMIN — ENOXAPARIN SODIUM 90 MILLIGRAM(S): 100 INJECTION SUBCUTANEOUS at 05:52

## 2017-09-24 RX ADMIN — GABAPENTIN 300 MILLIGRAM(S): 400 CAPSULE ORAL at 13:26

## 2017-09-24 RX ADMIN — Medication 3 MILLILITER(S): at 13:33

## 2017-09-24 RX ADMIN — Medication 3 MILLILITER(S): at 01:18

## 2017-09-24 RX ADMIN — Medication 650 MILLIGRAM(S): at 11:59

## 2017-09-24 RX ADMIN — Medication 1 TABLET(S): at 05:51

## 2017-09-24 RX ADMIN — MEROPENEM 200 MILLIGRAM(S): 1 INJECTION INTRAVENOUS at 22:49

## 2017-09-24 RX ADMIN — Medication 25 MILLIGRAM(S): at 05:53

## 2017-09-24 RX ADMIN — Medication 3 MILLILITER(S): at 20:21

## 2017-09-24 RX ADMIN — GABAPENTIN 300 MILLIGRAM(S): 400 CAPSULE ORAL at 22:52

## 2017-09-24 RX ADMIN — Medication 1200 MILLIGRAM(S): at 05:52

## 2017-09-24 RX ADMIN — Medication 1 APPLICATION(S): at 17:56

## 2017-09-24 RX ADMIN — ENOXAPARIN SODIUM 90 MILLIGRAM(S): 100 INJECTION SUBCUTANEOUS at 17:29

## 2017-09-24 RX ADMIN — Medication 150 MILLIGRAM(S): at 06:28

## 2017-09-24 NOTE — PROGRESS NOTE ADULT - ASSESSMENT
* Viral and Bacterial PNA (HCAP) with COPD Exacerbation  - RVP +for Entero and Rhino Virus  - leukocytosis regressing-> modify abx to vanco/meropenem #5 for broader coverage, suspected GNR/anarobes/resistant bacteria  - Multifocal pna, immunocompromised patient with hx of CLL/Rituxan  - S/p steroids - hold for now , c/w nebs  - S/P Bronchoscopy (9/23) - Bronchomalacia, no endobronchial masses, thick secretions  - FU Bronchoalveolar lavage sent for cytology, cell count, fungal smear/culture, afb, C&S, flow cytometry   - Pulm consult appreciated - outpatient eval for  underlying ERNST and will likely need nocturnal BiPAP    *mild thrombocytopenia probable consumption  -continue to monitor    *h/o chr CLL/SLL  outpt treatment    * AF stable, in NSR; Hx of DVT/PE  - on low dose amio and Metoprolol  - INR 1.9, 5mg coumadin add lovenox bridge    *DM  - continue Lantus 35U SQ Q12H  - premeal coverage/iss    *HTN  -continue Home meds    *Hepatitis B Infection  cont. Tenofovir 300mg po daily.    *DVTppx  -Coumadin and Lovenox

## 2017-09-24 NOTE — PROGRESS NOTE ADULT - SUBJECTIVE AND OBJECTIVE BOX
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 Cough is productive in nature with yellowish/whitish sputum. Associated symptoms include SOB and Low-grade Fevers (~100s). Denies sick contacts.    In the ED, patient was empirically treated with IV Vanc, Zosyn, and 2.5L IV NS. RVP positive for Entero/Rhinovirus. (12 Sep 2017 16:42)  pt is well known to me from multiple prior admissions   at bedside  no hemoptysis  no cp      Patient states he is feeling better today  Discussed the +Rhinovirus in nasal swab and let them know that the current treatment is supportive  States that he has been playing with his grandchildren and one of them had similar symptoms    9/15  sitting OOB in chair  no difficulty breathing  decreased cough and congestion      Persistent leukocytosis but patient endorses feeling better      Patient denies any dyspnea today  No acute events occurred overnight      feels better  decreased cough  he wants to go home  no cp  non toxic      Overnight, patient was noted to spike fevers  This morning, he states he feels generalized weakness  Denies chest pain, pleuritic chest pain, abdominal pain, no urinary symptoms, no headaches      Patient continues to have leukocytosis  States he feels weak but not more dyspneic  Overall, denies any change in his symptoms      Patient post bronchoscopy on   No acute events overnight    PAST MEDICAL & SURGICAL HISTORY:  Hypogammaglobulinemia: received IVIG  Hepatitis B virus infection, unspecified chronicity: retrovirals  Chronic diastolic congestive heart failure  Essential hypertension  DM type 2 (diabetes mellitus, type 2)  B-cell lymphoma, unspecified B-cell lymphoma type, unspecified body region: CLL stage 4/SLL; met NHL  Atrial fibrillation, unspecified type  COPD (chronic obstructive pulmonary disease)  History of esophageal dilatation  S/P cholecystectomy  H/O prostatectomy      PREVIOUS DIAGNOSTIC TESTING:      MEDICATIONS  (STANDING):  amiodarone    Tablet 100 milliGRAM(s) Oral daily  warfarin 2.5 milliGRAM(s) Oral daily  gabapentin 300 milliGRAM(s) Oral three times a day  simvastatin 10 milliGRAM(s) Oral at bedtime  tenofovir 300 milliGRAM(s) Oral daily  metoprolol 25 milliGRAM(s) Oral two times a day  docusate sodium 100 milliGRAM(s) Oral three times a day  dextrose 5%. 1000 milliLiter(s) (50 mL/Hr) IV Continuous <Continuous>  cefepime  IVPB 1000 milliGRAM(s) IV Intermittent every 24 hours  ALBUTerol/ipratropium for Nebulization 3 milliLiter(s) Nebulizer every 6 hours  enoxaparin Injectable 90 milliGRAM(s) SubCutaneous every 12 hours  insulin lispro (HumaLOG) corrective regimen sliding scale   SubCutaneous three times a day before meals  dextrose 50% Injectable 12.5 Gram(s) IV Push once  dextrose 50% Injectable 25 Gram(s) IV Push once  dextrose 50% Injectable 25 Gram(s) IV Push once    MEDICATIONS  (PRN):  acetaminophen   Tablet 650 milliGRAM(s) Oral every 6 hours PRN For Temp greater than 38 C (100.4 F)  dextrose Gel 1 Dose(s) Oral once PRN Blood Glucose LESS THAN 70 milliGRAM(s)/deciliter  glucagon  Injectable 1 milliGRAM(s) IntraMuscular once PRN Glucose LESS THAN 70 milligrams/deciliter  guaiFENesin   Syrup  (Sugar-Free) 100 milliGRAM(s) Oral every 6 hours PRN Cough      FAMILY HISTORY:  Family history of liver cancer (Father): father  85 liver CA  Family history of coronary arteriosclerosis (Mother): Mom  of MI age 68      SOCIAL HISTORY:   Denies any occupational exposures    REVIEW OF SYSTEM:  Pertinent items are noted in HPI.  Constitutional negative for chills,pos fevers, no sweats and weight loss  throat, and face:  negative for epistaxis, nasal congestion, sore throat and   tinnitus  Respiratory: pos for cough, dyspnea on exertion, pleuritic chest pain  and wheezing  Cardiovascular:  negative for chest pain, pos dyspnea and palpitations  Gastrointestinal: negative for abdominal pain, diarrhea, nausea and vomiting  Genitourinary: negative for dysuria, frequency and urinary incontinence  Skin:  negative for redness, rash, pruritus, swelling, dryness and   fissures  Hematologic/lymphatic: negative for bleeding and easy bruising  Musculoskeletal: negative for arthralgias, back pain and muscle weakness  Neurological: negative for dizziness, headaches, seizures and tremors  Behavioral/Psych:  negative for mood change, depression, suicidal attempts    Allergic/Immunologic: negative for anaphylaxis, angioedema and urticaria    Vital Signs Last 24 Hrs  T(C): 36.7 (15 Sep 2017 05:14), Max: 36.7 (15 Sep 2017 05:14)  T(F): 98 (15 Sep 2017 05:14), Max: 98 (15 Sep 2017 05:14)  HR: 92 (15 Sep 2017 07:40) (80 - 92)  BP: 104/60 (15 Sep 2017 05:14) (104/60 - 105/46)  BP(mean): --  RR: 17 (14 Sep 2017 11:19) (17 - 17)  SpO2: 97% (15 Sep 2017 05:14) (95% - 97%)    I&O's Summary    PHYSICAL EXAM  General Appearance: cooperative, no acute distress,   HEENT: PERRL, conjunctiva clear, EOM's intact, non injected pharynx,  Neck: Supple, , mild adenopathy,   Lungs: Clear to auscultation bilateral,no adventitious breath sounds, prolonged   expiratory phase, few scattered rhonchi, improved bronchospasm since admission  Heart: Regular rate and rhythm, S1, S2 normal, no murmur, rub or gallop  Abdomen: Soft, non-tender, bowel sounds active , no hepatosplenomegaly  Extremities: no cyanosis or edema, no joint swelling  Skin: Skin color, texture normal, no rashes   Neurologic: Alert and oriented X3 , cranial nerves intact, sensory and motor normal,    ECG:    LABS:                                     11.2   14.3  )-----------( 87       ( 24 Sep 2017 07:03 )             33.7   09-23    139  |  104  |  18  ----------------------------<  103<H>  3.7   |  27  |  1.15    Ca    7.6<L>      23 Sep 2017 06:47                    13.0   31.0  )-----------( 128      ( 18 Sep 2017 06:06 )             39.4     09-18    141  |  106  |  40<H>  ----------------------------<  87  3.6   |  27  |  1.18    Ca    8.0<L>      18 Sep 2017 06:06         PTT - ( 12 Sep 2017 12:40 )  PTT:32.8 sec  Urinalysis Basic - ( 12 Sep 2017 15:24 )    Color: Yellow / Appearance: Clear / S.005 / pH: x  Gluc: x / Ketone: Negative  / Bili: Negative / Urobili: 1 mg/dL   Blood: x / Protein: 15 mg/dL / Nitrite: Negative   Leuk Esterase: Trace / RBC: 0-2 /HPF / WBC 3-5   Sq Epi: x / Non Sq Epi: Moderate / Bacteria: Few            RADIOLOGY & ADDITIONAL STUDIES:  CT Chest reviewed  IMPRESSION:     Improving right lower right middle lobe pneumonia. There is new   nonspecific groundglass opacity in the left upper lobe. Increasing   bibasilar linear and platelike atelectasis.    Multiple enlarged lymph nodes in the chest and upper abdomen consistent   with lymphoma.

## 2017-09-24 NOTE — PROGRESS NOTE ADULT - ASSESSMENT
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 Cough is productive in nature with yellowish/whitish sputum. Associated symptoms include SOB and Low-grade Fevers (~100s). Denies sick contacts.  Hx recurrent pneumonia and Bronchitis  CLL with Immunoglobulin deficiency  Acute URI now with bronchospasm  Non toxic  mild Bronchospasm  COPD  Hx mucus plugging    9/14  Patient improving today with steroids, bronchodilators/nebulizers  Viral swab + RVP thus far, no bacterial positivity   Recommend to continues mobilization as tolerated and patient to receive IV IG rx as outpt    9/15  worsened leukocytosis on steroids  clinically improved  re eval with cxr today  cont with nebs  fu cultures    9/16  Patient continues to feel clinically better but leukocytosis persistent  Discussed with primary team who have started Abx for the persistent opacity.  Seems to be improving but given his immunocompromised state, agree with abx. If this does not improve, may need bronchoscopy with BAL to assess airways/parenchyma further.      9/18  - From a dyspnea standpoint, patient feels good at rest but develops slight discomfort on exertion  - Blood cultures were negative, Rhinovirus +. Source is still TBD.  -Leukocytosis worsening; patient on Cefepime/Doxycycline. Smudge cells still present on smear  - May consider involving hematology    9/19  complete course of antibiotics  stable / improved resp status  will need to resume IV IG therapy as outpt  supplemental o2 on exertion    9/20  Patient was complaining of generalized weakness, Leukocytosis and thrombocytopenia still present. H/O evaluated patient and recommended antibiotics.  Etiology of the fevers is multifactorial, could be undulating from underlying malignancy history but will continue to watch the pattern. Spoke with primary team; will start Vancomycin for broader coverage. If fevers still continue bronchoscopy is an option.     9/22  Patient feels mildly weak, has some dyspnea on exertion.  Leukocytosis is still present today, CT chest shows multifocal patchy opacities with underlying bronchiectasis.  I have discussed a bronchoscopy with the patient and his family and they are in agreement  Consent has been obtained  Please keep patient NPO past midnight    9/24  WBC improving, platelets still reducing  Patient is s/p bronchoscopy on 9/23. Large mucus plugs removed. Cultures were negative. Cell count unrevealing, rather sanguinous. Awaiting Cytology and Flow Cytometry.   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   Will need airway clearance device as an outpatient along with a sleep study

## 2017-09-24 NOTE — PROGRESS NOTE ADULT - SUBJECTIVE AND OBJECTIVE BOX
HOSPITALIST PROGRESS NOTE:  SUBJECTIVE:  PCP: PCP: Za Orozco  H/O: Dr. 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 Cough is productive in nature with yellowish/whitish sputum. Associated symptoms include SOB and Low-grade Fevers (~100s). Denies sick contacts.  In the ED, patient was empirically treated with IV Vanc, Zosyn, and 2.5L IV NS. RVP positive for Entero/Rhinovirus. (12 Sep 2017 16:42)  9/14: still complains of intermittent wheezing and cob.cough  9/15: dyspnea slowly improving although wbc rising quickly on steroids.   9/18: leukocytosis worsening although patient endorses some improvement.   9/20: fever spike 102, no change in resp status  9/21: afebrile, no complaints, leukocytosis regressing  9/22: low grade temp 100 ,tachycardic    9/23: Above reviewed. breathing much improved after bronchoscopy.  9/24: No complaints. breathing better    Allergies:  Privigen (Other (Severe))    REVIEW OF SYSTEMS:  See HPI. All other review of systems is negative unless indicated above.     OBJECTIVE  Physical Exam:  ICU Vital Signs Last 24 Hrs  T(C): 36.9 (24 Sep 2017 04:31), Max: 37.6 (23 Sep 2017 22:03)  T(F): 98.4 (24 Sep 2017 04:31), Max: 99.7 (23 Sep 2017 22:03)  HR: 88 (24 Sep 2017 08:14) (88 - 111)  BP: 104/61 (24 Sep 2017 05:53) (90/53 - 107/67)  BP(mean): --  ABP: --  ABP(mean): --  RR: 18 (24 Sep 2017 04:31) (17 - 18)  SpO2: 95% (24 Sep 2017 04:31) (95% - 99%)      General Appearance: cooperative, no acute distress,   HEENT: PERRL, conjunctiva clear, EOM's intact, non injected pharynx,  Neck: Supple, , mild adenopathy,   Lungs: adventitious lung sounds at bases at bases  expiratory phase, few scattered rhonchi  Heart: Regular rate and rhythm, S1, S2 normal, no murmur, rub or gallop  Abdomen: Soft, non-tender, bowel sounds active , no hepatosplenomegaly  Extremities: no cyanosis or edema, no joint swelling  Skin: Skin color, texture normal, no rashes   Neurologic: Alert and oriented X3 , cranial nerves intact, sensory and motor normal,  Breast: Deferred  Rectal: Deferred    MEDICATIONS  (STANDING):      LABS: All Labs Reviewed:                        12.0   16.6  )-----------( 81       ( 23 Sep 2017 06:47 )             35.6     09-23    139  |  104  |  18  ----------------------------<  103<H>  3.7   |  27  |  1.15    Ca    7.6<L>      23 Sep 2017 06:47      PT/INR - ( 23 Sep 2017 06:47 )   PT: 20.8 sec;   INR: 1.90 ratio         RADIOLOGY/EKG:    < from: Xray Chest 1 View AP/PA. (09.19.17 @ 23:39) >    FINDINGS/  IMPRESSION:          Tiny bilateral pleural effusions are present. Bibasilar atelectasis is   present. Cardiomediastinal silhouette is intact.      < end of copied text >    < from: CT Chest No Cont (09.16.17 @ 08:20) >    IMPRESSION:     Improving right lower right middle lobe pneumonia. There is new   nonspecific groundglass opacity in the left upper lobe. Increasing   bibasilar linear and platelike atelectasis.    Multiple enlarged lymph nodes in the chest and upper abdomen consistent   with lymphoma.    < end of copied text >

## 2017-09-25 LAB
CULTURE RESULTS: SIGNIFICANT CHANGE UP
GALACTOMANNAN AG SPEC IA-ACNC: <0.5 INDEX — SIGNIFICANT CHANGE UP
HCT VFR BLD CALC: 34.4 % — LOW (ref 39–50)
HGB BLD-MCNC: 11.4 G/DL — LOW (ref 13–17)
INR BLD: 2.74 RATIO — HIGH (ref 0.88–1.16)
MCHC RBC-ENTMCNC: 29.9 PG — SIGNIFICANT CHANGE UP (ref 27–34)
MCHC RBC-ENTMCNC: 33.2 GM/DL — SIGNIFICANT CHANGE UP (ref 32–36)
MCV RBC AUTO: 90.1 FL — SIGNIFICANT CHANGE UP (ref 80–100)
PLATELET # BLD AUTO: 92 K/UL — LOW (ref 150–400)
PROTHROM AB SERPL-ACNC: 30.2 SEC — HIGH (ref 9.8–12.7)
RBC # BLD: 3.82 M/UL — LOW (ref 4.2–5.8)
RBC # FLD: 14.2 % — SIGNIFICANT CHANGE UP (ref 10.3–14.5)
SPECIMEN SOURCE: SIGNIFICANT CHANGE UP
WBC # BLD: 15.8 K/UL — HIGH (ref 3.8–10.5)
WBC # FLD AUTO: 15.8 K/UL — HIGH (ref 3.8–10.5)

## 2017-09-25 RX ORDER — INSULIN GLARGINE 100 [IU]/ML
20 INJECTION, SOLUTION SUBCUTANEOUS
Qty: 0 | Refills: 0 | Status: DISCONTINUED | OUTPATIENT
Start: 2017-09-25 | End: 2017-09-26

## 2017-09-25 RX ORDER — WARFARIN SODIUM 2.5 MG/1
5 TABLET ORAL DAILY
Qty: 0 | Refills: 0 | Status: DISCONTINUED | OUTPATIENT
Start: 2017-09-25 | End: 2017-09-26

## 2017-09-25 RX ADMIN — GABAPENTIN 300 MILLIGRAM(S): 400 CAPSULE ORAL at 13:20

## 2017-09-25 RX ADMIN — Medication 100 MILLIGRAM(S): at 05:59

## 2017-09-25 RX ADMIN — Medication 1 TABLET(S): at 06:00

## 2017-09-25 RX ADMIN — GABAPENTIN 300 MILLIGRAM(S): 400 CAPSULE ORAL at 05:59

## 2017-09-25 RX ADMIN — Medication 1 TABLET(S): at 18:17

## 2017-09-25 RX ADMIN — Medication 3 MILLILITER(S): at 20:11

## 2017-09-25 RX ADMIN — MEROPENEM 200 MILLIGRAM(S): 1 INJECTION INTRAVENOUS at 06:49

## 2017-09-25 RX ADMIN — Medication 3 MILLILITER(S): at 01:33

## 2017-09-25 RX ADMIN — SIMVASTATIN 10 MILLIGRAM(S): 20 TABLET, FILM COATED ORAL at 21:48

## 2017-09-25 RX ADMIN — WARFARIN SODIUM 5 MILLIGRAM(S): 2.5 TABLET ORAL at 21:48

## 2017-09-25 RX ADMIN — Medication 25 MILLIGRAM(S): at 06:00

## 2017-09-25 RX ADMIN — INSULIN GLARGINE 20 UNIT(S): 100 INJECTION, SOLUTION SUBCUTANEOUS at 21:51

## 2017-09-25 RX ADMIN — Medication 100 MILLIGRAM(S): at 21:48

## 2017-09-25 RX ADMIN — Medication 1200 MILLIGRAM(S): at 06:51

## 2017-09-25 RX ADMIN — Medication 1200 MILLIGRAM(S): at 18:17

## 2017-09-25 RX ADMIN — Medication 1 APPLICATION(S): at 18:17

## 2017-09-25 RX ADMIN — MEROPENEM 200 MILLIGRAM(S): 1 INJECTION INTRAVENOUS at 21:50

## 2017-09-25 RX ADMIN — Medication 150 MILLIGRAM(S): at 18:18

## 2017-09-25 RX ADMIN — Medication 25 MILLIGRAM(S): at 18:17

## 2017-09-25 RX ADMIN — TENOFOVIR DISOPROXIL FUMARATE 300 MILLIGRAM(S): 300 TABLET, FILM COATED ORAL at 12:14

## 2017-09-25 RX ADMIN — Medication 150 MILLIGRAM(S): at 05:55

## 2017-09-25 RX ADMIN — GABAPENTIN 300 MILLIGRAM(S): 400 CAPSULE ORAL at 21:48

## 2017-09-25 RX ADMIN — MEROPENEM 200 MILLIGRAM(S): 1 INJECTION INTRAVENOUS at 13:20

## 2017-09-25 RX ADMIN — Medication 3 MILLILITER(S): at 14:01

## 2017-09-25 RX ADMIN — ENOXAPARIN SODIUM 90 MILLIGRAM(S): 100 INJECTION SUBCUTANEOUS at 05:58

## 2017-09-25 RX ADMIN — Medication 100 MILLIGRAM(S): at 13:20

## 2017-09-25 NOTE — PROGRESS NOTE ADULT - SUBJECTIVE AND OBJECTIVE BOX
HOSPITALIST PROGRESS NOTE:  SUBJECTIVE:  PCP: PCP: Za Orozco  H/O: Dr. 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 Cough is productive in nature with yellowish/whitish sputum. Associated symptoms include SOB and Low-grade Fevers (~100s). Denies sick contacts.  In the ED, patient was empirically treated with IV Vanc, Zosyn, and 2.5L IV NS. RVP positive for Entero/Rhinovirus. (12 Sep 2017 16:42)  9/14: still complains of intermittent wheezing and cob.cough  9/15: dyspnea slowly improving although wbc rising quickly on steroids.   9/18: leukocytosis worsening although patient endorses some improvement.   9/20: fever spike 102, no change in resp status  9/21: afebrile, no complaints, leukocytosis regressing  9/22: low grade temp 100 ,tachycardic    9/23: Above reviewed. breathing much improved after bronchoscopy.  9/24: No complaints. breathing better  9/25: this morning low blood sugars of 59. Denies any dyspnea, cough or CP; Translating services used ID#[id number]    Allergies:  Privigen (Other (Severe))    REVIEW OF SYSTEMS:  See HPI. All other review of systems is negative unless indicated above.     OBJECTIVE  Physical Exam:  Vital Signs Last 24 Hrs  T(C): 37.3 (25 Sep 2017 12:40), Max: 37.3 (25 Sep 2017 12:40)  T(F): 99.1 (25 Sep 2017 12:40), Max: 99.1 (25 Sep 2017 12:40)  HR: 87 (25 Sep 2017 12:40) (83 - 100)  BP: 106/55 (25 Sep 2017 12:40) (106/55 - 111/64)  BP(mean): --  RR: 18 (25 Sep 2017 12:40) (18 - 19)  SpO2: 96% (25 Sep 2017 12:40) (96% - 100%)    General Appearance: cooperative, no acute distress,   HEENT: PERRL, conjunctiva clear, EOM's intact, non injected pharynx,  Neck: Supple, , mild adenopathy,   Lungs: adventitious lung sounds at bases at bases  expiratory phase, few scattered rhonchi  Heart: Regular rate and rhythm, S1, S2 normal, no murmur, rub or gallop  Abdomen: Soft, non-tender, bowel sounds active , no hepatosplenomegaly  Extremities: no cyanosis or edema, no joint swelling  Skin: Skin color, texture normal, no rashes   Neurologic: Alert and oriented X3 , cranial nerves intact, sensory and motor normal,  Breast: Deferred  Rectal: Deferred    MEDICATIONS  (STANDING):      LABS: All Labs Reviewed:                        12.0   16.6  )-----------( 81       ( 23 Sep 2017 06:47 )             35.6     09-23    139  |  104  |  18  ----------------------------<  103<H>  3.7   |  27  |  1.15    Ca    7.6<L>      23 Sep 2017 06:47      PT/INR - ( 23 Sep 2017 06:47 )   PT: 20.8 sec;   INR: 1.90 ratio         RADIOLOGY/EKG:    < from: Xray Chest 1 View AP/PA. (09.19.17 @ 23:39) >    FINDINGS/  IMPRESSION:          Tiny bilateral pleural effusions are present. Bibasilar atelectasis is   present. Cardiomediastinal silhouette is intact.      < end of copied text >    < from: CT Chest No Cont (09.16.17 @ 08:20) >    IMPRESSION:     Improving right lower right middle lobe pneumonia. There is new   nonspecific groundglass opacity in the left upper lobe. Increasing   bibasilar linear and platelike atelectasis.    Multiple enlarged lymph nodes in the chest and upper abdomen consistent   with lymphoma.    < end of copied text >

## 2017-09-25 NOTE — PROGRESS NOTE ADULT - ASSESSMENT
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 Pneumonia in this patient will treat as health care associated pneumonia given hospitalized greater than 48 hours  - follow up cultures   - oxygen and nebs as needed   - iv hydration and supportive care   - patient at risk for gram negative rods and other resistant bacteria   - day #6 meropenem and vancomycin  - expect to be able to discharge home in am on no antibiotics as all rx was iv  2. 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

## 2017-09-25 NOTE — PROGRESS NOTE ADULT - ASSESSMENT
* Viral and Bacterial PNA (HCAP) with COPD Exacerbation  - RVP +for Entero and Rhino Virus  - leukocytosis regressing-> modify abx to vanco/meropenem #6 for broader coverage, suspected GNR/anarobes/resistant bacteria; Discussed with ID and d/c patient home on no ABX pooja  - Multifocal pna, immunocompromised patient with hx of CLL/Rituxan  - S/p steroids - hold for now , c/w nebs  - S/P Bronchoscopy (9/23) - Bronchomalacia, no endobronchial masses, thick secretions, Large mucus plugs removed  - Cultures were negative. Cell count unrevealing. Awaiting Cytology and Flow Cytometry.   - Pulm consult appreciated - outpatient eval for  underlying ERNST and will likely need nocturnal BiPAP Will need airway clearance device as an outpatient  - Pulse ox on ambulation prior to d/c    *mild thrombocytopenia probable consumption  -continue to monitor    *h/o chr CLL/SLL  outpt treatment    * AF stable, in NSR; Hx of DVT/PE  - on low dose amio and Metoprolol  - INR therapeutic, 5mg coumadin; S/P lovenox bridge    *DM  - Episode of Hypogluycemia (9/25)  - Decrease Lantus 20U SQ Q12H- premeal coverage/iss    *HTN  -continue Home meds    *Hepatitis B Infection  cont. Tenofovir 300mg po daily.    *DVTppx  -Coumadin and Lovenox

## 2017-09-25 NOTE — PROGRESS NOTE ADULT - SUBJECTIVE AND OBJECTIVE BOX
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), hx of hypogammaglobulinemia s/p failed IVIG infusions 2/2 to immediate type hypersensitivity reactions, HFpEF (LVEF 55-60% in 03/2016), COPD, T2DM, HTN, Hepatitis B, Hx PE/DVT, and Recurrent PNA, now admitted on 9/12 for evaluation of productive cough, mild shortness of breath and fever; initially thought to be viral infection, however, now developing leukocytosis, still short of breath even while wearing oxygen, imaging consistent with pneumonia  Today 9/17 patient doing better, breathing off oxygen  Today 9/18 patient doing better, asking to go home  Today 9/20 patient states he feels better, but had fever to 102 late evening yesterday  Today 9/21 patient still with productive cough, had low grade fevers overnite  Today 9/22 patient has cough and fever, still with elevate wbc  Today 9/25 patient feels better, cough improved, afebrile greater than 24 hours    MEDICATIONS  (STANDING):  gabapentin 300 milliGRAM(s) Oral three times a day  simvastatin 10 milliGRAM(s) Oral at bedtime  tenofovir 300 milliGRAM(s) Oral daily  metoprolol 25 milliGRAM(s) Oral two times a day  docusate sodium 100 milliGRAM(s) Oral three times a day  dextrose 5%. 1000 milliLiter(s) (50 mL/Hr) IV Continuous <Continuous>  ALBUTerol/ipratropium for Nebulization 3 milliLiter(s) Nebulizer every 6 hours  dextrose 50% Injectable 12.5 Gram(s) IV Push once  dextrose 50% Injectable 25 Gram(s) IV Push once  dextrose 50% Injectable 25 Gram(s) IV Push once  insulin lispro (HumaLOG) corrective regimen sliding scale   SubCutaneous three times a day before meals  insulin lispro (HumaLOG) corrective regimen sliding scale   SubCutaneous at bedtime  warfarin 5 milliGRAM(s) Oral daily  vancomycin  IVPB 750 milliGRAM(s) IV Intermittent every 12 hours  meropenem IVPB 500 milliGRAM(s) IV Intermittent every 8 hours  lactobacillus acidophilus 1 Tablet(s) Oral every 12 hours  guaiFENesin ER 1200 milliGRAM(s) Oral every 12 hours  clotrimazole 1% Cream 1 Application(s) Topical two times a day  insulin glargine Injectable (LANTUS) 20 Unit(s) SubCutaneous two times a day    MEDICATIONS  (PRN):  acetaminophen   Tablet 650 milliGRAM(s) Oral every 6 hours PRN For Temp greater than 38 C (100.4 F)  dextrose Gel 1 Dose(s) Oral once PRN Blood Glucose LESS THAN 70 milliGRAM(s)/deciliter  glucagon  Injectable 1 milliGRAM(s) IntraMuscular once PRN Glucose LESS THAN 70 milligrams/deciliter      Vital Signs Last 24 Hrs  T(C): 37.3 (25 Sep 2017 12:40), Max: 37.3 (25 Sep 2017 12:40)  T(F): 99.1 (25 Sep 2017 12:40), Max: 99.1 (25 Sep 2017 12:40)  HR: 87 (25 Sep 2017 12:40) (83 - 100)  BP: 106/55 (25 Sep 2017 12:40) (106/55 - 111/64)  BP(mean): --  RR: 18 (25 Sep 2017 12:40) (18 - 19)  SpO2: 96% (25 Sep 2017 12:40) (96% - 100%)    Physical Exam:        Constitutional: frail looking  HEENT: NC/AT, EOMI, PERRLA  Neck: supple  Respiratory: bilateral rhonchi, much better aeration  Cardiovascular: S1S2 regular, no murmurs  Abdomen: soft, not tender, not distended, positive BS  Genitourinary: deferred  Rectal: deferred  Musculoskeletal: no muscle tenderness, no joint swelling or tenderness  Neurological: AxOx3, moving all extremities, no focal deficits  Skin: no rashes      Culture - Bronchial (09.23.17 @ 08:30)    Gram Stain:   Rare Squamous epithelial cells per low power field  Moderate polymorphonuclear leukocytes per low power field  No organisms seen    Specimen Source: .Broncial Bronchoalveolar lavage / CUP    Culture Results:   Normal Respiratory Marybel present          Labs:                        11.4   15.8  )-----------( 92       ( 25 Sep 2017 07:49 )             34.4                  Cultures:     Labs:             Labs:                        12.7   24.5  )-----------( 102      ( 22 Sep 2017 04:53 )             36.0     09-21    139  |  106  |  20  ----------------------------<  104<H>  3.9   |  26  |  1.05    Ca    7.7<L>      21 Sep 2017 07:22         Vancomycin Level, Trough: 6.9 ug/mL (09-22 @ 04:53)      Cultures:                      12.3   23.9  )-----------( 112      ( 21 Sep 2017 07:22 )             36.8     09-20    139  |  107  |  30<H>  ----------------------------<  103<H>  3.9   |  26  |  1.08    Ca    8.2<L>      20 Sep 2017 06:20             Cultures:         Labs:                        12.9   30.3  )-----------( 125      ( 20 Sep 2017 06:20 )             39.9     09-20    139  |  107  |  30<H>  ----------------------------<  103<H>  3.9   |  26  |  1.08    Ca    8.2<L>      20 Sep 2017 06:20             Cultures:                             12.2   30.7  )-----------( 147      ( 18 Sep 2017 11:26 )             37.9     09-18    141  |  106  |  40<H>  ----------------------------<  87  3.6   |  27  |  1.18    Ca    8.0<L>      18 Sep 2017 06:06             Cultures:                             11.8   24.8  )-----------( 133      ( 16 Sep 2017 05:55 )             35.2     09-16    139  |  105  |  35<H>  ----------------------------<  268<H>  4.6   |  28  |  1.22    Ca    8.1<L>      16 Sep 2017 05:55       Labs:                        12.0   25.6  )-----------( 131      ( 17 Sep 2017 05:57 )             34.5     09-16    139  |  105  |  35<H>  ----------------------------<  268<H>  4.6   |  28  |  1.22    Ca    8.1<L>      16 Sep 2017 05:55             Cultures:         Radiology:< from: CT Chest No Cont (09.16.17 @ 08:20) >    EXAM:  CT CHEST                            PROCEDURE DATE:  09/16/2017          INTERPRETATION:  CT CHEST    HISTORY:  Left lower lobe atelectasis, chest pain, shortness of breath        TECHNIQUE: Noncontrast CT of the chest was performed. [place]   sagittal images were reconstructed. This study was performed using   automatic exposure control (radiation dose reduction software) to obtain   a diagnostic image quality scan with patient dose as low as reasonably   achievable.    COMPARISON: Chest x-ray 22 hours prior, chest CT 6/9/2017    FINDINGS:    LUNGS, AIRWAYS: The central airways are patent. Improving right middle   and right lower lobe pneumonia, newly resolved. Bibasilar areas of linear   platelike atelectasis have increased. There is new groundglass airspace   opacity at the left upper lobe.    PLEURA: No pleural abnormality.    HEART AND VESSELS: Normal heart size. No pericardial effusion. Coronary   artery calcifications are present. Normal caliber thoracic aorta.    MEDIASTINUM AND JEFFREY: Stable mediastinal and bilateral axillary   adenopathy.    UPPER ABDOMEN: Left adrenal adenoma again noted. Retroperitoneal and   mesenteric adenopathy again partially visualized. Nodular surface contour   of the liver suggestive of cirrhotic change.    BONES AND SOFT TISSUES: Chronic bilateral rib deformities again noted.    IMPRESSION:     Improving right lower right middle lobe pneumonia. There is new   nonspecific groundglass opacity in the left upper lobe. Increasing   bibasilar linear and platelike atelectasis.    Multiple enlarged lymph nodes in the chest and upper abdomen consistent   with lymphoma.    < end of copied text >      Advanced directive addressed: full resuscitation

## 2017-09-25 NOTE — PROGRESS NOTE ADULT - ASSESSMENT
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 Cough is productive in nature with yellowish/whitish sputum. Associated symptoms include SOB and Low-grade Fevers (~100s). Denies sick contacts.  Hx recurrent pneumonia and Bronchitis  CLL with Immunoglobulin deficiency  Acute URI now with bronchospasm  Non toxic  mild Bronchospasm  COPD  Hx mucus plugging    9/14  Patient improving today with steroids, bronchodilators/nebulizers  Viral swab + RVP thus far, no bacterial positivity   Recommend to continues mobilization as tolerated and patient to receive IV IG rx as outpt    9/15  worsened leukocytosis on steroids  clinically improved  re eval with cxr today  cont with nebs  fu cultures    9/16  Patient continues to feel clinically better but leukocytosis persistent  Discussed with primary team who have started Abx for the persistent opacity.  Seems to be improving but given his immunocompromised state, agree with abx. If this does not improve, may need bronchoscopy with BAL to assess airways/parenchyma further.      9/18  - From a dyspnea standpoint, patient feels good at rest but develops slight discomfort on exertion  - Blood cultures were negative, Rhinovirus +. Source is still TBD.  -Leukocytosis worsening; patient on Cefepime/Doxycycline. Smudge cells still present on smear  - May consider involving hematology    9/19  complete course of antibiotics  stable / improved resp status  will need to resume IV IG therapy as outpt  supplemental o2 on exertion    9/20  Patient was complaining of generalized weakness, Leukocytosis and thrombocytopenia still present. H/O evaluated patient and recommended antibiotics.  Etiology of the fevers is multifactorial, could be undulating from underlying malignancy history but will continue to watch the pattern. Spoke with primary team; will start Vancomycin for broader coverage. If fevers still continue bronchoscopy is an option.     9/22  Patient feels mildly weak, has some dyspnea on exertion.  Leukocytosis is still present today, CT chest shows multifocal patchy opacities with underlying bronchiectasis.  I have discussed a bronchoscopy with the patient and his family and they are in agreement  Consent has been obtained  Please keep patient NPO past midnight    9/24  WBC improving, platelets still reducing  Patient is s/p bronchoscopy on 9/23. Large mucus plugs removed. Cultures were negative. Cell count unrevealing, rather sanguinous. Awaiting Cytology and Flow Cytometry.   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   Will need airway clearance device as an outpatient along with a sleep study     9/25  Awaiting new CBC for 9/25  Patient is s/p bronchoscopy on 9/23. Large mucus plugs removed. Cultures were negative. Cell count unrevealing, rather sanguinous. Awaiting Cytology and Flow Cytometry.   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   Will need airway clearance device as an outpatient along with a sleep study     Thank you for the consult  Will see Dr. Lakhani as an outpatient

## 2017-09-25 NOTE — PROGRESS NOTE ADULT - SUBJECTIVE AND OBJECTIVE BOX
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 Cough is productive in nature with yellowish/whitish sputum. Associated symptoms include SOB and Low-grade Fevers (~100s). Denies sick contacts.    In the ED, patient was empirically treated with IV Vanc, Zosyn, and 2.5L IV NS. RVP positive for Entero/Rhinovirus. (12 Sep 2017 16:42)  pt is well known to me from multiple prior admissions   at bedside  no hemoptysis  no cp      Patient states he is feeling better today  Discussed the +Rhinovirus in nasal swab and let them know that the current treatment is supportive  States that he has been playing with his grandchildren and one of them had similar symptoms    9/15  sitting OOB in chair  no difficulty breathing  decreased cough and congestion      Persistent leukocytosis but patient endorses feeling better      Patient denies any dyspnea today  No acute events occurred overnight      feels better  decreased cough  he wants to go home  no cp  non toxic      Overnight, patient was noted to spike fevers  This morning, he states he feels generalized weakness  Denies chest pain, pleuritic chest pain, abdominal pain, no urinary symptoms, no headaches      Patient continues to have leukocytosis  States he feels weak but not more dyspneic  Overall, denies any change in his symptoms      Patient post bronchoscopy on   No acute events overnight      Patient walked yesterday, denied any new complaints  No acute events occurred overnight    PAST MEDICAL & SURGICAL HISTORY:  Hypogammaglobulinemia: received IVIG  Hepatitis B virus infection, unspecified chronicity: retrovirals  Chronic diastolic congestive heart failure  Essential hypertension  DM type 2 (diabetes mellitus, type 2)  B-cell lymphoma, unspecified B-cell lymphoma type, unspecified body region: CLL stage 4/SLL; met NHL  Atrial fibrillation, unspecified type  COPD (chronic obstructive pulmonary disease)  History of esophageal dilatation  S/P cholecystectomy  H/O prostatectomy      PREVIOUS DIAGNOSTIC TESTING:      MEDICATIONS  (STANDING):  MEDICATIONS  (STANDING):  gabapentin 300 milliGRAM(s) Oral three times a day  simvastatin 10 milliGRAM(s) Oral at bedtime  tenofovir 300 milliGRAM(s) Oral daily  metoprolol 25 milliGRAM(s) Oral two times a day  docusate sodium 100 milliGRAM(s) Oral three times a day  dextrose 5%. 1000 milliLiter(s) (50 mL/Hr) IV Continuous <Continuous>  ALBUTerol/ipratropium for Nebulization 3 milliLiter(s) Nebulizer every 6 hours  dextrose 50% Injectable 12.5 Gram(s) IV Push once  dextrose 50% Injectable 25 Gram(s) IV Push once  dextrose 50% Injectable 25 Gram(s) IV Push once  insulin lispro (HumaLOG) corrective regimen sliding scale   SubCutaneous three times a day before meals  insulin lispro (HumaLOG) corrective regimen sliding scale   SubCutaneous at bedtime  enoxaparin Injectable 90 milliGRAM(s) SubCutaneous every 12 hours  warfarin 5 milliGRAM(s) Oral daily  vancomycin  IVPB 750 milliGRAM(s) IV Intermittent every 12 hours  meropenem IVPB 500 milliGRAM(s) IV Intermittent every 8 hours  insulin glargine Injectable (LANTUS) 35 Unit(s) SubCutaneous two times a day  lactobacillus acidophilus 1 Tablet(s) Oral every 12 hours  guaiFENesin ER 1200 milliGRAM(s) Oral every 12 hours  clotrimazole 1% Cream 1 Application(s) Topical two times a day    MEDICATIONS  (PRN):  acetaminophen   Tablet 650 milliGRAM(s) Oral every 6 hours PRN For Temp greater than 38 C (100.4 F)  dextrose Gel 1 Dose(s) Oral once PRN Blood Glucose LESS THAN 70 milliGRAM(s)/deciliter  glucagon  Injectable 1 milliGRAM(s) IntraMuscular once PRN Glucose LESS THAN 70 milligrams/deciliter      FAMILY HISTORY:  Family history of liver cancer (Father): father  85 liver CA  Family history of coronary arteriosclerosis (Mother): Mom  of MI age 68      SOCIAL HISTORY:   Denies any occupational exposures    REVIEW OF SYSTEM:  shortness of breath improving, otherwise 12 point ROS negative    Vital Signs Last 24 Hrs  T(C): 36.7 (15 Sep 2017 05:14), Max: 36.7 (15 Sep 2017 05:14)  T(F): 98 (15 Sep 2017 05:14), Max: 98 (15 Sep 2017 05:14)  HR: 92 (15 Sep 2017 07:40) (80 - 92)  BP: 104/60 (15 Sep 2017 05:14) (104/60 - 105/46)  BP(mean): --  RR: 17 (14 Sep 2017 11:19) (17 - 17)  SpO2: 97% (15 Sep 2017 05:14) (95% - 97%)    I&O's Summary    PHYSICAL EXAM  General Appearance: cooperative, no acute distress,   HEENT: PERRL, conjunctiva clear, EOM's intact, non injected pharynx,  Neck: Supple, , mild adenopathy,   Lungs: Coarse bilaterally,  Heart: Regular rate and rhythm, S1, S2 normal, no murmur, rub or gallop  Abdomen: Soft, non-tender, bowel sounds active , no hepatosplenomegaly  Extremities: no cyanosis or edema, no joint swelling  Skin: Skin color, texture normal, no rashes   Neurologic: Alert and oriented X3 , cranial nerves intact, sensory and motor normal,    ECG:    LABS:                                              11.2   14.3  )-----------( 87       ( 24 Sep 2017 07:03 )             33.7          PTT - ( 12 Sep 2017 12:40 )  PTT:32.8 sec  Urinalysis Basic - ( 12 Sep 2017 15:24 )    Color: Yellow / Appearance: Clear / S.005 / pH: x  Gluc: x / Ketone: Negative  / Bili: Negative / Urobili: 1 mg/dL   Blood: x / Protein: 15 mg/dL / Nitrite: Negative   Leuk Esterase: Trace / RBC: 0-2 /HPF / WBC 3-5   Sq Epi: x / Non Sq Epi: Moderate / Bacteria: Few            RADIOLOGY & ADDITIONAL STUDIES:  CT Chest reviewed  IMPRESSION:     Improving right lower right middle lobe pneumonia. There is new   nonspecific groundglass opacity in the left upper lobe. Increasing   bibasilar linear and platelike atelectasis.    Multiple enlarged lymph nodes in the chest and upper abdomen consistent   with lymphoma.

## 2017-09-26 ENCOUNTER — TRANSCRIPTION ENCOUNTER (OUTPATIENT)
Age: 77
End: 2017-09-26

## 2017-09-26 VITALS
HEART RATE: 89 BPM | OXYGEN SATURATION: 91 % | RESPIRATION RATE: 18 BRPM | TEMPERATURE: 99 F | DIASTOLIC BLOOD PRESSURE: 58 MMHG | SYSTOLIC BLOOD PRESSURE: 108 MMHG

## 2017-09-26 LAB
HCT VFR BLD CALC: 33.4 % — LOW (ref 39–50)
HGB BLD-MCNC: 11.1 G/DL — LOW (ref 13–17)
INR BLD: 2.82 RATIO — HIGH (ref 0.88–1.16)
MCHC RBC-ENTMCNC: 29.6 PG — SIGNIFICANT CHANGE UP (ref 27–34)
MCHC RBC-ENTMCNC: 33.2 GM/DL — SIGNIFICANT CHANGE UP (ref 32–36)
MCV RBC AUTO: 89.3 FL — SIGNIFICANT CHANGE UP (ref 80–100)
PLATELET # BLD AUTO: 95 K/UL — LOW (ref 150–400)
PROTHROM AB SERPL-ACNC: 31.1 SEC — HIGH (ref 9.8–12.7)
RBC # BLD: 3.74 M/UL — LOW (ref 4.2–5.8)
RBC # FLD: 14.4 % — SIGNIFICANT CHANGE UP (ref 10.3–14.5)
WBC # BLD: 11 K/UL — HIGH (ref 3.8–10.5)
WBC # FLD AUTO: 11 K/UL — HIGH (ref 3.8–10.5)

## 2017-09-26 RX ORDER — IPRATROPIUM/ALBUTEROL SULFATE 18-103MCG
3 AEROSOL WITH ADAPTER (GRAM) INHALATION
Qty: 1 | Refills: 0 | OUTPATIENT
Start: 2017-09-26 | End: 2017-10-26

## 2017-09-26 RX ORDER — INFLUENZA VIRUS VACCINE 15; 15; 15; 15 UG/.5ML; UG/.5ML; UG/.5ML; UG/.5ML
0.5 SUSPENSION INTRAMUSCULAR ONCE
Qty: 0 | Refills: 0 | Status: COMPLETED | OUTPATIENT
Start: 2017-09-26 | End: 2017-09-26

## 2017-09-26 RX ORDER — INSULIN GLARGINE 100 [IU]/ML
35 INJECTION, SOLUTION SUBCUTANEOUS
Qty: 0 | Refills: 0 | COMMUNITY

## 2017-09-26 RX ORDER — INSULIN GLARGINE 100 [IU]/ML
20 INJECTION, SOLUTION SUBCUTANEOUS
Qty: 0 | Refills: 0 | COMMUNITY
Start: 2017-09-26

## 2017-09-26 RX ADMIN — MEROPENEM 200 MILLIGRAM(S): 1 INJECTION INTRAVENOUS at 05:59

## 2017-09-26 RX ADMIN — GABAPENTIN 300 MILLIGRAM(S): 400 CAPSULE ORAL at 05:59

## 2017-09-26 RX ADMIN — Medication 1 TABLET(S): at 05:59

## 2017-09-26 RX ADMIN — Medication 3 MILLILITER(S): at 01:35

## 2017-09-26 RX ADMIN — Medication 1 APPLICATION(S): at 06:06

## 2017-09-26 RX ADMIN — INSULIN GLARGINE 20 UNIT(S): 100 INJECTION, SOLUTION SUBCUTANEOUS at 10:21

## 2017-09-26 RX ADMIN — Medication 1200 MILLIGRAM(S): at 05:59

## 2017-09-26 RX ADMIN — Medication 100 MILLIGRAM(S): at 05:59

## 2017-09-26 RX ADMIN — Medication 3 MILLILITER(S): at 07:55

## 2017-09-26 RX ADMIN — TENOFOVIR DISOPROXIL FUMARATE 300 MILLIGRAM(S): 300 TABLET, FILM COATED ORAL at 11:03

## 2017-09-26 RX ADMIN — Medication 150 MILLIGRAM(S): at 05:59

## 2017-09-26 RX ADMIN — Medication 25 MILLIGRAM(S): at 05:59

## 2017-09-26 RX ADMIN — INFLUENZA VIRUS VACCINE 0.5 MILLILITER(S): 15; 15; 15; 15 SUSPENSION INTRAMUSCULAR at 11:59

## 2017-09-26 NOTE — DISCHARGE NOTE ADULT - CARE PLAN
Principal Discharge DX:	Pneumonia due to infectious organism, unspecified laterality, unspecified part of lung  Goal:	Viral and Bacterial PNA (HCAP) with COPD Exacerbation  Instructions for follow-up, activity and diet:	- RVP +for Entero and Rhino Virus S/P S/P Bronchoscopy (9/23) - Bronchomalacia, no endobronchial masses, thick secretions, Large mucus plugs removed; Discharge patient home on no antibiotics as patient already received a weeks worth of ABX, at increased risk of mucus plugging . will need Aerobika, mucus clearance device and sleep study as outpt; FU with Dr Waldrop in 3-4 days; FU Cytology and Flow Cytometry as outpatient.  continue Nebulizer therapy and mucinex at home.  Goal:	*DM  Instructions for follow-up, activity and diet:	- Episode of Hypoglycemia (9/25)- Decrease Lantus 20U SQ Q12H

## 2017-09-26 NOTE — PROGRESS NOTE ADULT - NSHPATTENDINGPLANDISCUSS_GEN_ALL_CORE
Patient, Nurse
pt and wife
pt and wife, ID/Pulm
Patient, Nurse

## 2017-09-26 NOTE — PROGRESS NOTE ADULT - ASSESSMENT
* Viral and Bacterial PNA (HCAP) with COPD Exacerbation  - RVP +for Entero and Rhino Virus  - leukocytosis regressing-> modify abx to vanco/meropenem #6 for broader coverage, suspected GNR/anarobes/resistant bacteria; Discussed with ID and d/c patient home on no ABX pooja  - Multifocal pna, immunocompromised patient with hx of CLL/Rituxan  - S/p steroids - hold for now , c/w nebs  - S/P Bronchoscopy (9/23) - Bronchomalacia, no endobronchial masses, thick secretions, Large mucus plugs removed  - Cultures were negative. Cell count unrevealing. BAL Negative   - Pulm consult appreciated - outpatient eval for  underlying ERNST and will likely need nocturnal BiPAP Will need airway clearance device as an outpatient  - Pulse ox on ambulation - Pulse ox on RA 93%, ON ambulation RA 90%    *mild thrombocytopenia probable consumption  - continue to monitor    *h/o chr CLL/SLL  - outpt treatment    * AF stable, in NSR; Hx of DVT/PE  - on low dose amio and Metoprolol  - INR therapeutic, 5mg coumadin; S/P lovenox bridge    *DM  - Episode of Hypoglycemia (9/25)  - Decrease Lantus 20U SQ Q12H- premeal coverage/iss    *HTN  -continue Home meds    *Hepatitis B Infection  cont. Tenofovir 300mg po daily.    *DVTppx  -Coumadin    *Discharge Instructions/Follow Up/Pending Labs:  -Discharge patient home on no antibiotics as patient already received a weeks worth of ABX, at increased risk of mucus plugging . will need Aerobika, mucus clearance device and sleep study as outpt; FU with Dr Waldrop in 3-4 days; FU Cytology and Flow Cytometry as outpatient.  continue Nebulizer therapy and mucinex at home.    -decrease lantus 20U Q12H as patient had episode of hypoglycemia during hospital stay; FU with PCP to check sugars     Total time: 45 minutes   Discussed with Pulmonary  Message left for Dr Orozco

## 2017-09-26 NOTE — DISCHARGE NOTE ADULT - MEDICATION SUMMARY - MEDICATIONS TO CHANGE
I will SWITCH the dose or number of times a day I take the medications listed below when I get home from the hospital:    Lantus  -- 30 unit(s) subcutaneous 2 times a day

## 2017-09-26 NOTE — PROGRESS NOTE ADULT - SUBJECTIVE AND OBJECTIVE BOX
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 Cough is productive in nature with yellowish/whitish sputum. Associated symptoms include SOB and Low-grade Fevers (~100s). Denies sick contacts.    In the ED, patient was empirically treated with IV Vanc, Zosyn, and 2.5L IV NS. RVP positive for Entero/Rhinovirus. (12 Sep 2017 16:42)  pt is well known to me from multiple prior admissions   at bedside  no hemoptysis  no cp      Patient states he is feeling better today  Discussed the +Rhinovirus in nasal swab and let them know that the current treatment is supportive  States that he has been playing with his grandchildren and one of them had similar symptoms    9/15  sitting OOB in chair  no difficulty breathing  decreased cough and congestion      Persistent leukocytosis but patient endorses feeling better      Patient denies any dyspnea today  No acute events occurred overnight      feels better  decreased cough  he wants to go home  no cp  non toxic      Overnight, patient was noted to spike fevers  This morning, he states he feels generalized weakness  Denies chest pain, pleuritic chest pain, abdominal pain, no urinary symptoms, no headaches      Patient continues to have leukocytosis  States he feels weak but not more dyspneic  Overall, denies any change in his symptoms      Patient post bronchoscopy on   No acute events overnight      Patient walked yesterday, denied any new complaints  No acute events occurred overnight      events noted and reveiwed  feels better after bronchoscopy  he wants to go home   smiling and in no distress  data rev with dr krishna  he remains at increased risk of mucus plugging  BAL, negative  cx  PAST MEDICAL & SURGICAL HISTORY:  Hypogammaglobulinemia: received IVIG  Hepatitis B virus infection, unspecified chronicity: retrovirals  Chronic diastolic congestive heart failure  Essential hypertension  DM type 2 (diabetes mellitus, type 2)  B-cell lymphoma, unspecified B-cell lymphoma type, unspecified body region: CLL stage 4/SLL; met NHL  Atrial fibrillation, unspecified type  COPD (chronic obstructive pulmonary disease)  History of esophageal dilatation  S/P cholecystectomy  H/O prostatectomy      PREVIOUS DIAGNOSTIC TESTING:      MEDICATIONS  (STANDING):  MEDICATIONS  (STANDING):  gabapentin 300 milliGRAM(s) Oral three times a day  simvastatin 10 milliGRAM(s) Oral at bedtime  tenofovir 300 milliGRAM(s) Oral daily  metoprolol 25 milliGRAM(s) Oral two times a day  docusate sodium 100 milliGRAM(s) Oral three times a day  dextrose 5%. 1000 milliLiter(s) (50 mL/Hr) IV Continuous <Continuous>  ALBUTerol/ipratropium for Nebulization 3 milliLiter(s) Nebulizer every 6 hours  dextrose 50% Injectable 12.5 Gram(s) IV Push once  dextrose 50% Injectable 25 Gram(s) IV Push once  dextrose 50% Injectable 25 Gram(s) IV Push once  insulin lispro (HumaLOG) corrective regimen sliding scale   SubCutaneous three times a day before meals  insulin lispro (HumaLOG) corrective regimen sliding scale   SubCutaneous at bedtime  enoxaparin Injectable 90 milliGRAM(s) SubCutaneous every 12 hours  warfarin 5 milliGRAM(s) Oral daily  vancomycin  IVPB 750 milliGRAM(s) IV Intermittent every 12 hours  meropenem IVPB 500 milliGRAM(s) IV Intermittent every 8 hours  insulin glargine Injectable (LANTUS) 35 Unit(s) SubCutaneous two times a day  lactobacillus acidophilus 1 Tablet(s) Oral every 12 hours  guaiFENesin ER 1200 milliGRAM(s) Oral every 12 hours  clotrimazole 1% Cream 1 Application(s) Topical two times a day    MEDICATIONS  (PRN):  acetaminophen   Tablet 650 milliGRAM(s) Oral every 6 hours PRN For Temp greater than 38 C (100.4 F)  dextrose Gel 1 Dose(s) Oral once PRN Blood Glucose LESS THAN 70 milliGRAM(s)/deciliter  glucagon  Injectable 1 milliGRAM(s) IntraMuscular once PRN Glucose LESS THAN 70 milligrams/deciliter      FAMILY HISTORY:  Family history of liver cancer (Father): father  85 liver CA  Family history of coronary arteriosclerosis (Mother): Mom  of MI age 68      SOCIAL HISTORY:   Denies any occupational exposures    REVIEW OF SYSTEM:  shortness of breath improving, otherwise 12 point ROS negative    Vital Signs Last 24 Hrs  T(C): 36.7 (15 Sep 2017 05:14), Max: 36.7 (15 Sep 2017 05:14)  T(F): 98 (15 Sep 2017 05:14), Max: 98 (15 Sep 2017 05:14)  HR: 92 (15 Sep 2017 07:40) (80 - 92)  BP: 104/60 (15 Sep 2017 05:14) (104/60 - 105/46)  BP(mean): --  RR: 17 (14 Sep 2017 11:19) (17 - 17)  SpO2: 97% (15 Sep 2017 05:14) (95% - 97%)    I&O's Summary    PHYSICAL EXAM  General Appearance: cooperative, no acute distress,   HEENT: PERRL, conjunctiva clear, EOM's intact, non injected pharynx,  Neck: Supple, , mild adenopathy,   Lungs: Coarse bilaterally,  Heart: Regular rate and rhythm, S1, S2 normal, no murmur, rub or gallop  Abdomen: Soft, non-tender, bowel sounds active , no hepatosplenomegaly  Extremities: no cyanosis or edema, no joint swelling  Skin: Skin color, texture normal, no rashes   Neurologic: Alert and oriented X3 , cranial nerves intact, sensory and motor normal,    ECG:    LABS:                                              11.2   14.3  )-----------( 87       ( 24 Sep 2017 07:03 )             33.7          PTT - ( 12 Sep 2017 12:40 )  PTT:32.8 sec  Urinalysis Basic - ( 12 Sep 2017 15:24 )    Color: Yellow / Appearance: Clear / S.005 / pH: x  Gluc: x / Ketone: Negative  / Bili: Negative / Urobili: 1 mg/dL   Blood: x / Protein: 15 mg/dL / Nitrite: Negative   Leuk Esterase: Trace / RBC: 0-2 /HPF / WBC 3-5   Sq Epi: x / Non Sq Epi: Moderate / Bacteria: Few            RADIOLOGY & ADDITIONAL STUDIES:  CT Chest reviewed  IMPRESSION:     Improving right lower right middle lobe pneumonia. There is new   nonspecific groundglass opacity in the left upper lobe. Increasing   bibasilar linear and platelike atelectasis.    Multiple enlarged lymph nodes in the chest and upper abdomen consistent   with lymphoma.

## 2017-09-26 NOTE — DISCHARGE NOTE ADULT - ADDITIONAL INSTRUCTIONS
*Discharge Instructions/Follow Up/Pending Labs:  -Discharge patient home on no antibiotics as patient already received a weeks worth of ABX, at increased risk of mucus plugging . will need Aerobika, mucus clearance device and sleep study as outpt; FU with Dr Waldrop in 3-4 days; FU Cytology and Flow Cytometry as outpatient.  continue Nebulizer therapy and mucinex at home.    -decrease lantus 20U Q12H as patient had episode of hypoglycemia during hospital stay; FU with PCP to check sugars

## 2017-09-26 NOTE — PROGRESS NOTE ADULT - ASSESSMENT
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 Cough is productive in nature with yellowish/whitish sputum. Associated symptoms include SOB and Low-grade Fevers (~100s). Denies sick contacts.  Hx recurrent pneumonia and Bronchitis  CLL with Immunoglobulin deficiency  Acute URI now with bronchospasm  Non toxic  mild Bronchospasm  COPD  Hx mucus plugging    9/14  Patient improving today with steroids, bronchodilators/nebulizers  Viral swab + RVP thus far, no bacterial positivity   Recommend to continues mobilization as tolerated and patient to receive IV IG rx as outpt    9/15  worsened leukocytosis on steroids  clinically improved  re eval with cxr today  cont with nebs  fu cultures    9/16  Patient continues to feel clinically better but leukocytosis persistent  Discussed with primary team who have started Abx for the persistent opacity.  Seems to be improving but given his immunocompromised state, agree with abx. If this does not improve, may need bronchoscopy with BAL to assess airways/parenchyma further.      9/18  - From a dyspnea standpoint, patient feels good at rest but develops slight discomfort on exertion  - Blood cultures were negative, Rhinovirus +. Source is still TBD.  -Leukocytosis worsening; patient on Cefepime/Doxycycline. Smudge cells still present on smear  - May consider involving hematology    9/19  complete course of antibiotics  stable / improved resp status  will need to resume IV IG therapy as outpt  supplemental o2 on exertion    9/20  Patient was complaining of generalized weakness, Leukocytosis and thrombocytopenia still present. H/O evaluated patient and recommended antibiotics.  Etiology of the fevers is multifactorial, could be undulating from underlying malignancy history but will continue to watch the pattern. Spoke with primary team; will start Vancomycin for broader coverage. If fevers still continue bronchoscopy is an option.     9/22  Patient feels mildly weak, has some dyspnea on exertion.  Leukocytosis is still present today, CT chest shows multifocal patchy opacities with underlying bronchiectasis.  I have discussed a bronchoscopy with the patient and his family and they are in agreement  Consent has been obtained  Please keep patient NPO past midnight    9/24  WBC improving, platelets still reducing  Patient is s/p bronchoscopy on 9/23. Large mucus plugs removed. Cultures were negative. Cell count unrevealing, rather sanguinous. Awaiting Cytology and Flow Cytometry.   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   Will need airway clearance device as an outpatient along with a sleep study     9/25  Awaiting new CBC for 9/25  Patient is s/p bronchoscopy on 9/23. Large mucus plugs removed. Cultures were negative. Cell count unrevealing, rather sanguinous. Awaiting Cytology and Flow Cytometry.   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   Will need airway clearance device as an outpatient along with a sleep study     9/26  data rev with Dr Salazar  he remains at increased risk of mucus plugging  BAL, negative  cx  s/p Bronchoscopy  will need Aerobika and mucus clearance device along with cont nebulizer therapy as outpt    Thank you for the consult  Will see Dr. Lakhani as an outpatient

## 2017-09-26 NOTE — DISCHARGE NOTE ADULT - OTHER SIGNIFICANT FINDINGS
LABS: All Labs Reviewed:                        12.0   16.6  )-----------( 81       ( 23 Sep 2017 06:47 )             35.6     09-23    139  |  104  |  18  ----------------------------<  103<H>  3.7   |  27  |  1.15    Ca    7.6<L>      23 Sep 2017 06:47      PT/INR - ( 23 Sep 2017 06:47 )   PT: 20.8 sec;   INR: 1.90 ratio         RADIOLOGY/EKG:    < from: Xray Chest 1 View AP/PA. (09.19.17 @ 23:39) >    FINDINGS/  IMPRESSION:          Tiny bilateral pleural effusions are present. Bibasilar atelectasis is   present. Cardiomediastinal silhouette is intact.      < end of copied text >    < from: CT Chest No Cont (09.16.17 @ 08:20) >    IMPRESSION:     Improving right lower right middle lobe pneumonia. There is new   nonspecific groundglass opacity in the left upper lobe. Increasing   bibasilar linear and platelike atelectasis.    Multiple enlarged lymph nodes in the chest and upper abdomen consistent   with lymphoma.    < end of copied text >

## 2017-09-26 NOTE — DISCHARGE NOTE ADULT - CARE PROVIDER_API CALL
Za Orozco), Medicine  [address]  [address]  [place] [address]  Phone: [phone]  Fax: [phone]    KELLY Lakhani (), Pulmonary Disease; Sleep Medicine  [address] [place] [address]  Phone: [phone]  Fax: [phone]    Geo Conte), Cardiovascular Disease; Internal Medicine  [address] [place] [address]  Phone: [phone]  Fax: [phone]

## 2017-09-26 NOTE — PROGRESS NOTE ADULT - SUBJECTIVE AND OBJECTIVE BOX
HOSPITALIST PROGRESS NOTE:  SUBJECTIVE:  PCP: PCP: Za Orozco  H/O: Dr. 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 Cough is productive in nature with yellowish/whitish sputum. Associated symptoms include SOB and Low-grade Fevers (~100s). Denies sick contacts.  In the ED, patient was empirically treated with IV Vanc, Zosyn, and 2.5L IV NS. RVP positive for Entero/Rhinovirus. (12 Sep 2017 16:42)  9/14: still complains of intermittent wheezing and cob.cough  9/15: dyspnea slowly improving although wbc rising quickly on steroids.   9/18: leukocytosis worsening although patient endorses some improvement.   9/20: fever spike 102, no change in resp status  9/21: afebrile, no complaints, leukocytosis regressing  9/22: low grade temp 100 ,tachycardic    9/23: Above reviewed. breathing much improved after bronchoscopy.  9/24: No complaints. breathing better  9/25: this morning low blood sugars of 59. Denies any dyspnea, cough or CP; Translating services used ID#[id number]  9/26: No complaints, would like to go home.    Allergies:  Privigen (Other (Severe))    REVIEW OF SYSTEMS:  See HPI. All other review of systems is negative unless indicated above.     OBJECTIVE  Physical Exam:  ICU Vital Signs Last 24 Hrs  T(C): 36.9 (26 Sep 2017 04:59), Max: 37.4 (25 Sep 2017 21:56)  T(F): 98.5 (26 Sep 2017 04:59), Max: 99.3 (25 Sep 2017 21:56)  HR: 97 (26 Sep 2017 07:45) (82 - 104)  BP: 124/63 (26 Sep 2017 06:07) (93/57 - 124/63)  BP(mean): --  ABP: --  ABP(mean): --  RR: 18 (26 Sep 2017 04:59) (18 - 18)  SpO2: 97% (26 Sep 2017 04:59) (94% - 97%)    General Appearance: cooperative, no acute distress,   HEENT: PERRL, conjunctiva clear, EOM's intact, non injected pharynx,  Neck: Supple, , mild adenopathy,   Lungs: adventitious lung sounds at bases at bases  expiratory phase, few scattered rhonchi  Heart: Regular rate and rhythm, S1, S2 normal, no murmur, rub or gallop  Abdomen: Soft, non-tender, bowel sounds active , no hepatosplenomegaly  Extremities: no cyanosis or edema, no joint swelling  Skin: Skin color, texture normal, no rashes   Neurologic: Alert and oriented X3 , cranial nerves intact, sensory and motor normal,  Breast: Deferred  Rectal: Deferred    MEDICATIONS  (STANDING):      LABS: All Labs Reviewed:                        12.0   16.6  )-----------( 81       ( 23 Sep 2017 06:47 )             35.6     09-23    139  |  104  |  18  ----------------------------<  103<H>  3.7   |  27  |  1.15    Ca    7.6<L>      23 Sep 2017 06:47      PT/INR - ( 23 Sep 2017 06:47 )   PT: 20.8 sec;   INR: 1.90 ratio         RADIOLOGY/EKG:    < from: Xray Chest 1 View AP/PA. (09.19.17 @ 23:39) >    FINDINGS/  IMPRESSION:          Tiny bilateral pleural effusions are present. Bibasilar atelectasis is   present. Cardiomediastinal silhouette is intact.      < end of copied text >    < from: CT Chest No Cont (09.16.17 @ 08:20) >    IMPRESSION:     Improving right lower right middle lobe pneumonia. There is new   nonspecific groundglass opacity in the left upper lobe. Increasing   bibasilar linear and platelike atelectasis.    Multiple enlarged lymph nodes in the chest and upper abdomen consistent   with lymphoma.    < end of copied text >

## 2017-09-26 NOTE — DISCHARGE NOTE ADULT - HOSPITAL COURSE
HOSPITALIST PROGRESS NOTE:  SUBJECTIVE:  PCP: PCP: Za Orozco  H/O: Dr. 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 Cough is productive in nature with yellowish/whitish sputum. Associated symptoms include SOB and Low-grade Fevers (~100s). Denies sick contacts.  In the ED, patient was empirically treated with IV Vanc, Zosyn, and 2.5L IV NS. RVP positive for Entero/Rhinovirus. (12 Sep 2017 16:42)  9/14: still complains of intermittent wheezing and cob.cough  9/15: dyspnea slowly improving although wbc rising quickly on steroids.   9/18: leukocytosis worsening although patient endorses some improvement.   9/20: fever spike 102, no change in resp status  9/21: afebrile, no complaints, leukocytosis regressing  9/22: low grade temp 100 ,tachycardic    9/23: Above reviewed. breathing much improved after bronchoscopy.  9/24: No complaints. breathing better  9/25: this morning low blood sugars of 59. Denies any dyspnea, cough or CP; Translating services used ID#[id number]  9/26: No complaints, would like to go home.      * Viral and Bacterial PNA (HCAP) with COPD Exacerbation  - RVP +for Entero and Rhino Virus  - leukocytosis regressing-> modify abx to vanco/meropenem #6 for broader coverage, suspected GNR/anarobes/resistant bacteria; Discussed with ID and d/c patient home on no ABX pooja  - Multifocal pna, immunocompromised patient with hx of CLL/Rituxan  - S/p steroids - hold for now , c/w nebs  - S/P Bronchoscopy (9/23) - Bronchomalacia, no endobronchial masses, thick secretions, Large mucus plugs removed  - Cultures were negative. Cell count unrevealing. BAL Negative   - Pulm consult appreciated - outpatient eval for  underlying ERNST and will likely need nocturnal BiPAP Will need airway clearance device as an outpatient  - Pulse ox on ambulation - Pulse ox on RA 93%, ON ambulation RA 90%    *mild thrombocytopenia probable consumption  - continue to monitor    *h/o chr CLL/SLL  - outpt treatment    * AF stable, in NSR; Hx of DVT/PE  - on low dose amio and Metoprolol  - INR therapeutic, 5mg coumadin; S/P lovenox bridge    *DM  - Episode of Hypoglycemia (9/25)  - Decrease Lantus 20U SQ Q12H- premeal coverage/iss    *HTN  -continue Home meds    *Hepatitis B Infection  cont. Tenofovir 300mg po daily.    *DVTppx  -Coumadin    *Discharge Instructions/Follow Up/Pending Labs:  -Discharge patient home on no antibiotics as patient already received a weeks worth of ABX, at increased risk of mucus plugging . will need Aerobika, mucus clearance device and sleep study as outpt; FU with Dr Waldrop in 3-4 days; FU Cytology and Flow Cytometry as outpatient.  continue Nebulizer therapy and mucinex at home.    -decrease lantus 20U Q12H as patient had episode of hypoglycemia during hospital stay; FU with PCP to check sugars     Total time: 45 minutes   Discussed with Pulmonary  Message left for Dr Orozco

## 2017-09-26 NOTE — DISCHARGE NOTE ADULT - PATIENT PORTAL LINK FT
“You can access the Follow[facility] Patient Portal, offered by [facility], by registering with the following website: [email or web address]”

## 2017-09-26 NOTE — PROGRESS NOTE ADULT - PROVIDER SPECIALTY LIST ADULT
Family Medicine
Hospitalist
Infectious Disease
Pulmonology
Hospitalist
Infectious Disease
Pulmonology
Pulmonology
Hospitalist

## 2017-09-26 NOTE — DISCHARGE NOTE ADULT - PLAN OF CARE
Viral and Bacterial PNA (HCAP) with COPD Exacerbation - RVP +for Entero and Rhino Virus S/P S/P Bronchoscopy (9/23) - Bronchomalacia, no endobronchial masses, thick secretions, Large mucus plugs removed; Discharge patient home on no antibiotics as patient already received a weeks worth of ABX, at increased risk of mucus plugging . will need Aerobika, mucus clearance device and sleep study as outpt; FU with Dr Waldrop in 3-4 days; FU Cytology and Flow Cytometry as outpatient.  continue Nebulizer therapy and mucinex at home. *DM - Episode of Hypoglycemia (9/25)- Decrease Lantus 20U SQ Q12H

## 2017-09-26 NOTE — DISCHARGE NOTE ADULT - MEDICATION SUMMARY - MEDICATIONS TO TAKE
I will START or STAY ON the medications listed below when I get home from the hospital:    amiodarone 200 mg oral tablet  -- 0.5 tab(s) by mouth once a day  -- Indication: For AFIB    warfarin 2.5 mg oral tablet  -- 1 tab(s) by mouth once a day (at bedtime)  -- Indication: For AFIB    gabapentin 300 mg oral capsule  -- 1 cap(s) by mouth 3 times a day  -- Indication: For Home meds    insulin glargine  -- 20 unit(s) injectable 2 times a day  -- Indication: For Diabetes    simvastatin 10 mg oral tablet  -- 1 tab(s) by mouth once a day (at bedtime)  -- Indication: For Hyperlipidemia    tenofovir 300 mg oral tablet  -- 1 tab(s) by mouth once a day  -- Indication: For Hep B    metoprolol tartrate 50 mg oral tablet  -- 1 tab(s) by mouth once a day  -- Indication: For HTN    umeclidinium 62.5 mcg/inh inhalation powder  -- 1 puff(s) inhaled once a day  -- Indication: For Home meds    ipratropium-albuterol 0.5 mg-2.5 mg/3 mLinhalation solution  -- 3 milliliter(s) inhaled every 6 hours, As Needed -for shortness of breath and/or wheezing   -- Indication: For COPD/Mucous pluggin    clotrimazole 1% topical cream  -- 1 application on skin 2 times a day  -- Indication: For rash    guaiFENesin 1200 mg oral tablet, extended release  -- 1 tab(s) by mouth every 12 hours  -- Indication: For mucous pluggnig    montelukast 10 mg oral tablet  -- 1 tab(s) by mouth once a day (at bedtime)  -- Indication: For COPD (chronic obstructive pulmonary disease)    multivitamin  -- 1 tab(s) by mouth once a day  -- Indication: For Vitamin

## 2017-09-28 LAB — TM INTERPRETATION: SIGNIFICANT CHANGE UP

## 2017-09-29 DIAGNOSIS — Z86.711 PERSONAL HISTORY OF PULMONARY EMBOLISM: ICD-10-CM

## 2017-09-29 DIAGNOSIS — C91.10 CHRONIC LYMPHOCYTIC LEUKEMIA OF B-CELL TYPE NOT HAVING ACHIEVED REMISSION: ICD-10-CM

## 2017-09-29 DIAGNOSIS — J44.1 CHRONIC OBSTRUCTIVE PULMONARY DISEASE WITH (ACUTE) EXACERBATION: ICD-10-CM

## 2017-09-29 DIAGNOSIS — J98.09 OTHER DISEASES OF BRONCHUS, NOT ELSEWHERE CLASSIFIED: ICD-10-CM

## 2017-09-29 DIAGNOSIS — C85.10 UNSPECIFIED B-CELL LYMPHOMA, UNSPECIFIED SITE: ICD-10-CM

## 2017-09-29 DIAGNOSIS — Z86.718 PERSONAL HISTORY OF OTHER VENOUS THROMBOSIS AND EMBOLISM: ICD-10-CM

## 2017-09-29 DIAGNOSIS — J12.89 OTHER VIRAL PNEUMONIA: ICD-10-CM

## 2017-09-29 DIAGNOSIS — E11.649 TYPE 2 DIABETES MELLITUS WITH HYPOGLYCEMIA WITHOUT COMA: ICD-10-CM

## 2017-09-29 DIAGNOSIS — J44.0 CHRONIC OBSTRUCTIVE PULMONARY DISEASE WITH (ACUTE) LOWER RESPIRATORY INFECTION: ICD-10-CM

## 2017-09-29 DIAGNOSIS — D80.1 NONFAMILIAL HYPOGAMMAGLOBULINEMIA: ICD-10-CM

## 2017-09-29 DIAGNOSIS — D69.6 THROMBOCYTOPENIA, UNSPECIFIED: ICD-10-CM

## 2017-09-29 DIAGNOSIS — B19.10 UNSPECIFIED VIRAL HEPATITIS B WITHOUT HEPATIC COMA: ICD-10-CM

## 2017-09-29 DIAGNOSIS — T38.0X5A ADVERSE EFFECT OF GLUCOCORTICOIDS AND SYNTHETIC ANALOGUES, INITIAL ENCOUNTER: ICD-10-CM

## 2017-09-29 DIAGNOSIS — B97.89 OTHER VIRAL AGENTS AS THE CAUSE OF DISEASES CLASSIFIED ELSEWHERE: ICD-10-CM

## 2017-09-29 DIAGNOSIS — I48.91 UNSPECIFIED ATRIAL FIBRILLATION: ICD-10-CM
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
## 2017-09-29 DIAGNOSIS — I11.0 HYPERTENSIVE HEART DISEASE WITH HEART FAILURE: ICD-10-CM

## 2017-09-29 DIAGNOSIS — B97.10 UNSPECIFIED ENTEROVIRUS AS THE CAUSE OF DISEASES CLASSIFIED ELSEWHERE: ICD-10-CM

## 2017-09-29 DIAGNOSIS — T17.990A OTHER FOREIGN OBJECT IN RESPIRATORY TRACT, PART UNSPECIFIED IN CAUSING ASPHYXIATION, INITIAL ENCOUNTER: ICD-10-CM

## 2017-09-29 DIAGNOSIS — R06.00 DYSPNEA, UNSPECIFIED: ICD-10-CM

## 2017-09-29 DIAGNOSIS — Z79.01 LONG TERM (CURRENT) USE OF ANTICOAGULANTS: ICD-10-CM

## 2017-09-29 DIAGNOSIS — Z90.49 ACQUIRED ABSENCE OF OTHER SPECIFIED PARTS OF DIGESTIVE TRACT: ICD-10-CM

## 2017-09-29 DIAGNOSIS — I25.10 ATHEROSCLEROTIC HEART DISEASE OF NATIVE CORONARY ARTERY WITHOUT ANGINA PECTORIS: ICD-10-CM

## 2017-09-29 DIAGNOSIS — Z79.4 LONG TERM (CURRENT) USE OF INSULIN: ICD-10-CM

## 2017-09-29 DIAGNOSIS — I50.32 CHRONIC DIASTOLIC (CONGESTIVE) HEART FAILURE: ICD-10-CM

## 2017-10-02 LAB — VIRUS SPEC CULT: (no result)

## 2017-10-13 ENCOUNTER — APPOINTMENT (OUTPATIENT)
Dept: CARDIOLOGY | Facility: CLINIC | Age: 77
End: 2017-10-13
Payer: MEDICARE

## 2017-10-13 PROCEDURE — 85610 PROTHROMBIN TIME: CPT | Mod: QW

## 2017-10-13 PROCEDURE — 99213 OFFICE O/P EST LOW 20 MIN: CPT

## 2017-10-13 PROCEDURE — 36416 COLLJ CAPILLARY BLOOD SPEC: CPT

## 2017-10-20 ENCOUNTER — APPOINTMENT (OUTPATIENT)
Dept: CARDIOLOGY | Facility: CLINIC | Age: 77
End: 2017-10-20
Payer: MEDICARE

## 2017-10-20 PROCEDURE — 36416 COLLJ CAPILLARY BLOOD SPEC: CPT

## 2017-10-20 PROCEDURE — 99213 OFFICE O/P EST LOW 20 MIN: CPT

## 2017-10-20 PROCEDURE — 85610 PROTHROMBIN TIME: CPT | Mod: QW

## 2017-10-25 LAB
CULTURE RESULTS: SIGNIFICANT CHANGE UP
SPECIMEN SOURCE: SIGNIFICANT CHANGE UP

## 2017-10-27 ENCOUNTER — APPOINTMENT (OUTPATIENT)
Dept: CARDIOLOGY | Facility: CLINIC | Age: 77
End: 2017-10-27
Payer: MEDICARE

## 2017-10-27 PROCEDURE — 36416 COLLJ CAPILLARY BLOOD SPEC: CPT

## 2017-10-27 PROCEDURE — 99213 OFFICE O/P EST LOW 20 MIN: CPT

## 2017-10-27 PROCEDURE — 85610 PROTHROMBIN TIME: CPT | Mod: QW

## 2017-11-03 ENCOUNTER — APPOINTMENT (OUTPATIENT)
Dept: CARDIOLOGY | Facility: CLINIC | Age: 77
End: 2017-11-03
Payer: MEDICARE

## 2017-11-03 PROCEDURE — 99213 OFFICE O/P EST LOW 20 MIN: CPT

## 2017-11-03 PROCEDURE — 85610 PROTHROMBIN TIME: CPT | Mod: QW

## 2017-11-03 PROCEDURE — 36416 COLLJ CAPILLARY BLOOD SPEC: CPT

## 2017-11-10 ENCOUNTER — APPOINTMENT (OUTPATIENT)
Dept: CARDIOLOGY | Facility: CLINIC | Age: 77
End: 2017-11-10
Payer: MEDICARE

## 2017-11-10 PROCEDURE — 85610 PROTHROMBIN TIME: CPT | Mod: QW

## 2017-11-10 PROCEDURE — 36416 COLLJ CAPILLARY BLOOD SPEC: CPT

## 2017-11-10 PROCEDURE — 99213 OFFICE O/P EST LOW 20 MIN: CPT

## 2017-11-15 LAB
CULTURE RESULTS: SIGNIFICANT CHANGE UP
SPECIMEN SOURCE: SIGNIFICANT CHANGE UP

## 2017-11-27 ENCOUNTER — APPOINTMENT (OUTPATIENT)
Dept: CARDIOLOGY | Facility: CLINIC | Age: 77
End: 2017-11-27
Payer: MEDICARE

## 2017-11-27 PROCEDURE — 36416 COLLJ CAPILLARY BLOOD SPEC: CPT

## 2017-11-27 PROCEDURE — 99213 OFFICE O/P EST LOW 20 MIN: CPT

## 2017-11-27 PROCEDURE — 85610 PROTHROMBIN TIME: CPT | Mod: QW

## 2017-12-22 ENCOUNTER — APPOINTMENT (OUTPATIENT)
Dept: CARDIOLOGY | Facility: CLINIC | Age: 77
End: 2017-12-22

## 2018-01-01 ENCOUNTER — APPOINTMENT (OUTPATIENT)
Dept: THORACIC SURGERY | Facility: HOSPITAL | Age: 78
End: 2018-01-01
Payer: MEDICARE

## 2018-01-01 ENCOUNTER — APPOINTMENT (OUTPATIENT)
Dept: CARDIOLOGY | Facility: CLINIC | Age: 78
End: 2018-01-01
Payer: MEDICARE

## 2018-01-01 ENCOUNTER — RESULT REVIEW (OUTPATIENT)
Age: 78
End: 2018-01-01

## 2018-01-01 ENCOUNTER — TRANSCRIPTION ENCOUNTER (OUTPATIENT)
Age: 78
End: 2018-01-01

## 2018-01-01 ENCOUNTER — INPATIENT (INPATIENT)
Facility: HOSPITAL | Age: 78
LOS: 8 days | Discharge: HOME | End: 2018-01-10
Attending: HOSPITALIST | Admitting: FAMILY MEDICINE
Payer: MEDICARE

## 2018-01-01 ENCOUNTER — APPOINTMENT (OUTPATIENT)
Dept: CARDIOLOGY | Facility: CLINIC | Age: 78
End: 2018-01-01

## 2018-01-01 ENCOUNTER — INPATIENT (INPATIENT)
Facility: HOSPITAL | Age: 78
LOS: 9 days | Discharge: NURSING, REHAB OR LONG-TERM CARE | End: 2018-11-12
Attending: FAMILY MEDICINE | Admitting: FAMILY MEDICINE
Payer: COMMERCIAL

## 2018-01-01 ENCOUNTER — OUTPATIENT (OUTPATIENT)
Dept: OUTPATIENT SERVICES | Facility: HOSPITAL | Age: 78
LOS: 1 days | Discharge: HOME | End: 2018-01-01

## 2018-01-01 ENCOUNTER — INPATIENT (INPATIENT)
Facility: HOSPITAL | Age: 78
LOS: 4 days | Discharge: HOME | End: 2018-05-31
Attending: HOSPITALIST | Admitting: HOSPITALIST
Payer: COMMERCIAL

## 2018-01-01 ENCOUNTER — INPATIENT (INPATIENT)
Facility: HOSPITAL | Age: 78
LOS: 1 days | Discharge: HOME | End: 2018-06-29
Attending: FAMILY MEDICINE | Admitting: FAMILY MEDICINE

## 2018-01-01 ENCOUNTER — OUTPATIENT (OUTPATIENT)
Dept: OUTPATIENT SERVICES | Facility: HOSPITAL | Age: 78
LOS: 1 days | End: 2018-01-01
Payer: MEDICARE

## 2018-01-01 ENCOUNTER — INPATIENT (INPATIENT)
Facility: HOSPITAL | Age: 78
LOS: 7 days | Discharge: HOME HEALTH | End: 2019-01-03
Attending: INTERNAL MEDICINE | Admitting: INTERNAL MEDICINE
Payer: MEDICARE

## 2018-01-01 ENCOUNTER — EMERGENCY (EMERGENCY)
Facility: HOSPITAL | Age: 78
LOS: 0 days | Discharge: HOME | End: 2018-01-23
Attending: EMERGENCY MEDICINE | Admitting: EMERGENCY MEDICINE
Payer: MEDICARE

## 2018-01-01 ENCOUNTER — RX RENEWAL (OUTPATIENT)
Age: 78
End: 2018-01-01

## 2018-01-01 VITALS
HEART RATE: 115 BPM | SYSTOLIC BLOOD PRESSURE: 133 MMHG | DIASTOLIC BLOOD PRESSURE: 85 MMHG | HEIGHT: 64 IN | OXYGEN SATURATION: 90 % | WEIGHT: 179.9 LBS | RESPIRATION RATE: 18 BRPM | TEMPERATURE: 101 F

## 2018-01-01 VITALS
SYSTOLIC BLOOD PRESSURE: 131 MMHG | OXYGEN SATURATION: 97 % | DIASTOLIC BLOOD PRESSURE: 64 MMHG | RESPIRATION RATE: 20 BRPM | HEART RATE: 80 BPM

## 2018-01-01 VITALS
WEIGHT: 199.96 LBS | HEIGHT: 63 IN | SYSTOLIC BLOOD PRESSURE: 151 MMHG | OXYGEN SATURATION: 92 % | RESPIRATION RATE: 16 BRPM | DIASTOLIC BLOOD PRESSURE: 87 MMHG | HEART RATE: 95 BPM

## 2018-01-01 VITALS
DIASTOLIC BLOOD PRESSURE: 63 MMHG | OXYGEN SATURATION: 100 % | HEART RATE: 104 BPM | TEMPERATURE: 98 F | WEIGHT: 175.05 LBS | SYSTOLIC BLOOD PRESSURE: 86 MMHG | HEIGHT: 69 IN | RESPIRATION RATE: 16 BRPM

## 2018-01-01 VITALS
HEART RATE: 86 BPM | TEMPERATURE: 99 F | OXYGEN SATURATION: 92 % | RESPIRATION RATE: 15 BRPM | HEIGHT: 67 IN | DIASTOLIC BLOOD PRESSURE: 69 MMHG | SYSTOLIC BLOOD PRESSURE: 112 MMHG | WEIGHT: 190.04 LBS

## 2018-01-01 VITALS — WEIGHT: 158.07 LBS

## 2018-01-01 VITALS
SYSTOLIC BLOOD PRESSURE: 125 MMHG | HEIGHT: 66 IN | OXYGEN SATURATION: 93 % | WEIGHT: 179.9 LBS | TEMPERATURE: 98 F | RESPIRATION RATE: 18 BRPM | HEART RATE: 83 BPM | DIASTOLIC BLOOD PRESSURE: 77 MMHG

## 2018-01-01 VITALS — HEIGHT: 64.96 IN | WEIGHT: 169.09 LBS

## 2018-01-01 VITALS — WEIGHT: 169.09 LBS

## 2018-01-01 VITALS — TEMPERATURE: 97 F

## 2018-01-01 DIAGNOSIS — Z51.11 ENCOUNTER FOR ANTINEOPLASTIC CHEMOTHERAPY: ICD-10-CM

## 2018-01-01 DIAGNOSIS — I25.10 ATHEROSCLEROTIC HEART DISEASE OF NATIVE CORONARY ARTERY WITHOUT ANGINA PECTORIS: ICD-10-CM

## 2018-01-01 DIAGNOSIS — Z98.890 OTHER SPECIFIED POSTPROCEDURAL STATES: Chronic | ICD-10-CM

## 2018-01-01 DIAGNOSIS — J18.9 PNEUMONIA, UNSPECIFIED ORGANISM: ICD-10-CM

## 2018-01-01 DIAGNOSIS — Z00.00 ENCOUNTER FOR GENERAL ADULT MEDICAL EXAMINATION WITHOUT ABNORMAL FINDINGS: ICD-10-CM

## 2018-01-01 DIAGNOSIS — B97.10 UNSPECIFIED ENTEROVIRUS AS THE CAUSE OF DISEASES CLASSIFIED ELSEWHERE: ICD-10-CM

## 2018-01-01 DIAGNOSIS — J44.9 CHRONIC OBSTRUCTIVE PULMONARY DISEASE, UNSPECIFIED: ICD-10-CM

## 2018-01-01 DIAGNOSIS — E11.9 TYPE 2 DIABETES MELLITUS WITHOUT COMPLICATIONS: ICD-10-CM

## 2018-01-01 DIAGNOSIS — I11.0 HYPERTENSIVE HEART DISEASE WITH HEART FAILURE: ICD-10-CM

## 2018-01-01 DIAGNOSIS — B19.10 UNSPECIFIED VIRAL HEPATITIS B WITHOUT HEPATIC COMA: ICD-10-CM
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
## 2018-01-01 DIAGNOSIS — Z79.01 LONG TERM (CURRENT) USE OF ANTICOAGULANTS: ICD-10-CM

## 2018-01-01 DIAGNOSIS — M54.5 LOW BACK PAIN: ICD-10-CM

## 2018-01-01 DIAGNOSIS — T38.0X5A ADVERSE EFFECT OF GLUCOCORTICOIDS AND SYNTHETIC ANALOGUES, INITIAL ENCOUNTER: ICD-10-CM

## 2018-01-01 DIAGNOSIS — Z86.718 PERSONAL HISTORY OF OTHER VENOUS THROMBOSIS AND EMBOLISM: ICD-10-CM

## 2018-01-01 DIAGNOSIS — I70.0 ATHEROSCLEROSIS OF AORTA: ICD-10-CM

## 2018-01-01 DIAGNOSIS — I48.91 UNSPECIFIED ATRIAL FIBRILLATION: ICD-10-CM

## 2018-01-01 DIAGNOSIS — R18.8 OTHER ASCITES: ICD-10-CM

## 2018-01-01 DIAGNOSIS — C85.10 UNSPECIFIED B-CELL LYMPHOMA, UNSPECIFIED SITE: ICD-10-CM

## 2018-01-01 DIAGNOSIS — Z90.79 ACQUIRED ABSENCE OF OTHER GENITAL ORGAN(S): Chronic | ICD-10-CM

## 2018-01-01 DIAGNOSIS — S32.010A WEDGE COMPRESSION FRACTURE OF FIRST LUMBAR VERTEBRA, INITIAL ENCOUNTER FOR CLOSED FRACTURE: ICD-10-CM

## 2018-01-01 DIAGNOSIS — I48.0 PAROXYSMAL ATRIAL FIBRILLATION: ICD-10-CM

## 2018-01-01 DIAGNOSIS — J96.22 ACUTE AND CHRONIC RESPIRATORY FAILURE WITH HYPERCAPNIA: ICD-10-CM

## 2018-01-01 DIAGNOSIS — J44.0 CHRONIC OBSTRUCTIVE PULMONARY DISEASE WITH (ACUTE) LOWER RESPIRATORY INFECTION: ICD-10-CM

## 2018-01-01 DIAGNOSIS — Z79.4 LONG TERM (CURRENT) USE OF INSULIN: ICD-10-CM

## 2018-01-01 DIAGNOSIS — J98.11 ATELECTASIS: ICD-10-CM

## 2018-01-01 DIAGNOSIS — Z79.01 ENCOUNTER FOR THERAPEUTIC DRUG LVL MONITORING: ICD-10-CM
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
## 2018-01-01 DIAGNOSIS — A41.9 SEPSIS, UNSPECIFIED ORGANISM: ICD-10-CM

## 2018-01-01 DIAGNOSIS — C91.10 CHRONIC LYMPHOCYTIC LEUKEMIA OF B-CELL TYPE NOT HAVING ACHIEVED REMISSION: ICD-10-CM

## 2018-01-01 DIAGNOSIS — Z90.49 ACQUIRED ABSENCE OF OTHER SPECIFIED PARTS OF DIGESTIVE TRACT: Chronic | ICD-10-CM

## 2018-01-01 DIAGNOSIS — J94.8 OTHER SPECIFIED PLEURAL CONDITIONS: ICD-10-CM

## 2018-01-01 DIAGNOSIS — D80.1 NONFAMILIAL HYPOGAMMAGLOBULINEMIA: ICD-10-CM

## 2018-01-01 DIAGNOSIS — D64.9 ANEMIA, UNSPECIFIED: ICD-10-CM

## 2018-01-01 DIAGNOSIS — J98.09 OTHER DISEASES OF BRONCHUS, NOT ELSEWHERE CLASSIFIED: ICD-10-CM

## 2018-01-01 DIAGNOSIS — J90 PLEURAL EFFUSION, NOT ELSEWHERE CLASSIFIED: ICD-10-CM

## 2018-01-01 DIAGNOSIS — K74.60 UNSPECIFIED CIRRHOSIS OF LIVER: ICD-10-CM

## 2018-01-01 DIAGNOSIS — N28.9 DISORDER OF KIDNEY AND URETER, UNSPECIFIED: ICD-10-CM

## 2018-01-01 DIAGNOSIS — E78.5 HYPERLIPIDEMIA, UNSPECIFIED: ICD-10-CM

## 2018-01-01 DIAGNOSIS — R79.1 ABNORMAL COAGULATION PROFILE: ICD-10-CM

## 2018-01-01 DIAGNOSIS — J96.21 ACUTE AND CHRONIC RESPIRATORY FAILURE WITH HYPOXIA: ICD-10-CM

## 2018-01-01 DIAGNOSIS — M48.56XA COLLAPSED VERTEBRA, NOT ELSEWHERE CLASSIFIED, LUMBAR REGION, INITIAL ENCOUNTER FOR FRACTURE: ICD-10-CM

## 2018-01-01 DIAGNOSIS — R50.9 FEVER, UNSPECIFIED: ICD-10-CM

## 2018-01-01 DIAGNOSIS — Z71.89 OTHER SPECIFIED COUNSELING: ICD-10-CM

## 2018-01-01 DIAGNOSIS — I50.33 ACUTE ON CHRONIC DIASTOLIC (CONGESTIVE) HEART FAILURE: ICD-10-CM

## 2018-01-01 DIAGNOSIS — E87.5 HYPERKALEMIA: ICD-10-CM
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
## 2018-01-01 DIAGNOSIS — Z86.711 PERSONAL HISTORY OF PULMONARY EMBOLISM: ICD-10-CM

## 2018-01-01 DIAGNOSIS — J44.1 CHRONIC OBSTRUCTIVE PULMONARY DISEASE WITH (ACUTE) EXACERBATION: ICD-10-CM

## 2018-01-01 DIAGNOSIS — Z91.14 PATIENT'S OTHER NONCOMPLIANCE WITH MEDICATION REGIMEN: ICD-10-CM

## 2018-01-01 DIAGNOSIS — E09.65 DRUG OR CHEMICAL INDUCED DIABETES MELLITUS WITH HYPERGLYCEMIA: ICD-10-CM

## 2018-01-01 DIAGNOSIS — T17.890A OTHER FOREIGN OBJECT IN OTHER PARTS OF RESPIRATORY TRACT CAUSING ASPHYXIATION, INITIAL ENCOUNTER: ICD-10-CM

## 2018-01-01 DIAGNOSIS — B97.89 OTHER VIRAL AGENTS AS THE CAUSE OF DISEASES CLASSIFIED ELSEWHERE: ICD-10-CM

## 2018-01-01 DIAGNOSIS — E87.1 HYPO-OSMOLALITY AND HYPONATREMIA: ICD-10-CM

## 2018-01-01 DIAGNOSIS — E11.649 TYPE 2 DIABETES MELLITUS WITH HYPOGLYCEMIA WITHOUT COMA: ICD-10-CM

## 2018-01-01 DIAGNOSIS — Z87.01 PERSONAL HISTORY OF PNEUMONIA (RECURRENT): ICD-10-CM

## 2018-01-01 DIAGNOSIS — I50.32 CHRONIC DIASTOLIC (CONGESTIVE) HEART FAILURE: ICD-10-CM

## 2018-01-01 DIAGNOSIS — Z76.89 PERSONS ENCOUNTERING HEALTH SERVICES IN OTHER SPECIFIED CIRCUMSTANCES: ICD-10-CM

## 2018-01-01 DIAGNOSIS — Z90.49 ACQUIRED ABSENCE OF OTHER SPECIFIED PARTS OF DIGESTIVE TRACT: ICD-10-CM

## 2018-01-01 DIAGNOSIS — Z51.81 ENCOUNTER FOR THERAPEUTIC DRUG LVL MONITORING: ICD-10-CM

## 2018-01-01 DIAGNOSIS — I08.1 RHEUMATIC DISORDERS OF BOTH MITRAL AND TRICUSPID VALVES: ICD-10-CM

## 2018-01-01 DIAGNOSIS — I27.82 CHRONIC PULMONARY EMBOLISM: ICD-10-CM
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
## 2018-01-01 DIAGNOSIS — Z99.81 DEPENDENCE ON SUPPLEMENTAL OXYGEN: ICD-10-CM

## 2018-01-01 DIAGNOSIS — I95.9 HYPOTENSION, UNSPECIFIED: ICD-10-CM

## 2018-01-01 DIAGNOSIS — I27.20 PULMONARY HYPERTENSION, UNSPECIFIED: ICD-10-CM

## 2018-01-01 DIAGNOSIS — D69.6 THROMBOCYTOPENIA, UNSPECIFIED: ICD-10-CM

## 2018-01-01 DIAGNOSIS — Z88.8 ALLERGY STATUS TO OTHER DRUGS, MEDICAMENTS AND BIOLOGICAL SUBSTANCES: ICD-10-CM

## 2018-01-01 DIAGNOSIS — Z87.891 PERSONAL HISTORY OF NICOTINE DEPENDENCE: ICD-10-CM

## 2018-01-01 DIAGNOSIS — R06.02 SHORTNESS OF BREATH: ICD-10-CM

## 2018-01-01 DIAGNOSIS — K76.6 PORTAL HYPERTENSION: ICD-10-CM

## 2018-01-01 DIAGNOSIS — M48.061 SPINAL STENOSIS, LUMBAR REGION WITHOUT NEUROGENIC CLAUDICATION: ICD-10-CM

## 2018-01-01 LAB
-  COAGULASE NEGATIVE STAPHYLOCOCCUS: SIGNIFICANT CHANGE UP
ADD ON TEST-SPECIMEN IN LAB: SIGNIFICANT CHANGE UP
ALBUMIN FLD-MCNC: 1.7 G/DL — SIGNIFICANT CHANGE UP
ALBUMIN FLD-MCNC: 1.9 G/DL — SIGNIFICANT CHANGE UP
ALBUMIN FLD-MCNC: 2.1 G/DL — SIGNIFICANT CHANGE UP
ALBUMIN SERPL ELPH-MCNC: 2.4 G/DL — LOW (ref 3.3–5)
ALBUMIN SERPL ELPH-MCNC: 2.5 G/DL — LOW (ref 3.3–5)
ALBUMIN SERPL ELPH-MCNC: 2.6 G/DL — LOW (ref 3.3–5)
ALBUMIN SERPL ELPH-MCNC: 2.6 G/DL — LOW (ref 3.3–5)
ALBUMIN SERPL ELPH-MCNC: 2.8 G/DL — LOW (ref 3.3–5)
ALBUMIN SERPL ELPH-MCNC: 2.8 G/DL — LOW (ref 3.3–5)
ALBUMIN SERPL ELPH-MCNC: 2.9 G/DL — LOW (ref 3.3–5)
ALBUMIN SERPL ELPH-MCNC: 2.9 G/DL — LOW (ref 3.3–5)
ALBUMIN SERPL ELPH-MCNC: 3.2 G/DL — LOW (ref 3.3–5)
ALLERGY+IMMUNOLOGY DIAG STUDY NOTE: SIGNIFICANT CHANGE UP
ALP SERPL-CCNC: 107 U/L — SIGNIFICANT CHANGE UP (ref 40–120)
ALP SERPL-CCNC: 117 U/L — SIGNIFICANT CHANGE UP (ref 40–120)
ALP SERPL-CCNC: 129 U/L — HIGH (ref 40–120)
ALP SERPL-CCNC: 148 U/L — HIGH (ref 40–120)
ALP SERPL-CCNC: 149 U/L — HIGH (ref 40–120)
ALP SERPL-CCNC: 162 U/L — HIGH (ref 40–120)
ALP SERPL-CCNC: 201 U/L — HIGH (ref 40–120)
ALP SERPL-CCNC: 94 U/L — SIGNIFICANT CHANGE UP (ref 40–120)
ALP SERPL-CCNC: 96 U/L — SIGNIFICANT CHANGE UP (ref 40–120)
ALT FLD-CCNC: 19 U/L — SIGNIFICANT CHANGE UP (ref 12–78)
ALT FLD-CCNC: 25 U/L — SIGNIFICANT CHANGE UP (ref 12–78)
ALT FLD-CCNC: 27 U/L — SIGNIFICANT CHANGE UP (ref 12–78)
ALT FLD-CCNC: 29 U/L — SIGNIFICANT CHANGE UP (ref 12–78)
ALT FLD-CCNC: 31 U/L — SIGNIFICANT CHANGE UP (ref 12–78)
ALT FLD-CCNC: 32 U/L — SIGNIFICANT CHANGE UP (ref 12–78)
ALT FLD-CCNC: 34 U/L — SIGNIFICANT CHANGE UP (ref 12–78)
ALT FLD-CCNC: 35 U/L — SIGNIFICANT CHANGE UP (ref 12–78)
ALT FLD-CCNC: 38 U/L — SIGNIFICANT CHANGE UP (ref 12–78)
ANION GAP SERPL CALC-SCNC: 10 MMOL/L — SIGNIFICANT CHANGE UP (ref 5–17)
ANION GAP SERPL CALC-SCNC: 10 MMOL/L — SIGNIFICANT CHANGE UP (ref 5–17)
ANION GAP SERPL CALC-SCNC: 11 MMOL/L — SIGNIFICANT CHANGE UP (ref 5–17)
ANION GAP SERPL CALC-SCNC: 11 MMOL/L — SIGNIFICANT CHANGE UP (ref 5–17)
ANION GAP SERPL CALC-SCNC: 12 MMOL/L — SIGNIFICANT CHANGE UP (ref 5–17)
ANION GAP SERPL CALC-SCNC: 12 MMOL/L — SIGNIFICANT CHANGE UP (ref 5–17)
ANION GAP SERPL CALC-SCNC: 13 MMOL/L — SIGNIFICANT CHANGE UP (ref 5–17)
ANION GAP SERPL CALC-SCNC: 4 MMOL/L — LOW (ref 5–17)
ANION GAP SERPL CALC-SCNC: 5 MMOL/L — SIGNIFICANT CHANGE UP (ref 5–17)
ANION GAP SERPL CALC-SCNC: 6 MMOL/L — SIGNIFICANT CHANGE UP (ref 5–17)
ANION GAP SERPL CALC-SCNC: 7 MMOL/L — SIGNIFICANT CHANGE UP (ref 5–17)
ANION GAP SERPL CALC-SCNC: 7 MMOL/L — SIGNIFICANT CHANGE UP (ref 5–17)
ANION GAP SERPL CALC-SCNC: 8 MMOL/L — SIGNIFICANT CHANGE UP (ref 5–17)
ANION GAP SERPL CALC-SCNC: 9 MMOL/L — SIGNIFICANT CHANGE UP (ref 5–17)
ANISOCYTOSIS BLD QL: SLIGHT — SIGNIFICANT CHANGE UP
APPEARANCE UR: CLEAR — SIGNIFICANT CHANGE UP
APTT BLD: 30.5 SEC — SIGNIFICANT CHANGE UP (ref 27.5–37.4)
APTT BLD: 32.5 SEC — SIGNIFICANT CHANGE UP (ref 27.5–37.4)
APTT BLD: 33.7 SEC — SIGNIFICANT CHANGE UP (ref 27.5–36.3)
APTT BLD: 34.2 SEC — SIGNIFICANT CHANGE UP (ref 27.5–37.4)
APTT BLD: 34.5 SEC — SIGNIFICANT CHANGE UP (ref 27.5–36.3)
APTT BLD: 35 SEC — SIGNIFICANT CHANGE UP (ref 27.5–36.3)
APTT BLD: 35 SEC — SIGNIFICANT CHANGE UP (ref 27.5–36.3)
APTT BLD: 35.6 SEC — SIGNIFICANT CHANGE UP (ref 27.5–36.3)
APTT BLD: 36.8 SEC — HIGH (ref 27.5–36.3)
APTT BLD: 37.1 SEC — HIGH (ref 27.5–36.3)
APTT BLD: 38.1 SEC — HIGH (ref 27.5–37.4)
APTT BLD: 39.2 SEC — HIGH (ref 27.5–36.3)
APTT BLD: 41.6 SEC — HIGH (ref 27.5–36.3)
APTT BLD: 56.8 SEC — HIGH (ref 27.5–36.3)
AST SERPL-CCNC: 23 U/L — SIGNIFICANT CHANGE UP (ref 15–37)
AST SERPL-CCNC: 25 U/L — SIGNIFICANT CHANGE UP (ref 15–37)
AST SERPL-CCNC: 25 U/L — SIGNIFICANT CHANGE UP (ref 15–37)
AST SERPL-CCNC: 26 U/L — SIGNIFICANT CHANGE UP (ref 15–37)
AST SERPL-CCNC: 28 U/L — SIGNIFICANT CHANGE UP (ref 15–37)
AST SERPL-CCNC: 28 U/L — SIGNIFICANT CHANGE UP (ref 15–37)
AST SERPL-CCNC: 34 U/L — SIGNIFICANT CHANGE UP (ref 15–37)
AST SERPL-CCNC: 34 U/L — SIGNIFICANT CHANGE UP (ref 15–37)
AST SERPL-CCNC: 47 U/L — HIGH (ref 15–37)
B PERT IGG+IGM PNL SER: ABNORMAL
BACTERIA # UR AUTO: (no result)
BACTERIA # UR AUTO: ABNORMAL
BASE EXCESS BLDV CALC-SCNC: 2.5 MMOL/L — HIGH (ref -2–2)
BASE EXCESS BLDV CALC-SCNC: 2.9 MMOL/L — HIGH (ref -2–2)
BASO STIPL BLD QL SMEAR: SLIGHT — SIGNIFICANT CHANGE UP
BASOPHILS # BLD AUTO: 0 K/UL — SIGNIFICANT CHANGE UP (ref 0–0.2)
BASOPHILS # BLD AUTO: 0.06 K/UL — SIGNIFICANT CHANGE UP (ref 0–0.2)
BASOPHILS # BLD AUTO: 0.07 K/UL — SIGNIFICANT CHANGE UP (ref 0–0.2)
BASOPHILS # BLD AUTO: 0.07 K/UL — SIGNIFICANT CHANGE UP (ref 0–0.2)
BASOPHILS # BLD AUTO: SIGNIFICANT CHANGE UP K/UL (ref 0–0.2)
BASOPHILS NFR BLD AUTO: 0 % — SIGNIFICANT CHANGE UP (ref 0–2)
BASOPHILS NFR BLD AUTO: 0.3 % — SIGNIFICANT CHANGE UP (ref 0–2)
BASOPHILS NFR BLD AUTO: 0.4 % — SIGNIFICANT CHANGE UP (ref 0–2)
BASOPHILS NFR BLD AUTO: 0.4 % — SIGNIFICANT CHANGE UP (ref 0–2)
BASOPHILS NFR BLD AUTO: 2 % — SIGNIFICANT CHANGE UP (ref 0–2)
BILIRUB SERPL-MCNC: 0.3 MG/DL — SIGNIFICANT CHANGE UP (ref 0.2–1.2)
BILIRUB SERPL-MCNC: 0.4 MG/DL — SIGNIFICANT CHANGE UP (ref 0.2–1.2)
BILIRUB SERPL-MCNC: 0.5 MG/DL — SIGNIFICANT CHANGE UP (ref 0.2–1.2)
BILIRUB SERPL-MCNC: 0.6 MG/DL — SIGNIFICANT CHANGE UP (ref 0.2–1.2)
BILIRUB SERPL-MCNC: 0.7 MG/DL — SIGNIFICANT CHANGE UP (ref 0.2–1.2)
BILIRUB UR-MCNC: NEGATIVE — SIGNIFICANT CHANGE UP
BUN SERPL-MCNC: 10 MG/DL — SIGNIFICANT CHANGE UP (ref 7–23)
BUN SERPL-MCNC: 11 MG/DL — SIGNIFICANT CHANGE UP (ref 7–23)
BUN SERPL-MCNC: 13 MG/DL — SIGNIFICANT CHANGE UP (ref 7–23)
BUN SERPL-MCNC: 14 MG/DL — SIGNIFICANT CHANGE UP (ref 7–23)
BUN SERPL-MCNC: 15 MG/DL — SIGNIFICANT CHANGE UP (ref 7–23)
BUN SERPL-MCNC: 20 MG/DL — SIGNIFICANT CHANGE UP (ref 7–23)
BUN SERPL-MCNC: 20 MG/DL — SIGNIFICANT CHANGE UP (ref 7–23)
BUN SERPL-MCNC: 21 MG/DL — SIGNIFICANT CHANGE UP (ref 7–23)
BUN SERPL-MCNC: 22 MG/DL — SIGNIFICANT CHANGE UP (ref 7–23)
BUN SERPL-MCNC: 23 MG/DL — SIGNIFICANT CHANGE UP (ref 7–23)
BUN SERPL-MCNC: 25 MG/DL — HIGH (ref 7–23)
BUN SERPL-MCNC: 26 MG/DL — HIGH (ref 7–23)
BUN SERPL-MCNC: 32 MG/DL — HIGH (ref 7–23)
BUN SERPL-MCNC: 33 MG/DL — HIGH (ref 7–23)
BUN SERPL-MCNC: 35 MG/DL — HIGH (ref 7–23)
BUN SERPL-MCNC: 38 MG/DL — HIGH (ref 7–23)
BUN SERPL-MCNC: 38 MG/DL — HIGH (ref 7–23)
BUN SERPL-MCNC: 40 MG/DL — HIGH (ref 7–23)
BUN SERPL-MCNC: 40 MG/DL — HIGH (ref 7–23)
BUN SERPL-MCNC: 42 MG/DL — HIGH (ref 7–23)
BUN SERPL-MCNC: 42 MG/DL — HIGH (ref 7–23)
BURR CELLS BLD QL SMEAR: PRESENT — SIGNIFICANT CHANGE UP
BURR CELLS BLD QL SMEAR: PRESENT — SIGNIFICANT CHANGE UP
CALCIUM SERPL-MCNC: 7.6 MG/DL — LOW (ref 8.5–10.1)
CALCIUM SERPL-MCNC: 7.6 MG/DL — LOW (ref 8.5–10.1)
CALCIUM SERPL-MCNC: 7.7 MG/DL — LOW (ref 8.5–10.1)
CALCIUM SERPL-MCNC: 7.7 MG/DL — LOW (ref 8.5–10.1)
CALCIUM SERPL-MCNC: 7.8 MG/DL — LOW (ref 8.5–10.1)
CALCIUM SERPL-MCNC: 7.9 MG/DL — LOW (ref 8.5–10.1)
CALCIUM SERPL-MCNC: 8 MG/DL — LOW (ref 8.5–10.1)
CALCIUM SERPL-MCNC: 8.1 MG/DL — LOW (ref 8.5–10.1)
CALCIUM SERPL-MCNC: 8.2 MG/DL — LOW (ref 8.5–10.1)
CALCIUM SERPL-MCNC: 8.3 MG/DL — LOW (ref 8.5–10.1)
CHLORIDE SERPL-SCNC: 100 MMOL/L — SIGNIFICANT CHANGE UP (ref 96–108)
CHLORIDE SERPL-SCNC: 100 MMOL/L — SIGNIFICANT CHANGE UP (ref 96–108)
CHLORIDE SERPL-SCNC: 101 MMOL/L — SIGNIFICANT CHANGE UP (ref 96–108)
CHLORIDE SERPL-SCNC: 102 MMOL/L — SIGNIFICANT CHANGE UP (ref 96–108)
CHLORIDE SERPL-SCNC: 102 MMOL/L — SIGNIFICANT CHANGE UP (ref 96–108)
CHLORIDE SERPL-SCNC: 104 MMOL/L — SIGNIFICANT CHANGE UP (ref 96–108)
CHLORIDE SERPL-SCNC: 104 MMOL/L — SIGNIFICANT CHANGE UP (ref 96–108)
CHLORIDE SERPL-SCNC: 106 MMOL/L — SIGNIFICANT CHANGE UP (ref 96–108)
CHLORIDE SERPL-SCNC: 95 MMOL/L — LOW (ref 96–108)
CHLORIDE SERPL-SCNC: 96 MMOL/L — SIGNIFICANT CHANGE UP (ref 96–108)
CHLORIDE SERPL-SCNC: 97 MMOL/L — SIGNIFICANT CHANGE UP (ref 96–108)
CHLORIDE SERPL-SCNC: 98 MMOL/L — SIGNIFICANT CHANGE UP (ref 96–108)
CHLORIDE SERPL-SCNC: 98 MMOL/L — SIGNIFICANT CHANGE UP (ref 96–108)
CHLORIDE SERPL-SCNC: 99 MMOL/L — SIGNIFICANT CHANGE UP (ref 96–108)
CHOLEST SERPL-MCNC: 83 MG/DL — SIGNIFICANT CHANGE UP (ref 10–199)
CK SERPL-CCNC: 13 U/L — LOW (ref 26–308)
CO2 SERPL-SCNC: 18 MMOL/L — LOW (ref 22–31)
CO2 SERPL-SCNC: 19 MMOL/L — LOW (ref 22–31)
CO2 SERPL-SCNC: 22 MMOL/L — SIGNIFICANT CHANGE UP (ref 22–31)
CO2 SERPL-SCNC: 24 MMOL/L — SIGNIFICANT CHANGE UP (ref 22–31)
CO2 SERPL-SCNC: 24 MMOL/L — SIGNIFICANT CHANGE UP (ref 22–31)
CO2 SERPL-SCNC: 25 MMOL/L — SIGNIFICANT CHANGE UP (ref 22–31)
CO2 SERPL-SCNC: 27 MMOL/L — SIGNIFICANT CHANGE UP (ref 22–31)
CO2 SERPL-SCNC: 28 MMOL/L — SIGNIFICANT CHANGE UP (ref 22–31)
CO2 SERPL-SCNC: 29 MMOL/L — SIGNIFICANT CHANGE UP (ref 22–31)
CO2 SERPL-SCNC: 29 MMOL/L — SIGNIFICANT CHANGE UP (ref 22–31)
CO2 SERPL-SCNC: 30 MMOL/L — SIGNIFICANT CHANGE UP (ref 22–31)
CO2 SERPL-SCNC: 31 MMOL/L — SIGNIFICANT CHANGE UP (ref 22–31)
CO2 SERPL-SCNC: 32 MMOL/L — HIGH (ref 22–31)
COLOR FLD: SIGNIFICANT CHANGE UP
COLOR SPEC: YELLOW — SIGNIFICANT CHANGE UP
COMMENT - URINE: SIGNIFICANT CHANGE UP
CREAT SERPL-MCNC: 0.84 MG/DL — SIGNIFICANT CHANGE UP (ref 0.5–1.3)
CREAT SERPL-MCNC: 0.9 MG/DL — SIGNIFICANT CHANGE UP (ref 0.5–1.3)
CREAT SERPL-MCNC: 0.95 MG/DL — SIGNIFICANT CHANGE UP (ref 0.5–1.3)
CREAT SERPL-MCNC: 0.97 MG/DL — SIGNIFICANT CHANGE UP (ref 0.5–1.3)
CREAT SERPL-MCNC: 0.98 MG/DL — SIGNIFICANT CHANGE UP (ref 0.5–1.3)
CREAT SERPL-MCNC: 0.99 MG/DL — SIGNIFICANT CHANGE UP (ref 0.5–1.3)
CREAT SERPL-MCNC: 1.07 MG/DL — SIGNIFICANT CHANGE UP (ref 0.5–1.3)
CREAT SERPL-MCNC: 1.07 MG/DL — SIGNIFICANT CHANGE UP (ref 0.5–1.3)
CREAT SERPL-MCNC: 1.08 MG/DL — SIGNIFICANT CHANGE UP (ref 0.5–1.3)
CREAT SERPL-MCNC: 1.09 MG/DL — SIGNIFICANT CHANGE UP (ref 0.5–1.3)
CREAT SERPL-MCNC: 1.1 MG/DL — SIGNIFICANT CHANGE UP (ref 0.5–1.3)
CREAT SERPL-MCNC: 1.1 MG/DL — SIGNIFICANT CHANGE UP (ref 0.5–1.3)
CREAT SERPL-MCNC: 1.12 MG/DL — SIGNIFICANT CHANGE UP (ref 0.5–1.3)
CREAT SERPL-MCNC: 1.12 MG/DL — SIGNIFICANT CHANGE UP (ref 0.5–1.3)
CREAT SERPL-MCNC: 1.13 MG/DL — SIGNIFICANT CHANGE UP (ref 0.5–1.3)
CREAT SERPL-MCNC: 1.14 MG/DL — SIGNIFICANT CHANGE UP (ref 0.5–1.3)
CREAT SERPL-MCNC: 1.15 MG/DL — SIGNIFICANT CHANGE UP (ref 0.5–1.3)
CREAT SERPL-MCNC: 1.17 MG/DL — SIGNIFICANT CHANGE UP (ref 0.5–1.3)
CREAT SERPL-MCNC: 1.21 MG/DL — SIGNIFICANT CHANGE UP (ref 0.5–1.3)
CREAT SERPL-MCNC: 1.29 MG/DL — SIGNIFICANT CHANGE UP (ref 0.5–1.3)
CREAT SERPL-MCNC: 1.33 MG/DL — HIGH (ref 0.5–1.3)
CREAT SERPL-MCNC: 1.44 MG/DL — HIGH (ref 0.5–1.3)
CREAT SERPL-MCNC: 1.46 MG/DL — HIGH (ref 0.5–1.3)
CULTURE RESULTS: NO GROWTH — SIGNIFICANT CHANGE UP
CULTURE RESULTS: NO GROWTH — SIGNIFICANT CHANGE UP
CULTURE RESULTS: SIGNIFICANT CHANGE UP
DACRYOCYTES BLD QL SMEAR: SLIGHT — SIGNIFICANT CHANGE UP
DIFF PNL FLD: NEGATIVE — SIGNIFICANT CHANGE UP
ELLIPTOCYTES BLD QL SMEAR: SLIGHT — SIGNIFICANT CHANGE UP
ELLIPTOCYTES BLD QL SMEAR: SLIGHT — SIGNIFICANT CHANGE UP
EOSINOPHIL # BLD AUTO: 0 K/UL — SIGNIFICANT CHANGE UP (ref 0–0.5)
EOSINOPHIL # BLD AUTO: 0.01 K/UL — SIGNIFICANT CHANGE UP (ref 0–0.5)
EOSINOPHIL # BLD AUTO: 0.09 K/UL — SIGNIFICANT CHANGE UP (ref 0–0.5)
EOSINOPHIL # BLD AUTO: 0.21 K/UL — SIGNIFICANT CHANGE UP (ref 0–0.5)
EOSINOPHIL # BLD AUTO: 0.25 K/UL — SIGNIFICANT CHANGE UP (ref 0–0.5)
EOSINOPHIL # BLD AUTO: 0.28 K/UL — SIGNIFICANT CHANGE UP (ref 0–0.5)
EOSINOPHIL # BLD AUTO: SIGNIFICANT CHANGE UP K/UL (ref 0–0.5)
EOSINOPHIL # FLD: 13 % — SIGNIFICANT CHANGE UP
EOSINOPHIL NFR BLD AUTO: 0 % — SIGNIFICANT CHANGE UP (ref 0–6)
EOSINOPHIL NFR BLD AUTO: 0.5 % — SIGNIFICANT CHANGE UP (ref 0–6)
EOSINOPHIL NFR BLD AUTO: 0.6 % — SIGNIFICANT CHANGE UP (ref 0–6)
EOSINOPHIL NFR BLD AUTO: 1 % — SIGNIFICANT CHANGE UP (ref 0–6)
EOSINOPHIL NFR BLD AUTO: 1 % — SIGNIFICANT CHANGE UP (ref 0–6)
EOSINOPHIL NFR BLD AUTO: 1.2 % — SIGNIFICANT CHANGE UP (ref 0–6)
EOSINOPHIL NFR BLD AUTO: 2 % — SIGNIFICANT CHANGE UP (ref 0–6)
EPI CELLS # UR: SIGNIFICANT CHANGE UP
FLUID INTAKE SUBSTANCE CLASS: SIGNIFICANT CHANGE UP
FLUID SEGMENTED GRANULOCYTES: 15 % — SIGNIFICANT CHANGE UP
FLUID SEGMENTED GRANULOCYTES: 3 % — SIGNIFICANT CHANGE UP
FLUID SEGMENTED GRANULOCYTES: 8 % — SIGNIFICANT CHANGE UP
GLUCOSE BLDC GLUCOMTR-MCNC: 105 MG/DL — HIGH (ref 70–99)
GLUCOSE BLDC GLUCOMTR-MCNC: 107 MG/DL — HIGH (ref 70–99)
GLUCOSE BLDC GLUCOMTR-MCNC: 109 MG/DL — HIGH (ref 70–99)
GLUCOSE BLDC GLUCOMTR-MCNC: 110 MG/DL — HIGH (ref 70–99)
GLUCOSE BLDC GLUCOMTR-MCNC: 111 MG/DL — HIGH (ref 70–99)
GLUCOSE BLDC GLUCOMTR-MCNC: 113 MG/DL — HIGH (ref 70–99)
GLUCOSE BLDC GLUCOMTR-MCNC: 114 MG/DL — HIGH (ref 70–99)
GLUCOSE BLDC GLUCOMTR-MCNC: 116 MG/DL — HIGH (ref 70–99)
GLUCOSE BLDC GLUCOMTR-MCNC: 117 MG/DL — HIGH (ref 70–99)
GLUCOSE BLDC GLUCOMTR-MCNC: 125 MG/DL — HIGH (ref 70–99)
GLUCOSE BLDC GLUCOMTR-MCNC: 129 MG/DL — HIGH (ref 70–99)
GLUCOSE BLDC GLUCOMTR-MCNC: 135 MG/DL — HIGH (ref 70–99)
GLUCOSE BLDC GLUCOMTR-MCNC: 136 MG/DL — HIGH (ref 70–99)
GLUCOSE BLDC GLUCOMTR-MCNC: 136 MG/DL — HIGH (ref 70–99)
GLUCOSE BLDC GLUCOMTR-MCNC: 143 MG/DL — HIGH (ref 70–99)
GLUCOSE BLDC GLUCOMTR-MCNC: 147 MG/DL — HIGH (ref 70–99)
GLUCOSE BLDC GLUCOMTR-MCNC: 149 MG/DL — HIGH (ref 70–99)
GLUCOSE BLDC GLUCOMTR-MCNC: 153 MG/DL — HIGH (ref 70–99)
GLUCOSE BLDC GLUCOMTR-MCNC: 153 MG/DL — HIGH (ref 70–99)
GLUCOSE BLDC GLUCOMTR-MCNC: 154 MG/DL — HIGH (ref 70–99)
GLUCOSE BLDC GLUCOMTR-MCNC: 157 MG/DL — HIGH (ref 70–99)
GLUCOSE BLDC GLUCOMTR-MCNC: 157 MG/DL — HIGH (ref 70–99)
GLUCOSE BLDC GLUCOMTR-MCNC: 158 MG/DL — HIGH (ref 70–99)
GLUCOSE BLDC GLUCOMTR-MCNC: 159 MG/DL — HIGH (ref 70–99)
GLUCOSE BLDC GLUCOMTR-MCNC: 161 MG/DL — HIGH (ref 70–99)
GLUCOSE BLDC GLUCOMTR-MCNC: 167 MG/DL — HIGH (ref 70–99)
GLUCOSE BLDC GLUCOMTR-MCNC: 167 MG/DL — HIGH (ref 70–99)
GLUCOSE BLDC GLUCOMTR-MCNC: 170 MG/DL — HIGH (ref 70–99)
GLUCOSE BLDC GLUCOMTR-MCNC: 175 MG/DL — HIGH (ref 70–99)
GLUCOSE BLDC GLUCOMTR-MCNC: 183 MG/DL — HIGH (ref 70–99)
GLUCOSE BLDC GLUCOMTR-MCNC: 191 MG/DL — HIGH (ref 70–99)
GLUCOSE BLDC GLUCOMTR-MCNC: 191 MG/DL — HIGH (ref 70–99)
GLUCOSE BLDC GLUCOMTR-MCNC: 192 MG/DL — HIGH (ref 70–99)
GLUCOSE BLDC GLUCOMTR-MCNC: 195 MG/DL — HIGH (ref 70–99)
GLUCOSE BLDC GLUCOMTR-MCNC: 198 MG/DL — HIGH (ref 70–99)
GLUCOSE BLDC GLUCOMTR-MCNC: 199 MG/DL — HIGH (ref 70–99)
GLUCOSE BLDC GLUCOMTR-MCNC: 199 MG/DL — HIGH (ref 70–99)
GLUCOSE BLDC GLUCOMTR-MCNC: 206 MG/DL — HIGH (ref 70–99)
GLUCOSE BLDC GLUCOMTR-MCNC: 209 MG/DL — HIGH (ref 70–99)
GLUCOSE BLDC GLUCOMTR-MCNC: 211 MG/DL — HIGH (ref 70–99)
GLUCOSE BLDC GLUCOMTR-MCNC: 212 MG/DL — HIGH (ref 70–99)
GLUCOSE BLDC GLUCOMTR-MCNC: 215 MG/DL — HIGH (ref 70–99)
GLUCOSE BLDC GLUCOMTR-MCNC: 226 MG/DL — HIGH (ref 70–99)
GLUCOSE BLDC GLUCOMTR-MCNC: 227 MG/DL — HIGH (ref 70–99)
GLUCOSE BLDC GLUCOMTR-MCNC: 227 MG/DL — HIGH (ref 70–99)
GLUCOSE BLDC GLUCOMTR-MCNC: 235 MG/DL — HIGH (ref 70–99)
GLUCOSE BLDC GLUCOMTR-MCNC: 237 MG/DL — HIGH (ref 70–99)
GLUCOSE BLDC GLUCOMTR-MCNC: 241 MG/DL — HIGH (ref 70–99)
GLUCOSE BLDC GLUCOMTR-MCNC: 250 MG/DL — HIGH (ref 70–99)
GLUCOSE BLDC GLUCOMTR-MCNC: 256 MG/DL — HIGH (ref 70–99)
GLUCOSE BLDC GLUCOMTR-MCNC: 258 MG/DL — HIGH (ref 70–99)
GLUCOSE BLDC GLUCOMTR-MCNC: 258 MG/DL — HIGH (ref 70–99)
GLUCOSE BLDC GLUCOMTR-MCNC: 259 MG/DL — HIGH (ref 70–99)
GLUCOSE BLDC GLUCOMTR-MCNC: 265 MG/DL — HIGH (ref 70–99)
GLUCOSE BLDC GLUCOMTR-MCNC: 267 MG/DL — HIGH (ref 70–99)
GLUCOSE BLDC GLUCOMTR-MCNC: 269 MG/DL — HIGH (ref 70–99)
GLUCOSE BLDC GLUCOMTR-MCNC: 272 MG/DL — HIGH (ref 70–99)
GLUCOSE BLDC GLUCOMTR-MCNC: 297 MG/DL — HIGH (ref 70–99)
GLUCOSE BLDC GLUCOMTR-MCNC: 300 MG/DL — HIGH (ref 70–99)
GLUCOSE BLDC GLUCOMTR-MCNC: 306 MG/DL — HIGH (ref 70–99)
GLUCOSE BLDC GLUCOMTR-MCNC: 306 MG/DL — HIGH (ref 70–99)
GLUCOSE BLDC GLUCOMTR-MCNC: 308 MG/DL — HIGH (ref 70–99)
GLUCOSE BLDC GLUCOMTR-MCNC: 312 MG/DL — HIGH (ref 70–99)
GLUCOSE BLDC GLUCOMTR-MCNC: 318 MG/DL — HIGH (ref 70–99)
GLUCOSE BLDC GLUCOMTR-MCNC: 324 MG/DL — HIGH (ref 70–99)
GLUCOSE BLDC GLUCOMTR-MCNC: 326 MG/DL — HIGH (ref 70–99)
GLUCOSE BLDC GLUCOMTR-MCNC: 328 MG/DL — HIGH (ref 70–99)
GLUCOSE BLDC GLUCOMTR-MCNC: 334 MG/DL — HIGH (ref 70–99)
GLUCOSE BLDC GLUCOMTR-MCNC: 345 MG/DL — HIGH (ref 70–99)
GLUCOSE BLDC GLUCOMTR-MCNC: 386 MG/DL — HIGH (ref 70–99)
GLUCOSE BLDC GLUCOMTR-MCNC: 407 MG/DL — HIGH (ref 70–99)
GLUCOSE BLDC GLUCOMTR-MCNC: 424 MG/DL — HIGH (ref 70–99)
GLUCOSE BLDC GLUCOMTR-MCNC: 436 MG/DL — HIGH (ref 70–99)
GLUCOSE BLDC GLUCOMTR-MCNC: 58 MG/DL — LOW (ref 70–99)
GLUCOSE BLDC GLUCOMTR-MCNC: 58 MG/DL — LOW (ref 70–99)
GLUCOSE BLDC GLUCOMTR-MCNC: 59 MG/DL — LOW (ref 70–99)
GLUCOSE BLDC GLUCOMTR-MCNC: 65 MG/DL — LOW (ref 70–99)
GLUCOSE BLDC GLUCOMTR-MCNC: 68 MG/DL — LOW (ref 70–99)
GLUCOSE BLDC GLUCOMTR-MCNC: 68 MG/DL — LOW (ref 70–99)
GLUCOSE BLDC GLUCOMTR-MCNC: 74 MG/DL — SIGNIFICANT CHANGE UP (ref 70–99)
GLUCOSE BLDC GLUCOMTR-MCNC: 77 MG/DL — SIGNIFICANT CHANGE UP (ref 70–99)
GLUCOSE BLDC GLUCOMTR-MCNC: 79 MG/DL — SIGNIFICANT CHANGE UP (ref 70–99)
GLUCOSE BLDC GLUCOMTR-MCNC: 81 MG/DL — SIGNIFICANT CHANGE UP (ref 70–99)
GLUCOSE BLDC GLUCOMTR-MCNC: 83 MG/DL — SIGNIFICANT CHANGE UP (ref 70–99)
GLUCOSE BLDC GLUCOMTR-MCNC: 90 MG/DL — SIGNIFICANT CHANGE UP (ref 70–99)
GLUCOSE BLDC GLUCOMTR-MCNC: 91 MG/DL — SIGNIFICANT CHANGE UP (ref 70–99)
GLUCOSE BLDC GLUCOMTR-MCNC: 92 MG/DL — SIGNIFICANT CHANGE UP (ref 70–99)
GLUCOSE BLDC GLUCOMTR-MCNC: 95 MG/DL — SIGNIFICANT CHANGE UP (ref 70–99)
GLUCOSE BLDC GLUCOMTR-MCNC: 97 MG/DL — SIGNIFICANT CHANGE UP (ref 70–99)
GLUCOSE FLD-MCNC: 201 MG/DL — SIGNIFICANT CHANGE UP
GLUCOSE FLD-MCNC: 218 MG/DL — SIGNIFICANT CHANGE UP
GLUCOSE FLD-MCNC: 303 MG/DL — SIGNIFICANT CHANGE UP
GLUCOSE SERPL-MCNC: 106 MG/DL — HIGH (ref 70–99)
GLUCOSE SERPL-MCNC: 107 MG/DL — HIGH (ref 70–99)
GLUCOSE SERPL-MCNC: 113 MG/DL — HIGH (ref 70–99)
GLUCOSE SERPL-MCNC: 115 MG/DL — HIGH (ref 70–99)
GLUCOSE SERPL-MCNC: 130 MG/DL — HIGH (ref 70–99)
GLUCOSE SERPL-MCNC: 138 MG/DL — HIGH (ref 70–99)
GLUCOSE SERPL-MCNC: 142 MG/DL — HIGH (ref 70–99)
GLUCOSE SERPL-MCNC: 143 MG/DL — HIGH (ref 70–99)
GLUCOSE SERPL-MCNC: 150 MG/DL — HIGH (ref 70–99)
GLUCOSE SERPL-MCNC: 152 MG/DL — HIGH (ref 70–99)
GLUCOSE SERPL-MCNC: 154 MG/DL — HIGH (ref 70–99)
GLUCOSE SERPL-MCNC: 162 MG/DL — HIGH (ref 70–99)
GLUCOSE SERPL-MCNC: 163 MG/DL — HIGH (ref 70–99)
GLUCOSE SERPL-MCNC: 165 MG/DL — HIGH (ref 70–99)
GLUCOSE SERPL-MCNC: 166 MG/DL — HIGH (ref 70–99)
GLUCOSE SERPL-MCNC: 212 MG/DL — HIGH (ref 70–99)
GLUCOSE SERPL-MCNC: 213 MG/DL — HIGH (ref 70–99)
GLUCOSE SERPL-MCNC: 218 MG/DL — HIGH (ref 70–99)
GLUCOSE SERPL-MCNC: 263 MG/DL — HIGH (ref 70–99)
GLUCOSE SERPL-MCNC: 273 MG/DL — HIGH (ref 70–99)
GLUCOSE SERPL-MCNC: 66 MG/DL — LOW (ref 70–99)
GLUCOSE SERPL-MCNC: 69 MG/DL — LOW (ref 70–99)
GLUCOSE SERPL-MCNC: 70 MG/DL — SIGNIFICANT CHANGE UP (ref 70–99)
GLUCOSE SERPL-MCNC: 70 MG/DL — SIGNIFICANT CHANGE UP (ref 70–99)
GLUCOSE SERPL-MCNC: 75 MG/DL — SIGNIFICANT CHANGE UP (ref 70–99)
GLUCOSE SERPL-MCNC: 80 MG/DL — SIGNIFICANT CHANGE UP (ref 70–99)
GLUCOSE SERPL-MCNC: 86 MG/DL — SIGNIFICANT CHANGE UP (ref 70–99)
GLUCOSE UR QL: NEGATIVE MG/DL — SIGNIFICANT CHANGE UP
GRAM STN FLD: SIGNIFICANT CHANGE UP
HAV IGM SER-ACNC: SIGNIFICANT CHANGE UP
HBA1C BLD-MCNC: 6.9 % — HIGH (ref 4–5.6)
HBA1C BLD-MCNC: 7.2 % — HIGH (ref 4–5.6)
HBV CORE IGM SER-ACNC: SIGNIFICANT CHANGE UP
HBV DNA # SERPL NAA+PROBE: SIGNIFICANT CHANGE UP
HBV DNA # SERPL NAA+PROBE: SIGNIFICANT CHANGE UP IU/ML
HBV DNA SERPL NAA+PROBE-LOG#: SIGNIFICANT CHANGE UP LOGIU/ML
HBV DNA SERPL NAA+PROBE-LOG#: SIGNIFICANT CHANGE UP LOGIU/ML
HBV SURFACE AG SER-ACNC: SIGNIFICANT CHANGE UP
HCO3 BLDV-SCNC: 28 MMOL/L — SIGNIFICANT CHANGE UP (ref 21–29)
HCO3 BLDV-SCNC: 28 MMOL/L — SIGNIFICANT CHANGE UP (ref 21–29)
HCT VFR BLD CALC: 31.4 % — LOW (ref 39–50)
HCT VFR BLD CALC: 33.2 % — LOW (ref 39–50)
HCT VFR BLD CALC: 34.7 % — LOW (ref 39–50)
HCT VFR BLD CALC: 35 % — LOW (ref 39–50)
HCT VFR BLD CALC: 35.3 % — LOW (ref 39–50)
HCT VFR BLD CALC: 35.7 % — LOW (ref 39–50)
HCT VFR BLD CALC: 35.9 % — LOW (ref 39–50)
HCT VFR BLD CALC: 36 % — LOW (ref 39–50)
HCT VFR BLD CALC: 36.1 % — LOW (ref 39–50)
HCT VFR BLD CALC: 36.7 % — LOW (ref 39–50)
HCT VFR BLD CALC: 36.7 % — LOW (ref 39–50)
HCT VFR BLD CALC: 37 % — LOW (ref 39–50)
HCT VFR BLD CALC: 37.2 % — LOW (ref 39–50)
HCT VFR BLD CALC: 37.6 % — LOW (ref 39–50)
HCT VFR BLD CALC: 37.7 % — LOW (ref 39–50)
HCT VFR BLD CALC: 37.9 % — LOW (ref 39–50)
HCT VFR BLD CALC: 37.9 % — LOW (ref 39–50)
HCT VFR BLD CALC: 38 % — LOW (ref 39–50)
HCT VFR BLD CALC: 38.2 % — LOW (ref 39–50)
HCT VFR BLD CALC: 38.5 % — LOW (ref 39–50)
HCT VFR BLD CALC: 39 % — SIGNIFICANT CHANGE UP (ref 39–50)
HCT VFR BLD CALC: 40.2 % — SIGNIFICANT CHANGE UP (ref 39–50)
HCT VFR BLD CALC: 41.7 % — SIGNIFICANT CHANGE UP (ref 39–50)
HCT VFR BLD CALC: 43.3 % — SIGNIFICANT CHANGE UP (ref 39–50)
HCV AB S/CO SERPL IA: 0.14 S/CO — SIGNIFICANT CHANGE UP
HCV AB SERPL-IMP: SIGNIFICANT CHANGE UP
HDLC SERPL-MCNC: 25 MG/DL — LOW
HGB BLD-MCNC: 10.3 G/DL — LOW (ref 13–17)
HGB BLD-MCNC: 10.7 G/DL — LOW (ref 13–17)
HGB BLD-MCNC: 10.9 G/DL — LOW (ref 13–17)
HGB BLD-MCNC: 11 G/DL — LOW (ref 13–17)
HGB BLD-MCNC: 11 G/DL — LOW (ref 13–17)
HGB BLD-MCNC: 11.1 G/DL — LOW (ref 13–17)
HGB BLD-MCNC: 11.2 G/DL — LOW (ref 13–17)
HGB BLD-MCNC: 11.2 G/DL — LOW (ref 13–17)
HGB BLD-MCNC: 11.3 G/DL — LOW (ref 13–17)
HGB BLD-MCNC: 11.3 G/DL — LOW (ref 13–17)
HGB BLD-MCNC: 11.4 G/DL — LOW (ref 13–17)
HGB BLD-MCNC: 11.6 G/DL — LOW (ref 13–17)
HGB BLD-MCNC: 11.7 G/DL — LOW (ref 13–17)
HGB BLD-MCNC: 11.8 G/DL — LOW (ref 13–17)
HGB BLD-MCNC: 11.9 G/DL — LOW (ref 13–17)
HGB BLD-MCNC: 12 G/DL — LOW (ref 13–17)
HGB BLD-MCNC: 12.1 G/DL — LOW (ref 13–17)
HGB BLD-MCNC: 12.3 G/DL — LOW (ref 13–17)
HGB BLD-MCNC: 12.4 G/DL — LOW (ref 13–17)
HGB BLD-MCNC: 12.6 G/DL — LOW (ref 13–17)
HGB BLD-MCNC: 13.2 G/DL — SIGNIFICANT CHANGE UP (ref 13–17)
HGB BLD-MCNC: 13.5 G/DL — SIGNIFICANT CHANGE UP (ref 13–17)
HGB BLD-MCNC: 14 G/DL — SIGNIFICANT CHANGE UP (ref 13–17)
HIV 1+2 AB+HIV1 P24 AG SERPL QL IA: SIGNIFICANT CHANGE UP
HYALINE CASTS # UR AUTO: ABNORMAL /LPF
HYPOCHROMIA BLD QL: SLIGHT — SIGNIFICANT CHANGE UP
IGA FLD-MCNC: 7 MG/DL — LOW (ref 84–499)
IGA FLD-MCNC: 7 MG/DL — LOW (ref 84–499)
IGG FLD-MCNC: 578 MG/DL — LOW (ref 610–1660)
IGG FLD-MCNC: 689 MG/DL — SIGNIFICANT CHANGE UP (ref 610–1660)
IGM SERPL-MCNC: 22 MG/DL — LOW (ref 35–242)
IGM SERPL-MCNC: 30 MG/DL — LOW (ref 35–242)
IMM GRANULOCYTES NFR BLD AUTO: 0.2 % — SIGNIFICANT CHANGE UP (ref 0–1.5)
IMM GRANULOCYTES NFR BLD AUTO: 0.3 % — SIGNIFICANT CHANGE UP (ref 0–1.5)
IMM GRANULOCYTES NFR BLD AUTO: 0.4 % — SIGNIFICANT CHANGE UP (ref 0–1.5)
INR BLD: 1.78 RATIO — HIGH (ref 0.88–1.16)
INR BLD: 1.8 RATIO — HIGH (ref 0.88–1.16)
INR BLD: 1.84 RATIO — HIGH (ref 0.88–1.16)
INR BLD: 1.97 RATIO — HIGH (ref 0.88–1.16)
INR BLD: 2.01 RATIO — HIGH (ref 0.88–1.16)
INR BLD: 2.1 RATIO — HIGH (ref 0.88–1.16)
INR BLD: 2.1 RATIO — HIGH (ref 0.88–1.16)
INR BLD: 2.35 RATIO — HIGH (ref 0.88–1.16)
INR BLD: 2.36 RATIO — HIGH (ref 0.88–1.16)
INR BLD: 2.47 RATIO — HIGH (ref 0.88–1.16)
INR BLD: 2.5 RATIO — HIGH (ref 0.88–1.16)
INR BLD: 2.53 RATIO — HIGH (ref 0.88–1.16)
INR BLD: 2.6 RATIO — HIGH (ref 0.88–1.16)
INR BLD: 2.7 RATIO — HIGH (ref 0.88–1.16)
INR BLD: 2.82 RATIO — HIGH (ref 0.88–1.16)
INR BLD: 2.84 RATIO — HIGH (ref 0.88–1.16)
INR BLD: 3.3 RATIO — HIGH (ref 0.88–1.16)
INR BLD: 3.85 RATIO — HIGH (ref 0.88–1.16)
INR BLD: 4.31 RATIO — HIGH (ref 0.88–1.16)
INR BLD: 4.42 RATIO — HIGH (ref 0.88–1.16)
INR BLD: 4.52 RATIO — HIGH (ref 0.88–1.16)
KAPPA LC SER QL IFE: 4.15 MG/DL — HIGH (ref 0.33–1.94)
KAPPA LC SER QL IFE: 6.02 MG/DL — HIGH (ref 0.33–1.94)
KAPPA/LAMBDA FREE LIGHT CHAIN RATIO, SERUM: 20.76 RATIO — HIGH (ref 0.26–1.65)
KAPPA/LAMBDA FREE LIGHT CHAIN RATIO, SERUM: 7.83 RATIO — HIGH (ref 0.26–1.65)
KETONES UR-MCNC: NEGATIVE — SIGNIFICANT CHANGE UP
LACTATE SERPL-SCNC: 1.2 MMOL/L — SIGNIFICANT CHANGE UP (ref 0.7–2)
LACTATE SERPL-SCNC: 1.3 MMOL/L — SIGNIFICANT CHANGE UP (ref 0.7–2)
LACTATE SERPL-SCNC: 1.5 MMOL/L — SIGNIFICANT CHANGE UP (ref 0.7–2)
LACTATE SERPL-SCNC: 1.8 MMOL/L — SIGNIFICANT CHANGE UP (ref 0.7–2)
LAMBDA LC SER QL IFE: 0.29 MG/DL — LOW (ref 0.57–2.63)
LAMBDA LC SER QL IFE: 0.53 MG/DL — LOW (ref 0.57–2.63)
LDH SERPL L TO P-CCNC: 142 U/L — SIGNIFICANT CHANGE UP
LDH SERPL L TO P-CCNC: 211 U/L — SIGNIFICANT CHANGE UP
LDH SERPL L TO P-CCNC: 238 U/L — SIGNIFICANT CHANGE UP (ref 84–241)
LDH SERPL L TO P-CCNC: 271 U/L — HIGH (ref 84–241)
LDH SERPL L TO P-CCNC: 85 U/L — SIGNIFICANT CHANGE UP
LEUKOCYTE ESTERASE UR-ACNC: ABNORMAL
LEUKOCYTE ESTERASE UR-ACNC: NEGATIVE — SIGNIFICANT CHANGE UP
LEUKOCYTE ESTERASE UR-ACNC: NEGATIVE — SIGNIFICANT CHANGE UP
LIPID PNL WITH DIRECT LDL SERPL: 37 MG/DL — SIGNIFICANT CHANGE UP
LYMPHOCYTES # BLD AUTO: 11.45 K/UL — HIGH (ref 1–3.3)
LYMPHOCYTES # BLD AUTO: 12.71 K/UL — HIGH (ref 1–3.3)
LYMPHOCYTES # BLD AUTO: 13.37 K/UL — HIGH (ref 1–3.3)
LYMPHOCYTES # BLD AUTO: 20.14 K/UL — HIGH (ref 1–3.3)
LYMPHOCYTES # BLD AUTO: 20.81 K/UL — HIGH (ref 1–3.3)
LYMPHOCYTES # BLD AUTO: 22.64 K/UL — HIGH (ref 1–3.3)
LYMPHOCYTES # BLD AUTO: 52 % — HIGH (ref 13–44)
LYMPHOCYTES # BLD AUTO: 6.48 K/UL — HIGH (ref 1–3.3)
LYMPHOCYTES # BLD AUTO: 65 % — HIGH (ref 13–44)
LYMPHOCYTES # BLD AUTO: 69 % — HIGH (ref 13–44)
LYMPHOCYTES # BLD AUTO: 71 % — HIGH (ref 13–44)
LYMPHOCYTES # BLD AUTO: 75 % — HIGH (ref 13–44)
LYMPHOCYTES # BLD AUTO: 75 % — HIGH (ref 13–44)
LYMPHOCYTES # BLD AUTO: 75.4 % — HIGH (ref 13–44)
LYMPHOCYTES # BLD AUTO: 76.1 % — HIGH (ref 13–44)
LYMPHOCYTES # BLD AUTO: 78.9 % — HIGH (ref 13–44)
LYMPHOCYTES # BLD AUTO: 82 % — HIGH (ref 13–44)
LYMPHOCYTES # BLD AUTO: 9.97 K/UL — HIGH (ref 1–3.3)
LYMPHOCYTES # BLD AUTO: SIGNIFICANT CHANGE UP K/UL (ref 1–3.3)
LYMPHOCYTES # FLD: 62 % — SIGNIFICANT CHANGE UP
LYMPHOCYTES # FLD: 65 % — SIGNIFICANT CHANGE UP
LYMPHOCYTES # FLD: 81 % — SIGNIFICANT CHANGE UP
LYMPHOCYTES # SPEC AUTO: 5 % — HIGH (ref 0–0)
MACROCYTES BLD QL: SLIGHT — SIGNIFICANT CHANGE UP
MAGNESIUM SERPL-MCNC: 1.9 MG/DL — SIGNIFICANT CHANGE UP (ref 1.6–2.6)
MAGNESIUM SERPL-MCNC: 1.9 MG/DL — SIGNIFICANT CHANGE UP (ref 1.6–2.6)
MANUAL DIF COMMENT BLD-IMP: SIGNIFICANT CHANGE UP
MANUAL DIF COMMENT BLD-IMP: SIGNIFICANT CHANGE UP
MANUAL SMEAR VERIFICATION: SIGNIFICANT CHANGE UP
MCHC RBC-ENTMCNC: 25.5 PG — LOW (ref 27–34)
MCHC RBC-ENTMCNC: 25.5 PG — LOW (ref 27–34)
MCHC RBC-ENTMCNC: 25.6 PG — LOW (ref 27–34)
MCHC RBC-ENTMCNC: 25.6 PG — LOW (ref 27–34)
MCHC RBC-ENTMCNC: 25.7 PG — LOW (ref 27–34)
MCHC RBC-ENTMCNC: 25.8 PG — LOW (ref 27–34)
MCHC RBC-ENTMCNC: 25.9 PG — LOW (ref 27–34)
MCHC RBC-ENTMCNC: 25.9 PG — LOW (ref 27–34)
MCHC RBC-ENTMCNC: 26 PG — LOW (ref 27–34)
MCHC RBC-ENTMCNC: 26.1 PG — LOW (ref 27–34)
MCHC RBC-ENTMCNC: 26.1 PG — LOW (ref 27–34)
MCHC RBC-ENTMCNC: 26.2 PG — LOW (ref 27–34)
MCHC RBC-ENTMCNC: 27.5 PG — SIGNIFICANT CHANGE UP (ref 27–34)
MCHC RBC-ENTMCNC: 27.6 PG — SIGNIFICANT CHANGE UP (ref 27–34)
MCHC RBC-ENTMCNC: 28.2 PG — SIGNIFICANT CHANGE UP (ref 27–34)
MCHC RBC-ENTMCNC: 28.3 PG — SIGNIFICANT CHANGE UP (ref 27–34)
MCHC RBC-ENTMCNC: 28.4 PG — SIGNIFICANT CHANGE UP (ref 27–34)
MCHC RBC-ENTMCNC: 28.7 PG — SIGNIFICANT CHANGE UP (ref 27–34)
MCHC RBC-ENTMCNC: 28.8 PG — SIGNIFICANT CHANGE UP (ref 27–34)
MCHC RBC-ENTMCNC: 29 PG — SIGNIFICANT CHANGE UP (ref 27–34)
MCHC RBC-ENTMCNC: 29.2 PG — SIGNIFICANT CHANGE UP (ref 27–34)
MCHC RBC-ENTMCNC: 31.1 GM/DL — LOW (ref 32–36)
MCHC RBC-ENTMCNC: 31.2 GM/DL — LOW (ref 32–36)
MCHC RBC-ENTMCNC: 31.3 GM/DL — LOW (ref 32–36)
MCHC RBC-ENTMCNC: 31.4 GM/DL — LOW (ref 32–36)
MCHC RBC-ENTMCNC: 31.6 GM/DL — LOW (ref 32–36)
MCHC RBC-ENTMCNC: 31.7 GM/DL — LOW (ref 32–36)
MCHC RBC-ENTMCNC: 31.9 GM/DL — LOW (ref 32–36)
MCHC RBC-ENTMCNC: 31.9 GM/DL — LOW (ref 32–36)
MCHC RBC-ENTMCNC: 32 GM/DL — SIGNIFICANT CHANGE UP (ref 32–36)
MCHC RBC-ENTMCNC: 32 GM/DL — SIGNIFICANT CHANGE UP (ref 32–36)
MCHC RBC-ENTMCNC: 32.1 GM/DL — SIGNIFICANT CHANGE UP (ref 32–36)
MCHC RBC-ENTMCNC: 32.2 GM/DL — SIGNIFICANT CHANGE UP (ref 32–36)
MCHC RBC-ENTMCNC: 32.2 GM/DL — SIGNIFICANT CHANGE UP (ref 32–36)
MCHC RBC-ENTMCNC: 32.3 GM/DL — SIGNIFICANT CHANGE UP (ref 32–36)
MCHC RBC-ENTMCNC: 32.3 GM/DL — SIGNIFICANT CHANGE UP (ref 32–36)
MCHC RBC-ENTMCNC: 32.4 GM/DL — SIGNIFICANT CHANGE UP (ref 32–36)
MCHC RBC-ENTMCNC: 32.5 GM/DL — SIGNIFICANT CHANGE UP (ref 32–36)
MCHC RBC-ENTMCNC: 32.8 GM/DL — SIGNIFICANT CHANGE UP (ref 32–36)
MCHC RBC-ENTMCNC: 32.8 GM/DL — SIGNIFICANT CHANGE UP (ref 32–36)
MCV RBC AUTO: 78.6 FL — LOW (ref 80–100)
MCV RBC AUTO: 79.4 FL — LOW (ref 80–100)
MCV RBC AUTO: 79.8 FL — LOW (ref 80–100)
MCV RBC AUTO: 79.9 FL — LOW (ref 80–100)
MCV RBC AUTO: 80.3 FL — SIGNIFICANT CHANGE UP (ref 80–100)
MCV RBC AUTO: 80.6 FL — SIGNIFICANT CHANGE UP (ref 80–100)
MCV RBC AUTO: 81 FL — SIGNIFICANT CHANGE UP (ref 80–100)
MCV RBC AUTO: 81.2 FL — SIGNIFICANT CHANGE UP (ref 80–100)
MCV RBC AUTO: 81.3 FL — SIGNIFICANT CHANGE UP (ref 80–100)
MCV RBC AUTO: 81.8 FL — SIGNIFICANT CHANGE UP (ref 80–100)
MCV RBC AUTO: 82 FL — SIGNIFICANT CHANGE UP (ref 80–100)
MCV RBC AUTO: 82.1 FL — SIGNIFICANT CHANGE UP (ref 80–100)
MCV RBC AUTO: 82.1 FL — SIGNIFICANT CHANGE UP (ref 80–100)
MCV RBC AUTO: 82.4 FL — SIGNIFICANT CHANGE UP (ref 80–100)
MCV RBC AUTO: 82.9 FL — SIGNIFICANT CHANGE UP (ref 80–100)
MCV RBC AUTO: 83.5 FL — SIGNIFICANT CHANGE UP (ref 80–100)
MCV RBC AUTO: 84.3 FL — SIGNIFICANT CHANGE UP (ref 80–100)
MCV RBC AUTO: 85.9 FL — SIGNIFICANT CHANGE UP (ref 80–100)
MCV RBC AUTO: 86.4 FL — SIGNIFICANT CHANGE UP (ref 80–100)
MCV RBC AUTO: 87.5 FL — SIGNIFICANT CHANGE UP (ref 80–100)
MCV RBC AUTO: 88.7 FL — SIGNIFICANT CHANGE UP (ref 80–100)
MCV RBC AUTO: 89.8 FL — SIGNIFICANT CHANGE UP (ref 80–100)
MCV RBC AUTO: 90.4 FL — SIGNIFICANT CHANGE UP (ref 80–100)
MCV RBC AUTO: 90.5 FL — SIGNIFICANT CHANGE UP (ref 80–100)
MCV RBC AUTO: 91.2 FL — SIGNIFICANT CHANGE UP (ref 80–100)
MCV RBC AUTO: 91.6 FL — SIGNIFICANT CHANGE UP (ref 80–100)
MESOTHL CELL # FLD: 1 % — SIGNIFICANT CHANGE UP
MESOTHL CELL # FLD: 1 % — SIGNIFICANT CHANGE UP
METHOD TYPE: SIGNIFICANT CHANGE UP
MICROCYTES BLD QL: SLIGHT — SIGNIFICANT CHANGE UP
MONOCYTES # BLD AUTO: 0.4 K/UL — SIGNIFICANT CHANGE UP (ref 0–0.9)
MONOCYTES # BLD AUTO: 0.48 K/UL — SIGNIFICANT CHANGE UP (ref 0–0.9)
MONOCYTES # BLD AUTO: 0.5 K/UL — SIGNIFICANT CHANGE UP (ref 0–0.9)
MONOCYTES # BLD AUTO: 0.55 K/UL — SIGNIFICANT CHANGE UP (ref 0–0.9)
MONOCYTES # BLD AUTO: 0.56 K/UL — SIGNIFICANT CHANGE UP (ref 0–0.9)
MONOCYTES # BLD AUTO: 0.83 K/UL — SIGNIFICANT CHANGE UP (ref 0–0.9)
MONOCYTES # BLD AUTO: 1.54 K/UL — HIGH (ref 0–0.9)
MONOCYTES # BLD AUTO: 1.87 K/UL — HIGH (ref 0–0.9)
MONOCYTES # BLD AUTO: SIGNIFICANT CHANGE UP K/UL (ref 0–0.9)
MONOCYTES NFR BLD AUTO: 15 % — HIGH (ref 2–14)
MONOCYTES NFR BLD AUTO: 2 % — SIGNIFICANT CHANGE UP (ref 2–14)
MONOCYTES NFR BLD AUTO: 2 % — SIGNIFICANT CHANGE UP (ref 2–14)
MONOCYTES NFR BLD AUTO: 3 % — SIGNIFICANT CHANGE UP (ref 2–14)
MONOCYTES NFR BLD AUTO: 3.3 % — SIGNIFICANT CHANGE UP (ref 2–14)
MONOCYTES NFR BLD AUTO: 5.8 % — SIGNIFICANT CHANGE UP (ref 2–14)
MONOCYTES NFR BLD AUTO: 9 % — SIGNIFICANT CHANGE UP (ref 2–14)
MONOS+MACROS # FLD: 15 % — SIGNIFICANT CHANGE UP
MONOS+MACROS # FLD: 16 % — SIGNIFICANT CHANGE UP
MONOS+MACROS # FLD: 20 % — SIGNIFICANT CHANGE UP
NEUTROPHILS # BLD AUTO: 2.66 K/UL — SIGNIFICANT CHANGE UP (ref 1.8–7.4)
NEUTROPHILS # BLD AUTO: 2.86 K/UL — SIGNIFICANT CHANGE UP (ref 1.8–7.4)
NEUTROPHILS # BLD AUTO: 3.24 K/UL — SIGNIFICANT CHANGE UP (ref 1.8–7.4)
NEUTROPHILS # BLD AUTO: 3.27 K/UL — SIGNIFICANT CHANGE UP (ref 1.8–7.4)
NEUTROPHILS # BLD AUTO: 3.59 K/UL — SIGNIFICANT CHANGE UP (ref 1.8–7.4)
NEUTROPHILS # BLD AUTO: 3.87 K/UL — SIGNIFICANT CHANGE UP (ref 1.8–7.4)
NEUTROPHILS # BLD AUTO: 4.63 K/UL — SIGNIFICANT CHANGE UP (ref 1.8–7.4)
NEUTROPHILS # BLD AUTO: 5 K/UL — SIGNIFICANT CHANGE UP (ref 1.8–7.4)
NEUTROPHILS # BLD AUTO: SIGNIFICANT CHANGE UP K/UL (ref 1.8–7.4)
NEUTROPHILS # BLD AUTO: SIGNIFICANT CHANGE UP K/UL (ref 1.8–7.4)
NEUTROPHILS NFR BLD AUTO: 13 % — LOW (ref 43–77)
NEUTROPHILS NFR BLD AUTO: 16 % — LOW (ref 43–77)
NEUTROPHILS NFR BLD AUTO: 16.8 % — LOW (ref 43–77)
NEUTROPHILS NFR BLD AUTO: 17.5 % — LOW (ref 43–77)
NEUTROPHILS NFR BLD AUTO: 19.5 % — LOW (ref 43–77)
NEUTROPHILS NFR BLD AUTO: 20 % — LOW (ref 43–77)
NEUTROPHILS NFR BLD AUTO: 24 % — LOW (ref 43–77)
NEUTROPHILS NFR BLD AUTO: 26 % — LOW (ref 43–77)
NEUTS BAND # BLD: 2 % — SIGNIFICANT CHANGE UP (ref 0–8)
NEUTS BAND # BLD: 5 % — SIGNIFICANT CHANGE UP (ref 0–8)
NIGHT BLUE STAIN TISS: SIGNIFICANT CHANGE UP
NITRITE UR-MCNC: NEGATIVE — SIGNIFICANT CHANGE UP
NON-GYN CYTOLOGY SPEC: SIGNIFICANT CHANGE UP
NRBC # BLD: 0 /100 WBCS — SIGNIFICANT CHANGE UP (ref 0–0)
NRBC # BLD: 0 /100 — SIGNIFICANT CHANGE UP (ref 0–0)
NRBC # BLD: SIGNIFICANT CHANGE UP /100 WBCS (ref 0–0)
NT-PROBNP SERPL-SCNC: 182 PG/ML — SIGNIFICANT CHANGE UP (ref 0–450)
NT-PROBNP SERPL-SCNC: 516 PG/ML — HIGH (ref 0–450)
NT-PROBNP SERPL-SCNC: 521 PG/ML — HIGH (ref 0–450)
NT-PROBNP SERPL-SCNC: 672 PG/ML — HIGH (ref 0–450)
ORGANISM # SPEC MICROSCOPIC CNT: SIGNIFICANT CHANGE UP
ORGANISM # SPEC MICROSCOPIC CNT: SIGNIFICANT CHANGE UP
OVALOCYTES BLD QL SMEAR: SLIGHT — SIGNIFICANT CHANGE UP
OVALOCYTES BLD QL SMEAR: SLIGHT — SIGNIFICANT CHANGE UP
PCO2 BLDV: 51 MMHG — HIGH (ref 35–50)
PCO2 BLDV: 52 MMHG — HIGH (ref 35–50)
PH BLDV: 7.35 — SIGNIFICANT CHANGE UP (ref 7.35–7.45)
PH BLDV: 7.36 — SIGNIFICANT CHANGE UP (ref 7.35–7.45)
PH FLD: 7.38 — SIGNIFICANT CHANGE UP
PH FLD: 7.44 — SIGNIFICANT CHANGE UP
PH UR: 6.5 — SIGNIFICANT CHANGE UP (ref 5–8)
PH UR: 7 — SIGNIFICANT CHANGE UP (ref 5–8)
PH UR: 7 — SIGNIFICANT CHANGE UP (ref 5–8)
PHOSPHATE SERPL-MCNC: 3.4 MG/DL — SIGNIFICANT CHANGE UP (ref 2.5–4.5)
PHOSPHATE SERPL-MCNC: 3.7 MG/DL — SIGNIFICANT CHANGE UP (ref 2.5–4.5)
PLAT MORPH BLD: NORMAL — SIGNIFICANT CHANGE UP
PLATELET # BLD AUTO: 100 K/UL — LOW (ref 150–400)
PLATELET # BLD AUTO: 102 K/UL — LOW (ref 150–400)
PLATELET # BLD AUTO: 102 K/UL — LOW (ref 150–400)
PLATELET # BLD AUTO: 107 K/UL — LOW (ref 150–400)
PLATELET # BLD AUTO: 107 K/UL — LOW (ref 150–400)
PLATELET # BLD AUTO: 109 K/UL — LOW (ref 150–400)
PLATELET # BLD AUTO: 110 K/UL — LOW (ref 150–400)
PLATELET # BLD AUTO: 113 K/UL — LOW (ref 150–400)
PLATELET # BLD AUTO: 118 K/UL — LOW (ref 150–400)
PLATELET # BLD AUTO: 148 K/UL — LOW (ref 150–400)
PLATELET # BLD AUTO: 157 K/UL — SIGNIFICANT CHANGE UP (ref 150–400)
PLATELET # BLD AUTO: 163 K/UL — SIGNIFICANT CHANGE UP (ref 150–400)
PLATELET # BLD AUTO: 177 K/UL — SIGNIFICANT CHANGE UP (ref 150–400)
PLATELET # BLD AUTO: 183 K/UL — SIGNIFICANT CHANGE UP (ref 150–400)
PLATELET # BLD AUTO: 214 K/UL — SIGNIFICANT CHANGE UP (ref 150–400)
PLATELET # BLD AUTO: 86 K/UL — LOW (ref 150–400)
PLATELET # BLD AUTO: 89 K/UL — LOW (ref 150–400)
PLATELET # BLD AUTO: 90 K/UL — LOW (ref 150–400)
PLATELET # BLD AUTO: 91 K/UL — LOW (ref 150–400)
PLATELET # BLD AUTO: 92 K/UL — LOW (ref 150–400)
PLATELET # BLD AUTO: 93 K/UL — LOW (ref 150–400)
PLATELET # BLD AUTO: 94 K/UL — LOW (ref 150–400)
PLATELET # BLD AUTO: 95 K/UL — LOW (ref 150–400)
PLATELET # BLD AUTO: 96 K/UL — LOW (ref 150–400)
PO2 BLDV: 105 MMHG — HIGH (ref 25–45)
PO2 BLDV: 64 MMHG — HIGH (ref 25–45)
POIKILOCYTOSIS BLD QL AUTO: SLIGHT — SIGNIFICANT CHANGE UP
POLYCHROMASIA BLD QL SMEAR: SLIGHT — SIGNIFICANT CHANGE UP
POTASSIUM SERPL-MCNC: 3.8 MMOL/L — SIGNIFICANT CHANGE UP (ref 3.5–5.3)
POTASSIUM SERPL-MCNC: 4 MMOL/L — SIGNIFICANT CHANGE UP (ref 3.5–5.3)
POTASSIUM SERPL-MCNC: 4.1 MMOL/L — SIGNIFICANT CHANGE UP (ref 3.5–5.3)
POTASSIUM SERPL-MCNC: 4.1 MMOL/L — SIGNIFICANT CHANGE UP (ref 3.5–5.3)
POTASSIUM SERPL-MCNC: 4.3 MMOL/L — SIGNIFICANT CHANGE UP (ref 3.5–5.3)
POTASSIUM SERPL-MCNC: 4.4 MMOL/L — SIGNIFICANT CHANGE UP (ref 3.5–5.3)
POTASSIUM SERPL-MCNC: 4.4 MMOL/L — SIGNIFICANT CHANGE UP (ref 3.5–5.3)
POTASSIUM SERPL-MCNC: 4.5 MMOL/L — SIGNIFICANT CHANGE UP (ref 3.5–5.3)
POTASSIUM SERPL-MCNC: 4.6 MMOL/L — SIGNIFICANT CHANGE UP (ref 3.5–5.3)
POTASSIUM SERPL-MCNC: 4.6 MMOL/L — SIGNIFICANT CHANGE UP (ref 3.5–5.3)
POTASSIUM SERPL-MCNC: 4.7 MMOL/L — SIGNIFICANT CHANGE UP (ref 3.5–5.3)
POTASSIUM SERPL-MCNC: 4.8 MMOL/L — SIGNIFICANT CHANGE UP (ref 3.5–5.3)
POTASSIUM SERPL-MCNC: 4.9 MMOL/L — SIGNIFICANT CHANGE UP (ref 3.5–5.3)
POTASSIUM SERPL-MCNC: 5 MMOL/L — SIGNIFICANT CHANGE UP (ref 3.5–5.3)
POTASSIUM SERPL-MCNC: 5 MMOL/L — SIGNIFICANT CHANGE UP (ref 3.5–5.3)
POTASSIUM SERPL-MCNC: 5.2 MMOL/L — SIGNIFICANT CHANGE UP (ref 3.5–5.3)
POTASSIUM SERPL-MCNC: 5.5 MMOL/L — HIGH (ref 3.5–5.3)
POTASSIUM SERPL-MCNC: 5.6 MMOL/L — HIGH (ref 3.5–5.3)
POTASSIUM SERPL-SCNC: 3.8 MMOL/L — SIGNIFICANT CHANGE UP (ref 3.5–5.3)
POTASSIUM SERPL-SCNC: 4 MMOL/L — SIGNIFICANT CHANGE UP (ref 3.5–5.3)
POTASSIUM SERPL-SCNC: 4.1 MMOL/L — SIGNIFICANT CHANGE UP (ref 3.5–5.3)
POTASSIUM SERPL-SCNC: 4.1 MMOL/L — SIGNIFICANT CHANGE UP (ref 3.5–5.3)
POTASSIUM SERPL-SCNC: 4.3 MMOL/L — SIGNIFICANT CHANGE UP (ref 3.5–5.3)
POTASSIUM SERPL-SCNC: 4.4 MMOL/L — SIGNIFICANT CHANGE UP (ref 3.5–5.3)
POTASSIUM SERPL-SCNC: 4.4 MMOL/L — SIGNIFICANT CHANGE UP (ref 3.5–5.3)
POTASSIUM SERPL-SCNC: 4.5 MMOL/L — SIGNIFICANT CHANGE UP (ref 3.5–5.3)
POTASSIUM SERPL-SCNC: 4.6 MMOL/L — SIGNIFICANT CHANGE UP (ref 3.5–5.3)
POTASSIUM SERPL-SCNC: 4.6 MMOL/L — SIGNIFICANT CHANGE UP (ref 3.5–5.3)
POTASSIUM SERPL-SCNC: 4.7 MMOL/L — SIGNIFICANT CHANGE UP (ref 3.5–5.3)
POTASSIUM SERPL-SCNC: 4.8 MMOL/L — SIGNIFICANT CHANGE UP (ref 3.5–5.3)
POTASSIUM SERPL-SCNC: 4.9 MMOL/L — SIGNIFICANT CHANGE UP (ref 3.5–5.3)
POTASSIUM SERPL-SCNC: 5 MMOL/L — SIGNIFICANT CHANGE UP (ref 3.5–5.3)
POTASSIUM SERPL-SCNC: 5 MMOL/L — SIGNIFICANT CHANGE UP (ref 3.5–5.3)
POTASSIUM SERPL-SCNC: 5.2 MMOL/L — SIGNIFICANT CHANGE UP (ref 3.5–5.3)
POTASSIUM SERPL-SCNC: 5.5 MMOL/L — HIGH (ref 3.5–5.3)
POTASSIUM SERPL-SCNC: 5.6 MMOL/L — HIGH (ref 3.5–5.3)
PROCALCITONIN SERPL-MCNC: 0.18 NG/ML — HIGH (ref 0.02–0.1)
PROT FLD-MCNC: 2.7 G/DL — SIGNIFICANT CHANGE UP
PROT FLD-MCNC: 3.3 G/DL — SIGNIFICANT CHANGE UP
PROT FLD-MCNC: 3.5 G/DL — SIGNIFICANT CHANGE UP
PROT SERPL-MCNC: 5.2 GM/DL — LOW (ref 6–8.3)
PROT SERPL-MCNC: 5.5 GM/DL — LOW (ref 6–8.3)
PROT SERPL-MCNC: 5.6 GM/DL — LOW (ref 6–8.3)
PROT SERPL-MCNC: 5.6 GM/DL — LOW (ref 6–8.3)
PROT SERPL-MCNC: 5.7 GM/DL — LOW (ref 6–8.3)
PROT SERPL-MCNC: 5.8 GM/DL — LOW (ref 6–8.3)
PROT SERPL-MCNC: 5.8 GM/DL — LOW (ref 6–8.3)
PROT SERPL-MCNC: 5.9 GM/DL — LOW (ref 6–8.3)
PROT SERPL-MCNC: 6 GM/DL — SIGNIFICANT CHANGE UP (ref 6–8.3)
PROT UR-MCNC: 15 MG/DL
PROT UR-MCNC: 30 MG/DL
PROT UR-MCNC: NEGATIVE MG/DL — SIGNIFICANT CHANGE UP
PROTHROM AB SERPL-ACNC: 20.1 SEC — HIGH (ref 10–12.9)
PROTHROM AB SERPL-ACNC: 20.1 SEC — HIGH (ref 9.8–12.7)
PROTHROM AB SERPL-ACNC: 20.4 SEC — HIGH (ref 10–12.9)
PROTHROM AB SERPL-ACNC: 22.3 SEC — HIGH (ref 10–12.9)
PROTHROM AB SERPL-ACNC: 22.8 SEC — HIGH (ref 10–12.9)
PROTHROM AB SERPL-ACNC: 23.8 SEC — HIGH (ref 10–12.9)
PROTHROM AB SERPL-ACNC: 23.9 SEC — HIGH (ref 10–12.9)
PROTHROM AB SERPL-ACNC: 25.8 SEC — HIGH (ref 9.8–12.7)
PROTHROM AB SERPL-ACNC: 26.9 SEC — HIGH (ref 10–12.9)
PROTHROM AB SERPL-ACNC: 27.9 SEC — HIGH (ref 9.8–12.7)
PROTHROM AB SERPL-ACNC: 28.2 SEC — HIGH (ref 10–12.9)
PROTHROM AB SERPL-ACNC: 28.6 SEC — HIGH (ref 10–12.9)
PROTHROM AB SERPL-ACNC: 28.6 SEC — HIGH (ref 9.8–12.7)
PROTHROM AB SERPL-ACNC: 29.8 SEC — HIGH (ref 9.8–12.7)
PROTHROM AB SERPL-ACNC: 32.3 SEC — HIGH (ref 10–12.9)
PROTHROM AB SERPL-ACNC: 32.6 SEC — HIGH (ref 10–12.9)
PROTHROM AB SERPL-ACNC: 38 SEC — HIGH (ref 10–12.9)
PROTHROM AB SERPL-ACNC: 42.7 SEC — HIGH (ref 9.8–12.7)
PROTHROM AB SERPL-ACNC: 50 SEC — HIGH (ref 10–12.9)
PROTHROM AB SERPL-ACNC: 51.4 SEC — HIGH (ref 10–12.9)
PROTHROM AB SERPL-ACNC: 52.6 SEC — HIGH (ref 10–12.9)
RAPID RVP RESULT: DETECTED
RBC # BLD: 3.59 M/UL — LOW (ref 4.2–5.8)
RBC # BLD: 3.67 M/UL — LOW (ref 4.2–5.8)
RBC # BLD: 3.79 M/UL — LOW (ref 4.2–5.8)
RBC # BLD: 3.96 M/UL — LOW (ref 4.2–5.8)
RBC # BLD: 3.97 M/UL — LOW (ref 4.2–5.8)
RBC # BLD: 4.11 M/UL — LOW (ref 4.2–5.8)
RBC # BLD: 4.12 M/UL — LOW (ref 4.2–5.8)
RBC # BLD: 4.22 M/UL — SIGNIFICANT CHANGE UP (ref 4.2–5.8)
RBC # BLD: 4.23 M/UL — SIGNIFICANT CHANGE UP (ref 4.2–5.8)
RBC # BLD: 4.24 M/UL — SIGNIFICANT CHANGE UP (ref 4.2–5.8)
RBC # BLD: 4.27 M/UL — SIGNIFICANT CHANGE UP (ref 4.2–5.8)
RBC # BLD: 4.27 M/UL — SIGNIFICANT CHANGE UP (ref 4.2–5.8)
RBC # BLD: 4.33 M/UL — SIGNIFICANT CHANGE UP (ref 4.2–5.8)
RBC # BLD: 4.39 M/UL — SIGNIFICANT CHANGE UP (ref 4.2–5.8)
RBC # BLD: 4.53 M/UL — SIGNIFICANT CHANGE UP (ref 4.2–5.8)
RBC # BLD: 4.53 M/UL — SIGNIFICANT CHANGE UP (ref 4.2–5.8)
RBC # BLD: 4.54 M/UL — SIGNIFICANT CHANGE UP (ref 4.2–5.8)
RBC # BLD: 4.57 M/UL — SIGNIFICANT CHANGE UP (ref 4.2–5.8)
RBC # BLD: 4.61 M/UL — SIGNIFICANT CHANGE UP (ref 4.2–5.8)
RBC # BLD: 4.63 M/UL — SIGNIFICANT CHANGE UP (ref 4.2–5.8)
RBC # BLD: 4.74 M/UL — SIGNIFICANT CHANGE UP (ref 4.2–5.8)
RBC # BLD: 4.86 M/UL — SIGNIFICANT CHANGE UP (ref 4.2–5.8)
RBC # BLD: 4.91 M/UL — SIGNIFICANT CHANGE UP (ref 4.2–5.8)
RBC # BLD: 5.04 M/UL — SIGNIFICANT CHANGE UP (ref 4.2–5.8)
RBC # BLD: 5.22 M/UL — SIGNIFICANT CHANGE UP (ref 4.2–5.8)
RBC # BLD: 5.37 M/UL — SIGNIFICANT CHANGE UP (ref 4.2–5.8)
RBC # FLD: 15.9 % — HIGH (ref 10.3–14.5)
RBC # FLD: 16.9 % — HIGH (ref 10.3–14.5)
RBC # FLD: 17.1 % — HIGH (ref 10.3–14.5)
RBC # FLD: 17.2 % — HIGH (ref 10.3–14.5)
RBC # FLD: 17.3 % — HIGH (ref 10.3–14.5)
RBC # FLD: 17.5 % — HIGH (ref 10.3–14.5)
RBC # FLD: 17.6 % — HIGH (ref 10.3–14.5)
RBC # FLD: 17.7 % — HIGH (ref 10.3–14.5)
RBC # FLD: 17.7 % — HIGH (ref 10.3–14.5)
RBC # FLD: 17.9 % — HIGH (ref 10.3–14.5)
RBC # FLD: 18.2 % — HIGH (ref 10.3–14.5)
RBC # FLD: 18.2 % — HIGH (ref 10.3–14.5)
RBC # FLD: 19.2 % — HIGH (ref 10.3–14.5)
RBC # FLD: 19.3 % — HIGH (ref 10.3–14.5)
RBC # FLD: 19.4 % — HIGH (ref 10.3–14.5)
RBC # FLD: 19.5 % — HIGH (ref 10.3–14.5)
RBC # FLD: 19.5 % — HIGH (ref 10.3–14.5)
RBC BLD AUTO: (no result)
RBC BLD AUTO: (no result)
RBC BLD AUTO: ABNORMAL
RBC BLD AUTO: NORMAL — SIGNIFICANT CHANGE UP
RBC BLD AUTO: SIGNIFICANT CHANGE UP
RBC BLD AUTO: SIGNIFICANT CHANGE UP
RBC CASTS # UR COMP ASSIST: SIGNIFICANT CHANGE UP /HPF (ref 0–4)
RBC CASTS # UR COMP ASSIST: SIGNIFICANT CHANGE UP /HPF (ref 0–4)
RCV VOL RI: HIGH /UL (ref 0–5)
RV+EV RNA SPEC QL NAA+PROBE: DETECTED
SAO2 % BLDV: 89 % — HIGH (ref 67–88)
SAO2 % BLDV: 97 % — HIGH (ref 67–88)
SMUDGE CELLS # BLD: PRESENT — SIGNIFICANT CHANGE UP
SMUDGE CELLS # BLD: PRESENT — SIGNIFICANT CHANGE UP
SODIUM SERPL-SCNC: 125 MMOL/L — LOW (ref 135–145)
SODIUM SERPL-SCNC: 127 MMOL/L — LOW (ref 135–145)
SODIUM SERPL-SCNC: 128 MMOL/L — LOW (ref 135–145)
SODIUM SERPL-SCNC: 129 MMOL/L — LOW (ref 135–145)
SODIUM SERPL-SCNC: 130 MMOL/L — LOW (ref 135–145)
SODIUM SERPL-SCNC: 131 MMOL/L — LOW (ref 135–145)
SODIUM SERPL-SCNC: 131 MMOL/L — LOW (ref 135–145)
SODIUM SERPL-SCNC: 132 MMOL/L — LOW (ref 135–145)
SODIUM SERPL-SCNC: 133 MMOL/L — LOW (ref 135–145)
SODIUM SERPL-SCNC: 134 MMOL/L — LOW (ref 135–145)
SODIUM SERPL-SCNC: 134 MMOL/L — LOW (ref 135–145)
SODIUM SERPL-SCNC: 135 MMOL/L — SIGNIFICANT CHANGE UP (ref 135–145)
SODIUM SERPL-SCNC: 136 MMOL/L — SIGNIFICANT CHANGE UP (ref 135–145)
SODIUM SERPL-SCNC: 137 MMOL/L — SIGNIFICANT CHANGE UP (ref 135–145)
SODIUM SERPL-SCNC: 137 MMOL/L — SIGNIFICANT CHANGE UP (ref 135–145)
SODIUM SERPL-SCNC: 138 MMOL/L — SIGNIFICANT CHANGE UP (ref 135–145)
SODIUM SERPL-SCNC: 140 MMOL/L — SIGNIFICANT CHANGE UP (ref 135–145)
SODIUM SERPL-SCNC: 141 MMOL/L — SIGNIFICANT CHANGE UP (ref 135–145)
SODIUM SERPL-SCNC: 142 MMOL/L — SIGNIFICANT CHANGE UP (ref 135–145)
SODIUM SERPL-SCNC: 142 MMOL/L — SIGNIFICANT CHANGE UP (ref 135–145)
SP GR SPEC: 1 — LOW (ref 1.01–1.02)
SP GR SPEC: 1.01 — SIGNIFICANT CHANGE UP (ref 1.01–1.02)
SP GR SPEC: 1.01 — SIGNIFICANT CHANGE UP (ref 1.01–1.02)
SPECIMEN SOURCE FLD: SIGNIFICANT CHANGE UP
SPECIMEN SOURCE: SIGNIFICANT CHANGE UP
T4 FREE+ TSH PNL SERPL: 1.09 UU/ML — SIGNIFICANT CHANGE UP (ref 0.34–4.82)
TOTAL CHOLESTEROL/HDL RATIO MEASUREMENT: 3.3 RATIO — LOW (ref 3.4–9.6)
TOTAL NUCLEATED CELL COUNT, BODY FLUID: 1406 /UL — HIGH (ref 0–5)
TOTAL NUCLEATED CELL COUNT, BODY FLUID: 2238 /UL — HIGH (ref 0–5)
TOTAL NUCLEATED CELL COUNT, BODY FLUID: 3085 /UL — HIGH (ref 0–5)
TRIGL SERPL-MCNC: 107 MG/DL — SIGNIFICANT CHANGE UP (ref 10–149)
TROPONIN I SERPL-MCNC: 0.02 NG/ML — SIGNIFICANT CHANGE UP (ref 0.01–0.04)
TROPONIN I SERPL-MCNC: <0.015 NG/ML — SIGNIFICANT CHANGE UP (ref 0.01–0.04)
TROPONIN I SERPL-MCNC: <0.015 NG/ML — SIGNIFICANT CHANGE UP (ref 0.01–0.04)
TUBE TYPE: SIGNIFICANT CHANGE UP
URATE SERPL-MCNC: 9.9 MG/DL — HIGH (ref 3.4–8.8)
UROBILINOGEN FLD QL: NEGATIVE MG/DL — SIGNIFICANT CHANGE UP
VARIANT LYMPHS # BLD: 2 % — SIGNIFICANT CHANGE UP (ref 0–6)
VARIANT LYMPHS # BLD: 2 % — SIGNIFICANT CHANGE UP (ref 0–6)
VARIANT LYMPHS # BLD: 4 % — SIGNIFICANT CHANGE UP (ref 0–6)
VARIANT LYMPHS # BLD: 6 % — SIGNIFICANT CHANGE UP (ref 0–6)
WBC # BLD: 11.98 K/UL — HIGH (ref 3.8–10.5)
WBC # BLD: 12.47 K/UL — HIGH (ref 3.8–10.5)
WBC # BLD: 13.29 K/UL — HIGH (ref 3.8–10.5)
WBC # BLD: 13.79 K/UL — HIGH (ref 3.8–10.5)
WBC # BLD: 15.07 K/UL — HIGH (ref 3.8–10.5)
WBC # BLD: 16.03 K/UL — HIGH (ref 3.8–10.5)
WBC # BLD: 16.12 K/UL — HIGH (ref 3.8–10.5)
WBC # BLD: 16.58 K/UL — HIGH (ref 3.8–10.5)
WBC # BLD: 16.82 K/UL — HIGH (ref 3.8–10.5)
WBC # BLD: 16.86 K/UL — HIGH (ref 3.8–10.5)
WBC # BLD: 16.94 K/UL — HIGH (ref 3.8–10.5)
WBC # BLD: 17.94 K/UL — HIGH (ref 3.8–10.5)
WBC # BLD: 20.25 K/UL — HIGH (ref 3.8–10.5)
WBC # BLD: 21.83 K/UL — HIGH (ref 3.8–10.5)
WBC # BLD: 21.86 K/UL — HIGH (ref 3.8–10.5)
WBC # BLD: 22.4 K/UL — HIGH (ref 3.8–10.5)
WBC # BLD: 23.18 K/UL — HIGH (ref 3.8–10.5)
WBC # BLD: 23.3 K/UL — HIGH (ref 3.8–10.5)
WBC # BLD: 24.6 K/UL — HIGH (ref 3.8–10.5)
WBC # BLD: 26.46 K/UL — HIGH (ref 3.8–10.5)
WBC # BLD: 27.61 K/UL — HIGH (ref 3.8–10.5)
WBC # BLD: 27.75 K/UL — HIGH (ref 3.8–10.5)
WBC # BLD: 35.56 K/UL — HIGH (ref 3.8–10.5)
WBC # BLD: 36.65 K/UL — HIGH (ref 3.8–10.5)
WBC # BLD: 44.6 K/UL — CRITICAL HIGH (ref 3.8–10.5)
WBC # BLD: 45.7 K/UL — CRITICAL HIGH (ref 3.8–10.5)
WBC # FLD AUTO: 11.98 K/UL — HIGH (ref 3.8–10.5)
WBC # FLD AUTO: 12.47 K/UL — HIGH (ref 3.8–10.5)
WBC # FLD AUTO: 13.29 K/UL — HIGH (ref 3.8–10.5)
WBC # FLD AUTO: 13.79 K/UL — HIGH (ref 3.8–10.5)
WBC # FLD AUTO: 15.07 K/UL — HIGH (ref 3.8–10.5)
WBC # FLD AUTO: 16.03 K/UL — HIGH (ref 3.8–10.5)
WBC # FLD AUTO: 16.12 K/UL — HIGH (ref 3.8–10.5)
WBC # FLD AUTO: 16.58 K/UL — HIGH (ref 3.8–10.5)
WBC # FLD AUTO: 16.82 K/UL — HIGH (ref 3.8–10.5)
WBC # FLD AUTO: 16.86 K/UL — HIGH (ref 3.8–10.5)
WBC # FLD AUTO: 16.94 K/UL — HIGH (ref 3.8–10.5)
WBC # FLD AUTO: 17.94 K/UL — HIGH (ref 3.8–10.5)
WBC # FLD AUTO: 20.25 K/UL — HIGH (ref 3.8–10.5)
WBC # FLD AUTO: 21.83 K/UL — HIGH (ref 3.8–10.5)
WBC # FLD AUTO: 21.86 K/UL — HIGH (ref 3.8–10.5)
WBC # FLD AUTO: 22.4 K/UL — HIGH (ref 3.8–10.5)
WBC # FLD AUTO: 23.18 K/UL — HIGH (ref 3.8–10.5)
WBC # FLD AUTO: 23.3 K/UL — HIGH (ref 3.8–10.5)
WBC # FLD AUTO: 24.6 K/UL — HIGH (ref 3.8–10.5)
WBC # FLD AUTO: 26.46 K/UL — HIGH (ref 3.8–10.5)
WBC # FLD AUTO: 27.61 K/UL — HIGH (ref 3.8–10.5)
WBC # FLD AUTO: 27.75 K/UL — HIGH (ref 3.8–10.5)
WBC # FLD AUTO: 35.56 K/UL — HIGH (ref 3.8–10.5)
WBC # FLD AUTO: 36.65 K/UL — HIGH (ref 3.8–10.5)
WBC # FLD AUTO: 44.6 K/UL — CRITICAL HIGH (ref 3.8–10.5)
WBC # FLD AUTO: 45.7 K/UL — CRITICAL HIGH (ref 3.8–10.5)
WBC UR QL: ABNORMAL
WBC UR QL: SIGNIFICANT CHANGE UP

## 2018-01-01 PROCEDURE — 85610 PROTHROMBIN TIME: CPT | Mod: QW

## 2018-01-01 PROCEDURE — 36416 COLLJ CAPILLARY BLOOD SPEC: CPT

## 2018-01-01 PROCEDURE — 99285 EMERGENCY DEPT VISIT HI MDM: CPT

## 2018-01-01 PROCEDURE — 71045 X-RAY EXAM CHEST 1 VIEW: CPT | Mod: 26

## 2018-01-01 PROCEDURE — 49083 ABD PARACENTESIS W/IMAGING: CPT

## 2018-01-01 PROCEDURE — 73502 X-RAY EXAM HIP UNI 2-3 VIEWS: CPT | Mod: 26,RT

## 2018-01-01 PROCEDURE — 99213 OFFICE O/P EST LOW 20 MIN: CPT

## 2018-01-01 PROCEDURE — 99214 OFFICE O/P EST MOD 30 MIN: CPT

## 2018-01-01 PROCEDURE — 32550 INSERT PLEURAL CATH: CPT

## 2018-01-01 PROCEDURE — G9005: CPT

## 2018-01-01 PROCEDURE — 93010 ELECTROCARDIOGRAM REPORT: CPT

## 2018-01-01 PROCEDURE — 71045 X-RAY EXAM CHEST 1 VIEW: CPT | Mod: 26,77

## 2018-01-01 PROCEDURE — 99212 OFFICE O/P EST SF 10 MIN: CPT

## 2018-01-01 PROCEDURE — 99232 SBSQ HOSP IP/OBS MODERATE 35: CPT

## 2018-01-01 PROCEDURE — 99232 SBSQ HOSP IP/OBS MODERATE 35: CPT | Mod: 25

## 2018-01-01 PROCEDURE — 72158 MRI LUMBAR SPINE W/O & W/DYE: CPT | Mod: 26

## 2018-01-01 PROCEDURE — ZZZZZ: CPT

## 2018-01-01 PROCEDURE — 88305 TISSUE EXAM BY PATHOLOGIST: CPT | Mod: 26

## 2018-01-01 PROCEDURE — 71250 CT THORAX DX C-: CPT | Mod: 26

## 2018-01-01 PROCEDURE — 70450 CT HEAD/BRAIN W/O DYE: CPT | Mod: 26

## 2018-01-01 PROCEDURE — 93306 TTE W/DOPPLER COMPLETE: CPT | Mod: 26

## 2018-01-01 PROCEDURE — 73030 X-RAY EXAM OF SHOULDER: CPT | Mod: 26,RT

## 2018-01-01 PROCEDURE — 99231 SBSQ HOSP IP/OBS SF/LOW 25: CPT

## 2018-01-01 PROCEDURE — 88108 CYTOPATH CONCENTRATE TECH: CPT | Mod: 26

## 2018-01-01 PROCEDURE — 32555 ASPIRATE PLEURA W/ IMAGING: CPT | Mod: LT

## 2018-01-01 PROCEDURE — 32601 THORACOSCOPY DIAGNOSTIC: CPT

## 2018-01-01 PROCEDURE — 76705 ECHO EXAM OF ABDOMEN: CPT | Mod: 26

## 2018-01-01 PROCEDURE — 74177 CT ABD & PELVIS W/CONTRAST: CPT | Mod: 26

## 2018-01-01 PROCEDURE — G9001: CPT

## 2018-01-01 PROCEDURE — 72131 CT LUMBAR SPINE W/O DYE: CPT | Mod: 26

## 2018-01-01 RX ORDER — DEXTROSE 50 % IN WATER 50 %
15 SYRINGE (ML) INTRAVENOUS ONCE
Qty: 0 | Refills: 0 | Status: DISCONTINUED | OUTPATIENT
Start: 2018-01-01 | End: 2018-01-01

## 2018-01-01 RX ORDER — DEXTROSE 50 % IN WATER 50 %
1 SYRINGE (ML) INTRAVENOUS ONCE
Qty: 0 | Refills: 0 | Status: DISCONTINUED | OUTPATIENT
Start: 2018-01-01 | End: 2018-01-10

## 2018-01-01 RX ORDER — DIPHENHYDRAMINE HCL 50 MG
50 CAPSULE ORAL EVERY 4 HOURS
Qty: 0 | Refills: 0 | Status: DISCONTINUED | OUTPATIENT
Start: 2018-01-01 | End: 2018-01-01

## 2018-01-01 RX ORDER — FUROSEMIDE 40 MG
1 TABLET ORAL
Qty: 0 | Refills: 0 | COMMUNITY

## 2018-01-01 RX ORDER — SPIRONOLACTONE 25 MG/1
100 TABLET, FILM COATED ORAL DAILY
Qty: 0 | Refills: 0 | Status: DISCONTINUED | OUTPATIENT
Start: 2018-01-01 | End: 2018-01-01

## 2018-01-01 RX ORDER — DOCUSATE SODIUM 100 MG
100 CAPSULE ORAL THREE TIMES A DAY
Qty: 0 | Refills: 0 | Status: DISCONTINUED | OUTPATIENT
Start: 2018-01-01 | End: 2018-01-01

## 2018-01-01 RX ORDER — ATORVASTATIN CALCIUM 80 MG/1
10 TABLET, FILM COATED ORAL AT BEDTIME
Qty: 0 | Refills: 0 | Status: DISCONTINUED | OUTPATIENT
Start: 2018-01-01 | End: 2018-01-01

## 2018-01-01 RX ORDER — OBINUTUZUMAB 1000 MG/40ML
100 INJECTION, SOLUTION, CONCENTRATE INTRAVENOUS ONCE
Qty: 0 | Refills: 0 | Status: COMPLETED | OUTPATIENT
Start: 2018-01-01 | End: 2018-01-01

## 2018-01-01 RX ORDER — OXYCODONE HYDROCHLORIDE 5 MG/1
10 TABLET ORAL EVERY 4 HOURS
Qty: 0 | Refills: 0 | Status: DISCONTINUED | OUTPATIENT
Start: 2018-01-01 | End: 2019-01-01

## 2018-01-01 RX ORDER — INSULIN LISPRO 100/ML
VIAL (ML) SUBCUTANEOUS
Qty: 0 | Refills: 0 | Status: DISCONTINUED | OUTPATIENT
Start: 2018-01-01 | End: 2018-01-01

## 2018-01-01 RX ORDER — WARFARIN SODIUM 2.5 MG/1
2.5 TABLET ORAL AT BEDTIME
Qty: 0 | Refills: 0 | Status: DISCONTINUED | OUTPATIENT
Start: 2018-01-01 | End: 2018-01-01

## 2018-01-01 RX ORDER — CEFEPIME 1 G/1
1000 INJECTION, POWDER, FOR SOLUTION INTRAMUSCULAR; INTRAVENOUS ONCE
Qty: 0 | Refills: 0 | Status: COMPLETED | OUTPATIENT
Start: 2018-01-01 | End: 2018-01-01

## 2018-01-01 RX ORDER — DEXTROSE 50 % IN WATER 50 %
12.5 SYRINGE (ML) INTRAVENOUS ONCE
Qty: 0 | Refills: 0 | Status: DISCONTINUED | OUTPATIENT
Start: 2018-01-01 | End: 2018-01-01

## 2018-01-01 RX ORDER — INSULIN DEGLUDEC 100 U/ML
20 INJECTION, SOLUTION SUBCUTANEOUS
Qty: 0 | Refills: 0 | COMMUNITY

## 2018-01-01 RX ORDER — DIPHENHYDRAMINE HCL 50 MG
50 CAPSULE ORAL EVERY 6 HOURS
Qty: 0 | Refills: 0 | Status: DISCONTINUED | OUTPATIENT
Start: 2018-01-01 | End: 2019-01-01

## 2018-01-01 RX ORDER — IPRATROPIUM/ALBUTEROL SULFATE 18-103MCG
3 AEROSOL WITH ADAPTER (GRAM) INHALATION EVERY 6 HOURS
Qty: 0 | Refills: 0 | Status: DISCONTINUED | OUTPATIENT
Start: 2018-01-01 | End: 2018-01-01

## 2018-01-01 RX ORDER — SODIUM CHLORIDE 9 MG/ML
1000 INJECTION INTRAMUSCULAR; INTRAVENOUS; SUBCUTANEOUS
Qty: 0 | Refills: 0 | Status: COMPLETED | OUTPATIENT
Start: 2018-01-01 | End: 2018-01-01

## 2018-01-01 RX ORDER — MEPERIDINE HYDROCHLORIDE 50 MG/ML
12.5 INJECTION INTRAMUSCULAR; INTRAVENOUS; SUBCUTANEOUS EVERY 4 HOURS
Qty: 0 | Refills: 0 | Status: DISCONTINUED | OUTPATIENT
Start: 2018-01-01 | End: 2018-01-01

## 2018-01-01 RX ORDER — TENOFOVIR DISOPROXIL FUMARATE 300 MG/1
1 TABLET, FILM COATED ORAL
Qty: 0 | Refills: 0 | COMMUNITY

## 2018-01-01 RX ORDER — AZITHROMYCIN 500 MG/1
TABLET, FILM COATED ORAL
Qty: 0 | Refills: 0 | Status: DISCONTINUED | OUTPATIENT
Start: 2018-01-01 | End: 2018-01-05

## 2018-01-01 RX ORDER — DEXTROSE 50 % IN WATER 50 %
25 SYRINGE (ML) INTRAVENOUS ONCE
Qty: 0 | Refills: 0 | Status: DISCONTINUED | OUTPATIENT
Start: 2018-01-01 | End: 2018-01-01

## 2018-01-01 RX ORDER — CEFEPIME 1 G/1
1000 INJECTION, POWDER, FOR SOLUTION INTRAMUSCULAR; INTRAVENOUS DAILY
Qty: 0 | Refills: 0 | Status: DISCONTINUED | OUTPATIENT
Start: 2018-01-01 | End: 2018-01-01

## 2018-01-01 RX ORDER — MONTELUKAST 4 MG/1
1 TABLET, CHEWABLE ORAL
Qty: 0 | Refills: 0 | COMMUNITY

## 2018-01-01 RX ORDER — GLUCAGON INJECTION, SOLUTION 0.5 MG/.1ML
1 INJECTION, SOLUTION SUBCUTANEOUS ONCE
Qty: 0 | Refills: 0 | Status: DISCONTINUED | OUTPATIENT
Start: 2018-01-01 | End: 2018-01-01

## 2018-01-01 RX ORDER — IPRATROPIUM/ALBUTEROL SULFATE 18-103MCG
3 AEROSOL WITH ADAPTER (GRAM) INHALATION ONCE
Qty: 0 | Refills: 0 | Status: COMPLETED | OUTPATIENT
Start: 2018-01-01 | End: 2018-01-01

## 2018-01-01 RX ORDER — SODIUM CHLORIDE 9 MG/ML
1000 INJECTION INTRAMUSCULAR; INTRAVENOUS; SUBCUTANEOUS ONCE
Qty: 0 | Refills: 0 | Status: COMPLETED | OUTPATIENT
Start: 2018-01-01 | End: 2018-01-01

## 2018-01-01 RX ORDER — GABAPENTIN 400 MG/1
300 CAPSULE ORAL THREE TIMES A DAY
Qty: 0 | Refills: 0 | Status: DISCONTINUED | OUTPATIENT
Start: 2018-01-01 | End: 2019-01-01

## 2018-01-01 RX ORDER — SODIUM CHLORIDE 9 MG/ML
3 INJECTION INTRAMUSCULAR; INTRAVENOUS; SUBCUTANEOUS ONCE
Qty: 0 | Refills: 0 | Status: COMPLETED | OUTPATIENT
Start: 2018-01-01 | End: 2018-01-01

## 2018-01-01 RX ORDER — INSULIN LISPRO 100/ML
VIAL (ML) SUBCUTANEOUS AT BEDTIME
Qty: 0 | Refills: 0 | Status: DISCONTINUED | OUTPATIENT
Start: 2018-01-01 | End: 2018-01-01

## 2018-01-01 RX ORDER — ACETAMINOPHEN 500 MG
650 TABLET ORAL ONCE
Qty: 0 | Refills: 0 | Status: COMPLETED | OUTPATIENT
Start: 2018-01-01 | End: 2018-01-01

## 2018-01-01 RX ORDER — ALBUTEROL 90 UG/1
3 AEROSOL, METERED ORAL
Qty: 0 | Refills: 0 | COMMUNITY

## 2018-01-01 RX ORDER — SODIUM CHLORIDE 9 MG/ML
1000 INJECTION, SOLUTION INTRAVENOUS
Qty: 0 | Refills: 0 | Status: DISCONTINUED | OUTPATIENT
Start: 2018-01-01 | End: 2018-01-01

## 2018-01-01 RX ORDER — SIMVASTATIN 20 MG/1
10 TABLET, FILM COATED ORAL AT BEDTIME
Qty: 0 | Refills: 0 | Status: DISCONTINUED | OUTPATIENT
Start: 2018-01-01 | End: 2018-01-01

## 2018-01-01 RX ORDER — GABAPENTIN 400 MG/1
300 CAPSULE ORAL THREE TIMES A DAY
Qty: 0 | Refills: 0 | Status: DISCONTINUED | OUTPATIENT
Start: 2018-01-01 | End: 2018-01-10

## 2018-01-01 RX ORDER — AMIODARONE HYDROCHLORIDE 400 MG/1
50 TABLET ORAL DAILY
Qty: 0 | Refills: 0 | Status: DISCONTINUED | OUTPATIENT
Start: 2018-01-01 | End: 2018-01-01

## 2018-01-01 RX ORDER — FENTANYL CITRATE 50 UG/ML
25 INJECTION INTRAVENOUS
Qty: 0 | Refills: 0 | Status: DISCONTINUED | OUTPATIENT
Start: 2018-01-01 | End: 2018-01-01

## 2018-01-01 RX ORDER — LACTULOSE 10 G/15ML
10 SOLUTION ORAL DAILY
Qty: 0 | Refills: 0 | Status: DISCONTINUED | OUTPATIENT
Start: 2018-01-01 | End: 2018-01-01

## 2018-01-01 RX ORDER — OXYCODONE HYDROCHLORIDE 5 MG/1
5 TABLET ORAL ONCE
Qty: 0 | Refills: 0 | Status: DISCONTINUED | OUTPATIENT
Start: 2018-01-01 | End: 2018-01-01

## 2018-01-01 RX ORDER — ALBUTEROL 90 UG/1
2 AEROSOL, METERED ORAL
Qty: 0 | Refills: 0 | COMMUNITY

## 2018-01-01 RX ORDER — SENNA PLUS 8.6 MG/1
2 TABLET ORAL AT BEDTIME
Qty: 0 | Refills: 0 | Status: DISCONTINUED | OUTPATIENT
Start: 2018-01-01 | End: 2018-01-01

## 2018-01-01 RX ORDER — WARFARIN SODIUM 2.5 MG/1
2.5 TABLET ORAL ONCE
Qty: 0 | Refills: 0 | Status: COMPLETED | OUTPATIENT
Start: 2018-01-01 | End: 2018-01-01

## 2018-01-01 RX ORDER — WARFARIN SODIUM 2.5 MG/1
3.75 TABLET ORAL DAILY
Qty: 0 | Refills: 0 | Status: DISCONTINUED | OUTPATIENT
Start: 2018-01-01 | End: 2018-01-01

## 2018-01-01 RX ORDER — MONTELUKAST 4 MG/1
10 TABLET, CHEWABLE ORAL AT BEDTIME
Qty: 0 | Refills: 0 | Status: DISCONTINUED | OUTPATIENT
Start: 2018-01-01 | End: 2018-01-01

## 2018-01-01 RX ORDER — INSULIN GLARGINE 100 [IU]/ML
10 INJECTION, SOLUTION SUBCUTANEOUS AT BEDTIME
Qty: 0 | Refills: 0 | Status: DISCONTINUED | OUTPATIENT
Start: 2018-01-01 | End: 2018-01-01

## 2018-01-01 RX ORDER — FUROSEMIDE 40 MG
20 TABLET ORAL
Qty: 0 | Refills: 0 | Status: DISCONTINUED | OUTPATIENT
Start: 2018-01-01 | End: 2018-01-01

## 2018-01-01 RX ORDER — FUROSEMIDE 40 MG
40 TABLET ORAL DAILY
Qty: 0 | Refills: 0 | Status: DISCONTINUED | OUTPATIENT
Start: 2018-01-01 | End: 2018-01-01

## 2018-01-01 RX ORDER — SPIRONOLACTONE 25 MG/1
25 TABLET, FILM COATED ORAL AT BEDTIME
Qty: 0 | Refills: 0 | Status: DISCONTINUED | OUTPATIENT
Start: 2018-01-01 | End: 2018-01-01

## 2018-01-01 RX ORDER — GABAPENTIN 400 MG/1
300 CAPSULE ORAL THREE TIMES A DAY
Qty: 0 | Refills: 0 | Status: DISCONTINUED | OUTPATIENT
Start: 2018-01-01 | End: 2018-01-01

## 2018-01-01 RX ORDER — LIDOCAINE HCL 20 MG/ML
25 VIAL (ML) INJECTION ONCE
Qty: 0 | Refills: 0 | Status: DISCONTINUED | OUTPATIENT
Start: 2018-01-01 | End: 2018-01-01

## 2018-01-01 RX ORDER — METOPROLOL TARTRATE 50 MG
25 TABLET ORAL
Qty: 0 | Refills: 0 | Status: DISCONTINUED | OUTPATIENT
Start: 2018-01-01 | End: 2019-01-01

## 2018-01-01 RX ORDER — TIOTROPIUM BROMIDE 18 UG/1
1 CAPSULE ORAL; RESPIRATORY (INHALATION) DAILY
Qty: 0 | Refills: 0 | Status: DISCONTINUED | OUTPATIENT
Start: 2018-01-01 | End: 2018-01-01

## 2018-01-01 RX ORDER — INSULIN LISPRO 100/ML
2 VIAL (ML) SUBCUTANEOUS
Qty: 0 | Refills: 0 | Status: DISCONTINUED | OUTPATIENT
Start: 2018-01-01 | End: 2018-01-01

## 2018-01-01 RX ORDER — INSULIN GLARGINE 100 [IU]/ML
20 INJECTION, SOLUTION SUBCUTANEOUS AT BEDTIME
Qty: 0 | Refills: 0 | Status: DISCONTINUED | OUTPATIENT
Start: 2018-01-01 | End: 2018-01-01

## 2018-01-01 RX ORDER — AZITHROMYCIN 500 MG/1
500 TABLET, FILM COATED ORAL EVERY 24 HOURS
Qty: 0 | Refills: 0 | Status: DISCONTINUED | OUTPATIENT
Start: 2018-01-02 | End: 2018-01-05

## 2018-01-01 RX ORDER — ALBUTEROL 90 UG/1
1 AEROSOL, METERED ORAL EVERY 4 HOURS
Qty: 0 | Refills: 0 | Status: DISCONTINUED | OUTPATIENT
Start: 2018-01-01 | End: 2019-01-01

## 2018-01-01 RX ORDER — SPIRONOLACTONE 25 MG/1
50 TABLET, FILM COATED ORAL
Qty: 0 | Refills: 0 | Status: DISCONTINUED | OUTPATIENT
Start: 2018-01-01 | End: 2018-01-01

## 2018-01-01 RX ORDER — DEXAMETHASONE 0.5 MG/5ML
10 ELIXIR ORAL ONCE
Qty: 0 | Refills: 0 | Status: COMPLETED | OUTPATIENT
Start: 2018-01-01 | End: 2018-01-01

## 2018-01-01 RX ORDER — VANCOMYCIN HCL 1 G
1000 VIAL (EA) INTRAVENOUS ONCE
Qty: 0 | Refills: 0 | Status: COMPLETED | OUTPATIENT
Start: 2018-01-01 | End: 2018-01-01

## 2018-01-01 RX ORDER — GABAPENTIN 400 MG/1
1 CAPSULE ORAL
Qty: 0 | Refills: 0 | COMMUNITY

## 2018-01-01 RX ORDER — SODIUM CHLORIDE 9 MG/ML
500 INJECTION INTRAMUSCULAR; INTRAVENOUS; SUBCUTANEOUS
Qty: 0 | Refills: 0 | Status: COMPLETED | OUTPATIENT
Start: 2018-01-01 | End: 2018-01-01

## 2018-01-01 RX ORDER — HYDROMORPHONE HYDROCHLORIDE 2 MG/ML
0.5 INJECTION INTRAMUSCULAR; INTRAVENOUS; SUBCUTANEOUS ONCE
Qty: 0 | Refills: 0 | Status: DISCONTINUED | OUTPATIENT
Start: 2018-01-01 | End: 2018-01-01

## 2018-01-01 RX ORDER — VANCOMYCIN HCL 1 G
1000 VIAL (EA) INTRAVENOUS EVERY 12 HOURS
Qty: 0 | Refills: 0 | Status: DISCONTINUED | OUTPATIENT
Start: 2018-01-01 | End: 2018-01-03

## 2018-01-01 RX ORDER — SPIRONOLACTONE 25 MG/1
25 TABLET, FILM COATED ORAL DAILY
Qty: 0 | Refills: 0 | Status: DISCONTINUED | OUTPATIENT
Start: 2018-01-01 | End: 2018-01-01

## 2018-01-01 RX ORDER — VANCOMYCIN HCL 1 G
1200 VIAL (EA) INTRAVENOUS ONCE
Qty: 0 | Refills: 0 | Status: COMPLETED | OUTPATIENT
Start: 2018-01-01 | End: 2018-01-01

## 2018-01-01 RX ORDER — HEPARIN SODIUM 5000 [USP'U]/ML
5000 INJECTION INTRAVENOUS; SUBCUTANEOUS EVERY 8 HOURS
Qty: 0 | Refills: 0 | Status: DISCONTINUED | OUTPATIENT
Start: 2018-01-01 | End: 2018-01-01

## 2018-01-01 RX ORDER — HYDROCORTISONE 20 MG
100 TABLET ORAL DAILY
Qty: 0 | Refills: 0 | Status: DISCONTINUED | OUTPATIENT
Start: 2018-01-01 | End: 2018-01-01

## 2018-01-01 RX ORDER — INSULIN LISPRO 100/ML
VIAL (ML) SUBCUTANEOUS
Qty: 0 | Refills: 0 | Status: DISCONTINUED | OUTPATIENT
Start: 2018-01-01 | End: 2018-01-10

## 2018-01-01 RX ORDER — TENOFOVIR DISOPROXIL FUMARATE 300 MG/1
300 TABLET, FILM COATED ORAL DAILY
Qty: 0 | Refills: 0 | Status: DISCONTINUED | OUTPATIENT
Start: 2018-01-01 | End: 2018-01-01

## 2018-01-01 RX ORDER — AMIODARONE HYDROCHLORIDE 400 MG/1
0.5 TABLET ORAL
Qty: 0 | Refills: 0 | COMMUNITY

## 2018-01-01 RX ORDER — OBINUTUZUMAB 1000 MG/40ML
900 INJECTION, SOLUTION, CONCENTRATE INTRAVENOUS ONCE
Qty: 0 | Refills: 0 | Status: COMPLETED | OUTPATIENT
Start: 2018-01-01 | End: 2018-01-01

## 2018-01-01 RX ORDER — DEXTROSE 50 % IN WATER 50 %
25 SYRINGE (ML) INTRAVENOUS ONCE
Qty: 0 | Refills: 0 | Status: DISCONTINUED | OUTPATIENT
Start: 2018-01-01 | End: 2019-01-01

## 2018-01-01 RX ORDER — ZINC SULFATE TAB 220 MG (50 MG ZINC EQUIVALENT) 220 (50 ZN) MG
220 TAB ORAL
Qty: 0 | Refills: 0 | Status: DISCONTINUED | OUTPATIENT
Start: 2018-01-01 | End: 2019-01-01

## 2018-01-01 RX ORDER — METOPROLOL TARTRATE 50 MG
25 TABLET ORAL
Qty: 0 | Refills: 0 | Status: DISCONTINUED | OUTPATIENT
Start: 2018-01-01 | End: 2018-01-01

## 2018-01-01 RX ORDER — OXYCODONE AND ACETAMINOPHEN 5; 325 MG/1; MG/1
2 TABLET ORAL EVERY 4 HOURS
Qty: 0 | Refills: 0 | Status: DISCONTINUED | OUTPATIENT
Start: 2018-01-01 | End: 2018-01-01

## 2018-01-01 RX ORDER — ALBUTEROL 90 UG/1
2.5 AEROSOL, METERED ORAL EVERY 4 HOURS
Qty: 0 | Refills: 0 | Status: DISCONTINUED | OUTPATIENT
Start: 2018-01-01 | End: 2018-01-01

## 2018-01-01 RX ORDER — FUROSEMIDE 40 MG
40 TABLET ORAL
Qty: 0 | Refills: 0 | Status: DISCONTINUED | OUTPATIENT
Start: 2018-01-01 | End: 2018-01-01

## 2018-01-01 RX ORDER — DIPHENHYDRAMINE HCL 50 MG
50 CAPSULE ORAL ONCE
Qty: 0 | Refills: 0 | Status: COMPLETED | OUTPATIENT
Start: 2018-01-01 | End: 2018-01-01

## 2018-01-01 RX ORDER — ACETAMINOPHEN 500 MG
2 TABLET ORAL
Qty: 0 | Refills: 0 | COMMUNITY

## 2018-01-01 RX ORDER — DEXAMETHASONE 0.5 MG/5ML
12 ELIXIR ORAL ONCE
Qty: 0 | Refills: 0 | Status: COMPLETED | OUTPATIENT
Start: 2018-01-01 | End: 2018-01-01

## 2018-01-01 RX ORDER — GLUCAGON INJECTION, SOLUTION 0.5 MG/.1ML
1 INJECTION, SOLUTION SUBCUTANEOUS ONCE
Qty: 0 | Refills: 0 | Status: DISCONTINUED | OUTPATIENT
Start: 2018-01-01 | End: 2019-01-01

## 2018-01-01 RX ORDER — SPIRONOLACTONE 25 MG/1
1 TABLET, FILM COATED ORAL
Qty: 15 | Refills: 0 | OUTPATIENT
Start: 2018-01-01 | End: 2018-01-01

## 2018-01-01 RX ORDER — METOPROLOL TARTRATE 50 MG
25 TABLET ORAL DAILY
Qty: 0 | Refills: 0 | Status: DISCONTINUED | OUTPATIENT
Start: 2018-01-01 | End: 2018-01-10

## 2018-01-01 RX ORDER — SITAGLIPTIN 50 MG/1
1 TABLET, FILM COATED ORAL
Qty: 0 | Refills: 0 | COMMUNITY

## 2018-01-01 RX ORDER — SODIUM CHLORIDE 9 MG/ML
500 INJECTION INTRAMUSCULAR; INTRAVENOUS; SUBCUTANEOUS ONCE
Qty: 0 | Refills: 0 | Status: COMPLETED | OUTPATIENT
Start: 2018-01-01 | End: 2018-01-01

## 2018-01-01 RX ORDER — WARFARIN SODIUM 2.5 MG/1
1 TABLET ORAL DAILY
Qty: 0 | Refills: 0 | Status: DISCONTINUED | OUTPATIENT
Start: 2018-01-01 | End: 2018-01-01

## 2018-01-01 RX ORDER — MONTELUKAST 4 MG/1
10 TABLET, CHEWABLE ORAL AT BEDTIME
Qty: 0 | Refills: 0 | Status: DISCONTINUED | OUTPATIENT
Start: 2018-01-01 | End: 2018-01-10

## 2018-01-01 RX ORDER — SIMVASTATIN 20 MG/1
10 TABLET, FILM COATED ORAL AT BEDTIME
Qty: 0 | Refills: 0 | Status: DISCONTINUED | OUTPATIENT
Start: 2018-01-01 | End: 2018-01-10

## 2018-01-01 RX ORDER — FUROSEMIDE 40 MG
40 TABLET ORAL ONCE
Qty: 0 | Refills: 0 | Status: COMPLETED | OUTPATIENT
Start: 2018-01-01 | End: 2018-01-01

## 2018-01-01 RX ORDER — AMIODARONE HYDROCHLORIDE 400 MG/1
100 TABLET ORAL DAILY
Qty: 0 | Refills: 0 | Status: DISCONTINUED | OUTPATIENT
Start: 2018-01-01 | End: 2018-01-10

## 2018-01-01 RX ORDER — ATORVASTATIN CALCIUM 80 MG/1
10 TABLET, FILM COATED ORAL AT BEDTIME
Qty: 0 | Refills: 0 | Status: DISCONTINUED | OUTPATIENT
Start: 2018-01-01 | End: 2019-01-01

## 2018-01-01 RX ORDER — MONTELUKAST 4 MG/1
10 TABLET, CHEWABLE ORAL AT BEDTIME
Qty: 0 | Refills: 0 | Status: DISCONTINUED | OUTPATIENT
Start: 2018-01-01 | End: 2019-01-01

## 2018-01-01 RX ORDER — SPIRONOLACTONE 25 MG/1
25 TABLET, FILM COATED ORAL DAILY
Qty: 0 | Refills: 0 | Status: DISCONTINUED | OUTPATIENT
Start: 2018-01-01 | End: 2019-01-01

## 2018-01-01 RX ORDER — ACETAMINOPHEN 500 MG
650 TABLET ORAL DAILY
Qty: 0 | Refills: 0 | Status: COMPLETED | OUTPATIENT
Start: 2018-01-01 | End: 2018-01-01

## 2018-01-01 RX ORDER — ASCORBIC ACID 60 MG
500 TABLET,CHEWABLE ORAL
Qty: 0 | Refills: 0 | Status: DISCONTINUED | OUTPATIENT
Start: 2018-01-01 | End: 2019-01-01

## 2018-01-01 RX ORDER — INSULIN GLARGINE 100 [IU]/ML
20 INJECTION, SOLUTION SUBCUTANEOUS EVERY MORNING
Qty: 0 | Refills: 0 | Status: DISCONTINUED | OUTPATIENT
Start: 2018-01-01 | End: 2018-01-01

## 2018-01-01 RX ORDER — OXYCODONE HYDROCHLORIDE 5 MG/1
5 TABLET ORAL EVERY 4 HOURS
Qty: 0 | Refills: 0 | Status: DISCONTINUED | OUTPATIENT
Start: 2018-01-01 | End: 2019-01-01

## 2018-01-01 RX ORDER — IMMUNE GLOBULIN (HUMAN) 10 G/100ML
20 INJECTION INTRAVENOUS; SUBCUTANEOUS ONCE
Qty: 0 | Refills: 0 | Status: COMPLETED | OUTPATIENT
Start: 2018-01-01 | End: 2018-01-01

## 2018-01-01 RX ORDER — INSULIN LISPRO 100/ML
6 VIAL (ML) SUBCUTANEOUS ONCE
Qty: 0 | Refills: 0 | Status: COMPLETED | OUTPATIENT
Start: 2018-01-01 | End: 2018-01-01

## 2018-01-01 RX ORDER — AMIODARONE HYDROCHLORIDE 400 MG/1
100 TABLET ORAL DAILY
Qty: 0 | Refills: 0 | Status: DISCONTINUED | OUTPATIENT
Start: 2018-01-01 | End: 2018-01-01

## 2018-01-01 RX ORDER — GLUCAGON INJECTION, SOLUTION 0.5 MG/.1ML
1 INJECTION, SOLUTION SUBCUTANEOUS ONCE
Qty: 0 | Refills: 0 | Status: DISCONTINUED | OUTPATIENT
Start: 2018-01-01 | End: 2018-01-10

## 2018-01-01 RX ORDER — MORPHINE SULFATE 50 MG/1
4 CAPSULE, EXTENDED RELEASE ORAL
Qty: 0 | Refills: 0 | Status: DISCONTINUED | OUTPATIENT
Start: 2018-01-01 | End: 2018-01-01

## 2018-01-01 RX ORDER — INSULIN LISPRO 100/ML
8 VIAL (ML) SUBCUTANEOUS ONCE
Qty: 0 | Refills: 0 | Status: COMPLETED | OUTPATIENT
Start: 2018-01-01 | End: 2018-01-01

## 2018-01-01 RX ORDER — MEPERIDINE HYDROCHLORIDE 50 MG/ML
12.5 INJECTION INTRAMUSCULAR; INTRAVENOUS; SUBCUTANEOUS ONCE
Qty: 0 | Refills: 0 | Status: DISCONTINUED | OUTPATIENT
Start: 2018-01-01 | End: 2018-01-01

## 2018-01-01 RX ORDER — WARFARIN SODIUM 2.5 MG/1
3.75 TABLET ORAL ONCE
Qty: 0 | Refills: 0 | Status: COMPLETED | OUTPATIENT
Start: 2018-01-01 | End: 2018-01-01

## 2018-01-01 RX ORDER — DEXTROSE 50 % IN WATER 50 %
50 SYRINGE (ML) INTRAVENOUS ONCE
Qty: 0 | Refills: 0 | Status: COMPLETED | OUTPATIENT
Start: 2018-01-01 | End: 2018-01-01

## 2018-01-01 RX ORDER — INSULIN LISPRO 100/ML
VIAL (ML) SUBCUTANEOUS
Qty: 0 | Refills: 0 | Status: DISCONTINUED | OUTPATIENT
Start: 2018-01-01 | End: 2019-01-01

## 2018-01-01 RX ORDER — ACETAMINOPHEN 500 MG
650 TABLET ORAL EVERY 6 HOURS
Qty: 0 | Refills: 0 | Status: DISCONTINUED | OUTPATIENT
Start: 2018-01-01 | End: 2018-01-01

## 2018-01-01 RX ORDER — LISINOPRIL 2.5 MG/1
2.5 TABLET ORAL DAILY
Qty: 0 | Refills: 0 | Status: DISCONTINUED | OUTPATIENT
Start: 2018-01-01 | End: 2018-01-01

## 2018-01-01 RX ORDER — DIPHENHYDRAMINE HCL 50 MG
50 CAPSULE ORAL ONCE
Qty: 0 | Refills: 0 | Status: DISCONTINUED | OUTPATIENT
Start: 2018-01-01 | End: 2018-01-01

## 2018-01-01 RX ORDER — CEFEPIME 1 G/1
1000 INJECTION, POWDER, FOR SOLUTION INTRAMUSCULAR; INTRAVENOUS EVERY 12 HOURS
Qty: 0 | Refills: 0 | Status: DISCONTINUED | OUTPATIENT
Start: 2018-01-01 | End: 2019-01-01

## 2018-01-01 RX ORDER — INSULIN GLARGINE 100 [IU]/ML
20 INJECTION, SOLUTION SUBCUTANEOUS EVERY MORNING
Qty: 0 | Refills: 0 | Status: DISCONTINUED | OUTPATIENT
Start: 2018-01-01 | End: 2018-01-05

## 2018-01-01 RX ORDER — INSULIN GLARGINE 100 [IU]/ML
5 INJECTION, SOLUTION SUBCUTANEOUS AT BEDTIME
Qty: 0 | Refills: 0 | Status: DISCONTINUED | OUTPATIENT
Start: 2018-01-01 | End: 2018-01-01

## 2018-01-01 RX ORDER — DEXTROSE 50 % IN WATER 50 %
25 SYRINGE (ML) INTRAVENOUS ONCE
Qty: 0 | Refills: 0 | Status: COMPLETED | OUTPATIENT
Start: 2018-01-01 | End: 2018-01-01

## 2018-01-01 RX ORDER — INSULIN GLARGINE 100 [IU]/ML
10 INJECTION, SOLUTION SUBCUTANEOUS AT BEDTIME
Qty: 0 | Refills: 0 | Status: COMPLETED | OUTPATIENT
Start: 2018-01-01 | End: 2018-01-01

## 2018-01-01 RX ORDER — MORPHINE SULFATE 50 MG/1
2 CAPSULE, EXTENDED RELEASE ORAL EVERY 6 HOURS
Qty: 0 | Refills: 0 | Status: DISCONTINUED | OUTPATIENT
Start: 2018-01-01 | End: 2018-01-01

## 2018-01-01 RX ORDER — IPRATROPIUM/ALBUTEROL SULFATE 18-103MCG
3 AEROSOL WITH ADAPTER (GRAM) INHALATION EVERY 6 HOURS
Qty: 0 | Refills: 0 | Status: DISCONTINUED | OUTPATIENT
Start: 2018-01-01 | End: 2018-01-10

## 2018-01-01 RX ORDER — SODIUM CHLORIDE 9 MG/ML
1000 INJECTION, SOLUTION INTRAVENOUS
Qty: 0 | Refills: 0 | Status: DISCONTINUED | OUTPATIENT
Start: 2018-01-01 | End: 2018-01-10

## 2018-01-01 RX ORDER — SIMETHICONE 80 MG/1
80 TABLET, CHEWABLE ORAL ONCE
Qty: 0 | Refills: 0 | Status: DISCONTINUED | OUTPATIENT
Start: 2018-01-01 | End: 2018-01-01

## 2018-01-01 RX ORDER — SODIUM CHLORIDE 9 MG/ML
1000 INJECTION, SOLUTION INTRAVENOUS
Qty: 0 | Refills: 0 | Status: DISCONTINUED | OUTPATIENT
Start: 2018-01-01 | End: 2019-01-01

## 2018-01-01 RX ORDER — UMECLIDINIUM 62.5 UG/1
1 AEROSOL, POWDER ORAL
Qty: 0 | Refills: 0 | COMMUNITY

## 2018-01-01 RX ORDER — INSULIN GLARGINE 100 [IU]/ML
5 INJECTION, SOLUTION SUBCUTANEOUS AT BEDTIME
Qty: 0 | Refills: 0 | Status: DISCONTINUED | OUTPATIENT
Start: 2018-01-01 | End: 2019-01-01

## 2018-01-01 RX ORDER — DEXTROSE 50 % IN WATER 50 %
12.5 SYRINGE (ML) INTRAVENOUS ONCE
Qty: 0 | Refills: 0 | Status: COMPLETED | OUTPATIENT
Start: 2018-01-01 | End: 2018-01-01

## 2018-01-01 RX ORDER — SODIUM CHLORIDE 9 MG/ML
1000 INJECTION INTRAMUSCULAR; INTRAVENOUS; SUBCUTANEOUS
Qty: 0 | Refills: 0 | Status: DISCONTINUED | OUTPATIENT
Start: 2018-01-01 | End: 2018-01-01

## 2018-01-01 RX ORDER — ATORVASTATIN CALCIUM 80 MG/1
1 TABLET, FILM COATED ORAL
Qty: 30 | Refills: 0 | OUTPATIENT
Start: 2018-01-01 | End: 2018-01-01

## 2018-01-01 RX ORDER — BUDESONIDE AND FORMOTEROL FUMARATE DIHYDRATE 160; 4.5 UG/1; UG/1
2 AEROSOL RESPIRATORY (INHALATION)
Qty: 0 | Refills: 0 | Status: DISCONTINUED | OUTPATIENT
Start: 2018-01-01 | End: 2018-01-01

## 2018-01-01 RX ORDER — FUROSEMIDE 40 MG
1 TABLET ORAL
Qty: 15 | Refills: 0 | OUTPATIENT
Start: 2018-01-01 | End: 2018-01-01

## 2018-01-01 RX ORDER — DEXTROSE 50 % IN WATER 50 %
12.5 SYRINGE (ML) INTRAVENOUS ONCE
Qty: 0 | Refills: 0 | Status: DISCONTINUED | OUTPATIENT
Start: 2018-01-01 | End: 2019-01-01

## 2018-01-01 RX ORDER — ACETAMINOPHEN 500 MG
1000 TABLET ORAL ONCE
Qty: 0 | Refills: 0 | Status: COMPLETED | OUTPATIENT
Start: 2018-01-01 | End: 2018-01-01

## 2018-01-01 RX ORDER — FLUCONAZOLE 150 MG/1
150 TABLET ORAL DAILY
Qty: 0 | Refills: 0 | Status: COMPLETED | OUTPATIENT
Start: 2018-01-01 | End: 2019-01-01

## 2018-01-01 RX ORDER — ONDANSETRON 8 MG/1
4 TABLET, FILM COATED ORAL ONCE
Qty: 0 | Refills: 0 | Status: DISCONTINUED | OUTPATIENT
Start: 2018-01-01 | End: 2018-01-01

## 2018-01-01 RX ORDER — DIPHENHYDRAMINE HCL 50 MG
25 CAPSULE ORAL DAILY
Qty: 0 | Refills: 0 | Status: COMPLETED | OUTPATIENT
Start: 2018-01-01 | End: 2018-01-01

## 2018-01-01 RX ORDER — ALBUTEROL 90 UG/1
0 AEROSOL, METERED ORAL
Qty: 0 | Refills: 0 | COMMUNITY

## 2018-01-01 RX ORDER — ACETAMINOPHEN 500 MG
650 TABLET ORAL EVERY 6 HOURS
Qty: 0 | Refills: 0 | Status: DISCONTINUED | OUTPATIENT
Start: 2018-01-01 | End: 2019-01-01

## 2018-01-01 RX ORDER — SPIRONOLACTONE 25 MG/1
1 TABLET, FILM COATED ORAL
Qty: 0 | Refills: 0 | COMMUNITY

## 2018-01-01 RX ORDER — DIPHENHYDRAMINE HCL 50 MG
25 CAPSULE ORAL ONCE
Qty: 0 | Refills: 0 | Status: COMPLETED | OUTPATIENT
Start: 2018-01-01 | End: 2018-01-01

## 2018-01-01 RX ORDER — HYDROCORTISONE 20 MG
100 TABLET ORAL ONCE
Qty: 0 | Refills: 0 | Status: COMPLETED | OUTPATIENT
Start: 2018-01-01 | End: 2018-01-01

## 2018-01-01 RX ORDER — AMIODARONE HYDROCHLORIDE 400 MG/1
100 TABLET ORAL DAILY
Qty: 0 | Refills: 0 | Status: DISCONTINUED | OUTPATIENT
Start: 2018-01-01 | End: 2019-01-01

## 2018-01-01 RX ORDER — DEXTROSE 50 % IN WATER 50 %
25 SYRINGE (ML) INTRAVENOUS ONCE
Qty: 0 | Refills: 0 | Status: DISCONTINUED | OUTPATIENT
Start: 2018-01-01 | End: 2018-01-10

## 2018-01-01 RX ORDER — SENNA PLUS 8.6 MG/1
2 TABLET ORAL AT BEDTIME
Qty: 0 | Refills: 0 | Status: DISCONTINUED | OUTPATIENT
Start: 2018-01-01 | End: 2019-01-01

## 2018-01-01 RX ORDER — TRAMADOL HYDROCHLORIDE 50 MG/1
50 TABLET ORAL EVERY 4 HOURS
Qty: 0 | Refills: 0 | Status: DISCONTINUED | OUTPATIENT
Start: 2018-01-01 | End: 2018-01-01

## 2018-01-01 RX ORDER — FLUCONAZOLE 150 MG/1
100 TABLET ORAL ONCE
Qty: 0 | Refills: 0 | Status: COMPLETED | OUTPATIENT
Start: 2018-01-01 | End: 2018-01-01

## 2018-01-01 RX ORDER — CEFEPIME 1 G/1
1000 INJECTION, POWDER, FOR SOLUTION INTRAMUSCULAR; INTRAVENOUS EVERY 12 HOURS
Qty: 0 | Refills: 0 | Status: DISCONTINUED | OUTPATIENT
Start: 2018-01-01 | End: 2018-01-10

## 2018-01-01 RX ORDER — AZITHROMYCIN 500 MG/1
500 TABLET, FILM COATED ORAL ONCE
Qty: 0 | Refills: 0 | Status: COMPLETED | OUTPATIENT
Start: 2018-01-01 | End: 2018-01-01

## 2018-01-01 RX ORDER — ALBUTEROL 90 UG/1
2.5 AEROSOL, METERED ORAL ONCE
Qty: 0 | Refills: 0 | Status: COMPLETED | OUTPATIENT
Start: 2018-01-01 | End: 2018-01-01

## 2018-01-01 RX ORDER — OXYCODONE AND ACETAMINOPHEN 5; 325 MG/1; MG/1
1 TABLET ORAL EVERY 4 HOURS
Qty: 0 | Refills: 0 | Status: DISCONTINUED | OUTPATIENT
Start: 2018-01-01 | End: 2018-01-01

## 2018-01-01 RX ORDER — VANCOMYCIN HCL 1 G
1200 VIAL (EA) INTRAVENOUS EVERY 12 HOURS
Qty: 0 | Refills: 0 | Status: DISCONTINUED | OUTPATIENT
Start: 2018-01-01 | End: 2018-01-01

## 2018-01-01 RX ORDER — TIOTROPIUM BROMIDE 18 UG/1
2 CAPSULE ORAL; RESPIRATORY (INHALATION)
Qty: 1 | Refills: 2 | OUTPATIENT
Start: 2018-01-01 | End: 2018-01-01

## 2018-01-01 RX ORDER — WARFARIN SODIUM 2.5 MG/1
2.5 TABLET ORAL AT BEDTIME
Qty: 0 | Refills: 0 | Status: COMPLETED | OUTPATIENT
Start: 2018-01-01 | End: 2018-01-01

## 2018-01-01 RX ORDER — SPIRONOLACTONE 25 MG/1
25 TABLET, FILM COATED ORAL
Qty: 0 | Refills: 0 | Status: DISCONTINUED | OUTPATIENT
Start: 2018-01-01 | End: 2018-01-01

## 2018-01-01 RX ORDER — ACETAMINOPHEN 500 MG
650 TABLET ORAL ONCE
Qty: 0 | Refills: 0 | Status: DISCONTINUED | OUTPATIENT
Start: 2018-01-01 | End: 2018-01-01

## 2018-01-01 RX ORDER — SIMVASTATIN 20 MG/1
1 TABLET, FILM COATED ORAL
Qty: 0 | Refills: 0 | COMMUNITY

## 2018-01-01 RX ORDER — DEXTROSE 50 % IN WATER 50 %
12.5 SYRINGE (ML) INTRAVENOUS ONCE
Qty: 0 | Refills: 0 | Status: DISCONTINUED | OUTPATIENT
Start: 2018-01-01 | End: 2018-01-10

## 2018-01-01 RX ORDER — VANCOMYCIN HCL 1 G
VIAL (EA) INTRAVENOUS
Qty: 0 | Refills: 0 | Status: DISCONTINUED | OUTPATIENT
Start: 2018-01-01 | End: 2018-01-01

## 2018-01-01 RX ORDER — DEXTROSE 50 % IN WATER 50 %
15 SYRINGE (ML) INTRAVENOUS ONCE
Qty: 0 | Refills: 0 | Status: DISCONTINUED | OUTPATIENT
Start: 2018-01-01 | End: 2019-01-01

## 2018-01-01 RX ORDER — NYSTATIN CREAM 100000 [USP'U]/G
1 CREAM TOPICAL
Qty: 0 | Refills: 0 | Status: DISCONTINUED | OUTPATIENT
Start: 2018-01-01 | End: 2019-01-01

## 2018-01-01 RX ORDER — TENOFOVIR DISOPROXIL FUMARATE 300 MG/1
300 TABLET, FILM COATED ORAL DAILY
Qty: 0 | Refills: 0 | Status: DISCONTINUED | OUTPATIENT
Start: 2018-01-01 | End: 2019-01-01

## 2018-01-01 RX ORDER — WARFARIN SODIUM 2.5 MG/1
1 TABLET ORAL
Qty: 0 | Refills: 0 | COMMUNITY
Start: 2018-01-01

## 2018-01-01 RX ADMIN — HYDROMORPHONE HYDROCHLORIDE 0.5 MILLIGRAM(S): 2 INJECTION INTRAMUSCULAR; INTRAVENOUS; SUBCUTANEOUS at 20:38

## 2018-01-01 RX ADMIN — GABAPENTIN 300 MILLIGRAM(S): 400 CAPSULE ORAL at 14:10

## 2018-01-01 RX ADMIN — GABAPENTIN 300 MILLIGRAM(S): 400 CAPSULE ORAL at 13:28

## 2018-01-01 RX ADMIN — ATORVASTATIN CALCIUM 10 MILLIGRAM(S): 80 TABLET, FILM COATED ORAL at 21:13

## 2018-01-01 RX ADMIN — Medication 3: at 18:04

## 2018-01-01 RX ADMIN — SODIUM CHLORIDE 1000 MILLILITER(S): 9 INJECTION INTRAMUSCULAR; INTRAVENOUS; SUBCUTANEOUS at 20:43

## 2018-01-01 RX ADMIN — Medication 650 MILLIGRAM(S): at 13:03

## 2018-01-01 RX ADMIN — SODIUM CHLORIDE 75 MILLILITER(S): 9 INJECTION, SOLUTION INTRAVENOUS at 08:22

## 2018-01-01 RX ADMIN — MORPHINE SULFATE 4 MILLIGRAM(S): 50 CAPSULE, EXTENDED RELEASE ORAL at 18:11

## 2018-01-01 RX ADMIN — OXYCODONE AND ACETAMINOPHEN 2 TABLET(S): 5; 325 TABLET ORAL at 22:00

## 2018-01-01 RX ADMIN — Medication 3: at 08:23

## 2018-01-01 RX ADMIN — TENOFOVIR DISOPROXIL FUMARATE 300 MILLIGRAM(S): 300 TABLET, FILM COATED ORAL at 11:53

## 2018-01-01 RX ADMIN — SIMVASTATIN 10 MILLIGRAM(S): 20 TABLET, FILM COATED ORAL at 21:15

## 2018-01-01 RX ADMIN — GABAPENTIN 300 MILLIGRAM(S): 400 CAPSULE ORAL at 06:25

## 2018-01-01 RX ADMIN — TENOFOVIR DISOPROXIL FUMARATE 300 MILLIGRAM(S): 300 TABLET, FILM COATED ORAL at 13:02

## 2018-01-01 RX ADMIN — Medication 1 APPLICATION(S): at 05:08

## 2018-01-01 RX ADMIN — GABAPENTIN 300 MILLIGRAM(S): 400 CAPSULE ORAL at 06:24

## 2018-01-01 RX ADMIN — WARFARIN SODIUM 1 MILLIGRAM(S): 2.5 TABLET ORAL at 21:26

## 2018-01-01 RX ADMIN — CEFEPIME 1000 MILLIGRAM(S): 1 INJECTION, POWDER, FOR SOLUTION INTRAMUSCULAR; INTRAVENOUS at 06:15

## 2018-01-01 RX ADMIN — FLUCONAZOLE 150 MILLIGRAM(S): 150 TABLET ORAL at 14:42

## 2018-01-01 RX ADMIN — CEFEPIME 1000 MILLIGRAM(S): 1 INJECTION, POWDER, FOR SOLUTION INTRAMUSCULAR; INTRAVENOUS at 18:22

## 2018-01-01 RX ADMIN — TENOFOVIR DISOPROXIL FUMARATE 300 MILLIGRAM(S): 300 TABLET, FILM COATED ORAL at 13:09

## 2018-01-01 RX ADMIN — Medication 4: at 16:48

## 2018-01-01 RX ADMIN — GABAPENTIN 300 MILLIGRAM(S): 400 CAPSULE ORAL at 05:54

## 2018-01-01 RX ADMIN — Medication 40 MILLIGRAM(S): at 13:00

## 2018-01-01 RX ADMIN — MONTELUKAST 10 MILLIGRAM(S): 4 TABLET, CHEWABLE ORAL at 22:41

## 2018-01-01 RX ADMIN — Medication 5: at 17:02

## 2018-01-01 RX ADMIN — Medication 3: at 12:32

## 2018-01-01 RX ADMIN — HEPARIN SODIUM 5000 UNIT(S): 5000 INJECTION INTRAVENOUS; SUBCUTANEOUS at 13:03

## 2018-01-01 RX ADMIN — INSULIN GLARGINE 20 UNIT(S): 100 INJECTION, SOLUTION SUBCUTANEOUS at 08:36

## 2018-01-01 RX ADMIN — GABAPENTIN 300 MILLIGRAM(S): 400 CAPSULE ORAL at 05:36

## 2018-01-01 RX ADMIN — Medication 650 MILLIGRAM(S): at 09:47

## 2018-01-01 RX ADMIN — HEPARIN SODIUM 5000 UNIT(S): 5000 INJECTION INTRAVENOUS; SUBCUTANEOUS at 06:02

## 2018-01-01 RX ADMIN — AMIODARONE HYDROCHLORIDE 100 MILLIGRAM(S): 400 TABLET ORAL at 05:54

## 2018-01-01 RX ADMIN — ATORVASTATIN CALCIUM 10 MILLIGRAM(S): 80 TABLET, FILM COATED ORAL at 21:19

## 2018-01-01 RX ADMIN — Medication 100 MILLIGRAM(S): at 13:03

## 2018-01-01 RX ADMIN — Medication 100 MILLIGRAM(S): at 05:09

## 2018-01-01 RX ADMIN — CEFEPIME 1000 MILLIGRAM(S): 1 INJECTION, POWDER, FOR SOLUTION INTRAMUSCULAR; INTRAVENOUS at 17:29

## 2018-01-01 RX ADMIN — SPIRONOLACTONE 50 MILLIGRAM(S): 25 TABLET, FILM COATED ORAL at 17:35

## 2018-01-01 RX ADMIN — CEFEPIME 1000 MILLIGRAM(S): 1 INJECTION, POWDER, FOR SOLUTION INTRAMUSCULAR; INTRAVENOUS at 05:12

## 2018-01-01 RX ADMIN — ATORVASTATIN CALCIUM 10 MILLIGRAM(S): 80 TABLET, FILM COATED ORAL at 22:07

## 2018-01-01 RX ADMIN — Medication 25 MILLIGRAM(S): at 05:39

## 2018-01-01 RX ADMIN — AMIODARONE HYDROCHLORIDE 100 MILLIGRAM(S): 400 TABLET ORAL at 05:24

## 2018-01-01 RX ADMIN — GABAPENTIN 300 MILLIGRAM(S): 400 CAPSULE ORAL at 05:40

## 2018-01-01 RX ADMIN — Medication 3: at 12:00

## 2018-01-01 RX ADMIN — Medication 3 MILLILITER(S): at 19:25

## 2018-01-01 RX ADMIN — BUDESONIDE AND FORMOTEROL FUMARATE DIHYDRATE 2 PUFF(S): 160; 4.5 AEROSOL RESPIRATORY (INHALATION) at 07:53

## 2018-01-01 RX ADMIN — Medication 2: at 22:01

## 2018-01-01 RX ADMIN — Medication 1 APPLICATION(S): at 05:39

## 2018-01-01 RX ADMIN — NYSTATIN CREAM 1 APPLICATION(S): 100000 CREAM TOPICAL at 17:29

## 2018-01-01 RX ADMIN — Medication 20 MILLIGRAM(S): at 11:39

## 2018-01-01 RX ADMIN — Medication 2: at 08:02

## 2018-01-01 RX ADMIN — Medication 650 MILLIGRAM(S): at 16:13

## 2018-01-01 RX ADMIN — Medication 20 MILLIGRAM(S): at 16:18

## 2018-01-01 RX ADMIN — GABAPENTIN 300 MILLIGRAM(S): 400 CAPSULE ORAL at 05:15

## 2018-01-01 RX ADMIN — CEFEPIME 1000 MILLIGRAM(S): 1 INJECTION, POWDER, FOR SOLUTION INTRAMUSCULAR; INTRAVENOUS at 06:21

## 2018-01-01 RX ADMIN — TENOFOVIR DISOPROXIL FUMARATE 300 MILLIGRAM(S): 300 TABLET, FILM COATED ORAL at 14:36

## 2018-01-01 RX ADMIN — AZITHROMYCIN 255 MILLIGRAM(S): 500 TABLET, FILM COATED ORAL at 16:44

## 2018-01-01 RX ADMIN — BUDESONIDE AND FORMOTEROL FUMARATE DIHYDRATE 2 PUFF(S): 160; 4.5 AEROSOL RESPIRATORY (INHALATION) at 20:55

## 2018-01-01 RX ADMIN — SPIRONOLACTONE 25 MILLIGRAM(S): 25 TABLET, FILM COATED ORAL at 21:13

## 2018-01-01 RX ADMIN — HEPARIN SODIUM 5000 UNIT(S): 5000 INJECTION INTRAVENOUS; SUBCUTANEOUS at 13:18

## 2018-01-01 RX ADMIN — TIOTROPIUM BROMIDE 1 CAPSULE(S): 18 CAPSULE ORAL; RESPIRATORY (INHALATION) at 07:53

## 2018-01-01 RX ADMIN — INSULIN GLARGINE 10 UNIT(S): 100 INJECTION, SOLUTION SUBCUTANEOUS at 21:47

## 2018-01-01 RX ADMIN — Medication 1: at 14:22

## 2018-01-01 RX ADMIN — ZINC SULFATE TAB 220 MG (50 MG ZINC EQUIVALENT) 220 MILLIGRAM(S): 220 (50 ZN) TAB at 06:16

## 2018-01-01 RX ADMIN — OXYCODONE AND ACETAMINOPHEN 1 TABLET(S): 5; 325 TABLET ORAL at 06:21

## 2018-01-01 RX ADMIN — AMIODARONE HYDROCHLORIDE 100 MILLIGRAM(S): 400 TABLET ORAL at 14:29

## 2018-01-01 RX ADMIN — LACTULOSE 10 GRAM(S): 10 SOLUTION ORAL at 14:13

## 2018-01-01 RX ADMIN — GABAPENTIN 300 MILLIGRAM(S): 400 CAPSULE ORAL at 14:20

## 2018-01-01 RX ADMIN — OBINUTUZUMAB 26 MILLIGRAM(S): 1000 INJECTION, SOLUTION, CONCENTRATE INTRAVENOUS at 14:02

## 2018-01-01 RX ADMIN — Medication 1 APPLICATION(S): at 05:41

## 2018-01-01 RX ADMIN — OXYCODONE AND ACETAMINOPHEN 2 TABLET(S): 5; 325 TABLET ORAL at 21:17

## 2018-01-01 RX ADMIN — SODIUM CHLORIDE 50 MILLILITER(S): 9 INJECTION INTRAMUSCULAR; INTRAVENOUS; SUBCUTANEOUS at 03:41

## 2018-01-01 RX ADMIN — GABAPENTIN 300 MILLIGRAM(S): 400 CAPSULE ORAL at 21:40

## 2018-01-01 RX ADMIN — AMIODARONE HYDROCHLORIDE 100 MILLIGRAM(S): 400 TABLET ORAL at 05:10

## 2018-01-01 RX ADMIN — Medication 1: at 21:35

## 2018-01-01 RX ADMIN — MONTELUKAST 10 MILLIGRAM(S): 4 TABLET, CHEWABLE ORAL at 21:51

## 2018-01-01 RX ADMIN — Medication 1: at 12:02

## 2018-01-01 RX ADMIN — Medication 100 MILLIGRAM(S): at 06:15

## 2018-01-01 RX ADMIN — Medication 25 MILLIGRAM(S): at 05:09

## 2018-01-01 RX ADMIN — Medication 40 MILLIGRAM(S): at 22:03

## 2018-01-01 RX ADMIN — SODIUM CHLORIDE 1000 MILLILITER(S): 9 INJECTION INTRAMUSCULAR; INTRAVENOUS; SUBCUTANEOUS at 19:42

## 2018-01-01 RX ADMIN — GABAPENTIN 300 MILLIGRAM(S): 400 CAPSULE ORAL at 22:16

## 2018-01-01 RX ADMIN — Medication 650 MILLIGRAM(S): at 11:33

## 2018-01-01 RX ADMIN — Medication 20 MILLIGRAM(S): at 05:42

## 2018-01-01 RX ADMIN — Medication 25 MILLIGRAM(S): at 14:15

## 2018-01-01 RX ADMIN — Medication 40 MILLIGRAM(S): at 13:01

## 2018-01-01 RX ADMIN — SENNA PLUS 2 TABLET(S): 8.6 TABLET ORAL at 22:04

## 2018-01-01 RX ADMIN — HEPARIN SODIUM 5000 UNIT(S): 5000 INJECTION INTRAVENOUS; SUBCUTANEOUS at 05:41

## 2018-01-01 RX ADMIN — GABAPENTIN 300 MILLIGRAM(S): 400 CAPSULE ORAL at 05:08

## 2018-01-01 RX ADMIN — TENOFOVIR DISOPROXIL FUMARATE 300 MILLIGRAM(S): 300 TABLET, FILM COATED ORAL at 12:09

## 2018-01-01 RX ADMIN — Medication 1 TABLET(S): at 13:01

## 2018-01-01 RX ADMIN — AMIODARONE HYDROCHLORIDE 100 MILLIGRAM(S): 400 TABLET ORAL at 05:16

## 2018-01-01 RX ADMIN — Medication 2 UNIT(S): at 12:10

## 2018-01-01 RX ADMIN — Medication 40 MILLIGRAM(S): at 05:42

## 2018-01-01 RX ADMIN — INSULIN GLARGINE 10 UNIT(S): 100 INJECTION, SOLUTION SUBCUTANEOUS at 21:06

## 2018-01-01 RX ADMIN — GABAPENTIN 300 MILLIGRAM(S): 400 CAPSULE ORAL at 21:16

## 2018-01-01 RX ADMIN — Medication 6 UNIT(S): at 22:41

## 2018-01-01 RX ADMIN — GABAPENTIN 300 MILLIGRAM(S): 400 CAPSULE ORAL at 21:35

## 2018-01-01 RX ADMIN — AMIODARONE HYDROCHLORIDE 100 MILLIGRAM(S): 400 TABLET ORAL at 06:15

## 2018-01-01 RX ADMIN — GABAPENTIN 300 MILLIGRAM(S): 400 CAPSULE ORAL at 13:51

## 2018-01-01 RX ADMIN — Medication 1: at 12:42

## 2018-01-01 RX ADMIN — TENOFOVIR DISOPROXIL FUMARATE 300 MILLIGRAM(S): 300 TABLET, FILM COATED ORAL at 12:59

## 2018-01-01 RX ADMIN — FLUCONAZOLE 150 MILLIGRAM(S): 150 TABLET ORAL at 12:41

## 2018-01-01 RX ADMIN — CEFEPIME 1000 MILLIGRAM(S): 1 INJECTION, POWDER, FOR SOLUTION INTRAMUSCULAR; INTRAVENOUS at 18:26

## 2018-01-01 RX ADMIN — LACTULOSE 10 GRAM(S): 10 SOLUTION ORAL at 12:28

## 2018-01-01 RX ADMIN — BUDESONIDE AND FORMOTEROL FUMARATE DIHYDRATE 2 PUFF(S): 160; 4.5 AEROSOL RESPIRATORY (INHALATION) at 07:41

## 2018-01-01 RX ADMIN — TENOFOVIR DISOPROXIL FUMARATE 300 MILLIGRAM(S): 300 TABLET, FILM COATED ORAL at 13:20

## 2018-01-01 RX ADMIN — TENOFOVIR DISOPROXIL FUMARATE 300 MILLIGRAM(S): 300 TABLET, FILM COATED ORAL at 21:40

## 2018-01-01 RX ADMIN — ATORVASTATIN CALCIUM 10 MILLIGRAM(S): 80 TABLET, FILM COATED ORAL at 21:07

## 2018-01-01 RX ADMIN — HEPARIN SODIUM 5000 UNIT(S): 5000 INJECTION INTRAVENOUS; SUBCUTANEOUS at 21:12

## 2018-01-01 RX ADMIN — Medication 4: at 17:04

## 2018-01-01 RX ADMIN — GABAPENTIN 300 MILLIGRAM(S): 400 CAPSULE ORAL at 06:04

## 2018-01-01 RX ADMIN — Medication 1: at 08:16

## 2018-01-01 RX ADMIN — Medication 100 MILLIGRAM(S): at 21:07

## 2018-01-01 RX ADMIN — Medication 650 MILLIGRAM(S): at 15:45

## 2018-01-01 RX ADMIN — Medication 1: at 21:38

## 2018-01-01 RX ADMIN — OBINUTUZUMAB 20.43 MILLIGRAM(S): 1000 INJECTION, SOLUTION, CONCENTRATE INTRAVENOUS at 12:02

## 2018-01-01 RX ADMIN — GABAPENTIN 300 MILLIGRAM(S): 400 CAPSULE ORAL at 21:19

## 2018-01-01 RX ADMIN — WARFARIN SODIUM 3.75 MILLIGRAM(S): 2.5 TABLET ORAL at 22:30

## 2018-01-01 RX ADMIN — Medication 40 MILLIGRAM(S): at 18:23

## 2018-01-01 RX ADMIN — MONTELUKAST 10 MILLIGRAM(S): 4 TABLET, CHEWABLE ORAL at 21:57

## 2018-01-01 RX ADMIN — Medication 1 TABLET(S): at 12:41

## 2018-01-01 RX ADMIN — AMIODARONE HYDROCHLORIDE 50 MILLIGRAM(S): 400 TABLET ORAL at 06:04

## 2018-01-01 RX ADMIN — Medication 1000 MILLIGRAM(S): at 20:19

## 2018-01-01 RX ADMIN — MORPHINE SULFATE 4 MILLIGRAM(S): 50 CAPSULE, EXTENDED RELEASE ORAL at 14:18

## 2018-01-01 RX ADMIN — Medication 40 MILLIGRAM(S): at 11:48

## 2018-01-01 RX ADMIN — ALBUTEROL 2.5 MILLIGRAM(S): 90 AEROSOL, METERED ORAL at 14:56

## 2018-01-01 RX ADMIN — WARFARIN SODIUM 2.5 MILLIGRAM(S): 2.5 TABLET ORAL at 22:00

## 2018-01-01 RX ADMIN — Medication 1 APPLICATION(S): at 18:02

## 2018-01-01 RX ADMIN — Medication 100 MILLIGRAM(S): at 18:21

## 2018-01-01 RX ADMIN — Medication 1 TABLET(S): at 14:29

## 2018-01-01 RX ADMIN — SODIUM CHLORIDE 500 MILLILITER(S): 9 INJECTION INTRAMUSCULAR; INTRAVENOUS; SUBCUTANEOUS at 15:49

## 2018-01-01 RX ADMIN — Medication 106 MILLIGRAM(S): at 10:19

## 2018-01-01 RX ADMIN — TENOFOVIR DISOPROXIL FUMARATE 300 MILLIGRAM(S): 300 TABLET, FILM COATED ORAL at 11:49

## 2018-01-01 RX ADMIN — OXYCODONE HYDROCHLORIDE 5 MILLIGRAM(S): 5 TABLET ORAL at 13:45

## 2018-01-01 RX ADMIN — GABAPENTIN 300 MILLIGRAM(S): 400 CAPSULE ORAL at 05:14

## 2018-01-01 RX ADMIN — WARFARIN SODIUM 2.5 MILLIGRAM(S): 2.5 TABLET ORAL at 21:19

## 2018-01-01 RX ADMIN — TIOTROPIUM BROMIDE 1 CAPSULE(S): 18 CAPSULE ORAL; RESPIRATORY (INHALATION) at 20:38

## 2018-01-01 RX ADMIN — Medication 25 MILLIGRAM(S): at 05:31

## 2018-01-01 RX ADMIN — HEPARIN SODIUM 5000 UNIT(S): 5000 INJECTION INTRAVENOUS; SUBCUTANEOUS at 05:03

## 2018-01-01 RX ADMIN — Medication 1: at 16:30

## 2018-01-01 RX ADMIN — GABAPENTIN 300 MILLIGRAM(S): 400 CAPSULE ORAL at 22:04

## 2018-01-01 RX ADMIN — WARFARIN SODIUM 2.5 MILLIGRAM(S): 2.5 TABLET ORAL at 22:16

## 2018-01-01 RX ADMIN — Medication 1: at 12:07

## 2018-01-01 RX ADMIN — HEPARIN SODIUM 5000 UNIT(S): 5000 INJECTION INTRAVENOUS; SUBCUTANEOUS at 21:46

## 2018-01-01 RX ADMIN — INSULIN GLARGINE 10 UNIT(S): 100 INJECTION, SOLUTION SUBCUTANEOUS at 22:42

## 2018-01-01 RX ADMIN — Medication 40 MILLIGRAM(S): at 13:51

## 2018-01-01 RX ADMIN — Medication 500 MILLIGRAM(S): at 18:21

## 2018-01-01 RX ADMIN — Medication 650 MILLIGRAM(S): at 14:42

## 2018-01-01 RX ADMIN — HEPARIN SODIUM 5000 UNIT(S): 5000 INJECTION INTRAVENOUS; SUBCUTANEOUS at 05:36

## 2018-01-01 RX ADMIN — FLUCONAZOLE 150 MILLIGRAM(S): 150 TABLET ORAL at 12:58

## 2018-01-01 RX ADMIN — Medication 1 TABLET(S): at 13:26

## 2018-01-01 RX ADMIN — Medication 25 MILLIGRAM(S): at 05:43

## 2018-01-01 RX ADMIN — Medication 100 MILLIGRAM(S): at 14:46

## 2018-01-01 RX ADMIN — AMIODARONE HYDROCHLORIDE 100 MILLIGRAM(S): 400 TABLET ORAL at 05:07

## 2018-01-01 RX ADMIN — Medication 2: at 17:34

## 2018-01-01 RX ADMIN — GABAPENTIN 300 MILLIGRAM(S): 400 CAPSULE ORAL at 05:03

## 2018-01-01 RX ADMIN — MORPHINE SULFATE 4 MILLIGRAM(S): 50 CAPSULE, EXTENDED RELEASE ORAL at 05:38

## 2018-01-01 RX ADMIN — Medication 25 MILLIGRAM(S): at 05:21

## 2018-01-01 RX ADMIN — GABAPENTIN 300 MILLIGRAM(S): 400 CAPSULE ORAL at 06:02

## 2018-01-01 RX ADMIN — GABAPENTIN 300 MILLIGRAM(S): 400 CAPSULE ORAL at 13:00

## 2018-01-01 RX ADMIN — Medication 50 MILLILITER(S): at 08:33

## 2018-01-01 RX ADMIN — ATORVASTATIN CALCIUM 10 MILLIGRAM(S): 80 TABLET, FILM COATED ORAL at 21:57

## 2018-01-01 RX ADMIN — Medication 500 MILLIGRAM(S): at 17:39

## 2018-01-01 RX ADMIN — Medication 1: at 17:28

## 2018-01-01 RX ADMIN — AMIODARONE HYDROCHLORIDE 100 MILLIGRAM(S): 400 TABLET ORAL at 05:14

## 2018-01-01 RX ADMIN — LACTULOSE 10 GRAM(S): 10 SOLUTION ORAL at 12:01

## 2018-01-01 RX ADMIN — Medication 25 GRAM(S): at 17:45

## 2018-01-01 RX ADMIN — Medication 1: at 11:52

## 2018-01-01 RX ADMIN — Medication 100 MILLIGRAM(S): at 21:11

## 2018-01-01 RX ADMIN — SPIRONOLACTONE 25 MILLIGRAM(S): 25 TABLET, FILM COATED ORAL at 05:36

## 2018-01-01 RX ADMIN — SODIUM CHLORIDE 50 MILLILITER(S): 9 INJECTION INTRAMUSCULAR; INTRAVENOUS; SUBCUTANEOUS at 23:43

## 2018-01-01 RX ADMIN — Medication 2: at 17:33

## 2018-01-01 RX ADMIN — GABAPENTIN 300 MILLIGRAM(S): 400 CAPSULE ORAL at 13:04

## 2018-01-01 RX ADMIN — Medication 100 MILLIGRAM(S): at 22:01

## 2018-01-01 RX ADMIN — Medication 50 MILLIGRAM(S): at 14:43

## 2018-01-01 RX ADMIN — Medication 20 MILLIGRAM(S): at 18:00

## 2018-01-01 RX ADMIN — SODIUM CHLORIDE 500 MILLILITER(S): 9 INJECTION INTRAMUSCULAR; INTRAVENOUS; SUBCUTANEOUS at 22:42

## 2018-01-01 RX ADMIN — MONTELUKAST 10 MILLIGRAM(S): 4 TABLET, CHEWABLE ORAL at 21:15

## 2018-01-01 RX ADMIN — SODIUM CHLORIDE 75 MILLILITER(S): 9 INJECTION, SOLUTION INTRAVENOUS at 19:58

## 2018-01-01 RX ADMIN — SENNA PLUS 2 TABLET(S): 8.6 TABLET ORAL at 21:11

## 2018-01-01 RX ADMIN — Medication 40 MILLIGRAM(S): at 16:13

## 2018-01-01 RX ADMIN — WARFARIN SODIUM 2.5 MILLIGRAM(S): 2.5 TABLET ORAL at 21:11

## 2018-01-01 RX ADMIN — SODIUM CHLORIDE 50 MILLILITER(S): 9 INJECTION INTRAMUSCULAR; INTRAVENOUS; SUBCUTANEOUS at 17:02

## 2018-01-01 RX ADMIN — Medication 1: at 17:49

## 2018-01-01 RX ADMIN — Medication 100 MILLIGRAM(S): at 17:39

## 2018-01-01 RX ADMIN — Medication 100 MILLIGRAM(S): at 05:02

## 2018-01-01 RX ADMIN — SIMVASTATIN 10 MILLIGRAM(S): 20 TABLET, FILM COATED ORAL at 21:38

## 2018-01-01 RX ADMIN — Medication 2 UNIT(S): at 08:03

## 2018-01-01 RX ADMIN — Medication 3: at 12:52

## 2018-01-01 RX ADMIN — Medication 25 MILLIGRAM(S): at 13:11

## 2018-01-01 RX ADMIN — OXYCODONE HYDROCHLORIDE 5 MILLIGRAM(S): 5 TABLET ORAL at 06:52

## 2018-01-01 RX ADMIN — Medication 100 MILLIGRAM(S): at 21:46

## 2018-01-01 RX ADMIN — NYSTATIN CREAM 1 APPLICATION(S): 100000 CREAM TOPICAL at 05:11

## 2018-01-01 RX ADMIN — LACTULOSE 10 GRAM(S): 10 SOLUTION ORAL at 13:00

## 2018-01-01 RX ADMIN — SIMVASTATIN 10 MILLIGRAM(S): 20 TABLET, FILM COATED ORAL at 21:35

## 2018-01-01 RX ADMIN — Medication 650 MILLIGRAM(S): at 12:15

## 2018-01-01 RX ADMIN — ZINC SULFATE TAB 220 MG (50 MG ZINC EQUIVALENT) 220 MILLIGRAM(S): 220 (50 ZN) TAB at 17:28

## 2018-01-01 RX ADMIN — Medication 20 MILLIGRAM(S): at 17:34

## 2018-01-01 RX ADMIN — ATORVASTATIN CALCIUM 10 MILLIGRAM(S): 80 TABLET, FILM COATED ORAL at 00:54

## 2018-01-01 RX ADMIN — ZINC SULFATE TAB 220 MG (50 MG ZINC EQUIVALENT) 220 MILLIGRAM(S): 220 (50 ZN) TAB at 17:38

## 2018-01-01 RX ADMIN — TENOFOVIR DISOPROXIL FUMARATE 300 MILLIGRAM(S): 300 TABLET, FILM COATED ORAL at 12:29

## 2018-01-01 RX ADMIN — Medication 106 MILLIGRAM(S): at 13:03

## 2018-01-01 RX ADMIN — ZINC SULFATE TAB 220 MG (50 MG ZINC EQUIVALENT) 220 MILLIGRAM(S): 220 (50 ZN) TAB at 05:17

## 2018-01-01 RX ADMIN — CEFEPIME 100 MILLIGRAM(S): 1 INJECTION, POWDER, FOR SOLUTION INTRAMUSCULAR; INTRAVENOUS at 22:15

## 2018-01-01 RX ADMIN — ATORVASTATIN CALCIUM 10 MILLIGRAM(S): 80 TABLET, FILM COATED ORAL at 21:51

## 2018-01-01 RX ADMIN — OXYCODONE AND ACETAMINOPHEN 2 TABLET(S): 5; 325 TABLET ORAL at 09:57

## 2018-01-01 RX ADMIN — Medication 100 MILLIGRAM(S): at 18:26

## 2018-01-01 RX ADMIN — OXYCODONE AND ACETAMINOPHEN 1 TABLET(S): 5; 325 TABLET ORAL at 21:00

## 2018-01-01 RX ADMIN — OXYCODONE AND ACETAMINOPHEN 2 TABLET(S): 5; 325 TABLET ORAL at 13:05

## 2018-01-01 RX ADMIN — TIOTROPIUM BROMIDE 1 CAPSULE(S): 18 CAPSULE ORAL; RESPIRATORY (INHALATION) at 08:15

## 2018-01-01 RX ADMIN — GABAPENTIN 300 MILLIGRAM(S): 400 CAPSULE ORAL at 06:18

## 2018-01-01 RX ADMIN — Medication 1 APPLICATION(S): at 21:06

## 2018-01-01 RX ADMIN — Medication 20 MILLIGRAM(S): at 22:28

## 2018-01-01 RX ADMIN — AMIODARONE HYDROCHLORIDE 100 MILLIGRAM(S): 400 TABLET ORAL at 05:39

## 2018-01-01 RX ADMIN — TENOFOVIR DISOPROXIL FUMARATE 300 MILLIGRAM(S): 300 TABLET, FILM COATED ORAL at 14:15

## 2018-01-01 RX ADMIN — HEPARIN SODIUM 5000 UNIT(S): 5000 INJECTION INTRAVENOUS; SUBCUTANEOUS at 21:18

## 2018-01-01 RX ADMIN — OXYCODONE AND ACETAMINOPHEN 2 TABLET(S): 5; 325 TABLET ORAL at 17:26

## 2018-01-01 RX ADMIN — INSULIN GLARGINE 20 UNIT(S): 100 INJECTION, SOLUTION SUBCUTANEOUS at 08:35

## 2018-01-01 RX ADMIN — MONTELUKAST 10 MILLIGRAM(S): 4 TABLET, CHEWABLE ORAL at 22:30

## 2018-01-01 RX ADMIN — Medication 1 TABLET(S): at 13:44

## 2018-01-01 RX ADMIN — TRAMADOL HYDROCHLORIDE 50 MILLIGRAM(S): 50 TABLET ORAL at 18:30

## 2018-01-01 RX ADMIN — Medication 250 MILLIGRAM(S): at 03:03

## 2018-01-01 RX ADMIN — TENOFOVIR DISOPROXIL FUMARATE 300 MILLIGRAM(S): 300 TABLET, FILM COATED ORAL at 13:00

## 2018-01-01 RX ADMIN — Medication 20 MILLIGRAM(S): at 06:13

## 2018-01-01 RX ADMIN — TENOFOVIR DISOPROXIL FUMARATE 300 MILLIGRAM(S): 300 TABLET, FILM COATED ORAL at 11:48

## 2018-01-01 RX ADMIN — Medication 40 MILLIGRAM(S): at 09:46

## 2018-01-01 RX ADMIN — GABAPENTIN 300 MILLIGRAM(S): 400 CAPSULE ORAL at 21:22

## 2018-01-01 RX ADMIN — Medication 1 APPLICATION(S): at 06:02

## 2018-01-01 RX ADMIN — INSULIN GLARGINE 5 UNIT(S): 100 INJECTION, SOLUTION SUBCUTANEOUS at 21:18

## 2018-01-01 RX ADMIN — ATORVASTATIN CALCIUM 10 MILLIGRAM(S): 80 TABLET, FILM COATED ORAL at 21:16

## 2018-01-01 RX ADMIN — GABAPENTIN 300 MILLIGRAM(S): 400 CAPSULE ORAL at 21:07

## 2018-01-01 RX ADMIN — Medication 25 MILLIGRAM(S): at 17:34

## 2018-01-01 RX ADMIN — Medication 1 APPLICATION(S): at 05:01

## 2018-01-01 RX ADMIN — WARFARIN SODIUM 2.5 MILLIGRAM(S): 2.5 TABLET ORAL at 21:51

## 2018-01-01 RX ADMIN — ATORVASTATIN CALCIUM 10 MILLIGRAM(S): 80 TABLET, FILM COATED ORAL at 22:41

## 2018-01-01 RX ADMIN — Medication 100 MILLIGRAM(S): at 05:24

## 2018-01-01 RX ADMIN — Medication 1 TABLET(S): at 11:48

## 2018-01-01 RX ADMIN — SODIUM CHLORIDE 2000 MILLILITER(S): 9 INJECTION INTRAMUSCULAR; INTRAVENOUS; SUBCUTANEOUS at 10:37

## 2018-01-01 RX ADMIN — Medication 25 MILLIGRAM(S): at 18:52

## 2018-01-01 RX ADMIN — TIOTROPIUM BROMIDE 1 CAPSULE(S): 18 CAPSULE ORAL; RESPIRATORY (INHALATION) at 07:34

## 2018-01-01 RX ADMIN — Medication 1 APPLICATION(S): at 17:34

## 2018-01-01 RX ADMIN — AMIODARONE HYDROCHLORIDE 100 MILLIGRAM(S): 400 TABLET ORAL at 06:14

## 2018-01-01 RX ADMIN — Medication 25 MILLIGRAM(S): at 06:14

## 2018-01-01 RX ADMIN — Medication 650 MILLIGRAM(S): at 14:49

## 2018-01-01 RX ADMIN — GABAPENTIN 300 MILLIGRAM(S): 400 CAPSULE ORAL at 13:01

## 2018-01-01 RX ADMIN — Medication 1 TABLET(S): at 11:53

## 2018-01-01 RX ADMIN — OXYCODONE AND ACETAMINOPHEN 2 TABLET(S): 5; 325 TABLET ORAL at 18:21

## 2018-01-01 RX ADMIN — GABAPENTIN 300 MILLIGRAM(S): 400 CAPSULE ORAL at 05:31

## 2018-01-01 RX ADMIN — GABAPENTIN 300 MILLIGRAM(S): 400 CAPSULE ORAL at 13:18

## 2018-01-01 RX ADMIN — Medication 4: at 08:35

## 2018-01-01 RX ADMIN — GABAPENTIN 300 MILLIGRAM(S): 400 CAPSULE ORAL at 22:49

## 2018-01-01 RX ADMIN — ATORVASTATIN CALCIUM 10 MILLIGRAM(S): 80 TABLET, FILM COATED ORAL at 21:11

## 2018-01-01 RX ADMIN — GABAPENTIN 300 MILLIGRAM(S): 400 CAPSULE ORAL at 22:42

## 2018-01-01 RX ADMIN — Medication 20 MILLIGRAM(S): at 18:52

## 2018-01-01 RX ADMIN — SENNA PLUS 2 TABLET(S): 8.6 TABLET ORAL at 21:46

## 2018-01-01 RX ADMIN — SPIRONOLACTONE 100 MILLIGRAM(S): 25 TABLET, FILM COATED ORAL at 12:37

## 2018-01-01 RX ADMIN — TIOTROPIUM BROMIDE 1 CAPSULE(S): 18 CAPSULE ORAL; RESPIRATORY (INHALATION) at 08:54

## 2018-01-01 RX ADMIN — LACTULOSE 10 GRAM(S): 10 SOLUTION ORAL at 14:21

## 2018-01-01 RX ADMIN — Medication 1 APPLICATION(S): at 17:28

## 2018-01-01 RX ADMIN — SODIUM CHLORIDE 250 MILLILITER(S): 9 INJECTION INTRAMUSCULAR; INTRAVENOUS; SUBCUTANEOUS at 23:36

## 2018-01-01 RX ADMIN — Medication 25 MILLIGRAM(S): at 12:09

## 2018-01-01 RX ADMIN — TENOFOVIR DISOPROXIL FUMARATE 300 MILLIGRAM(S): 300 TABLET, FILM COATED ORAL at 12:01

## 2018-01-01 RX ADMIN — ATORVASTATIN CALCIUM 10 MILLIGRAM(S): 80 TABLET, FILM COATED ORAL at 22:01

## 2018-01-01 RX ADMIN — AMIODARONE HYDROCHLORIDE 100 MILLIGRAM(S): 400 TABLET ORAL at 05:43

## 2018-01-01 RX ADMIN — Medication 20 MILLIGRAM(S): at 05:31

## 2018-01-01 RX ADMIN — LISINOPRIL 2.5 MILLIGRAM(S): 2.5 TABLET ORAL at 06:04

## 2018-01-01 RX ADMIN — Medication 1 TABLET(S): at 12:02

## 2018-01-01 RX ADMIN — GABAPENTIN 300 MILLIGRAM(S): 400 CAPSULE ORAL at 22:29

## 2018-01-01 RX ADMIN — Medication 4: at 12:06

## 2018-01-01 RX ADMIN — Medication 4: at 11:48

## 2018-01-01 RX ADMIN — Medication 650 MILLIGRAM(S): at 10:23

## 2018-01-01 RX ADMIN — Medication 25 MILLIGRAM(S): at 16:13

## 2018-01-01 RX ADMIN — INSULIN GLARGINE 10 UNIT(S): 100 INJECTION, SOLUTION SUBCUTANEOUS at 21:19

## 2018-01-01 RX ADMIN — Medication 2: at 21:15

## 2018-01-01 RX ADMIN — SODIUM CHLORIDE 2000 MILLILITER(S): 9 INJECTION INTRAMUSCULAR; INTRAVENOUS; SUBCUTANEOUS at 10:38

## 2018-01-01 RX ADMIN — Medication 125 MILLIGRAM(S): at 16:44

## 2018-01-01 RX ADMIN — GABAPENTIN 300 MILLIGRAM(S): 400 CAPSULE ORAL at 21:13

## 2018-01-01 RX ADMIN — Medication 3 MILLILITER(S): at 10:39

## 2018-01-01 RX ADMIN — NYSTATIN CREAM 1 APPLICATION(S): 100000 CREAM TOPICAL at 18:23

## 2018-01-01 RX ADMIN — Medication 2: at 12:59

## 2018-01-01 RX ADMIN — LACTULOSE 10 GRAM(S): 10 SOLUTION ORAL at 12:06

## 2018-01-01 RX ADMIN — Medication 650 MILLIGRAM(S): at 18:37

## 2018-01-01 RX ADMIN — Medication 250 MILLIGRAM(S): at 17:29

## 2018-01-01 RX ADMIN — GABAPENTIN 300 MILLIGRAM(S): 400 CAPSULE ORAL at 14:45

## 2018-01-01 RX ADMIN — SPIRONOLACTONE 100 MILLIGRAM(S): 25 TABLET, FILM COATED ORAL at 14:10

## 2018-01-01 RX ADMIN — GABAPENTIN 300 MILLIGRAM(S): 400 CAPSULE ORAL at 21:18

## 2018-01-01 RX ADMIN — CEFEPIME 100 MILLIGRAM(S): 1 INJECTION, POWDER, FOR SOLUTION INTRAMUSCULAR; INTRAVENOUS at 09:23

## 2018-01-01 RX ADMIN — Medication 25 MILLIGRAM(S): at 06:25

## 2018-01-01 RX ADMIN — INSULIN GLARGINE 5 UNIT(S): 100 INJECTION, SOLUTION SUBCUTANEOUS at 21:25

## 2018-01-01 RX ADMIN — LACTULOSE 10 GRAM(S): 10 SOLUTION ORAL at 13:27

## 2018-01-01 RX ADMIN — Medication 25 MILLIGRAM(S): at 22:16

## 2018-01-01 RX ADMIN — TRAMADOL HYDROCHLORIDE 50 MILLIGRAM(S): 50 TABLET ORAL at 17:58

## 2018-01-01 RX ADMIN — GABAPENTIN 300 MILLIGRAM(S): 400 CAPSULE ORAL at 21:15

## 2018-01-01 RX ADMIN — SPIRONOLACTONE 25 MILLIGRAM(S): 25 TABLET, FILM COATED ORAL at 05:31

## 2018-01-01 RX ADMIN — Medication 1 TABLET(S): at 12:28

## 2018-01-01 RX ADMIN — GABAPENTIN 300 MILLIGRAM(S): 400 CAPSULE ORAL at 14:49

## 2018-01-01 RX ADMIN — TENOFOVIR DISOPROXIL FUMARATE 300 MILLIGRAM(S): 300 TABLET, FILM COATED ORAL at 12:43

## 2018-01-01 RX ADMIN — Medication 100 MILLIGRAM(S): at 06:01

## 2018-01-01 RX ADMIN — LACTULOSE 10 GRAM(S): 10 SOLUTION ORAL at 21:42

## 2018-01-01 RX ADMIN — Medication 100 MILLIGRAM(S): at 14:16

## 2018-01-01 RX ADMIN — Medication 60 MILLIGRAM(S): at 14:42

## 2018-01-01 RX ADMIN — Medication 102 MILLIGRAM(S): at 12:44

## 2018-01-01 RX ADMIN — SENNA PLUS 2 TABLET(S): 8.6 TABLET ORAL at 21:16

## 2018-01-01 RX ADMIN — Medication 100 MILLIGRAM(S): at 06:18

## 2018-01-01 RX ADMIN — LACTULOSE 10 GRAM(S): 10 SOLUTION ORAL at 17:33

## 2018-01-01 RX ADMIN — ZINC SULFATE TAB 220 MG (50 MG ZINC EQUIVALENT) 220 MILLIGRAM(S): 220 (50 ZN) TAB at 18:26

## 2018-01-01 RX ADMIN — Medication 1: at 17:31

## 2018-01-01 RX ADMIN — Medication 40 MILLIGRAM(S): at 12:44

## 2018-01-01 RX ADMIN — CEFEPIME 1000 MILLIGRAM(S): 1 INJECTION, POWDER, FOR SOLUTION INTRAMUSCULAR; INTRAVENOUS at 19:42

## 2018-01-01 RX ADMIN — OXYCODONE AND ACETAMINOPHEN 1 TABLET(S): 5; 325 TABLET ORAL at 20:13

## 2018-01-01 RX ADMIN — OXYCODONE AND ACETAMINOPHEN 1 TABLET(S): 5; 325 TABLET ORAL at 05:37

## 2018-01-01 RX ADMIN — GABAPENTIN 300 MILLIGRAM(S): 400 CAPSULE ORAL at 13:45

## 2018-01-01 RX ADMIN — BUDESONIDE AND FORMOTEROL FUMARATE DIHYDRATE 2 PUFF(S): 160; 4.5 AEROSOL RESPIRATORY (INHALATION) at 07:32

## 2018-01-01 RX ADMIN — MORPHINE SULFATE 4 MILLIGRAM(S): 50 CAPSULE, EXTENDED RELEASE ORAL at 18:26

## 2018-01-01 RX ADMIN — Medication 1: at 08:34

## 2018-01-01 RX ADMIN — HEPARIN SODIUM 5000 UNIT(S): 5000 INJECTION INTRAVENOUS; SUBCUTANEOUS at 14:20

## 2018-01-01 RX ADMIN — GABAPENTIN 300 MILLIGRAM(S): 400 CAPSULE ORAL at 14:16

## 2018-01-01 RX ADMIN — LACTULOSE 10 GRAM(S): 10 SOLUTION ORAL at 12:58

## 2018-01-01 RX ADMIN — INSULIN GLARGINE 20 UNIT(S): 100 INJECTION, SOLUTION SUBCUTANEOUS at 09:10

## 2018-01-01 RX ADMIN — Medication 100 MILLIGRAM(S): at 05:14

## 2018-01-01 RX ADMIN — TRAMADOL HYDROCHLORIDE 50 MILLIGRAM(S): 50 TABLET ORAL at 21:13

## 2018-01-01 RX ADMIN — GABAPENTIN 300 MILLIGRAM(S): 400 CAPSULE ORAL at 22:08

## 2018-01-01 RX ADMIN — AMIODARONE HYDROCHLORIDE 100 MILLIGRAM(S): 400 TABLET ORAL at 06:01

## 2018-01-01 RX ADMIN — Medication 1 TABLET(S): at 12:11

## 2018-01-01 RX ADMIN — Medication 1 APPLICATION(S): at 21:43

## 2018-01-01 RX ADMIN — Medication 1 APPLICATION(S): at 05:21

## 2018-01-01 RX ADMIN — TENOFOVIR DISOPROXIL FUMARATE 300 MILLIGRAM(S): 300 TABLET, FILM COATED ORAL at 13:28

## 2018-01-01 RX ADMIN — HYDROMORPHONE HYDROCHLORIDE 0.5 MILLIGRAM(S): 2 INJECTION INTRAMUSCULAR; INTRAVENOUS; SUBCUTANEOUS at 18:34

## 2018-01-01 RX ADMIN — TENOFOVIR DISOPROXIL FUMARATE 300 MILLIGRAM(S): 300 TABLET, FILM COATED ORAL at 12:28

## 2018-01-01 RX ADMIN — Medication 3 MILLILITER(S): at 16:44

## 2018-01-01 RX ADMIN — Medication 20 MILLIGRAM(S): at 06:25

## 2018-01-01 RX ADMIN — MORPHINE SULFATE 4 MILLIGRAM(S): 50 CAPSULE, EXTENDED RELEASE ORAL at 04:46

## 2018-01-01 RX ADMIN — NYSTATIN CREAM 1 APPLICATION(S): 100000 CREAM TOPICAL at 06:16

## 2018-01-01 RX ADMIN — Medication 1 APPLICATION(S): at 21:58

## 2018-01-01 RX ADMIN — Medication 250 MILLIGRAM(S): at 09:24

## 2018-01-01 RX ADMIN — Medication 4: at 16:19

## 2018-01-01 RX ADMIN — Medication 2: at 22:30

## 2018-01-01 RX ADMIN — Medication 40 MILLIGRAM(S): at 05:20

## 2018-01-01 RX ADMIN — WARFARIN SODIUM 3.75 MILLIGRAM(S): 2.5 TABLET ORAL at 22:02

## 2018-01-01 RX ADMIN — Medication 1 TABLET(S): at 12:29

## 2018-01-01 RX ADMIN — Medication 1 APPLICATION(S): at 17:31

## 2018-01-01 RX ADMIN — LISINOPRIL 2.5 MILLIGRAM(S): 2.5 TABLET ORAL at 14:14

## 2018-01-01 RX ADMIN — OXYCODONE HYDROCHLORIDE 5 MILLIGRAM(S): 5 TABLET ORAL at 13:08

## 2018-01-01 RX ADMIN — Medication 25 MILLIGRAM(S): at 05:48

## 2018-01-01 RX ADMIN — INSULIN GLARGINE 5 UNIT(S): 100 INJECTION, SOLUTION SUBCUTANEOUS at 21:57

## 2018-01-01 RX ADMIN — OXYCODONE AND ACETAMINOPHEN 2 TABLET(S): 5; 325 TABLET ORAL at 08:57

## 2018-01-01 RX ADMIN — Medication 4: at 08:41

## 2018-01-01 RX ADMIN — AMIODARONE HYDROCHLORIDE 100 MILLIGRAM(S): 400 TABLET ORAL at 05:31

## 2018-01-01 RX ADMIN — Medication 1 TABLET(S): at 13:09

## 2018-01-01 RX ADMIN — Medication 2: at 18:21

## 2018-01-01 RX ADMIN — Medication 8 UNIT(S): at 21:25

## 2018-01-01 RX ADMIN — INSULIN GLARGINE 5 UNIT(S): 100 INJECTION, SOLUTION SUBCUTANEOUS at 21:50

## 2018-01-01 RX ADMIN — GABAPENTIN 300 MILLIGRAM(S): 400 CAPSULE ORAL at 14:42

## 2018-01-01 RX ADMIN — TENOFOVIR DISOPROXIL FUMARATE 300 MILLIGRAM(S): 300 TABLET, FILM COATED ORAL at 18:01

## 2018-01-01 RX ADMIN — HYDROMORPHONE HYDROCHLORIDE 0.5 MILLIGRAM(S): 2 INJECTION INTRAMUSCULAR; INTRAVENOUS; SUBCUTANEOUS at 20:23

## 2018-01-01 RX ADMIN — Medication 20 MILLIGRAM(S): at 05:48

## 2018-01-01 RX ADMIN — AMIODARONE HYDROCHLORIDE 100 MILLIGRAM(S): 400 TABLET ORAL at 05:20

## 2018-01-01 RX ADMIN — Medication 1 APPLICATION(S): at 06:52

## 2018-01-01 RX ADMIN — CEFEPIME 1000 MILLIGRAM(S): 1 INJECTION, POWDER, FOR SOLUTION INTRAMUSCULAR; INTRAVENOUS at 17:39

## 2018-01-01 RX ADMIN — Medication 500 MILLIGRAM(S): at 06:15

## 2018-01-01 RX ADMIN — SPIRONOLACTONE 25 MILLIGRAM(S): 25 TABLET, FILM COATED ORAL at 06:01

## 2018-01-01 RX ADMIN — SODIUM CHLORIDE 2000 MILLILITER(S): 9 INJECTION INTRAMUSCULAR; INTRAVENOUS; SUBCUTANEOUS at 09:24

## 2018-01-01 RX ADMIN — HEPARIN SODIUM 5000 UNIT(S): 5000 INJECTION INTRAVENOUS; SUBCUTANEOUS at 21:15

## 2018-01-01 RX ADMIN — Medication 1: at 09:10

## 2018-01-01 RX ADMIN — Medication 100 MILLIGRAM(S): at 13:44

## 2018-01-01 RX ADMIN — GABAPENTIN 300 MILLIGRAM(S): 400 CAPSULE ORAL at 21:11

## 2018-01-01 RX ADMIN — ZINC SULFATE TAB 220 MG (50 MG ZINC EQUIVALENT) 220 MILLIGRAM(S): 220 (50 ZN) TAB at 06:18

## 2018-01-01 RX ADMIN — GABAPENTIN 300 MILLIGRAM(S): 400 CAPSULE ORAL at 21:59

## 2018-01-01 RX ADMIN — Medication 1 APPLICATION(S): at 17:50

## 2018-01-01 RX ADMIN — Medication 100 MILLIGRAM(S): at 21:18

## 2018-01-01 RX ADMIN — HEPARIN SODIUM 5000 UNIT(S): 5000 INJECTION INTRAVENOUS; SUBCUTANEOUS at 22:00

## 2018-01-01 RX ADMIN — Medication 650 MILLIGRAM(S): at 21:15

## 2018-01-01 RX ADMIN — Medication 100 MILLIGRAM(S): at 20:23

## 2018-01-01 RX ADMIN — Medication 100 MILLIGRAM(S): at 21:40

## 2018-01-01 RX ADMIN — HEPARIN SODIUM 5000 UNIT(S): 5000 INJECTION INTRAVENOUS; SUBCUTANEOUS at 13:01

## 2018-01-01 RX ADMIN — Medication 3 MILLILITER(S): at 16:55

## 2018-01-01 RX ADMIN — Medication 100 MILLIGRAM(S): at 21:16

## 2018-01-01 RX ADMIN — AMIODARONE HYDROCHLORIDE 100 MILLIGRAM(S): 400 TABLET ORAL at 05:48

## 2018-01-01 RX ADMIN — MONTELUKAST 10 MILLIGRAM(S): 4 TABLET, CHEWABLE ORAL at 22:07

## 2018-01-01 RX ADMIN — Medication 400 MILLIGRAM(S): at 19:59

## 2018-01-01 RX ADMIN — SENNA PLUS 2 TABLET(S): 8.6 TABLET ORAL at 21:19

## 2018-01-01 RX ADMIN — SPIRONOLACTONE 50 MILLIGRAM(S): 25 TABLET, FILM COATED ORAL at 05:39

## 2018-01-01 RX ADMIN — Medication 1 APPLICATION(S): at 19:16

## 2018-01-01 RX ADMIN — MONTELUKAST 10 MILLIGRAM(S): 4 TABLET, CHEWABLE ORAL at 22:16

## 2018-01-01 RX ADMIN — Medication 3 MILLILITER(S): at 16:30

## 2018-01-01 RX ADMIN — BUDESONIDE AND FORMOTEROL FUMARATE DIHYDRATE 2 PUFF(S): 160; 4.5 AEROSOL RESPIRATORY (INHALATION) at 20:14

## 2018-01-01 RX ADMIN — Medication 650 MILLIGRAM(S): at 11:48

## 2018-01-01 RX ADMIN — GABAPENTIN 300 MILLIGRAM(S): 400 CAPSULE ORAL at 05:23

## 2018-01-01 RX ADMIN — Medication 12.5 GRAM(S): at 12:36

## 2018-01-01 RX ADMIN — SPIRONOLACTONE 25 MILLIGRAM(S): 25 TABLET, FILM COATED ORAL at 18:11

## 2018-01-01 RX ADMIN — Medication 1 APPLICATION(S): at 05:57

## 2018-01-01 RX ADMIN — SPIRONOLACTONE 25 MILLIGRAM(S): 25 TABLET, FILM COATED ORAL at 05:56

## 2018-01-01 RX ADMIN — WARFARIN SODIUM 1 MILLIGRAM(S): 2.5 TABLET ORAL at 21:47

## 2018-01-01 RX ADMIN — GABAPENTIN 300 MILLIGRAM(S): 400 CAPSULE ORAL at 14:21

## 2018-01-01 RX ADMIN — INSULIN GLARGINE 20 UNIT(S): 100 INJECTION, SOLUTION SUBCUTANEOUS at 08:41

## 2018-01-01 RX ADMIN — TENOFOVIR DISOPROXIL FUMARATE 300 MILLIGRAM(S): 300 TABLET, FILM COATED ORAL at 12:36

## 2018-01-01 RX ADMIN — CEFEPIME 1000 MILLIGRAM(S): 1 INJECTION, POWDER, FOR SOLUTION INTRAMUSCULAR; INTRAVENOUS at 05:08

## 2018-01-01 RX ADMIN — Medication 4: at 21:25

## 2018-01-01 RX ADMIN — Medication 6: at 12:59

## 2018-01-01 RX ADMIN — GABAPENTIN 300 MILLIGRAM(S): 400 CAPSULE ORAL at 15:06

## 2018-01-01 RX ADMIN — GABAPENTIN 300 MILLIGRAM(S): 400 CAPSULE ORAL at 21:58

## 2018-01-01 RX ADMIN — BUDESONIDE AND FORMOTEROL FUMARATE DIHYDRATE 2 PUFF(S): 160; 4.5 AEROSOL RESPIRATORY (INHALATION) at 20:00

## 2018-01-01 RX ADMIN — BUDESONIDE AND FORMOTEROL FUMARATE DIHYDRATE 2 PUFF(S): 160; 4.5 AEROSOL RESPIRATORY (INHALATION) at 08:54

## 2018-01-01 RX ADMIN — SPIRONOLACTONE 25 MILLIGRAM(S): 25 TABLET, FILM COATED ORAL at 13:28

## 2018-01-01 RX ADMIN — AMIODARONE HYDROCHLORIDE 50 MILLIGRAM(S): 400 TABLET ORAL at 06:25

## 2018-01-01 RX ADMIN — SODIUM CHLORIDE 2000 MILLILITER(S): 9 INJECTION INTRAMUSCULAR; INTRAVENOUS; SUBCUTANEOUS at 10:36

## 2018-01-01 RX ADMIN — TRAMADOL HYDROCHLORIDE 50 MILLIGRAM(S): 50 TABLET ORAL at 10:45

## 2018-01-01 RX ADMIN — GABAPENTIN 300 MILLIGRAM(S): 400 CAPSULE ORAL at 13:09

## 2018-01-01 RX ADMIN — Medication 650 MILLIGRAM(S): at 11:47

## 2018-01-01 RX ADMIN — INSULIN GLARGINE 10 UNIT(S): 100 INJECTION, SOLUTION SUBCUTANEOUS at 21:11

## 2018-01-01 RX ADMIN — HEPARIN SODIUM 5000 UNIT(S): 5000 INJECTION INTRAVENOUS; SUBCUTANEOUS at 13:45

## 2018-01-01 RX ADMIN — TENOFOVIR DISOPROXIL FUMARATE 300 MILLIGRAM(S): 300 TABLET, FILM COATED ORAL at 18:23

## 2018-01-01 RX ADMIN — AMIODARONE HYDROCHLORIDE 100 MILLIGRAM(S): 400 TABLET ORAL at 05:04

## 2018-01-01 RX ADMIN — SODIUM CHLORIDE 3 MILLILITER(S): 9 INJECTION INTRAMUSCULAR; INTRAVENOUS; SUBCUTANEOUS at 09:17

## 2018-01-01 RX ADMIN — ATORVASTATIN CALCIUM 10 MILLIGRAM(S): 80 TABLET, FILM COATED ORAL at 21:46

## 2018-01-01 RX ADMIN — Medication 25 MILLIGRAM(S): at 17:04

## 2018-01-01 RX ADMIN — GABAPENTIN 300 MILLIGRAM(S): 400 CAPSULE ORAL at 05:43

## 2018-01-01 RX ADMIN — OXYCODONE AND ACETAMINOPHEN 2 TABLET(S): 5; 325 TABLET ORAL at 12:35

## 2018-01-01 RX ADMIN — GABAPENTIN 300 MILLIGRAM(S): 400 CAPSULE ORAL at 05:11

## 2018-01-01 RX ADMIN — Medication 2: at 12:10

## 2018-01-01 RX ADMIN — SIMVASTATIN 10 MILLIGRAM(S): 20 TABLET, FILM COATED ORAL at 21:57

## 2018-01-01 RX ADMIN — IMMUNE GLOBULIN (HUMAN) 50 GRAM(S): 10 INJECTION INTRAVENOUS; SUBCUTANEOUS at 15:11

## 2018-01-01 RX ADMIN — GABAPENTIN 300 MILLIGRAM(S): 400 CAPSULE ORAL at 12:58

## 2018-01-01 RX ADMIN — BUDESONIDE AND FORMOTEROL FUMARATE DIHYDRATE 2 PUFF(S): 160; 4.5 AEROSOL RESPIRATORY (INHALATION) at 19:40

## 2018-01-01 RX ADMIN — Medication 25 MILLIGRAM(S): at 21:11

## 2018-01-01 RX ADMIN — MONTELUKAST 10 MILLIGRAM(S): 4 TABLET, CHEWABLE ORAL at 21:38

## 2018-01-01 RX ADMIN — Medication 1 TABLET(S): at 17:34

## 2018-01-01 RX ADMIN — SIMVASTATIN 10 MILLIGRAM(S): 20 TABLET, FILM COATED ORAL at 21:22

## 2018-01-01 RX ADMIN — AMIODARONE HYDROCHLORIDE 100 MILLIGRAM(S): 400 TABLET ORAL at 06:03

## 2018-01-01 RX ADMIN — Medication 650 MILLIGRAM(S): at 08:03

## 2018-01-01 RX ADMIN — Medication 1 TABLET(S): at 14:15

## 2018-01-01 RX ADMIN — SPIRONOLACTONE 25 MILLIGRAM(S): 25 TABLET, FILM COATED ORAL at 21:57

## 2018-01-01 RX ADMIN — GABAPENTIN 300 MILLIGRAM(S): 400 CAPSULE ORAL at 13:03

## 2018-01-01 RX ADMIN — Medication 40 MILLIGRAM(S): at 06:13

## 2018-01-01 RX ADMIN — Medication 1 TABLET(S): at 21:43

## 2018-01-01 RX ADMIN — SIMVASTATIN 10 MILLIGRAM(S): 20 TABLET, FILM COATED ORAL at 22:31

## 2018-01-01 RX ADMIN — Medication 1200 MILLIGRAM(S): at 17:29

## 2018-01-01 RX ADMIN — Medication 1 APPLICATION(S): at 17:32

## 2018-01-01 RX ADMIN — TENOFOVIR DISOPROXIL FUMARATE 300 MILLIGRAM(S): 300 TABLET, FILM COATED ORAL at 12:24

## 2018-01-01 RX ADMIN — Medication 500 MILLIGRAM(S): at 18:26

## 2018-01-01 RX ADMIN — Medication 650 MILLIGRAM(S): at 12:09

## 2018-01-01 RX ADMIN — TIOTROPIUM BROMIDE 1 CAPSULE(S): 18 CAPSULE ORAL; RESPIRATORY (INHALATION) at 08:26

## 2018-01-01 RX ADMIN — TENOFOVIR DISOPROXIL FUMARATE 300 MILLIGRAM(S): 300 TABLET, FILM COATED ORAL at 09:11

## 2018-01-01 RX ADMIN — GABAPENTIN 300 MILLIGRAM(S): 400 CAPSULE ORAL at 21:38

## 2018-01-01 RX ADMIN — Medication 40 MILLIGRAM(S): at 17:57

## 2018-01-01 RX ADMIN — GABAPENTIN 300 MILLIGRAM(S): 400 CAPSULE ORAL at 14:28

## 2018-01-01 RX ADMIN — Medication 100 MILLIGRAM(S): at 18:24

## 2018-01-01 RX ADMIN — Medication 650 MILLIGRAM(S): at 12:47

## 2018-01-01 RX ADMIN — FLUCONAZOLE 100 MILLIGRAM(S): 150 TABLET ORAL at 21:57

## 2018-01-01 RX ADMIN — Medication 20 MILLIGRAM(S): at 06:27

## 2018-01-01 RX ADMIN — Medication 20 MILLIGRAM(S): at 06:03

## 2018-01-01 RX ADMIN — Medication 1 APPLICATION(S): at 05:15

## 2018-01-01 RX ADMIN — Medication 250 MILLIGRAM(S): at 11:48

## 2018-01-01 RX ADMIN — ZINC SULFATE TAB 220 MG (50 MG ZINC EQUIVALENT) 220 MILLIGRAM(S): 220 (50 ZN) TAB at 18:21

## 2018-01-01 RX ADMIN — Medication 1 TABLET(S): at 12:59

## 2018-01-01 RX ADMIN — BUDESONIDE AND FORMOTEROL FUMARATE DIHYDRATE 2 PUFF(S): 160; 4.5 AEROSOL RESPIRATORY (INHALATION) at 20:25

## 2018-01-01 RX ADMIN — TENOFOVIR DISOPROXIL FUMARATE 300 MILLIGRAM(S): 300 TABLET, FILM COATED ORAL at 12:07

## 2018-01-01 RX ADMIN — GABAPENTIN 300 MILLIGRAM(S): 400 CAPSULE ORAL at 21:51

## 2018-01-01 RX ADMIN — Medication 1: at 07:54

## 2018-01-01 RX ADMIN — Medication 500 MILLIGRAM(S): at 06:18

## 2018-01-01 RX ADMIN — AZITHROMYCIN 255 MILLIGRAM(S): 500 TABLET, FILM COATED ORAL at 14:13

## 2018-01-01 RX ADMIN — Medication 3 MILLILITER(S): at 09:25

## 2018-01-01 RX ADMIN — SIMVASTATIN 10 MILLIGRAM(S): 20 TABLET, FILM COATED ORAL at 22:49

## 2018-01-01 RX ADMIN — Medication 20 MILLIGRAM(S): at 06:04

## 2018-01-01 RX ADMIN — HEPARIN SODIUM 5000 UNIT(S): 5000 INJECTION INTRAVENOUS; SUBCUTANEOUS at 05:53

## 2018-01-01 RX ADMIN — NYSTATIN CREAM 1 APPLICATION(S): 100000 CREAM TOPICAL at 17:39

## 2018-01-01 RX ADMIN — GABAPENTIN 300 MILLIGRAM(S): 400 CAPSULE ORAL at 06:14

## 2018-01-01 RX ADMIN — SPIRONOLACTONE 100 MILLIGRAM(S): 25 TABLET, FILM COATED ORAL at 14:46

## 2018-01-01 RX ADMIN — Medication 500 MILLIGRAM(S): at 17:28

## 2018-01-01 RX ADMIN — GABAPENTIN 300 MILLIGRAM(S): 400 CAPSULE ORAL at 05:20

## 2018-01-01 RX ADMIN — INSULIN GLARGINE 5 UNIT(S): 100 INJECTION, SOLUTION SUBCUTANEOUS at 22:08

## 2018-01-01 RX ADMIN — Medication 100 MILLIGRAM(S): at 05:41

## 2018-01-01 RX ADMIN — Medication 100 MILLIGRAM(S): at 05:36

## 2018-01-01 RX ADMIN — CEFEPIME 1000 MILLIGRAM(S): 1 INJECTION, POWDER, FOR SOLUTION INTRAMUSCULAR; INTRAVENOUS at 18:24

## 2018-01-01 RX ADMIN — MONTELUKAST 10 MILLIGRAM(S): 4 TABLET, CHEWABLE ORAL at 21:22

## 2018-01-01 RX ADMIN — INSULIN GLARGINE 20 UNIT(S): 100 INJECTION, SOLUTION SUBCUTANEOUS at 08:16

## 2018-01-01 RX ADMIN — Medication 2: at 08:03

## 2018-01-01 RX ADMIN — SIMVASTATIN 10 MILLIGRAM(S): 20 TABLET, FILM COATED ORAL at 22:16

## 2018-01-01 RX ADMIN — Medication 500 MILLIGRAM(S): at 05:09

## 2018-01-01 RX ADMIN — Medication 100 MILLIGRAM(S): at 17:28

## 2018-01-01 RX ADMIN — GABAPENTIN 300 MILLIGRAM(S): 400 CAPSULE ORAL at 00:54

## 2018-01-01 RX ADMIN — TRAMADOL HYDROCHLORIDE 50 MILLIGRAM(S): 50 TABLET ORAL at 09:46

## 2018-01-01 RX ADMIN — MONTELUKAST 10 MILLIGRAM(S): 4 TABLET, CHEWABLE ORAL at 00:53

## 2018-01-01 RX ADMIN — HEPARIN SODIUM 5000 UNIT(S): 5000 INJECTION INTRAVENOUS; SUBCUTANEOUS at 05:08

## 2018-01-01 RX ADMIN — HYDROMORPHONE HYDROCHLORIDE 0.5 MILLIGRAM(S): 2 INJECTION INTRAMUSCULAR; INTRAVENOUS; SUBCUTANEOUS at 18:08

## 2018-01-01 RX ADMIN — Medication 25 MILLIGRAM(S): at 06:04

## 2018-01-01 RX ADMIN — Medication 40 MILLIGRAM(S): at 17:04

## 2018-01-01 RX ADMIN — Medication 40 MILLIGRAM(S): at 05:48

## 2018-01-01 RX ADMIN — MONTELUKAST 10 MILLIGRAM(S): 4 TABLET, CHEWABLE ORAL at 22:49

## 2018-01-01 RX ADMIN — OXYCODONE HYDROCHLORIDE 5 MILLIGRAM(S): 5 TABLET ORAL at 06:19

## 2018-01-01 RX ADMIN — Medication 25 MILLIGRAM(S): at 21:13

## 2018-01-01 RX ADMIN — GABAPENTIN 300 MILLIGRAM(S): 400 CAPSULE ORAL at 21:46

## 2018-01-01 RX ADMIN — SPIRONOLACTONE 25 MILLIGRAM(S): 25 TABLET, FILM COATED ORAL at 00:54

## 2018-01-01 RX ADMIN — Medication 100 MILLIGRAM(S): at 05:21

## 2018-01-01 RX ADMIN — Medication 25 MILLIGRAM(S): at 06:03

## 2018-01-01 RX ADMIN — MONTELUKAST 10 MILLIGRAM(S): 4 TABLET, CHEWABLE ORAL at 21:19

## 2018-01-01 RX ADMIN — TRAMADOL HYDROCHLORIDE 50 MILLIGRAM(S): 50 TABLET ORAL at 22:00

## 2018-01-01 RX ADMIN — GABAPENTIN 300 MILLIGRAM(S): 400 CAPSULE ORAL at 05:48

## 2018-01-01 RX ADMIN — Medication 25 MILLIGRAM(S): at 11:26

## 2018-01-01 RX ADMIN — Medication 1: at 18:12

## 2018-01-01 RX ADMIN — MONTELUKAST 10 MILLIGRAM(S): 4 TABLET, CHEWABLE ORAL at 21:35

## 2018-01-01 RX ADMIN — SODIUM CHLORIDE 2000 MILLILITER(S): 9 INJECTION INTRAMUSCULAR; INTRAVENOUS; SUBCUTANEOUS at 09:14

## 2018-01-01 RX ADMIN — ATORVASTATIN CALCIUM 10 MILLIGRAM(S): 80 TABLET, FILM COATED ORAL at 21:40

## 2018-01-01 RX ADMIN — Medication 650 MILLIGRAM(S): at 09:24

## 2018-01-01 RX ADMIN — Medication 25 MILLIGRAM(S): at 18:24

## 2018-01-01 RX ADMIN — Medication 1 TABLET(S): at 12:58

## 2018-01-01 RX ADMIN — INSULIN GLARGINE 10 UNIT(S): 100 INJECTION, SOLUTION SUBCUTANEOUS at 21:16

## 2018-01-01 RX ADMIN — AMIODARONE HYDROCHLORIDE 100 MILLIGRAM(S): 400 TABLET ORAL at 05:40

## 2018-01-01 RX ADMIN — Medication 3: at 12:20

## 2018-01-01 RX ADMIN — INSULIN GLARGINE 20 UNIT(S): 100 INJECTION, SOLUTION SUBCUTANEOUS at 21:14

## 2018-01-01 RX ADMIN — Medication 1 TABLET(S): at 14:20

## 2018-01-01 RX ADMIN — OXYCODONE HYDROCHLORIDE 10 MILLIGRAM(S): 5 TABLET ORAL at 05:30

## 2018-01-01 NOTE — ED PROVIDER NOTE - PROGRESS NOTE DETAILS
I Dillan Eaton attest that this document has been prepared for and under the supervision of Dr. Castellano. ( done due under physician due to sunrise access issue pending resolution).

## 2018-01-01 NOTE — PATIENT PROFILE ADULT. - NS PRO CONTRA FLU 1
Patient with history of chronic kidney disease with a baseline ~1.1.  Per nursing home records and patients daughter she has had poor PO intake especially over the last week.  UA consistent with dehydration with 16 hyaline cast.  Another contributing factor could be her urinary tract infection.  -improving with IVFs  -avoid nephrotoxic agents     yes

## 2018-01-01 NOTE — ED PROVIDER NOTE - OBJECTIVE STATEMENT
Pt is a 76 yo hm with hx NonHogkins lymphoma who has nodules in liver and tight lung which block air movement. Pt has recurrent pneumonia in his right lung. Last episode October. Pt's daughter  states 3 days ago developed fever green productive cough and sob. Pt is exsmoker. Has had some confusion for 2 days. Hx enlarged heart. PMD Ernesto,onc Corrina, Card Dg, Pulm Hourizideh.

## 2018-01-01 NOTE — PATIENT PROFILE ADULT. - VISION (WITH CORRECTIVE LENSES IF THE PATIENT USUALLY WEARS THEM):
distance glasses/Normal vision: sees adequately in most situations; can see medication labels, newsprint

## 2018-01-01 NOTE — ED ADULT NURSE NOTE - CHIEF COMPLAINT QUOTE
patient has had a fever to 102 x 3 days, no travel or sick contact, + cough.  Having chest pain due to a lot of coughing

## 2018-01-01 NOTE — H&P ADULT - ASSESSMENT
1. Pneumonia in immunocompromised patient: Patient has a h/o CLL on treatment. H/o recurrent pneumonia. Had bronch, BAl during las hospitalization and Cx was negative. Currently CXR shows left middle and lower lobe infiltrate.  Plan is to admit to tele, currently on 8 L Ventimask, sats 95+, but occasional episodes of desaturation.  S/p vanco and cefepime in the ED.  Will check sputum cx, blood cx, Procal, CRP.  Pulmonary consult placed  Strict aspiration precautions.  Speech and swallow eval.  Duonebs q6hrs  check Resp viral panel   If he desats, will consider putting him on bipap.     2. Heart failure with preserved EF. Patient has trace pitting pedal edema. Not on diuretics  Will give 1 time lasix 20 mg IV.  Resume home cardiac meds    3. H/o COPD- Will continue duonebs. Home spiriva.   Will hold steroids for now. If his condition does not improve, will consider starting him on steroids. Currently, he is not wheezing.    4. CLL 1. Pneumonia in immunocompromised patient: Patient has a h/o CLL on treatment. H/o recurrent pneumonia. Had bronch, BAl during las hospitalization and Cx was negative. Currently CXR shows left middle and lower lobe infiltrate.  Plan is to admit to tele, currently on 8 L Ventimask, sats 95+, but occasional episodes of desaturation.  S/p vanco and cefepime in the ED. Continue vanco, cefepime and added zithromax. check vanco trough 30 mts prior to 4th dose  Will check sputum cx, blood cx, Procal, CRP.  Pulmonary consult placed  Strict aspiration precautions.  Speech and swallow eval.  Duonebs q6hrs  check Resp viral panel   If he desats, will consider putting him on bipap.     2. Heart failure with preserved EF. Patient has trace pitting pedal edema. Not on diuretics  Will give 1 time lasix 20 mg IV.  Resume home cardiac meds    3. H/o COPD- Will continue duonebs. Home spiriva.   Will hold steroids for now. If his condition does not improve, will consider starting him on steroids. Currently, he is not wheezing.    4. 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 He has leucocytosis with WBC in [id number]. Most likely cause of this is sec to infection.  Oncology consult placed. Will continue to monitor WBC.    5. H/o Afib- current EKG shows sinus tachy. Patient on amio, and metoprolol.   Will resume amiodarone, metoprolol 25 mg bid with holding parameters.    6. Type 2 DM- on insulin Tresiba 20 units bid at home and Januvia.  Will start sliding scale insulin and lantus    Patient on warfarin    SCD's for DVT prophyaxis

## 2018-01-01 NOTE — H&P ADULT - HISTORY OF PRESENT ILLNESS
Patient is 77 year old man with complex past medical history including CLL/lymphoma on treatment, h/o recurrent pneumonia, Afib, HFprF, Hep B, COPD, type 2DM, H/o PE DVT was brought in as his family noticed worsening cough, fever 101 on multiple occassions during the last 3 days with more fatigue and drowsiness.  He was here in September,17, with pneumonia s/p bronch at that time s/o thick mucus with bronchomalacia.  Patient this time has cough with mucoid expectoration which turned to greenish phlegm. Fever intermittent, not associated with chills or rigor.  No nausea or vomiting. C/o chest tightness a/w cough. no hemoptysis. No h/o sick contacts. Denies wheezing. Patient on COPD meds, continued it as usual.  Patient denies any dizziness or lightheadedness.  He felt weak and tired, and per family was getting drowsy with episodes of confusion.  He is on follow up with Oncologist and was being planned to get next treatment this week.  While he was in the ED, his CXR showed left lower and midzone infiltrates. S/p vanco and cefepime.

## 2018-01-01 NOTE — H&P ADULT - NSHPLABSRESULTS_GEN_ALL_CORE
CXR: infiltrate left lower zone. and midzone, trace effusion left. Haziness rt lower lobe CXR: infiltrate left lower zone. and midzone, trace effusion left. Haziness rt lower lobe  EKG: sinus tachy, No ST elevation or depression, BBB

## 2018-01-01 NOTE — ED PROVIDER NOTE - CONSTITUTIONAL, MLM
normal... well nourished, awake, alert, oriented to person, place, time/situation and in mild respiratory distress

## 2018-01-01 NOTE — H&P ADULT - NSHPSOCIALHISTORY_GEN_ALL_CORE
Stays with family.  H/o smoking in the past quit 25 years back. worked as a .  Denies alcohol or drugs.  Needs assistance with some of ADLS, but independent with most of it.

## 2018-01-01 NOTE — H&P ADULT - NSHPPHYSICALEXAM_GEN_ALL_CORE
Vitals:    Alert oriented x2-3.   On NC o2, sats 100%  No pallor, icterus cyanosis or clubbing  CVS- irreg irreg, s1s2+, no s3s4. JVD- not elevated  Chest- no chest wall deformity  Resp: air entry equal b/l. Dimished both lung bases. Occassional scattered crackles left base  Skin; no bruises  Extremities: b/l trace pitting pedal edema  Abd: mild gaseous distension, nontender. no hernia . BS+, no organomegaly  : no CVA tenderness or suprapubic tenderness / min  O2 sats 96% on 4 L  /70 mm Hg  RR 20/ min  Alert oriented x2-3.   On NC o2, sats 100%  No pallor, icterus cyanosis or clubbing  CVS- irreg irreg, s1s2+, no s3s4. JVD- not elevated  Chest- no chest wall deformity  Resp: air entry equal b/l. Dimished both lung bases. Occassional scattered crackles left base  Skin; no bruises  Extremities: b/l trace pitting pedal edema  Abd: mild gaseous distension, nontender. no hernia . BS+, no organomegaly  : no CVA tenderness or suprapubic tenderness

## 2018-01-02 LAB
APTT BLD: 42.5 SEC — HIGH (ref 27.5–37.4)
BASE EXCESS BLDA CALC-SCNC: -0.4 MMOL/L — SIGNIFICANT CHANGE UP (ref -2–2)
BASE EXCESS BLDA CALC-SCNC: -2.7 MMOL/L — LOW (ref -2–2)
BLOOD GAS COMMENTS ARTERIAL: SIGNIFICANT CHANGE UP
BLOOD GAS COMMENTS ARTERIAL: SIGNIFICANT CHANGE UP
CRP SERPL-MCNC: 11.4 MG/DL — HIGH (ref 0–0.4)
CULTURE RESULTS: NO GROWTH — SIGNIFICANT CHANGE UP
GAS PNL BLDA: SIGNIFICANT CHANGE UP
GAS PNL BLDA: SIGNIFICANT CHANGE UP
GLUCOSE BLDC GLUCOMTR-MCNC: 121 MG/DL — HIGH (ref 70–99)
GLUCOSE BLDC GLUCOMTR-MCNC: 159 MG/DL — HIGH (ref 70–99)
GLUCOSE BLDC GLUCOMTR-MCNC: 293 MG/DL — HIGH (ref 70–99)
HBA1C BLD-MCNC: 7.7 % — HIGH (ref 4–5.6)
HCO3 BLDA-SCNC: 23 MMOL/L — SIGNIFICANT CHANGE UP (ref 21–29)
HCO3 BLDA-SCNC: 25 MMOL/L — SIGNIFICANT CHANGE UP (ref 21–29)
HCT VFR BLD CALC: 35.2 % — LOW (ref 39–50)
HGB BLD-MCNC: 11.1 G/DL — LOW (ref 13–17)
HOROWITZ INDEX BLDA+IHG-RTO: 50 — SIGNIFICANT CHANGE UP
INR BLD: 4.54 RATIO — HIGH (ref 0.88–1.16)
LEGIONELLA AG UR QL: NEGATIVE — SIGNIFICANT CHANGE UP
MCHC RBC-ENTMCNC: 26.6 PG — LOW (ref 27–34)
MCHC RBC-ENTMCNC: 31.4 GM/DL — LOW (ref 32–36)
MCV RBC AUTO: 84.6 FL — SIGNIFICANT CHANGE UP (ref 80–100)
NT-PROBNP SERPL-SCNC: 1552 PG/ML — HIGH (ref 0–450)
PCO2 BLDA: 48 MMHG — HIGH (ref 32–46)
PCO2 BLDA: 50 MMHG — HIGH (ref 32–46)
PH BLDA: 7.3 — LOW (ref 7.35–7.45)
PH BLDA: 7.33 — LOW (ref 7.35–7.45)
PLATELET # BLD AUTO: 134 K/UL — LOW (ref 150–400)
PO2 BLDA: 59 MMHG — LOW (ref 74–108)
PO2 BLDA: 84 MMHG — SIGNIFICANT CHANGE UP (ref 74–108)
PROCALCITONIN SERPL-MCNC: 0.27 NG/ML — HIGH (ref 0–0.04)
PROTHROM AB SERPL-ACNC: 50.6 SEC — HIGH (ref 9.8–12.7)
RBC # BLD: 4.16 M/UL — LOW (ref 4.2–5.8)
RBC # FLD: 15.6 % — HIGH (ref 10.3–14.5)
SAO2 % BLDA: 87 % — LOW (ref 92–96)
SAO2 % BLDA: 95 % — SIGNIFICANT CHANGE UP (ref 92–96)
SPECIMEN SOURCE: SIGNIFICANT CHANGE UP
WBC # BLD: 39.2 K/UL — HIGH (ref 3.8–10.5)
WBC # FLD AUTO: 39.2 K/UL — HIGH (ref 3.8–10.5)

## 2018-01-02 PROCEDURE — 71045 X-RAY EXAM CHEST 1 VIEW: CPT | Mod: 26

## 2018-01-02 RX ORDER — BUDESONIDE, MICRONIZED 100 %
0.5 POWDER (GRAM) MISCELLANEOUS
Qty: 0 | Refills: 0 | Status: DISCONTINUED | OUTPATIENT
Start: 2018-01-02 | End: 2018-01-10

## 2018-01-02 RX ORDER — IPRATROPIUM/ALBUTEROL SULFATE 18-103MCG
3 AEROSOL WITH ADAPTER (GRAM) INHALATION ONCE
Qty: 0 | Refills: 0 | Status: COMPLETED | OUTPATIENT
Start: 2018-01-02 | End: 2018-01-02

## 2018-01-02 RX ADMIN — Medication 3 MILLILITER(S): at 21:45

## 2018-01-02 RX ADMIN — Medication 250 MILLIGRAM(S): at 06:35

## 2018-01-02 RX ADMIN — Medication 60 MILLIGRAM(S): at 06:34

## 2018-01-02 RX ADMIN — Medication 25 MILLIGRAM(S): at 11:37

## 2018-01-02 RX ADMIN — AZITHROMYCIN 255 MILLIGRAM(S): 500 TABLET, FILM COATED ORAL at 13:53

## 2018-01-02 RX ADMIN — Medication 2: at 13:51

## 2018-01-02 RX ADMIN — CEFEPIME 100 MILLIGRAM(S): 1 INJECTION, POWDER, FOR SOLUTION INTRAMUSCULAR; INTRAVENOUS at 06:32

## 2018-01-02 RX ADMIN — Medication 6: at 17:42

## 2018-01-02 RX ADMIN — Medication 3 MILLILITER(S): at 06:16

## 2018-01-02 RX ADMIN — Medication 0.5 MILLIGRAM(S): at 21:46

## 2018-01-02 RX ADMIN — GABAPENTIN 300 MILLIGRAM(S): 400 CAPSULE ORAL at 11:36

## 2018-01-02 RX ADMIN — GABAPENTIN 300 MILLIGRAM(S): 400 CAPSULE ORAL at 15:27

## 2018-01-02 RX ADMIN — CEFEPIME 100 MILLIGRAM(S): 1 INJECTION, POWDER, FOR SOLUTION INTRAMUSCULAR; INTRAVENOUS at 17:15

## 2018-01-02 RX ADMIN — Medication 40 MILLIGRAM(S): at 17:21

## 2018-01-02 RX ADMIN — Medication 1200 MILLIGRAM(S): at 17:15

## 2018-01-02 RX ADMIN — Medication 250 MILLIGRAM(S): at 17:15

## 2018-01-02 RX ADMIN — SIMVASTATIN 10 MILLIGRAM(S): 20 TABLET, FILM COATED ORAL at 21:49

## 2018-01-02 RX ADMIN — Medication 3 MILLILITER(S): at 16:09

## 2018-01-02 RX ADMIN — MONTELUKAST 10 MILLIGRAM(S): 4 TABLET, CHEWABLE ORAL at 21:49

## 2018-01-02 RX ADMIN — GABAPENTIN 300 MILLIGRAM(S): 400 CAPSULE ORAL at 21:49

## 2018-01-02 RX ADMIN — Medication 3 MILLILITER(S): at 11:22

## 2018-01-02 RX ADMIN — Medication 1200 MILLIGRAM(S): at 11:36

## 2018-01-02 RX ADMIN — Medication 3 MILLILITER(S): at 02:32

## 2018-01-02 RX ADMIN — AMIODARONE HYDROCHLORIDE 100 MILLIGRAM(S): 400 TABLET ORAL at 11:36

## 2018-01-02 NOTE — PROVIDER CONTACT NOTE (OTHER) - ASSESSMENT
Desating 88%. Pt is resting in bed but appears lethargic. Diffuse wheezing, lethargic, using accessory muscles to breath, sating 88-93% on Ventimask 50%.   ABG Stat, CXR stat, IV Solumedrol 60 mg x 1stat, Duoneb and BiPap order placed.

## 2018-01-02 NOTE — CONSULT NOTE ADULT - ASSESSMENT
1) Hypercapnic Respiratory Failure with Hypoxemia  2) Bronchomalacia / Bronchiectasis  3) +Entero/Rhinovirus  4) Left lung opacity on CXR  5) Shortness of Breath  6) COPD/Emphysema  7) History of Lymphoma  8) History of AF, on Chronic Anticoagulation for VTE/AF    78 yo M, history of chronic O2 dependency, underwent bronchoscopy in October found to have bronchomalacia and mucus plugs. Presents with +Entero/Rhinovirus.  He has been treated for Lymphoma in the past and recently saw Oncology with a plan of starting Obiotuzumab (anti CD-20) which also has a side effect of bronchospasm.   CXR showed a left lower lobe opacity and chronic changes.  ABG showed hypoxemia with hypercapnia, on BiPAP.   Mentation is slowly improving  Continue Duoneb q 6 hours, IV Azithromycin/Cefepime  Will order CT Chest (PE Protocol) since he is currently off ATC   Goal SaO2 >88%  Prognosis is guarded; would recommend calling Palliative Care  Thank you for the consult

## 2018-01-02 NOTE — PROGRESS NOTE ADULT - SUBJECTIVE AND OBJECTIVE BOX
CHIEF COMPLAINT:    SUBJECTIVE:     REVIEW OF SYSTEMS:    CONSTITUTIONAL: No weakness, fevers or chills  EYES/ENT: No visual changes;  No vertigo or throat pain   NECK: No pain or stiffness  RESPIRATORY: No cough, wheezing, hemoptysis; No shortness of breath  CARDIOVASCULAR: No chest pain or palpitations  GASTROINTESTINAL: No abdominal or epigastric pain. No nausea, vomiting, or hematemesis; No diarrhea or constipation. No melena or hematochezia.  GENITOURINARY: No dysuria, frequency or hematuria  NEUROLOGICAL: No numbness or weakness  SKIN: No itching, burning, rashes, or lesions   All other review of systems is negative unless indicated above    Vital Signs Last 24 Hrs  T(C): 36.2 (02 Jan 2018 06:10), Max: 38.2 (01 Jan 2018 17:30)  T(F): 97.2 (02 Jan 2018 06:10), Max: 100.8 (01 Jan 2018 17:30)  HR: 95 (02 Jan 2018 08:00) (76 - 120)  BP: 123/62 (02 Jan 2018 08:00) (106/68 - 154/89)  BP(mean): 76 (02 Jan 2018 08:00) (73 - 90)  RR: 20 (02 Jan 2018 08:00) (18 - 32)  SpO2: 94% (02 Jan 2018 06:45) (89% - 100%)    I&O's Summary    01 Jan 2018 07:01  -  02 Jan 2018 07:00  --------------------------------------------------------  IN: 240 mL / OUT: 200 mL / NET: 40 mL        CAPILLARY BLOOD GLUCOSE      POCT Blood Glucose.: 92 mg/dL (01 Jan 2018 17:21)  POCT Blood Glucose.: 125 mg/dL (01 Jan 2018 14:57)      PHYSICAL EXAM:    Constitutional: NAD, awake and alert, well-developed  HEENT: PERR, EOMI, Normal Hearing, MMM  Neck: Soft and supple, No LAD, No JVD  Respiratory: Breath sounds are clear bilaterally, No wheezing, rales or rhonchi  Cardiovascular: S1 and S2, regular rate and rhythm, no Murmurs, gallops or rubs  Gastrointestinal: Bowel Sounds present, soft, nontender, nondistended, no guarding, no rebound  Extremities: No peripheral edema  Vascular: 2+ peripheral pulses  Neurological: A/O x 3, no focal deficits  Musculoskeletal: 5/5 strength b/l upper and lower extremities  Skin: No rashes    MEDICATIONS:  MEDICATIONS  (STANDING):  ALBUTerol/ipratropium for Nebulization 3 milliLiter(s) Nebulizer every 6 hours  amiodarone    Tablet 100 milliGRAM(s) Oral daily  azithromycin  IVPB      azithromycin  IVPB 500 milliGRAM(s) IV Intermittent every 24 hours  cefepime  IVPB 1000 milliGRAM(s) IV Intermittent every 12 hours  dextrose 5%. 1000 milliLiter(s) (50 mL/Hr) IV Continuous <Continuous>  dextrose 50% Injectable 12.5 Gram(s) IV Push once  dextrose 50% Injectable 25 Gram(s) IV Push once  gabapentin 300 milliGRAM(s) Oral three times a day  guaiFENesin ER 1200 milliGRAM(s) Oral every 12 hours  insulin glargine Injectable (LANTUS) 20 Unit(s) SubCutaneous every morning  insulin lispro (HumaLOG) corrective regimen sliding scale   SubCutaneous three times a day before meals  metoprolol     tartrate 25 milliGRAM(s) Oral daily  montelukast 10 milliGRAM(s) Oral at bedtime  simvastatin 10 milliGRAM(s) Oral at bedtime  vancomycin  IVPB 1000 milliGRAM(s) IV Intermittent every 12 hours      LABS: All Labs Reviewed:                        11.1   39.2  )-----------( 134      ( 02 Jan 2018 05:12 )             35.2     01-01    137  |  100  |  14  ----------------------------<  70  4.3   |  28  |  0.84    Ca    8.3<L>      01 Jan 2018 08:31    TPro  5.8<L>  /  Alb  3.2<L>  /  TBili  0.7  /  DBili  x   /  AST  25  /  ALT  27  /  AlkPhos  129<H>  01-01    PT/INR - ( 01 Jan 2018 08:31 )   PT: 29.8 sec;   INR: 2.70 ratio         PTT - ( 01 Jan 2018 08:31 )  PTT:38.1 sec    Patient is 77 year old man with complex past medical history including CLL/lymphoma on treatment, h/o recurrent pneumonia, Afib, HFprF, Hep B, COPD, type 2DM, H/o PE DVT was brought in as his family noticed worsening cough, fever 101 on multiple occassions during the last 3 days with more fatigue and drowsiness.  He was here in September,17, with pneumonia s/p bronch at that time s/o thick mucus with bronchomalacia.  Patient this time has cough with mucoid expectoration which turned to greenish phlegm. Fever intermittent, not associated with chills or rigor.  No nausea or vomiting. C/o chest tightness a/w cough. no hemoptysis. No h/o sick contacts. Denies wheezing. Patient on COPD meds, continued it as usual.  Patient denies any dizziness or lightheadedness.  He felt weak and tired, and per family was getting drowsy with episodes of confusion.  He is on follow up with Oncologist and was being planned to get next treatment this week.  While he was in the ED, his CXR showed left lower and midzone infiltrates. S/p vanco and cefepime.      1-Pneumonia in immunocompromised patient: Patient has a h/o CLL on treatment. H/o recurrent pneumonia. Had bronch, BAl during las hospitalization and Cx was negative. Currently CXR shows left middle and lower lobe infiltrate.  Plan is to admit to tele, currently on 8 L Ventimask, sats 95+, but occasional episodes of desaturation.  S/p vanco and cefepime in the ED. Continue vanco, cefepime and added zithromax. check vanco trough 30 mts prior to 4th dose  Will check sputum cx, blood cx, Procal, CRP.  Pulmonary consult placed  Strict aspiration precautions.  Speech and swallow eval.  Duonebs q6hrs  check Resp viral panel   If he desats, will consider putting him on bipap.     2. Heart failure with preserved EF. Patient has trace pitting pedal edema. Not on diuretics  Will give 1 time lasix 20 mg IV.  Resume home cardiac meds    3. H/o COPD- Will continue duonebs. Home spiriva.   Will hold steroids for now. If his condition does not improve, will consider starting him on steroids. Currently, he is not wheezing.    4. 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 He has leucocytosis with WBC in [id number]. Most likely cause of this is sec to infection.  Oncology consult placed. Will continue to monitor WBC.    5. H/o Afib- current EKG shows sinus tachy. Patient on amio, and metoprolol.   Will resume amiodarone, metoprolol 25 mg bid with holding parameters.    6. Type 2 DM- on insulin Tresiba 20 units bid at home and Januvia.  Will start sliding scale insulin and lantus    Patient on warfarin    SCD's for DVT prophyaxis CHIEF COMPLAINT/Diagnosis: PNA/ b/l / Enterovirus    SUBJECTIVE: no complaints; currently on ventimask saturating 91-92%    REVIEW OF SYSTEMS:    CONSTITUTIONAL: No weakness, fevers or chills  EYES/ENT: No visual changes;  No vertigo or throat pain   NECK: No pain or stiffness  RESPIRATORY: No cough, wheezing, hemoptysis; No shortness of breath  CARDIOVASCULAR: No chest pain or palpitations  GASTROINTESTINAL: No abdominal or epigastric pain. No nausea, vomiting, or hematemesis; No diarrhea or constipation. No melena or hematochezia.  GENITOURINARY: No dysuria, frequency or hematuria  NEUROLOGICAL: No numbness or weakness  SKIN: No itching, burning, rashes, or lesions   All other review of systems is negative unless indicated above    Vital Signs Last 24 Hrs  T(C): 36.2 (02 Jan 2018 06:10), Max: 38.2 (01 Jan 2018 17:30)  T(F): 97.2 (02 Jan 2018 06:10), Max: 100.8 (01 Jan 2018 17:30)  HR: 95 (02 Jan 2018 08:00) (76 - 120)  BP: 123/62 (02 Jan 2018 08:00) (106/68 - 154/89)  BP(mean): 76 (02 Jan 2018 08:00) (73 - 90)  RR: 20 (02 Jan 2018 08:00) (18 - 32)  SpO2: 94% (02 Jan 2018 06:45) (89% - 100%)    I&O's Summary    01 Jan 2018 07:01  -  02 Jan 2018 07:00  --------------------------------------------------------  IN: 240 mL / OUT: 200 mL / NET: 40 mL        CAPILLARY BLOOD GLUCOSE      POCT Blood Glucose.: 92 mg/dL (01 Jan 2018 17:21)  POCT Blood Glucose.: 125 mg/dL (01 Jan 2018 14:57)      PHYSICAL EXAM:    Constitutional: NAD, awake and alert, well-developed  HEENT: PERR, EOMI, Normal Hearing, MMM  Neck: Soft and supple, No LAD, No JVD  Respiratory: Breath sounds are clear bilaterally, No wheezing, rales or rhonchi  Cardiovascular: S1 and S2, regular rate and rhythm, no Murmurs, gallops or rubs  Gastrointestinal: Bowel Sounds present, soft, nontender, nondistended, no guarding, no rebound  Extremities: No peripheral edema  Vascular: 2+ peripheral pulses  Neurological: A/O x 3, no focal deficits  Musculoskeletal: 5/5 strength b/l upper and lower extremities  Skin: No rashes    MEDICATIONS:  MEDICATIONS  (STANDING):  ALBUTerol/ipratropium for Nebulization 3 milliLiter(s) Nebulizer every 6 hours  amiodarone    Tablet 100 milliGRAM(s) Oral daily  azithromycin  IVPB      azithromycin  IVPB 500 milliGRAM(s) IV Intermittent every 24 hours  cefepime  IVPB 1000 milliGRAM(s) IV Intermittent every 12 hours  dextrose 5%. 1000 milliLiter(s) (50 mL/Hr) IV Continuous <Continuous>  dextrose 50% Injectable 12.5 Gram(s) IV Push once  dextrose 50% Injectable 25 Gram(s) IV Push once  gabapentin 300 milliGRAM(s) Oral three times a day  guaiFENesin ER 1200 milliGRAM(s) Oral every 12 hours  insulin glargine Injectable (LANTUS) 20 Unit(s) SubCutaneous every morning  insulin lispro (HumaLOG) corrective regimen sliding scale   SubCutaneous three times a day before meals  metoprolol     tartrate 25 milliGRAM(s) Oral daily  montelukast 10 milliGRAM(s) Oral at bedtime  simvastatin 10 milliGRAM(s) Oral at bedtime  vancomycin  IVPB 1000 milliGRAM(s) IV Intermittent every 12 hours      LABS: All Labs Reviewed:                        11.1   39.2  )-----------( 134      ( 02 Jan 2018 05:12 )             35.2     01-01    137  |  100  |  14  ----------------------------<  70  4.3   |  28  |  0.84    Ca    8.3<L>      01 Jan 2018 08:31    TPro  5.8<L>  /  Alb  3.2<L>  /  TBili  0.7  /  DBili  x   /  AST  25  /  ALT  27  /  AlkPhos  129<H>  01-01    PT/INR - ( 01 Jan 2018 08:31 )   PT: 29.8 sec;   INR: 2.70 ratio         PTT - ( 01 Jan 2018 08:31 )  PTT:38.1 sec    Patient is 77 year old man with complex past medical history including CLL/lymphoma on treatment, h/o recurrent pneumonia, Afib, HFprF, Hep B, COPD(home home o2 intermitteintly) Ex smoker (quit 30 years ago, 1PPD), type 2DM, H/o PE DVT was brought in as his family noticed worsening cough, fever 101 on multiple occassions during the last 3 days with more fatigue and drowsiness.  Found to have b/ l PNA,  hypoxic, tachynic      1-Acute hypoxic and hypercapnic respiratory failure secondary to Pneumonia in immunocompromised patient:  -c/w Vanc/ Azithormycin / and cefepime  -ID consult  -c/w daily labs  -also positive for entervirus    2. Heart failure with preserved EF. Patient has trace pitting pedal edema. Not on diuretics  -does not seem to be in acute exacerbation  -BNP 1552  -recieved one dose of lasix in ER, no edema currently     3.  acute exacerbation of COPD-  -patient currently on ventimask , hiss baseline is intermittent nasal canula  -start low dose solumederol 40iv daily   Will continue duonebs. Home spiriva.       4. 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 He has leucocytosis with WBC in [id number]. Most likely cause of this is sec to infection.  Oncology consult placed. Will continue to monitor WBC.    5. H/o Afib- current EKG shows sinus tachy. Patient on amio, and metoprolol.   Will resume amiodarone, metoprolol 25 mg bid with holding parameters.  -inr pending    6. Type 2 DM- on insulin Tresiba 20 units bid at home and Januvia.  Will start sliding scale insulin and lantus    7-DVT proph- on coumadin

## 2018-01-02 NOTE — CONSULT NOTE ADULT - ASSESSMENT
Long standing h/0  SLL / CLL  Lymphocytosis: reflects ongoing infection  Thrombocytopenia: improved  Hypogammaglobulinemia  Mild nemia  Adenopathy ( last CT scans) : Asymptomatic  Recurrent pneumonia    A/P    At this time will not recommend therapy for the CLL/ Ongoing infection  Obinutuzumab could further attenuate/ deplete  the immune system; could worsen infectious course  We will look into prior gammaglobulin infusions: determine if there is a formulation that he might tolerate  Agree with antibiotics etc Long standing h/0  SLL / CLL  Lymphocytosis: reflects ongoing infection  Thrombocytopenia: improved  Hypogammaglobulinemia  Mild nemia  Adenopathy ( last CT scans) : Asymptomatic  Recurrent pneumonia    A/P    At this time will not recommend therapy for the CLL/ given Ongoing infection  Obinutuzumab could further attenuate/ deplete  the immune system; could worsen infectious course  We will look into prior gammaglobulin infusions: determine if there is a formulation that he might tolerate  Agree with antibiotics etc      ADDENDUM    Allergy  was to  Privagen ; Had chills at the end of Gammagard/ No type I reaction  Given degree of immune suppression/ ,multiple  infections/ Low IgG  would suggest  an infusion of Gammagard/ 250-500 mg / Kg q 4 weeks/   Premedicate with Hydrocortisone 60 mg, Tylenol 650 mg and Benadryl 50 mg IV  This Rx was discussed with patient and family  and they were in agreement

## 2018-01-02 NOTE — PROGRESS NOTE ADULT - SUBJECTIVE AND OBJECTIVE BOX
Pt was seen and examined at bedside because RN called to abeba. for desating 88%. Pt is resting in bed but appears lethargic. Pt si admitted with PNA, Hc of COPD. Pt was seen and examined at bedside because RN called to socrates for desating 88%. Pt is resting in bed but appears lethargic. Pt is admitted with PNA, Hx of COPD, on Oxygen at home but Pt does not know how much oxygen he is using at home.  On exam, Diffuse wheezing, lethargic, using accessory muscles to breath, sating 88-93% on Ventimask 50%.   ABG Stat, CXR stat, IV Solumedrol 60 mg x 1stat, Duoneb and BiPap order placed. c/w IV Abx. Pulmonary physician consulted on admission.    Plan d/w Night RN    Hospitalist to f/u in AM. Pt was seen and examined at bedside because RN called to socrates for desating 88%. Pt is resting in bed but appears lethargic. Pt is admitted with PNA, Hx of COPD, on Oxygen at home but Pt does not know how much oxygen he is using at home.  On exam, Diffuse wheezing, lethargic, using accessory muscles to breath, sating 88-93% on Ventimask 50%.   ABG Stat, CXR stat, IV Solumedrol 60 mg x 1stat, Duoneb and BiPap order placed. c/w IV Abx. Pulmonary physician consulted on admission.    Plan d/w Night RN    Case D/w Dr. Hope    Hospitalist to f/u in AM.

## 2018-01-03 LAB
ANION GAP SERPL CALC-SCNC: 10 MMOL/L — SIGNIFICANT CHANGE UP (ref 5–17)
BASE EXCESS BLDA CALC-SCNC: -1.8 MMOL/L — SIGNIFICANT CHANGE UP (ref -2–2)
BLOOD GAS COMMENTS ARTERIAL: SIGNIFICANT CHANGE UP
BUN SERPL-MCNC: 30 MG/DL — HIGH (ref 7–23)
CALCIUM SERPL-MCNC: 8.1 MG/DL — LOW (ref 8.5–10.1)
CHLORIDE SERPL-SCNC: 100 MMOL/L — SIGNIFICANT CHANGE UP (ref 96–108)
CO2 SERPL-SCNC: 24 MMOL/L — SIGNIFICANT CHANGE UP (ref 22–31)
CREAT SERPL-MCNC: 1.15 MG/DL — SIGNIFICANT CHANGE UP (ref 0.5–1.3)
GAS PNL BLDA: SIGNIFICANT CHANGE UP
GLUCOSE BLDC GLUCOMTR-MCNC: 235 MG/DL — HIGH (ref 70–99)
GLUCOSE BLDC GLUCOMTR-MCNC: 304 MG/DL — HIGH (ref 70–99)
GLUCOSE BLDC GLUCOMTR-MCNC: 314 MG/DL — HIGH (ref 70–99)
GLUCOSE BLDC GLUCOMTR-MCNC: 368 MG/DL — HIGH (ref 70–99)
GLUCOSE BLDC GLUCOMTR-MCNC: 395 MG/DL — HIGH (ref 70–99)
GLUCOSE SERPL-MCNC: 294 MG/DL — HIGH (ref 70–99)
HCO3 BLDA-SCNC: 23 MMOL/L — SIGNIFICANT CHANGE UP (ref 21–29)
PCO2 BLDA: 44 MMHG — SIGNIFICANT CHANGE UP (ref 32–46)
PH BLDA: 7.34 — LOW (ref 7.35–7.45)
PO2 BLDA: 106 MMHG — SIGNIFICANT CHANGE UP (ref 74–108)
POTASSIUM SERPL-MCNC: 4.6 MMOL/L — SIGNIFICANT CHANGE UP (ref 3.5–5.3)
POTASSIUM SERPL-SCNC: 4.6 MMOL/L — SIGNIFICANT CHANGE UP (ref 3.5–5.3)
SAO2 % BLDA: 97 % — HIGH (ref 92–96)
SODIUM SERPL-SCNC: 134 MMOL/L — LOW (ref 135–145)

## 2018-01-03 RX ADMIN — INSULIN GLARGINE 20 UNIT(S): 100 INJECTION, SOLUTION SUBCUTANEOUS at 08:45

## 2018-01-03 RX ADMIN — Medication 0.5 MILLIGRAM(S): at 20:28

## 2018-01-03 RX ADMIN — Medication 10: at 13:19

## 2018-01-03 RX ADMIN — Medication 3 MILLILITER(S): at 14:27

## 2018-01-03 RX ADMIN — MONTELUKAST 10 MILLIGRAM(S): 4 TABLET, CHEWABLE ORAL at 22:44

## 2018-01-03 RX ADMIN — Medication 250 MILLIGRAM(S): at 08:40

## 2018-01-03 RX ADMIN — Medication 1200 MILLIGRAM(S): at 17:09

## 2018-01-03 RX ADMIN — GABAPENTIN 300 MILLIGRAM(S): 400 CAPSULE ORAL at 05:52

## 2018-01-03 RX ADMIN — Medication 40 MILLIGRAM(S): at 05:51

## 2018-01-03 RX ADMIN — CEFEPIME 100 MILLIGRAM(S): 1 INJECTION, POWDER, FOR SOLUTION INTRAMUSCULAR; INTRAVENOUS at 17:08

## 2018-01-03 RX ADMIN — GABAPENTIN 300 MILLIGRAM(S): 400 CAPSULE ORAL at 22:44

## 2018-01-03 RX ADMIN — Medication 1200 MILLIGRAM(S): at 05:52

## 2018-01-03 RX ADMIN — Medication 25 MILLIGRAM(S): at 05:52

## 2018-01-03 RX ADMIN — SIMVASTATIN 10 MILLIGRAM(S): 20 TABLET, FILM COATED ORAL at 22:44

## 2018-01-03 RX ADMIN — Medication 3 MILLILITER(S): at 07:53

## 2018-01-03 RX ADMIN — GABAPENTIN 300 MILLIGRAM(S): 400 CAPSULE ORAL at 13:20

## 2018-01-03 RX ADMIN — Medication 8: at 08:43

## 2018-01-03 RX ADMIN — AMIODARONE HYDROCHLORIDE 100 MILLIGRAM(S): 400 TABLET ORAL at 05:52

## 2018-01-03 RX ADMIN — Medication 8: at 17:05

## 2018-01-03 RX ADMIN — AZITHROMYCIN 255 MILLIGRAM(S): 500 TABLET, FILM COATED ORAL at 13:19

## 2018-01-03 RX ADMIN — Medication 0.5 MILLIGRAM(S): at 07:51

## 2018-01-03 RX ADMIN — CEFEPIME 100 MILLIGRAM(S): 1 INJECTION, POWDER, FOR SOLUTION INTRAMUSCULAR; INTRAVENOUS at 05:51

## 2018-01-03 RX ADMIN — Medication 3 MILLILITER(S): at 02:10

## 2018-01-03 RX ADMIN — Medication 3 MILLILITER(S): at 20:28

## 2018-01-03 NOTE — PROGRESS NOTE ADULT - ASSESSMENT
1) Hypercapnic Respiratory Failure with Hypoxemia  2) Bronchomalacia / Bronchiectasis  3) +Entero/Rhinovirus  4) Left lung opacity on CXR  5) Shortness of Breath  6) COPD/Emphysema  7) History of Lymphoma  8) History of AF, on Chronic Anticoagulation for VTE/AF    76 yo M, history of chronic O2 dependency, underwent bronchoscopy in October found to have bronchomalacia and mucus plugs. Presents with +Entero/Rhinovirus.  He has been treated for Lymphoma in the past and recently saw Oncology with a plan of starting Obiotuzumab (anti CD-20) which also has a side effect of bronchospasm, has not received it yet.   CXR showed a left lower lobe opacity and chronic changes.  Repeat ABG showed that hypoxemia improving and he still remains hypercapnic  Now not in respiratory distress, patient states that he feels better   Continue Duoneb q 6 hours, IV Azithromycin/Cefepime  Patient is receiving Coumadin 2.5 mg nightly  Goal SaO2 >88% -->Can titrate O2 down to NC (currently on ventimask)   Follow up new ABG  Prognosis is guarded; would recommend calling Palliative Care  Thank you for the consult

## 2018-01-03 NOTE — CONSULT NOTE ADULT - SUBJECTIVE AND OBJECTIVE BOX
HPI:  Patient is 77 year old man who has had CLL / SLL for > 15 years / He has had a few brief courses of therapy in the past/ his larger problem has been  h/o recurrent pneumonia/ COPD: He is known to have hypogammaglobulinemia and several attempts at IVIG infusion have been met with allergic like reactions; He has had multiple admissions for pneumonia and is followed closely with pulmonary medicine. His CT scans have revealed mediastinal and retroperitoneal nodes/ the latter recentl increasing in extent and size/ He also recently developed low platelets, anemia: suggesting progression of the CLL / SLL; we recently suggested that he start on sytemic therapy and consider a trial of  gamma-globulin;  We had discussed possible Obinutuzumab as a single agent ( Anti CD 20) and this was going to be administered as an in patient; However he is now admitted with a 3 day history of cough, fever, dyspnea and confusion. PERSAUD with imaging / labs: Viral and bacterial pneumonia laura; presently on antibiotics / O2 and in cardiac stepdown    PAST MEDICAL & SURGICAL HISTORY:  Hypogammaglobulinemia: received IVIG  Hepatitis B virus infection, unspecified chronicity: retrovirals  Chronic diastolic congestive heart failure  Essential hypertension  DM type 2 (diabetes mellitus, type 2)  B-cell lymphoma, unspecified B-cell lymphoma type, unspecified body region: CLL stage 4/SLL; met NHL  Atrial fibrillation, unspecified type  COPD (chronic obstructive pulmonary disease)  History of esophageal dilatation  S/P cholecystectomy  H/O prostatectomy      MEDICATIONS  (STANDING):  ALBUTerol/ipratropium for Nebulization 3 milliLiter(s) Nebulizer every 6 hours  amiodarone    Tablet 100 milliGRAM(s) Oral daily  azithromycin  IVPB      azithromycin  IVPB 500 milliGRAM(s) IV Intermittent every 24 hours  buDESOnide   0.5 milliGRAM(s) Respule 0.5 milliGRAM(s) Inhalation two times a day  cefepime  IVPB 1000 milliGRAM(s) IV Intermittent every 12 hours  dextrose 5%. 1000 milliLiter(s) (50 mL/Hr) IV Continuous <Continuous>  dextrose 50% Injectable 12.5 Gram(s) IV Push once  dextrose 50% Injectable 25 Gram(s) IV Push once  gabapentin 300 milliGRAM(s) Oral three times a day  guaiFENesin ER 1200 milliGRAM(s) Oral every 12 hours  insulin glargine Injectable (LANTUS) 20 Unit(s) SubCutaneous every morning  insulin lispro (HumaLOG) corrective regimen sliding scale   SubCutaneous three times a day before meals  methylPREDNISolone sodium succinate Injectable 40 milliGRAM(s) IV Push daily  metoprolol     tartrate 25 milliGRAM(s) Oral daily  montelukast 10 milliGRAM(s) Oral at bedtime  simvastatin 10 milliGRAM(s) Oral at bedtime  vancomycin  IVPB 1000 milliGRAM(s) IV Intermittent every 12 hours    MEDICATIONS  (PRN):  dextrose Gel 1 Dose(s) Oral once PRN Blood Glucose LESS THAN 70 milliGRAM(s)/deciliter  glucagon  Injectable 1 milliGRAM(s) IntraMuscular once PRN Glucose LESS THAN 70 milligrams/deciliter      Allergies    Privigen (Other (Severe))    Intolerances        SOCIAL HISTORY:    FAMILY HISTORY:  Family history of liver cancer (Father): father  85 liver CA  Family history of coronary arteriosclerosis (Mother): Mom  of MI age 68      Dyspnea, cough decreased appetite, fatigue and weakness  ( Via / patient Slovak speaking)    Vital Signs Last 24 Hrs  T(C): 36.6 (2018 16:25), Max: 37.4 (2018 19:26)  T(F): 97.8 (2018 16:25), Max: 99.4 (2018 19:26)  HR: 92 (2018 16:03) (76 - 120)  BP: 120/69 (2018 16:03) (106/68 - 139/78)  BP(mean): 82 (2018 16:03) (73 - 90)  RR: 21 (2018 16:03) (18 - 32)  SpO2: 91% (2018 16:03) (89% - 95%)    Dyspnea at rest  Wearing O2 mask  Lungs rhonchi  Cor no murmur  Abd distended / no organomegaly  No purpura, ecchymoses        LABS:                        11.1   39.2  )-----------( 134      ( 2018 05:12 )             35.2     01-    137  |  100  |  14  ----------------------------<  70  4.3   |  28  |  0.84    Ca    8.3<L>      2018 08:31    TPro  5.8<L>  /  Alb  3.2<L>  /  TBili  0.7  /  DBili  x   /  AST  25  /  ALT  27  /  AlkPhos  129<H>      PT/INR - ( 2018 16:50 )   PT: 50.6 sec;   INR: 4.54 ratio         PTT - ( 2018 16:50 )  PTT:42.5 sec  Urinalysis Basic - ( 2018 14:44 )    Color: Yellow / Appearance: Clear / S.010 / pH: x  Gluc: x / Ketone: Negative  / Bili: Negative / Urobili: Negative mg/dL   Blood: x / Protein: 30 mg/dL / Nitrite: Negative   Leuk Esterase: Negative / RBC: 0-2 /HPF / WBC 0-2   Sq Epi: x / Non Sq Epi: x / Bacteria: Moderate        RADIOLOGY & ADDITIONAL STUDIES:
Patient is a 77y old  Male who presents with a chief complaint of Fatigue, fever, and shortness of breath x 3 days (2018 14:03)      HPI:  Patient is 77 year old man with complex past medical history including CLL/lymphoma on treatment, h/o recurrent pneumonia, Afib, HFprF, Hep B, COPD, type 2DM, H/o PE DVT was brought in as his family noticed worsening cough, fever 101 on multiple occassions during the last 3 days with more fatigue and drowsiness.  He was here in , with pneumonia s/p bronch at that time s/o thick mucus with bronchomalacia.  Patient this time has cough with mucoid expectoration which turned to greenish phlegm. Fever intermittent, not associated with chills or rigor.  No nausea or vomiting. C/o chest tightness a/w cough. no hemoptysis. No h/o sick contacts. Denies wheezing. Patient on COPD meds, continued it as usual.  Patient denies any dizziness or lightheadedness.  He felt weak and tired, and per family was getting drowsy with episodes of confusion.  He is on follow up with Oncologist and was being planned to get next treatment this week.  While he was in the ED, his CXR showed left lower and midzone infiltrates. S/p vanco and cefepime. (2018 14:03)  Evaluated on     PAST MEDICAL & SURGICAL HISTORY:  Hypogammaglobulinemia: received IVIG  Hepatitis B virus infection, unspecified chronicity: retrovirals  Chronic diastolic congestive heart failure  Essential hypertension  DM type 2 (diabetes mellitus, type 2)  B-cell lymphoma, unspecified B-cell lymphoma type, unspecified body region: CLL stage 4/SLL; met NHL  Atrial fibrillation, unspecified type  COPD (chronic obstructive pulmonary disease)  History of esophageal dilatation  S/P cholecystectomy  H/O prostatectomy      PREVIOUS DIAGNOSTIC TESTING:      MEDICATIONS  (STANDING):  ALBUTerol/ipratropium for Nebulization 3 milliLiter(s) Nebulizer every 6 hours  amiodarone    Tablet 100 milliGRAM(s) Oral daily  azithromycin  IVPB      azithromycin  IVPB 500 milliGRAM(s) IV Intermittent every 24 hours  cefepime  IVPB 1000 milliGRAM(s) IV Intermittent every 12 hours  dextrose 5%. 1000 milliLiter(s) (50 mL/Hr) IV Continuous <Continuous>  dextrose 50% Injectable 12.5 Gram(s) IV Push once  dextrose 50% Injectable 25 Gram(s) IV Push once  gabapentin 300 milliGRAM(s) Oral three times a day  guaiFENesin ER 1200 milliGRAM(s) Oral every 12 hours  insulin glargine Injectable (LANTUS) 20 Unit(s) SubCutaneous every morning  insulin lispro (HumaLOG) corrective regimen sliding scale   SubCutaneous three times a day before meals  metoprolol     tartrate 25 milliGRAM(s) Oral daily  montelukast 10 milliGRAM(s) Oral at bedtime  simvastatin 10 milliGRAM(s) Oral at bedtime  vancomycin  IVPB 1000 milliGRAM(s) IV Intermittent every 12 hours    MEDICATIONS  (PRN):  dextrose Gel 1 Dose(s) Oral once PRN Blood Glucose LESS THAN 70 milliGRAM(s)/deciliter  glucagon  Injectable 1 milliGRAM(s) IntraMuscular once PRN Glucose LESS THAN 70 milligrams/deciliter      FAMILY HISTORY:  Family history of liver cancer (Father): father  85 liver CA  Family history of coronary arteriosclerosis (Mother): Mom  of MI age 68      REVIEW OF SYSTEM:  Shortness of breath, otherwise 12 point ROS negative    Vital Signs Last 24 Hrs  T(C): 36.2 (2018 06:10), Max: 38.2 (2018 17:30)  T(F): 97.2 (2018 06:10), Max: 100.8 (2018 17:30)  HR: 92 (2018 06:45) (76 - 120)  BP: 134/74 (2018 04:00) (106/68 - 154/89)  BP(mean): 88 (2018 04:00) (73 - 90)  RR: 23 (2018 06:00) (18 - 32)  SpO2: 94% (2018 06:45) (89% - 100%)    I&O's Summary    2018 07:01  -  2018 07:00  --------------------------------------------------------  IN: 240 mL / OUT: 200 mL / NET: 40 mL      PHYSICAL EXAM  General Appearance: cooperative, no acute distress,   HEENT: PERRL, conjunctiva clear, EOM's intact, non injected pharynx, no exudate, TM   normal  Neck: Supple, , no adenopathy, thyroid: not enlarged, no carotid bruit or JVD  Back: Symmetric, no  tenderness,no soft tissue tenderness  Lungs: Bilateral upper lobe wheezing, rhonchi in the lower lobes LLL>RLL  Heart: Regular rate and rhythm, S1, S2 normal, no murmur, rub or gallop  Abdomen: Soft, non-tender, bowel sounds active , no hepatosplenomegaly  Extremities: no cyanosis or edema, no joint swelling  Skin: Skin color, texture normal, no rashes   Neurologic: Alert and oriented X3 , cranial nerves intact, sensory and motor normal,    ECG:    LABS:                          11.1   39.2  )-----------( 134      ( 2018 05:12 )             35.2         137  |  100  |  14  ----------------------------<  70  4.3   |  28  |  0.84    Ca    8.3<L>      2018 08:31    TPro  5.8<L>  /  Alb  3.2<L>  /  TBili  0.7  /  DBili  x   /  AST  25  /  ALT  27  /  AlkPhos  129<H>              PT/INR - ( 2018 08:31 )   PT: 29.8 sec;   INR: 2.70 ratio         PTT - ( 2018 08:31 )  PTT:38.1 sec  Urinalysis Basic - ( 2018 14:44 )    Color: Yellow / Appearance: Clear / S.010 / pH: x  Gluc: x / Ketone: Negative  / Bili: Negative / Urobili: Negative mg/dL   Blood: x / Protein: 30 mg/dL / Nitrite: Negative   Leuk Esterase: Negative / RBC: 0-2 /HPF / WBC 0-2   Sq Epi: x / Non Sq Epi: x / Bacteria: Moderate      ABG - ( 2018 06:41 )  pH: 7.33  /  pCO2: 50    /  pO2: 59    / HCO3: 25    / Base Excess: -.4   /  SaO2: 87                    RADIOLOGY & ADDITIONAL STUDIES:
Patient is a 77y old  Male who presents with a chief complaint of Fatigue, fever, and shortness of breath x 3 days (2018 14:03)    HPI:  78 y/o man with h/o CLL/lymphoma on treatment, h/o recurrent pneumonia, Afib, HFprF, Hep B, COPD on home O2, DM type 2, prior PE DVT waswas admitted on  for worsening cough, fever to 101F on multiple occasions during the last 3 days PTA with more fatigue and drowsiness. He reports cough with mucoid expectoration which turned to greenish phlegm. Fever intermittent, not associated with chills or rigor. Has chest tightness a/w cough. no hemoptysis. No h/o sick contacts. Denies wheezing. Patient denies any dizziness or lightheadedness. He felt weak and tired, and per family was getting drowsy with episodes of confusion. While he was in the ED, he received vanco IV and cefepime.      PMH: as above  PSH: as above  Meds: per reconciliation sheet, noted below  MEDICATIONS  (STANDING):  ALBUTerol/ipratropium for Nebulization 3 milliLiter(s) Nebulizer every 6 hours  amiodarone    Tablet 100 milliGRAM(s) Oral daily  azithromycin  IVPB      azithromycin  IVPB 500 milliGRAM(s) IV Intermittent every 24 hours  buDESOnide   0.5 milliGRAM(s) Respule 0.5 milliGRAM(s) Inhalation two times a day  cefepime  IVPB 1000 milliGRAM(s) IV Intermittent every 12 hours  dextrose 5%. 1000 milliLiter(s) (50 mL/Hr) IV Continuous <Continuous>  dextrose 50% Injectable 12.5 Gram(s) IV Push once  dextrose 50% Injectable 25 Gram(s) IV Push once  gabapentin 300 milliGRAM(s) Oral three times a day  guaiFENesin ER 1200 milliGRAM(s) Oral every 12 hours  insulin glargine Injectable (LANTUS) 20 Unit(s) SubCutaneous every morning  insulin lispro (HumaLOG) corrective regimen sliding scale   SubCutaneous three times a day before meals  methylPREDNISolone sodium succinate Injectable 40 milliGRAM(s) IV Push daily  metoprolol     tartrate 25 milliGRAM(s) Oral daily  montelukast 10 milliGRAM(s) Oral at bedtime  simvastatin 10 milliGRAM(s) Oral at bedtime  vancomycin  IVPB 1000 milliGRAM(s) IV Intermittent every 12 hours    MEDICATIONS  (PRN):  dextrose Gel 1 Dose(s) Oral once PRN Blood Glucose LESS THAN 70 milliGRAM(s)/deciliter  glucagon  Injectable 1 milliGRAM(s) IntraMuscular once PRN Glucose LESS THAN 70 milligrams/deciliter    Allergies    Privigen (Other (Severe))    Intolerances      Social: no smoking, no alcohol, no illegal drugs; no recent travel, no exposure to TB  FAMILY HISTORY:  Family history of liver cancer (Father): father  85 liver CA  Family history of coronary arteriosclerosis (Mother): Mom  of MI age 68    ROS: the patient denies HA, no dizziness, no sore throat, no blurry vision, no CP, no palpitations, has SOB, has cough, no abdominal pain, no diarrhea, no N/V, no dysuria, no leg pain, no claudication, no rash, no joint aches, no rectal pain or bleeding, no night sweats; increased weakness    Vital Signs Last 24 Hrs  T(C): 36.1 (2018 12:11), Max: 36.8 (2018 02:28)  T(F): 97 (2018 12:11), Max: 98.3 (2018 02:28)  HR: 90 (2018 14:30) (72 - 110)  BP: 93/49 (2018 12:00) (93/49 - 120/69)  BP(mean): 59 (2018 12:00) (59 - 82)  RR: 25 (2018 12:00) (20 - 26)  SpO2: 93% (2018 00:00) (87% - 93%)  Daily     Daily Weight in k.3 (2018 06:00)    PE:    Constitutional: frail looking  HEENT: NC/AT, EOMI, PERRLA  Neck: supple  Back: no tenderness  Respiratory: crackles at bases  Cardiovascular: S1S2 regular, no murmurs  Abdomen: soft, not tender, not distended, positive BS  Genitourinary: no LN palpable  Rectal: deferred  Musculoskeletal: no muscle tenderness, no joint swelling or tenderness  Extremities: no pedal edema  Neurological: AxOx3, moving all extremities  Skin: no rashes    Labs:                        11.1   39.2  )-----------( 134      ( 2018 05:12 )             35.2         134<L>  |  100  |  30<H>  ----------------------------<  294<H>  4.6   |  24  |  1.15    Ca    8.1<L>      2018 04:38    Rapid Respiratory Viral Panel (18 @ 15:22)    Rapid RVP Result: Detected: The [facility] RVP Rapid uses polymerase chain reaction (PCR) and melt  curve analysis to screen for adenovirus; coronavirus HKU1, NL63, 229E,  OC43; human metapneumovirus (hMPV); human enterovirus/rhinovirus  (Entero/RV); influenza A; influenza A/H1;influenza A/H3; influenza  A/H1-2009; influenza B; parainfluenza viruses 1, 2, 3, 4; respiratory  syncytial virus; Bordetella pertussis; Mycoplasma pneumoniae; and  Chlamydophila pneumoniae.    Entero/Rhinovirus (RapRVP): Detected: spoke to jayjay fitch rn in er 18 17:25          Radiology:    < from: Xray Chest 1 View AP/PA. (18 @ 09:27) >  Bibasilar airspace opacity in left mid lung airspace opacity is noted.   Findings are concerning for pneumonia given the patient's history. Heart   size appears mildly enlarged. Chronic appearing posttraumatic deformity   of the right proximal humerusis noted.    < end of copied text >      Advanced directives addressed: full resuscitation

## 2018-01-03 NOTE — PROGRESS NOTE ADULT - SUBJECTIVE AND OBJECTIVE BOX
CHIEF COMPLAINT/Diagnosis: acute copd exacerbation/ diastolic chf eexaceration/ PNA/ Immunocomprimised    SUBJECTIVE: no complaints- states he is feeling somewhat better, butnot quite himself. ; yesturday he was on ventimask sating 92%; Today he is onn nasal canula, sating 94%    REVIEW OF SYSTEMS:    CONSTITUTIONAL: No weakness, fevers or chills  EYES/ENT: No visual changes;  No vertigo or throat pain   NECK: No pain or stiffness  RESPIRATORY: No cough, wheezing, hemoptysis; No shortness of breath  CARDIOVASCULAR: No chest pain or palpitations  GASTROINTESTINAL: No abdominal or epigastric pain. No nausea, vomiting, or hematemesis; No diarrhea or constipation. No melena or hematochezia.  GENITOURINARY: No dysuria, frequency or hematuria  NEUROLOGICAL: No numbness or weakness  SKIN: No itching, burning, rashes, or lesions   All other review of systems is negative unless indicated above    Vital Signs Last 24 Hrs  T(C): 35.6 (03 Jan 2018 20:55), Max: 36.8 (03 Jan 2018 02:28)  T(F): 96 (03 Jan 2018 20:55), Max: 98.3 (03 Jan 2018 02:28)  HR: 94 (03 Jan 2018 20:29) (72 - 97)  BP: 108/54 (03 Jan 2018 16:00) (93/49 - 117/56)  BP(mean): 67 (03 Jan 2018 16:00) (59 - 72)  RR: 22 (03 Jan 2018 18:00) (20 - 27)  SpO2: 94% (03 Jan 2018 20:29) (87% - 94%)    I&O's Summary    03 Jan 2018 07:01  -  03 Jan 2018 22:16  --------------------------------------------------------  IN: 790 mL / OUT: 1000 mL / NET: -210 mL        CAPILLARY BLOOD GLUCOSE      POCT Blood Glucose.: 314 mg/dL (03 Jan 2018 17:02)  POCT Blood Glucose.: 395 mg/dL (03 Jan 2018 13:14)  POCT Blood Glucose.: 368 mg/dL (03 Jan 2018 11:15)  POCT Blood Glucose.: 304 mg/dL (03 Jan 2018 08:10)      PHYSICAL EXAM:    Constitutional: NAD, awake and alert, well-developed  HEENT: PERR, EOMI, Normal Hearing, MMM  Neck: Soft and supple, No LAD, No JVD  Respiratory: Breath sounds are clear bilaterally, No wheezing, rales or rhonchi  Cardiovascular: S1 and S2, regular rate and rhythm, no Murmurs, gallops or rubs  Gastrointestinal: Bowel Sounds present, soft, nontender, nondistended, no guarding, no rebound  Extremities: No peripheral edema  Vascular: 2+ peripheral pulses  Neurological: A/O x 3, no focal deficits  Musculoskeletal: 5/5 strength b/l upper and lower extremities  Skin: No rashes    MEDICATIONS:  MEDICATIONS  (STANDING):  ALBUTerol/ipratropium for Nebulization 3 milliLiter(s) Nebulizer every 6 hours  amiodarone    Tablet 100 milliGRAM(s) Oral daily  azithromycin  IVPB      azithromycin  IVPB 500 milliGRAM(s) IV Intermittent every 24 hours  buDESOnide   0.5 milliGRAM(s) Respule 0.5 milliGRAM(s) Inhalation two times a day  cefepime  IVPB 1000 milliGRAM(s) IV Intermittent every 12 hours  dextrose 5%. 1000 milliLiter(s) (50 mL/Hr) IV Continuous <Continuous>  dextrose 50% Injectable 12.5 Gram(s) IV Push once  dextrose 50% Injectable 25 Gram(s) IV Push once  gabapentin 300 milliGRAM(s) Oral three times a day  guaiFENesin ER 1200 milliGRAM(s) Oral every 12 hours  insulin glargine Injectable (LANTUS) 20 Unit(s) SubCutaneous every morning  insulin lispro (HumaLOG) corrective regimen sliding scale   SubCutaneous three times a day before meals  methylPREDNISolone sodium succinate Injectable 40 milliGRAM(s) IV Push daily  metoprolol     tartrate 25 milliGRAM(s) Oral daily  montelukast 10 milliGRAM(s) Oral at bedtime  simvastatin 10 milliGRAM(s) Oral at bedtime      LABS: All Labs Reviewed:                        11.1   39.2  )-----------( 134      ( 02 Jan 2018 05:12 )             35.2     01-03    134<L>  |  100  |  30<H>  ----------------------------<  294<H>  4.6   |  24  |  1.15    Ca    8.1<L>      03 Jan 2018 04:38      PT/INR - ( 02 Jan 2018 16:50 )   PT: 50.6 sec;   INR: 4.54 ratio         PTT - ( 02 Jan 2018 16:50 )  PTT:42.5 sec      Blood Culture: 01-01 @ 14:44  Organism --  Gram Stain Blood -- Gram Stain --  Specimen Source .Urine None  Culture-Blood --    01-01 @ 14:34  Organism --  Gram Stain Blood -- Gram Stain --  Specimen Source .Blood None  Culture-Blood --    01-01 @ 08:31  Organism --  Gram Stain Blood -- Gram Stain --  Specimen Source .Blood Blood-Peripheral  Culture-Blood --          Patient is 77 year old man with complex past medical history including CLL/lymphoma on treatment, h/o recurrent pneumonia, Afib, HFprF, Hep B, COPD(home home o2 intermitteintly) Ex smoker (quit 30 years ago, 1PPD), type 2DM, H/o PE DVT was brought in as his family noticed worsening cough, fever 101 on multiple occassions during the last 3 days with more fatigue and drowsiness.  Found to have b/ l PNA,  hypoxic, tachynic      1-Acute hypoxic and hypercapnic respiratory failure secondary to Multifactorial; Pneumonia in immunocompromised patient:  -c/w Vanc/ Azithormycin / and cefepime  -ID consult  -c/w daily labs  -also positive for entervirus    2. Heart failure with preserved EF. Patient has trace pitting pedal edema. Not on diuretics  -does not seem to be in acute exacerbation, but likely was in ER   -BNP 1552  -recieved one dose of lasix in ER, no edema currently     3.  acute exacerbation of COPD-  -patient currently on ventimask , hiss baseline is intermittent nasal canula  -start low dose solumederol 40iv daily 1/2; today theree is significant imprvment in breathing.   -Will taper solumederol for first dose of Oral prednisone to be given tomarrow am  -Will continue duonebs.  -pulmicort added      4. 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 He has leucocytosis with WBC in [id number]. Most likely cause of this is sec to infection.  Oncology consult placed. Will continue to monitor WBC.    5. H/o Afib- current EKG shows sinus tachy. Patient on amio, and metoprolol.   Will resume amiodarone, metoprolol 25 mg bid with holding parameters.  -inr pending    6. Type 2 DM- on insulin Tresiba 20 units bid at home and Januvia.  Will start sliding scale insulin and lantus    7-DVT proph- on coumadin        Disp:  patient may go to  for his chemo when medically ready;   Discuss with oncology.

## 2018-01-03 NOTE — CONSULT NOTE ADULT - ASSESSMENT
76 y/o man with h/o CLL/lymphoma on treatment, h/o recurrent pneumonia, Afib, HFprF, Hep B, COPD on home O2, DM type 2, prior PE DVT waswas admitted on 1/1 for worsening cough, fever to 101F on multiple occasions during the last 3 days PTA with more fatigue and drowsiness. He reports cough with mucoid expectoration which turned to greenish phlegm. Fever intermittent, not associated with chills or rigor. Has chest tightness a/w cough. no hemoptysis. No h/o sick contacts. Denies wheezing. Patient denies any dizziness or lightheadedness. He felt weak and tired, and per family was getting drowsy with episodes of confusion. While he was in the ED, he received vanco IV and cefepime.    1. Dyspnea. Persistent bibasilar opacities. Possible pneumonia. End stage COPD. Acute on chronic respiratory failure. URI with rhinovirus. Immunocompromised host.  -leukocytosis likely related to CLL  -obtain BC x 2  -obtain sputum c/s  -agree with vancomycin 750 mg IV q12h, cefepime 1 g mIV q12h and azithromycin 500 mg PO qd  -reason for abx use and side effects reviewed with patient; monitor BMP and vancomycin trough levels   -monitor temps  -f/u CBC  -supportive care  2. Other issues:   -care per medicine 78 y/o man with h/o CLL/lymphoma on treatment, h/o recurrent pneumonia, Afib, HFprF, Hep B, COPD on home O2, DM type 2, prior PE DVT waswas admitted on 1/1 for worsening cough, fever to 101F on multiple occasions during the last 3 days PTA with more fatigue and drowsiness. He reports cough with mucoid expectoration which turned to greenish phlegm. Fever intermittent, not associated with chills or rigor. Has chest tightness a/w cough. no hemoptysis. No h/o sick contacts. Denies wheezing. Patient denies any dizziness or lightheadedness. He felt weak and tired, and per family was getting drowsy with episodes of confusion. While he was in the ED, he received vanco IV and cefepime.    1. Dyspnea. Persistent bibasilar opacities. Possible pneumonia. End stage COPD. Acute on chronic respiratory failure. URI with rhinovirus. Immunocompromised host.  -leukocytosis likely related to CLL  -obtain BC x 2  -obtain sputum c/s  -agree with cefepime 1 g mIV q12h and azithromycin 500 mg PO qd  -reason for abx use and side effects reviewed with patient; monitor BMP   -obtain CT chest  -monitor temps  -f/u CBC  -supportive care  2. Other issues:   -care per medicine

## 2018-01-03 NOTE — PROGRESS NOTE ADULT - SUBJECTIVE AND OBJECTIVE BOX
Patient is a 77y old  Male who presents with a chief complaint of Fatigue, fever, and shortness of breath x 3 days (2018 14:03)      HPI:  Patient is 77 year old man with complex past medical history including CLL/lymphoma on treatment, h/o recurrent pneumonia, Afib, HFprF, Hep B, COPD, type 2DM, H/o PE DVT was brought in as his family noticed worsening cough, fever 101 on multiple occassions during the last 3 days with more fatigue and drowsiness.  He was here in , with pneumonia s/p bronch at that time s/o thick mucus with bronchomalacia.  Patient this time has cough with mucoid expectoration which turned to greenish phlegm. Fever intermittent, not associated with chills or rigor.  No nausea or vomiting. C/o chest tightness a/w cough. no hemoptysis. No h/o sick contacts. Denies wheezing. Patient on COPD meds, continued it as usual.  Patient denies any dizziness or lightheadedness.  He felt weak and tired, and per family was getting drowsy with episodes of confusion.  He is on follow up with Oncologist and was being planned to get next treatment this week.  While he was in the ED, his CXR showed left lower and midzone infiltrates. S/p vanco and cefepime. (2018 14:03)  Evaluated on 1/2    1/3/2018  Patient states he is feeling less weak, no acute events occurred overnight     PAST MEDICAL & SURGICAL HISTORY:  Hypogammaglobulinemia: received IVIG  Hepatitis B virus infection, unspecified chronicity: retrovirals  Chronic diastolic congestive heart failure  Essential hypertension  DM type 2 (diabetes mellitus, type 2)  B-cell lymphoma, unspecified B-cell lymphoma type, unspecified body region: CLL stage 4/SLL; met NHL  Atrial fibrillation, unspecified type  COPD (chronic obstructive pulmonary disease)  History of esophageal dilatation  S/P cholecystectomy  H/O prostatectomy      PREVIOUS DIAGNOSTIC TESTING:      MEDICATIONS  (STANDING):  ALBUTerol/ipratropium for Nebulization 3 milliLiter(s) Nebulizer every 6 hours  amiodarone    Tablet 100 milliGRAM(s) Oral daily  azithromycin  IVPB      azithromycin  IVPB 500 milliGRAM(s) IV Intermittent every 24 hours  buDESOnide   0.5 milliGRAM(s) Respule 0.5 milliGRAM(s) Inhalation two times a day  cefepime  IVPB 1000 milliGRAM(s) IV Intermittent every 12 hours  dextrose 5%. 1000 milliLiter(s) (50 mL/Hr) IV Continuous <Continuous>  dextrose 50% Injectable 12.5 Gram(s) IV Push once  dextrose 50% Injectable 25 Gram(s) IV Push once  gabapentin 300 milliGRAM(s) Oral three times a day  guaiFENesin ER 1200 milliGRAM(s) Oral every 12 hours  insulin glargine Injectable (LANTUS) 20 Unit(s) SubCutaneous every morning  insulin lispro (HumaLOG) corrective regimen sliding scale   SubCutaneous three times a day before meals  methylPREDNISolone sodium succinate Injectable 40 milliGRAM(s) IV Push daily  metoprolol     tartrate 25 milliGRAM(s) Oral daily  montelukast 10 milliGRAM(s) Oral at bedtime  simvastatin 10 milliGRAM(s) Oral at bedtime  vancomycin  IVPB 1000 milliGRAM(s) IV Intermittent every 12 hours    MEDICATIONS  (PRN):  dextrose Gel 1 Dose(s) Oral once PRN Blood Glucose LESS THAN 70 milliGRAM(s)/deciliter  glucagon  Injectable 1 milliGRAM(s) IntraMuscular once PRN Glucose LESS THAN 70 milligrams/deciliter        FAMILY HISTORY:  Family history of liver cancer (Father): father  85 liver CA  Family history of coronary arteriosclerosis (Mother): Mom  of MI age 68      REVIEW OF SYSTEM:  Shortness of breath, otherwise 12 point ROS negative    Vital Signs Last 24 Hrs  Vital Signs Last 24 Hrs  T(C): 36.1 (2018 06:00), Max: 36.8 (2018 02:28)  T(F): 96.9 (2018 06:00), Max: 98.3 (2018 02:28)  HR: 94 (2018 07:45) (86 - 110)  BP: 117/56 (2018 04:00) (104/59 - 120/69)  BP(mean): 72 (2018 04:00) (65 - 82)  RR: 23 (2018 06:00) (18 - 26)  SpO2: 93% (2018 00:00) (87% - 93%)    I&O's Summary    2018 07:01  -  2018 07:00  --------------------------------------------------------  IN: 240 mL / OUT: 200 mL / NET: 40 mL      PHYSICAL EXAM  General Appearance: cooperative, no acute distress,   HEENT: PERRL, conjunctiva clear, EOM's intact, non injected pharynx, no exudate, TM   normal  Neck: Supple, , no adenopathy, thyroid: not enlarged, no carotid bruit or JVD  Back: Symmetric, no  tenderness,no soft tissue tenderness  Lungs: Bilateral upper lobe wheezing, rhonchi in the lower lobes LLL>RLL  Heart: Regular rate and rhythm, S1, S2 normal, no murmur, rub or gallop  Abdomen: Soft, non-tender, bowel sounds active , no hepatosplenomegaly  Extremities: no cyanosis or edema, no joint swelling  Skin: Skin color, texture normal, no rashes   Neurologic: Alert and oriented X3 , cranial nerves intact, sensory and motor normal,    ECG:    LABS:                          11.1   39.2  )-----------( 134      ( 2018 05:12 )             35.2   01-    134<L>  |  100  |  30<H>  ----------------------------<  294<H>  4.6   |  24  |  1.15    Ca    8.1<L>      2018 04:38              PT/INR - ( 2018 08:31 )   PT: 29.8 sec;   INR: 2.70 ratio         PTT - ( 2018 08:31 )  PTT:38.1 sec  Urinalysis Basic - ( 2018 14:44 )    Color: Yellow / Appearance: Clear / S.010 / pH: x  Gluc: x / Ketone: Negative  / Bili: Negative / Urobili: Negative mg/dL   Blood: x / Protein: 30 mg/dL / Nitrite: Negative   Leuk Esterase: Negative / RBC: 0-2 /HPF / WBC 0-2   Sq Epi: x / Non Sq Epi: x / Bacteria: Moderate      ABG - ( 2018 06:41 )  pH: 7.33  /  pCO2: 50    /  pO2: 59    / HCO3: 25    / Base Excess: -.4   /  SaO2: 87      ABG - ( 2018 11:29 )  pH: 7.30  /  pCO2: 48    /  pO2: 84    / HCO3: 23    / Base Excess: -2.7  /  SaO2: 95                            RADIOLOGY & ADDITIONAL STUDIES:

## 2018-01-04 LAB
GLUCOSE BLDC GLUCOMTR-MCNC: 241 MG/DL — HIGH (ref 70–99)
GLUCOSE BLDC GLUCOMTR-MCNC: 362 MG/DL — HIGH (ref 70–99)
GLUCOSE BLDC GLUCOMTR-MCNC: 434 MG/DL — HIGH (ref 70–99)
GLUCOSE BLDC GLUCOMTR-MCNC: 493 MG/DL — CRITICAL HIGH (ref 70–99)
GLUCOSE BLDC GLUCOMTR-MCNC: 495 MG/DL — CRITICAL HIGH (ref 70–99)
GRAM STN FLD: SIGNIFICANT CHANGE UP
INR BLD: 3.77 RATIO — HIGH (ref 0.88–1.16)
PROTHROM AB SERPL-ACNC: 41.8 SEC — HIGH (ref 9.8–12.7)
SPECIMEN SOURCE: SIGNIFICANT CHANGE UP

## 2018-01-04 PROCEDURE — 71250 CT THORAX DX C-: CPT | Mod: 26

## 2018-01-04 RX ORDER — INSULIN GLARGINE 100 [IU]/ML
10 INJECTION, SOLUTION SUBCUTANEOUS ONCE
Qty: 0 | Refills: 0 | Status: COMPLETED | OUTPATIENT
Start: 2018-01-04 | End: 2018-01-04

## 2018-01-04 RX ORDER — INSULIN LISPRO 100/ML
10 VIAL (ML) SUBCUTANEOUS ONCE
Qty: 0 | Refills: 0 | Status: COMPLETED | OUTPATIENT
Start: 2018-01-04 | End: 2018-01-04

## 2018-01-04 RX ADMIN — MONTELUKAST 10 MILLIGRAM(S): 4 TABLET, CHEWABLE ORAL at 23:09

## 2018-01-04 RX ADMIN — SIMVASTATIN 10 MILLIGRAM(S): 20 TABLET, FILM COATED ORAL at 23:09

## 2018-01-04 RX ADMIN — Medication 4: at 09:56

## 2018-01-04 RX ADMIN — CEFEPIME 100 MILLIGRAM(S): 1 INJECTION, POWDER, FOR SOLUTION INTRAMUSCULAR; INTRAVENOUS at 17:55

## 2018-01-04 RX ADMIN — Medication 12: at 17:54

## 2018-01-04 RX ADMIN — INSULIN GLARGINE 20 UNIT(S): 100 INJECTION, SOLUTION SUBCUTANEOUS at 09:57

## 2018-01-04 RX ADMIN — Medication 1200 MILLIGRAM(S): at 17:54

## 2018-01-04 RX ADMIN — CEFEPIME 100 MILLIGRAM(S): 1 INJECTION, POWDER, FOR SOLUTION INTRAMUSCULAR; INTRAVENOUS at 06:15

## 2018-01-04 RX ADMIN — Medication 0.5 MILLIGRAM(S): at 20:30

## 2018-01-04 RX ADMIN — Medication 10 UNIT(S): at 23:09

## 2018-01-04 RX ADMIN — Medication 40 MILLIGRAM(S): at 06:14

## 2018-01-04 RX ADMIN — Medication 3 MILLILITER(S): at 20:30

## 2018-01-04 RX ADMIN — Medication 12: at 13:06

## 2018-01-04 RX ADMIN — Medication 0.5 MILLIGRAM(S): at 11:28

## 2018-01-04 RX ADMIN — INSULIN GLARGINE 10 UNIT(S): 100 INJECTION, SOLUTION SUBCUTANEOUS at 13:19

## 2018-01-04 RX ADMIN — AZITHROMYCIN 255 MILLIGRAM(S): 500 TABLET, FILM COATED ORAL at 13:08

## 2018-01-04 RX ADMIN — GABAPENTIN 300 MILLIGRAM(S): 400 CAPSULE ORAL at 23:09

## 2018-01-04 RX ADMIN — Medication 3 MILLILITER(S): at 11:29

## 2018-01-04 RX ADMIN — Medication 3 MILLILITER(S): at 01:47

## 2018-01-04 RX ADMIN — Medication 1200 MILLIGRAM(S): at 09:57

## 2018-01-04 RX ADMIN — GABAPENTIN 300 MILLIGRAM(S): 400 CAPSULE ORAL at 09:57

## 2018-01-04 RX ADMIN — GABAPENTIN 300 MILLIGRAM(S): 400 CAPSULE ORAL at 13:08

## 2018-01-04 RX ADMIN — AMIODARONE HYDROCHLORIDE 100 MILLIGRAM(S): 400 TABLET ORAL at 09:57

## 2018-01-04 NOTE — PROGRESS NOTE ADULT - SUBJECTIVE AND OBJECTIVE BOX
Patient is a 77y old  Male who presents with a chief complaint of Fatigue, fever, and shortness of breath x 3 days (01 Jan 2018 14:03)    HPI:  76 y/o man with h/o CLL/lymphoma on treatment, h/o recurrent pneumonia, Afib, HFprF, Hep B, COPD on home O2, DM type 2, prior PE DVT waswas admitted on 1/1 for worsening cough, fever to 101F on multiple occasions during the last 3 days PTA with more fatigue and drowsiness. He reports cough with mucoid expectoration which turned to greenish phlegm. Fever intermittent, not associated with chills or rigor. Has chest tightness a/w cough. no hemoptysis. No h/o sick contacts. Denies wheezing. Patient denies any dizziness or lightheadedness. He felt weak and tired, and per family was getting drowsy with episodes of confusion. While he was in the ED, he received vanco IV and cefepime.    Lying in bed in NAD  Weak looking  Has dry cough    MEDICATIONS  (STANDING):  ALBUTerol/ipratropium for Nebulization 3 milliLiter(s) Nebulizer every 6 hours  amiodarone    Tablet 100 milliGRAM(s) Oral daily  azithromycin  IVPB      azithromycin  IVPB 500 milliGRAM(s) IV Intermittent every 24 hours  buDESOnide   0.5 milliGRAM(s) Respule 0.5 milliGRAM(s) Inhalation two times a day  cefepime  IVPB 1000 milliGRAM(s) IV Intermittent every 12 hours  dextrose 5%. 1000 milliLiter(s) (50 mL/Hr) IV Continuous <Continuous>  dextrose 50% Injectable 12.5 Gram(s) IV Push once  dextrose 50% Injectable 25 Gram(s) IV Push once  gabapentin 300 milliGRAM(s) Oral three times a day  guaiFENesin ER 1200 milliGRAM(s) Oral every 12 hours  insulin glargine Injectable (LANTUS) 20 Unit(s) SubCutaneous every morning  insulin lispro (HumaLOG) corrective regimen sliding scale   SubCutaneous three times a day before meals  methylPREDNISolone sodium succinate Injectable 40 milliGRAM(s) IV Push daily  metoprolol     tartrate 25 milliGRAM(s) Oral daily  montelukast 10 milliGRAM(s) Oral at bedtime  simvastatin 10 milliGRAM(s) Oral at bedtime    MEDICATIONS  (PRN):  dextrose Gel 1 Dose(s) Oral once PRN Blood Glucose LESS THAN 70 milliGRAM(s)/deciliter  glucagon  Injectable 1 milliGRAM(s) IntraMuscular once PRN Glucose LESS THAN 70 milligrams/deciliter      Vital Signs Last 24 Hrs  T(C): 35.6 (03 Jan 2018 20:55), Max: 36.1 (03 Jan 2018 12:11)  T(F): 96 (03 Jan 2018 20:55), Max: 97 (03 Jan 2018 12:11)  HR: 78 (04 Jan 2018 11:30) (71 - 96)  BP: 100/55 (04 Jan 2018 09:55) (100/55 - 117/55)  BP(mean): 66 (04 Jan 2018 09:55) (66 - 72)  RR: 22 (04 Jan 2018 04:00) (20 - 27)  SpO2: 92% (04 Jan 2018 04:00) (92% - 94%)    Physical Exam:      Constitutional: frail looking  HEENT: NC/AT, EOMI, PERRLA  Neck: supple  Back: no tenderness  Respiratory: crackles at bases  Cardiovascular: S1S2 regular, no murmurs  Abdomen: soft, not tender, not distended, positive BS  Genitourinary: no LN palpable  Rectal: deferred  Musculoskeletal: no muscle tenderness, no joint swelling or tenderness  Extremities: no pedal edema  Neurological: AxOx3, moving all extremities  Skin: no rashes    Labs:    01-03    134<L>  |  100  |  30<H>  ----------------------------<  294<H>  4.6   |  24  |  1.15    Ca    8.1<L>      03 Jan 2018 04:38                          11.1   39.2  )-----------( 134      ( 02 Jan 2018 05:12 )             35.2     01-03    134<L>  |  100  |  30<H>  ----------------------------<  294<H>  4.6   |  24  |  1.15    Ca    8.1<L>      03 Jan 2018 04:38    Rapid Respiratory Viral Panel (01.01.18 @ 15:22)    Rapid RVP Result: Detected: The Film[employer] RVP Rapid uses polymerase chain reaction (PCR) and melt  curve analysis to screen for adenovirus; coronavirus HKU1, NL63, 229E,  OC43; human metapneumovirus (hMPV); human enterovirus/rhinovirus  (Entero/RV); influenza A; influenza A/H1;influenza A/H3; influenza  A/H1-2009; influenza B; parainfluenza viruses 1, 2, 3, 4; respiratory  syncytial virus; Bordetella pertussis; Mycoplasma pneumoniae; and  Chlamydophila pneumoniae.    Entero/Rhinovirus (RapRVP): Detected: spoke to jayjay fitch rn in er 01/01/18 17:25      Culture - Sputum (collected 03 Jan 2018 17:00)  Source: .Sputum Sputum  Gram Stain (04 Jan 2018 00:03):    Numerous polymorphonuclear leukocytes per low power field    Few Squamous epithelial cells per low power field    Few Gram Positive Cocci in Pairs and Chains per oil power field    Rare Gram Variable Rods per oil power field    Culture - Urine (collected 01 Jan 2018 14:44)  Source: .Urine None  Final Report (02 Jan 2018 22:05):    No growth    Culture - Blood (collected 01 Jan 2018 14:34)  Source: .Blood None  Preliminary Report (03 Jan 2018 03:01):    No growth to date.    Culture - Blood (collected 01 Jan 2018 08:31)  Source: .Blood Blood-Peripheral  Preliminary Report (02 Jan 2018 13:01):    No growth to date.    Culture - Blood (collected 01 Jan 2018 08:31)  Source: .Blood Blood-Peripheral  Preliminary Report (02 Jan 2018 13:01):    No growth to date.        Radiology:    < from: Xray Chest 1 View AP/PA. (01.01.18 @ 09:27) >  Bibasilar airspace opacity in left mid lung airspace opacity is noted.   Findings are concerning for pneumonia given the patient's history. Heart   size appears mildly enlarged. Chronic appearing posttraumatic deformity   of the right proximal humerusis noted.    < end of copied text >    < from: CT Chest No Cont (01.04.18 @ 10:21) >  Interval worsening of partial atelectasis of the left lower lobe and some   infiltrates and/or atelectatic changes inthe right lower lobe. Clinical   correlation is recommended.    < end of copied text >      Advanced directives addressed: full resuscitation

## 2018-01-04 NOTE — PROGRESS NOTE ADULT - ASSESSMENT
78 y/o man with h/o CLL/lymphoma on treatment, h/o recurrent pneumonia, Afib, HFprF, Hep B, COPD on home O2, DM type 2, prior PE DVT waswas admitted on 1/1 for worsening cough, fever to 101F on multiple occasions during the last 3 days PTA with more fatigue and drowsiness. He reports cough with mucoid expectoration which turned to greenish phlegm. Fever intermittent, not associated with chills or rigor. Has chest tightness a/w cough. no hemoptysis. No h/o sick contacts. Denies wheezing. Patient denies any dizziness or lightheadedness. He felt weak and tired, and per family was getting drowsy with episodes of confusion. While he was in the ED, he received vanco IV and cefepime.    1. Dyspnea improving. Persistent bibasilar opacities. Possible pneumonia. End stage COPD. Acute on chronic respiratory failure. URI with rhinovirus. Immunocompromised host.  -leukocytosis likely related to CLL  -f/u BC x 2  -f/u sputum c/s  -on cefepime 1 gm IV q12h and azithromycin 500 mg IV qd # 2  -tolerating abx well so far; no side effects noted  - CT chest shows interval worsening of pulmonary infiltraltes  -continue abx coverage  -monitor temps  -f/u CBC  -supportive care  2. Other issues:   -care per medicine

## 2018-01-04 NOTE — PROGRESS NOTE ADULT - SUBJECTIVE AND OBJECTIVE BOX
78 y/o man with h/o CLL/lymphoma on treatment, h/o recurrent pneumonia, Afib, HFprF, Hep B, COPD on home O2, DM type 2, prior PE DVT waswas admitted on 1/1 for worsening cough, fever to 101F on multiple occasions during the last 3 days PTA with more fatigue and drowsiness. He reports cough with mucoid expectoration which turned to greenish phlegm. Fever intermittent, not associated with chills or rigor. Has chest tightness a/w cough. no hemoptysis. No h/o sick contacts. Denies wheezing. Patient denies any dizziness or lightheadedness. He felt weak and tired, and per family was getting drowsy with episodes of confusion. While he was in the ED, he received vanco IV and cefepime.    1/4: persistent, worsening infiltrates    Vital Signs Last 24 Hrs  T(C): 36.1 (04 Jan 2018 12:00), Max: 36.1 (04 Jan 2018 12:00)  T(F): 97 (04 Jan 2018 12:00), Max: 97 (04 Jan 2018 12:00)  HR: 78 (04 Jan 2018 11:30) (71 - 96)  BP: 100/55 (04 Jan 2018 09:55) (100/55 - 117/55)  BP(mean): 66 (04 Jan 2018 09:55) (66 - 72)  RR: 22 (04 Jan 2018 04:00) (20 - 27)  SpO2: 92% (04 Jan 2018 04:00) (92% - 94%)          PHYSICAL EXAM:    Constitutional: NAD, awake and alert, well-developed  HEENT: PERR, EOMI, Normal Hearing, MMM  Neck: Soft and supple, No LAD, No JVD  Respiratory: Breath sounds are clear bilaterally, No wheezing, rales or rhonchi  Cardiovascular: S1 and S2, regular rate and rhythm, no Murmurs, gallops or rubs  Gastrointestinal: Bowel Sounds present, soft, nontender, nondistended, no guarding, no rebound  Extremities: No peripheral edema  Vascular: 2+ peripheral pulses  Neurological: A/O x 3, no focal deficits  Musculoskeletal: 5/5 strength b/l upper and lower extremities  Skin: No rashes            * Acute hypoxic and hypercapnic respiratory failure secondary to Multifactorial; Pneumonia in immunocompromised patient:  -on IV abx  - pt is also on Amio; r/o component of lung toxicity  - per heme might benefit from IgG infusion      * B cell CLL: patient on follow up oncology Dr Tee. He has leucocytosis with WBC in [id number]. Most likely cause of this is sec to infection.    * H/o Afib- current EKG shows sinus tachy. Patient on amio, and metoprolol.   -on amiodarone, metoprolol 25 mg bid with holding parameters.    * Type 2 DM- on insulin Tresiba 20 units bid at home and Januvia.  - sliding scale insulin and lantus

## 2018-01-05 LAB
ANION GAP SERPL CALC-SCNC: 7 MMOL/L — SIGNIFICANT CHANGE UP (ref 5–17)
BUN SERPL-MCNC: 42 MG/DL — HIGH (ref 7–23)
CALCIUM SERPL-MCNC: 8 MG/DL — LOW (ref 8.5–10.1)
CHLORIDE SERPL-SCNC: 103 MMOL/L — SIGNIFICANT CHANGE UP (ref 96–108)
CO2 SERPL-SCNC: 28 MMOL/L — SIGNIFICANT CHANGE UP (ref 22–31)
CREAT SERPL-MCNC: 1.22 MG/DL — SIGNIFICANT CHANGE UP (ref 0.5–1.3)
CULTURE RESULTS: SIGNIFICANT CHANGE UP
GLUCOSE BLDC GLUCOMTR-MCNC: 227 MG/DL — HIGH (ref 70–99)
GLUCOSE BLDC GLUCOMTR-MCNC: 228 MG/DL — HIGH (ref 70–99)
GLUCOSE BLDC GLUCOMTR-MCNC: 323 MG/DL — HIGH (ref 70–99)
GLUCOSE BLDC GLUCOMTR-MCNC: 330 MG/DL — HIGH (ref 70–99)
GLUCOSE BLDC GLUCOMTR-MCNC: 424 MG/DL — HIGH (ref 70–99)
GLUCOSE BLDC GLUCOMTR-MCNC: 443 MG/DL — HIGH (ref 70–99)
GLUCOSE SERPL-MCNC: 183 MG/DL — HIGH (ref 70–99)
HCT VFR BLD CALC: 34.5 % — LOW (ref 39–50)
HGB BLD-MCNC: 11.1 G/DL — LOW (ref 13–17)
INR BLD: 3.29 RATIO — HIGH (ref 0.88–1.16)
MCHC RBC-ENTMCNC: 27.1 PG — SIGNIFICANT CHANGE UP (ref 27–34)
MCHC RBC-ENTMCNC: 32.3 GM/DL — SIGNIFICANT CHANGE UP (ref 32–36)
MCV RBC AUTO: 83.8 FL — SIGNIFICANT CHANGE UP (ref 80–100)
PLATELET # BLD AUTO: 166 K/UL — SIGNIFICANT CHANGE UP (ref 150–400)
POTASSIUM SERPL-MCNC: 5 MMOL/L — SIGNIFICANT CHANGE UP (ref 3.5–5.3)
POTASSIUM SERPL-SCNC: 5 MMOL/L — SIGNIFICANT CHANGE UP (ref 3.5–5.3)
PROTHROM AB SERPL-ACNC: 36.4 SEC — HIGH (ref 9.8–12.7)
RBC # BLD: 4.11 M/UL — LOW (ref 4.2–5.8)
RBC # FLD: 15.7 % — HIGH (ref 10.3–14.5)
SODIUM SERPL-SCNC: 138 MMOL/L — SIGNIFICANT CHANGE UP (ref 135–145)
SPECIMEN SOURCE: SIGNIFICANT CHANGE UP
WBC # BLD: 64.5 K/UL — CRITICAL HIGH (ref 3.8–10.5)
WBC # FLD AUTO: 64.5 K/UL — CRITICAL HIGH (ref 3.8–10.5)

## 2018-01-05 RX ORDER — METOPROLOL TARTRATE 50 MG
1 TABLET ORAL
Qty: 0 | Refills: 0 | COMMUNITY

## 2018-01-05 RX ORDER — TENOFOVIR DISOPROXIL FUMARATE 300 MG/1
300 TABLET, FILM COATED ORAL DAILY
Qty: 0 | Refills: 0 | Status: DISCONTINUED | OUTPATIENT
Start: 2018-01-05 | End: 2018-01-10

## 2018-01-05 RX ORDER — INSULIN GLARGINE 100 [IU]/ML
20 INJECTION, SOLUTION SUBCUTANEOUS
Qty: 0 | Refills: 0 | Status: DISCONTINUED | OUTPATIENT
Start: 2018-01-05 | End: 2018-01-07

## 2018-01-05 RX ORDER — INSULIN LISPRO 100/ML
15 VIAL (ML) SUBCUTANEOUS ONCE
Qty: 0 | Refills: 0 | Status: COMPLETED | OUTPATIENT
Start: 2018-01-05 | End: 2018-01-05

## 2018-01-05 RX ADMIN — INSULIN GLARGINE 20 UNIT(S): 100 INJECTION, SOLUTION SUBCUTANEOUS at 09:32

## 2018-01-05 RX ADMIN — SIMVASTATIN 10 MILLIGRAM(S): 20 TABLET, FILM COATED ORAL at 22:37

## 2018-01-05 RX ADMIN — Medication 3 MILLILITER(S): at 08:21

## 2018-01-05 RX ADMIN — Medication 1200 MILLIGRAM(S): at 05:34

## 2018-01-05 RX ADMIN — INSULIN GLARGINE 20 UNIT(S): 100 INJECTION, SOLUTION SUBCUTANEOUS at 22:33

## 2018-01-05 RX ADMIN — Medication 0.5 MILLIGRAM(S): at 08:21

## 2018-01-05 RX ADMIN — Medication 15 UNIT(S): at 18:15

## 2018-01-05 RX ADMIN — Medication 3 MILLILITER(S): at 19:44

## 2018-01-05 RX ADMIN — Medication 12: at 17:34

## 2018-01-05 RX ADMIN — Medication 40 MILLIGRAM(S): at 05:33

## 2018-01-05 RX ADMIN — Medication 0.5 MILLIGRAM(S): at 19:44

## 2018-01-05 RX ADMIN — AZITHROMYCIN 255 MILLIGRAM(S): 500 TABLET, FILM COATED ORAL at 16:22

## 2018-01-05 RX ADMIN — CEFEPIME 100 MILLIGRAM(S): 1 INJECTION, POWDER, FOR SOLUTION INTRAMUSCULAR; INTRAVENOUS at 17:32

## 2018-01-05 RX ADMIN — GABAPENTIN 300 MILLIGRAM(S): 400 CAPSULE ORAL at 05:34

## 2018-01-05 RX ADMIN — Medication 25 MILLIGRAM(S): at 05:34

## 2018-01-05 RX ADMIN — Medication 3 MILLILITER(S): at 13:23

## 2018-01-05 RX ADMIN — Medication 4: at 09:30

## 2018-01-05 RX ADMIN — GABAPENTIN 300 MILLIGRAM(S): 400 CAPSULE ORAL at 16:10

## 2018-01-05 RX ADMIN — CEFEPIME 100 MILLIGRAM(S): 1 INJECTION, POWDER, FOR SOLUTION INTRAMUSCULAR; INTRAVENOUS at 05:33

## 2018-01-05 RX ADMIN — MONTELUKAST 10 MILLIGRAM(S): 4 TABLET, CHEWABLE ORAL at 22:37

## 2018-01-05 RX ADMIN — AMIODARONE HYDROCHLORIDE 100 MILLIGRAM(S): 400 TABLET ORAL at 05:34

## 2018-01-05 RX ADMIN — GABAPENTIN 300 MILLIGRAM(S): 400 CAPSULE ORAL at 22:37

## 2018-01-05 RX ADMIN — Medication 8: at 12:30

## 2018-01-05 RX ADMIN — Medication 3 MILLILITER(S): at 03:10

## 2018-01-05 RX ADMIN — Medication 1200 MILLIGRAM(S): at 17:37

## 2018-01-05 NOTE — PROGRESS NOTE ADULT - SUBJECTIVE AND OBJECTIVE BOX
Patient is a 77y old  Male who presents with a chief complaint of Fatigue, fever, and shortness of breath x 3 days (01 Jan 2018 14:03)    HPI:  76 y/o man with h/o CLL/lymphoma on treatment, h/o recurrent pneumonia, Afib, HFprF, Hep B, COPD on home O2, DM type 2, prior PE DVT waswas admitted on 1/1 for worsening cough, fever to 101F on multiple occasions during the last 3 days PTA with more fatigue and drowsiness. He reports cough with mucoid expectoration which turned to greenish phlegm. Fever intermittent, not associated with chills or rigor. Has chest tightness a/w cough. no hemoptysis. No h/o sick contacts. Denies wheezing. Patient denies any dizziness or lightheadedness. He felt weak and tired, and per family was getting drowsy with episodes of confusion. While he was in the ED, he received vanco IV and cefepime.    OOB to chair  Weak looking  Has dry cough; SOB is improved    MEDICATIONS  (STANDING):  ALBUTerol/ipratropium for Nebulization 3 milliLiter(s) Nebulizer every 6 hours  amiodarone    Tablet 100 milliGRAM(s) Oral daily  azithromycin  IVPB      azithromycin  IVPB 500 milliGRAM(s) IV Intermittent every 24 hours  buDESOnide   0.5 milliGRAM(s) Respule 0.5 milliGRAM(s) Inhalation two times a day  cefepime  IVPB 1000 milliGRAM(s) IV Intermittent every 12 hours  dextrose 5%. 1000 milliLiter(s) (50 mL/Hr) IV Continuous <Continuous>  dextrose 50% Injectable 12.5 Gram(s) IV Push once  dextrose 50% Injectable 25 Gram(s) IV Push once  gabapentin 300 milliGRAM(s) Oral three times a day  guaiFENesin ER 1200 milliGRAM(s) Oral every 12 hours  insulin glargine Injectable (LANTUS) 20 Unit(s) SubCutaneous every morning  insulin lispro (HumaLOG) corrective regimen sliding scale   SubCutaneous three times a day before meals  methylPREDNISolone sodium succinate Injectable 40 milliGRAM(s) IV Push daily  metoprolol     tartrate 25 milliGRAM(s) Oral daily  montelukast 10 milliGRAM(s) Oral at bedtime  simvastatin 10 milliGRAM(s) Oral at bedtime    MEDICATIONS  (PRN):  dextrose Gel 1 Dose(s) Oral once PRN Blood Glucose LESS THAN 70 milliGRAM(s)/deciliter  glucagon  Injectable 1 milliGRAM(s) IntraMuscular once PRN Glucose LESS THAN 70 milligrams/deciliter      Vital Signs Last 24 Hrs  T(C): 36.7 (05 Jan 2018 04:45), Max: 36.8 (04 Jan 2018 20:56)  T(F): 98.1 (05 Jan 2018 04:45), Max: 98.3 (04 Jan 2018 20:56)  HR: 93 (05 Jan 2018 10:02) (82 - 101)  BP: 102/55 (05 Jan 2018 10:02) (102/55 - 134/76)  BP(mean): 90 (05 Jan 2018 04:45) (80 - 90)  RR: 17 (05 Jan 2018 10:02) (17 - 27)  SpO2: 93% (05 Jan 2018 10:02) (93% - 96%)    Physical Exam:      Constitutional: frail looking  HEENT: NC/AT, EOMI, PERRLA  Neck: supple  Back: no tenderness  Respiratory: crackles at bases  Cardiovascular: S1S2 regular, no murmurs  Abdomen: soft, not tender, not distended, positive BS  Genitourinary: no LN palpable  Rectal: deferred  Musculoskeletal: no muscle tenderness, no joint swelling or tenderness  Extremities: no pedal edema  Neurological: AxOx3, moving all extremities  Skin: no rashes    Labs:                        11.1   64.5  )-----------( 166      ( 05 Jan 2018 06:26 )             34.5     01-05    138  |  103  |  42<H>  ----------------------------<  183<H>  5.0   |  28  |  1.22    Ca    8.0<L>      05 Jan 2018 06:26      01-03    134<L>  |  100  |  30<H>  ----------------------------<  294<H>  4.6   |  24  |  1.15    Ca    8.1<L>      03 Jan 2018 04:38                          11.1   39.2  )-----------( 134      ( 02 Jan 2018 05:12 )             35.2     01-03    134<L>  |  100  |  30<H>  ----------------------------<  294<H>  4.6   |  24  |  1.15    Ca    8.1<L>      03 Jan 2018 04:38    Rapid Respiratory Viral Panel (01.01.18 @ 15:22)    Rapid RVP Result: Detected: The [employer] RVP Rapid uses polymerase chain reaction (PCR) and melt  curve analysis to screen for adenovirus; coronavirus HKU1, NL63, 229E,  OC43; human metapneumovirus (hMPV); human enterovirus/rhinovirus  (Entero/RV); influenza A; influenza A/H1;influenza A/H3; influenza  A/H1-2009; influenza B; parainfluenza viruses 1, 2, 3, 4; respiratory  syncytial virus; Bordetella pertussis; Mycoplasma pneumoniae; and  Chlamydophila pneumoniae.    Entero/Rhinovirus (RapRVP): Detected: spoke to jayjay fitch rn in er 01/01/18 17:25      Culture - Sputum (collected 03 Jan 2018 17:00)  Source: .Sputum Sputum  Gram Stain (04 Jan 2018 00:03):    Numerous polymorphonuclear leukocytes per low power field    Few Squamous epithelial cells per low power field    Few Gram Positive Cocci in Pairs and Chains per oil power field    Rare Gram Variable Rods per oil power field    Culture - Urine (collected 01 Jan 2018 14:44)  Source: .Urine None  Final Report (02 Jan 2018 22:05):    No growth    Culture - Blood (collected 01 Jan 2018 14:34)  Source: .Blood None  Preliminary Report (03 Jan 2018 03:01):    No growth to date.    Culture - Blood (collected 01 Jan 2018 08:31)  Source: .Blood Blood-Peripheral  Preliminary Report (02 Jan 2018 13:01):    No growth to date.    Culture - Blood (collected 01 Jan 2018 08:31)  Source: .Blood Blood-Peripheral  Preliminary Report (02 Jan 2018 13:01):    No growth to date.        Radiology:    < from: Xray Chest 1 View AP/PA. (01.01.18 @ 09:27) >  Bibasilar airspace opacity in left mid lung airspace opacity is noted.   Findings are concerning for pneumonia given the patient's history. Heart   size appears mildly enlarged. Chronic appearing posttraumatic deformity   of the right proximal humerusis noted.    < end of copied text >    < from: CT Chest No Cont (01.04.18 @ 10:21) >  Interval worsening of partial atelectasis of the left lower lobe and some   infiltrates and/or atelectatic changes inthe right lower lobe. Clinical   correlation is recommended.    < end of copied text >      Advanced directives addressed: full resuscitation

## 2018-01-05 NOTE — PROGRESS NOTE ADULT - ASSESSMENT
1) Hypercapnic Respiratory Failure with Hypoxemia  2) Bronchomalacia / Bronchiectasis  3) +Entero/Rhinovirus  4) Left lung opacity on CXR  5) Shortness of Breath  6) COPD/Emphysema  7) History of Lymphoma  8) History of AF, on Chronic Anticoagulation for VTE/AF  9) Leukocytosis    1/5/18  78 yo M, history of chronic O2 dependency, underwent bronchoscopy in October found to have bronchomalacia and mucus plugs. Presents with +Entero/Rhinovirus.  He has been treated for Lymphoma in the past and recently saw Oncology with a plan of starting Obiotuzumab (anti CD-20) which also has a side effect of bronchospasm, has not received it yet.   CXR showed a left lower lobe opacity and chronic changes.  Now not in respiratory distress, patient states that he feels better   Continue Duoneb q 6 hours, IV Azithromycin/Cefepime  Patient is receiving Coumadin 2.5 mg nightly  New ABG shows improved O2 7.333/50/59-->7.34/44/106 Goal SaO2 >88% -->Back on Home O2 levels of 2-3 L NC O2  Findings on CT Chest of atelectasis, mediastinal adenopathy are consistent with bronchomalacia and lymphoma respectively   Ambulate as tolerated  Thank you for the consult

## 2018-01-05 NOTE — PROGRESS NOTE ADULT - SUBJECTIVE AND OBJECTIVE BOX
76 y/o man with h/o CLL/lymphoma on treatment, h/o recurrent pneumonia, Afib, HFprF, Hep B, COPD on home O2, DM type 2, prior PE DVT waswas admitted on 1/1 for worsening cough, fever to 101F on multiple occasions during the last 3 days PTA with more fatigue and drowsiness. He reports cough with mucoid expectoration which turned to greenish phlegm. Fever intermittent, not associated with chills or rigor. Has chest tightness a/w cough. no hemoptysis. No h/o sick contacts. Denies wheezing. Patient denies any dizziness or lightheadedness. He felt weak and tired, and per family was getting drowsy with episodes of confusion. While he was in the ED, he received vanco IV and cefepime.    1/4: persistent, worsening infiltrates  1/5: feels better    Vital Signs Last 24 Hrs  T(C): 36.7 (05 Jan 2018 04:45), Max: 36.8 (04 Jan 2018 20:56)  T(F): 98.1 (05 Jan 2018 04:45), Max: 98.3 (04 Jan 2018 20:56)  HR: 93 (05 Jan 2018 10:02) (82 - 101)  BP: 102/55 (05 Jan 2018 10:02) (102/55 - 134/76)  BP(mean): 90 (05 Jan 2018 04:45) (80 - 90)  RR: 17 (05 Jan 2018 10:02) (17 - 27)  SpO2: 93% (05 Jan 2018 10:02) (93% - 96%)          PHYSICAL EXAM:    Constitutional: NAD, awake and alert, well-developed  HEENT: PERR, EOMI, Normal Hearing, MMM  Neck: Soft and supple, No LAD, No JVD  Respiratory: Breath sounds are clear bilaterally, rare ronchi  Cardiovascular: S1 and S2, regular rate and rhythm, no Murmurs, gallops or rubs  Gastrointestinal: Bowel Sounds present, soft, nontender, nondistended, no guarding, no rebound  Extremities: No peripheral edema  Vascular: 2+ peripheral pulses  Neurological: A/O x 3, no focal deficits  Musculoskeletal: 5/5 strength b/l upper and lower extremities  Skin: No rashes        78 yo M, history of chronic O2 dependency, underwent bronchoscopy in October found to have bronchomalacia and mucus plugs. Presents with +Entero/Rhinovirus.    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    * H/o Afib- current EKG shows sinus tachy. Patient on amio, and metoprolol.   -on amiodarone, metoprolol 25 mg bid with holding parameters.    * Type 2 DM- on insulin Tresiba 20 units bid at home and Januvia.  - sliding scale insulin and lantus changed to BID as of today 76 y/o man with h/o CLL/lymphoma on treatment, h/o recurrent pneumonia, Afib, HFprF, Hep B, COPD on home O2, DM type 2, prior PE DVT waswas admitted on 1/1 for worsening cough, fever to 101F on multiple occasions during the last 3 days PTA with more fatigue and drowsiness. He reports cough with mucoid expectoration which turned to greenish phlegm. Fever intermittent, not associated with chills or rigor. Has chest tightness a/w cough. no hemoptysis. No h/o sick contacts. Denies wheezing. Patient denies any dizziness or lightheadedness. He felt weak and tired, and per family was getting drowsy with episodes of confusion. While he was in the ED, he received vanco IV and cefepime.    1/4: persistent, worsening infiltrates  1/5: feels better    Vital Signs Last 24 Hrs  T(C): 36.7 (05 Jan 2018 04:45), Max: 36.8 (04 Jan 2018 20:56)  T(F): 98.1 (05 Jan 2018 04:45), Max: 98.3 (04 Jan 2018 20:56)  HR: 93 (05 Jan 2018 10:02) (82 - 101)  BP: 102/55 (05 Jan 2018 10:02) (102/55 - 134/76)  BP(mean): 90 (05 Jan 2018 04:45) (80 - 90)  RR: 17 (05 Jan 2018 10:02) (17 - 27)  SpO2: 93% (05 Jan 2018 10:02) (93% - 96%)          PHYSICAL EXAM:    Constitutional: NAD, awake and alert, well-developed  HEENT: PERR, EOMI, Normal Hearing, MMM  Neck: Soft and supple, No LAD, No JVD  Respiratory: Breath sounds are clear bilaterally, rare ronchi  Cardiovascular: S1 and S2, regular rate and rhythm, no Murmurs, gallops or rubs  Gastrointestinal: Bowel Sounds present, soft, nontender, nondistended, no guarding, no rebound  Extremities: No peripheral edema  Vascular: 2+ peripheral pulses  Neurological: A/O x 3, no focal deficits  Musculoskeletal: 5/5 strength b/l upper and lower extremities  Skin: No rashes        78 yo M, history of chronic O2 dependency, underwent bronchoscopy in October found to have bronchomalacia and mucus plugs. Presents with +Entero/Rhinovirus.    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    * H/o Afib- current EKG shows sinus tachy. Patient on amio, and metoprolol.   -on amiodarone, metoprolol 25 mg bid with holding parameters.    * Type 2 DM- on insulin Tresiba 20 units bid at home and Januvia.  - sliding scale insulin and lantus changed to BID as of today    * Pt on Tenofovir at home /hepatitis- resume

## 2018-01-05 NOTE — PROGRESS NOTE ADULT - ASSESSMENT
78 y/o man with h/o CLL/lymphoma on treatment, h/o recurrent pneumonia, Afib, HFprF, Hep B, COPD on home O2, DM type 2, prior PE DVT waswas admitted on 1/1 for worsening cough, fever to 101F on multiple occasions during the last 3 days PTA with more fatigue and drowsiness. He reports cough with mucoid expectoration which turned to greenish phlegm. Fever intermittent, not associated with chills or rigor. Has chest tightness a/w cough. no hemoptysis. No h/o sick contacts. Denies wheezing. Patient denies any dizziness or lightheadedness. He felt weak and tired, and per family was getting drowsy with episodes of confusion. While he was in the ED, he received vanco IV and cefepime.    1. Dyspnea improving slowly. Persistent bibasilar opacities. Possible pneumonia. End stage COPD. Acute on chronic respiratory failure. URI with rhinovirus. Immunocompromised host.  -leukocytosis likely related to CLL  -f/u BC x 2  -sputum c/s shows normal respiratory manuel  -on cefepime 1 gm IV q12h and azithromycin 500 mg IV qd # 3  -tolerating abx well so far; no side effects noted  - CT chest shows interval worsening of pulmonary infiltrates  -continue abx coverage  -monitor temps  -f/u CBC  -supportive care  2. Other issues:   -care per medicine

## 2018-01-05 NOTE — PROGRESS NOTE ADULT - SUBJECTIVE AND OBJECTIVE BOX
Patient is a 77y old  Male who presents with a chief complaint of Fatigue, fever, and shortness of breath x 3 days (2018 14:03)      HPI:  Patient is 77 year old man with complex past medical history including CLL/lymphoma on treatment, h/o recurrent pneumonia, Afib, HFprF, Hep B, COPD, type 2DM, H/o PE DVT was brought in as his family noticed worsening cough, fever 101 on multiple occassions during the last 3 days with more fatigue and drowsiness.  He was here in , with pneumonia s/p bronch at that time s/o thick mucus with bronchomalacia.  Patient this time has cough with mucoid expectoration which turned to greenish phlegm. Fever intermittent, not associated with chills or rigor.  No nausea or vomiting. C/o chest tightness a/w cough. no hemoptysis. No h/o sick contacts. Denies wheezing. Patient on COPD meds, continued it as usual.  Patient denies any dizziness or lightheadedness.  He felt weak and tired, and per family was getting drowsy with episodes of confusion.  He is on follow up with Oncologist and was being planned to get next treatment this week.  While he was in the ED, his CXR showed left lower and midzone infiltrates. S/p vanco and cefepime. (2018 14:03)  Evaluated on 1/2    1/3/2018  Patient states he is feeling less weak, no acute events occurred overnight     18  No acute events occurred overnight  CT Chest performed     PAST MEDICAL & SURGICAL HISTORY:  Hypogammaglobulinemia: received IVIG  Hepatitis B virus infection, unspecified chronicity: retrovirals  Chronic diastolic congestive heart failure  Essential hypertension  DM type 2 (diabetes mellitus, type 2)  B-cell lymphoma, unspecified B-cell lymphoma type, unspecified body region: CLL stage 4/SLL; met NHL  Atrial fibrillation, unspecified type  COPD (chronic obstructive pulmonary disease)  History of esophageal dilatation  S/P cholecystectomy  H/O prostatectomy      PREVIOUS DIAGNOSTIC TESTING:      MEDICATIONS  (STANDING):  ALBUTerol/ipratropium for Nebulization 3 milliLiter(s) Nebulizer every 6 hours  amiodarone    Tablet 100 milliGRAM(s) Oral daily  azithromycin  IVPB      azithromycin  IVPB 500 milliGRAM(s) IV Intermittent every 24 hours  buDESOnide   0.5 milliGRAM(s) Respule 0.5 milliGRAM(s) Inhalation two times a day  cefepime  IVPB 1000 milliGRAM(s) IV Intermittent every 12 hours  dextrose 5%. 1000 milliLiter(s) (50 mL/Hr) IV Continuous <Continuous>  dextrose 50% Injectable 12.5 Gram(s) IV Push once  dextrose 50% Injectable 25 Gram(s) IV Push once  gabapentin 300 milliGRAM(s) Oral three times a day  guaiFENesin ER 1200 milliGRAM(s) Oral every 12 hours  insulin glargine Injectable (LANTUS) 20 Unit(s) SubCutaneous every morning  insulin lispro (HumaLOG) corrective regimen sliding scale   SubCutaneous three times a day before meals  methylPREDNISolone sodium succinate Injectable 40 milliGRAM(s) IV Push daily  metoprolol     tartrate 25 milliGRAM(s) Oral daily  montelukast 10 milliGRAM(s) Oral at bedtime  simvastatin 10 milliGRAM(s) Oral at bedtime  vancomycin  IVPB 1000 milliGRAM(s) IV Intermittent every 12 hours    MEDICATIONS  (PRN):  dextrose Gel 1 Dose(s) Oral once PRN Blood Glucose LESS THAN 70 milliGRAM(s)/deciliter  glucagon  Injectable 1 milliGRAM(s) IntraMuscular once PRN Glucose LESS THAN 70 milligrams/deciliter        FAMILY HISTORY:  Family history of liver cancer (Father): father  85 liver CA  Family history of coronary arteriosclerosis (Mother): Mom  of MI age 68      REVIEW OF SYSTEM:  Shortness of breath, otherwise 12 point ROS negative    Vital Signs Last 24 Hrs  Vital Signs Last 24 Hrs  T(C): 36.1 (2018 06:00), Max: 36.8 (2018 02:28)  T(F): 96.9 (2018 06:00), Max: 98.3 (2018 02:28)  HR: 94 (2018 07:45) (86 - 110)  BP: 117/56 (2018 04:00) (104/59 - 120/69)  BP(mean): 72 (2018 04:00) (65 - 82)  RR: 23 (2018 06:00) (18 - 26)  SpO2: 93% (2018 00:00) (87% - 93%)    I&O's Summary    2018 07:01  -  2018 07:00  --------------------------------------------------------  IN: 240 mL / OUT: 200 mL / NET: 40 mL      PHYSICAL EXAM  General Appearance: cooperative, no acute distress,   HEENT: PERRL, conjunctiva clear, EOM's intact, non injected pharynx, no exudate, TM   normal  Neck: Supple, , no adenopathy, thyroid: not enlarged, no carotid bruit or JVD  Back: Symmetric, no  tenderness,no soft tissue tenderness  Lungs: Bilateral upper lobe wheezing, rhonchi in the lower lobes LLL>RLL  Heart: Regular rate and rhythm, S1, S2 normal, no murmur, rub or gallop  Abdomen: Soft, non-tender, bowel sounds active , no hepatosplenomegaly  Extremities: no cyanosis or edema, no joint swelling  Skin: Skin color, texture normal, no rashes   Neurologic: Alert and oriented X3 , cranial nerves intact, sensory and motor normal,    ECG:    LABS:                          11.1   39.2  )-----------( 134      ( 2018 05:12 )             35.2   01-03    134<L>  |  100  |  30<H>  ----------------------------<  294<H>  4.6   |  24  |  1.15    Ca    8.1<L>      2018 04:38              PT/INR - ( 2018 08:31 )   PT: 29.8 sec;   INR: 2.70 ratio         PTT - ( 2018 08:31 )  PTT:38.1 sec  Urinalysis Basic - ( 2018 14:44 )    Color: Yellow / Appearance: Clear / S.010 / pH: x  Gluc: x / Ketone: Negative  / Bili: Negative / Urobili: Negative mg/dL   Blood: x / Protein: 30 mg/dL / Nitrite: Negative   Leuk Esterase: Negative / RBC: 0-2 /HPF / WBC 0-2   Sq Epi: x / Non Sq Epi: x / Bacteria: Moderate      ABG - ( 2018 06:41 )  pH: 7.33  /  pCO2: 50    /  pO2: 59    / HCO3: 25    / Base Excess: -.4   /  SaO2: 87      ABG - ( 2018 11:29 )  pH: 7.30  /  pCO2: 48    /  pO2: 84    / HCO3: 23    / Base Excess: -2.7  /  SaO2: 95          CT Chest    IMPRESSION:  Interval worsening of partial atelectasis of the left lower lobe and some   infiltrates and/or atelectatic changes in the right lower lobe. Clinical   correlation is recommended.  Mediastinal adenopathy, subcarinal adenopathy  Report and images reviewed by me

## 2018-01-06 LAB
CULTURE RESULTS: SIGNIFICANT CHANGE UP
CULTURE RESULTS: SIGNIFICANT CHANGE UP
GLUCOSE BLDC GLUCOMTR-MCNC: 101 MG/DL — HIGH (ref 70–99)
GLUCOSE BLDC GLUCOMTR-MCNC: 125 MG/DL — HIGH (ref 70–99)
GLUCOSE BLDC GLUCOMTR-MCNC: 229 MG/DL — HIGH (ref 70–99)
GLUCOSE BLDC GLUCOMTR-MCNC: 252 MG/DL — HIGH (ref 70–99)
GLUCOSE BLDC GLUCOMTR-MCNC: 265 MG/DL — HIGH (ref 70–99)
HCT VFR BLD CALC: 41.8 % — SIGNIFICANT CHANGE UP (ref 39–50)
HGB BLD-MCNC: 13.5 G/DL — SIGNIFICANT CHANGE UP (ref 13–17)
INR BLD: 2.18 RATIO — HIGH (ref 0.88–1.16)
LACTATE SERPL-SCNC: 2.2 MMOL/L — HIGH (ref 0.7–2)
MCHC RBC-ENTMCNC: 27.6 PG — SIGNIFICANT CHANGE UP (ref 27–34)
MCHC RBC-ENTMCNC: 32.2 GM/DL — SIGNIFICANT CHANGE UP (ref 32–36)
MCV RBC AUTO: 85.8 FL — SIGNIFICANT CHANGE UP (ref 80–100)
PLATELET # BLD AUTO: 186 K/UL — SIGNIFICANT CHANGE UP (ref 150–400)
PROTHROM AB SERPL-ACNC: 23.9 SEC — HIGH (ref 9.8–12.7)
RBC # BLD: 4.88 M/UL — SIGNIFICANT CHANGE UP (ref 4.2–5.8)
RBC # FLD: 16.2 % — HIGH (ref 10.3–14.5)
SPECIMEN SOURCE: SIGNIFICANT CHANGE UP
SPECIMEN SOURCE: SIGNIFICANT CHANGE UP
WBC # BLD: 90.9 K/UL — CRITICAL HIGH (ref 3.8–10.5)
WBC # FLD AUTO: 90.9 K/UL — CRITICAL HIGH (ref 3.8–10.5)

## 2018-01-06 PROCEDURE — 71045 X-RAY EXAM CHEST 1 VIEW: CPT | Mod: 26

## 2018-01-06 RX ORDER — VANCOMYCIN HCL 1 G
1000 VIAL (EA) INTRAVENOUS EVERY 12 HOURS
Qty: 0 | Refills: 0 | Status: DISCONTINUED | OUTPATIENT
Start: 2018-01-07 | End: 2018-01-10

## 2018-01-06 RX ORDER — WARFARIN SODIUM 2.5 MG/1
2.5 TABLET ORAL DAILY
Qty: 0 | Refills: 0 | Status: DISCONTINUED | OUTPATIENT
Start: 2018-01-06 | End: 2018-01-08

## 2018-01-06 RX ORDER — VANCOMYCIN HCL 1 G
1000 VIAL (EA) INTRAVENOUS ONCE
Qty: 0 | Refills: 0 | Status: COMPLETED | OUTPATIENT
Start: 2018-01-06 | End: 2018-01-06

## 2018-01-06 RX ORDER — SODIUM CHLORIDE 9 MG/ML
500 INJECTION INTRAMUSCULAR; INTRAVENOUS; SUBCUTANEOUS ONCE
Qty: 0 | Refills: 0 | Status: COMPLETED | OUTPATIENT
Start: 2018-01-06 | End: 2018-01-06

## 2018-01-06 RX ORDER — ACETAMINOPHEN 500 MG
650 TABLET ORAL ONCE
Qty: 0 | Refills: 0 | Status: COMPLETED | OUTPATIENT
Start: 2018-01-06 | End: 2018-01-06

## 2018-01-06 RX ORDER — VANCOMYCIN HCL 1 G
VIAL (EA) INTRAVENOUS
Qty: 0 | Refills: 0 | Status: DISCONTINUED | OUTPATIENT
Start: 2018-01-06 | End: 2018-01-10

## 2018-01-06 RX ADMIN — SIMVASTATIN 10 MILLIGRAM(S): 20 TABLET, FILM COATED ORAL at 23:56

## 2018-01-06 RX ADMIN — Medication 0.5 MILLIGRAM(S): at 08:53

## 2018-01-06 RX ADMIN — INSULIN GLARGINE 20 UNIT(S): 100 INJECTION, SOLUTION SUBCUTANEOUS at 23:24

## 2018-01-06 RX ADMIN — Medication 1200 MILLIGRAM(S): at 17:36

## 2018-01-06 RX ADMIN — Medication 25 MILLIGRAM(S): at 05:54

## 2018-01-06 RX ADMIN — Medication 250 MILLIGRAM(S): at 21:39

## 2018-01-06 RX ADMIN — GABAPENTIN 300 MILLIGRAM(S): 400 CAPSULE ORAL at 13:53

## 2018-01-06 RX ADMIN — Medication 4: at 11:34

## 2018-01-06 RX ADMIN — Medication 3 MILLILITER(S): at 20:30

## 2018-01-06 RX ADMIN — WARFARIN SODIUM 2.5 MILLIGRAM(S): 2.5 TABLET ORAL at 23:23

## 2018-01-06 RX ADMIN — SODIUM CHLORIDE 500 MILLILITER(S): 9 INJECTION INTRAMUSCULAR; INTRAVENOUS; SUBCUTANEOUS at 22:30

## 2018-01-06 RX ADMIN — CEFEPIME 100 MILLIGRAM(S): 1 INJECTION, POWDER, FOR SOLUTION INTRAMUSCULAR; INTRAVENOUS at 17:38

## 2018-01-06 RX ADMIN — GABAPENTIN 300 MILLIGRAM(S): 400 CAPSULE ORAL at 23:22

## 2018-01-06 RX ADMIN — Medication 6: at 17:37

## 2018-01-06 RX ADMIN — Medication 3 MILLILITER(S): at 08:52

## 2018-01-06 RX ADMIN — AMIODARONE HYDROCHLORIDE 100 MILLIGRAM(S): 400 TABLET ORAL at 05:55

## 2018-01-06 RX ADMIN — TENOFOVIR DISOPROXIL FUMARATE 300 MILLIGRAM(S): 300 TABLET, FILM COATED ORAL at 11:34

## 2018-01-06 RX ADMIN — Medication 0.5 MILLIGRAM(S): at 20:28

## 2018-01-06 RX ADMIN — INSULIN GLARGINE 20 UNIT(S): 100 INJECTION, SOLUTION SUBCUTANEOUS at 08:52

## 2018-01-06 RX ADMIN — CEFEPIME 100 MILLIGRAM(S): 1 INJECTION, POWDER, FOR SOLUTION INTRAMUSCULAR; INTRAVENOUS at 05:55

## 2018-01-06 RX ADMIN — GABAPENTIN 300 MILLIGRAM(S): 400 CAPSULE ORAL at 05:55

## 2018-01-06 RX ADMIN — MONTELUKAST 10 MILLIGRAM(S): 4 TABLET, CHEWABLE ORAL at 23:21

## 2018-01-06 RX ADMIN — Medication 3 MILLILITER(S): at 02:10

## 2018-01-06 RX ADMIN — Medication 1200 MILLIGRAM(S): at 05:55

## 2018-01-06 RX ADMIN — Medication 650 MILLIGRAM(S): at 20:13

## 2018-01-06 NOTE — CHART NOTE - NSCHARTNOTEFT_GEN_A_CORE
Code sepsis called for fever to 102 F and tachycardia to 120s. Pt admitted for pneumonia, has altered mental status at baseline (has been waxing/waning since admission according to family/nursing). At time of interview, patient is tachypneic but saturating in mid 90s on nasal cannula. Continues to have cough. Denies chest pain/pressure at this time.    Vital Signs Last 24 Hrs  T(C): 36.3 (01-06-18 @ 16:37)  T(F): 97.4 (01-06-18 @ 16:37), Max: 98.6 (01-06-18 @ 04:15)  HR: 108 (01-06-18 @ 16:37) (73 - 108)  BP: 108/77 (01-06-18 @ 16:37)  BP(mean): --  RR: 18 (01-06-18 @ 16:37) (18 - 18)  SpO2: 95% (01-06-18 @ 16:37) (95% - 100%)  Wt(kg): --    PHYSICAL EXAM:    GENERAL: AOx2, NAD, well-groomed, well-developed  HEAD:  NC/AT  EYES: EOMI, PERRLA, no scleral icterus  HEENT: Moist mucous membranes  NECK: Supple, No JVD  LUNG: Decreased breath sounds over LLL w/ rhonci  HEART: RRR, tachycardia; No murmurs, rubs, or gallops  ABDOMEN: +BS, ST/ND/NT  EXTREMITIES:  No cyanosis    A/P: 77M w/ CLL admitted for pneumonia.     Continue IV cefepime  Added 1x dose vanc  f/u CBC (expect WBC to be elevated given CLL), lactic acid, blood cultures  f/u CXR    Discussed with senior resident & ICU attending

## 2018-01-06 NOTE — PROVIDER CONTACT NOTE (CHANGE IN STATUS NOTIFICATION) - ASSESSMENT
On assessment, pt shivering and tachypneic. Pt febrile with temp of 101.6 orally, HR in 130's. Code sepsis called. As per family, pt believed he was at home and seeing things above his bed. Previously oriented to place. See code sepsis sheet in chart for full detail

## 2018-01-06 NOTE — CHART NOTE - NSCHARTNOTEFT_GEN_A_CORE
Responded to code sepsis on 3N     76 y/o male with HBV, DM, AF, CLL on Rx and COPD on home O2 admitted on 1/1 for PNA. pt noted to have temp 102 prompting code sepsis.  Pt already on cefepime followed by ID.  On arrival,  NAD, awake, stable BP, HR 120s.  Family at bedside.    Chest CT 1/4 with LLL infiltrate and mosaic pattern  PMH as above  Meds reviewed  ALL as noted  FH NC  No ETOH    Exam as above      IMP:    Sepsis--lung source--hemodynamically stable     Suggest:    check lactate  No indication for IVF at this time  PanCx  Cont with BS IV abx    Does not need ICU at this time    Above d/w 3N staff, RN supervisor and house staff.

## 2018-01-06 NOTE — PROGRESS NOTE ADULT - ASSESSMENT
1) Hypercapnic Respiratory Failure with Hypoxemia  2) Bronchomalacia / Bronchiectasis  3) +Entero/Rhinovirus  4) Left lung opacity on CXR  5) Shortness of Breath  6) COPD/Emphysema  7) History of Lymphoma  8) History of AF, on Chronic Anticoagulation for VTE/AF  9) Leukocytosis    1/5/18  78 yo M, history of chronic O2 dependency, underwent bronchoscopy in October found to have bronchomalacia and mucus plugs. Presents with +Entero/Rhinovirus.  He has been treated for Lymphoma in the past and recently saw Oncology with a plan of starting Obiotuzumab (anti CD-20) which also has a side effect of bronchospasm, has not received it yet.   CXR showed a left lower lobe opacity and chronic changes.  Now not in respiratory distress, patient states that he feels better     1/6  overall prognosis remains guarded  mucus plugging cont factor to worsened resp status  no clinical evidence of amiodarone lung toxicity  noted discontinued steroids  monitor for bronchospasm  inhaled steroids  fu labs and cxr  encourage OOB  Continue Duoneb q 6 hours, IV Azithromycin/Cefepime  Patient is receiving Coumadin 2.5 mg nightly  New ABG shows improved O2 7.333/50/59-->7.34/44/106 Goal SaO2 >88% -->Back on Home O2 levels of 2-3 L NC O2  Findings on CT Chest of atelectasis, mediastinal adenopathy are consistent with bronchomalacia and lymphoma respectively   Ambulate as tolerated  Thank you for the consult

## 2018-01-06 NOTE — PROGRESS NOTE ADULT - SUBJECTIVE AND OBJECTIVE BOX
Patient is a 77y old  Male who presents with a chief complaint of Fatigue, fever, and shortness of breath x 3 days (2018 14:03)      HPI:  Patient is 77 year old man with complex past medical history including CLL/lymphoma on treatment, h/o recurrent pneumonia, Afib, HFprF, Hep B, COPD, type 2DM, H/o PE DVT was brought in as his family noticed worsening cough, fever 101 on multiple occassions during the last 3 days with more fatigue and drowsiness.  He was here in , with pneumonia s/p bronch at that time s/o thick mucus with bronchomalacia.  Patient this time has cough with mucoid expectoration which turned to greenish phlegm. Fever intermittent, not associated with chills or rigor.  No nausea or vomiting. C/o chest tightness a/w cough. no hemoptysis. No h/o sick contacts. Denies wheezing. Patient on COPD meds, continued it as usual.  Patient denies any dizziness or lightheadedness.  He felt weak and tired, and per family was getting drowsy with episodes of confusion.  He is on follow up with Oncologist and was being planned to get next treatment this week.  While he was in the ED, his CXR showed left lower and midzone infiltrates. S/p vanco and cefepime. (2018 14:03)  Evaluated on 1/2    1/3/2018  Patient states he is feeling less weak, no acute events occurred overnight     18  No acute events occurred overnight  CT Chest performed 2018  seen and examined today  sitting OOB in chair today  still with some cough and congestion  no cp  smiling  no resp distress  noted that steroids stopped    PAST MEDICAL & SURGICAL HISTORY:  Hypogammaglobulinemia: received IVIG  Hepatitis B virus infection, unspecified chronicity: retrovirals  Chronic diastolic congestive heart failure  Essential hypertension  DM type 2 (diabetes mellitus, type 2)  B-cell lymphoma, unspecified B-cell lymphoma type, unspecified body region: CLL stage 4/SLL; met NHL  Atrial fibrillation, unspecified type  COPD (chronic obstructive pulmonary disease)  History of esophageal dilatation  S/P cholecystectomy  H/O prostatectomy      PREVIOUS DIAGNOSTIC TESTING:      MEDICATIONS  (STANDING):  ALBUTerol/ipratropium for Nebulization 3 milliLiter(s) Nebulizer every 6 hours  amiodarone    Tablet 100 milliGRAM(s) Oral daily  buDESOnide   0.5 milliGRAM(s) Respule 0.5 milliGRAM(s) Inhalation two times a day  cefepime  IVPB 1000 milliGRAM(s) IV Intermittent every 12 hours  dextrose 5%. 1000 milliLiter(s) (50 mL/Hr) IV Continuous <Continuous>  dextrose 50% Injectable 12.5 Gram(s) IV Push once  dextrose 50% Injectable 25 Gram(s) IV Push once  gabapentin 300 milliGRAM(s) Oral three times a day  guaiFENesin ER 1200 milliGRAM(s) Oral every 12 hours  insulin glargine Injectable (LANTUS) 20 Unit(s) SubCutaneous two times a day  insulin lispro (HumaLOG) corrective regimen sliding scale   SubCutaneous three times a day before meals  metoprolol     tartrate 25 milliGRAM(s) Oral daily  montelukast 10 milliGRAM(s) Oral at bedtime  simvastatin 10 milliGRAM(s) Oral at bedtime  tenofovir 300 milliGRAM(s) Oral daily      MEDICATIONS  (PRN):  dextrose Gel 1 Dose(s) Oral once PRN Blood Glucose LESS THAN 70 milliGRAM(s)/deciliter  glucagon  Injectable 1 milliGRAM(s) IntraMuscular once PRN Glucose LESS THAN 70 milligrams/deciliter        FAMILY HISTORY:  Family history of liver cancer (Father): father  85 liver CA  Family history of coronary arteriosclerosis (Mother): Mom  of MI age 68      REVIEW OF SYSTEM:  Shortness of breath, otherwise 12 point ROS negative    Vital Signs Last 24 Hrs  T(C): 37 (2018 04:15), Max: 37 (2018 04:15)  T(F): 98.6 (2018 04:15), Max: 98.6 (2018 04:15)  HR: 73 (2018 04:15) (73 - 108)  BP: 130/66 (2018 04:15) (99/61 - 130/66)  BP(mean): --  RR: 18 (2018 04:15) (17 - 19)  SpO2: 100% (2018 04:15) (91% - 100%)      PHYSICAL EXAM  General Appearance: cooperative, no acute distress,   HEENT: PERRL, conjunctiva clear, EOM's intact, non injected pharynx, no exudate, TM   normal  Neck: Supple, , no adenopathy, thyroid: not enlarged, no carotid bruit or JVD  Back: Symmetric, no  tenderness,no soft tissue tenderness  Lungs: Bilateral upper lobe wheezing mild, scattered rhonchi in the lower lobes LLL>RLL  Heart: Regular rate and rhythm, S1, S2 normal, no murmur, rub or gallop  Abdomen: Soft, non-tender, bowel sounds active , no hepatosplenomegaly  Extremities: no cyanosis or edema, no joint swelling  Skin: Skin color, texture normal, no rashes   Neurologic: Alert and oriented X3 , cranial nerves intact, sensory and motor normal,    ECG:    LABS:                        11.1   64.5  )-----------( 166      ( 2018 06:26 )             34.5                01    138  |  103  |  42<H>  ----------------------------<  183<H>  5.0   |  28  |  1.22    Ca    8.0<L>      2018 06:26               11.1   39.2  )-----------( 134      ( 2018 05:12 )             35.2   01-03    134<L>  |  100  |  30<H>  ----------------------------<  294<H>  4.6   |  24  |  1.15    Ca    8.1<L>      2018 04:38              PT/INR - ( 2018 08:31 )   PT: 29.8 sec;   INR: 2.70 ratio         PTT - ( 2018 08:31 )  PTT:38.1 sec  Urinalysis Basic - ( 2018 14:44 )    Color: Yellow / Appearance: Clear / S.010 / pH: x  Gluc: x / Ketone: Negative  / Bili: Negative / Urobili: Negative mg/dL   Blood: x / Protein: 30 mg/dL / Nitrite: Negative   Leuk Esterase: Negative / RBC: 0-2 /HPF / WBC 0-2   Sq Epi: x / Non Sq Epi: x / Bacteria: Moderate      ABG - ( 2018 06:41 )  pH: 7.33  /  pCO2: 50    /  pO2: 59    / HCO3: 25    / Base Excess: -.4   /  SaO2: 87      ABG - ( 2018 11:29 )  pH: 7.30  /  pCO2: 48    /  pO2: 84    / HCO3: 23    / Base Excess: -2.7  /  SaO2: 95          CT Chest    IMPRESSION:  Interval worsening of partial atelectasis of the left lower lobe and some   infiltrates and/or atelectatic changes in the right lower lobe. Clinical   correlation is recommended.  Mediastinal adenopathy, subcarinal adenopathy  Report and images reviewed by me

## 2018-01-06 NOTE — PROGRESS NOTE ADULT - SUBJECTIVE AND OBJECTIVE BOX
CC: cough    HPI: 76 y/o man with h/o CLL/lymphoma on treatment, h/o recurrent pneumonia, Afib, HFprF, Hep B, COPD on home O2, DM type 2, prior PE DVT waswas admitted on 1/1 for worsening cough, fever to 101F on multiple occasions during the last 3 days PTA with more fatigue and drowsiness. He reports cough with mucoid expectoration which turned to greenish phlegm. Fever intermittent, not associated with chills or rigor. Has chest tightness a/w cough. no hemoptysis. No h/o sick contacts. Denies wheezing. Patient denies any dizziness or lightheadedness. He felt weak and tired, and per family was getting drowsy with episodes of confusion. While he was in the ED, he received vanco IV and cefepime.    1/6: Above reviewed, feels "so so", still w/ intermittent coughing. No fevers last night.     ROS: neg unless stated above.     Vital Signs Last 24 Hrs  T(C): 36.3 (06 Jan 2018 16:37), Max: 37 (06 Jan 2018 04:15)  T(F): 97.4 (06 Jan 2018 16:37), Max: 98.6 (06 Jan 2018 04:15)  HR: 108 (06 Jan 2018 16:37) (73 - 108)  BP: 108/77 (06 Jan 2018 16:37) (99/61 - 130/66)  BP(mean): --  RR: 18 (06 Jan 2018 16:37) (18 - 18)  SpO2: 95% (06 Jan 2018 16:37) (95% - 100%)    PHYSICAL EXAM:    Constitutional: NAD, awake and alert, well-developed  male.   HEENT: PERR, EOMI, Normal Hearing, MMM  Neck: Soft and supple, No LAD, No JVD  Respiratory: Breath sounds are clear bilaterally, minimal rhonchi on bases.   Cardiovascular: S1 and S2, regular rate and rhythm, no Murmurs, gallops or rubs  Gastrointestinal: Bowel Sounds present, soft, nontender, nondistended, no guarding, no rebound  Extremities: No peripheral edema  Vascular: 2+ peripheral pulses  Neurological: A/O x 3, no focal deficits  Musculoskeletal: 5/5 strength b/l upper and lower extremities  Skin: No rashes    ALL LABS/ IMAGES REVIEWED.    78 yo M, history of chronic O2 dependency, underwent bronchoscopy in October found to have bronchomalacia and mucus plugs. Presents with +Entero/Rhinovirus.    * Acute hypoxic and hypercapnic respiratory failure secondary to Multifactorial; Pneumonia in immunocompromised patient:  - on IV abx clinically better --> Day 6 IV CEFEPIME. Completed Azithro X 3 days for atypical coverage.   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    * H/o Afib- current EKG shows sinus tachy. Patient on amio, and metoprolol.   -on amiodarone, metoprolol 25 mg bid with holding parameters.    * Type 2 DM- on insulin Tresiba 20 units bid at home and Januvia.  - sliding scale insulin and lantus changed to BID on 1/5    * Pt on Tenofovir at home /hepatitis- resumed here.     Dispo: remain inpatient, mobilize.   Total time > 45 mins. CC: cough    HPI: 76 y/o man with h/o CLL/lymphoma on treatment, h/o recurrent pneumonia, Afib, HFprF, Hep B, COPD on home O2, DM type 2, prior PE DVT waswas admitted on 1/1 for worsening cough, fever to 101F on multiple occasions during the last 3 days PTA with more fatigue and drowsiness. He reports cough with mucoid expectoration which turned to greenish phlegm. Fever intermittent, not associated with chills or rigor. Has chest tightness a/w cough. no hemoptysis. No h/o sick contacts. Denies wheezing. Patient denies any dizziness or lightheadedness. He felt weak and tired, and per family was getting drowsy with episodes of confusion. While he was in the ED, he received vanco IV and cefepime.    1/6: Above reviewed, feels "so so", still w/ intermittent coughing. No fevers last night.     ROS: neg unless stated above.     Vital Signs Last 24 Hrs  T(C): 36.3 (06 Jan 2018 16:37), Max: 37 (06 Jan 2018 04:15)  T(F): 97.4 (06 Jan 2018 16:37), Max: 98.6 (06 Jan 2018 04:15)  HR: 108 (06 Jan 2018 16:37) (73 - 108)  BP: 108/77 (06 Jan 2018 16:37) (99/61 - 130/66)  BP(mean): --  RR: 18 (06 Jan 2018 16:37) (18 - 18)  SpO2: 95% (06 Jan 2018 16:37) (95% - 100%)    PHYSICAL EXAM:    Constitutional: NAD, awake and alert, well-developed  male.   HEENT: PERR, EOMI, Normal Hearing, MMM  Neck: Soft and supple, No LAD, No JVD  Respiratory: Breath sounds are clear bilaterally, minimal rhonchi on bases.   Cardiovascular: S1 and S2, regular rate and rhythm, no Murmurs, gallops or rubs  Gastrointestinal: Bowel Sounds present, soft, nontender, nondistended, no guarding, no rebound  Extremities: No peripheral edema  Vascular: 2+ peripheral pulses  Neurological: A/O x 3, no focal deficits  Musculoskeletal: 5/5 strength b/l upper and lower extremities  Skin: No rashes    ALL LABS/ IMAGES REVIEWED.    78 yo M, history of chronic O2 dependency, underwent bronchoscopy in October found to have bronchomalacia and mucus plugs. Presents with +Entero/Rhinovirus.    * Acute hypoxic and hypercapnic respiratory failure secondary to Multifactorial; Pneumonia in immunocompromised patient:  - on IV abx clinically better --> Day 6 IV CEFEPIME. Completed Azithro X 3 days for atypical coverage.   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 PENDING AM REPEAT   - He has been treated for Lymphoma in the past and recently saw Oncology with a plan of starting Obiotuzumab (anti CD-20) which also has a side effect of bronchospasm, has not received it yet.    * H/o Afib- current EKG shows sinus tachy. Patient on amio, and metoprolol.   -on amiodarone, metoprolol 25 mg bid with holding parameters.  - START COUMADIN IF INR <3, check stat INR and daily    * Type 2 DM- on insulin Tresiba 20 units bid at home and Januvia.  - sliding scale insulin and lantus changed to BID on 1/5    * Pt on Tenofovir at home /hepatitis- resumed here.     Dispo: remain inpatient, mobilize.   Total time > 45 mins.

## 2018-01-07 LAB
ANION GAP SERPL CALC-SCNC: 7 MMOL/L — SIGNIFICANT CHANGE UP (ref 5–17)
APPEARANCE UR: CLEAR — SIGNIFICANT CHANGE UP
BACTERIA # UR AUTO: (no result)
BILIRUB UR-MCNC: NEGATIVE — SIGNIFICANT CHANGE UP
BUN SERPL-MCNC: 29 MG/DL — HIGH (ref 7–23)
CALCIUM SERPL-MCNC: 7.7 MG/DL — LOW (ref 8.5–10.1)
CHLORIDE SERPL-SCNC: 103 MMOL/L — SIGNIFICANT CHANGE UP (ref 96–108)
CO2 SERPL-SCNC: 29 MMOL/L — SIGNIFICANT CHANGE UP (ref 22–31)
COLOR SPEC: YELLOW — SIGNIFICANT CHANGE UP
CREAT SERPL-MCNC: 0.95 MG/DL — SIGNIFICANT CHANGE UP (ref 0.5–1.3)
CULTURE RESULTS: SIGNIFICANT CHANGE UP
DIFF PNL FLD: (no result)
EPI CELLS # UR: SIGNIFICANT CHANGE UP
GLUCOSE BLDC GLUCOMTR-MCNC: 104 MG/DL — HIGH (ref 70–99)
GLUCOSE BLDC GLUCOMTR-MCNC: 145 MG/DL — HIGH (ref 70–99)
GLUCOSE BLDC GLUCOMTR-MCNC: 208 MG/DL — HIGH (ref 70–99)
GLUCOSE BLDC GLUCOMTR-MCNC: 50 MG/DL — LOW (ref 70–99)
GLUCOSE BLDC GLUCOMTR-MCNC: 97 MG/DL — SIGNIFICANT CHANGE UP (ref 70–99)
GLUCOSE SERPL-MCNC: 41 MG/DL — CRITICAL LOW (ref 70–99)
GLUCOSE UR QL: NEGATIVE MG/DL — SIGNIFICANT CHANGE UP
GRAN CASTS # UR COMP ASSIST: (no result) /LPF
HCT VFR BLD CALC: 35.2 % — LOW (ref 39–50)
HGB BLD-MCNC: 11.2 G/DL — LOW (ref 13–17)
INR BLD: 1.85 RATIO — HIGH (ref 0.88–1.16)
KETONES UR-MCNC: NEGATIVE — SIGNIFICANT CHANGE UP
LACTATE SERPL-SCNC: 0.9 MMOL/L — SIGNIFICANT CHANGE UP (ref 0.7–2)
LEUKOCYTE ESTERASE UR-ACNC: NEGATIVE — SIGNIFICANT CHANGE UP
MCHC RBC-ENTMCNC: 26.9 PG — LOW (ref 27–34)
MCHC RBC-ENTMCNC: 31.8 GM/DL — LOW (ref 32–36)
MCV RBC AUTO: 84.4 FL — SIGNIFICANT CHANGE UP (ref 80–100)
NITRITE UR-MCNC: NEGATIVE — SIGNIFICANT CHANGE UP
PH UR: 6 — SIGNIFICANT CHANGE UP (ref 5–8)
PLATELET # BLD AUTO: 158 K/UL — SIGNIFICANT CHANGE UP (ref 150–400)
POTASSIUM SERPL-MCNC: 4.3 MMOL/L — SIGNIFICANT CHANGE UP (ref 3.5–5.3)
POTASSIUM SERPL-SCNC: 4.3 MMOL/L — SIGNIFICANT CHANGE UP (ref 3.5–5.3)
PROT UR-MCNC: 30 MG/DL
PROTHROM AB SERPL-ACNC: 20.2 SEC — HIGH (ref 9.8–12.7)
RBC # BLD: 4.17 M/UL — LOW (ref 4.2–5.8)
RBC # FLD: 15.9 % — HIGH (ref 10.3–14.5)
RBC CASTS # UR COMP ASSIST: SIGNIFICANT CHANGE UP /HPF (ref 0–4)
SODIUM SERPL-SCNC: 139 MMOL/L — SIGNIFICANT CHANGE UP (ref 135–145)
SP GR SPEC: 1.01 — SIGNIFICANT CHANGE UP (ref 1.01–1.02)
SPECIMEN SOURCE: SIGNIFICANT CHANGE UP
UROBILINOGEN FLD QL: NEGATIVE MG/DL — SIGNIFICANT CHANGE UP
WBC # BLD: 62.4 K/UL — CRITICAL HIGH (ref 3.8–10.5)
WBC # FLD AUTO: 62.4 K/UL — CRITICAL HIGH (ref 3.8–10.5)
WBC UR QL: SIGNIFICANT CHANGE UP

## 2018-01-07 RX ORDER — INSULIN GLARGINE 100 [IU]/ML
10 INJECTION, SOLUTION SUBCUTANEOUS AT BEDTIME
Qty: 0 | Refills: 0 | Status: DISCONTINUED | OUTPATIENT
Start: 2018-01-07 | End: 2018-01-10

## 2018-01-07 RX ORDER — ACETAMINOPHEN 500 MG
650 TABLET ORAL EVERY 6 HOURS
Qty: 0 | Refills: 0 | Status: DISCONTINUED | OUTPATIENT
Start: 2018-01-07 | End: 2018-01-10

## 2018-01-07 RX ADMIN — Medication 3 MILLILITER(S): at 03:07

## 2018-01-07 RX ADMIN — Medication 650 MILLIGRAM(S): at 18:00

## 2018-01-07 RX ADMIN — Medication 3 MILLILITER(S): at 10:17

## 2018-01-07 RX ADMIN — CEFEPIME 100 MILLIGRAM(S): 1 INJECTION, POWDER, FOR SOLUTION INTRAMUSCULAR; INTRAVENOUS at 05:51

## 2018-01-07 RX ADMIN — GABAPENTIN 300 MILLIGRAM(S): 400 CAPSULE ORAL at 14:01

## 2018-01-07 RX ADMIN — GABAPENTIN 300 MILLIGRAM(S): 400 CAPSULE ORAL at 05:50

## 2018-01-07 RX ADMIN — Medication 250 MILLIGRAM(S): at 08:31

## 2018-01-07 RX ADMIN — Medication 0.5 MILLIGRAM(S): at 19:53

## 2018-01-07 RX ADMIN — MONTELUKAST 10 MILLIGRAM(S): 4 TABLET, CHEWABLE ORAL at 22:52

## 2018-01-07 RX ADMIN — CEFEPIME 100 MILLIGRAM(S): 1 INJECTION, POWDER, FOR SOLUTION INTRAMUSCULAR; INTRAVENOUS at 17:20

## 2018-01-07 RX ADMIN — WARFARIN SODIUM 2.5 MILLIGRAM(S): 2.5 TABLET ORAL at 22:52

## 2018-01-07 RX ADMIN — Medication 1200 MILLIGRAM(S): at 17:26

## 2018-01-07 RX ADMIN — SIMVASTATIN 10 MILLIGRAM(S): 20 TABLET, FILM COATED ORAL at 22:51

## 2018-01-07 RX ADMIN — AMIODARONE HYDROCHLORIDE 100 MILLIGRAM(S): 400 TABLET ORAL at 05:50

## 2018-01-07 RX ADMIN — Medication 250 MILLIGRAM(S): at 17:52

## 2018-01-07 RX ADMIN — Medication 0.5 MILLIGRAM(S): at 10:15

## 2018-01-07 RX ADMIN — Medication 3 MILLILITER(S): at 19:54

## 2018-01-07 RX ADMIN — GABAPENTIN 300 MILLIGRAM(S): 400 CAPSULE ORAL at 22:52

## 2018-01-07 RX ADMIN — INSULIN GLARGINE 10 UNIT(S): 100 INJECTION, SOLUTION SUBCUTANEOUS at 22:49

## 2018-01-07 RX ADMIN — TENOFOVIR DISOPROXIL FUMARATE 300 MILLIGRAM(S): 300 TABLET, FILM COATED ORAL at 11:44

## 2018-01-07 RX ADMIN — Medication 25 MILLIGRAM(S): at 05:50

## 2018-01-07 RX ADMIN — Medication 1200 MILLIGRAM(S): at 05:50

## 2018-01-07 NOTE — PROVIDER CONTACT NOTE (CRITICAL VALUE NOTIFICATION) - ACTION/TREATMENT ORDERED:
md aware. will assess.
No new orders received. Expected lab result with pt hx
500ml bolus, repeat lactate post fluids

## 2018-01-07 NOTE — PROGRESS NOTE ADULT - ASSESSMENT
76 y/o man with h/o CLL/lymphoma on treatment, h/o recurrent pneumonia, Afib, HFprF, Hep B, COPD on home O2, DM type 2, prior PE DVT waswas admitted on 1/1 for worsening cough, fever to 101F on multiple occasions during the last 3 days PTA with more fatigue and drowsiness. He reports cough with mucoid expectoration which turned to greenish phlegm. Fever intermittent, not associated with chills or rigor. Has chest tightness a/w cough. no hemoptysis. No h/o sick contacts. Denies wheezing. Patient denies any dizziness or lightheadedness. He felt weak and tired, and per family was getting drowsy with episodes of confusion. While he was in the ED, he received vanco IV and cefepime.    1. Fever. Dyspnea improving slowly. Persistent bibasilar opacities. Possible pneumonia. End stage COPD. Acute on chronic respiratory failure. URI with rhinovirus. Immunocompromised host.  -noted with fevers, repeat cx pending, xray shows persistent pna  - vancomycin added for additional mrsa coverage [id number], check trough prior to 4th dose  -on cefepime 1 gm IV q12h #7  -continue with antibiotic coverage  -s/p azithromycin 500 mg IV qd # 3  -leukocytosis likely related to CLL  -sputum c/s shows normal respiratory manuel  -tolerating abx well so far; no side effects noted  -CT chest shows interval worsening of pulmonary infiltrates  -monitor temps  -f/u CBC  -supportive care    2. Other issues:   -care per medicine

## 2018-01-07 NOTE — PROGRESS NOTE ADULT - SUBJECTIVE AND OBJECTIVE BOX
CC: cough    HPI: 78 y/o man with h/o CLL/lymphoma on treatment, h/o recurrent pneumonia, Afib, HFprF, Hep B, COPD on home O2, DM type 2, prior PE DVT waswas admitted on 1/1 for worsening cough, fever to 101F on multiple occasions during the last 3 days PTA with more fatigue and drowsiness. He reports cough with mucoid expectoration which turned to greenish phlegm. Fever intermittent, not associated with chills or rigor. Has chest tightness a/w cough. no hemoptysis. No h/o sick contacts. Denies wheezing. Patient denies any dizziness or lightheadedness. He felt weak and tired, and per family was getting drowsy with episodes of confusion. While he was in the ED, he received vanco IV and cefepime.    1/6: Above reviewed, feels "so so", still w/ intermittent coughing. No fevers last night.   1/7/18: Code sepsis overnight. Appears ill, normal lactate. + cough. Family at bedside.    ROS: neg unless stated above.     Vital Signs Last 24 Hrs  T(C): 37.2 (07 Jan 2018 10:32), Max: 38.7 (06 Jan 2018 19:38)  T(F): 98.9 (07 Jan 2018 10:32), Max: 101.6 (06 Jan 2018 19:38)  HR: 73 (07 Jan 2018 10:32) (73 - 132)  BP: 118/57 (07 Jan 2018 10:32) (108/77 - 149/85)  BP(mean): --  RR: 18 (07 Jan 2018 10:32) (18 - 20)  SpO2: 97% (07 Jan 2018 10:32) (92% - 97%)    PHYSICAL EXAM:    Constitutional: Ill appearing awake and alert, well-developed  male.   HEENT: PERR, EOMI, Normal Hearing, MMM  Neck: Soft and supple, No LAD, No JVD  Respiratory: Scattered rhonchi on bases.   Cardiovascular: S1 and S2, mildly tachycardic, no Murmurs, gallops or rubs  Gastrointestinal: Bowel Sounds present, soft, nontender, nondistended, no guarding, no rebound  Extremities: No peripheral edema  Vascular: 2+ peripheral pulses  Neurological: A/O x 3, no focal deficits  Musculoskeletal: 5/5 strength b/l upper and lower extremities  Skin: No rashes    ALL LABS/ IMAGES REVIEWED.    78 yo M, history of chronic O2 dependency, underwent bronchoscopy in October found to have bronchomalacia and mucus plugs. Presents with +Entero/Rhinovirus.    * Acute hypoxic and hypercapnic respiratory failure secondary to Multifactorial; Pneumonia in immunocompromised patient:  - Sepsis - no shock --> Day 7 IV CEFEPIME. Completed Azithro X 3 days for atypical coverage.   - NOW ON VANC. PENDING REPEAT CULTURES  - OFF STEROIDS  - pt is also on Amio;  - per heme might benefit from IgG infusion --> Dr. Smith to follow up tomorrow as patient with severe allergy in the past.   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    * H/o Afib- current EKG shows sinus tachy. Patient on amio, and metoprolol.   -on amiodarone, metoprolol 25 mg bid with holding parameters.  - START COUMADIN IF INR <3, check stat INR and daily    * Type 2 DM- on insulin Tresiba 20 units bid at home and Januvia.  - A1c 7.7% --> since hypoglycemic this AM, dc AM lantus, cont PM Lantus at half dose 10mg. Monitor.     * Pt on Tenofovir at home /hepatitis- resumed here.     Dispo: remain inpatient, mobilize. Discussed w/ team and family  Total time > 45 mins.

## 2018-01-07 NOTE — PROGRESS NOTE ADULT - SUBJECTIVE AND OBJECTIVE BOX
Patient is a 77y old  Male who presents with a chief complaint of Fatigue, fever, and shortness of breath x 3 days (01 Jan 2018 14:03)    HPI:  78 y/o man with h/o CLL/lymphoma on treatment, h/o recurrent pneumonia, Afib, HFprF, Hep B, COPD on home O2, DM type 2, prior PE DVT waswas admitted on 1/1 for worsening cough, fever to 101F on multiple occasions during the last 3 days PTA with more fatigue and drowsiness. He reports cough with mucoid expectoration which turned to greenish phlegm. Fever intermittent, not associated with chills or rigor. Has chest tightness a/w cough. no hemoptysis. No h/o sick contacts. Denies wheezing. Patient denies any dizziness or lightheadedness. He felt weak and tired, and per family was getting drowsy with episodes of confusion. While he was in the ED, he received vanco IV and cefepime.    code sepsis o/n for fever/tachpnea/tachycardia  feels weak today  + cough      MEDICATIONS  (STANDING):  ALBUTerol/ipratropium for Nebulization 3 milliLiter(s) Nebulizer every 6 hours  amiodarone    Tablet 100 milliGRAM(s) Oral daily  azithromycin  IVPB      azithromycin  IVPB 500 milliGRAM(s) IV Intermittent every 24 hours  buDESOnide   0.5 milliGRAM(s) Respule 0.5 milliGRAM(s) Inhalation two times a day  cefepime  IVPB 1000 milliGRAM(s) IV Intermittent every 12 hours  dextrose 5%. 1000 milliLiter(s) (50 mL/Hr) IV Continuous <Continuous>  dextrose 50% Injectable 12.5 Gram(s) IV Push once  dextrose 50% Injectable 25 Gram(s) IV Push once  gabapentin 300 milliGRAM(s) Oral three times a day  guaiFENesin ER 1200 milliGRAM(s) Oral every 12 hours  insulin glargine Injectable (LANTUS) 20 Unit(s) SubCutaneous every morning  insulin lispro (HumaLOG) corrective regimen sliding scale   SubCutaneous three times a day before meals  methylPREDNISolone sodium succinate Injectable 40 milliGRAM(s) IV Push daily  metoprolol     tartrate 25 milliGRAM(s) Oral daily  montelukast 10 milliGRAM(s) Oral at bedtime  simvastatin 10 milliGRAM(s) Oral at bedtime    MEDICATIONS  (PRN):  dextrose Gel 1 Dose(s) Oral once PRN Blood Glucose LESS THAN 70 milliGRAM(s)/deciliter  glucagon  Injectable 1 milliGRAM(s) IntraMuscular once PRN Glucose LESS THAN 70 milligrams/deciliter      Vital Signs Last 24 Hrs  T(C): 36.7 (05 Jan 2018 04:45), Max: 36.8 (04 Jan 2018 20:56)  T(F): 98.1 (05 Jan 2018 04:45), Max: 98.3 (04 Jan 2018 20:56)  HR: 93 (05 Jan 2018 10:02) (82 - 101)  BP: 102/55 (05 Jan 2018 10:02) (102/55 - 134/76)  BP(mean): 90 (05 Jan 2018 04:45) (80 - 90)  RR: 17 (05 Jan 2018 10:02) (17 - 27)  SpO2: 93% (05 Jan 2018 10:02) (93% - 96%)    Physical Exam:      Constitutional: frail looking  HEENT: NC/AT, EOMI, PERRLA  Neck: supple  Back: no tenderness  Respiratory: crackles at bases  Cardiovascular: S1S2 regular, no murmurs  Abdomen: soft, not tender, not distended, positive BS  Genitourinary: no LN palpable  Rectal: deferred  Musculoskeletal: no muscle tenderness, no joint swelling or tenderness  Extremities: no pedal edema  Neurological: AxOx3, moving all extremities  Skin: no rashes    Labs:                                   11.2   62.4  )-----------( 158      ( 07 Jan 2018 06:15 )             35.2     01-07    139  |  103  |  29<H>  ----------------------------<  41<LL>  4.3   |  29  |  0.95    Ca    7.7<L>      07 Jan 2018 06:15        Rapid Respiratory Viral Panel (01.01.18 @ 15:22)    Rapid RVP Result: Detected: The [employer] RVP Rapid uses polymerase chain reaction (PCR) and melt  curve analysis to screen for adenovirus; coronavirus HKU1, NL63, 229E,  OC43; human metapneumovirus (hMPV); human enterovirus/rhinovirus  (Entero/RV); influenza A; influenza A/H1;influenza A/H3; influenza  A/H1-2009; influenza B; parainfluenza viruses 1, 2, 3, 4; respiratory  syncytial virus; Bordetella pertussis; Mycoplasma pneumoniae; and  Chlamydophila pneumoniae.    Entero/Rhinovirus (RapRVP): Detected: spoke to jayjay fitch rn in er 01/01/18 17:25      Culture - Sputum (collected 03 Jan 2018 17:00)  Source: .Sputum Sputum  Gram Stain (04 Jan 2018 00:03):    Numerous polymorphonuclear leukocytes per low power field    Few Squamous epithelial cells per low power field    Few Gram Positive Cocci in Pairs and Chains per oil power field    Rare Gram Variable Rods per oil power field    Culture - Urine (collected 01 Jan 2018 14:44)  Source: .Urine None  Final Report (02 Jan 2018 22:05):    No growth    Culture - Blood (collected 01 Jan 2018 14:34)  Source: .Blood None  Preliminary Report (03 Jan 2018 03:01):    No growth to date.    Culture - Blood (collected 01 Jan 2018 08:31)  Source: .Blood Blood-Peripheral  Preliminary Report (02 Jan 2018 13:01):    No growth to date.    Culture - Blood (collected 01 Jan 2018 08:31)  Source: .Blood Blood-Peripheral  Preliminary Report (02 Jan 2018 13:01):    No growth to date.        Radiology:  < from: Xray Chest 1 View AP- PORTABLE-Urgent (01.06.18 @ 20:59) >  EXAM:  XR CHEST PORTABLE URGENT 1V                            PROCEDURE DATE:  01/06/2018          INTERPRETATION:  History: Fever and tachycardia.    Single view of the chest was performed.    Comparison is made to prior CT of the chest from January 4, 2018.    Findings:    There is demonstration of patchy airspace opacity within the right mid   and lower lung and along the left lung base. There is a trace left   pleural effusion. Heart size is within normal limits. Chronic   posttraumatic deformities of the right posterior ribs are noted.    Impression:    Unchanged patchy airspace opacity within the right mid and lower lung and   within the left lung base. Small left pleural effusion.      < from: Xray Chest 1 View AP/PA. (01.01.18 @ 09:27) >  Bibasilar airspace opacity in left mid lung airspace opacity is noted.   Findings are concerning for pneumonia given the patient's history. Heart   size appears mildly enlarged. Chronic appearing posttraumatic deformity   of the right proximal humerusis noted.      < from: CT Chest No Cont (01.04.18 @ 10:21) >  Interval worsening of partial atelectasis of the left lower lobe and some   infiltrates and/or atelectatic changes inthe right lower lobe. Clinical   correlation is recommended.        Advanced directives addressed: full resuscitation

## 2018-01-08 LAB
ANION GAP SERPL CALC-SCNC: 6 MMOL/L — SIGNIFICANT CHANGE UP (ref 5–17)
BUN SERPL-MCNC: 21 MG/DL — SIGNIFICANT CHANGE UP (ref 7–23)
CALCIUM SERPL-MCNC: 7.6 MG/DL — LOW (ref 8.5–10.1)
CHLORIDE SERPL-SCNC: 99 MMOL/L — SIGNIFICANT CHANGE UP (ref 96–108)
CO2 SERPL-SCNC: 31 MMOL/L — SIGNIFICANT CHANGE UP (ref 22–31)
CREAT SERPL-MCNC: 0.86 MG/DL — SIGNIFICANT CHANGE UP (ref 0.5–1.3)
GLUCOSE BLDC GLUCOMTR-MCNC: 139 MG/DL — HIGH (ref 70–99)
GLUCOSE BLDC GLUCOMTR-MCNC: 174 MG/DL — HIGH (ref 70–99)
GLUCOSE BLDC GLUCOMTR-MCNC: 265 MG/DL — HIGH (ref 70–99)
GLUCOSE BLDC GLUCOMTR-MCNC: 87 MG/DL — SIGNIFICANT CHANGE UP (ref 70–99)
GLUCOSE SERPL-MCNC: 119 MG/DL — HIGH (ref 70–99)
HCT VFR BLD CALC: 32.1 % — LOW (ref 39–50)
HGB BLD-MCNC: 10.4 G/DL — LOW (ref 13–17)
INR BLD: 1.64 RATIO — HIGH (ref 0.88–1.16)
MCHC RBC-ENTMCNC: 28.2 PG — SIGNIFICANT CHANGE UP (ref 27–34)
MCHC RBC-ENTMCNC: 32.6 GM/DL — SIGNIFICANT CHANGE UP (ref 32–36)
MCV RBC AUTO: 86.4 FL — SIGNIFICANT CHANGE UP (ref 80–100)
PLATELET # BLD AUTO: 110 K/UL — LOW (ref 150–400)
POTASSIUM SERPL-MCNC: 4.2 MMOL/L — SIGNIFICANT CHANGE UP (ref 3.5–5.3)
POTASSIUM SERPL-SCNC: 4.2 MMOL/L — SIGNIFICANT CHANGE UP (ref 3.5–5.3)
PROTHROM AB SERPL-ACNC: 17.9 SEC — HIGH (ref 9.8–12.7)
RBC # BLD: 3.71 M/UL — LOW (ref 4.2–5.8)
RBC # FLD: 15.8 % — HIGH (ref 10.3–14.5)
SODIUM SERPL-SCNC: 136 MMOL/L — SIGNIFICANT CHANGE UP (ref 135–145)
WBC # BLD: 42.7 K/UL — CRITICAL HIGH (ref 3.8–10.5)
WBC # FLD AUTO: 42.7 K/UL — CRITICAL HIGH (ref 3.8–10.5)

## 2018-01-08 RX ORDER — WARFARIN SODIUM 2.5 MG/1
2.5 TABLET ORAL DAILY
Qty: 0 | Refills: 0 | Status: DISCONTINUED | OUTPATIENT
Start: 2018-01-09 | End: 2018-01-09

## 2018-01-08 RX ORDER — WARFARIN SODIUM 2.5 MG/1
5 TABLET ORAL ONCE
Qty: 0 | Refills: 0 | Status: COMPLETED | OUTPATIENT
Start: 2018-01-08 | End: 2018-01-08

## 2018-01-08 RX ORDER — ENOXAPARIN SODIUM 100 MG/ML
80 INJECTION SUBCUTANEOUS
Qty: 0 | Refills: 0 | Status: DISCONTINUED | OUTPATIENT
Start: 2018-01-08 | End: 2018-01-10

## 2018-01-08 RX ADMIN — CEFEPIME 100 MILLIGRAM(S): 1 INJECTION, POWDER, FOR SOLUTION INTRAMUSCULAR; INTRAVENOUS at 11:53

## 2018-01-08 RX ADMIN — Medication 1200 MILLIGRAM(S): at 05:34

## 2018-01-08 RX ADMIN — GABAPENTIN 300 MILLIGRAM(S): 400 CAPSULE ORAL at 17:21

## 2018-01-08 RX ADMIN — Medication 3 MILLILITER(S): at 01:14

## 2018-01-08 RX ADMIN — Medication 3 MILLILITER(S): at 13:46

## 2018-01-08 RX ADMIN — Medication 3 MILLILITER(S): at 19:42

## 2018-01-08 RX ADMIN — SIMVASTATIN 10 MILLIGRAM(S): 20 TABLET, FILM COATED ORAL at 23:28

## 2018-01-08 RX ADMIN — GABAPENTIN 300 MILLIGRAM(S): 400 CAPSULE ORAL at 23:27

## 2018-01-08 RX ADMIN — Medication 2: at 11:54

## 2018-01-08 RX ADMIN — Medication 25 MILLIGRAM(S): at 05:34

## 2018-01-08 RX ADMIN — CEFEPIME 100 MILLIGRAM(S): 1 INJECTION, POWDER, FOR SOLUTION INTRAMUSCULAR; INTRAVENOUS at 17:13

## 2018-01-08 RX ADMIN — Medication 0.5 MILLIGRAM(S): at 19:42

## 2018-01-08 RX ADMIN — Medication 0.5 MILLIGRAM(S): at 08:47

## 2018-01-08 RX ADMIN — AMIODARONE HYDROCHLORIDE 100 MILLIGRAM(S): 400 TABLET ORAL at 05:34

## 2018-01-08 RX ADMIN — MONTELUKAST 10 MILLIGRAM(S): 4 TABLET, CHEWABLE ORAL at 23:27

## 2018-01-08 RX ADMIN — Medication 650 MILLIGRAM(S): at 17:13

## 2018-01-08 RX ADMIN — Medication 250 MILLIGRAM(S): at 05:33

## 2018-01-08 RX ADMIN — WARFARIN SODIUM 5 MILLIGRAM(S): 2.5 TABLET ORAL at 23:27

## 2018-01-08 RX ADMIN — TENOFOVIR DISOPROXIL FUMARATE 300 MILLIGRAM(S): 300 TABLET, FILM COATED ORAL at 11:54

## 2018-01-08 RX ADMIN — Medication 250 MILLIGRAM(S): at 17:21

## 2018-01-08 RX ADMIN — Medication 3 MILLILITER(S): at 08:48

## 2018-01-08 RX ADMIN — ENOXAPARIN SODIUM 80 MILLIGRAM(S): 100 INJECTION SUBCUTANEOUS at 17:13

## 2018-01-08 RX ADMIN — INSULIN GLARGINE 10 UNIT(S): 100 INJECTION, SOLUTION SUBCUTANEOUS at 23:26

## 2018-01-08 RX ADMIN — GABAPENTIN 300 MILLIGRAM(S): 400 CAPSULE ORAL at 05:34

## 2018-01-08 RX ADMIN — Medication 1200 MILLIGRAM(S): at 17:21

## 2018-01-08 NOTE — PROGRESS NOTE ADULT - ASSESSMENT
76 y/o man with h/o CLL/lymphoma on treatment, h/o recurrent pneumonia, Afib, HFprF, Hep B, COPD on home O2, DM type 2, prior PE DVT waswas admitted on 1/1 for worsening cough, fever to 101F on multiple occasions during the last 3 days PTA with more fatigue and drowsiness. He reports cough with mucoid expectoration which turned to greenish phlegm. Fever intermittent, not associated with chills or rigor. Has chest tightness a/w cough. no hemoptysis. No h/o sick contacts. Denies wheezing. Patient denies any dizziness or lightheadedness. He felt weak and tired, and per family was getting drowsy with episodes of confusion. While he was in the ED, he received vanco IV and cefepime.    1. Low grade fever. Dyspnea improving slowly. Persistent bibasilar opacities. Possible pneumonia. End stage COPD. Acute on chronic respiratory failure. URI with rhinovirus. Immunocompromised host.  -noted with fevers  -on cefepime 1 gm IV q12h # 8 and vancomycin IV # 2  -tolerating abx well so far; no side effects noted  -obtain vancomycin level  -s/p azithromycin 500 mg IV qd # 3  -leukocytosis likely related to CLL  -sputum c/s shows normal respiratory manuel  -tolerating abx well so far; no side effects noted  -CT chest shows interval worsening of pulmonary infiltrates  -continue with antibiotic coverage  -monitor temps  -f/u CBC  -supportive care    2. Other issues:   -care per medicine

## 2018-01-08 NOTE — PROGRESS NOTE ADULT - SUBJECTIVE AND OBJECTIVE BOX
CC: cough    HPI: 78 y/o  man with h/o CLL/lymphoma on treatment, h/o recurrent pneumonia, Afib, HFprF, Hep B, COPD on home O2, DM type 2, prior PE DVT waswas admitted on 1/1 for worsening cough, fever to 101F on multiple occasions during the last 3 days PTA with more fatigue and drowsiness. He reports cough with mucoid expectoration which turned to greenish phlegm.    Hospital course: Patient admitted with recurrent PNA with minimal improvement on IV abx. He had bronch in the fall of 2017 for similar episode which revealed bronchomalecia and thus unable to expectorate and clear secretions thus suspectible to recurrent infections in the setting of CLL. Was code SEPSIS over the weekend and now on IV abx X 1 week+.     1/8/18: Coughing, weak appearing. Afebrile today. Wife at bedside, discussed case in depth w/ bedside .     ROS: neg unless stated above.     Vital Signs Last 24 Hrs  T(C): 37.5 (08 Jan 2018 09:08), Max: 37.9 (07 Jan 2018 17:12)  T(F): 99.5 (08 Jan 2018 09:08), Max: 100.3 (07 Jan 2018 17:12)  HR: 98 (08 Jan 2018 13:50) (88 - 105)  BP: 100/53 (08 Jan 2018 09:08) (100/53 - 124/70)  BP(mean): --  RR: 20 (08 Jan 2018 09:08) (18 - 20)  SpO2: 95% (08 Jan 2018 09:08) (95% - 96%)    PHYSICAL EXAM:    Constitutional: Ill appearing awake and alert, well-developed  male.   HEENT: PERR, EOMI, Normal Hearing, MMM  Neck: Soft and supple, No LAD, No JVD  Respiratory: Scattered rhonchi on bases.   Cardiovascular: S1 and S2, mildly tachycardic, no Murmurs, gallops or rubs  Gastrointestinal: Bowel Sounds present, soft, nontender, nondistended, no guarding, no rebound  Extremities: No peripheral edema  Vascular: 2+ peripheral pulses  Neurological: A/O x 3, no focal deficits  Musculoskeletal: 5/5 strength b/l upper and lower extremities  Skin: No rashes    ALL LABS/ IMAGES REVIEWED.                        10.4   42.7  )-----------( 110      ( 08 Jan 2018 06:51 )             32.1   01-08    136  |  99  |  21  ----------------------------<  119<H>  4.2   |  31  |  0.86    Ca    7.6<L>      08 Jan 2018 06:51        76 yo M, history of chronic O2 dependency, underwent bronchoscopy in October found to have bronchomalacia and mucus plugs. Presents with +Entero/Rhinovirus.    * Acute hypoxic and hypercapnic respiratory failure secondary to Multifactorial; Pneumonia in immunocompromised patient:  - Sepsis - no shock --> Day 8 IV CEFEPIME. Completed Azithro X 3 days for atypical coverage.   - NOW ON VANC day #2. PENDING REPEAT CULTURES  - OFF STEROIDS  - pt is also on Amio;  - 1/8: Discussed w/ Dr. Houser ([facility]) --> to start IVIG infusion tomorrow to help boost immunity against infection. In the past did have severe allergy thus will need to be pre-medicated w/ steroids and benedryl. Overall poor prognosis, may need palliative input.     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    * H/o Afib- current EKG shows sinus tachy. Patient on amio, and metoprolol.   -on amiodarone, metoprolol 25 mg bid with holding parameters.  - INR subtherapeutic and given hx of PE, high risk of recurrence.   - will start bridge w/ Lovenox, inc Coumadin 5mg tonight. Daily INR.       * Type 2 DM- on insulin Tresiba 20 units bid at home and Januvia.  - A1c 7.7% -->had hypoglycemia this admission w/ BS 50's thus Lantus decreased to 10U QHS. Monitor.     * Pt on Tenofovir at home /hepatitis- resumed here.     Dispo: remain inpatient, mobilize. Discussed w/ team and family.  Total time > 45 mins.

## 2018-01-08 NOTE — PROGRESS NOTE ADULT - SUBJECTIVE AND OBJECTIVE BOX
INTERVAL HISTORY:    Patient slightly better  On antibiotics  Pneumonia    Discussed possible  IV IG / while in patient    REVIEW OF SYSTEMS:    Fatigue  less cough  no chest pain  appetite fair    Allergies    Privigen (Other (Severe))    Intolerances        MEDICATIONS  (STANDING):  ALBUTerol/ipratropium for Nebulization 3 milliLiter(s) Nebulizer every 6 hours  amiodarone    Tablet 100 milliGRAM(s) Oral daily  buDESOnide   0.5 milliGRAM(s) Respule 0.5 milliGRAM(s) Inhalation two times a day  cefepime  IVPB 1000 milliGRAM(s) IV Intermittent every 12 hours  dextrose 5%. 1000 milliLiter(s) (50 mL/Hr) IV Continuous <Continuous>  dextrose 50% Injectable 12.5 Gram(s) IV Push once  dextrose 50% Injectable 25 Gram(s) IV Push once  enoxaparin Injectable 80 milliGRAM(s) SubCutaneous two times a day  gabapentin 300 milliGRAM(s) Oral three times a day  guaiFENesin ER 1200 milliGRAM(s) Oral every 12 hours  insulin glargine Injectable (LANTUS) 10 Unit(s) SubCutaneous at bedtime  insulin lispro (HumaLOG) corrective regimen sliding scale   SubCutaneous three times a day before meals  metoprolol     tartrate 25 milliGRAM(s) Oral daily  montelukast 10 milliGRAM(s) Oral at bedtime  simvastatin 10 milliGRAM(s) Oral at bedtime  tenofovir 300 milliGRAM(s) Oral daily  vancomycin  IVPB 1000 milliGRAM(s) IV Intermittent every 12 hours  vancomycin  IVPB      warfarin 5 milliGRAM(s) Oral once    MEDICATIONS  (PRN):  acetaminophen   Tablet 650 milliGRAM(s) Oral every 6 hours PRN For Temp greater than 38 C (100.4 F)  dextrose Gel 1 Dose(s) Oral once PRN Blood Glucose LESS THAN 70 milliGRAM(s)/deciliter  glucagon  Injectable 1 milliGRAM(s) IntraMuscular once PRN Glucose LESS THAN 70 milligrams/deciliter      Vital Signs Last 24 Hrs  T(C): 38.4 (2018 16:58), Max: 38.4 (2018 16:58)  T(F): 101.1 (2018 16:58), Max: 101.1 (2018 16:58)  HR: 106 (2018 16:58) (88 - 106)  BP: 108/56 (2018 16:58) (100/53 - 124/70)  BP(mean): --  RR: 18 (2018 16:58) (18 - 20)  SpO2: 94% (2018 16:58) (94% - 96%)    PHYSICAL EXAM:    GENERAL: NAD,   HEAD:  Atraumatic, Normocephalic  EYES: EOMI, PERRLA, conjunctiva and sclera clear    NECK: Supple, No JVD, Normal thyroid  NERVOUS SYSTEM:    CHEST/LUNG: Clear to percussion bilaterally; No rales, rhonchi,   HEART: Regular rate and rhythm;   ABDOMEN: Soft, Nontender.  EXTREMITIES:   edema:-  LYMPH: No lymphadenopathy noted        LABS:                        10.4   42.7  )-----------( 110      ( 2018 06:51 )             32.1     01-08    136  |  99  |  21  ----------------------------<  119<H>  4.2   |  31  |  0.86    Ca    7.6<L>      2018 06:51      PT/INR - ( 2018 06:51 )   PT: 17.9 sec;   INR: 1.64 ratio           Urinalysis Basic - ( 2018 08:08 )    Color: Yellow / Appearance: Clear / S.015 / pH: x  Gluc: x / Ketone: Negative  / Bili: Negative / Urobili: Negative mg/dL   Blood: x / Protein: 30 mg/dL / Nitrite: Negative   Leuk Esterase: Negative / RBC: 0-2 /HPF / WBC 3-5   Sq Epi: x / Non Sq Epi: Occasional / Bacteria: Few

## 2018-01-08 NOTE — PROGRESS NOTE ADULT - ASSESSMENT
Patient with CLL/ Hypogammaglobulinemia  RECURRENT PNEUMONIA  COPD    A/P    Consider gammaglobulin  Gammagard  All risks and benefits discussed  Chemotherapy for CLL:  On hold for now/ consider Cytoxan/ have to avoid immune suppressive medications

## 2018-01-08 NOTE — PROGRESS NOTE ADULT - SUBJECTIVE AND OBJECTIVE BOX
Patient is a 77y old  Male who presents with a chief complaint of Fatigue, fever, and shortness of breath x 3 days (01 Jan 2018 14:03)    HPI:  78 y/o man with h/o CLL/lymphoma on treatment, h/o recurrent pneumonia, Afib, HFprF, Hep B, COPD on home O2, DM type 2, prior PE DVT waswas admitted on 1/1 for worsening cough, fever to 101F on multiple occasions during the last 3 days PTA with more fatigue and drowsiness. He reports cough with mucoid expectoration which turned to greenish phlegm. Fever intermittent, not associated with chills or rigor. Has chest tightness a/w cough. no hemoptysis. No h/o sick contacts. Denies wheezing. Patient denies any dizziness or lightheadedness. He felt weak and tired, and per family was getting drowsy with episodes of confusion. While he was in the ED, he received vanco IV and cefepime.    Weak looking  Has dry cough  No energy  Denies pain    MEDICATIONS  (STANDING):  ALBUTerol/ipratropium for Nebulization 3 milliLiter(s) Nebulizer every 6 hours  amiodarone    Tablet 100 milliGRAM(s) Oral daily  buDESOnide   0.5 milliGRAM(s) Respule 0.5 milliGRAM(s) Inhalation two times a day  cefepime  IVPB 1000 milliGRAM(s) IV Intermittent every 12 hours  dextrose 5%. 1000 milliLiter(s) (50 mL/Hr) IV Continuous <Continuous>  dextrose 50% Injectable 12.5 Gram(s) IV Push once  dextrose 50% Injectable 25 Gram(s) IV Push once  gabapentin 300 milliGRAM(s) Oral three times a day  guaiFENesin ER 1200 milliGRAM(s) Oral every 12 hours  insulin glargine Injectable (LANTUS) 10 Unit(s) SubCutaneous at bedtime  insulin lispro (HumaLOG) corrective regimen sliding scale   SubCutaneous three times a day before meals  metoprolol     tartrate 25 milliGRAM(s) Oral daily  montelukast 10 milliGRAM(s) Oral at bedtime  simvastatin 10 milliGRAM(s) Oral at bedtime  tenofovir 300 milliGRAM(s) Oral daily  vancomycin  IVPB 1000 milliGRAM(s) IV Intermittent every 12 hours  vancomycin  IVPB      warfarin 5 milliGRAM(s) Oral once    MEDICATIONS  (PRN):  acetaminophen   Tablet 650 milliGRAM(s) Oral every 6 hours PRN For Temp greater than 38 C (100.4 F)  dextrose Gel 1 Dose(s) Oral once PRN Blood Glucose LESS THAN 70 milliGRAM(s)/deciliter  glucagon  Injectable 1 milliGRAM(s) IntraMuscular once PRN Glucose LESS THAN 70 milligrams/deciliter      Vital Signs Last 24 Hrs  T(C): 37.5 (08 Jan 2018 09:08), Max: 37.9 (07 Jan 2018 17:12)  T(F): 99.5 (08 Jan 2018 09:08), Max: 100.3 (07 Jan 2018 17:12)  HR: 98 (08 Jan 2018 13:50) (88 - 105)  BP: 100/53 (08 Jan 2018 09:08) (100/53 - 124/70)  BP(mean): --  RR: 20 (08 Jan 2018 09:08) (18 - 20)  SpO2: 95% (08 Jan 2018 09:08) (95% - 96%)    Physical Exam:      Constitutional: frail looking  HEENT: NC/AT, EOMI, PERRLA  Neck: supple  Back: no tenderness  Respiratory: few crackles at bases  Cardiovascular: S1S2 regular, no murmurs  Abdomen: soft, not tender, not distended, positive BS  Genitourinary: no LN palpable  Rectal: deferred  Musculoskeletal: no muscle tenderness, no joint swelling or tenderness  Extremities: no pedal edema  Neurological: AxOx3, moving all extremities  Skin: no rashes    Labs:                        10.4   42.7  )-----------( 110      ( 08 Jan 2018 06:51 )             32.1     01-08    136  |  99  |  21  ----------------------------<  119<H>  4.2   |  31  |  0.86    Ca    7.6<L>      08 Jan 2018 06:51                        11.2   62.4  )-----------( 158      ( 07 Jan 2018 06:15 )             35.2     01-07    139  |  103  |  29<H>  ----------------------------<  41<LL>  4.3   |  29  |  0.95    Ca    7.7<L>      07 Jan 2018 06:15        Rapid Respiratory Viral Panel (01.01.18 @ 15:22)    Rapid RVP Result: Detected: The [employer] RVP Rapid uses polymerase chain reaction (PCR) and melt  curve analysis to screen for adenovirus; coronavirus HKU1, NL63, 229E,  OC43; human metapneumovirus (hMPV); human enterovirus/rhinovirus  (Entero/RV); influenza A; influenza A/H1;influenza A/H3; influenza  A/H1-2009; influenza B; parainfluenza viruses 1, 2, 3, 4; respiratory  syncytial virus; Bordetella pertussis; Mycoplasma pneumoniae; and  Chlamydophila pneumoniae.    Entero/Rhinovirus (RapRVP): Detected: spoke to jayjay fitch rn in er 01/01/18 17:25      Culture - Sputum (collected 03 Jan 2018 17:00)  Source: .Sputum Sputum  Gram Stain (04 Jan 2018 00:03):    Numerous polymorphonuclear leukocytes per low power field    Few Squamous epithelial cells per low power field    Few Gram Positive Cocci in Pairs and Chains per oil power field    Rare Gram Variable Rods per oil power field    Culture - Urine (collected 01 Jan 2018 14:44)  Source: .Urine None  Final Report (02 Jan 2018 22:05):    No growth    Culture - Blood (collected 01 Jan 2018 14:34)  Source: .Blood None  Preliminary Report (03 Jan 2018 03:01):    No growth to date.    Culture - Blood (collected 01 Jan 2018 08:31)  Source: .Blood Blood-Peripheral  Preliminary Report (02 Jan 2018 13:01):    No growth to date.    Culture - Blood (collected 01 Jan 2018 08:31)  Source: .Blood Blood-Peripheral  Preliminary Report (02 Jan 2018 13:01):    No growth to date.        Radiology:  < from: Xray Chest 1 View AP- PORTABLE-Urgent (01.06.18 @ 20:59) >  EXAM:  XR CHEST PORTABLE URGENT 1V                            PROCEDURE DATE:  01/06/2018          INTERPRETATION:  History: Fever and tachycardia.    Single view of the chest was performed.    Comparison is made to prior CT of the chest from January 4, 2018.    Findings:    There is demonstration of patchy airspace opacity within the right mid   and lower lung and along the left lung base. There is a trace left   pleural effusion. Heart size is within normal limits. Chronic   posttraumatic deformities of the right posterior ribs are noted.    Impression:    Unchanged patchy airspace opacity within the right mid and lower lung and   within the left lung base. Small left pleural effusion.      < from: Xray Chest 1 View AP/PA. (01.01.18 @ 09:27) >  Bibasilar airspace opacity in left mid lung airspace opacity is noted.   Findings are concerning for pneumonia given the patient's history. Heart   size appears mildly enlarged. Chronic appearing posttraumatic deformity   of the right proximal humerusis noted.      < from: CT Chest No Cont (01.04.18 @ 10:21) >  Interval worsening of partial atelectasis of the left lower lobe and some   infiltrates and/or atelectatic changes inthe right lower lobe. Clinical   correlation is recommended.        Advanced directives addressed: full resuscitation

## 2018-01-08 NOTE — PROGRESS NOTE ADULT - SUBJECTIVE AND OBJECTIVE BOX
Patient is a 77y old  Male who presents with a chief complaint of Fatigue, fever, and shortness of breath x 3 days (2018 14:03)      HPI:  Patient is 77 year old man with complex past medical history including CLL/lymphoma on treatment, h/o recurrent pneumonia, Afib, HFprF, Hep B, COPD, type 2DM, H/o PE DVT was brought in as his family noticed worsening cough, fever 101 on multiple occassions during the last 3 days with more fatigue and drowsiness.  He was here in , with pneumonia s/p bronch at that time s/o thick mucus with bronchomalacia.  Patient this time has cough with mucoid expectoration which turned to greenish phlegm. Fever intermittent, not associated with chills or rigor.  No nausea or vomiting. C/o chest tightness a/w cough. no hemoptysis. No h/o sick contacts. Denies wheezing. Patient on COPD meds, continued it as usual.  Patient denies any dizziness or lightheadedness.  He felt weak and tired, and per family was getting drowsy with episodes of confusion.  He is on follow up with Oncologist and was being planned to get next treatment this week.  While he was in the ED, his CXR showed left lower and midzone infiltrates. S/p vanco and cefepime. (2018 14:03)  Evaluated on 18  Code sepsis called over the weekend  Patient states he feels weak      PAST MEDICAL & SURGICAL HISTORY:  Hypogammaglobulinemia: received IVIG  Hepatitis B virus infection, unspecified chronicity: retrovirals  Chronic diastolic congestive heart failure  Essential hypertension  DM type 2 (diabetes mellitus, type 2)  B-cell lymphoma, unspecified B-cell lymphoma type, unspecified body region: CLL stage 4/SLL; met NHL  Atrial fibrillation, unspecified type  COPD (chronic obstructive pulmonary disease)  History of esophageal dilatation  S/P cholecystectomy  H/O prostatectomy      PREVIOUS DIAGNOSTIC TESTING:    MEDICATIONS  (STANDING):  ALBUTerol/ipratropium for Nebulization 3 milliLiter(s) Nebulizer every 6 hours  amiodarone    Tablet 100 milliGRAM(s) Oral daily  buDESOnide   0.5 milliGRAM(s) Respule 0.5 milliGRAM(s) Inhalation two times a day  cefepime  IVPB 1000 milliGRAM(s) IV Intermittent every 12 hours  dextrose 5%. 1000 milliLiter(s) (50 mL/Hr) IV Continuous <Continuous>  dextrose 50% Injectable 12.5 Gram(s) IV Push once  dextrose 50% Injectable 25 Gram(s) IV Push once  enoxaparin Injectable 80 milliGRAM(s) SubCutaneous two times a day  gabapentin 300 milliGRAM(s) Oral three times a day  guaiFENesin ER 1200 milliGRAM(s) Oral every 12 hours  insulin glargine Injectable (LANTUS) 10 Unit(s) SubCutaneous at bedtime  insulin lispro (HumaLOG) corrective regimen sliding scale   SubCutaneous three times a day before meals  metoprolol     tartrate 25 milliGRAM(s) Oral daily  montelukast 10 milliGRAM(s) Oral at bedtime  simvastatin 10 milliGRAM(s) Oral at bedtime  tenofovir 300 milliGRAM(s) Oral daily  vancomycin  IVPB 1000 milliGRAM(s) IV Intermittent every 12 hours  vancomycin  IVPB      warfarin 5 milliGRAM(s) Oral once    MEDICATIONS  (PRN):  acetaminophen   Tablet 650 milliGRAM(s) Oral every 6 hours PRN For Temp greater than 38 C (100.4 F)  dextrose Gel 1 Dose(s) Oral once PRN Blood Glucose LESS THAN 70 milliGRAM(s)/deciliter  glucagon  Injectable 1 milliGRAM(s) IntraMuscular once PRN Glucose LESS THAN 70 milligrams/deciliter        FAMILY HISTORY:  Family history of liver cancer (Father): father  85 liver CA  Family history of coronary arteriosclerosis (Mother): Mom  of MI age 68      REVIEW OF SYSTEM:  Shortness of breath, otherwise 12 point ROS negative    Vital Signs Last 24 Hrs  T(C): 37 (2018 04:15), Max: 37 (2018 04:15)  T(F): 98.6 (2018 04:15), Max: 98.6 (2018 04:15)  HR: 73 (2018 04:15) (73 - 108)  BP: 130/66 (2018 04:15) (99/61 - 130/66)  BP(mean): --  RR: 18 (2018 04:15) (17 - 19)  SpO2: 100% (2018 04:15) (91% - 100%)      PHYSICAL EXAM  General Appearance: cooperative, no acute distress,   HEENT: PERRL, conjunctiva clear, EOM's intact, non injected pharynx, no exudate, TM   normal  Neck: Supple, , no adenopathy, thyroid: not enlarged, no carotid bruit or JVD  Back: Symmetric, no  tenderness,no soft tissue tenderness  Lungs: Bilateral upper lobe wheezing mild, scattered rhonchi in the lower lobes LLL>RLL  Heart: Regular rate and rhythm, S1, S2 normal, no murmur, rub or gallop  Abdomen: Soft, non-tender, bowel sounds active , no hepatosplenomegaly  Extremities: no cyanosis or edema, no joint swelling  Skin: Skin color, texture normal, no rashes   Neurologic: Alert and oriented X3 , cranial nerves intact, sensory and motor normal,    ECG:    LABS:                        11.1   64.5  )-----------( 166      ( 2018 06:26 )             34.5                    138  |  103  |  42<H>  ----------------------------<  183<H>  5.0   |  28  |  1.22    Ca    8.0<L>      2018 06:26               11.1   39.2  )-----------( 134      ( 2018 05:12 )             35.2       134<L>  |  100  |  30<H>  ----------------------------<  294<H>  4.6   |  24  |  1.15    Ca    8.1<L>      2018 04:38              PT/INR - ( 2018 08:31 )   PT: 29.8 sec;   INR: 2.70 ratio         PTT - ( 2018 08:31 )  PTT:38.1 sec  Urinalysis Basic - ( 2018 14:44 )    Color: Yellow / Appearance: Clear / S.010 / pH: x  Gluc: x / Ketone: Negative  / Bili: Negative / Urobili: Negative mg/dL   Blood: x / Protein: 30 mg/dL / Nitrite: Negative   Leuk Esterase: Negative / RBC: 0-2 /HPF / WBC 0-2   Sq Epi: x / Non Sq Epi: x / Bacteria: Moderate      ABG - ( 2018 06:41 )  pH: 7.33  /  pCO2: 50    /  pO2: 59    / HCO3: 25    / Base Excess: -.4   /  SaO2: 87      ABG - ( 2018 11:29 )  pH: 7.30  /  pCO2: 48    /  pO2: 84    / HCO3: 23    / Base Excess: -2.7  /  SaO2: 95          CT Chest    IMPRESSION:  Interval worsening of partial atelectasis of the left lower lobe and some   infiltrates and/or atelectatic changes in the right lower lobe. Clinical   correlation is recommended.  Mediastinal adenopathy, subcarinal adenopathy  Report and images reviewed by me

## 2018-01-08 NOTE — PROGRESS NOTE ADULT - ASSESSMENT
1) Hypercapnic Respiratory Failure with Hypoxemia  2) Bronchomalacia / Bronchiectasis  3) +Entero/Rhinovirus  4) Left lung opacity on CXR  5) Shortness of Breath  6) COPD/Emphysema  7) History of Lymphoma  8) History of AF, on Chronic Anticoagulation for VTE/AF  9) Leukocytosis    1/8/18  78 yo M, history of chronic O2 dependency, underwent bronchoscopy in October found to have bronchomalacia and mucus plugs. Presents with +Entero/Rhinovirus.  He has been treated for Lymphoma in the past and recently saw Oncology with a plan of starting Obiotuzumab (anti CD-20) which also has a side effect of bronchospasm, has not received it yet.   Code sepsis called on 1/6  monitor for bronchospasm  inhaled steroids  fu labs and cxr  encourage OOB  Continue Duoneb q 6 hours,   ID on board; now on cefepime and vancomycin  Patient is receiving Coumadin 2.5 mg nightly  New ABG shows improved O2 7.333/50/59-->7.34/44/106 Goal SaO2 >88% -->Back on Home O2 levels of 2-3 L NC O2  Findings on CT Chest of atelectasis, mediastinal adenopathy are consistent with bronchomalacia and lymphoma respectively   Palliative care consult; discussed with Dr. Cuevas  Ambulate as tolerated  Thank you for the consult 1) Hypercapnic Respiratory Failure with Hypoxemia  2) Bronchomalacia / Bronchiectasis  3) +Entero/Rhinovirus  4) Left lung opacity on CXR  5) Shortness of Breath  6) COPD/Emphysema  7) History of Lymphoma  8) History of AF, on Chronic Anticoagulation for VTE/AF  9) Leukocytosis    1/8/18  78 yo M, history of chronic O2 dependency, underwent bronchoscopy in October found to have bronchomalacia and mucus plugs. Presents with +Entero/Rhinovirus.  He has been treated for Lymphoma in the past and recently saw Oncology with a plan of starting Obiotuzumab (anti CD-20) which also has a side effect of bronchospasm, has not received it yet.   Code sepsis called on 1/6  monitor for bronchospasm  inhaled steroids  fu labs and cxr  encourage OOB  Continue Duoneb q 6 hours,   ID on board; now on cefepime and vancomycin  Patient is receiving Coumadin 2.5 mg nightly  New ABG shows improved O2 7.333/50/59-->7.34/44/106 Goal SaO2 >88% -->Back on Home O2 levels of 2-3 L NC O2  Findings on CT Chest of atelectasis, mediastinal adenopathy are consistent with bronchomalacia and lymphoma respectively   Palliative care consult; discussed with Dr. Mcmillan  Ambulate as tolerated  Thank you for the consult

## 2018-01-09 LAB
GLUCOSE BLDC GLUCOMTR-MCNC: 175 MG/DL — HIGH (ref 70–99)
GLUCOSE BLDC GLUCOMTR-MCNC: 183 MG/DL — HIGH (ref 70–99)
GLUCOSE BLDC GLUCOMTR-MCNC: 255 MG/DL — HIGH (ref 70–99)
GLUCOSE BLDC GLUCOMTR-MCNC: 326 MG/DL — HIGH (ref 70–99)
HCT VFR BLD CALC: 32 % — LOW (ref 39–50)
HGB BLD-MCNC: 10.3 G/DL — LOW (ref 13–17)
INR BLD: 1.53 RATIO — HIGH (ref 0.88–1.16)
MCHC RBC-ENTMCNC: 27.2 PG — SIGNIFICANT CHANGE UP (ref 27–34)
MCHC RBC-ENTMCNC: 32.1 GM/DL — SIGNIFICANT CHANGE UP (ref 32–36)
MCV RBC AUTO: 84.8 FL — SIGNIFICANT CHANGE UP (ref 80–100)
PLATELET # BLD AUTO: 99 K/UL — LOW (ref 150–400)
PROTHROM AB SERPL-ACNC: 16.7 SEC — HIGH (ref 9.8–12.7)
RBC # BLD: 3.78 M/UL — LOW (ref 4.2–5.8)
RBC # FLD: 15.7 % — HIGH (ref 10.3–14.5)
WBC # BLD: 34.5 K/UL — HIGH (ref 3.8–10.5)
WBC # FLD AUTO: 34.5 K/UL — HIGH (ref 3.8–10.5)

## 2018-01-09 RX ORDER — IMMUNE GLOBULIN,GAMMA(IGG) 5 %
5 VIAL (ML) INTRAVENOUS ONCE
Qty: 0 | Refills: 0 | Status: COMPLETED | OUTPATIENT
Start: 2018-01-09 | End: 2018-01-09

## 2018-01-09 RX ORDER — IMMUNE GLOBULIN,GAMMA(IGG) 5 %
20 VIAL (ML) INTRAVENOUS ONCE
Qty: 0 | Refills: 0 | Status: COMPLETED | OUTPATIENT
Start: 2018-01-09 | End: 2018-01-09

## 2018-01-09 RX ORDER — WARFARIN SODIUM 2.5 MG/1
5 TABLET ORAL ONCE
Qty: 0 | Refills: 0 | Status: COMPLETED | OUTPATIENT
Start: 2018-01-09 | End: 2018-01-09

## 2018-01-09 RX ORDER — MEPERIDINE HYDROCHLORIDE 50 MG/ML
12.5 INJECTION INTRAMUSCULAR; INTRAVENOUS; SUBCUTANEOUS
Qty: 0 | Refills: 0 | Status: DISCONTINUED | OUTPATIENT
Start: 2018-01-09 | End: 2018-01-10

## 2018-01-09 RX ORDER — WARFARIN SODIUM 2.5 MG/1
3 TABLET ORAL DAILY
Qty: 0 | Refills: 0 | Status: DISCONTINUED | OUTPATIENT
Start: 2018-01-10 | End: 2018-01-10

## 2018-01-09 RX ORDER — DIPHENHYDRAMINE HCL 50 MG
50 CAPSULE ORAL ONCE
Qty: 0 | Refills: 0 | Status: COMPLETED | OUTPATIENT
Start: 2018-01-09 | End: 2018-01-09

## 2018-01-09 RX ORDER — ACETAMINOPHEN 500 MG
650 TABLET ORAL ONCE
Qty: 0 | Refills: 0 | Status: COMPLETED | OUTPATIENT
Start: 2018-01-09 | End: 2018-01-09

## 2018-01-09 RX ORDER — NYSTATIN CREAM 100000 [USP'U]/G
1 CREAM TOPICAL
Qty: 0 | Refills: 0 | Status: DISCONTINUED | OUTPATIENT
Start: 2018-01-09 | End: 2018-01-10

## 2018-01-09 RX ORDER — IMMUNE GLOBULIN,GAMMA(IGG) 5 %
25 VIAL (ML) INTRAVENOUS ONCE
Qty: 0 | Refills: 0 | Status: DISCONTINUED | OUTPATIENT
Start: 2018-01-09 | End: 2018-01-09

## 2018-01-09 RX ADMIN — GABAPENTIN 300 MILLIGRAM(S): 400 CAPSULE ORAL at 21:37

## 2018-01-09 RX ADMIN — Medication 3 MILLILITER(S): at 14:05

## 2018-01-09 RX ADMIN — CEFEPIME 100 MILLIGRAM(S): 1 INJECTION, POWDER, FOR SOLUTION INTRAMUSCULAR; INTRAVENOUS at 21:40

## 2018-01-09 RX ADMIN — ENOXAPARIN SODIUM 80 MILLIGRAM(S): 100 INJECTION SUBCUTANEOUS at 06:51

## 2018-01-09 RX ADMIN — WARFARIN SODIUM 5 MILLIGRAM(S): 2.5 TABLET ORAL at 21:40

## 2018-01-09 RX ADMIN — AMIODARONE HYDROCHLORIDE 100 MILLIGRAM(S): 400 TABLET ORAL at 06:56

## 2018-01-09 RX ADMIN — GABAPENTIN 300 MILLIGRAM(S): 400 CAPSULE ORAL at 14:47

## 2018-01-09 RX ADMIN — TENOFOVIR DISOPROXIL FUMARATE 300 MILLIGRAM(S): 300 TABLET, FILM COATED ORAL at 12:13

## 2018-01-09 RX ADMIN — Medication 6: at 17:51

## 2018-01-09 RX ADMIN — Medication 1200 MILLIGRAM(S): at 06:52

## 2018-01-09 RX ADMIN — Medication 3 MILLILITER(S): at 01:30

## 2018-01-09 RX ADMIN — SIMVASTATIN 10 MILLIGRAM(S): 20 TABLET, FILM COATED ORAL at 21:41

## 2018-01-09 RX ADMIN — Medication 50 MILLIGRAM(S): at 12:12

## 2018-01-09 RX ADMIN — Medication 650 MILLIGRAM(S): at 12:10

## 2018-01-09 RX ADMIN — MONTELUKAST 10 MILLIGRAM(S): 4 TABLET, CHEWABLE ORAL at 21:41

## 2018-01-09 RX ADMIN — Medication 0.5 MILLIGRAM(S): at 08:17

## 2018-01-09 RX ADMIN — Medication 25 MILLIGRAM(S): at 06:52

## 2018-01-09 RX ADMIN — NYSTATIN CREAM 1 APPLICATION(S): 100000 CREAM TOPICAL at 17:17

## 2018-01-09 RX ADMIN — Medication 2: at 08:27

## 2018-01-09 RX ADMIN — CEFEPIME 100 MILLIGRAM(S): 1 INJECTION, POWDER, FOR SOLUTION INTRAMUSCULAR; INTRAVENOUS at 06:51

## 2018-01-09 RX ADMIN — ENOXAPARIN SODIUM 80 MILLIGRAM(S): 100 INJECTION SUBCUTANEOUS at 17:02

## 2018-01-09 RX ADMIN — Medication 1200 MILLIGRAM(S): at 17:01

## 2018-01-09 RX ADMIN — Medication 125 MILLIGRAM(S): at 12:11

## 2018-01-09 RX ADMIN — Medication 3 MILLILITER(S): at 08:17

## 2018-01-09 RX ADMIN — Medication 50 GRAM(S): at 12:11

## 2018-01-09 RX ADMIN — GABAPENTIN 300 MILLIGRAM(S): 400 CAPSULE ORAL at 06:52

## 2018-01-09 RX ADMIN — Medication 12.5 GRAM(S): at 17:00

## 2018-01-09 RX ADMIN — INSULIN GLARGINE 10 UNIT(S): 100 INJECTION, SOLUTION SUBCUTANEOUS at 21:47

## 2018-01-09 RX ADMIN — Medication 250 MILLIGRAM(S): at 06:51

## 2018-01-09 RX ADMIN — Medication 2: at 12:12

## 2018-01-09 NOTE — PROGRESS NOTE ADULT - ASSESSMENT
76 y/o man with h/o CLL/lymphoma on treatment, h/o recurrent pneumonia, Afib, HFprF, Hep B, COPD on home O2, DM type 2, prior PE DVT waswas admitted on 1/1 for worsening cough, fever to 101F on multiple occasions during the last 3 days PTA with more fatigue and drowsiness. He reports cough with mucoid expectoration which turned to greenish phlegm. Fever intermittent, not associated with chills or rigor. Has chest tightness a/w cough. no hemoptysis. No h/o sick contacts. Denies wheezing. Patient denies any dizziness or lightheadedness. He felt weak and tired, and per family was getting drowsy with episodes of confusion. While he was in the ED, he received vanco IV and cefepime.    1. Low grade fever. Dyspnea. Persistent bibasilar opacities. Possible pneumonia. End stage COPD. Acute on chronic respiratory failure. s/p URI with rhinovirus. Immunocompromised host.  -noted with fevers  -respiratory function remains frail  -on cefepime 1 gm IV q12h # 9 and vancomycin IV # 3  -tolerating abx well so far; no side effects noted  -obtain vancomycin level  -leukocytosis likely related to CLL  -sputum c/s shows normal respiratory manuel  -tolerating abx well so far; no side effects noted  -continue with antibiotic coverage  -monitor temps  -f/u CBC  -supportive care    2. Other issues:   -care per medicine

## 2018-01-09 NOTE — PROGRESS NOTE ADULT - SUBJECTIVE AND OBJECTIVE BOX
CC: cough    HPI: 76 y/o  man with h/o CLL/lymphoma on treatment, h/o recurrent pneumonia, Afib, HFprF, Hep B, COPD on home O2, DM type 2, prior PE DVT waswas admitted on 1/1 for worsening cough, fever to 101F on multiple occasions during the last 3 days PTA with more fatigue and drowsiness. He reports cough with mucoid expectoration which turned to greenish phlegm.    Hospital course: Patient admitted with recurrent PNA with minimal improvement on IV abx. He had bronch in the fall of 2017 for similar episode which revealed bronchomalecia and thus unable to expectorate and clear secretions thus suspectible to recurrent infections in the setting of CLL. Was code SEPSIS over the weekend and now on IV abx X 1 week+.     1/9/18: Seen w/ family at bedside. Case discussed w/ Dr. Houser --> family and patient amenable to getting Gammagard today and monitor for rigors. Pt denies pain, + cough.    ROS: neg unless stated above.     Vital Signs Last 24 Hrs  T(C): 36.9 (09 Jan 2018 04:07), Max: 38.4 (08 Jan 2018 16:58)  T(F): 98.4 (09 Jan 2018 04:07), Max: 101.1 (08 Jan 2018 16:58)  HR: 100 (09 Jan 2018 08:25) (91 - 106)  BP: 107/63 (09 Jan 2018 04:07) (102/55 - 108/56)  BP(mean): --  RR: 18 (09 Jan 2018 04:07) (17 - 18)  SpO2: 95% (09 Jan 2018 04:07) (92% - 100%)    PHYSICAL EXAM:    Constitutional: Ill appearing awake and alert, well-developed  male.   HEENT: PERR, EOMI, Normal Hearing, MMM  Neck: Soft and supple, No LAD, No JVD  Respiratory: Scattered rhonchi on bases.   Cardiovascular: S1 and S2, mildly tachycardic, no Murmurs, gallops or rubs  Gastrointestinal: Bowel Sounds present, soft, nontender, nondistended, no guarding, no rebound  Extremities: No peripheral edema  Vascular: 2+ peripheral pulses  Neurological: A/O x 3, no focal deficits  Musculoskeletal: 5/5 strength b/l upper and lower extremities  Skin: No rashes    ALL LABS/ IMAGES REVIEWED.                                   10.3   34.5  )-----------( 99       ( 09 Jan 2018 07:23 )             32.0                10.4   42.7  )-----------( 110      ( 08 Jan 2018 06:51 )             32.1     01-08    136  |  99  |  21  ----------------------------<  119<H>  4.2   |  31  |  0.86    Ca    7.6<L>      08 Jan 2018 06:51        78 yo M, history of chronic O2 dependency, underwent bronchoscopy in October found to have bronchomalacia and mucus plugs. Presents with +Entero/Rhinovirus.    * Acute hypoxic and hypercapnic respiratory failure secondary to Multifactorial; Pneumonia in immunocompromised patient:  - Sepsis - no shock --> Day 9 IV CEFEPIME. Completed Azithro X 3 days for atypical coverage.   - NOW ON VANC day #3. Repeat blood ctx negative on 1/6.  - OFF STEROIDS  - pt is also on Amio;  -  Discussed w/ Dr. Houser ([facility]) --> to start IVIG infusion TODAY.     - Findings on CT Chest of atelectasis, mediastinal adenopathy are consistent with bronchomalacia and lymphoma     * B cell CLL: patient on follow up oncology; WBC ~35K  - He has been treated for Lymphoma in the past and recently saw Oncology with a plan of starting Obiotuzumab (anti CD-20) which also has a side effect of bronchospasm, has not received it yet.    * H/o Afib- current EKG shows sinus tachy. Patient on amio, and metoprolol.   -on amiodarone, metoprolol 25 mg bid with holding parameters.  - INR subtherapeutic and given hx of PE, high risk of recurrence.   - on bridge w/ Lovenox, inc Coumadin 5mg tonight. Daily INR.       * Type 2 DM- on insulin Tresiba 20 units bid at home and Januvia.  - A1c 7.7% -->had hypoglycemia this admission w/ BS 50's thus Lantus decreased to 15U QHS. Monitor.     * Pt on Tenofovir at home /hepatitis- resumed here.     Dispo: remain inpatient, mobilize. Discussed w/ team and family. For IVIG today. May need Palliative care consult for further GOC tomorrow.  Total time > 45 mins.

## 2018-01-09 NOTE — PROGRESS NOTE ADULT - SUBJECTIVE AND OBJECTIVE BOX
Patient is a 77y old  Male who presents with a chief complaint of Fatigue, fever, and shortness of breath x 3 days (01 Jan 2018 14:03)    HPI:  78 y/o man with h/o CLL/lymphoma on treatment, h/o recurrent pneumonia, Afib, HFprF, Hep B, COPD on home O2, DM type 2, prior PE DVT waswas admitted on 1/1 for worsening cough, fever to 101F on multiple occasions during the last 3 days PTA with more fatigue and drowsiness. He reports cough with mucoid expectoration which turned to greenish phlegm. Fever intermittent, not associated with chills or rigor. Has chest tightness a/w cough. no hemoptysis. No h/o sick contacts. Denies wheezing. Patient denies any dizziness or lightheadedness. He felt weak and tired, and per family was getting drowsy with episodes of confusion. While he was in the ED, he received vanco IV and cefepime.    Weak looking  Has dry cough  Denies pain    MEDICATIONS  (STANDING):  ALBUTerol/ipratropium for Nebulization 3 milliLiter(s) Nebulizer every 6 hours  amiodarone    Tablet 100 milliGRAM(s) Oral daily  buDESOnide   0.5 milliGRAM(s) Respule 0.5 milliGRAM(s) Inhalation two times a day  cefepime  IVPB 1000 milliGRAM(s) IV Intermittent every 12 hours  dextrose 5%. 1000 milliLiter(s) (50 mL/Hr) IV Continuous <Continuous>  dextrose 50% Injectable 12.5 Gram(s) IV Push once  dextrose 50% Injectable 25 Gram(s) IV Push once  enoxaparin Injectable 80 milliGRAM(s) SubCutaneous two times a day  gabapentin 300 milliGRAM(s) Oral three times a day  guaiFENesin ER 1200 milliGRAM(s) Oral every 12 hours  immune globulin gamma IVPB 5 Gram(s) IV Intermittent once  insulin glargine Injectable (LANTUS) 10 Unit(s) SubCutaneous at bedtime  insulin lispro (HumaLOG) corrective regimen sliding scale   SubCutaneous three times a day before meals  metoprolol     tartrate 25 milliGRAM(s) Oral daily  montelukast 10 milliGRAM(s) Oral at bedtime  simvastatin 10 milliGRAM(s) Oral at bedtime  tenofovir 300 milliGRAM(s) Oral daily  vancomycin  IVPB 1000 milliGRAM(s) IV Intermittent every 12 hours  vancomycin  IVPB      warfarin 5 milliGRAM(s) Oral once    MEDICATIONS  (PRN):  acetaminophen   Tablet 650 milliGRAM(s) Oral every 6 hours PRN For Temp greater than 38 C (100.4 F)  dextrose Gel 1 Dose(s) Oral once PRN Blood Glucose LESS THAN 70 milliGRAM(s)/deciliter  glucagon  Injectable 1 milliGRAM(s) IntraMuscular once PRN Glucose LESS THAN 70 milligrams/deciliter      Vital Signs Last 24 Hrs  T(C): 37.4 (09 Jan 2018 11:52), Max: 38.4 (08 Jan 2018 16:58)  T(F): 99.3 (09 Jan 2018 11:52), Max: 101.1 (08 Jan 2018 16:58)  HR: 102 (09 Jan 2018 11:52) (91 - 106)  BP: 95/57 (09 Jan 2018 11:52) (95/57 - 108/56)  BP(mean): --  RR: 18 (09 Jan 2018 11:52) (17 - 18)  SpO2: 95% (09 Jan 2018 11:52) (92% - 100%)    Physical Exam:      Vital Signs Last 24 Hrs  T(C): 37.5 (08 Jan 2018 09:08), Max: 37.9 (07 Jan 2018 17:12)  T(F): 99.5 (08 Jan 2018 09:08), Max: 100.3 (07 Jan 2018 17:12)  HR: 98 (08 Jan 2018 13:50) (88 - 105)  BP: 100/53 (08 Jan 2018 09:08) (100/53 - 124/70)  BP(mean): --  RR: 20 (08 Jan 2018 09:08) (18 - 20)  SpO2: 95% (08 Jan 2018 09:08) (95% - 96%)    Physical Exam:      Constitutional: frail looking  HEENT: NC/AT, EOMI, PERRLA  Neck: supple  Back: no tenderness  Respiratory: few crackles at bases  Cardiovascular: S1S2 regular, no murmurs  Abdomen: soft, not tender, not distended, positive BS  Genitourinary: no LN palpable  Rectal: deferred  Musculoskeletal: no muscle tenderness, no joint swelling or tenderness  Extremities: no pedal edema  Neurological: AxOx3, moving all extremities  Skin: no rashes    Labs:                                   10.3   34.5  )-----------( 99       ( 09 Jan 2018 07:23 )             32.0     01-08    136  |  99  |  21  ----------------------------<  119<H>  4.2   |  31  |  0.86    Ca    7.6<L>      08 Jan 2018 06:51       Labs:                        10.4   42.7  )-----------( 110      ( 08 Jan 2018 06:51 )             32.1     01-08    136  |  99  |  21  ----------------------------<  119<H>  4.2   |  31  |  0.86    Ca    7.6<L>      08 Jan 2018 06:51                        11.2   62.4  )-----------( 158      ( 07 Jan 2018 06:15 )             35.2     01-07    139  |  103  |  29<H>  ----------------------------<  41<LL>  4.3   |  29  |  0.95    Ca    7.7<L>      07 Jan 2018 06:15        Rapid Respiratory Viral Panel (01.01.18 @ 15:22)    Rapid RVP Result: Detected: The [employer] RVP Rapid uses polymerase chain reaction (PCR) and melt  curve analysis to screen for adenovirus; coronavirus HKU1, NL63, 229E,  OC43; human metapneumovirus (hMPV); human enterovirus/rhinovirus  (Entero/RV); influenza A; influenza A/H1;influenza A/H3; influenza  A/H1-2009; influenza B; parainfluenza viruses 1, 2, 3, 4; respiratory  syncytial virus; Bordetella pertussis; Mycoplasma pneumoniae; and  Chlamydophila pneumoniae.    Entero/Rhinovirus (RapRVP): Detected: spoke to jayjay fitch rn in er 01/01/18 17:25      Culture - Sputum (collected 03 Jan 2018 17:00)  Source: .Sputum Sputum  Gram Stain (04 Jan 2018 00:03):    Numerous polymorphonuclear leukocytes per low power field    Few Squamous epithelial cells per low power field    Few Gram Positive Cocci in Pairs and Chains per oil power field    Rare Gram Variable Rods per oil power field    Culture - Urine (collected 01 Jan 2018 14:44)  Source: .Urine None  Final Report (02 Jan 2018 22:05):    No growth    Culture - Blood (collected 01 Jan 2018 14:34)  Source: .Blood None  Preliminary Report (03 Jan 2018 03:01):    No growth to date.    Culture - Blood (collected 01 Jan 2018 08:31)  Source: .Blood Blood-Peripheral  Preliminary Report (02 Jan 2018 13:01):    No growth to date.    Culture - Blood (collected 01 Jan 2018 08:31)  Source: .Blood Blood-Peripheral  Preliminary Report (02 Jan 2018 13:01):    No growth to date.        Radiology:  < from: Xray Chest 1 View AP- PORTABLE-Urgent (01.06.18 @ 20:59) >  EXAM:  XR CHEST PORTABLE URGENT 1V                            PROCEDURE DATE:  01/06/2018          INTERPRETATION:  History: Fever and tachycardia.    Single view of the chest was performed.    Comparison is made to prior CT of the chest from January 4, 2018.    Findings:    There is demonstration of patchy airspace opacity within the right mid   and lower lung and along the left lung base. There is a trace left   pleural effusion. Heart size is within normal limits. Chronic   posttraumatic deformities of the right posterior ribs are noted.    Impression:    Unchanged patchy airspace opacity within the right mid and lower lung and   within the left lung base. Small left pleural effusion.      < from: Xray Chest 1 View AP/PA. (01.01.18 @ 09:27) >  Bibasilar airspace opacity in left mid lung airspace opacity is noted.   Findings are concerning for pneumonia given the patient's history. Heart   size appears mildly enlarged. Chronic appearing posttraumatic deformity   of the right proximal humerusis noted.      < from: CT Chest No Cont (01.04.18 @ 10:21) >  Interval worsening of partial atelectasis of the left lower lobe and some   infiltrates and/or atelectatic changes inthe right lower lobe. Clinical   correlation is recommended.        Advanced directives addressed: full resuscitation

## 2018-01-09 NOTE — PROGRESS NOTE ADULT - ASSESSMENT
CLL  Hypogammaglobulinemia  Multiple infections    A/P    Discussed Gammaglobulin with premedications  Rational and risk of therapy discussed  Risks include allergy / hypotension/ respiratory side effects   Patient and family willing to proceed

## 2018-01-09 NOTE — PROGRESS NOTE ADULT - SUBJECTIVE AND OBJECTIVE BOX
INTERVAL HISTORY:    Stable this AM  No new developements    Less dyspnea  Less cough  Less fatigue      Allergies    Privigen (Other (Severe))    Intolerances        MEDICATIONS  (STANDING):  acetaminophen   Tablet 650 milliGRAM(s) Oral once  ALBUTerol/ipratropium for Nebulization 3 milliLiter(s) Nebulizer every 6 hours  amiodarone    Tablet 100 milliGRAM(s) Oral daily  buDESOnide   0.5 milliGRAM(s) Respule 0.5 milliGRAM(s) Inhalation two times a day  cefepime  IVPB 1000 milliGRAM(s) IV Intermittent every 12 hours  dextrose 5%. 1000 milliLiter(s) (50 mL/Hr) IV Continuous <Continuous>  dextrose 50% Injectable 12.5 Gram(s) IV Push once  dextrose 50% Injectable 25 Gram(s) IV Push once  diphenhydrAMINE   Injectable 50 milliGRAM(s) IV Push once  enoxaparin Injectable 80 milliGRAM(s) SubCutaneous two times a day  gabapentin 300 milliGRAM(s) Oral three times a day  guaiFENesin ER 1200 milliGRAM(s) Oral every 12 hours  immune globulin gamma IVPB 25 Gram(s) IV Intermittent once  insulin glargine Injectable (LANTUS) 10 Unit(s) SubCutaneous at bedtime  insulin lispro (HumaLOG) corrective regimen sliding scale   SubCutaneous three times a day before meals  methylPREDNISolone sodium succinate Injectable 125 milliGRAM(s) IV Push once  metoprolol     tartrate 25 milliGRAM(s) Oral daily  montelukast 10 milliGRAM(s) Oral at bedtime  simvastatin 10 milliGRAM(s) Oral at bedtime  tenofovir 300 milliGRAM(s) Oral daily  vancomycin  IVPB 1000 milliGRAM(s) IV Intermittent every 12 hours  vancomycin  IVPB      warfarin 5 milliGRAM(s) Oral once    MEDICATIONS  (PRN):  acetaminophen   Tablet 650 milliGRAM(s) Oral every 6 hours PRN For Temp greater than 38 C (100.4 F)  dextrose Gel 1 Dose(s) Oral once PRN Blood Glucose LESS THAN 70 milliGRAM(s)/deciliter  glucagon  Injectable 1 milliGRAM(s) IntraMuscular once PRN Glucose LESS THAN 70 milligrams/deciliter      Vital Signs Last 24 Hrs  T(C): 36.9 (09 Jan 2018 04:07), Max: 38.4 (08 Jan 2018 16:58)  T(F): 98.4 (09 Jan 2018 04:07), Max: 101.1 (08 Jan 2018 16:58)  HR: 100 (09 Jan 2018 08:25) (91 - 106)  BP: 107/63 (09 Jan 2018 04:07) (102/55 - 108/56)  BP(mean): --  RR: 18 (09 Jan 2018 04:07) (17 - 18)  SpO2: 95% (09 Jan 2018 04:07) (92% - 100%)    PHYSICAL EXAM:    GENERAL: NAD,   HEAD:  Atraumatic, Normocephalic  EYES: EOMI, PERRLA, conjunctiva and sclera clear    NECK: Supple, No JVD, Normal thyroid  NERVOUS SYSTEM:    CHEST/LUNG: Clear to percussion bilaterally; No rales, rhonchi,   HEART: Regular rate and rhythm;   ABDOMEN: Soft, Nontender.  EXTREMITIES:   edema:-  LYMPH: No lymphadenopathy noted        LABS:                        10.3   34.5  )-----------( 99       ( 09 Jan 2018 07:23 )             32.0     01-08    136  |  99  |  21  ----------------------------<  119<H>  4.2   |  31  |  0.86    Ca    7.6<L>      08 Jan 2018 06:51      PT/INR - ( 09 Jan 2018 07:23 )   PT: 16.7 sec;   INR: 1.53 ratio

## 2018-01-10 ENCOUNTER — TRANSCRIPTION ENCOUNTER (OUTPATIENT)
Age: 78
End: 2018-01-10

## 2018-01-10 VITALS — WEIGHT: 169.09 LBS

## 2018-01-10 LAB
ANION GAP SERPL CALC-SCNC: 5 MMOL/L — SIGNIFICANT CHANGE UP (ref 5–17)
BUN SERPL-MCNC: 21 MG/DL — SIGNIFICANT CHANGE UP (ref 7–23)
CALCIUM SERPL-MCNC: 7.7 MG/DL — LOW (ref 8.5–10.1)
CHLORIDE SERPL-SCNC: 96 MMOL/L — SIGNIFICANT CHANGE UP (ref 96–108)
CO2 SERPL-SCNC: 31 MMOL/L — SIGNIFICANT CHANGE UP (ref 22–31)
CREAT SERPL-MCNC: 0.87 MG/DL — SIGNIFICANT CHANGE UP (ref 0.5–1.3)
GLUCOSE BLDC GLUCOMTR-MCNC: 335 MG/DL — HIGH (ref 70–99)
GLUCOSE BLDC GLUCOMTR-MCNC: 452 MG/DL — CRITICAL HIGH (ref 70–99)
GLUCOSE SERPL-MCNC: 316 MG/DL — HIGH (ref 70–99)
HCT VFR BLD CALC: 32.9 % — LOW (ref 39–50)
HGB BLD-MCNC: 10.6 G/DL — LOW (ref 13–17)
INR BLD: 2.01 RATIO — HIGH (ref 0.88–1.16)
MCHC RBC-ENTMCNC: 27.7 PG — SIGNIFICANT CHANGE UP (ref 27–34)
MCHC RBC-ENTMCNC: 32.2 GM/DL — SIGNIFICANT CHANGE UP (ref 32–36)
MCV RBC AUTO: 86 FL — SIGNIFICANT CHANGE UP (ref 80–100)
PLATELET # BLD AUTO: 91 K/UL — LOW (ref 150–400)
POTASSIUM SERPL-MCNC: 4.6 MMOL/L — SIGNIFICANT CHANGE UP (ref 3.5–5.3)
POTASSIUM SERPL-SCNC: 4.6 MMOL/L — SIGNIFICANT CHANGE UP (ref 3.5–5.3)
PROTHROM AB SERPL-ACNC: 22 SEC — HIGH (ref 9.8–12.7)
RBC # BLD: 3.82 M/UL — LOW (ref 4.2–5.8)
RBC # FLD: 15.9 % — HIGH (ref 10.3–14.5)
SODIUM SERPL-SCNC: 132 MMOL/L — LOW (ref 135–145)
VANCOMYCIN TROUGH SERPL-MCNC: 18.8 UG/ML — SIGNIFICANT CHANGE UP (ref 10–20)
WBC # BLD: 35.5 K/UL — HIGH (ref 3.8–10.5)
WBC # FLD AUTO: 35.5 K/UL — HIGH (ref 3.8–10.5)

## 2018-01-10 RX ORDER — METOPROLOL TARTRATE 50 MG
1 TABLET ORAL
Qty: 0 | Refills: 0 | COMMUNITY
Start: 2018-01-10

## 2018-01-10 RX ORDER — METOPROLOL TARTRATE 50 MG
1 TABLET ORAL
Qty: 0 | Refills: 0 | COMMUNITY

## 2018-01-10 RX ADMIN — Medication 1200 MILLIGRAM(S): at 05:54

## 2018-01-10 RX ADMIN — NYSTATIN CREAM 1 APPLICATION(S): 100000 CREAM TOPICAL at 06:44

## 2018-01-10 RX ADMIN — Medication 250 MILLIGRAM(S): at 00:33

## 2018-01-10 RX ADMIN — Medication 3 MILLILITER(S): at 00:37

## 2018-01-10 RX ADMIN — GABAPENTIN 300 MILLIGRAM(S): 400 CAPSULE ORAL at 13:49

## 2018-01-10 RX ADMIN — Medication 0.5 MILLIGRAM(S): at 11:02

## 2018-01-10 RX ADMIN — ENOXAPARIN SODIUM 80 MILLIGRAM(S): 100 INJECTION SUBCUTANEOUS at 05:55

## 2018-01-10 RX ADMIN — Medication 25 MILLIGRAM(S): at 05:54

## 2018-01-10 RX ADMIN — Medication 8: at 08:16

## 2018-01-10 RX ADMIN — GABAPENTIN 300 MILLIGRAM(S): 400 CAPSULE ORAL at 05:54

## 2018-01-10 RX ADMIN — Medication 3 MILLILITER(S): at 11:03

## 2018-01-10 RX ADMIN — CEFEPIME 100 MILLIGRAM(S): 1 INJECTION, POWDER, FOR SOLUTION INTRAMUSCULAR; INTRAVENOUS at 08:15

## 2018-01-10 RX ADMIN — TENOFOVIR DISOPROXIL FUMARATE 300 MILLIGRAM(S): 300 TABLET, FILM COATED ORAL at 11:37

## 2018-01-10 RX ADMIN — Medication 12: at 12:05

## 2018-01-10 RX ADMIN — Medication 250 MILLIGRAM(S): at 05:55

## 2018-01-10 RX ADMIN — AMIODARONE HYDROCHLORIDE 100 MILLIGRAM(S): 400 TABLET ORAL at 05:54

## 2018-01-10 NOTE — DISCHARGE NOTE ADULT - PLAN OF CARE
Follow up with Dr. Houser in 3 weeks - You will need IVIG treatment every 3 weeks in Dr. Houser's office A-fib - Continue with Coumadin, INR checks  - Decreased Metoprolol dose from 50mg BID to 25mg once a day  - Continue with Amiodarone

## 2018-01-10 NOTE — DISCHARGE NOTE ADULT - CARE PROVIDER_API CALL
Ale Houser), Hematology; Internal Medicine; Medical Oncology  [address] [facility] [facility]  [address] [place] [address]  Phone: [phone]  Fax: [phone]    Imtiaz Salazar (MD), Internal Medicine  [address] [place] [address]  Phone: [phone]    Za Orozco), Medicine  [address] [facility]  [address]  [place] [address]  Phone: [phone]  Fax: [phone]

## 2018-01-10 NOTE — DISCHARGE NOTE ADULT - CARE PLAN
1 Principal Discharge DX:	B-cell lymphoma, unspecified B-cell lymphoma type, unspecified body region  Goal:	Follow up with Dr. Houser in 3 weeks  Instructions for follow-up, activity and diet:	- You will need IVIG treatment every 3 weeks in Dr. Houser's office  Goal:	A-fib  Instructions for follow-up, activity and diet:	- Continue with Coumadin, INR checks  - Decreased Metoprolol dose from 50mg BID to 25mg once a day  - Continue with Amiodarone

## 2018-01-10 NOTE — PHYSICAL THERAPY INITIAL EVALUATION ADULT - ADDITIONAL COMMENTS
CT Chest No Cont :Interval worsening of partial atelectasis of the left lower lobe and some   infiltrates and/or atelectatic changes in the right lower lobe. Xray Chest:Bibasilar airspace opacity in left mid lung airspace opacity is noted CT Chest No Cont :Interval worsening of partial atelectasis of the left lower lobe and some   infiltrates and/or atelectatic changes in the right lower lobe. Xray Chest:Bibasilar airspace opacity in left mid lung airspace opacity is noted  Lives with sister and sisters family and mother in a house. used cane and O2 PRN. /ADL and IADL's. Bathroom and bedroom on first floor. 3STE with 1 HR.

## 2018-01-10 NOTE — PROGRESS NOTE ADULT - PROVIDER SPECIALTY LIST ADULT
Heme/Onc
Heme/Onc
Hospitalist
Infectious Disease
Pulmonology
Hospitalist
Hospitalist
Infectious Disease

## 2018-01-10 NOTE — DISCHARGE NOTE ADULT - MEDICATION SUMMARY - MEDICATIONS TO TAKE
I will START or STAY ON the medications listed below when I get home from the hospital:    amiodarone 200 mg oral tablet  -- 0.5 tab(s) by mouth once a day  -- Indication: For for your heart    warfarin 2.5 mg oral tablet  -- 1 tab(s) by mouth once a day (at bedtime)  -- Indication: For blood thinner    gabapentin 300 mg oral capsule  -- 1 cap(s) by mouth 3 times a day  -- Indication: For for nerve pain    insulin glargine  -- 20 unit(s) injectable 2 times a day  -- Indication: For insulin    Januvia 50 mg oral tablet  -- 1 tab(s) by mouth once a day  -- Indication: For for diabetes    simvastatin 10 mg oral tablet  -- 1 tab(s) by mouth once a day (at bedtime)  -- Indication: For for your cholesterol    Viread 300 mg oral tablet  -- 1 tab(s) by mouth once a day  -- Indication: For for Hep B    metoprolol tartrate 25 mg oral tablet  -- 1 tab(s) by mouth once a day  -- Indication: For for your heart    albuterol 2.5 mg/3 mL (0.083%) inhalation solution  -- 3 milliliter(s) inhaled 2 times a day  -- Indication: For inhaler    guaiFENesin 1200 mg oral tablet, extended release  -- 1 tab(s) by mouth every 12 hours  -- Indication: For expectorant    montelukast 10 mg oral tablet  -- 1 tab(s) by mouth once a day (at bedtime)  -- Indication: For singulair    multivitamin  -- 1 tab(s) by mouth once a day  -- Indication: For vitamin I will START or STAY ON the medications listed below when I get home from the hospital:    amiodarone 200 mg oral tablet  -- 0.5 tab(s) by mouth once a day  -- Indication: For for your heart    warfarin 2.5 mg oral tablet  -- 1 tab(s) by mouth once a day (at bedtime)  -- Indication: For blood thinner    gabapentin 300 mg oral capsule  -- 1 cap(s) by mouth 3 times a day  -- Indication: For neurontin    insulin glargine  -- 20 unit(s) injectable 2 times a day  -- Indication: For for diabetes    Januvia 50 mg oral tablet  -- 1 tab(s) by mouth once a day  -- Indication: For for diabetes    simvastatin 10 mg oral tablet  -- 1 tab(s) by mouth once a day (at bedtime)  -- Indication: For for your cholesterol    Viread 300 mg oral tablet  -- 1 tab(s) by mouth once a day  -- Indication: For for Hepatitis B    metoprolol tartrate 25 mg oral tablet  -- 1 tab(s) by mouth once a day  -- Indication: For for your heart    albuterol 2.5 mg/3 mL (0.083%) inhalation solution  -- 3 milliliter(s) inhaled 2 times a day  -- Indication: For inhaler    guaiFENesin 1200 mg oral tablet, extended release  -- 1 tab(s) by mouth every 12 hours  -- Indication: For muciniex    montelukast 10 mg oral tablet  -- 1 tab(s) by mouth once a day (at bedtime)  -- Indication: For singulair    multivitamin  -- 1 tab(s) by mouth once a day  -- Indication: For vitamin

## 2018-01-10 NOTE — PHYSICAL THERAPY INITIAL EVALUATION ADULT - PERTINENT HX OF CURRENT PROBLEM, REHAB EVAL
admitted on 1/1 for worsening cough, fever to 101F on multiple occasions during the last 3 days PTA with more fatigue and drowsiness. He reports cough with mucoid expectoration which turned to greenish phlegm. Fever intermittent, not associated with chills or rigor. Has chest tightness a/w cough.. He felt weak and tired, and per family was getting drowsy with episodes of confusion.

## 2018-01-10 NOTE — DISCHARGE NOTE ADULT - HOSPITAL COURSE
HPI from ED: 76 y/o  man with h/o CLL/lymphoma on treatment, h/o recurrent pneumonia, Afib, HFprF, Hep B, COPD on home O2, DM type 2, prior PE DVT who was admitted on 1/1 for worsening cough, fever to 101F on multiple occasions during the last 3 days PTA with more fatigue and drowsiness. He reports cough with mucoid expectoration which turned to greenish phlegm.    Hospital course: Patient admitted with recurrent PNA with minimal improvement on IV abx. He had bronch in the fall of 2017 for similar episode which revealed bronchomalecia and thus unable to expectorate and clear secretions thus susceptible to recurrent infections in the setting of CLL. Was code SEPSIS and was on IV Cefepime x 10 days, Vanco x 4 days.        1/10/18 - Pt. seen and examined in the chair with wife at bedside. Pt. much improved. Tolerated IVIG well. Ambulated in the halls with PT. Wants to go home. Cough much improved.     ROS: neg unless stated above.     Vital Signs Last 24 Hrs  T(C): 36.9 (10 Tee 2018 10:34), Max: 37.4 (09 Jan 2018 11:52)  T(F): 98.4 (10 Tee 2018 10:34), Max: 99.3 (09 Jan 2018 11:52)  HR: 91 (10 Tee 2018 11:31) (86 - 133)  BP: 102/53 (10 Tee 2018 10:34) (95/57 - 115/69)  BP(mean): 73 (10 Tee 2018 04:41) (73 - 73)  RR: 18 (10 Tee 2018 10:34) (18 - 20)  SpO2: 98% (10 Tee 2018 10:34) (95% - 98%)    PHYSICAL EXAM:    Constitutional: NAD, well-developed   HEENT: Normal Hearing, MMM  Neck: Soft and supple, No LAD, No JVD  Respiratory: Lung sounds dimished throughout, wheezing which is chronic  Cardiovascular: S1 and S2, RRR  Gastrointestinal: Bowel Sounds present, soft, nontender, nondistended, no guarding, no rebound  Extremities: No peripheral edema  Vascular: 2+ peripheral pulses  Neurological: A/O x 3, no focal deficits  Musculoskeletal: 5/5 strength b/l upper and lower extremities      ALL LABS/ IMAGES REVIEWED.             HOSPITAL COURSE BY PROBLEM:  	  * Acute hypoxic and hypercapnic respiratory failure secondary to Multifactorial; Pneumonia in immunocompromised patient  - Sepsis - no shock --> completed 10 days of IV CEFEPIME, Completed Azithro X 3 days for atypical coverage, completed 4 days of IV Vancomycin.  - Repeat blood ctx negative on 1/6.  - Discussed with Dr Garcia --- no further antibiotics on discharge.   - OFF STEROIDS  - Findings on CT Chest of atelectasis, mediastinal adenopathy are consistent with bronchomalacia and lymphoma   - pulm consult appreciated --- f/u with Dr. Salazar as outpt  - pt is on home O2, 2-3L    * B cell CLL  - s/p IVIG, tolerated well   - follow up with Dr. Houser --- will need IVIG infusions in the office Q 3 weeks  - WBC ~35 2/2 CLL      * H/o Afib  - current NSR on tele  - c/w amiodarone  - c/w metoprolol 25 mg QD (pt was taking 50mg BID but BP has been low in hospital and VSS on metoprolol 25mg QD since admission)  - INR therapeutic  - c/w Coumadin      * Type 2 DM  - c/w insulin Tresiba 20 units bid at home and Januvia.      * Hep B  - c/w Tenofovir     Dispo: discharge home today --- discussed at length with pt and pts wife. They have O2 setup in home already. They do not want home care. Walked well with physical therapy and have lots of family support. HPI from ED: 76 y/o  man with h/o CLL/lymphoma on treatment, h/o recurrent pneumonia, Afib, HFprF, Hep B, COPD on home O2, DM type 2, prior PE DVT who was admitted on 1/1 for worsening cough, fever to 101F on multiple occasions during the last 3 days PTA with more fatigue and drowsiness. He reports cough with mucoid expectoration which turned to greenish phlegm.    Hospital course: Patient admitted with recurrent PNA with minimal improvement on IV abx. He had bronch in the fall of 2017 for similar episode which revealed bronchomalecia and thus unable to expectorate and clear secretions thus susceptible to recurrent infections in the setting of CLL. Was code SEPSIS and was on IV Cefepime x 10 days, Vanco x 4 days.        1/10/18 - Pt. seen and examined in the chair with wife at bedside. Pt. much improved. Tolerated IVIG well. Ambulated in the halls with PT. Wants to go home. Cough much improved.     ROS: neg unless stated above.     Vital Signs Last 24 Hrs  T(C): 36.9 (10 Tee 2018 10:34), Max: 37.4 (09 Jan 2018 11:52)  T(F): 98.4 (10 Tee 2018 10:34), Max: 99.3 (09 Jan 2018 11:52)  HR: 91 (10 Tee 2018 11:31) (86 - 133)  BP: 102/53 (10 Tee 2018 10:34) (95/57 - 115/69)  BP(mean): 73 (10 Tee 2018 04:41) (73 - 73)  RR: 18 (10 Tee 2018 10:34) (18 - 20)  SpO2: 98% (10 Tee 2018 10:34) (95% - 98%)    PHYSICAL EXAM:    Constitutional: NAD, well-developed   HEENT: Normal Hearing, MMM  Neck: Soft and supple, No LAD, No JVD  Respiratory: Lung sounds dimished throughout, wheezing which is chronic  Cardiovascular: S1 and S2, RRR  Gastrointestinal: Bowel Sounds present, soft, nontender, nondistended, no guarding, no rebound  Extremities: No peripheral edema  Vascular: 2+ peripheral pulses  Neurological: A/O x 3, no focal deficits  Musculoskeletal: 5/5 strength b/l upper and lower extremities      ALL LABS/ IMAGES REVIEWED.             HOSPITAL COURSE BY PROBLEM:  	  * Acute hypoxic and hypercapnic respiratory failure secondary to Multifactorial; Pneumonia in immunocompromised patient  - Sepsis - no shock --> completed 10 days of IV CEFEPIME, Completed Azithro X 3 days for atypical coverage, completed 4 days of IV Vancomycin.  - Repeat blood ctx negative on 1/6.  - Discussed with Dr Garcia --- no further antibiotics on discharge.   - OFF STEROIDS  - Findings on CT Chest of atelectasis, mediastinal adenopathy are consistent with bronchomalacia and lymphoma   - pulm consult appreciated --- f/u with Dr. Salazar as outpt  - pt is on home O2, 2-3L    * B cell CLL  - s/p IVIG, tolerated well   - follow up with Dr. Houser --- will need IVIG infusions in the office Q 3 weeks  - WBC ~35 2/2 CLL      * H/o Afib  - current NSR on tele  - c/w amiodarone  - c/w metoprolol 25 mg QD (pt was taking 50mg BID but BP has been low in hospital and VSS on metoprolol 25mg QD since admission)  - INR therapeutic  - c/w Coumadin      * Type 2 DM  - pt had episode of hypoglycemia on 1/7 so Lantus dose was cut in half from 20 units to 10 units  - 1/10 - now pt with high blood sugar, plan for discharge today and patient will resume his home regimen ---> c/w insulin Tresiba 20 units bid at home and Januvia.      * Hep B  - c/w Tenofovir     Dispo: discharge home today --- discussed at length with pt and pts wife. They have O2 setup in home already. They do not want home care. Walked well with physical therapy and have lots of family support. HPI from ED: 78 y/o  man with h/o CLL/lymphoma on treatment, h/o recurrent pneumonia, Afib, HFprF, Hep B, COPD on home O2, DM type 2, prior PE DVT who was admitted on 1/1 for worsening cough, fever to 101F on multiple occasions during the last 3 days PTA with more fatigue and drowsiness. He reports cough with mucoid expectoration which turned to greenish phlegm.    Hospital course: Patient admitted with recurrent PNA with minimal improvement on IV abx. He had bronch in the fall of 2017 for similar episode which revealed bronchomalecia and thus unable to expectorate and clear secretions thus susceptible to recurrent infections in the setting of CLL. Was code SEPSIS and was on IV Cefepime x 10 days, Vanco x 4 days.        1/10/18 - Pt. seen and examined in the chair with wife at bedside. Pt. much improved. Tolerated IVIG well. Ambulated in the halls with PT. Wants to go home. Cough much improved.     ROS: neg unless stated above.     Vital Signs Last 24 Hrs  T(C): 36.9 (10 Tee 2018 10:34), Max: 37.4 (09 Jan 2018 11:52)  T(F): 98.4 (10 Tee 2018 10:34), Max: 99.3 (09 Jan 2018 11:52)  HR: 91 (10 Tee 2018 11:31) (86 - 133)  BP: 102/53 (10 Tee 2018 10:34) (95/57 - 115/69)  BP(mean): 73 (10 Tee 2018 04:41) (73 - 73)  RR: 18 (10 Tee 2018 10:34) (18 - 20)  SpO2: 98% (10 Tee 2018 10:34) (95% - 98%)    PHYSICAL EXAM:    Constitutional: NAD, well-developed   HEENT: Normal Hearing, MMM  Neck: Soft and supple, No LAD, No JVD  Respiratory: Lung sounds dimished throughout, wheezing which is chronic  Cardiovascular: S1 and S2, RRR  Gastrointestinal: Bowel Sounds present, soft, nontender, nondistended, no guarding, no rebound  Extremities: No peripheral edema  Vascular: 2+ peripheral pulses  Neurological: A/O x 3, no focal deficits  Musculoskeletal: 5/5 strength b/l upper and lower extremities      ALL LABS/ IMAGES REVIEWED.             HOSPITAL COURSE BY PROBLEM:  	  * Acute hypoxic and hypercapnic respiratory failure secondary to Multifactorial; Pneumonia in immunocompromised patient  - Sepsis - no shock --> completed 10 days of IV CEFEPIME, Completed Azithro X 3 days for atypical coverage, completed 4 days of IV Vancomycin.  - Repeat blood ctx negative on 1/6.  - Discussed with Dr Garcia --- no further antibiotics on discharge.   - OFF STEROIDS  - Findings on CT Chest of atelectasis, mediastinal adenopathy are consistent with bronchomalacia and lymphoma   - pulm consult appreciated --- f/u with Dr. Salazar as outpt  - pt is on home O2, 2-3L    * B cell CLL  - s/p IVIG, tolerated well   - follow up with Dr. Houser --- will need IVIG infusions in the office Q 3 weeks  - WBC ~35 2/2 CLL      * H/o Afib  - current NSR on tele  - c/w amiodarone  - c/w metoprolol 25 mg QD (pt was taking 50mg BID but BP has been low in hospital and VSS on metoprolol 25mg QD since admission)  - INR therapeutic  - c/w Coumadin      * Type 2 DM  - pt had episode of hypoglycemia on 1/7 so Lantus dose was cut in half from 20 units to 10 units  - 1/10 - now pt with high blood sugar, plan for discharge today and patient will resume his home regimen ---> c/w insulin Tresiba 20 units bid at home and Januvia.      * Hep B  - c/w Tenofovir     Dispo: discharge home today --- discussed at length with pt and pts wife. They have O2 setup in home already. They do not want home care. Walked well with physical therapy and have lots of family support.      Total dc time 55 min   PCP notified HPI from ED: 76 y/o  man with h/o CLL/lymphoma on treatment, h/o recurrent pneumonia, Afib, HFprF, Hep B, COPD on home O2, DM type 2, prior PE DVT who was admitted on 1/1 for worsening cough, fever to 101F on multiple occasions during the last 3 days PTA with more fatigue and drowsiness. He reports cough with mucoid expectoration which turned to greenish phlegm.    Hospital course: Patient admitted with recurrent PNA with minimal improvement on IV abx. He had bronch in the fall of 2017 for similar episode which revealed bronchomalecia and thus unable to expectorate and clear secretions thus susceptible to recurrent infections in the setting of CLL. Was code SEPSIS and was on IV Cefepime x 10 days, Vanco x 4 days.        1/10/18 - Pt. seen and examined in the chair with wife at bedside. Pt. much improved. Tolerated IVIG well. Ambulated in the halls with PT. Wants to go home. Cough much improved.     ROS: neg unless stated above.     Vital Signs Last 24 Hrs  T(C): 36.9 (10 Tee 2018 10:34), Max: 37.4 (09 Jan 2018 11:52)  T(F): 98.4 (10 Tee 2018 10:34), Max: 99.3 (09 Jan 2018 11:52)  HR: 91 (10 Tee 2018 11:31) (86 - 133)  BP: 102/53 (10 Tee 2018 10:34) (95/57 - 115/69)  BP(mean): 73 (10 Tee 2018 04:41) (73 - 73)  RR: 18 (10 Tee 2018 10:34) (18 - 20)  SpO2: 98% (10 Tee 2018 10:34) (95% - 98%)    PHYSICAL EXAM:    Constitutional: NAD, well-developed   HEENT: Normal Hearing, MMM  Neck: Soft and supple, No LAD, No JVD  Respiratory: Lung sounds dimished throughout, wheezing which is chronic  Cardiovascular: S1 and S2, RRR  Gastrointestinal: Bowel Sounds present, soft, nontender, nondistended, no guarding, no rebound  Extremities: No peripheral edema  Vascular: 2+ peripheral pulses  Neurological: A/O x 3, no focal deficits  Musculoskeletal: 5/5 strength b/l upper and lower extremities      ALL LABS/ IMAGES REVIEWED.             HOSPITAL COURSE BY PROBLEM:  	  * Acute hypoxic and hypercapnic respiratory failure secondary to Multifactorial; Pneumonia in immunocompromised patient  - Sepsis - no shock --> completed 10 days of IV CEFEPIME, Completed Azithro X 3 days for atypical coverage, completed 4 days of IV Vancomycin.  - Repeat blood ctx negative on 1/6.  - Discussed with Dr Garcia --- no further antibiotics on discharge.   - OFF STEROIDS  - Findings on CT Chest of atelectasis, mediastinal adenopathy are consistent with bronchomalacia and lymphoma   - pulm consult appreciated --- f/u with Dr. Salazar as outpt  - pt is on home O2, 2-3L    * B cell CLL  - s/p IVIG, tolerated well   - follow up with Dr. Houser --- will need IVIG infusions in the office Q 3 weeks  - WBC ~35 2/2 CLL      * H/o Afib  - current NSR on tele  - c/w amiodarone  - c/w metoprolol 25 mg QD (pt was taking 50mg BID but BP has been low in hospital and VSS on metoprolol 25mg QD since admission)  - INR therapeutic  - c/w Coumadin      * Type 2 DM  *Pseudohyponetremia  - pt had episode of hypoglycemia on 1/7 so Lantus dose was cut in half from 20 units to 10 units  - 1/10 - now pt with high blood sugar, plan for discharge today and patient will resume his home regimen ---> c/w insulin Tresiba 20 units bid at home and Januvia.      * Hep B  - c/w Tenofovir     Dispo: discharge home today --- discussed at length with pt and pts wife. They have O2 setup in home already. They do not want home care. Walked well with physical therapy and have lots of family support.      Total dc time 55 min   PCP notified

## 2018-01-10 NOTE — PROGRESS NOTE ADULT - ASSESSMENT
1) Hypercapnic Respiratory Failure with Hypoxemia  2) Bronchomalacia / Bronchiectasis  3) +Entero/Rhinovirus  4) Left lung opacity on CXR  5) Shortness of Breath  6) COPD/Emphysema  7) History of Lymphoma  8) History of AF, on Chronic Anticoagulation for VTE/AF  9) Leukocytosis    1/10/18  78 yo M, history of chronic O2 dependency, underwent bronchoscopy in October found to have bronchomalacia and mucus plugs. Presents with +Entero/Rhinovirus.  He has been treated for Lymphoma in the past and recently saw Oncology with a plan of starting Obiotuzumab (anti CD-20) which also has a side effect of bronchospasm, has not received it yet. Code sepsis called on 1/6  Prognosis remains guarded  IV Ig  Continue Duoneb q 6 hours,   ID on board; now on cefepime and vancomycin  Patient is receiving Coumadin 2.5 mg nightly  New ABG shows improved O2 7.333/50/59-->7.34/44/106 Goal SaO2 >88% -->Back on Home O2 levels of 2-3 L NC O2  Findings on CT Chest of atelectasis, mediastinal adenopathy are consistent with bronchomalacia and lymphoma respectively   Palliative care consult; discussed with Dr. Mcmillan  Ambulate as tolerated  Thank you for the consult

## 2018-01-10 NOTE — PROGRESS NOTE ADULT - ASSESSMENT
76 y/o man with h/o CLL/lymphoma on treatment, h/o recurrent pneumonia, Afib, HFprF, Hep B, COPD on home O2, DM type 2, prior PE DVT waswas admitted on 1/1 for worsening cough, fever to 101F on multiple occasions during the last 3 days PTA with more fatigue and drowsiness. He reports cough with mucoid expectoration which turned to greenish phlegm. Fever intermittent, not associated with chills or rigor. Has chest tightness a/w cough. no hemoptysis. No h/o sick contacts. Denies wheezing. Patient denies any dizziness or lightheadedness. He felt weak and tired, and per family was getting drowsy with episodes of confusion. While he was in the ED, he received vanco IV and cefepime.    1. Low grade fever resolving. Dyspnea resolved. Persistent bibasilar opacities. Possible pneumonia. End stage COPD. Acute on chronic respiratory failure. s/p URI with rhinovirus. Immunocompromised host.  -respiratory function remains frail, but is likely at baseline  -on cefepime 1 gm IV q12h # 10 and vancomycin IV # 4  -tolerating abx well so far; no side effects noted  -vancomycin level is therapeutic  -leukocytosis likely related to CLL  -complete antibiotic coverage therapy today  -monitor temps  -f/u CBC  -supportive care    2. Other issues:   -care per medicine

## 2018-01-10 NOTE — PHYSICAL THERAPY INITIAL EVALUATION ADULT - GENERAL OBSERVATIONS, REHAB EVAL
Pre session: supine in bed with O2 via nc/monitor and IV in place. call bell and phone in reach. family by bedside and helped with translation. No c/o pain. Post session: all lines intact, seated in chair with chair alarm on. call bell and phone in reach. family present. Nsg aware. Tolerated well and would benefit from further skilled PT for functional mobility training.

## 2018-01-10 NOTE — DISCHARGE NOTE ADULT - PATIENT PORTAL LINK FT
“You can access the [facility] Patient Portal, offered by [facility], by registering with the following website: [email or web address]”

## 2018-01-10 NOTE — PROGRESS NOTE ADULT - SUBJECTIVE AND OBJECTIVE BOX
Patient is a 77y old  Male who presents with a chief complaint of Fatigue, fever, and shortness of breath x 3 days (2018 14:03)      HPI:  Patient is 77 year old man with complex past medical history including CLL/lymphoma on treatment, h/o recurrent pneumonia, Afib, HFprF, Hep B, COPD, type 2DM, H/o PE DVT was brought in as his family noticed worsening cough, fever 101 on multiple occassions during the last 3 days with more fatigue and drowsiness.  He was here in , with pneumonia s/p bronch at that time s/o thick mucus with bronchomalacia.  Patient this time has cough with mucoid expectoration which turned to greenish phlegm. Fever intermittent, not associated with chills or rigor.  No nausea or vomiting. C/o chest tightness a/w cough. no hemoptysis. No h/o sick contacts. Denies wheezing. Patient on COPD meds, continued it as usual.  Patient denies any dizziness or lightheadedness.  He felt weak and tired, and per family was getting drowsy with episodes of confusion.  He is on follow up with Oncologist and was being planned to get next treatment this week.  While he was in the ED, his CXR showed left lower and midzone infiltrates. S/p vanco and cefepime. (2018 14:03)  Evaluated on 1/2        1/10/18  Patient still feels weak  Compliant with BiPAP overnight  No acute events occurred TMax in last 24 hours 99.3    PAST MEDICAL & SURGICAL HISTORY:  Hypogammaglobulinemia: received IVIG  Hepatitis B virus infection, unspecified chronicity: retrovirals  Chronic diastolic congestive heart failure  Essential hypertension  DM type 2 (diabetes mellitus, type 2)  B-cell lymphoma, unspecified B-cell lymphoma type, unspecified body region: CLL stage 4/SLL; met NHL  Atrial fibrillation, unspecified type  COPD (chronic obstructive pulmonary disease)  History of esophageal dilatation  S/P cholecystectomy  H/O prostatectomy      MEDICATIONS  (STANDING):  ALBUTerol/ipratropium for Nebulization 3 milliLiter(s) Nebulizer every 6 hours  amiodarone    Tablet 100 milliGRAM(s) Oral daily  buDESOnide   0.5 milliGRAM(s) Respule 0.5 milliGRAM(s) Inhalation two times a day  cefepime  IVPB 1000 milliGRAM(s) IV Intermittent every 12 hours  dextrose 5%. 1000 milliLiter(s) (50 mL/Hr) IV Continuous <Continuous>  dextrose 50% Injectable 12.5 Gram(s) IV Push once  dextrose 50% Injectable 25 Gram(s) IV Push once  enoxaparin Injectable 80 milliGRAM(s) SubCutaneous two times a day  gabapentin 300 milliGRAM(s) Oral three times a day  guaiFENesin ER 1200 milliGRAM(s) Oral every 12 hours  insulin glargine Injectable (LANTUS) 10 Unit(s) SubCutaneous at bedtime  insulin lispro (HumaLOG) corrective regimen sliding scale   SubCutaneous three times a day before meals  metoprolol     tartrate 25 milliGRAM(s) Oral daily  montelukast 10 milliGRAM(s) Oral at bedtime  nystatin Powder 1 Application(s) Topical two times a day  simvastatin 10 milliGRAM(s) Oral at bedtime  tenofovir 300 milliGRAM(s) Oral daily  vancomycin  IVPB 1000 milliGRAM(s) IV Intermittent every 12 hours  vancomycin  IVPB      warfarin 3 milliGRAM(s) Oral daily    MEDICATIONS  (PRN):  acetaminophen   Tablet 650 milliGRAM(s) Oral every 6 hours PRN For Temp greater than 38 C (100.4 F)  dextrose Gel 1 Dose(s) Oral once PRN Blood Glucose LESS THAN 70 milliGRAM(s)/deciliter  glucagon  Injectable 1 milliGRAM(s) IntraMuscular once PRN Glucose LESS THAN 70 milligrams/deciliter  meperidine     Injectable 12.5 milliGRAM(s) IV Push every 30 minutes PRN Rigors / Shakes          FAMILY HISTORY:  Family history of liver cancer (Father): father  85 liver CA  Family history of coronary arteriosclerosis (Mother): Mom  of MI age 68      REVIEW OF SYSTEM:  Shortness of breath, otherwise 12 point ROS negative    Vital Signs Last 24 Hrs  T(C): 37.1 (10 Tee 2018 04:41), Max: 37.4 (2018 10:10)  T(F): 98.7 (10 Tee 2018 04:41), Max: 99.3 (2018 10:10)  HR: 86 (10 Tee 2018 04:41) (86 - 133)  BP: 108/62 (10 Tee 2018 04:41) (95/57 - 115/69)  BP(mean): 73 (10 Tee 2018 04:41) (73 - 73)  RR: 19 (10 Tee 2018 04:41) (18 - 20)  SpO2: 96% (10 Tee 2018 04:41) (95% - 96%)      PHYSICAL EXAM  General Appearance: cooperative, no acute distress,   HEENT: PERRL, conjunctiva clear, EOM's intact, non injected pharynx, no exudate, TM   normal  Neck: Supple, , no adenopathy, thyroid: not enlarged, no carotid bruit or JVD  Back: Symmetric, no  tenderness,no soft tissue tenderness  Lungs: Bilateral upper lobe wheezing mild, scattered rhonchi in the lower lobes LLL>RLL  Heart: Regular rate and rhythm, S1, S2 normal, no murmur, rub or gallop  Abdomen: Soft, non-tender, bowel sounds active , no hepatosplenomegaly  Extremities: no cyanosis or edema, no joint swelling  Skin: Skin color, texture normal, no rashes   Neurologic: Alert and oriented X3 , cranial nerves intact, sensory and motor normal,    ECG:    LABS:                                   10.6   35.5  )-----------( 91       ( 10 Tee 2018 05:37 )             32.9   01-10    132<L>  |  96  |  21  ----------------------------<  316<H>  4.6   |  31  |  0.87    Ca    7.7<L>      10 Tee 2018 05:37               PT/INR - ( 2018 08:31 )   PT: 29.8 sec;   INR: 2.70 ratio         PTT - ( 2018 08:31 )  PTT:38.1 sec  Urinalysis Basic - ( 2018 14:44 )    Color: Yellow / Appearance: Clear / S.010 / pH: x  Gluc: x / Ketone: Negative  / Bili: Negative / Urobili: Negative mg/dL   Blood: x / Protein: 30 mg/dL / Nitrite: Negative   Leuk Esterase: Negative / RBC: 0-2 /HPF / WBC 0-2   Sq Epi: x / Non Sq Epi: x / Bacteria: Moderate      ABG - ( 2018 06:41 )  pH: 7.33  /  pCO2: 50    /  pO2: 59    / HCO3: 25    / Base Excess: -.4   /  SaO2: 87      ABG - ( 2018 11:29 )  pH: 7.30  /  pCO2: 48    /  pO2: 84    / HCO3: 23    / Base Excess: -2.7  /  SaO2: 95          CT Chest    IMPRESSION:  Interval worsening of partial atelectasis of the left lower lobe and some   infiltrates and/or atelectatic changes in the right lower lobe. Clinical   correlation is recommended.  Mediastinal adenopathy, subcarinal adenopathy  Report and images reviewed by me

## 2018-01-10 NOTE — PHYSICAL THERAPY INITIAL EVALUATION ADULT - DIAGNOSIS, PT EVAL
Acute hypoxic and hypercapnic respiratory failure secondary to Multifactorial; Pneumonia in immunocompromised patient:

## 2018-01-10 NOTE — PROGRESS NOTE ADULT - SUBJECTIVE AND OBJECTIVE BOX
Patient is a 77y old  Male who presents with a chief complaint of Fatigue, fever, and shortness of breath x 3 days (01 Jan 2018 14:03)    HPI:  76 y/o man with h/o CLL/lymphoma on treatment, h/o recurrent pneumonia, Afib, HFprF, Hep B, COPD on home O2, DM type 2, prior PE DVT waswas admitted on 1/1 for worsening cough, fever to 101F on multiple occasions during the last 3 days PTA with more fatigue and drowsiness. He reports cough with mucoid expectoration which turned to greenish phlegm. Fever intermittent, not associated with chills or rigor. Has chest tightness a/w cough. no hemoptysis. No h/o sick contacts. Denies wheezing. Patient denies any dizziness or lightheadedness. He felt weak and tired, and per family was getting drowsy with episodes of confusion. While he was in the ED, he received vanco IV and cefepime.    OOB to chair  Feels better  Has dry cough  Denies pain    MEDICATIONS  (STANDING):  ALBUTerol/ipratropium for Nebulization 3 milliLiter(s) Nebulizer every 6 hours  amiodarone    Tablet 100 milliGRAM(s) Oral daily  buDESOnide   0.5 milliGRAM(s) Respule 0.5 milliGRAM(s) Inhalation two times a day  cefepime  IVPB 1000 milliGRAM(s) IV Intermittent every 12 hours  dextrose 5%. 1000 milliLiter(s) (50 mL/Hr) IV Continuous <Continuous>  dextrose 50% Injectable 12.5 Gram(s) IV Push once  dextrose 50% Injectable 25 Gram(s) IV Push once  gabapentin 300 milliGRAM(s) Oral three times a day  guaiFENesin ER 1200 milliGRAM(s) Oral every 12 hours  insulin glargine Injectable (LANTUS) 10 Unit(s) SubCutaneous at bedtime  insulin lispro (HumaLOG) corrective regimen sliding scale   SubCutaneous three times a day before meals  metoprolol     tartrate 25 milliGRAM(s) Oral daily  montelukast 10 milliGRAM(s) Oral at bedtime  nystatin Powder 1 Application(s) Topical two times a day  simvastatin 10 milliGRAM(s) Oral at bedtime  tenofovir 300 milliGRAM(s) Oral daily  vancomycin  IVPB 1000 milliGRAM(s) IV Intermittent every 12 hours  vancomycin  IVPB      warfarin 3 milliGRAM(s) Oral daily    MEDICATIONS  (PRN):  acetaminophen   Tablet 650 milliGRAM(s) Oral every 6 hours PRN For Temp greater than 38 C (100.4 F)  dextrose Gel 1 Dose(s) Oral once PRN Blood Glucose LESS THAN 70 milliGRAM(s)/deciliter  glucagon  Injectable 1 milliGRAM(s) IntraMuscular once PRN Glucose LESS THAN 70 milligrams/deciliter  meperidine     Injectable 12.5 milliGRAM(s) IV Push every 30 minutes PRN Rigors / Shakes      Vital Signs Last 24 Hrs  T(C): 36.9 (10 Tee 2018 10:34), Max: 37.4 (09 Jan 2018 11:52)  T(F): 98.4 (10 Tee 2018 10:34), Max: 99.3 (09 Jan 2018 11:52)  HR: 86 (10 Tee 2018 10:34) (86 - 133)  BP: 102/53 (10 Tee 2018 10:34) (95/57 - 115/69)  BP(mean): 73 (10 Tee 2018 04:41) (73 - 73)  RR: 18 (10 Tee 2018 10:34) (18 - 20)  SpO2: 98% (10 Tee 2018 10:34) (95% - 98%)    Physical Exam:      Constitutional: frail looking  HEENT: NC/AT, EOMI, PERRLA  Neck: supple  Back: no tenderness  Respiratory: few crackles at bases  Cardiovascular: S1S2 regular, no murmurs  Abdomen: soft, not tender, not distended, positive BS  Genitourinary: no LN palpable  Rectal: deferred  Musculoskeletal: no muscle tenderness, no joint swelling or tenderness  Extremities: no pedal edema  Neurological: AxOx3, moving all extremities  Skin: no rashes    Labs:                        10.6   35.5  )-----------( 91       ( 10 Tee 2018 05:37 )             32.9     01-10    132<L>  |  96  |  21  ----------------------------<  316<H>  4.6   |  31  |  0.87    Ca    7.7<L>      10 Tee 2018 05:37         Vancomycin Level, Trough: 18.8 ug/mL (01-10 @ 05:37)                        10.3   34.5  )-----------( 99       ( 09 Jan 2018 07:23 )             32.0     01-08    136  |  99  |  21  ----------------------------<  119<H>  4.2   |  31  |  0.86    Ca    7.6<L>      08 Jan 2018 06:51       Labs:                        10.4   42.7  )-----------( 110      ( 08 Jan 2018 06:51 )             32.1     01-08    136  |  99  |  21  ----------------------------<  119<H>  4.2   |  31  |  0.86    Ca    7.6<L>      08 Jan 2018 06:51                        11.2   62.4  )-----------( 158      ( 07 Jan 2018 06:15 )             35.2     01-07    139  |  103  |  29<H>  ----------------------------<  41<LL>  4.3   |  29  |  0.95    Ca    7.7<L>      07 Jan 2018 06:15        Rapid Respiratory Viral Panel (01.01.18 @ 15:22)    Rapid RVP Result: Detected: The [employer] RVP Rapid uses polymerase chain reaction (PCR) and melt  curve analysis to screen for adenovirus; coronavirus HKU1, NL63, 229E,  OC43; human metapneumovirus (hMPV); human enterovirus/rhinovirus  (Entero/RV); influenza A; influenza A/H1;influenza A/H3; influenza  A/H1-2009; influenza B; parainfluenza viruses 1, 2, 3, 4; respiratory  syncytial virus; Bordetella pertussis; Mycoplasma pneumoniae; and  Chlamydophila pneumoniae.    Entero/Rhinovirus (RapRVP): Detected: spoke to jayjay fitch rn in er 01/01/18 17:25      Culture - Sputum (collected 03 Jan 2018 17:00)  Source: .Sputum Sputum  Gram Stain (04 Jan 2018 00:03):    Numerous polymorphonuclear leukocytes per low power field    Few Squamous epithelial cells per low power field    Few Gram Positive Cocci in Pairs and Chains per oil power field    Rare Gram Variable Rods per oil power field    Culture - Urine (collected 01 Jan 2018 14:44)  Source: .Urine None  Final Report (02 Jan 2018 22:05):    No growth    Culture - Blood (collected 01 Jan 2018 14:34)  Source: .Blood None  Preliminary Report (03 Jan 2018 03:01):    No growth to date.    Culture - Blood (collected 01 Jan 2018 08:31)  Source: .Blood Blood-Peripheral  Preliminary Report (02 Jan 2018 13:01):    No growth to date.    Culture - Blood (collected 01 Jan 2018 08:31)  Source: .Blood Blood-Peripheral  Preliminary Report (02 Jan 2018 13:01):    No growth to date.        Radiology:  < from: Xray Chest 1 View AP- PORTABLE-Urgent (01.06.18 @ 20:59) >  EXAM:  XR CHEST PORTABLE URGENT 1V                            PROCEDURE DATE:  01/06/2018          INTERPRETATION:  History: Fever and tachycardia.    Single view of the chest was performed.    Comparison is made to prior CT of the chest from January 4, 2018.    Findings:    There is demonstration of patchy airspace opacity within the right mid   and lower lung and along the left lung base. There is a trace left   pleural effusion. Heart size is within normal limits. Chronic   posttraumatic deformities of the right posterior ribs are noted.    Impression:    Unchanged patchy airspace opacity within the right mid and lower lung and   within the left lung base. Small left pleural effusion.      < from: Xray Chest 1 View AP/PA. (01.01.18 @ 09:27) >  Bibasilar airspace opacity in left mid lung airspace opacity is noted.   Findings are concerning for pneumonia given the patient's history. Heart   size appears mildly enlarged. Chronic appearing posttraumatic deformity   of the right proximal humerusis noted.      < from: CT Chest No Cont (01.04.18 @ 10:21) >  Interval worsening of partial atelectasis of the left lower lobe and some   infiltrates and/or atelectatic changes inthe right lower lobe. Clinical   correlation is recommended.        Advanced directives addressed: full resuscitation

## 2018-01-23 NOTE — ED PROVIDER NOTE - PROGRESS NOTE DETAILS
Bebeto PGY3: Discussed case with PMD who will see patient outpatient for follow up. patient remains well appearing. has had a few episodes of hypoglycemia on record in previous hospital stay. xray grossly unchanged. history of high sugar and it is possible that patient can be symptomatic when so low. Bebeto PGY3: will send home on augmentin/doxycycline and follow with PMD. Attending Fanny: PE: AAOx3 NAD, Cardiac S1S2 no mrg, Resp +rhonchi bilaterally no resp distress, Abd soft NTND, Neuro no focal deficits  Pt well appearing in ED after D50, back to baseline per family. No evidence of PNA however has mild cough and rhonchi so will cover for bronchitis, strict return precautions given to pt and family to return to ED for reeval if there is any concern for worsening at home. Close fu with PMD

## 2018-01-23 NOTE — ED PROVIDER NOTE - CARE PLAN
Assessment and plan of treatment:	Follow up with your primary care doctor within 48-72 hours.   You must return for new, worsening or concerning symptoms; specifically including those listed on the attached sheet.   Take augmentin and doxycycline as indicated on the label with respect to the warnings   Use your oxygen at all times at 3 liters per minute  Follow up with your doctor regarding a new glucose plan. Principal Discharge DX:	Hypoglycemia  Assessment and plan of treatment:	Follow up with your primary care doctor within 48-72 hours.   You must return for new, worsening or concerning symptoms; specifically including those listed on the attached sheet.   Take augmentin and doxycycline as indicated on the label with respect to the warnings   Use your oxygen at all times at 3 liters per minute  Follow up with your doctor regarding a new glucose plan.  Secondary Diagnosis:	Cough

## 2018-01-23 NOTE — ED ADULT NURSE NOTE - CHPI ED SYMPTOMS NEG
no weakness/no tingling/no chills/no vomiting/no decreased eating/drinking/no numbness/no fever/no pain/no nausea/no dizziness

## 2018-01-23 NOTE — ED ADULT NURSE NOTE - OBJECTIVE STATEMENT
Pt BIBA ambulance for hypoglycemic episode this morning. Pt BGM as per family was 55 this AM, here on admission 95, then 81. Pt alert and oriented x3. Rectal temp 96.7 F

## 2018-01-23 NOTE — ED PROVIDER NOTE - PLAN OF CARE
Follow up with your primary care doctor within 48-72 hours.   You must return for new, worsening or concerning symptoms; specifically including those listed on the attached sheet.   Take augmentin and doxycycline as indicated on the label with respect to the warnings   Use your oxygen at all times at 3 liters per minute  Follow up with your doctor regarding a new glucose plan.

## 2018-01-23 NOTE — ED PROVIDER NOTE - PHYSICAL EXAMINATION
Bebeto: A & O x 3, NAD, HEENT WNL and no facial asymmetry; lungs with expiratory phase prolonged with some rhonchi bilaterally, heart with reg rhythm; abdomen soft obese NTND with normal rectal exam and temperature; extremities with trace bilateral pitting edema; skin with no rashes, neuro exam non focal with no motor or sensory deficits

## 2018-01-23 NOTE — ED PROVIDER NOTE - OBJECTIVE STATEMENT
77 year old, extensive medical history reconciled in PMH section of chart, admitted 2 weeks ago for viral illness and developed pna. Complaiing of night sweats for 2 weeks and now presenting from home with AMS, found to have sugar of 55 and EMS reporting response with D50. Patient daughter states patient was screaming in pain, didn't recongnize his family members and not responding to questions. Daughter agreeing that patient appears better after the D50, with our repeat at high 80s. Patient was also not using his supplemental oxygen at home and was saturating in mid 80s. Responded well to 3 LPM and is in 96% in ER. not currently on chemotherapy.     relevant meds: insulin, januvia, coumadin,   PMD: Ernesto  Onc: akund 77 year old, extensive medical history reconciled in PMH section of chart, admitted 2 weeks ago for viral illness and developed pna. Complaiing of night sweats for 2 weeks and now presenting from home with AMS, found to have sugar of 55 and EMS reporting response with D50. Patient daughter states patient was screaming in pain, didn't recongnize his family members and not responding to questions. Daughter agreeing that patient appears better after the D50, with our repeat at high 80s. Patient was also not using his supplemental oxygen at home and was saturating in mid 80s. Responded well to 3 LPM and is in 96% in ER. not currently on chemotherapy.     relevant meds: insulin, januvia, coumadin,   PMD: Ernesto  Onc: halle

## 2018-01-23 NOTE — ED PROVIDER NOTE - ATTENDING CONTRIBUTION TO CARE
I, Kyrie Escobedo MD, personally saw the patient with resident.  I have personally performed a face to face diagnostic evaluation on this patient.  I have reviewed the resident note and agree with the history, exam, and plan of care, except as noted.

## 2018-01-23 NOTE — ED PROVIDER NOTE - MEDICAL DECISION MAKING DETAILS
Bebeto PGY3: confusion in patient found to be hypoxic and hypoglycemic. Usually sugar 55 doesn't explain all of Altered Mental Status, will obtain head ct, basic labs, ekg, check sugar hourly for 2 hours and co2 to eval for retention

## 2018-05-26 NOTE — ED ADULT NURSE NOTE - OBJECTIVE STATEMENT
Pt BIBA after trip and fall at [employer].  Pt complains of R-sided rib and chest pain.  Pt tachypneic, moist nonproductive cough, pulse ox 88% on room air on arrival.  Pt recently treated for pneumonia, placed on diuretics.  Family at bedside, report history of hepatitis B, cancer, "water on lungs."  20g PIV placed in LAC.  Cardiac and VS monitoring initiated on arrival.

## 2018-05-26 NOTE — ED PROVIDER NOTE - OBJECTIVE STATEMENT
79 y/o Male with a PMHx of A-Fib on Warfarin, COPD, CHF, Hepatitis B, DM type 2, Hypogammaglobulinemia, B-cell lymphoma  presents to ED translated by family s/p trip and fall on step c/o right shoulder pain and right rib pain. Pt does not recall if he hit his head during fall. No LOC, no HA. Pt was recently treated for PNA, saw pulmonologist yesterday but felt fine at the time. No abd pain, no N/V/D. Pt has chronic cough and SOB. States that he experienced some SOB while en route to ED, but has bettered since. Pt is on 2L of O2 at home.

## 2018-05-26 NOTE — ED PROVIDER NOTE - PROGRESS NOTE DETAILS
Accepted for admission to hospitalist. Family aware and amenable to plan. Pt with pleural effusions, ascites, COPD and HF exacerbation.  Accepted for admission to hospitalist. Family aware and amenable to plan.

## 2018-05-26 NOTE — ED PROVIDER NOTE - CONSTITUTIONAL, MLM
normal... Well appearing, well nourished, awake, alert, oriented to person, place, time/situation and in no apparent distress. Head normocephalic atraumatic.

## 2018-05-26 NOTE — H&P ADULT - HISTORY OF PRESENT ILLNESS
History obtained from prior chart, patient and daughter.  Translation by daughter.    78 y.o. male with complex past medical history including CLL/lymphoma on treatment, h/o recurrent pneumonia, Afib, HFpEF, Hep B, COPD, type 2DM, H/o PE DVT presents with s/p fall. Pt was walking up stairs when he tripped and fell on the right side. Pt reports right rib and shoulder pain. Denies lightheadedness or dizziness prior to falling. Pt has been short of breath that has been gradually worsening. He saw his pulmonologist yesterday and was noted to have fluid around the lung. Pt denies any subjective fever, chills, sore throat. Reports sharp on/off chest pain that's worse with coughing. Reports chronic cough and current cough is not new. +worsening abdominal distension over the past 2-3 weeks. Denies diarrhea or dysuria.

## 2018-05-26 NOTE — H&P ADULT - ASSESSMENT
History obtained from prior chart, patient and daughter.  Translation by daughter.    78 y.o. male with complex past medical history including CLL/lymphoma on treatment, h/o recurrent pneumonia, Afib, HFpEF, Hep B, COPD, type 2DM, H/o PE DVT presents with s/p fall. Pt was walking up stairs when he tripped and fell on the right side. Pt reports right rib and shoulder pain. Denies lightheadedness or dizziness prior to falling. Pt has been short of breath that has been gradually worsening. He saw his pulmonologist yesterday and was noted to have fluid around the lung. Pt denies any subjective fever, chills, sore throat. Reports sharp on/off chest pain that's worse with coughing. Reports chronic cough and current cough is not new. +worsening abdominal distension over the past 2-3 weeks. Denies diarrhea or dysuria.    #hypoxemia/SOB due to multifactorial (COPD exacerbation, CHF, pleural effusion and ascites)  #COPD exacerbation  -admit to CICU  -pulse ox monitoring and supplemental oxygen  -iv steroids  -iv lasix bid  -pulm consult  -cardio consults    #tachycardia likely from pain and pleural effusion/ascites and duoneb use  -less likely PE due to therapeutic INR  -EKG  -pain control prn  -IR eval for thoracentesis and paracentesis  -cont home amio and metoprolol    #AFib on coumadin  -CHADSVASc >4  -cont amio and metoprolol  -daily INR   -cont coumadin for now    #HBV with liver cirrhosis and ascites  -IR eval for tap  -GI consult, Dr Nails  -on viread    #B cell lymphoma/CLL  -oncology consult, Dr Houser    #HFpEF (EF 60-65%)  -I/O, daily weight  -iv lasix  -cont spironolactone  -cardio consult    #DM2  -on Tresiba 20units  -start lantus  -fingerstick monitoring    #DVT ppx  -on coumadin    #Advanced care planning  -discussed advanced directives with patient with wife and daughter at bedside. At this point, wishes to be FULL CODE

## 2018-05-26 NOTE — ED PROVIDER NOTE - MUSCULOSKELETAL, MLM
Tenderness right lateral ribs. ROM intact right shoulder. No tenderness to right shoulder. 5/5 motor strength RUE. Spine appears normal.

## 2018-05-26 NOTE — ED ADULT TRIAGE NOTE - CHIEF COMPLAINT QUOTE
Pt. to the ED BIBA C/O trip and fall - Pt. denies hitting head and loc- Pt. C/O Bilateral flank pain- denies SOB and CP

## 2018-05-26 NOTE — H&P ADULT - NSHPOUTPATIENTPROVIDERS_GEN_ALL_CORE
Jeanne( daughter- healthcare proxy/ contact ) [phone]  PCP: Za Orozco  Pulm: Celestino Waldrop  Cardio: Dg  GI: Jaqui  Oncology: Randal

## 2018-05-26 NOTE — H&P ADULT - NSHPPHYSICALEXAM_GEN_ALL_CORE
Vital Signs Last 24 Hrs  T(C): 37.9 (26 May 2018 16:15), Max: 37.9 (26 May 2018 16:15)  T(F): 100.2 (26 May 2018 16:15), Max: 100.2 (26 May 2018 16:15)  HR: 124 (26 May 2018 18:23) (95 - 124)  BP: 125/80 (26 May 2018 18:23) (125/80 - 151/87)  BP(mean): --  RR: 28 (26 May 2018 18:23) (16 - 28)  SpO2: 93% (26 May 2018 18:23) (88% - 95%)    GEN: appears tachypneic with supplemental oxygen  Neuro: AAOx3, CN nonfocal  HEENT: NC/AT, EOMI  Neck: no thyroidmegaly, no JVD  Cardiovascular: S1S2 present, regular rhythm, tachycardia, no murmur  Respiratory: breath sounds diminished bilaterally, +wheezing, no rales, +rhonchi  Gastrointestinal: bowel sounds normal, soft, no abdominal tenderness, +distension  Musculoskeletal: no muscle tenderness  Extremities: 2+ pitting edema bilaterally  Skin: No rash, healed lesions on right side back (pt reports shingles 2 months ago)

## 2018-05-26 NOTE — ED PROVIDER NOTE - SKIN, MLM
Skin normal color for race, warm, dry and intact. Healed dermatomal rash to right anterior chest wall.

## 2018-05-27 NOTE — CONSULT NOTE ADULT - ASSESSMENT
78 y.o. male with complex past medical history including CLL/lymphoma on treatment, h/o recurrent pneumonia, Afib, HFpEF, Hep B, COPD, type 2DM, H/o PE DVT presents with s/p fall. Pt was walking up stairs when he tripped and fell on the right side. Pt reports right rib and shoulder pain. Denies lightheadedness or dizziness prior to falling. Pt has been short of breath that has been gradually worsening. He saw his pulmonologist yesterday and was noted to have fluid around the lung. Pt denies any subjective fever, chills, sore throat. Reports sharp on/off chest pain that's worse with coughing. Reports chronic cough and current cough is not new. +worsening abdominal distension over the past 2-3 weeks. Denies diarrhea or dysuria.    Hypoxemia with shortness of breath–multifactorial, COPD exacerbation  Additionally, patient has pleural effusions that contributed to this    Tachycardia, has improved    Large-volume ascites, likely secondary to cirrhosis however, the may be a component of progression of lymphoma also contributing to this.  IR evaluation for paracentesis, large volume. Would give additional albumin, 12.5 g/L of ascitic fluid removed and would start diuretics afterwards with close follow-up of a Schatzki's creatinine.    Mild acute renal insufficiency with increasing creatinine and this should be monitored closely    Hepatitis B with liver cirrhosis and ascites, noncompliance discussed with patient and daughter. After discussion with the daughter, the daughter will be getting a pill container that will help him be more compliant with his medications.  Risk of developing resistance with missing doses as he has been doing discussed with them.    Patient has had recent hyperkalemia which was mild, would stop Aldactone and restart with Lasix after paracentesis

## 2018-05-27 NOTE — PROGRESS NOTE ADULT - SUBJECTIVE AND OBJECTIVE BOX
CHIEF COMPLAINT:    SUBJECTIVE:     REVIEW OF SYSTEMS:    CONSTITUTIONAL: No weakness, fevers or chills  EYES/ENT: No visual changes;  No vertigo or throat pain   NECK: No pain or stiffness  RESPIRATORY: No cough, wheezing, hemoptysis; No shortness of breath  CARDIOVASCULAR: No chest pain or palpitations  GASTROINTESTINAL: No abdominal or epigastric pain. No nausea, vomiting, or hematemesis; No diarrhea or constipation. No melena or hematochezia.  GENITOURINARY: No dysuria, frequency or hematuria  NEUROLOGICAL: No numbness or weakness  SKIN: No itching, burning, rashes, or lesions   All other review of systems is negative unless indicated above    Vital Signs Last 24 Hrs  T(C): 36.1 (26 May 2018 23:00), Max: 37.9 (26 May 2018 16:15)  T(F): 97 (26 May 2018 23:00), Max: 100.2 (26 May 2018 16:15)  HR: 92 (27 May 2018 09:05) (82 - 124)  BP: 92/59 (27 May 2018 09:05) (92/59 - 151/87)  BP(mean): 67 (27 May 2018 09:05) (67 - 88)  RR: 21 (27 May 2018 09:05) (15 - 28)  SpO2: 91% (27 May 2018 09:05) (88% - 97%)    I&O's Summary    26 May 2018 07:01  -  27 May 2018 07:00  --------------------------------------------------------  IN: 240 mL / OUT: 850 mL / NET: -610 mL        CAPILLARY BLOOD GLUCOSE      POCT Blood Glucose.: 306 mg/dL (27 May 2018 11:44)  POCT Blood Glucose.: 312 mg/dL (27 May 2018 08:00)  POCT Blood Glucose.: 334 mg/dL (26 May 2018 21:42)      PHYSICAL EXAM:    Constitutional: NAD, awake and alert, well-developed  HEENT: PERR, EOMI, Normal Hearing, MMM  Neck: Soft and supple, No LAD, No JVD  Respiratory: Breath sounds are clear bilaterally, No wheezing, rales or rhonchi  Cardiovascular: S1 and S2, regular rate and rhythm, no Murmurs, gallops or rubs  Gastrointestinal: Bowel Sounds present, soft, nontender, nondistended, no guarding, no rebound  Extremities: No peripheral edema  Vascular: 2+ peripheral pulses  Neurological: A/O x 3, no focal deficits  Musculoskeletal: 5/5 strength b/l upper and lower extremities  Skin: No rashes    MEDICATIONS:  MEDICATIONS  (STANDING):  amiodarone    Tablet 100 milliGRAM(s) Oral daily  dextrose 5%. 1000 milliLiter(s) (50 mL/Hr) IV Continuous <Continuous>  dextrose 50% Injectable 12.5 Gram(s) IV Push once  dextrose 50% Injectable 25 Gram(s) IV Push once  dextrose 50% Injectable 25 Gram(s) IV Push once  furosemide   Injectable 20 milliGRAM(s) IV Push two times a day  gabapentin 300 milliGRAM(s) Oral three times a day  insulin glargine Injectable (LANTUS) 20 Unit(s) SubCutaneous every morning  insulin lispro (HumaLOG) corrective regimen sliding scale   SubCutaneous three times a day before meals  insulin lispro (HumaLOG) corrective regimen sliding scale   SubCutaneous at bedtime  methylPREDNISolone sodium succinate Injectable 40 milliGRAM(s) IV Push every 8 hours  metoprolol tartrate 25 milliGRAM(s) Oral two times a day  montelukast 10 milliGRAM(s) Oral at bedtime  simvastatin 10 milliGRAM(s) Oral at bedtime  spironolactone 25 milliGRAM(s) Oral at bedtime  tenofovir disoproxil fumarate (VIREAD) 300 milliGRAM(s) Oral daily      LABS: All Labs Reviewed:                        11.2   26.46 )-----------( 100      ( 27 May 2018 05:42 )             35.9     05-27    136  |  100  |  15  ----------------------------<  263<H>  4.9   |  25  |  1.33<H>    Ca    8.3<L>      27 May 2018 05:42    TPro  5.7<L>  /  Alb  2.8<L>  /  TBili  0.4  /  DBili  x   /  AST  47<H>  /  ALT  25  /  AlkPhos  107  05-26    PT/INR - ( 27 May 2018 05:42 )   PT: 27.9 sec;   INR: 2.53 ratio         PTT - ( 26 May 2018 16:35 )  PTT:34.2 sec  CARDIAC MARKERS ( 26 May 2018 16:35 )  <0.015 ng/mL / x     / x     / x     / x            Assessment and Plan  	    78 y.o. male with complex past medical history including CLL/lymphoma on treatment, h/o recurrent pneumonia, Afib, HFpEF, Hep B, COPD, type 2DM, H/o PE DVT presents with s/p fall. Pt was walking up stairs when he tripped and fell on the right side. Pt reports right rib and shoulder pain. Denies lightheadedness or dizziness prior to falling. Pt has been short of breath that has been gradually worsening. He saw his pulmonologist yesterday and was noted to have fluid around the lung. Pt denies any subjective fever, chills, sore throat. Reports sharp on/off chest pain that's worse with coughing. Reports chronic cough and current cough is not new. +worsening abdominal distension over the past 2-3 weeks. Denies diarrhea or dysuria.    #hypoxemia/SOB due to multifactorial (COPD exacerbation, CHF, pleural effusion and ascites)  #COPD exacerbation  -admit to CICU  -pulse ox monitoring and supplemental oxygen  -iv steroids  -iv lasix bid  -pulm consult  -cardio consults    #tachycardia likely from pain and pleural effusion/ascites and duoneb use  -less likely PE due to therapeutic INR  -EKG  -pain control prn  -IR eval for thoracentesis and paracentesis  -cont home amio and metoprolol    #AFib on coumadin  -CHADSVASc >4  -cont amio and metoprolol  -daily INR   -cont coumadin for now    #HBV with liver cirrhosis and ascites  -IR eval for tap  -GI consult, Dr Nails  -on viread    #B cell lymphoma/CLL  -oncology consult, Dr Houser    #HFpEF (EF 60-65%)  -I/O, daily weight  -iv lasix  -cont spironolactone  -cardio consult    #DM2  -on Tresiba 20units  -start lantus  -fingerstick monitoring    #DVT ppx  -on coumadin    #Advanced care planning  -discussed advanced directives with patient with wife and daughter at bedside. At this point, wishes to be FULL CODE CHIEF COMPLAINT/diagnosis: COPD/ chf    SUBJECTIVE: no complaints    REVIEW OF SYSTEMS:    CONSTITUTIONAL: No weakness, fevers or chills  EYES/ENT: No visual changes;  No vertigo or throat pain   NECK: No pain or stiffness  RESPIRATORY: No cough, wheezing, hemoptysis; No shortness of breath  CARDIOVASCULAR: No chest pain or palpitations  GASTROINTESTINAL: No abdominal or epigastric pain. No nausea, vomiting, or hematemesis; No diarrhea or constipation. No melena or hematochezia.  GENITOURINARY: No dysuria, frequency or hematuria  NEUROLOGICAL: No numbness or weakness  SKIN: No itching, burning, rashes, or lesions   All other review of systems is negative unless indicated above    Vital Signs Last 24 Hrs  T(C): 36.1 (26 May 2018 23:00), Max: 37.9 (26 May 2018 16:15)  T(F): 97 (26 May 2018 23:00), Max: 100.2 (26 May 2018 16:15)  HR: 92 (27 May 2018 09:05) (82 - 124)  BP: 92/59 (27 May 2018 09:05) (92/59 - 151/87)  BP(mean): 67 (27 May 2018 09:05) (67 - 88)  RR: 21 (27 May 2018 09:05) (15 - 28)  SpO2: 91% (27 May 2018 09:05) (88% - 97%)    I&O's Summary    26 May 2018 07:01  -  27 May 2018 07:00  --------------------------------------------------------  IN: 240 mL / OUT: 850 mL / NET: -610 mL        CAPILLARY BLOOD GLUCOSE      POCT Blood Glucose.: 306 mg/dL (27 May 2018 11:44)  POCT Blood Glucose.: 312 mg/dL (27 May 2018 08:00)  POCT Blood Glucose.: 334 mg/dL (26 May 2018 21:42)      PHYSICAL EXAM:    Constitutional: NAD, awake and alert, well-developed  HEENT: PERR, EOMI, Normal Hearing, MMM  Neck: Soft and supple, No LAD, No JVD  Respiratory: Breath sounds are clear bilaterally, No wheezing, rales or rhonchi  Cardiovascular: S1 and S2, regular rate and rhythm, no Murmurs, gallops or rubs  Gastrointestinal: Bowel Sounds present, soft, nontender, nondistended, no guarding, no rebound  Extremities: No peripheral edema  Vascular: 2+ peripheral pulses  Neurological: A/O x 3, no focal deficits  Musculoskeletal: 5/5 strength b/l upper and lower extremities  Skin: No rashes    MEDICATIONS:  MEDICATIONS  (STANDING):  amiodarone    Tablet 100 milliGRAM(s) Oral daily  dextrose 5%. 1000 milliLiter(s) (50 mL/Hr) IV Continuous <Continuous>  dextrose 50% Injectable 12.5 Gram(s) IV Push once  dextrose 50% Injectable 25 Gram(s) IV Push once  dextrose 50% Injectable 25 Gram(s) IV Push once  furosemide   Injectable 20 milliGRAM(s) IV Push two times a day  gabapentin 300 milliGRAM(s) Oral three times a day  insulin glargine Injectable (LANTUS) 20 Unit(s) SubCutaneous every morning  insulin lispro (HumaLOG) corrective regimen sliding scale   SubCutaneous three times a day before meals  insulin lispro (HumaLOG) corrective regimen sliding scale   SubCutaneous at bedtime  methylPREDNISolone sodium succinate Injectable 40 milliGRAM(s) IV Push every 8 hours  metoprolol tartrate 25 milliGRAM(s) Oral two times a day  montelukast 10 milliGRAM(s) Oral at bedtime  simvastatin 10 milliGRAM(s) Oral at bedtime  spironolactone 25 milliGRAM(s) Oral at bedtime  tenofovir disoproxil fumarate (VIREAD) 300 milliGRAM(s) Oral daily      LABS: All Labs Reviewed:                        11.2   26.46 )-----------( 100      ( 27 May 2018 05:42 )             35.9     05-27    136  |  100  |  15  ----------------------------<  263<H>  4.9   |  25  |  1.33<H>    Ca    8.3<L>      27 May 2018 05:42    TPro  5.7<L>  /  Alb  2.8<L>  /  TBili  0.4  /  DBili  x   /  AST  47<H>  /  ALT  25  /  AlkPhos  107  05-26    PT/INR - ( 27 May 2018 05:42 )   PT: 27.9 sec;   INR: 2.53 ratio         PTT - ( 26 May 2018 16:35 )  PTT:34.2 sec  CARDIAC MARKERS ( 26 May 2018 16:35 )  <0.015 ng/mL / x     / x     / x     / x            Assessment and Plan  	    78 y.o. male with complex past medical history including CLL/lymphoma on treatment, h/o recurrent pneumonia, Afib, HFpEF, Hep B, COPD, type 2DM, H/o PE DVT presents with s/p fall. Pt was walking up stairs when he tripped and fell on the right side. Pt reports right rib and shoulder pain. Denies lightheadedness or dizziness prior to falling. Pt has been short of breath that has been gradually worsening. He saw his pulmonologist yesterday and was noted to have fluid around the lung. Pt denies any subjective fever, chills, sore throat. Reports sharp on/off chest pain that's worse with coughing. Reports chronic cough and current cough is not new. +worsening abdominal distension over the past 2-3 weeks. Denies diarrhea or dysuria.    #hypoxemia/SOB due to multifactorial (COPD exacerbation, CHF, pleural effusion and ascites)  #COPD exacerbation  -admit to CICU  -pulse ox monitoring and supplemental oxygen  -taper steroids to oral prednisone  -taper lasix to oral- starting tomamrrow; creatinine going up, stop iv lasix   -pulm consult  -cardio consults    #tachycardia likely from pain and pleural effusion/ascites and duoneb use  -less likely PE due to therapeutic INR  -EKG  -pain control prn  -IR eval for thoracentesis and paracentesis  -cont home amio and metoprolol    #AFib on coumadin  -CHADSVASc >4  -cont amio and metoprolol  -daily INR   -cont coumadin for now    #HBV with liver cirrhosis and ascites  -IR eval for tap  -GI consult, Dr Nails  -on viread    #B cell lymphoma/CLL  -oncology consult, Dr Houser    #HFpEF (EF 60-65%)  -I/O, daily weight  -iv lasix  -cont spironolactone  -cardio consult    #DM2  -on Tresiba 20units  -start lantus  -fingerstick monitoring    #DVT ppx  -on coumadin    #Advanced care planning  -discussed advanced directives with patient with wife and daughter at bedside. At this point, wishes to be FULL CODE

## 2018-05-27 NOTE — CONSULT NOTE ADULT - ASSESSMENT
status post mechanical fall, I doubt if he had an episode of syncope  Patient did not lose any consciousness.  Chronic diastolic congestive heart failure.  Bilateral pleural effusion left greater than right.  History of CLL/lymphoma.  Paroxysmal atrial fibrillation he is normal sinus rhythm.  Cirrhosis of liver.  Calcified coronaries and aorta on CT scan of the chest.  Ascites.  Suggest  Continue with diuretics.   Continue with low doses of amiodarone since patient is symptomatic with  atrial fibrillation.  Intake and output/daily weights.  Follow with electrolytes.  Pulmonary evaluation.  DVT prophylaxis.   discussed with patient's family at bedside and hospitalist. status post mechanical fall, I doubt if he had an episode of syncope  Patient did not lose any consciousness.  Chronic diastolic congestive heart failure.  Bilateral pleural effusion left greater than right.  History of CLL/lymphoma.  Paroxysmal atrial fibrillation he is normal sinus rhythm.  Cirrhosis of liver.  Calcified coronaries and aorta on CT scan of the chest.  Ascites.  Suggest  Continue with diuretics.   Continue with low doses of amiodarone since patient is symptomatic with  atrial fibrillation.  Intake and output/daily weights.  Follow with electrolytes.  Pulmonary evaluation.  Adjust INR 2.0  discussed with patient's family at bedside and hospitalist.

## 2018-05-28 NOTE — DISCHARGE NOTE ADULT - NS AS DC HF EDUCATION INSTRUCTIONS
Call Primary Care Provider for follow-up after discharge/Monitor Weight Daily/Report weight gain of 2 or more pounds in one day or 3 or more pounds in one week, worsening shortness of breath, fatigue, weakness, increased swelling of hands and feet to primary care provider/Activities as tolerated/Low salt diet

## 2018-05-28 NOTE — DISCHARGE NOTE ADULT - MEDICATION SUMMARY - MEDICATIONS TO CHANGE
I will SWITCH the dose or number of times a day I take the medications listed below when I get home from the hospital:    furosemide 20 mg oral tablet  -- 1 tab(s) by mouth 3 times a day I will SWITCH the dose or number of times a day I take the medications listed below when I get home from the hospital:  None

## 2018-05-28 NOTE — DISCHARGE NOTE ADULT - CARE PLAN
Principal Discharge DX:	Chronic obstructive pulmonary disease, unspecified COPD type  Goal:	start oral Prednisone Tapering doses as instructed; Start daily oral inhaler: Spiriva  Assessment and plan of treatment:	f/u outpatient with your pulmonologist w/ in one week  Secondary Diagnosis:	Chronic diastolic congestive heart failure  Goal:	c/w previous medications, with decreased dose of lasix to twice a day secondary to renal concerns  Assessment and plan of treatment:	f/u outpatient with your cardiologist w/ in one week of discharge  Secondary Diagnosis:	Atrial fibrillation, unspecified type  Goal:	c/w previous home meds

## 2018-05-28 NOTE — PROGRESS NOTE ADULT - SUBJECTIVE AND OBJECTIVE BOX
Patient is a 78y old  Male who presents with a chief complaint of s/p fall  SOB (28 May 2018 09:09)      HPI:  pt feeling ok  minimal pain after fall    PAST MEDICAL & SURGICAL HISTORY:  Hypogammaglobulinemia: received IVIG  Hepatitis B virus infection, unspecified chronicity: retrovirals  Chronic diastolic congestive heart failure  Essential hypertension  DM type 2 (diabetes mellitus, type 2)  B-cell lymphoma, unspecified B-cell lymphoma type, unspecified body region: CLL stage 4/SLL; met NHL  Atrial fibrillation, unspecified type  COPD (chronic obstructive pulmonary disease)  History of esophageal dilatation  S/P cholecystectomy  H/O prostatectomy    MEDICATIONS  (STANDING):  amiodarone    Tablet 100 milliGRAM(s) Oral daily  dextrose 5%. 1000 milliLiter(s) (50 mL/Hr) IV Continuous <Continuous>  dextrose 50% Injectable 12.5 Gram(s) IV Push once  dextrose 50% Injectable 25 Gram(s) IV Push once  dextrose 50% Injectable 25 Gram(s) IV Push once  furosemide    Tablet 20 milliGRAM(s) Oral two times a day  gabapentin 300 milliGRAM(s) Oral three times a day  insulin glargine Injectable (LANTUS) 20 Unit(s) SubCutaneous every morning  insulin lispro (HumaLOG) corrective regimen sliding scale   SubCutaneous three times a day before meals  insulin lispro (HumaLOG) corrective regimen sliding scale   SubCutaneous at bedtime  metoprolol tartrate 25 milliGRAM(s) Oral two times a day  montelukast 10 milliGRAM(s) Oral at bedtime  predniSONE   Tablet 40 milliGRAM(s) Oral two times a day  simvastatin 10 milliGRAM(s) Oral at bedtime  tenofovir disoproxil fumarate (VIREAD) 300 milliGRAM(s) Oral daily    MEDICATIONS  (PRN):  acetaminophen   Tablet 650 milliGRAM(s) Oral every 6 hours PRN For Temp greater than 38 C (100.4 F), mild pain, HA  ALBUTerol/ipratropium for Nebulization 3 milliLiter(s) Nebulizer every 6 hours PRN Shortness of Breath and/or Wheezing  dextrose 40% Gel 15 Gram(s) Oral once PRN Blood Glucose LESS THAN 70 milliGRAM(s)/deciliter  glucagon  Injectable 1 milliGRAM(s) IntraMuscular once PRN Glucose LESS THAN 70 milligrams/deciliter  morphine  - Injectable 2 milliGRAM(s) IV Push every 6 hours PRN Severe Pain (7 - 10)    Allergies  Privigen (Other (Severe))    Vital Signs Last 24 Hrs  T(C): --  T(F): --  HR: 67 (28 May 2018 04:00) (67 - 100)  BP: 124/65 (28 May 2018 04:00) (97/59 - 140/76)  BP(mean): 79 (28 May 2018 04:00) (67 - 93)  RR: 20 (27 May 2018 17:00) (19 - 20)  SpO2: 93% (28 May 2018 04:00) (88% - 97%)    PHYSICAL EXAM:    Constitutional: NAD, well-developed  Respiratory: CTA  Cardiovascular: S1 and S2  Gastrointestinal: BS+, soft, mild distention ascites      LABS:                        11.2   26.46 )-----------( 100      ( 27 May 2018 05:42 )             35.9     05-28    137  |  102  |  26<H>  ----------------------------<  273<H>  4.5   |  28  |  0.95    Ca    8.0<L>      28 May 2018 04:29    TPro  5.7<L>  /  Alb  2.8<L>  /  TBili  0.4  /  DBili  x   /  AST  47<H>  /  ALT  25  /  AlkPhos  107  05-26    PT/INR - ( 27 May 2018 05:42 )   PT: 27.9 sec;   INR: 2.53 ratio         PTT - ( 26 May 2018 16:35 )  PTT:34.2 sec  LIVER FUNCTIONS - ( 26 May 2018 16:35 )  Alb: 2.8 g/dL / Pro: 5.7 gm/dL / ALK PHOS: 107 U/L / ALT: 25 U/L / AST: 47 U/L / GGT: x             RADIOLOGY & ADDITIONAL STUDIES:

## 2018-05-28 NOTE — CONSULT NOTE ADULT - ASSESSMENT
78 y.o. male with complex past medical history including CLL/lymphoma on treatment, h/o recurrent pneumonia, Afib, HFpEF, Hep B, COPD, type 2DM, H/o PE DVT presents with s/p fall. Pt was walking up stairs when he tripped and fell on the right side. Pt reports right rib and shoulder pain. Denies lightheadedness or dizziness prior to falling. Pt has been short of breath that has been gradually worsening. He saw his pulmonologist yesterday and was noted to have fluid around the lung. Pt denies any subjective fever, chills, sore throat. Reports sharp on/off chest pain that's worse with coughing. Reports chronic cough and current cough is not new. +worsening abdominal distension over the past 2-3 weeks. Denies diarrhea or dysuria. (26 May 2018 19:39)    pt is seen and examined with wife and son at bedside  data reviewed with RN staff as well  case discussed with Dr parekh today 5/28  outpt data discussed as well    Cirrhosis with portal HTN  Bilateral pleural effusions L>R  ascites / peripheral edema related to above  diastolic CHF likely cont factor  Improved clinically on diuresis  Increased risk of hepatorenal syndrome  residual moderate sized left sided pleural effusions  Pt would clinically benefit from diagnostic / large volume left sided thoracentesis  family and pt agreed to have it done once INR is lowered  data reviewed with DR Parekh as well 5/28  no sign of pneumonia    agree with repeat cxr now for assessment  hold coumadin tonight  IR consult for large volume thoracentesis when INR<2  family is updated regarding status  needs supplemental O2 with activities  incentive spirometry use encouraged  pt / family agree to proceed with thoracentesis / pt's son is translating

## 2018-05-28 NOTE — DISCHARGE NOTE ADULT - MEDICATION SUMMARY - MEDICATIONS TO TAKE
I will START or STAY ON the medications listed below when I get home from the hospital:    predniSONE 10 mg oral tablet  -- start 40mg bid x 1 day, then 30mg BID x 2 days, then 20mg bid x 2 days, then 10mg bid x 2days, then 5mg bidx2 days. pharm to adjust quant  -- It is very important that you take or use this exactly as directed.  Do not skip doses or discontinue unless directed by your doctor.  Obtain medical advice before taking any non-prescription drugs as some may affect the action of this medication.  Take with food or milk.    -- Indication: For copd    spironolactone 25 mg oral tablet  -- 1 tab(s) by mouth once a day (at bedtime)  -- Indication: For congestive heart    amiodarone 200 mg oral tablet  -- 0.5 tab(s) by mouth once a day  -- Indication: For afib    warfarin 2.5 mg oral tablet  -- 1.5 tab(s) by mouth once a day (at bedtime)  -- Indication: For afib    gabapentin 300 mg oral capsule  -- 1 cap(s) by mouth 3 times a day  -- Indication: For Pain    simvastatin 10 mg oral tablet  -- 1 tab(s) by mouth once a day (at bedtime)  -- Indication: For hld/cad    Viread 300 mg oral tablet  -- 1 tab(s) by mouth once a day  -- Indication: For hep b    metoprolol tartrate 25 mg oral tablet  -- 1 tab(s) by mouth 2 times a day  -- Indication: For htn    furosemide 20 mg oral tablet  -- 1 tab(s) by mouth 2 times a day  -- Indication: For congestive heart    montelukast 10 mg oral tablet  -- 1 tab(s) by mouth once a day (at bedtime)  -- Indication: For copd    multivitamin  -- 1 tab(s) by mouth once a day  -- Indication: For Prphylaxis I will START or STAY ON the medications listed below when I get home from the hospital:    predniSONE 10 mg oral tablet  -- 10 tab(s) by mouth once a day   -- It is very important that you take or use this exactly as directed.  Do not skip doses or discontinue unless directed by your doctor.  Obtain medical advice before taking any non-prescription drugs as some may affect the action of this medication.  Take with food or milk.    -- Indication: For Asthma    spironolactone 25 mg oral tablet  -- 1 tab(s) by mouth once a day (at bedtime)  -- Indication: For congestive heart    amiodarone 200 mg oral tablet  -- 0.5 tab(s) by mouth once a day  -- Indication: For afib    warfarin 2.5 mg oral tablet  -- 1.5 tab(s) by mouth once a day (at bedtime)  -- Indication: For afib    gabapentin 300 mg oral capsule  -- 1 cap(s) by mouth 3 times a day  -- Indication: For Pain    simvastatin 10 mg oral tablet  -- 1 tab(s) by mouth once a day (at bedtime)  -- Indication: For hld/cad    Viread 300 mg oral tablet  -- 1 tab(s) by mouth once a day  -- Indication: For hep b    metoprolol tartrate 25 mg oral tablet  -- 1 tab(s) by mouth 2 times a day  -- Indication: For htn    Spiriva Respimat 1.25 mcg/inh inhalation aerosol  -- 2 puff(s) inhaled once a day   -- Check with your doctor before becoming pregnant.  For inhalation only.    -- Indication: For Asthma    furosemide 20 mg oral tablet  -- 1 tab(s) by mouth 2 times a day  -- Indication: For congestive heart    montelukast 10 mg oral tablet  -- 1 tab(s) by mouth once a day (at bedtime)  -- Indication: For copd    multivitamin  -- 1 tab(s) by mouth once a day  -- Indication: For mvi

## 2018-05-28 NOTE — DISCHARGE NOTE ADULT - HOSPITAL COURSE
Assessment and Plan  	    78 y.o. male with complex past medical history including CLL/lymphoma on treatment, h/o recurrent pneumonia, Afib, HFpEF, Hep B, COPD, type 2DM, H/o PE DVT presents with s/p fall. Pt was walking up stairs when he tripped and fell on the right side. Pt reports right rib and shoulder pain. Denies lightheadedness or dizziness prior to falling. Pt has been short of breath that has been gradually worsening. He saw his pulmonologist yesterday and was noted to have fluid around the lung. Pt denies any subjective fever, chills, sore throat. Reports sharp on/off chest pain that's worse with coughing. Reports chronic cough and current cough is not new. +worsening abdominal distension over the past 2-3 weeks. Denies diarrhea or dysuria.    #hypoxemia/SOB due to multifactorial (COPD exacerbation, CHF, pleural effusion and ascites)  #COPD exacerbation  -admit to CICU  -pulse ox monitoring and supplemental oxygen  -taper steroids to oral prednisone  -taper lasix to oral- starting tomamrrow; creatinine going up, stop iv lasix   -pulm consult  -cardio consults    #tachycardia likely from pain and pleural effusion/ascites and duoneb use  -less likely PE due to therapeutic INR  -EKG  -pain control prn  -IR eval for thoracentesis and paracentesis  -cont home amio and metoprolol    #AFib on coumadin  -CHADSVASc >4  -cont amio and metoprolol  -daily INR   -cont coumadin for now    #HBV with liver cirrhosis and ascites  -IR eval for tap  -GI consult, Dr Nails  -on viread    #B cell lymphoma/CLL  -oncology consult, Dr Houser    #HFpEF (EF 60-65%)  -I/O, daily weight  -iv lasix  -cont spironolactone  -cardio consult    #DM2  -on Tresiba 20units  -start lantus  -fingerstick monitoring    #DVT ppx  -on coumadin    #Advanced care planning  -discussed advanced directives with patient with wife and daughter at bedside. At this point, wishes to be FULL CODE Vital Signs Last 24 Hrs  T(C): --  T(F): --  HR: 67 (28 May 2018 04:00) (67 - 100)  BP: 124/65 (28 May 2018 04:00) (97/59 - 140/76)  BP(mean): 79 (28 May 2018 04:00) (67 - 93)  RR: 20 (27 May 2018 17:00) (19 - 20)  SpO2: 93% (28 May 2018 04:00) (88% - 97%)    HEENT:   pupils equal and reactive, EOMI, no oropharyngeal lesions, erythema, exudates, oral thrush    NECK:   supple, no carotid bruits, no palpable lymph nodes, no thyromegaly    CV:  +S1, +S2, regular, no murmurs or rubs    RESP:   lungs clear to auscultation bilaterally, no wheezing, rales, rhonchi, good air entry bilaterally    BREAST:  not examined    GI:  abdomen soft, non-tender, non-distended, normal BS, no bruits, no abdominal masses, no palpable masses    RECTAL:  not examined    :  not examined    MSK:   normal muscle tone, no atrophy, no rigidity, no contractions    EXT:   no clubbing, no cyanosis, no edema, no calf pain, swelling or erythema    VASCULAR:  pulses equal and symmetric in the upper and lower extremities    NEURO:  AAOX3, no focal neurological deficits, follows all commands, able to move extremities spontaneously    SKIN:  no ulcers, lesions or rashes    28 May 2018 04:29    137    |  102    |  26     ----------------------------<  273    4.5     |  28     |  0.95     Ca    8.0        28 May 2018 04:29    TPro  5.7    /  Alb  2.8    /  TBili  0.4    /  DBili  x      /  AST  47     /  ALT  25     /  AlkPhos  107    26 May 2018 16:35  LIVER FUNCTIONS - ( 26 May 2018 16:35 )  Alb: 2.8 g/dL / Pro: 5.7 gm/dL / ALK PHOS: 107 U/L / ALT: 25 U/L / AST: 47 U/L / GGT: x         PT/INR - ( 27 May 2018 05:42 )   PT: 27.9 sec;   INR: 2.53 ratio         PTT - ( 26 May 2018 16:35 )  PTT:34.2 secCBC Full  -  ( 27 May 2018 05:42 )  WBC Count : 26.46 K/uL  Hemoglobin : 11.2 g/dL  Hematocrit : 35.9 %  Platelet Count - Automated : 100 K/uL  Mean Cell Volume : 90.4 fl  Mean Cell Hemoglobin : 28.2 pg  Mean Cell Hemoglobin Concentration : 31.2 gm/dL  Auto Neutrophil # : 4.63 K/uL  Auto Lymphocyte # : 20.14 K/uL  Auto Monocyte # : 1.54 K/uL  Auto Eosinophil # : 0.01 K/uL  Auto Basophil # : 0.07 K/uL        Hospital Course:      78 y.o. male with complex past medical history including CLL/lymphoma on treatment, h/o recurrent pneumonia, Afib, HFpEF, Hep B, COPD, type 2DM, H/o PE DVT presents with s/p fall. Pt was walking up stairs when he tripped and fell on the right side. Pt reports right rib and shoulder pain. Denies lightheadedness or dizziness prior to falling. Pt has been short of breath that has been gradually worsening. He saw his pulmonologist yesterday and was noted to have fluid around the lung.   Admitted for multifactorial symptoms of acute diastolic chf exacerbation, acute copd exacerbation. Patient was treated with Iv solumederol, Iv lasix. He has responded to treatment and is now asymptomatic. He is advised to starte Spiriva outpatient and c/w oral prednisone taper. He is also advised to c/w previous cardiac meds  He will f/u outpatient with his cardioloist and pulmonary w/ in one week of discharge. 78 y.o. male with complex past medical history including CLL/lymphoma on treatment, h/o recurrent pneumonia, Afib, HFpEF, Hep B, COPD, type 2DM, H/o PE DVT presents with s/p fall. Pt was walking up stairs when he tripped and fell on the right side. Pt reports right rib and shoulder pain. Denies lightheadedness or dizziness prior to falling. Pt has been short of breath that has been gradually worsening. He saw his pulmonologist yesterday and was noted to have fluid around the lung.   Admitted for multifactorial symptoms of acute diastolic chf exacerbation, acute copd exacerbation. Patient was treated with Iv solumederol, Iv lasix. He has responded to treatment and is now asymptomatic. He is advised to starte Spiriva outpatient and c/w oral prednisone taper. He is also advised to c/w previous cardiac meds;  -s/p thoracentesis.....resume diurrhetics  He will f/u outpatient with his cardioloist and pulmonary w/ in one week of discharge.         REVIEW OF SYSTEMS:    CONSTITUTIONAL: No weakness, No fevers or chills  ENT: No ear ache, No sorethroat  NECK: No pain, No stiffness  RESPIRATORY: No cough, No wheezing, No hemoptysis; No dyspnea  CARDIOVASCULAR: No chest pain, No palpitations  GASTROINTESTINAL: No abd pain, No nausea, No vomiting, No hematemesis, No diarrhea or constipation. No melena, No hematochezia.  GENITOURINARY: No dysuria, No  hematuria  NEUROLOGICAL: No diplopia, No paresthesia, No motor dysfunction  MUSCULOSKELETAL: No arthralgia, No myalgia  SKIN: No rashes, or lesions   PSYCH: no anxiety, no suicidal ideation    All other review of systems is negative unless indicated above    Vital Signs Last 24 Hrs  T(C): 36 (31 May 2018 06:15), Max: 36.6 (30 May 2018 15:48)  T(F): 96.8 (31 May 2018 06:15), Max: 97.9 (30 May 2018 15:48)  HR: 68 (31 May 2018 08:28) (65 - 68)  BP: 115/64 (31 May 2018 08:28) (115/64 - 127/67)  BP(mean): 76 (31 May 2018 08:28) (76 - 90)  RR: 18 (31 May 2018 06:20) (18 - 18)  SpO2: 96% (31 May 2018 08:28) (93% - 96%)    PHYSICAL EXAM:    GENERAL: NAD, Well nourished  HEENT:  NC/AT, EOMI, PERRLA, No scleral icterus, Moist mucous membranes  NECK: Supple, No JVD  CNS:  Alert & Oriented X3, Motor Strength 5/5 B/L upper and lower extremities; DTRs 2+ intact   LUNG: Normal Breath sounds, Clear to auscultation bilaterally, No rales, No rhonchi, No wheezing  HEART: RRR; No murmurs, No rubs  ABDOMEN: +BS, ST/ND/NT  GENITOURINARY: Voiding, Bladder not distended  EXTREMITIES:  2+ Peripheral Pulses, No clubbing, No cyanosis, No tibial edema  MUSCULOSKELTAL: Joints normal ROM, No TTP, No effusion  SKIN: no rashes  RECTAL: deferred, not indicated  BREAST: deferred               Assessment and Plan:    1. Acute on chronic respiratory failure due to multifactorial (COPD exacerbation, CHF, pleural effusion and ascites)    2. COPD exacerbation  Prednisone taper    3. Pleural effusion: s/p thoracentesis:   Analysis c/w exudate by proteins  Pulmonary f/u as outpt  -resume coumadin and diurrhetics    4. AFib on coumadin  -CHADSVASc >4  -cont amio and metoprolol  -daily INR   -Resume coumadin     5. HBV with liver cirrhosis and ascites  no need for paracentesis according to GI    6. B cell lymphoma/CLL  -oncology consult, Dr Houser  f/u pleural fluid cytology    7. Acute on chronic diastolic Chf/ HFpEF (EF 60-65%)  -I/O, daily weight  -iv lasix  -cont spironolactone    d/c home, time 35m

## 2018-05-28 NOTE — DISCHARGE NOTE ADULT - CARE PROVIDER_API CALL
Geo Conte (MD), Cardiovascular Disease; Internal Medicine  [address] [place] [address]  Phone: [phone]  Fax: [phone]    KELLY Lakhani (), Pulmonary Disease; Sleep Medicine  [address] [place] [address]  Phone: [phone]  Fax: [phone] Geo Conte (MD), Cardiovascular Disease; Internal Medicine  [address] [place] [address]  Phone: [phone]  Fax: [phone]    KELLY Lakhani (), Pulmonary Disease; Sleep Medicine  [address] [place] [address]  Phone: [phone]  Fax: [phone]    Miguel Angel Ibarra), Internal Medicine  [address] [place] [address]  Phone: [phone]  Fax: [phone]

## 2018-05-28 NOTE — CONSULT NOTE ADULT - SUBJECTIVE AND OBJECTIVE BOX
Patient is a 78y old  Male who presents with a chief complaint of s/p fall        HPI:  History obtained from prior chart, patient and daughter.  Translation by daughter.  patient is a 78-year-old with history of hypertension, chronic diastolic congestive heart failure, COPD, paroxysmal atrial fibrillation, history of DVT and pulmonary embolis, he has chronic bilateral pleural effusion, CLL/lymphoma being treated, history of recurrent pneumonia, patient is admitted after he fell. Patient was walking to the [employer] and he tripped and fell. Patient states he did not lose any consciousness . He denies any headache or any blurred vision. He has been alert and oriented since admission. He denies any chest pain. Medication of breath with mild-to-moderate exertion. No orthopnea or PND. He has mild bilateral ankle edema. since admission he has been alert and  oriented and has been in normal sinus rhythm on tel    CT Chest No Cont (18   IMPRESSION:  moderate BILATERAL pleural effusions, LEFT greater than   RIGHT, with underlying atelectasis.   Mildly enlarged axillaryor   mediastinal lymph nodes.   Ascites. Old RIGHT-sided rib fractures.          CT Abdomen and Pelvis w/ IV Cont (18   IMPRESSION:     Moderate ascites, new from 2015.    Cirrhosis and splenomegaly, suspect portal hypertension.    Extensive abdominal and pelvic lymphadenopathy, enlarged from 2015.          PAST MEDICAL & SURGICAL HISTORY:  Hypogammaglobulinemia: received IVIG  Hepatitis B virus infection, unspecified chronicity: retrovirals  Chronic diastolic congestive heart failure  Essential hypertension  DM type 2 (diabetes mellitus, type 2)  B-cell lymphoma, unspecified B-cell lymphoma type, unspecified body region: CLL stage 4/SLL; met NHL  Atrial fibrillation, unspecified type  COPD (chronic obstructive pulmonary disease)  History of esophageal dilatation  S/P cholecystectomy  H/O prostatectomy      PREVIOUS DIAGNOSTIC TESTING:      ECHO  FINDINGS:  < from: Transthoracic Echocardiogram (17    Summary     Normal aortic valve structure and function.     Normal appearing left atrium.   The left ventricle is normalin size, wall motion and contractility.   Mild concentric left ventricular hypertrophy is present.   Estimated left ventricular ejection fraction is 60-65 %.     All visualized extra cardiac structures appears to be normal.     EA reversal of the mitral inflow consistent with reduced compliance of   the   left ventricle.   Mild (1+) mitral regurgitation is present.     No evidence of pericardial effusion.   No evidence of pleural effusion.     Trace pulmonic valvular regurgitation is present.   Normal appearing right atrium.   Normal appearing right ventricle structure and function.   Mild (1+) tricuspid valve regurgitation is present.   Mild pulmonary hypertension.            MEDICATIONS  (STANDING):  amiodarone    Tablet 100 milliGRAM(s) Oral daily  dextrose 5%. 1000 milliLiter(s) (50 mL/Hr) IV Continuous <Continuous>  dextrose 50% Injectable 12.5 Gram(s) IV Push once  dextrose 50% Injectable 25 Gram(s) IV Push once  dextrose 50% Injectable 25 Gram(s) IV Push once  furosemide   Injectable 20 milliGRAM(s) IV Push two times a day  gabapentin 300 milliGRAM(s) Oral three times a day  insulin glargine Injectable (LANTUS) 20 Unit(s) SubCutaneous every morning  insulin lispro (HumaLOG) corrective regimen sliding scale   SubCutaneous three times a day before meals  insulin lispro (HumaLOG) corrective regimen sliding scale   SubCutaneous at bedtime  methylPREDNISolone sodium succinate Injectable 40 milliGRAM(s) IV Push every 8 hours  metoprolol tartrate 25 milliGRAM(s) Oral two times a day  montelukast 10 milliGRAM(s) Oral at bedtime  simvastatin 10 milliGRAM(s) Oral at bedtime  spironolactone 25 milliGRAM(s) Oral at bedtime  tenofovir disoproxil fumarate (VIREAD) 300 milliGRAM(s) Oral daily    MEDICATIONS  (PRN):  acetaminophen   Tablet 650 milliGRAM(s) Oral every 6 hours PRN For Temp greater than 38 C (100.4 F), mild pain, HA  ALBUTerol/ipratropium for Nebulization 3 milliLiter(s) Nebulizer every 6 hours PRN Shortness of Breath and/or Wheezing  dextrose 40% Gel 15 Gram(s) Oral once PRN Blood Glucose LESS THAN 70 milliGRAM(s)/deciliter  glucagon  Injectable 1 milliGRAM(s) IntraMuscular once PRN Glucose LESS THAN 70 milligrams/deciliter  morphine  - Injectable 2 milliGRAM(s) IV Push every 6 hours PRN Severe Pain (7 - 10)      FAMILY HISTORY:  Family history of liver cancer (Father): father  85 liver CA  Family history of coronary arteriosclerosis (Mother): Mom  of MI age 68      SOCIAL HISTORY:  he denies any history of smoking or any Alcohol consumption.    REVIEW OF SYSTEM:  Pertinent items are noted in HPI.  Constitutional	Negative for chills, fevers, sweats.    Eyes: 	Negative for visual disturbance.  Ears, nose, mouth, throat, and face: Negative for epistaxis, nasal congestion, sore throat ..  Neck:	Negative for enlargement, pain and difficulty in swallowing  Respiration : Negative for cough,  pleuritic chest pain and wheezing  Cardiovascular: Negative for chest pain,  and palpitations    Gastrointestinal : Negative for abdominal pain, diarrhea, nausea and vomiting  Genitourinary: Negative for dysuria, frequency and urinary incontinence .  Skin: Negative for  rash, pruritus, swelling, dryness .  	  Hematologic/lymphatic: Negative for bleeding and easy bruising  Musculoskeletal: Negative for arthralgias, back pain and muscle weakness.  Neurological: Negative for dizziness, headaches, seizures and tremors  Behavioral/Psych: Negative for mood change, depression.  Endocrine:	Negative for blurry vision, polydipsia and polyuria, diaphoresis.   Allergic/Immunologic:	Negative for anaphylaxis, angioedema and urticaria.      Vital Signs Last 24 Hrs  T(C): 36.1 (26 May 2018 23:00), Max: 37.9 (26 May 2018 16:15)  T(F): 97 (26 May 2018 23:00), Max: 100.2 (26 May 2018 16:15)  HR: 92 (27 May 2018 09:05) (82 - 124)  BP: 92/59 (27 May 2018 09:05) (92/59 - 151/87)  BP(mean): 67 (27 May 2018 09:05) (67 - 88)  RR: 21 (27 May 2018 09:05) (15 - 28)  SpO2: 91% (27 May 2018 09:05) (88% - 97%)          PHYSICAL EXAM  General Appearance: cooperative, no acute distress,   HEENT: PERRL, conjunctiva clear, EOM's intact, non injected pharynx, no exudate .  Neck: Supple, , no adenopathy, thyroid: not enlarged, no carotid bruit or JVD    Lungs: diminished breath sounds on both sides left greater than right scattered left basilar rales.  Heart: Regular rate and rhythm, S1, S2 normal, no murmur, rub or gallop  Abdomen: Soft, non-tender, bowel sounds active , no hepatosplenomegaly , probable ascites  Extremities: no cyanosis 1 + ankle  edema, no joint swelling  Skin: Skin color, texture normal, no rashes   Neurologic: Alert and oriented X3 , cranial nerves intact, sensory and motor normal,        INTERPRETATION OF TELEMETRY: NSR    ECG: NSR PRWP        LABS:                          11.2   26.46 )-----------( 100      ( 27 May 2018 05:42 )             35.9         136  |  100  |  15  ----------------------------<  263<H>  4.9   |  25  |  1.33<H>    Ca    8.3<L>      27 May 2018 05:42    TPro  5.7<L>  /  Alb  2.8<L>  /  TBili  0.4  /  DBili  x   /  AST  47<H>  /  ALT  25  /  AlkPhos  107      CARDIAC MARKERS ( 26 May 2018 16:35 )  <0.015 ng/mL / x     / x     / x     / x            Pro BNP  516  @ 16:35  D Dimer  --  @ 16:35    PT/INR - ( 27 May 2018 05:42 )   PT: 27.9 sec;   INR: 2.53 ratio         PTT - ( 26 May 2018 16:35 )  PTT:34.2 sec
Patient is a 78y old  Male who presents with a chief complaint of s/p fall  SOB (28 May 2018 09:09)        78 y.o. male with past medical history including CLL/lymphoma on treatment, h/o recurrent pneumonia, Afib, HFpEF, Hep B, COPD, type 2DM, H/o PE DVT admitted on  for evaluation of falling on his right side and shoulder. Notes he has been intermittently short of breath with mild cough. Otherwise no other specific complaints.          PMH: as above  PSH: as above  Meds: per reconciliation sheet, noted below  MEDICATIONS  (STANDING):  amiodarone    Tablet 100 milliGRAM(s) Oral daily  dextrose 5%. 1000 milliLiter(s) (50 mL/Hr) IV Continuous <Continuous>  dextrose 50% Injectable 12.5 Gram(s) IV Push once  dextrose 50% Injectable 25 Gram(s) IV Push once  dextrose 50% Injectable 25 Gram(s) IV Push once  furosemide    Tablet 20 milliGRAM(s) Oral two times a day  gabapentin 300 milliGRAM(s) Oral three times a day  insulin glargine Injectable (LANTUS) 20 Unit(s) SubCutaneous every morning  insulin lispro (HumaLOG) corrective regimen sliding scale   SubCutaneous three times a day before meals  insulin lispro (HumaLOG) corrective regimen sliding scale   SubCutaneous at bedtime  metoprolol tartrate 25 milliGRAM(s) Oral two times a day  montelukast 10 milliGRAM(s) Oral at bedtime  predniSONE   Tablet 40 milliGRAM(s) Oral two times a day  simvastatin 10 milliGRAM(s) Oral at bedtime  tenofovir disoproxil fumarate (VIREAD) 300 milliGRAM(s) Oral daily    MEDICATIONS  (PRN):  acetaminophen   Tablet 650 milliGRAM(s) Oral every 6 hours PRN For Temp greater than 38 C (100.4 F), mild pain, HA  ALBUTerol/ipratropium for Nebulization 3 milliLiter(s) Nebulizer every 6 hours PRN Shortness of Breath and/or Wheezing  dextrose 40% Gel 15 Gram(s) Oral once PRN Blood Glucose LESS THAN 70 milliGRAM(s)/deciliter  glucagon  Injectable 1 milliGRAM(s) IntraMuscular once PRN Glucose LESS THAN 70 milligrams/deciliter  morphine  - Injectable 2 milliGRAM(s) IV Push every 6 hours PRN Severe Pain (7 - 10)    Allergies    Privigen (Other (Severe))    Intolerances      Social: no smoking, no alcohol, no illegal drugs; no recent travel, no exposure to TB  FAMILY HISTORY:  Family history of liver cancer (Father): father  85 liver CA  Family history of coronary arteriosclerosis (Mother): Mom  of MI age 68     no history of premature cardiovascular disease in first degree relatives  ROS: the patient denies fever, no chills, no HA, no dizziness, no sore throat, no blurry vision, no CP, no palpitations,  no abdominal pain, no diarrhea, no N/V, no dysuria, no leg pain, no claudication, no rash, no joint aches, no rectal pain or bleeding, no night sweats  All other systems reviewed and are negative    Vital Signs Last 24 Hrs  T(C): --  T(F): --  HR: 67 (28 May 2018 04:00) (67 - 100)  BP: 124/65 (28 May 2018 04:00) (107/61 - 140/76)  BP(mean): 79 (28 May 2018 04:00) (71 - 93)  RR: 20 (27 May 2018 17:00) (19 - 20)  SpO2: 93% (28 May 2018 04:00) (88% - 97%)  Daily     Daily Weight in k.2 (28 May 2018 09:09)    PE:    Constitutional: frail looking  HEENT: NC/AT, EOMI, PERRLA, conjunctivae clear; ears and nose atraumatic; pharynx clear  Neck: supple; thyroid not palpable  Back: no tenderness  Respiratory: respiratory effort normal; clear to auscultation  Cardiovascular: S1S2 regular, no murmurs  Abdomen: soft, not tender, not distended, positive BS; no liver or spleen organomegaly  Genitourinary: no suprapubic tenderness  Musculoskeletal: no muscle tenderness, no joint swelling or tenderness  Neurological/ Psychiatric: AxOx3, judgement and insight normal;  moving all extremities  Skin: no rashes; no palpable lesions    Labs: all available labs reviewed                        11.   26.46 )-----------( 100      ( 27 May 2018 05:42 )             35.9     05-28    137  |  102  |  26<H>  ----------------------------<  273<H>  4.5   |  28  |  0.95    Ca    8.0<L>      28 May 2018 04:29    TPro  5.7<L>  /  Alb  2.8<L>  /  TBili  0.4  /  DBili  x   /  AST  47<H>  /  ALT  25  /  AlkPhos  107       LIVER FUNCTIONS - ( 26 May 2018 16:35 )  Alb: 2.8 g/dL / Pro: 5.7 gm/dL / ALK PHOS: 107 U/L / ALT: 25 U/L / AST: 47 U/L / GGT: x           Culture - Blood (18 @ 16:35)    -  Coagulase negative Staphylococcus: Detec    Gram Stain:   Growth in aerobic bottle:  Gram Positive Cocci in Clusters    Specimen Source: .Blood Blood-Peripheral    Organism: Blood Culture PCR    Culture Results:   Growth in aerobic bottle:  Gram Positive Cocci in Clusters  "Due to technical problems, Proteus sp. will Not be reported as part of  the BCID panel until further notice"  ***Blood Panel PCR results on this specimen are available  approximately 3 hours after the Gram stain result.***  Gram stain, PCR, and/or culture results may not always  correspond due to difference in methodologies.  ************************************************************  This PCR assay was performed using [employer].  The following targets are tested for: Enterococcus,  vancomycin resistant enterococci, Listeria monocytogenes,  coagulase negative staphylococci, S. aureus,  methicillin resistant S. aureus, Streptococcus agalactiae  (Group B), S. pneumoniae, S. pyogenes (Group A),  Acinetobacter baumannii, Enterobacter cloacae, E. coli,  Klebsiella oxytoca, K. pneumoniae, Proteus sp.,  Serratia marcescens, Haemophilus influenzae,  Neisseria meningitidis, Pseudomonas aeruginosa, Candida  albicans, C. glabrata, C krusei, C parapsilosis,  C. tropicalis and the KPC resistance gene.    Organism Identification: Blood Culture PCR    Method Type: PCR      < from: CT Chest No Cont (18 @ 17:37) >  IMPRESSION:  moderate BILATERAL pleural effusions, LEFT greater than   RIGHT, with underlying atelectasis.   Mildly enlarged axillaryor   mediastinal lymph nodes.   Ascites. Old RIGHT-sided rib fractures.       < end of copied text >      Radiology: all available radiological tests reviewed    Advanced directives addressed: full resuscitation
78 y.o. male with complex past medical history including CLL/lymphoma  on no present therapy other than monthly Gammaglobulin, h/o recurrent pneumonia, Afib, HFpEF, Hep B, COPD, type 2DM, H/o PE DVT presents with s/p fall. Pt was walking up stairs when he tripped and fell on the right side. Pt reports right rib and shoulder pain. Denies lightheadedness or dizziness prior to falling. Pt has been short of breath that has been gradually worsening. He saw his pulmonologist yesterday and was noted to have fluid around the lung. Pt denies any subjective fever, chills, sore throat. Reports sharp on/off chest pain that's worse with coughing. Reports chronic cough and current cough is not new. +worsening abdominal distension over the past 2-3 weeks. Denies diarrhea or dysuria. (26 May 2018 19:39)/ treated with diuretics/ steroids/ bronchodilators and feels better    Imaging continues to show mediastinal, retroperitoneal nodes/ we have not initiated therapy ( yet) as risks > benefits, and symptoms were related to underlying cardio-pulmonary    condition/     PAST MEDICAL & SURGICAL HISTORY:  Hypogammaglobulinemia: received IVIG  Hepatitis B virus infection, unspecified chronicity: retrovirals  Chronic diastolic congestive heart failure  Essential hypertension  DM type 2 (diabetes mellitus, type 2)  B-cell lymphoma, unspecified B-cell lymphoma type, unspecified body region: CLL stage 4/SLL; met NHL  Atrial fibrillation, unspecified type  COPD (chronic obstructive pulmonary disease)  History of esophageal dilatation  S/P cholecystectomy  H/O prostatectomy      MEDICATIONS  (STANDING):  amiodarone    Tablet 100 milliGRAM(s) Oral daily  dextrose 5%. 1000 milliLiter(s) (50 mL/Hr) IV Continuous <Continuous>  dextrose 50% Injectable 12.5 Gram(s) IV Push once  dextrose 50% Injectable 25 Gram(s) IV Push once  dextrose 50% Injectable 25 Gram(s) IV Push once  furosemide    Tablet 20 milliGRAM(s) Oral two times a day  gabapentin 300 milliGRAM(s) Oral three times a day  insulin glargine Injectable (LANTUS) 20 Unit(s) SubCutaneous every morning  insulin lispro (HumaLOG) corrective regimen sliding scale   SubCutaneous three times a day before meals  insulin lispro (HumaLOG) corrective regimen sliding scale   SubCutaneous at bedtime  metoprolol tartrate 25 milliGRAM(s) Oral two times a day  montelukast 10 milliGRAM(s) Oral at bedtime  predniSONE   Tablet 40 milliGRAM(s) Oral two times a day  simvastatin 10 milliGRAM(s) Oral at bedtime  tenofovir disoproxil fumarate (VIREAD) 300 milliGRAM(s) Oral daily    MEDICATIONS  (PRN):  acetaminophen   Tablet 650 milliGRAM(s) Oral every 6 hours PRN For Temp greater than 38 C (100.4 F), mild pain, HA  ALBUTerol/ipratropium for Nebulization 3 milliLiter(s) Nebulizer every 6 hours PRN Shortness of Breath and/or Wheezing  dextrose 40% Gel 15 Gram(s) Oral once PRN Blood Glucose LESS THAN 70 milliGRAM(s)/deciliter  glucagon  Injectable 1 milliGRAM(s) IntraMuscular once PRN Glucose LESS THAN 70 milligrams/deciliter  morphine  - Injectable 2 milliGRAM(s) IV Push every 6 hours PRN Severe Pain (7 - 10)      Allergies    Privigen (Other (Severe))    Intolerances        SOCIAL HISTORY:    FAMILY HISTORY:  Family history of liver cancer (Father): father  85 liver CA  Family history of coronary arteriosclerosis (Mother): Mom  of MI age 68      Vital Signs Last 24 Hrs  T(C): --  T(F): --  HR: 67 (28 May 2018 04:00) (67 - 100)  BP: 124/65 (28 May 2018 04:00) (97/59 - 140/76)  BP(mean): 79 (28 May 2018 04:00) (67 - 93)  RR: 20 (27 May 2018 17:00) (19 - 20)  SpO2: 93% (28 May 2018 04:00) (88% - 97%)      NAD  VSS  HEENT neg  Lungs clear  Cor neg  Abd soft  Ext neg    LABS:                        11.2   26.46 )-----------( 100      ( 27 May 2018 05:42 )             35.9     05-28    137  |  102  |  26<H>  ----------------------------<  273<H>  4.5   |  28  |  0.95    Ca    8.0<L>      28 May 2018 04:29    TPro  5.7<L>  /  Alb  2.8<L>  /  TBili  0.4  /  DBili  x   /  AST  47<H>  /  ALT  25  /  AlkPhos  107  05-26    PT/INR - ( 27 May 2018 05:42 )   PT: 27.9 sec;   INR: 2.53 ratio         PTT - ( 26 May 2018 16:35 )  PTT:34.2 sec      RADIOLOGY & ADDITIONAL STUDIES:
Patient is a 78y old  Male who presents with a chief complaint of s/p fall  SOB (28 May 2018 09:09)      HPI:  History obtained from prior chart, patient and daughter.  Translation by daughter.    78 y.o. male with complex past medical history including CLL/lymphoma on treatment, h/o recurrent pneumonia, Afib, HFpEF, Hep B, COPD, type 2DM, H/o PE DVT presents with s/p fall. Pt was walking up stairs when he tripped and fell on the right side. Pt reports right rib and shoulder pain. Denies lightheadedness or dizziness prior to falling. Pt has been short of breath that has been gradually worsening. He saw his pulmonologist yesterday and was noted to have fluid around the lung. Pt denies any subjective fever, chills, sore throat. Reports sharp on/off chest pain that's worse with coughing. Reports chronic cough and current cough is not new. +worsening abdominal distension over the past 2-3 weeks. Denies diarrhea or dysuria. (26 May 2018 19:39)    pt is seen and examined with wife and son at bedside  data reveiwed with RN staff as well  case discussed with Dr mariano today   outpt data discussed as well  PAST MEDICAL & SURGICAL HISTORY:  Hypogammaglobulinemia: received IVIG  Hepatitis B virus infection, unspecified chronicity: retrovirals  Chronic diastolic congestive heart failure  Essential hypertension  DM type 2 (diabetes mellitus, type 2)  B-cell lymphoma, unspecified B-cell lymphoma type, unspecified body region: CLL stage 4/SLL; met NHL  Atrial fibrillation, unspecified type  COPD (chronic obstructive pulmonary disease)  History of esophageal dilatation  S/P cholecystectomy  H/O prostatectomy      PREVIOUS DIAGNOSTIC TESTING:      MEDICATIONS  (STANDING):  amiodarone    Tablet 100 milliGRAM(s) Oral daily  dextrose 5%. 1000 milliLiter(s) (50 mL/Hr) IV Continuous <Continuous>  dextrose 50% Injectable 12.5 Gram(s) IV Push once  dextrose 50% Injectable 25 Gram(s) IV Push once  dextrose 50% Injectable 25 Gram(s) IV Push once  furosemide    Tablet 20 milliGRAM(s) Oral two times a day  gabapentin 300 milliGRAM(s) Oral three times a day  insulin glargine Injectable (LANTUS) 20 Unit(s) SubCutaneous every morning  insulin lispro (HumaLOG) corrective regimen sliding scale   SubCutaneous three times a day before meals  insulin lispro (HumaLOG) corrective regimen sliding scale   SubCutaneous at bedtime  metoprolol tartrate 25 milliGRAM(s) Oral two times a day  montelukast 10 milliGRAM(s) Oral at bedtime  predniSONE   Tablet 40 milliGRAM(s) Oral two times a day  simvastatin 10 milliGRAM(s) Oral at bedtime  tenofovir disoproxil fumarate (VIREAD) 300 milliGRAM(s) Oral daily    MEDICATIONS  (PRN):  acetaminophen   Tablet 650 milliGRAM(s) Oral every 6 hours PRN For Temp greater than 38 C (100.4 F), mild pain, HA  ALBUTerol/ipratropium for Nebulization 3 milliLiter(s) Nebulizer every 6 hours PRN Shortness of Breath and/or Wheezing  dextrose 40% Gel 15 Gram(s) Oral once PRN Blood Glucose LESS THAN 70 milliGRAM(s)/deciliter  glucagon  Injectable 1 milliGRAM(s) IntraMuscular once PRN Glucose LESS THAN 70 milligrams/deciliter  morphine  - Injectable 2 milliGRAM(s) IV Push every 6 hours PRN Severe Pain (7 - 10)      FAMILY HISTORY:  Family history of liver cancer (Father): father  85 liver CA  Family history of coronary arteriosclerosis (Mother): Mom  of MI age 68      SOCIAL HISTORY:  ***    REVIEW OF SYSTEM:  Pertinent items are noted in HPI.  Constitutional negative for chills, fevers, sweats and weight loss  throat, and face:  negative for epistaxis, pos nasal congestion, sore throat and   tinnitus  Respiratory: negative for cough,pos  dyspnea on exertion, pleuritic chest pain  and wheezing  Cardiovascular:  negative for chest pain,pos  dyspnea and palpitations  Gastrointestinal: negative for abdominal pain, diarrhea, nausea and vomiting  Genitourinary: negative for dysuria, frequency and urinary incontinence  Skin:  negative for redness, rash, pruritus, swelling, dryness and   fissures  Hematologic/lymphatic: negative for bleeding and easy bruising    Vital Signs Last 24 Hrs  T(C): --  T(F): --  HR: 67 (28 May 2018 04:00) (67 - 100)  BP: 124/65 (28 May 2018 04:00) (97/59 - 140/76)  BP(mean): 79 (28 May 2018 04:00) (67 - 93)  RR: 20 (27 May 2018 17:00) (19 - 20)  SpO2: 93% (28 May 2018 04:00) (88% - 97%)    o2 sat 91% on 2 liters nc    I&O's Summary    27 May 2018 07:01  -  28 May 2018 07:00  --------------------------------------------------------  IN: 250 mL / OUT: 0 mL / NET: 250 mL      PHYSICAL EXAM  General Appearance: cooperative, no acute distress,   HEENT: PERRL, conjunctiva clear, EOM's intact, non injected pharynx, no exudate, TM   normal  Neck: Supple, , no adenopathy, thyroid: not enlarged, no carotid bruit or JVD  Back: Symmetric, no  tenderness,no soft tissue tenderness  Lungs: Dullenss to percussion left mid to lower lung fileds more linda nroght base  no rhonchi  no wheeze  Heart: Regular rate and rhythm, S1, S2 normal, no murmur, rub or gallop  Abdomen: Soft, non-tender, bowel sounds active , no hepatosplenomegaly  Extremities: decreased pitting edema, no joint swelling  Skin: Skin color, texture normal, no rashes   Neurologic: Alert and oriented X3 , cranial nerves intact, sensory and motor normal,    ECG:    LABS:                          11.2   26.46 )-----------( 100      ( 27 May 2018 05:42 )             35.9     05-28    137  |  102  |  26<H>  ----------------------------<  273<H>  4.5   |  28  |  0.95    Ca    8.0<L>      28 May 2018 04:29    TPro  5.7<L>  /  Alb  2.8<L>  /  TBili  0.4  /  DBili  x   /  AST  47<H>  /  ALT  25  /  AlkPhos  107  26    CARDIAC MARKERS ( 26 May 2018 16:35 )  <0.015 ng/mL / x     / x     / x     / x            Pro BNP  516  @ 16:35  D Dimer  --  @ 16:35    PT/INR - ( 27 May 2018 05:42 )   PT: 27.9 sec;   INR: 2.53 ratio         PTT - ( 26 May 2018 16:35 )  PTT:34.2 sec          RADIOLOGY & ADDITIONAL STUDIES:    < from: CT Chest No Cont (18 @ 17:37) >  PROCEDURE DATE:  2018          INTERPRETATION:      CT chest without contrast         CLINICAL INFORMATION:       Cough and chest congestion, RIGHT rib pain    TECHNIQUE:  Contiguous axial 3 mm sections were obtained through the   chest using single helical acquisition.  In addition, 3 mm sagittal and   coronal reformatted images obtained.   This scan was performed using   automatic exposure control (radiation dose reduction software) to obtain   a diagnostic image quality scan with patient dose as low as reasonably   achievable.        FINDINGS:   CT dated 2018 available for review.    The thyroid gland appears intact.    The lungs significant for moderate BILATERAL pleural effusions, LEFT   greater than RIGHT, with underlying atelectasis.       The trachea and   major bronchi are patent.    Mildly enlarged axillary and mediastinal lymph nodes are again found.      The heart size is normal.   The mediastinum, chest wall and thoracic   spine show old RIGHT-sided rib fractures..    Limited evaluation of the upper abdomen is is again for ascites.      IMPRESSION:  moderate BILATERAL pleural effusions, LEFT greater than   RIGHT, with underlying atelectasis.   Mildly enlarged axillaryor   mediastinal lymph nodes.   Ascites. Old RIGHT-sided rib fractures.     < end of copied text >  < from: CT Abdomen and Pelvis w/ IV Cont (18 @ 17:41) >  IMPRESSION:     Moderate ascites, new from .    Cirrhosis and splenomegaly, suspect portal hypertension.    Extensive abdominal and pelvic lymphadenopathy, enlarged from 2015.    Other incidental comments as above.    < end of copied text >
Patient is a 78y old  Male who presents with a chief complaint of s/p fall  SOB (26 May 2018 19:39)      HPI:  History obtained from prior chart, patient and daughter.  Translation by daughter.    78 y.o. male with complex past medical history including CLL/lymphoma on treatment, h/o recurrent pneumonia, Afib, HFpEF, Hep B, COPD, type 2DM, H/o PE DVT presents with s/p fall. Pt was walking up stairs when he tripped and fell on the right side. Pt reports right rib and shoulder pain. Denies lightheadedness or dizziness prior to falling. Pt has been short of breath that has been gradually worsening. He saw his pulmonologist yesterday and was noted to have fluid around the lung. Pt denies any subjective fever, chills, sore throat. Reports sharp on/off chest pain that's worse with coughing. Reports chronic cough and current cough is not new. +worsening abdominal distension over the past 2-3 weeks. Denies diarrhea or dysuria. (26 May 2018 19:39)      Patient has noted increasing fatigue. It's also noted some increased shortness of breath. He was recently seen by pulmonology and after discussion with me, patient was started on both Aldactone and Lasix. However, he had progressive weakness and a fall.    Patient denies any abdominal pain, chest pain,  Shortness of breath as above.  He did have a trip when he fell.  He does have some pain on his right side, that increases with deep inspiration.  History is per patient and family          PAST MEDICAL & SURGICAL HISTORY:  Hypogammaglobulinemia: received IVIG  Hepatitis B virus infection, unspecified chronicity: retrovirals  Chronic diastolic congestive heart failure  Essential hypertension  DM type 2 (diabetes mellitus, type 2)  B-cell lymphoma, unspecified B-cell lymphoma type, unspecified body region: CLL stage 4/SLL; met NHL  Atrial fibrillation, unspecified type  COPD (chronic obstructive pulmonary disease)  History of esophageal dilatation  S/P cholecystectomy  H/O prostatectomy      MEDICATIONS  (STANDING):  amiodarone    Tablet 100 milliGRAM(s) Oral daily  dextrose 5%. 1000 milliLiter(s) (50 mL/Hr) IV Continuous <Continuous>  dextrose 50% Injectable 12.5 Gram(s) IV Push once  dextrose 50% Injectable 25 Gram(s) IV Push once  dextrose 50% Injectable 25 Gram(s) IV Push once  gabapentin 300 milliGRAM(s) Oral three times a day  insulin glargine Injectable (LANTUS) 20 Unit(s) SubCutaneous every morning  insulin lispro (HumaLOG) corrective regimen sliding scale   SubCutaneous three times a day before meals  insulin lispro (HumaLOG) corrective regimen sliding scale   SubCutaneous at bedtime  metoprolol tartrate 25 milliGRAM(s) Oral two times a day  montelukast 10 milliGRAM(s) Oral at bedtime  predniSONE   Tablet 40 milliGRAM(s) Oral two times a day  simvastatin 10 milliGRAM(s) Oral at bedtime  spironolactone 25 milliGRAM(s) Oral at bedtime  tenofovir disoproxil fumarate (VIREAD) 300 milliGRAM(s) Oral daily    MEDICATIONS  (PRN):  acetaminophen   Tablet 650 milliGRAM(s) Oral every 6 hours PRN For Temp greater than 38 C (100.4 F), mild pain, HA  ALBUTerol/ipratropium for Nebulization 3 milliLiter(s) Nebulizer every 6 hours PRN Shortness of Breath and/or Wheezing  dextrose 40% Gel 15 Gram(s) Oral once PRN Blood Glucose LESS THAN 70 milliGRAM(s)/deciliter  glucagon  Injectable 1 milliGRAM(s) IntraMuscular once PRN Glucose LESS THAN 70 milligrams/deciliter  morphine  - Injectable 2 milliGRAM(s) IV Push every 6 hours PRN Severe Pain (7 - 10)      Allergies    Privigen (Other (Severe))    Intolerances        SOCIAL HISTORY:lives with family    FAMILY HISTORY:  Family history of liver cancer (Father): father  85 liver CA  Family history of coronary arteriosclerosis (Mother): Mom  of MI age 68      REVIEW OF SYSTEMS:    CONSTITUTIONAL: No weakness, fevers or chills  EYES/ENT: No visual changes;  No vertigo or throat pain   NECK: No pain or stiffness  RESPIRATORY: No cough, wheezing, hemoptysis; No shortness of breath  CARDIOVASCULAR: No chest pain or palpitations  GENITOURINARY: No dysuria, frequency or hematuria  NEUROLOGICAL: No numbness or weakness  SKIN: No itching, burning, rashes, or lesions   All other review of systems is negative unless indicated above.    Vital Signs Last 24 Hrs  T(C): 36.1 (26 May 2018 23:00), Max: 37.9 (26 May 2018 16:15)  T(F): 97 (26 May 2018 23:00), Max: 100.2 (26 May 2018 16:15)  HR: 96 (27 May 2018 14:00) (82 - 124)  BP: 107/61 (27 May 2018 14:00) (92/59 - 151/87)  BP(mean): 71 (27 May 2018 14:00) (67 - 88)  RR: 19 (27 May 2018 14:00) (15 - 28)  SpO2: 88% (27 May 2018 14:00) (88% - 97%)    PHYSICAL EXAM:    Constitutional: NAD, well-developed  HEENT: EOMI, throat clear  Neck: No LAD, supple  Respiratory: CTA and P  Cardiovascular: S1 and S2, RRR, no M  Gastrointestinal: BS+, soft, NT/pos ascited, neg HSM,  Extremities: No peripheral , neg clubing, cyanosis  pos edema  Vascular: 2+ peripheral pulses  Neurological: A/O x 3, no focal deficits  Psychiatric: Normal mood, normal affect  Skin: No rashes    LABS:  CBC Full  -  ( 27 May 2018 05:42 )  WBC Count : 26.46 K/uL  Hemoglobin : 11.2 g/dL  Hematocrit : 35.9 %  Platelet Count - Automated : 100 K/uL  Mean Cell Volume : 90.4 fl  Mean Cell Hemoglobin : 28.2 pg  Mean Cell Hemoglobin Concentration : 31.2 gm/dL  Auto Neutrophil # : 4.63 K/uL  Auto Lymphocyte # : 20.14 K/uL  Auto Monocyte # : 1.54 K/uL  Auto Eosinophil # : 0.01 K/uL  Auto Basophil # : 0.07 K/uL  Auto Neutrophil % : 17.5 %  Auto Lymphocyte % : 76.1 %  Auto Monocyte % : 5.8 %  Auto Eosinophil % : 0.0 %  Auto Basophil % : 0.3 %        136  |  100  |  15  ----------------------------<  263<H>  4.9   |  25  |  1.33<H>    Ca    8.3<L>      27 May 2018 05:42    TPro  5.7<L>  /  Alb  2.8<L>  /  TBili  0.4  /  DBili  x   /  AST  47<H>  /  ALT  25  /  AlkPhos  107      PT/INR - ( 27 May 2018 05:42 )   PT: 27.9 sec;   INR: 2.53 ratio         PTT - ( 26 May 2018 16:35 )  PTT:34.2 sec        RADIOLOGY & ADDITIONAL STUDIES:  < from: CT Abdomen and Pelvis w/ IV Cont (18 @ 17:41) >  EXAM:  CT ABDOMEN AND PELVIS IC                            PROCEDURE DATE:  2018          INTERPRETATION:  Clinical information: Fever, abdominal pain and   distention    Comparison: 2015    PROCEDURE:   CT of the Abdomen and Pelvis was performed with intravenous contrast.  90ml of Omnipaque 350 was injected intravenously. 10cc was discarded.    FINDINGS:    LOWER CHEST: Mild to moderate left and small right pleural effusions.   Partially visualized mediastinal adenopathy.    ABDOMEN:  LIVER: Cirrhosis.  BILE DUCTS: Normal caliber  GALLBLADDER: No calcified gallstones. Normal caliber wall  PANCREAS: Within normal limits  SPLEEN: Splenomegaly.  ADRENALS: 1.5 cm left adrenal nodule stable.  KIDNEYS: Nodular infiltrative changes in theperinephric fat left greater   than right, grossly stable.    PELVIS:  REPRODUCTIVE ORGANS: No pelvic masses  BLADDER: Within normal limits    PERITONEUM:Moderate ascites.  BOWEL: Within normal limits.  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  RETROPERITONEUM: Extensive pelvic, retroperitoneal and mesenteric   adenopathy, overall enlarged from 2015.  ABDOMINAL WALL: Within normal limits  MUSCULOSKELETAL: Multiple old right-sided rib fractures. New from 2015   T11 vertebral compression fracture.    IMPRESSION:     Moderate ascites, new from 2015.    Cirrhosis and splenomegaly, suspect portal hypertension.    Extensive abdominal and pelvic lymphadenopathy, enlarged from 2015.    Other incidental comments as above.                    WAYNE TRAYLOR   This document has been electronically signed. May 26 2018  5:54PM    < end of copied text >

## 2018-05-28 NOTE — CONSULT NOTE ADULT - ASSESSMENT
Patient with CLL/ SLL, adenopathy, hypogammaglobulinemia  COPD / A FIB  Anemia    A/P    Will continue to hold on chemotherapy etc  Due for gammaglobulin infusion next week: outpatient if discharged  FU heme in a week  Discussed with family

## 2018-05-28 NOTE — DISCHARGE NOTE ADULT - PATIENT PORTAL LINK FT
You can access the [facility] Patient Portal, offered by [facility], by registering with the following website: [email or web address]

## 2018-05-28 NOTE — CONSULT NOTE ADULT - ASSESSMENT
78 y.o. male with past medical history including CLL/lymphoma on treatment, h/o recurrent pneumonia, Afib, HFpEF, Hep B, COPD, type 2DM, H/o PE DVT admitted on 5/26 for evaluation of falling on his right side and shoulder. Notes he has been intermittently short of breath with mild cough. Otherwise no other specific complaints  1. Patient noted with coagulase negative staph in blood cultures, most likely represents a skin contaminant; no obvious signs of infectious disease process  - okay from id standpoint to discharge home on no antibiotics  - continue current meds  - pain management for pain sustained after fall  2. other issues: per medicine

## 2018-05-28 NOTE — DISCHARGE NOTE ADULT - PLAN OF CARE
start oral Prednisone Tapering doses as instructed; Start daily oral inhaler: Spiriva f/u outpatient with your pulmonologist w/ in one week c/w previous medications, with decreased dose of lasix to twice a day secondary to renal concerns f/u outpatient with your cardiologist w/ in one week of discharge c/w previous home meds

## 2018-05-28 NOTE — DISCHARGE NOTE ADULT - PROVIDER TOKENS
TOKEN:'[id number]:'[id number]' TOKEN:'[id number]:'[id number]:'[id number]'

## 2018-05-28 NOTE — DISCHARGE NOTE ADULT - CARE PROVIDERS DIRECT ADDRESSES
,DirectAddress_Unknown,DirectAddress_Unknown ,DirectAddress_Unknown,DirectAddress_Unknown,[email or web address]

## 2018-05-28 NOTE — PROGRESS NOTE ADULT - SUBJECTIVE AND OBJECTIVE BOX
HPI:    patient is a 78-year-old with history of hypertension, chronic diastolic congestive heart failure, COPD, paroxysmal atrial fibrillation, history of DVT and pulmonary emboli in the past , he has chronic bilateral pleural effusion, CLL/lymphoma being treated, history of recurrent pneumonia, patient is admitted after he fell. Patient was walking to the [employer] and he tripped and fell. Patient states he did not lose any consciousness . He denies any headache or any blurred vision. He has been alert and oriented since admission. He denies any chest pain or shortness of breath, he is comfortable and is in normal sinus rhythm. He has large bilateral pleural effusion and may need thoracocentesis.    CT Chest No Cont (05.26.18   IMPRESSION:  moderate BILATERAL pleural effusions, LEFT greater than   RIGHT, with underlying atelectasis.   Mildly enlarged axillaryor   mediastinal lymph nodes.   Ascites. Old RIGHT-sided rib fractures.          CT Abdomen and Pelvis w/ IV Cont (05.26.18   IMPRESSION:     Moderate ascites, new from 2015.    Cirrhosis and splenomegaly, suspect portal hypertension.    Extensive abdominal and pelvic lymphadenopathy, enlarged from 2015.          PAST MEDICAL & SURGICAL HISTORY:  Hypogammaglobulinemia: received IVIG  Hepatitis B virus infection, unspecified chronicity: retrovirals  Chronic diastolic congestive heart failure  Essential hypertension  DM type 2 (diabetes mellitus, type 2)  B-cell lymphoma, unspecified B-cell lymphoma type, unspecified body region: CLL stage 4/SLL; met NHL  Atrial fibrillation, unspecified type  COPD (chronic obstructive pulmonary disease)  History of esophageal dilatation  S/P cholecystectomy  H/O prostatectomy    MEDICATIONS  (STANDING):  amiodarone    Tablet 100 milliGRAM(s) Oral daily  dextrose 5%. 1000 milliLiter(s) (50 mL/Hr) IV Continuous <Continuous>  dextrose 50% Injectable 12.5 Gram(s) IV Push once  dextrose 50% Injectable 25 Gram(s) IV Push once  dextrose 50% Injectable 25 Gram(s) IV Push once  furosemide    Tablet 20 milliGRAM(s) Oral two times a day  gabapentin 300 milliGRAM(s) Oral three times a day  insulin glargine Injectable (LANTUS) 20 Unit(s) SubCutaneous every morning  insulin lispro (HumaLOG) corrective regimen sliding scale   SubCutaneous three times a day before meals  insulin lispro (HumaLOG) corrective regimen sliding scale   SubCutaneous at bedtime  metoprolol tartrate 25 milliGRAM(s) Oral two times a day  montelukast 10 milliGRAM(s) Oral at bedtime  predniSONE   Tablet 40 milliGRAM(s) Oral two times a day  simvastatin 10 milliGRAM(s) Oral at bedtime  tenofovir disoproxil fumarate (VIREAD) 300 milliGRAM(s) Oral daily    MEDICATIONS  (PRN):  acetaminophen   Tablet 650 milliGRAM(s) Oral every 6 hours PRN For Temp greater than 38 C (100.4 F), mild pain, HA  ALBUTerol/ipratropium for Nebulization 3 milliLiter(s) Nebulizer every 6 hours PRN Shortness of Breath and/or Wheezing  dextrose 40% Gel 15 Gram(s) Oral once PRN Blood Glucose LESS THAN 70 milliGRAM(s)/deciliter  glucagon  Injectable 1 milliGRAM(s) IntraMuscular once PRN Glucose LESS THAN 70 milligrams/deciliter  morphine  - Injectable 2 milliGRAM(s) IV Push every 6 hours PRN Severe Pain (7 - 10)          REVIEW OF SYSTEM:  Pertinent items are noted in HPI.  Constitutional	Negative for chills, fevers, sweats.    Eyes: 	Negative for visual disturbance.  Ears, nose, mouth, throat, and face: Negative for epistaxis, nasal congestion, sore throat..  Neck:	Negative for enlargement, pain and difficulty in swallowing  Respiration : Negative for pleuritic chest pain and wheezing  Cardiovascular: Negative for chest pain, and palpitations , he has FRANZ .    Gastrointestinal : Negative for abdominal pain, diarrhea, nausea and vomiting  Genitourinary: Negative for dysuria, frequency and urinary incontinence .  Skin: Negative for  rash, pruritus, swelling, dryness .  	  Hematologic/lymphatic: Negative for bleeding and easy bruising  Musculoskeletal: Negative for arthralgias, back pain and muscle weakness.  Neurological: Negative for dizziness, headaches, seizures and tremors  Behavioral/Psych: Negative for mood change, depression.  Endocrine:	Negative for blurry vision, polydipsia and polyuria, diaphoresis.   Allergic/Immunologic:	Negative for anaphylaxis, angioedema and urticaria.      Vital Signs Last 24 Hrs  T(C): 36.8 (28 May 2018 16:08), Max: 36.8 (28 May 2018 16:08)  T(F): 98.3 (28 May 2018 16:08), Max: 98.3 (28 May 2018 16:08)  HR: 67 (28 May 2018 04:00) (67 - 95)  BP: 124/65 (28 May 2018 04:00) (115/69 - 140/76)  BP(mean): 79 (28 May 2018 04:00) (79 - 93)  RR: --  SpO2: 93% (28 May 2018 04:00) (89% - 97%)          PHYSICAL EXAM  General Appearance: cooperative, no acute distress,   HEENT: PERRL, conjunctiva clear, EOM's intact, non injected pharynx, no exudate  Neck: Supple, , no adenopathy, thyroid: not enlarged, no carotid bruit or JVD  Back: Symmetric, no  tenderness,no soft tissue tenderness  Lungs: diminished breath sounds on both sides left greater than right , there is scattered left basilar rale  Heart: Regular rate and rhythm, S1, S2 normal, no murmur, rub or gallop  Abdomen: Soft, non-tender, bowel sounds active , no hepatosplenomegaly  Extremities: no cyanosis 1 to 2 +  edema, no joint swelling  Skin: Skin color, texture normal, no rashes   Neurologic: Alert and oriented X3 , cranial nerves intact, sensory and motor normal,        INTERPRETATION OF TELEMETRY: NSR            LABS:                          11.2   26.46 )-----------( 100      ( 27 May 2018 05:42 )             35.9     05-28    137  |  102  |  26<H>  ----------------------------<  273<H>  4.5   |  28  |  0.95    Ca    8.0<L>      28 May 2018 04:29            Pro BNP  521 05-28 @ 11:36  D Dimer  -- 05-28 @ 11:36  Pro BNP  516 05-26 @ 16:35  D Dimer  -- 05-26 @ 16:35    PT/INR - ( 27 May 2018 05:42 )   PT: 27.9 sec;   INR: 2.53 ratio                   RADIOLOGY & ADDITIONAL STUDIES:

## 2018-05-29 NOTE — PROGRESS NOTE ADULT - SUBJECTIVE AND OBJECTIVE BOX
HPI :   patient is a 78-year-old with history of hypertension, chronic diastolic congestive heart failure, COPD, paroxysmal atrial fibrillation, history of DVT and pulmonary emboli in the past , he has chronic bilateral pleural effusion, CLL/lymphoma being treated, history of recurrent pneumonia, patient is admitted after he fell. Patient was walking to the [employer] shop and he tripped and fell. Patient states he did not lose any consciousness . He denies any headache or any blurred vision. He has been alert and oriented since admission. He denies any chest pain or shortness of breath, he is comfortable and is in normal sinus rhythm. He has large bilateral pleural effusion and may need thoracocentesis.    CT Chest No Cont (05.26.18   IMPRESSION:  moderate BILATERAL pleural effusions, LEFT greater than   RIGHT, with underlying atelectasis.   Mildly enlarged axillaryor   mediastinal lymph nodes.   Ascites. Old RIGHT-sided rib fractures.          CT Abdomen and Pelvis w/ IV Cont (05.26.18   IMPRESSION:     Moderate ascites, new from 2015.    Cirrhosis and splenomegaly, suspect portal hypertension.    Extensive abdominal and pelvic lymphadenopathy, enlarged from 2015.          PAST MEDICAL & SURGICAL HISTORY:  Hypogammaglobulinemia: received IVIG  Hepatitis B virus infection, unspecified chronicity: retrovirals  Chronic diastolic congestive heart failure  Essential hypertension  DM type 2 (diabetes mellitus, type 2)  B-cell lymphoma, unspecified B-cell lymphoma type, unspecified body region: CLL stage 4/SLL; met NHL  Atrial fibrillation, unspecified type  COPD (chronic obstructive pulmonary disease)  History of esophageal dilatation  S/P cholecystectomy  H/O prostatectomy      MEDICATIONS  (STANDING):  amiodarone    Tablet 100 milliGRAM(s) Oral daily  dextrose 5%. 1000 milliLiter(s) (50 mL/Hr) IV Continuous <Continuous>  dextrose 50% Injectable 12.5 Gram(s) IV Push once  dextrose 50% Injectable 25 Gram(s) IV Push once  dextrose 50% Injectable 25 Gram(s) IV Push once  furosemide    Tablet 20 milliGRAM(s) Oral two times a day  gabapentin 300 milliGRAM(s) Oral three times a day  insulin glargine Injectable (LANTUS) 20 Unit(s) SubCutaneous every morning  insulin lispro (HumaLOG) corrective regimen sliding scale   SubCutaneous three times a day before meals  insulin lispro (HumaLOG) corrective regimen sliding scale   SubCutaneous at bedtime  metoprolol tartrate 25 milliGRAM(s) Oral two times a day  montelukast 10 milliGRAM(s) Oral at bedtime  predniSONE   Tablet 20 milliGRAM(s) Oral two times a day  simvastatin 10 milliGRAM(s) Oral at bedtime  tenofovir disoproxil fumarate (VIREAD) 300 milliGRAM(s) Oral daily    MEDICATIONS  (PRN):  acetaminophen   Tablet 650 milliGRAM(s) Oral every 6 hours PRN For Temp greater than 38 C (100.4 F), mild pain, HA  ALBUTerol/ipratropium for Nebulization 3 milliLiter(s) Nebulizer every 6 hours PRN Shortness of Breath and/or Wheezing  dextrose 40% Gel 15 Gram(s) Oral once PRN Blood Glucose LESS THAN 70 milliGRAM(s)/deciliter  glucagon  Injectable 1 milliGRAM(s) IntraMuscular once PRN Glucose LESS THAN 70 milligrams/deciliter  morphine  - Injectable 2 milliGRAM(s) IV Push every 6 hours PRN Severe Pain (7 - 10)      REVIEW OF SYSTEM:  Pertinent items are noted in HPI.  Constitutional	Negative for chills, fevers, sweats.    Eyes: 	Negative for visual disturbance.  Ears, nose, mouth, throat, and face: Negative for epistaxis, nasal congestion, sore throat .  Neck:	Negative for enlargement, pain and difficulty in swallowing  Respiration : Negative for cough, dyspnea on exertion, pleuritic chest pain and wheezing  Cardiovascular: Negative for chest pain, dyspnea and palpitations    Gastrointestinal : Negative for abdominal pain, diarrhea, nausea and vomiting  Genitourinary: Negative for dysuria, frequency and urinary incontinence .  Skin: Negative for  rash, pruritus, swelling, dryness .  	  Hematologic/lymphatic: Negative for bleeding and easy bruising  Musculoskeletal: Negative for arthralgias, back pain and muscle weakness.  Neurological: Negative for dizziness, headaches, seizures and tremors  Behavioral/Psych: Negative for mood change, depression.  Endocrine:	Negative for blurry vision, polydipsia and polyuria, diaphoresis.   Allergic/Immunologic:	Negative for anaphylaxis, angioedema and urticaria.      Vital Signs Last 24 Hrs  T(C): 35 (29 May 2018 05:20), Max: 36.8 (28 May 2018 16:08)  T(F): 95 (29 May 2018 05:20), Max: 98.3 (28 May 2018 16:08)  HR: 65 (29 May 2018 06:00) (65 - 94)  BP: 123/66 (29 May 2018 06:00) (101/56 - 124/63)  BP(mean): 80 (29 May 2018 06:00) (66 - 81)  RR: 18 (29 May 2018 04:10) (18 - 22)  SpO2: 96% (29 May 2018 06:00) (91% - 100%)        PHYSICAL EXAM  General Appearance: cooperative, no acute distress,   HEENT: PERRL, conjunctiva clear, EOM's intact, non injected pharynx, no exudate .  Neck: Supple, , no adenopathy, thyroid: not enlarged, no carotid bruit or JVD  Back: Symmetric, no  tenderness,no soft tissue tenderness  Lungs: Clear to auscultation bilateral,no adventitious breath sounds, normal   expiratory phase  Heart: Regular rate and rhythm, S1, S2 normal, no murmur, rub or gallop  Abdomen: Soft, non-tender, bowel sounds active , no hepatosplenomegaly  Extremities: no cyanosis or edema, no joint swelling  Skin: Skin color, texture normal, no rashes   Neurologic: Alert and oriented X3 , cranial nerves intact, sensory and motor normal,        INTERPRETATION OF TELEMETRY: NSR             LABS:                          10.7   35.56 )-----------( 92       ( 29 May 2018 04:40 )             33.2     05-29    141  |  104  |  33<H>  ----------------------------<  165<H>  4.1   |  27  |  0.97    Ca    8.0<L>      29 May 2018 04:40            Pro BNP  521 05-28 @ 11:36  D Dimer  -- 05-28 @ 11:36  Pro BNP  516 05-26 @ 16:35  D Dimer  -- 05-26 @ 16:35    PT/INR - ( 29 May 2018 04:40 )   PT: 28.6 sec;   INR: 2.60 ratio         PTT - ( 29 May 2018 04:40 )  PTT:32.5 sec

## 2018-05-29 NOTE — PROGRESS NOTE ADULT - SUBJECTIVE AND OBJECTIVE BOX
Patient is a 78y old  Male who presents with a chief complaint of s/p fall  SOB (28 May 2018 09:09)      HPI:  History obtained from prior chart, patient and daughter.  Translation by daughter.    78 y.o. male with complex past medical history including CLL/lymphoma on treatment, h/o recurrent pneumonia, Afib, HFpEF, Hep B, COPD, type 2DM, H/o PE DVT presents with s/p fall. Pt was walking up stairs when he tripped and fell on the right side. Pt reports right rib and shoulder pain. Denies lightheadedness or dizziness prior to falling. Pt has been short of breath that has been gradually worsening. He saw his pulmonologist yesterday and was noted to have fluid around the lung. Pt denies any subjective fever, chills, sore throat. Reports sharp on/off chest pain that's worse with coughing. Reports chronic cough and current cough is not new. +worsening abdominal distension over the past 2-3 weeks. Denies diarrhea or dysuria. (26 May 2018 19:39)    comf  pos fatigue and cx likely contaminant  pos SOB and FRANZ      PAST MEDICAL & SURGICAL HISTORY:  Hypogammaglobulinemia: received IVIG  Hepatitis B virus infection, unspecified chronicity: retrovirals  Chronic diastolic congestive heart failure  Essential hypertension  DM type 2 (diabetes mellitus, type 2)  B-cell lymphoma, unspecified B-cell lymphoma type, unspecified body region: CLL stage 4/SLL; met NHL  Atrial fibrillation, unspecified type  COPD (chronic obstructive pulmonary disease)  History of esophageal dilatation  S/P cholecystectomy  H/O prostatectomy      MEDICATIONS  (STANDING):  amiodarone    Tablet 50 milliGRAM(s) Oral daily  dextrose 5%. 1000 milliLiter(s) (50 mL/Hr) IV Continuous <Continuous>  dextrose 50% Injectable 12.5 Gram(s) IV Push once  dextrose 50% Injectable 25 Gram(s) IV Push once  dextrose 50% Injectable 25 Gram(s) IV Push once  furosemide    Tablet 20 milliGRAM(s) Oral two times a day  gabapentin 300 milliGRAM(s) Oral three times a day  insulin glargine Injectable (LANTUS) 20 Unit(s) SubCutaneous every morning  insulin lispro (HumaLOG) corrective regimen sliding scale   SubCutaneous three times a day before meals  insulin lispro (HumaLOG) corrective regimen sliding scale   SubCutaneous at bedtime  metoprolol tartrate 25 milliGRAM(s) Oral two times a day  montelukast 10 milliGRAM(s) Oral at bedtime  predniSONE   Tablet 20 milliGRAM(s) Oral two times a day  simvastatin 10 milliGRAM(s) Oral at bedtime  tenofovir disoproxil fumarate (VIREAD) 300 milliGRAM(s) Oral daily    MEDICATIONS  (PRN):  acetaminophen   Tablet 650 milliGRAM(s) Oral every 6 hours PRN For Temp greater than 38 C (100.4 F), mild pain, HA  ALBUTerol/ipratropium for Nebulization 3 milliLiter(s) Nebulizer every 6 hours PRN Shortness of Breath and/or Wheezing  dextrose 40% Gel 15 Gram(s) Oral once PRN Blood Glucose LESS THAN 70 milliGRAM(s)/deciliter  glucagon  Injectable 1 milliGRAM(s) IntraMuscular once PRN Glucose LESS THAN 70 milligrams/deciliter  morphine  - Injectable 2 milliGRAM(s) IV Push every 6 hours PRN Severe Pain (7 - 10)      Allergies    Privigen (Other (Severe))    Intolerances        SOCIAL HISTORY:NC    FAMILY HISTORY:  Family history of liver cancer (Father): father  85 liver CA  Family history of coronary arteriosclerosis (Mother): Mom  of MI age 68      REVIEW OF SYSTEMS:    CONSTITUTIONAL: No weakness, fevers or chills  EYES/ENT: No visual changes;  No vertigo or throat pain   NECK: No pain or stiffness  RESPIRATORY: No cough, wheezing, hemoptysis; No shortness of breath  CARDIOVASCULAR: No chest pain or palpitations  GENITOURINARY: No dysuria, frequency or hematuria  NEUROLOGICAL: No numbness or weakness  SKIN: No itching, burning, rashes, or lesions   All other review of systems is negative unless indicated above.    Vital Signs Last 24 Hrs  T(C): 37 (29 May 2018 16:22), Max: 37 (29 May 2018 16:22)  T(F): 98.6 (29 May 2018 16:22), Max: 98.6 (29 May 2018 16:22)  HR: 66 (29 May 2018 12:00) (57 - 77)  BP: 122/70 (29 May 2018 12:00) (101/56 - 135/61)  BP(mean): 83 (29 May 2018 08:00) (66 - 83)  RR: 18 (29 May 2018 04:10) (18 - 20)  SpO2: 90% (29 May 2018 12:00) (90% - 98%)    PHYSICAL EXAM:    Constitutional: NAD, well-developed  HEENT: EOMI, throat clear  Neck: No LAD, supple  Respiratory: CTA and P but dec at bases and dull to percussion  Cardiovascular: S1 and S2, RRR, no M  Gastrointestinal: BS+, soft, NT/ND, neg HSM,    pos ascites  Extremities: No peripheral edema, neg clubing, cyanosis  Vascular: 2+ peripheral pulses  Neurological: A/O x 3, no focal deficits  Psychiatric: Normal mood, normal affect  Skin: No rashes    LABS:  CBC Full  -  ( 29 May 2018 04:40 )  WBC Count : 35.56 K/uL  Hemoglobin : 10.7 g/dL  Hematocrit : 33.2 %  Platelet Count - Automated : 92 K/uL  Mean Cell Volume : 90.5 fl  Mean Cell Hemoglobin : 29.2 pg  Mean Cell Hemoglobin Concentration : 32.2 gm/dL  Auto Neutrophil # : x  Auto Lymphocyte # : x  Auto Monocyte # : x  Auto Eosinophil # : x  Auto Basophil # : x  Auto Neutrophil % : x  Auto Lymphocyte % : x  Auto Monocyte % : x  Auto Eosinophil % : x  Auto Basophil % : x    -    141  |  104  |  33<H>  ----------------------------<  165<H>  4.1   |  27  |  0.97    Ca    8.0<L>      29 May 2018 04:40      PT/INR - ( 29 May 2018 04:40 )   PT: 28.6 sec;   INR: 2.60 ratio         PTT - ( 29 May 2018 04:40 )  PTT:32.5 sec        RADIOLOGY & ADDITIONAL STUDIES:

## 2018-05-29 NOTE — PROGRESS NOTE ADULT - SUBJECTIVE AND OBJECTIVE BOX
CHIEF COMPLAINT/Diagnosis: shortness of breath/ chf/ copd/ascites    SUBJECTIVE: no complaints    REVIEW OF SYSTEMS:    CONSTITUTIONAL: No weakness, fevers or chills  EYES/ENT: No visual changes;  No vertigo or throat pain   NECK: No pain or stiffness  RESPIRATORY: No cough, wheezing, hemoptysis; No shortness of breath  CARDIOVASCULAR: No chest pain or palpitations  GASTROINTESTINAL: No abdominal or epigastric pain. No nausea, vomiting, or hematemesis; No diarrhea or constipation. No melena or hematochezia.  GENITOURINARY: No dysuria, frequency or hematuria  NEUROLOGICAL: No numbness or weakness  SKIN: No itching, burning, rashes, or lesions   All other review of systems is negative unless indicated above    Vital Signs Last 24 Hrs  T(C): 37 (29 May 2018 16:22), Max: 37 (29 May 2018 16:22)  T(F): 98.6 (29 May 2018 16:22), Max: 98.6 (29 May 2018 16:22)  HR: 66 (29 May 2018 12:00) (57 - 77)  BP: 122/70 (29 May 2018 12:00) (101/56 - 135/61)  BP(mean): 83 (29 May 2018 08:00) (66 - 83)  RR: 18 (29 May 2018 04:10) (18 - 20)  SpO2: 90% (29 May 2018 12:00) (90% - 98%)    I&O's Summary      CAPILLARY BLOOD GLUCOSE      POCT Blood Glucose.: 267 mg/dL (29 May 2018 11:14)  POCT Blood Glucose.: 170 mg/dL (29 May 2018 08:10)  POCT Blood Glucose.: 195 mg/dL (28 May 2018 22:47)      PHYSICAL EXAM:    Constitutional: NAD, awake and alert, well-developed  HEENT: PERR, EOMI, Normal Hearing, MMM  Neck: Soft and supple, No LAD, No JVD  Respiratory: Breath sounds are clear bilaterally, No wheezing, rales or rhonchi  Cardiovascular: S1 and S2, regular rate and rhythm, no Murmurs, gallops or rubs  Gastrointestinal: Bowel Sounds present, soft, nontender, nondistended, no guarding, no rebound  Extremities: No peripheral edema  Vascular: 2+ peripheral pulses  Neurological: A/O x 3, no focal deficits  Musculoskeletal: 5/5 strength b/l upper and lower extremities  Skin: No rashes    MEDICATIONS:  MEDICATIONS  (STANDING):  amiodarone    Tablet 50 milliGRAM(s) Oral daily  dextrose 5%. 1000 milliLiter(s) (50 mL/Hr) IV Continuous <Continuous>  dextrose 50% Injectable 12.5 Gram(s) IV Push once  dextrose 50% Injectable 25 Gram(s) IV Push once  dextrose 50% Injectable 25 Gram(s) IV Push once  furosemide    Tablet 20 milliGRAM(s) Oral two times a day  gabapentin 300 milliGRAM(s) Oral three times a day  insulin glargine Injectable (LANTUS) 20 Unit(s) SubCutaneous every morning  insulin lispro (HumaLOG) corrective regimen sliding scale   SubCutaneous three times a day before meals  insulin lispro (HumaLOG) corrective regimen sliding scale   SubCutaneous at bedtime  metoprolol tartrate 25 milliGRAM(s) Oral two times a day  montelukast 10 milliGRAM(s) Oral at bedtime  predniSONE   Tablet 20 milliGRAM(s) Oral two times a day  simvastatin 10 milliGRAM(s) Oral at bedtime  tenofovir disoproxil fumarate (VIREAD) 300 milliGRAM(s) Oral daily      LABS: All Labs Reviewed:                        10.7   35.56 )-----------( 92       ( 29 May 2018 04:40 )             33.2     05-29    141  |  104  |  33<H>  ----------------------------<  165<H>  4.1   |  27  |  0.97    Ca    8.0<L>      29 May 2018 04:40      PT/INR - ( 29 May 2018 04:40 )   PT: 28.6 sec;   INR: 2.60 ratio         PTT - ( 29 May 2018 04:40 )  PTT:32.5 sec      Blood Culture: 05-26 @ 16:35  Organism Blood Culture PCR  Gram Stain Blood -- Gram Stain   Growth in aerobic bottle:  Gram Positive Cocci in Clusters  Specimen Source .Blood Blood-Peripheral  Culture-Blood --        Assessment and Plan  	    78 y.o. male with complex past medical history including CLL/lymphoma on treatment, h/o recurrent pneumonia, Afib, HFpEF, Hep B, COPD, type 2DM, H/o PE DVT presents with s/p fall. Pt was walking up stairs when he tripped and fell on the right side. Pt reports right rib and shoulder pain. Denies lightheadedness or dizziness prior to falling. Pt has been short of breath that has been gradually worsening. He saw his pulmonologist yesterday and was noted to have fluid around the lung. Pt denies any subjective fever, chills, sore throat. Reports sharp on/off chest pain that's worse with coughing. Reports chronic cough and current cough is not new. +worsening abdominal distension over the past 2-3 weeks. Denies diarrhea or dysuria.    #hypoxemia/SOB due to multifactorial (COPD exacerbation, CHF, pleural effusion and ascites)  #COPD exacerbation  -admit to CICU  -pulse ox monitoring and supplemental oxygen  -taper steroids to oral prednisone  --now on oral lasix  -pulm consult  -cardio consults  -Xray shows b/l effusion >>> will need thoracentesiss as per pulmonary; His inr is elevated  -hold iNr for IR thoracentesis    #tachycardia likely from pain and pleural effusion/ascites and duoneb use  -less likely PE due to therapeutic INR  -EKG  -pain control prn  -IR eval for thoracentesis and paracentesis  -cont home amio and metoprolol    #AFib on coumadin  -CHADSVASc >4  -cont amio and metoprolol  -daily INR   -hold coumadin for IR thora  -if drops below 2, will start lovenox    #HBV with liver cirrhosis and ascites  -IR eval for tap  -GI consult, Dr Nails  -on viread  -will need paracentesis possibly after INR down    #B cell lymphoma/CLL  -oncology consult, Dr Houser    #HFpEF (EF 60-65%)  -I/O, daily weight  -iv lasix  -cont spironolactone  -cardio consult    #DM2  -on Tresiba 20units  -start lantus  -fingerstick monitoring    #DVT ppx  -on coumadin    #Advanced care planning  -discussed advanced directives with patient with wife and daughter at bedside. At this point, wishes to be FULL CODE

## 2018-05-29 NOTE — PROGRESS NOTE ADULT - SUBJECTIVE AND OBJECTIVE BOX
Patient is a 78y old  Male who presents with a chief complaint of s/p fall  SOB (28 May 2018 09:09)      HPI:  History obtained from prior chart, patient and daughter.  Translation by daughter.    78 y.o. male with complex past medical history including CLL/lymphoma on treatment, h/o recurrent pneumonia, Afib, HFpEF, Hep B, COPD, type 2DM, H/o PE DVT presents with s/p fall. Pt was walking up stairs when he tripped and fell on the right side. Pt reports right rib and shoulder pain. Denies lightheadedness or dizziness prior to falling. Pt has been short of breath that has been gradually worsening. He saw his pulmonologist yesterday and was noted to have fluid around the lung. Pt denies any subjective fever, chills, sore throat. Reports sharp on/off chest pain that's worse with coughing. Reports chronic cough and current cough is not new. +worsening abdominal distension over the past 2-3 weeks. Denies diarrhea or dysuria. (26 May 2018 19:39)    pt is seen and examined with wife and son at bedside  data reveiwed with RN staff as well  case discussed with Dr mariano today   outpt data discussed as well      data reviewed with RN staff  coumadin on hold  plan for large volume thoracentesis after INR improved  resting comfortably this am  no distress  PAST MEDICAL & SURGICAL HISTORY:  Hypogammaglobulinemia: received IVIG  Hepatitis B virus infection, unspecified chronicity: retrovirals  Chronic diastolic congestive heart failure  Essential hypertension  DM type 2 (diabetes mellitus, type 2)  B-cell lymphoma, unspecified B-cell lymphoma type, unspecified body region: CLL stage 4/SLL; met NHL  Atrial fibrillation, unspecified type  COPD (chronic obstructive pulmonary disease)  History of esophageal dilatation  S/P cholecystectomy  H/O prostatectomy      PREVIOUS DIAGNOSTIC TESTING:      MEDICATIONS  (STANDING):  amiodarone    Tablet 100 milliGRAM(s) Oral daily  dextrose 5%. 1000 milliLiter(s) (50 mL/Hr) IV Continuous <Continuous>  dextrose 50% Injectable 12.5 Gram(s) IV Push once  dextrose 50% Injectable 25 Gram(s) IV Push once  dextrose 50% Injectable 25 Gram(s) IV Push once  furosemide    Tablet 20 milliGRAM(s) Oral two times a day  gabapentin 300 milliGRAM(s) Oral three times a day  insulin glargine Injectable (LANTUS) 20 Unit(s) SubCutaneous every morning  insulin lispro (HumaLOG) corrective regimen sliding scale   SubCutaneous three times a day before meals  insulin lispro (HumaLOG) corrective regimen sliding scale   SubCutaneous at bedtime  metoprolol tartrate 25 milliGRAM(s) Oral two times a day  montelukast 10 milliGRAM(s) Oral at bedtime  predniSONE   Tablet 40 milliGRAM(s) Oral two times a day  simvastatin 10 milliGRAM(s) Oral at bedtime  tenofovir disoproxil fumarate (VIREAD) 300 milliGRAM(s) Oral daily      MEDICATIONS  (PRN):  acetaminophen   Tablet 650 milliGRAM(s) Oral every 6 hours PRN For Temp greater than 38 C (100.4 F), mild pain, HA  ALBUTerol/ipratropium for Nebulization 3 milliLiter(s) Nebulizer every 6 hours PRN Shortness of Breath and/or Wheezing  dextrose 40% Gel 15 Gram(s) Oral once PRN Blood Glucose LESS THAN 70 milliGRAM(s)/deciliter  glucagon  Injectable 1 milliGRAM(s) IntraMuscular once PRN Glucose LESS THAN 70 milligrams/deciliter  morphine  - Injectable 2 milliGRAM(s) IV Push every 6 hours PRN Severe Pain (7 - 10)      FAMILY HISTORY:  Family history of liver cancer (Father): father  85 liver CA  Family history of coronary arteriosclerosis (Mother): Mom  of MI age 68      SOCIAL HISTORY:  ***    REVIEW OF SYSTEM:  Pertinent items are noted in HPI.  Constitutional negative for chills, fevers, sweats and weight loss  throat, and face:  negative for epistaxis, pos nasal congestion, sore throat and   tinnitus  Respiratory: negative for cough,pos  dyspnea on exertion, pleuritic chest pain  and wheezing  Cardiovascular:  negative for chest pain,pos  dyspnea and palpitations  Gastrointestinal: negative for abdominal pain, diarrhea, nausea and vomiting  Genitourinary: negative for dysuria, frequency and urinary incontinence  Skin:  negative for redness, rash, pruritus, swelling, dryness and   fissures  Hematologic/lymphatic: negative for bleeding and easy bruising    Vital Signs Last 24 Hrs  T(C): 35 (29 May 2018 05:20), Max: 36.8 (28 May 2018 16:08)  T(F): 95 (29 May 2018 05:20), Max: 98.3 (28 May 2018 16:08)  HR: 65 (29 May 2018 06:00) (65 - 94)  BP: 123/66 (29 May 2018 06:00) (101/56 - 124/63)  BP(mean): 80 (29 May 2018 06:00) (66 - 81)  RR: 18 (29 May 2018 04:10) (18 - 22)  SpO2: 96% (29 May 2018 06:00) (91% - 100%)    o2 sat 91% on 2 liters nc    I&O's Summary    27 May 2018 07:01  -  28 May 2018 07:00  --------------------------------------------------------  IN: 250 mL / OUT: 0 mL / NET: 250 mL      PHYSICAL EXAM  General Appearance: cooperative, no acute distress,   HEENT: PERRL, conjunctiva clear, EOM's intact, non injected pharynx, no exudate, TM   normal  Neck: Supple, , no adenopathy, thyroid: not enlarged, no carotid bruit or JVD  Back: Symmetric, no  tenderness,no soft tissue tenderness  Lungs: Dullness to percussion left mid to lower lung fields more than right base  no rhonchi  no wheeze  Heart: Regular rate and rhythm, S1, S2 normal, no murmur, rub or gallop  Abdomen: Soft, non-tender, bowel sounds active , no hepatosplenomegaly  Extremities: decreased pitting edema, no joint swelling  Skin: Skin color, texture normal, no rashes   Neurologic: Alert and oriented X3 , cranial nerves intact, sensory and motor normal,    ECG:    LABS:                                   10.7   35.56 )-----------( 92       ( 29 May 2018 04:40 )             33.2   05-29    141  |  104  |  33<H>  ----------------------------<  165<H>  4.1   |  27  |  0.97    Ca    8.0<L>      29 May 2018 04:40                 11.2   26.46 )-----------( 100      ( 27 May 2018 05:42 )             35.9     05-28  PT/INR - ( 29 May 2018 04:40 )   PT: 28.6 sec;   INR: 2.60 ratio         PTT - ( 29 May 2018 04:40 )  PTT:32.5 sec  137  |  102  |  26<H>  ----------------------------<  273<H>  4.5   |  28  |  0.95    Ca    8.0<L>      28 May 2018 04:29    TPro  5.7<L>  /  Alb  2.8<L>  /  TBili  0.4  /  DBili  x   /  AST  47<H>  /  ALT  25  /  AlkPhos  107      CARDIAC MARKERS ( 26 May 2018 16:35 )  <0.015 ng/mL / x     / x     / x     / x            Pro BNP  516  @ 16:35  D Dimer  --  @ 16:35    PT/INR - ( 27 May 2018 05:42 )   PT: 27.9 sec;   INR: 2.53 ratio         PTT - ( 26 May 2018 16:35 )  PTT:34.2 sec          RADIOLOGY & ADDITIONAL STUDIES:    < from: CT Chest No Cont (18 @ 17:37) >  PROCEDURE DATE:  2018          INTERPRETATION:      CT chest without contrast         CLINICAL INFORMATION:       Cough and chest congestion, RIGHT rib pain    TECHNIQUE:  Contiguous axial 3 mm sections were obtained through the   chest using single helical acquisition.  In addition, 3 mm sagittal and   coronal reformatted images obtained.   This scan was performed using   automatic exposure control (radiation dose reduction software) to obtain   a diagnostic image quality scan with patient dose as low as reasonably   achievable.        FINDINGS:   CT dated 2018 available for review.    The thyroid gland appears intact.    The lungs significant for moderate BILATERAL pleural effusions, LEFT   greater than RIGHT, with underlying atelectasis.       The trachea and   major bronchi are patent.    Mildly enlarged axillary and mediastinal lymph nodes are again found.      The heart size is normal.   The mediastinum, chest wall and thoracic   spine show old RIGHT-sided rib fractures..    Limited evaluation of the upper abdomen is is again for ascites.      IMPRESSION:  moderate BILATERAL pleural effusions, LEFT greater than   RIGHT, with underlying atelectasis.   Mildly enlarged axillaryor   mediastinal lymph nodes.   Ascites. Old RIGHT-sided rib fractures.     < end of copied text >  < from: CT Abdomen and Pelvis w/ IV Cont (18 @ 17:41) >  IMPRESSION:     Moderate ascites, new from 2015.    Cirrhosis and splenomegaly, suspect portal hypertension.    Extensive abdominal and pelvic lymphadenopathy, enlarged from 2015.    Other incidental comments as above.    < end of copied text >   xray with increased bilateral pleural effusions seen

## 2018-05-30 NOTE — [facility] QUERY NOTE - [id number]_GEN_ALL_CORE_HH
Documentation on chart of COPD. Pt. on Home O2 2 liter. RR19-22 and pulse ox 93-97% in the hospital on supplemental O2.  Please clarify in your progress notes and/or discharge summary if there is a corresponding diagnosis associated with the clinical information.  A) Chronic Respiratory Failure  B) No indications of Respiratory Failure  C) Other (Please specify condition)

## 2018-05-30 NOTE — PROGRESS NOTE ADULT - SUBJECTIVE AND OBJECTIVE BOX
HPI :   patient is a 78-year-old with history of hypertension, chronic diastolic congestive heart failure, COPD, paroxysmal atrial fibrillation, history of DVT and pulmonary emboli in the past , he has chronic bilateral pleural effusion, CLL/lymphoma being treated, history of recurrent pneumonia, patient is admitted after he fell. Patient was walking to the [employer] shop and he tripped and fell. Patient states he did not lose any consciousness . He denies any headache or any blurred vision. He has been alert and oriented since admission. He denies any chest pain or shortness of breath, he is comfortable and is in normal sinus rhythm. He has large bilateral pleural effusion and may need thoracocentesis. He also has   moderate ascites.    CT Chest No Cont (05.26.18   IMPRESSION:  moderate BILATERAL pleural effusions, LEFT greater than   RIGHT, with underlying atelectasis.   Mildly enlarged axillaryor   mediastinal lymph nodes.   Ascites. Old RIGHT-sided rib fractures.          CT Abdomen and Pelvis w/ IV Cont (05.26.18   IMPRESSION:     Moderate ascites, new from 2015.    Cirrhosis and splenomegaly, suspect portal hypertension.    Extensive abdominal and pelvic lymphadenopathy, enlarged from 2015.          PAST MEDICAL & SURGICAL HISTORY:  Hypogammaglobulinemia: received IVIG  Hepatitis B virus infection, unspecified chronicity: retrovirals  Chronic diastolic congestive heart failure  Essential hypertension  DM type 2 (diabetes mellitus, type 2)  B-cell lymphoma, unspecified B-cell lymphoma type, unspecified body region: CLL stage 4/SLL; met NHL  Atrial fibrillation, unspecified type  COPD (chronic obstructive pulmonary disease)  History of esophageal dilatation  S/P cholecystectomy  H/O prostatectomy    MEDICATIONS  (STANDING):  amiodarone    Tablet 50 milliGRAM(s) Oral daily  dextrose 5%. 1000 milliLiter(s) (50 mL/Hr) IV Continuous <Continuous>  dextrose 50% Injectable 12.5 Gram(s) IV Push once  dextrose 50% Injectable 25 Gram(s) IV Push once  dextrose 50% Injectable 25 Gram(s) IV Push once  furosemide    Tablet 20 milliGRAM(s) Oral two times a day  gabapentin 300 milliGRAM(s) Oral three times a day  insulin glargine Injectable (LANTUS) 20 Unit(s) SubCutaneous every morning  insulin lispro (HumaLOG) corrective regimen sliding scale   SubCutaneous three times a day before meals  insulin lispro (HumaLOG) corrective regimen sliding scale   SubCutaneous at bedtime  metoprolol tartrate 25 milliGRAM(s) Oral two times a day  montelukast 10 milliGRAM(s) Oral at bedtime  predniSONE   Tablet 20 milliGRAM(s) Oral two times a day  simvastatin 10 milliGRAM(s) Oral at bedtime  tenofovir disoproxil fumarate (VIREAD) 300 milliGRAM(s) Oral daily    MEDICATIONS  (PRN):  acetaminophen   Tablet 650 milliGRAM(s) Oral every 6 hours PRN For Temp greater than 38 C (100.4 F), mild pain, HA  ALBUTerol/ipratropium for Nebulization 3 milliLiter(s) Nebulizer every 6 hours PRN Shortness of Breath and/or Wheezing  dextrose 40% Gel 15 Gram(s) Oral once PRN Blood Glucose LESS THAN 70 milliGRAM(s)/deciliter  glucagon  Injectable 1 milliGRAM(s) IntraMuscular once PRN Glucose LESS THAN 70 milligrams/deciliter  morphine  - Injectable 2 milliGRAM(s) IV Push every 6 hours PRN Severe Pain (7 - 10)          REVIEW OF SYSTEM:  Pertinent items are noted in HPI.  Constitutional	Negative for chills, fevers, sweats.    Eyes: 	Negative for visual disturbance.  Ears, nose, mouth, throat, and face: Negative for epistaxis, nasal congestion, sore throat .  Neck:	Negative for enlargement, pain and difficulty in swallowing  Respiration : Negative for cough, dyspnea on exertion, pleuritic chest pain and wheezing  Cardiovascular: Negative for chest pain, dyspnea and palpitations    Gastrointestinal : Negative for abdominal pain, diarrhea, nausea and vomiting  Genitourinary: Negative for dysuria, frequency and urinary incontinence .  Skin: Negative for  rash, pruritus, swelling, dryness .  	  Hematologic/lymphatic: Negative for bleeding and easy bruising  Musculoskeletal: Negative for arthralgias, back pain and muscle weakness.  Neurological: Negative for dizziness, headaches, seizures and tremors  Behavioral/Psych: Negative for mood change, depression.  Endocrine:	Negative for blurry vision, polydipsia and polyuria, diaphoresis.   Allergic/Immunologic:	Negative for anaphylaxis, angioedema and urticaria.      Vital Signs Last 24 Hrs  T(C): 36.1 (30 May 2018 06:02), Max: 37 (29 May 2018 16:22)  T(F): 97 (30 May 2018 06:02), Max: 98.6 (29 May 2018 16:22)  HR: 59 (30 May 2018 09:00) (59 - 77)  BP: 100/57 (30 May 2018 09:00) (100/57 - 145/68)  BP(mean): 68 (30 May 2018 09:00) (68 - 90)  RR: 15 (30 May 2018 06:00) (13 - 19)  SpO2: 98% (30 May 2018 09:00) (90% - 99%)    I&O's Summary    29 May 2018 07:01  -  30 May 2018 07:00  --------------------------------------------------------  IN: 0 mL / OUT: 600 mL / NET: -600 mL      PHYSICAL EXAM  General Appearance: cooperative, no acute distress,   HEENT: PERRL, conjunctiva clear, EOM's intact, non injected pharynx, no exudate .  Neck: Supple, , no adenopathy, thyroid: not enlarged, no carotid bruit or JVD  Back: Symmetric, no  tenderness,no soft tissue tenderness  Lungs: diminished breath sounds on both sides left greater than right. No rales or rhonchi's.  Heart: Regular rate and rhythm, S1, S2 normal, no murmur, rub or gallop , ascites is present.  Abdomen: Soft, non-tender, bowel sounds active , no hepatosplenomegaly  Extremities: no cyanosis or edema, no joint swelling  Skin: Skin color, texture normal, no rashes   Neurologic: Alert and oriented X3 , cranial nerves intact, sensory and motor normal,        INTERPRETATION OF TELEMETRY: NSR          LABS:                          11.0   36.65 )-----------( 86       ( 30 May 2018 04:21 )             34.7     05-30    140  |  104  |  35<H>  ----------------------------<  213<H>  5.0   |  30  |  1.07    Ca    8.3<L>      30 May 2018 04:21    TPro  5.6<L>  /  Alb  2.9<L>  /  TBili  0.5  /  DBili  x   /  AST  28  /  ALT  35  /  AlkPhos  96  05-30          Pro BNP  521 05-28 @ 11:36  D Dimer  -- 05-28 @ 11:36  Pro BNP  516 05-26 @ 16:35  D Dimer  -- 05-26 @ 16:35    PT/INR - ( 30 May 2018 04:21 )   PT: 20.1 sec;   INR: 1.84 ratio         PTT - ( 30 May 2018 04:21 )  PTT:30.5 sec          RADIOLOGY & ADDITIONAL STUDIES:

## 2018-05-30 NOTE — PROGRESS NOTE ADULT - SUBJECTIVE AND OBJECTIVE BOX
CHIEF COMPLAINT/Diagnosis: chf/ copd/ CLL    SUBJECTIVE: no complaints    REVIEW OF SYSTEMS:    CONSTITUTIONAL: No weakness, fevers or chills  EYES/ENT: No visual changes;  No vertigo or throat pain   NECK: No pain or stiffness  RESPIRATORY: No cough, wheezing, hemoptysis; No shortness of breath  CARDIOVASCULAR: No chest pain or palpitations  GASTROINTESTINAL: No abdominal or epigastric pain. No nausea, vomiting, or hematemesis; No diarrhea or constipation. No melena or hematochezia.  GENITOURINARY: No dysuria, frequency or hematuria  NEUROLOGICAL: No numbness or weakness  SKIN: No itching, burning, rashes, or lesions   All other review of systems is negative unless indicated above    Vital Signs Last 24 Hrs  T(C): 36.4 (30 May 2018 12:00), Max: 37 (29 May 2018 16:22)  T(F): 97.6 (30 May 2018 12:00), Max: 98.6 (29 May 2018 16:22)  HR: 59 (30 May 2018 09:00) (59 - 77)  BP: 100/57 (30 May 2018 09:00) (100/57 - 145/68)  BP(mean): 68 (30 May 2018 09:00) (68 - 90)  RR: 15 (30 May 2018 06:00) (13 - 19)  SpO2: 98% (30 May 2018 09:00) (92% - 99%)    I&O's Summary    29 May 2018 07:01  -  30 May 2018 07:00  --------------------------------------------------------  IN: 0 mL / OUT: 600 mL / NET: -600 mL        CAPILLARY BLOOD GLUCOSE      POCT Blood Glucose.: 256 mg/dL (30 May 2018 11:24)  POCT Blood Glucose.: 191 mg/dL (30 May 2018 08:20)  POCT Blood Glucose.: 269 mg/dL (29 May 2018 20:59)  POCT Blood Glucose.: 227 mg/dL (29 May 2018 17:32)      PHYSICAL EXAM:    Constitutional: NAD, awake and alert, well-developed  HEENT: PERR, EOMI, Normal Hearing, MMM  Neck: Soft and supple, No LAD, No JVD  Respiratory: Breath sounds are clear bilaterally, No wheezing, rales or rhonchi  Cardiovascular: S1 and S2, regular rate and rhythm, no Murmurs, gallops or rubs  Gastrointestinal: Bowel Sounds present, soft, nontender, nondistended, no guarding, no rebound  Extremities: No peripheral edema  Vascular: 2+ peripheral pulses  Neurological: A/O x 3, no focal deficits  Musculoskeletal: 5/5 strength b/l upper and lower extremities  Skin: No rashes    MEDICATIONS:  MEDICATIONS  (STANDING):  amiodarone    Tablet 50 milliGRAM(s) Oral daily  dextrose 5%. 1000 milliLiter(s) (50 mL/Hr) IV Continuous <Continuous>  dextrose 50% Injectable 12.5 Gram(s) IV Push once  dextrose 50% Injectable 25 Gram(s) IV Push once  dextrose 50% Injectable 25 Gram(s) IV Push once  furosemide    Tablet 20 milliGRAM(s) Oral two times a day  gabapentin 300 milliGRAM(s) Oral three times a day  insulin glargine Injectable (LANTUS) 20 Unit(s) SubCutaneous every morning  insulin lispro (HumaLOG) corrective regimen sliding scale   SubCutaneous three times a day before meals  insulin lispro (HumaLOG) corrective regimen sliding scale   SubCutaneous at bedtime  metoprolol tartrate 25 milliGRAM(s) Oral two times a day  montelukast 10 milliGRAM(s) Oral at bedtime  predniSONE   Tablet 20 milliGRAM(s) Oral two times a day  simvastatin 10 milliGRAM(s) Oral at bedtime  tenofovir disoproxil fumarate (VIREAD) 300 milliGRAM(s) Oral daily      LABS: All Labs Reviewed:                        11.0   36.65 )-----------( 86       ( 30 May 2018 04:21 )             34.7     05-30    140  |  104  |  35<H>  ----------------------------<  213<H>  5.0   |  30  |  1.07    Ca    8.3<L>      30 May 2018 04:21    TPro  5.6<L>  /  Alb  2.9<L>  /  TBili  0.5  /  DBili  x   /  AST  28  /  ALT  35  /  AlkPhos  96  05-30    PT/INR - ( 30 May 2018 04:21 )   PT: 20.1 sec;   INR: 1.84 ratio         PTT - ( 30 May 2018 04:21 )  PTT:30.5 sec      Blood Culture: 05-26 @ 16:35  Organism Blood Culture PCR  Gram Stain Blood -- Gram Stain   Growth in aerobic bottle:  Gram Positive Cocci in Clusters  Specimen Source .Blood Blood-Peripheral  Culture-Blood --      Assessment and Plan  	    78 y.o. male with complex past medical history including CLL/lymphoma on treatment, h/o recurrent pneumonia, Afib, HFpEF, Hep B, COPD, type 2DM, H/o PE DVT presents with s/p fall. Pt was walking up stairs when he tripped and fell on the right side. Pt reports right rib and shoulder pain. Denies lightheadedness or dizziness prior to falling. Pt has been short of breath that has been gradually worsening. He saw his pulmonologist yesterday and was noted to have fluid around the lung. Pt denies any subjective fever, chills, sore throat. Reports sharp on/off chest pain that's worse with coughing. Reports chronic cough and current cough is not new. +worsening abdominal distension over the past 2-3 weeks. Denies diarrhea or dysuria.    #hypoxemia/SOB due to multifactorial (COPD exacerbation, CHF, pleural effusion and ascites)  #COPD exacerbation  -admit to CICU  -pulse ox monitoring and supplemental oxygen  -taper steroids to oral prednisone  --now on oral lasix  -pulm consult  -cardio consults  -Xray shows b/l effusion >>> will need thoracentesiss as per pulmonary; His inr is now less then 2.0  -hold coumadin for IR thoracentesis and paracentesisi TODAY.  -resume coumadin [place]     #tachycardia likely from pain and pleural effusion/ascites and duoneb use  -less likely PE due to therapeutic INR  -EKG  -pain control prn  -IR eval for thoracentesis and paracentesis  -cont home amio and metoprolol    #AFib on coumadin  -CHADSVASc >4  -cont amio and metoprolol  -daily INR   -hold coumadin for IR thora and para  -Resume coumadim [place]      #HBV with liver cirrhosis and ascites  -IR eval for tap  -GI consult, Dr Nails  -on viread  -will need paracentesis possibly after INR down    #B cell lymphoma/CLL  -oncology consult, Dr Houser    #HFpEF (EF 60-65%)  -I/O, daily weight  -iv lasix  -cont spironolactone  -cardio consult    #DM2  -on Tresiba 20units  -start lantus  -fingerstick monitoring    #DVT ppx  -on coumadin    #Advanced care planning  -discussed advanced directives with patient with wife and daughter at bedside. At this point, wishes to be FULL CODE          PLAN: if all goes well with the paracentesis and the thoracenteisis today, patient can likey be discharged to home [place] , he already has supplemntal o2 at home.

## 2018-05-30 NOTE — PROGRESS NOTE ADULT - SUBJECTIVE AND OBJECTIVE BOX
Patient is a 78y old  Male who presents with a chief complaint of s/p fall  SOB (28 May 2018 09:09)      HPI:  History obtained from prior chart, patient and daughter.  Translation by daughter.    78 y.o. male with complex past medical history including CLL/lymphoma on treatment, h/o recurrent pneumonia, Afib, HFpEF, Hep B, COPD, type 2DM, H/o PE DVT presents with s/p fall. Pt was walking up stairs when he tripped and fell on the right side. Pt reports right rib and shoulder pain. Denies lightheadedness or dizziness prior to falling. Pt has been short of breath that has been gradually worsening. He saw his pulmonologist yesterday and was noted to have fluid around the lung. Pt denies any subjective fever, chills, sore throat. Reports sharp on/off chest pain that's worse with coughing. Reports chronic cough and current cough is not new. +worsening abdominal distension over the past 2-3 weeks. Denies diarrhea or dysuria. (26 May 2018 19:39)    pt is seen and examined with wife and son at bedside  data reveiwed with RN staff as well  case discussed with Dr mariano today   outpt data discussed as well      data reviewed with RN staff  coumadin on hold  plan for large volume thoracentesis after INR improved  resting comfortably this am  no distress   data reviewed with Dr ZAMARRIPA with plan for paracentesis / albumin infusion  data rev with RN staff today  pt is in agreement to proceed  his family are aware as well  no resp distress this am  PAST MEDICAL & SURGICAL HISTORY:  Hypogammaglobulinemia: received IVIG  Hepatitis B virus infection, unspecified chronicity: retrovirals  Chronic diastolic congestive heart failure  Essential hypertension  DM type 2 (diabetes mellitus, type 2)  B-cell lymphoma, unspecified B-cell lymphoma type, unspecified body region: CLL stage 4/SLL; met NHL  Atrial fibrillation, unspecified type  COPD (chronic obstructive pulmonary disease)  History of esophageal dilatation  S/P cholecystectomy  H/O prostatectomy      PREVIOUS DIAGNOSTIC TESTING:      MEDICATIONS  (STANDING):  amiodarone    Tablet 50 milliGRAM(s) Oral daily  dextrose 5%. 1000 milliLiter(s) (50 mL/Hr) IV Continuous <Continuous>  dextrose 50% Injectable 12.5 Gram(s) IV Push once  dextrose 50% Injectable 25 Gram(s) IV Push once  dextrose 50% Injectable 25 Gram(s) IV Push once  furosemide    Tablet 20 milliGRAM(s) Oral two times a day  gabapentin 300 milliGRAM(s) Oral three times a day  insulin glargine Injectable (LANTUS) 20 Unit(s) SubCutaneous every morning  insulin lispro (HumaLOG) corrective regimen sliding scale   SubCutaneous three times a day before meals  insulin lispro (HumaLOG) corrective regimen sliding scale   SubCutaneous at bedtime  metoprolol tartrate 25 milliGRAM(s) Oral two times a day  montelukast 10 milliGRAM(s) Oral at bedtime  predniSONE   Tablet 20 milliGRAM(s) Oral two times a day  simvastatin 10 milliGRAM(s) Oral at bedtime  tenofovir disoproxil fumarate (VIREAD) 300 milliGRAM(s) Oral daily        MEDICATIONS  (PRN):  acetaminophen   Tablet 650 milliGRAM(s) Oral every 6 hours PRN For Temp greater than 38 C (100.4 F), mild pain, HA  ALBUTerol/ipratropium for Nebulization 3 milliLiter(s) Nebulizer every 6 hours PRN Shortness of Breath and/or Wheezing  dextrose 40% Gel 15 Gram(s) Oral once PRN Blood Glucose LESS THAN 70 milliGRAM(s)/deciliter  glucagon  Injectable 1 milliGRAM(s) IntraMuscular once PRN Glucose LESS THAN 70 milligrams/deciliter  morphine  - Injectable 2 milliGRAM(s) IV Push every 6 hours PRN Severe Pain (7 - 10)      FAMILY HISTORY:  Family history of liver cancer (Father): father  85 liver CA  Family history of coronary arteriosclerosis (Mother): Mom  of MI age 68      SOCIAL HISTORY:  ***    REVIEW OF SYSTEM:  Pertinent items are noted in HPI.  Constitutional negative for chills, fevers, sweats and weight loss  throat, and face:  negative for epistaxis, pos nasal congestion, sore throat and   tinnitus  Respiratory: negative for cough,pos  dyspnea on exertion, pleuritic chest pain  and wheezing  Cardiovascular:  negative for chest pain,pos  dyspnea and palpitations  Gastrointestinal: negative for abdominal pain, diarrhea, nausea and vomiting  Genitourinary: negative for dysuria, frequency and urinary incontinence  Skin:  negative for redness, rash, pruritus, swelling, dryness and   fissures  Hematologic/lymphatic: negative for bleeding and easy bruising    Vital Signs Last 24 Hrs  T(C): 36.1 (30 May 2018 06:02), Max: 37 (29 May 2018 16:22)  T(F): 97 (30 May 2018 06:02), Max: 98.6 (29 May 2018 16:22)  HR: 68 (30 May 2018 06:00) (61 - 77)  BP: 118/59 (30 May 2018 06:00) (118/59 - 145/68)  BP(mean): 72 (30 May 2018 06:00) (72 - 90)  RR: 15 (30 May 2018 06:00) (13 - 19)  SpO2: 97% (30 May 2018 06:00) (90% - 99%)  o2 sat 91% on 2 liters nc    I&O's Summary    27 May 2018 07:01  -  28 May 2018 07:00  --------------------------------------------------------  IN: 250 mL / OUT: 0 mL / NET: 250 mL      PHYSICAL EXAM  General Appearance: cooperative, no acute distress,   HEENT: PERRL, conjunctiva clear, EOM's intact, non injected pharynx, no exudate, TM   normal  Neck: Supple, , no adenopathy, thyroid: not enlarged, no carotid bruit or JVD  Back: Symmetric, no  tenderness,no soft tissue tenderness  Lungs: Dullness to percussion left mid to lower lung fields more than right base  no rhonchi  no wheeze  Heart: Regular rate and rhythm, S1, S2 normal, no murmur, rub or gallop  Abdomen: Soft, non-tender, bowel sounds active , no hepatosplenomegaly  Extremities: decreased pitting edema, no joint swelling  Skin: Skin color, texture normal, no rashes   Neurologic: Alert and oriented X3 , cranial nerves intact, sensory and motor normal,    ECG:    LABS:                        11.0   36.65 )-----------( 86       ( 30 May 2018 04:21 )             34.7   05-30    140  |  104  |  35<H>  ----------------------------<  213<H>  5.0   |  30  |  1.07    Ca    8.3<L>      30 May 2018 04:21    TPro  5.6<L>  /  Alb  2.9<L>  /  TBili  0.5  /  DBili  x   /  AST  28  /  ALT  35  /  AlkPhos  96  30                                   10.7   35.56 )-----------( 92       ( 29 May 2018 04:40 )  PT/INR - ( 30 May 2018 04:21 )   PT: 20.1 sec;   INR: 1.84 ratio         PTT - ( 30 May 2018 04:21 )  PTT:30.5 sec           33.2   05-29    141  |  104  |  33<H>  ----------------------------<  165<H>  4.1   |  27  |  0.97    Ca    8.0<L>      29 May 2018 04:40                 11.2   26.46 )-----------( 100      ( 27 May 2018 05:42 )             35.9     05-28  PT/INR - ( 29 May 2018 04:40 )   PT: 28.6 sec;   INR: 2.60 ratio         PTT - ( 29 May 2018 04:40 )  PTT:32.5 sec  137  |  102  |  26<H>  ----------------------------<  273<H>  4.5   |  28  |  0.95    Ca    8.0<L>      28 May 2018 04:29    TPro  5.7<L>  /  Alb  2.8<L>  /  TBili  0.4  /  DBili  x   /  AST  47<H>  /  ALT  25  /  AlkPhos  107  26    CARDIAC MARKERS ( 26 May 2018 16:35 )  <0.015 ng/mL / x     / x     / x     / x            Pro BNP  516  @ 16:35  D Dimer  --  @ 16:35    PT/INR - ( 27 May 2018 05:42 )   PT: 27.9 sec;   INR: 2.53 ratio         PTT - ( 26 May 2018 16:35 )  PTT:34.2 sec          RADIOLOGY & ADDITIONAL STUDIES:    < from: CT Chest No Cont (18 @ 17:37) >  PROCEDURE DATE:  2018          INTERPRETATION:      CT chest without contrast         CLINICAL INFORMATION:       Cough and chest congestion, RIGHT rib pain    TECHNIQUE:  Contiguous axial 3 mm sections were obtained through the   chest using single helical acquisition.  In addition, 3 mm sagittal and   coronal reformatted images obtained.   This scan was performed using   automatic exposure control (radiation dose reduction software) to obtain   a diagnostic image quality scan with patient dose as low as reasonably   achievable.        FINDINGS:   CT dated 2018 available for review.    The thyroid gland appears intact.    The lungs significant for moderate BILATERAL pleural effusions, LEFT   greater than RIGHT, with underlying atelectasis.       The trachea and   major bronchi are patent.    Mildly enlarged axillary and mediastinal lymph nodes are again found.      The heart size is normal.   The mediastinum, chest wall and thoracic   spine show old RIGHT-sided rib fractures..    Limited evaluation of the upper abdomen is is again for ascites.      IMPRESSION:  moderate BILATERAL pleural effusions, LEFT greater than   RIGHT, with underlying atelectasis.   Mildly enlarged axillaryor   mediastinal lymph nodes.   Ascites. Old RIGHT-sided rib fractures.     < end of copied text >  < from: CT Abdomen and Pelvis w/ IV Cont (18 @ 17:41) >  IMPRESSION:     Moderate ascites, new from .    Cirrhosis and splenomegaly, suspect portal hypertension.    Extensive abdominal and pelvic lymphadenopathy, enlarged from 2015.    Other incidental comments as above.    < end of copied text >   xray with increased bilateral pleural effusions seen

## 2018-05-31 NOTE — PROGRESS NOTE ADULT - SUBJECTIVE AND OBJECTIVE BOX
Patient is a 78y old  Male who presents with a chief complaint of s/p fall  SOB (28 May 2018 09:09)      HPI:  History obtained from prior chart, patient and daughter.  Translation by daughter.    78 y.o. male with complex past medical history including CLL/lymphoma on treatment, h/o recurrent pneumonia, Afib, HFpEF, Hep B, COPD, type 2DM, H/o PE DVT presents with s/p fall. Pt was walking up stairs when he tripped and fell on the right side. Pt reports right rib and shoulder pain. Denies lightheadedness or dizziness prior to falling. Pt has been short of breath that has been gradually worsening. He saw his pulmonologist yesterday and was noted to have fluid around the lung. Pt denies any subjective fever, chills, sore throat. Reports sharp on/off chest pain that's worse with coughing. Reports chronic cough and current cough is not new. +worsening abdominal distension over the past 2-3 weeks. Denies diarrhea or dysuria. (26 May 2018 19:39)    History per patient and wife. Status post thoracentesis, consistent with malignant however, we will await cytology. There may be component of cirrhotic induced disease also.  Discussed with patient and wife.  Continues to have some abdominal distention.  I suggested that they follow up with oncology and follow-up with me. Did discuss the case with pulmonary and patient will also be initiated on diuretics. Follow-up in 1-2 weeks.        PAST MEDICAL & SURGICAL HISTORY:  Hypogammaglobulinemia: received IVIG  Hepatitis B virus infection, unspecified chronicity: retrovirals  Chronic diastolic congestive heart failure  Essential hypertension  DM type 2 (diabetes mellitus, type 2)  B-cell lymphoma, unspecified B-cell lymphoma type, unspecified body region: CLL stage 4/SLL; met NHL  Atrial fibrillation, unspecified type  COPD (chronic obstructive pulmonary disease)  History of esophageal dilatation  S/P cholecystectomy  H/O prostatectomy      MEDICATIONS  (STANDING):  amiodarone    Tablet 50 milliGRAM(s) Oral daily  dextrose 5%. 1000 milliLiter(s) (50 mL/Hr) IV Continuous <Continuous>  dextrose 50% Injectable 12.5 Gram(s) IV Push once  dextrose 50% Injectable 25 Gram(s) IV Push once  dextrose 50% Injectable 25 Gram(s) IV Push once  furosemide    Tablet 20 milliGRAM(s) Oral two times a day  gabapentin 300 milliGRAM(s) Oral three times a day  insulin glargine Injectable (LANTUS) 20 Unit(s) SubCutaneous every morning  insulin lispro (HumaLOG) corrective regimen sliding scale   SubCutaneous three times a day before meals  insulin lispro (HumaLOG) corrective regimen sliding scale   SubCutaneous at bedtime  metoprolol tartrate 25 milliGRAM(s) Oral two times a day  montelukast 10 milliGRAM(s) Oral at bedtime  simvastatin 10 milliGRAM(s) Oral at bedtime  tenofovir disoproxil fumarate (VIREAD) 300 milliGRAM(s) Oral daily    MEDICATIONS  (PRN):  acetaminophen   Tablet 650 milliGRAM(s) Oral every 6 hours PRN For Temp greater than 38 C (100.4 F), mild pain, HA  ALBUTerol/ipratropium for Nebulization 3 milliLiter(s) Nebulizer every 6 hours PRN Shortness of Breath and/or Wheezing  dextrose 40% Gel 15 Gram(s) Oral once PRN Blood Glucose LESS THAN 70 milliGRAM(s)/deciliter  glucagon  Injectable 1 milliGRAM(s) IntraMuscular once PRN Glucose LESS THAN 70 milligrams/deciliter  morphine  - Injectable 2 milliGRAM(s) IV Push every 6 hours PRN Severe Pain (7 - 10)      Allergies    Privigen (Other (Severe))    Intolerances        SOCIAL HISTORY:NC    FAMILY HISTORY:  Family history of liver cancer (Father): father  85 liver CA  Family history of coronary arteriosclerosis (Mother): Mom  of MI age 68      REVIEW OF SYSTEMS:    CONSTITUTIONAL: No weakness, fevers or chills  EYES/ENT: No visual changes;  No vertigo or throat pain   NECK: No pain or stiffness  RESPIRATORY: No cough, wheezing, hemoptysis; No shortness of breath  CARDIOVASCULAR: No chest pain or palpitations  GENITOURINARY: No dysuria, frequency or hematuria  NEUROLOGICAL: No numbness or weakness  SKIN: No itching, burning, rashes, or lesions   All other review of systems is negative unless indicated above.    Vital Signs Last 24 Hrs  T(C): 36 (31 May 2018 11:45), Max: 36.6 (30 May 2018 15:48)  T(F): 96.8 (31 May 2018 11:45), Max: 97.9 (30 May 2018 15:48)  HR: 68 (31 May 2018 08:28) (65 - 68)  BP: 115/64 (31 May 2018 08:28) (115/64 - 127/67)  BP(mean): 76 (31 May 2018 08:28) (76 - 90)  RR: 18 (31 May 2018 06:20) (18 - 18)  SpO2: 96% (31 May 2018 08:28) (93% - 96%)    PHYSICAL EXAM:    Constitutional: NAD, well-developed  HEENT: EOMI, throat clear  Neck: No LAD, supple  Respiratory: CTA and P  Cardiovascular: S1 and S2, RRR, no M  Gastrointestinal: BS+, soft, NT/ND, neg HSM,  Extremities: No peripheral edema, neg clubing, cyanosis  Vascular: 2+ peripheral pulses  Neurological: A/O x 3, no focal deficits  Psychiatric: Normal mood, normal affect  Skin: No rashes    LABS:  CBC Full  -  ( 31 May 2018 05:18 )  WBC Count : 44.60 K/uL  Hemoglobin : 11.7 g/dL  Hematocrit : 37.0 %  Platelet Count - Automated : 86 K/uL  Mean Cell Volume : 89.8 fl  Mean Cell Hemoglobin : 28.4 pg  Mean Cell Hemoglobin Concentration : 31.6 gm/dL  Auto Neutrophil # : x  Auto Lymphocyte # : x  Auto Monocyte # : x  Auto Eosinophil # : x  Auto Basophil # : x  Auto Neutrophil % : x  Auto Lymphocyte % : x  Auto Monocyte % : x  Auto Eosinophil % : x  Auto Basophil % : x    -31    142  |  106  |  32<H>  ----------------------------<  138<H>  4.4   |  30  |  0.99    Ca    8.0<L>      31 May 2018 05:18    TPro  5.6<L>  /  Alb  2.9<L>  /  TBili  0.5  /  DBili  x   /  AST  28  /  ALT  35  /  AlkPhos  96  05-30    PT/INR - ( 30 May 2018 04:21 )   PT: 20.1 sec;   INR: 1.84 ratio         PTT - ( 30 May 2018 04:21 )  PTT:30.5 sec        RADIOLOGY & ADDITIONAL STUDIES:

## 2018-05-31 NOTE — PROGRESS NOTE ADULT - SUBJECTIVE AND OBJECTIVE BOX
HPI :   patient is a 78-year-old with history of hypertension, chronic diastolic congestive heart failure, COPD, paroxysmal atrial fibrillation, history of DVT and pulmonary emboli in the past , he has chronic bilateral pleural effusion, CLL/lymphoma being treated, history of recurrent pneumonia, patient is admitted after he fell. After admission he is been diagnosed to have bilateral moderate size pleural effusion left greater than right and moderate ascites. He status post left thoracocentesis yesterday, he feels improved and is comfortable at rest. He denies any chest pain or shortness of breath. He still has mild abdominal distention.    CT Chest No Cont (05.26.18   IMPRESSION:  moderate BILATERAL pleural effusions, LEFT greater than   RIGHT, with underlying atelectasis.   Mildly enlarged axillaryor   mediastinal lymph nodes.   Ascites. Old RIGHT-sided rib fractures.          CT Abdomen and Pelvis w/ IV Cont (05.26.18   IMPRESSION:     Moderate ascites, new from 2015.    Cirrhosis and splenomegaly, suspect portal hypertension.    Extensive abdominal and pelvic lymphadenopathy, enlarged from 2015.          PAST MEDICAL & SURGICAL HISTORY:  Hypogammaglobulinemia: received IVIG  Hepatitis B virus infection, unspecified chronicity: retrovirals  Chronic diastolic congestive heart failure  Essential hypertension  DM type 2 (diabetes mellitus, type 2)  B-cell lymphoma, unspecified B-cell lymphoma type, unspecified body region: CLL stage 4/SLL; met NHL  Atrial fibrillation, unspecified type  COPD (chronic obstructive pulmonary disease)  History of esophageal dilatation  S/P cholecystectomy  H/O prostatectomy    MEDICATIONS  (STANDING):  amiodarone    Tablet 50 milliGRAM(s) Oral daily  dextrose 5%. 1000 milliLiter(s) (50 mL/Hr) IV Continuous <Continuous>  dextrose 50% Injectable 12.5 Gram(s) IV Push once  dextrose 50% Injectable 25 Gram(s) IV Push once  dextrose 50% Injectable 25 Gram(s) IV Push once  furosemide    Tablet 20 milliGRAM(s) Oral two times a day  gabapentin 300 milliGRAM(s) Oral three times a day  insulin glargine Injectable (LANTUS) 20 Unit(s) SubCutaneous every morning  insulin lispro (HumaLOG) corrective regimen sliding scale   SubCutaneous three times a day before meals  insulin lispro (HumaLOG) corrective regimen sliding scale   SubCutaneous at bedtime  metoprolol tartrate 25 milliGRAM(s) Oral two times a day  montelukast 10 milliGRAM(s) Oral at bedtime  simvastatin 10 milliGRAM(s) Oral at bedtime  tenofovir disoproxil fumarate (VIREAD) 300 milliGRAM(s) Oral daily    MEDICATIONS  (PRN):  acetaminophen   Tablet 650 milliGRAM(s) Oral every 6 hours PRN For Temp greater than 38 C (100.4 F), mild pain, HA  ALBUTerol/ipratropium for Nebulization 3 milliLiter(s) Nebulizer every 6 hours PRN Shortness of Breath and/or Wheezing  dextrose 40% Gel 15 Gram(s) Oral once PRN Blood Glucose LESS THAN 70 milliGRAM(s)/deciliter  glucagon  Injectable 1 milliGRAM(s) IntraMuscular once PRN Glucose LESS THAN 70 milligrams/deciliter  morphine  - Injectable 2 milliGRAM(s) IV Push every 6 hours PRN Severe Pain (7 - 10)        REVIEW OF SYSTEM:  Pertinent items are noted in HPI.  Constitutional	Negative for chills, fevers, sweats.    Eyes: 	Negative for visual disturbance.  Ears, nose, mouth, throat, and face: Negative for epistaxis, nasal congestion, sore throat .  Neck:	Negative for enlargement, pain and difficulty in swallowing  Respiration : Negative for cough, dyspnea on exertion, pleuritic chest pain and wheezing  Cardiovascular: Negative for chest pain, dyspnea and palpitations    Gastrointestinal : Negative for abdominal pain, diarrhea, nausea and vomiting  Genitourinary: Negative for dysuria, frequency and urinary incontinence .  Skin: Negative for  rash, pruritus, swelling, dryness .  	  Hematologic/lymphatic: Negative for bleeding and easy bruising  Musculoskeletal: Negative for arthralgias, back pain and muscle weakness.  Neurological: Negative for dizziness, headaches, seizures and tremors  Behavioral/Psych: Negative for mood change, depression.  Endocrine:	Negative for blurry vision, polydipsia and polyuria, diaphoresis.   Allergic/Immunologic:	Negative for anaphylaxis, angioedema and urticaria.      Vital Signs Last 24 Hrs  T(C): 36 (31 May 2018 06:15), Max: 36.6 (30 May 2018 15:48)  T(F): 96.8 (31 May 2018 06:15), Max: 97.9 (30 May 2018 15:48)  HR: 68 (31 May 2018 08:28) (65 - 68)  BP: 115/64 (31 May 2018 08:28) (115/64 - 127/67)  BP(mean): 76 (31 May 2018 08:28) (76 - 90)  RR: 18 (31 May 2018 06:20) (18 - 18)  SpO2: 96% (31 May 2018 08:28) (93% - 96%)    I&O's Summary    30 May 2018 07:01  -  31 May 2018 07:00  --------------------------------------------------------  IN: 0 mL / OUT: 1100 mL / NET: -1100 mL      PHYSICAL EXAM  General Appearance: cooperative, no acute distress,   HEENT: PERRL, conjunctiva clear, EOM's intact, non injected pharynx, no exudate, TM   normal  Neck: Supple, , no adenopathy, thyroid: not enlarged, no carotid bruit or JVD  Back: Symmetric, no  tenderness,no soft tissue tenderness  Lungs: diminished breath sounds on both sides .  Heart: Regular rate and rhythm, S1, S2 normal, no murmur, rub or gallop  Abdomen: Soft, non-tender, bowel sounds active , no hepatosplenomegaly , moderate ascites  Extremities: no cyanosis 1 + ankle  edema, no joint swelling  Skin: Skin color, texture normal, no rashes   Neurologic: Alert and oriented X3 , cranial nerves intact, sensory and motor normal,        INTERPRETATION OF TELEMETRY: NSR           LABS:                          11.7   44.60 )-----------( 86       ( 31 May 2018 05:18 )             37.0     05-31    142  |  106  |  32<H>  ----------------------------<  138<H>  4.4   |  30  |  0.99    Ca    8.0<L>      31 May 2018 05:18    TPro  5.6<L>  /  Alb  2.9<L>  /  TBili  0.5  /  DBili  x   /  AST  28  /  ALT  35  /  AlkPhos  96  05-30          Pro BNP  521 05-28 @ 11:36  D Dimer  -- 05-28 @ 11:36  Pro BNP  516 05-26 @ 16:35  D Dimer  -- 05-26 @ 16:35    PT/INR - ( 30 May 2018 04:21 )   PT: 20.1 sec;   INR: 1.84 ratio         PTT - ( 30 May 2018 04:21 )  PTT:30.5 sec

## 2018-05-31 NOTE — PROGRESS NOTE ADULT - ASSESSMENT
78 y.o. male with complex past medical history including CLL/lymphoma on treatment, h/o recurrent pneumonia, Afib, HFpEF, Hep B, COPD, type 2DM, H/o PE DVT presents with s/p fall. Pt was walking up stairs when he tripped and fell on the right side. Pt reports right rib and shoulder pain. Denies lightheadedness or dizziness prior to falling. Pt has been short of breath that has been gradually worsening. He saw his pulmonologist yesterday and was noted to have fluid around the lung. Pt denies any subjective fever, chills, sore throat. Reports sharp on/off chest pain that's worse with coughing. Reports chronic cough and current cough is not new. +worsening abdominal distension over the past 2-3 weeks. Denies diarrhea or dysuria. (26 May 2018 19:39)    pt is seen and examined with wife and son at bedside  data reviewed with RN staff as well  case discussed with Dr parekh today 5/28  data reveiwed with RN staff 5/29  outpt data discussed as well    Cirrhosis with portal HTN  Bilateral pleural effusions L>R  ascites / peripheral edema related to above  diastolic CHF likely cont factor  Improved clinically on diuresis  Increased risk of hepatorenal syndrome  residual moderate sized left sided pleural effusions  Pt would clinically benefit from diagnostic / large volume left sided thoracentesis  family and pt agreed to have it done once INR is lowered  data reviewed with DR Parekh as well 5/28  no sign of pneumonia    agree with repeat cxr now for assessment  hold coumadin tonight  IR consult for large volume thoracentesis when INR<2  family is updated regarding status  needs supplemental O2 with activities  incentive spirometry use encouraged  pt / family agree to proceed with thoracentesis / pt's son is translating  taper steroids / decrease dose today  hold coumadin and will get large volume thoracentesis during this admission  send PF for analaysis including cytology requested
78 y.o. male with complex past medical history including CLL/lymphoma on treatment, h/o recurrent pneumonia, Afib, HFpEF, Hep B, COPD, type 2DM, H/o PE DVT presents with s/p fall. Pt was walking up stairs when he tripped and fell on the right side. Pt reports right rib and shoulder pain. Denies lightheadedness or dizziness prior to falling. Pt has been short of breath that has been gradually worsening. He saw his pulmonologist yesterday and was noted to have fluid around the lung. Pt denies any subjective fever, chills, sore throat. Reports sharp on/off chest pain that's worse with coughing. Reports chronic cough and current cough is not new. +worsening abdominal distension over the past 2-3 weeks. Denies diarrhea or dysuria.    Hypoxemia with shortness of breath–multifactorial, COPD exacerbation  and is improving    Tachycardia, has improved    Large-volume ascites, likely secondary to cirrhosis however, the may be a component of progression of lymphoma also contributing to this.      IR evaluation for paracentesis, large volume. Would give additional albumin, 12.5 g/L of ascitic fluid removed and would start diuretics afterwards with close follow-up of creatinine.  DW Dr mariano and pulmonary    Mild acute renal insufficiency with increasing creatinine and this should be monitored closely    Hepatitis B with liver cirrhosis and ascites, noncompliance discussed with patient and daughter. After discussion with the daughter, the daughter will be getting a pill container that will help him be more compliant with his medications.  Risk of developing resistance with missing doses as he has been doing discussed with them.    Patient has had recent hyperkalemia which was mild, would stop Aldactone and restart with Lasix after paracentesis
78 y.o. male with complex past medical history including CLL/lymphoma on treatment, h/o recurrent pneumonia, Afib, HFpEF, Hep B, COPD, type 2DM, H/o PE DVT presents with s/p fall. Pt was walking up stairs when he tripped and fell on the right side. Pt reports right rib and shoulder pain. Denies lightheadedness or dizziness prior to falling. Pt has been short of breath that has been gradually worsening. He saw his pulmonologist yesterday and was noted to have fluid around the lung. Pt denies any subjective fever, chills, sore throat. Reports sharp on/off chest pain that's worse with coughing. Reports chronic cough and current cough is not new. +worsening abdominal distension over the past 2-3 weeks. Denies diarrhea or dysuria.    Hypoxemia with shortness of breath–multifactorial, COPD exacerbation  and is improving    Tachycardia, has improved    Large-volume ascites, likely secondary to cirrhosis however, the may be a component of progression of lymphoma also contributing to this.      IR evaluation for paracentesis, large volume. Would give additional albumin, 12.5 g/L of ascitic fluid removed and would start diuretics afterwards with close follow-up of creatinine.  DW Dr mariano and pulmonary  did not luis diurietics well before and will need Fu of renal fx after DC on diuretics        Hepatitis B with liver cirrhosis and ascites, noncompliance discussed with patient and daughter. After discussion with the daughter, the daughter will be getting a pill container that will help him be more compliant with his medications.  Risk of developing resistance with missing doses as he has been doing discussed with them.    Patient has had recent hyperkalemia which was mild, would stop Aldactone and restart with Lasix after paracentesis
78 y.o. male with complex past medical history including CLL/lymphoma on treatment, h/o recurrent pneumonia, Afib, HFpEF, Hep B, COPD, type 2DM, H/o PE DVT presents with s/p fall. Pt was walking up stairs when he tripped and fell on the right side. Pt reports right rib and shoulder pain. Denies lightheadedness or dizziness prior to falling. Pt has been short of breath that has been gradually worsening. He saw his pulmonologist yesterday and was noted to have fluid around the lung. Pt denies any subjective fever, chills, sore throat. Reports sharp on/off chest pain that's worse with coughing. Reports chronic cough and current cough is not new. +worsening abdominal distension over the past 2-3 weeks. Denies diarrhea or dysuria.    Hypoxemia with shortness of breath–multifactorial, COPD exacerbation  and is improving    Tachycardia, has improved    Large-volume ascites, likely secondary to cirrhosis however, the may be a component of progression of lymphoma also contributing to this.  recent thoracentesis conscerning for malignant indced ascites as well      gentle diuresis        Hepatitis B with liver cirrhosis and ascites, noncompliance discussed with patient and daughter. After discussion with the daughter, the daughter will be getting a pill container that will help him be more compliant with his medications.  Risk of developing resistance with missing doses as he has been doing discussed with them.    fu 1 week
78 y.o. male with complex past medical history including CLL/lymphoma on treatment, h/o recurrent pneumonia, Afib, HFpEF, Hep B, COPD, type 2DM, H/o PE DVT presents with s/p fall. Pt was walking up stairs when he tripped and fell on the right side. Pt reports right rib and shoulder pain. Denies lightheadedness or dizziness prior to falling. Pt has been short of breath that has been gradually worsening. He saw his pulmonologist yesterday and was noted to have fluid around the lung. Pt denies any subjective fever, chills, sore throat. Reports sharp on/off chest pain that's worse with coughing. Reports chronic cough and current cough is not new. +worsening abdominal distension over the past 2-3 weeks. Denies diarrhea or dysuria. (26 May 2018 19:39)    pt is seen and examined with wife and son at bedside  data reviewed with RN staff as well  case discussed with Dr parekh today 5/28  data reveiwed with RN staff 5/29  outpt data discussed as well    Cirrhosis with portal HTN  Bilateral pleural effusions L>R  ascites / peripheral edema related to above  diastolic CHF likely cont factor  Improved clinically on diuresis  Increased risk of hepatorenal syndrome  residual moderate sized left sided pleural effusions  Pt would clinically benefit from diagnostic / large volume left sided thoracentesis  after discussion with GI consultant , plan to proceed with large volume paracentesis  FLUID to be sent for analysis  Cirrhosis likley cause  also need fluid cytology with hx lymphoma for analysis  RN staff informed today 5/30  family and pt agreed to have it done once INR is lowered  data reviewed with DR Parekh as well 5/28  no sign of pneumonia    agree with repeat cxr now for assessment  hold coumadin tonight  IR consult for large volume paracentesis  when INR<2  family is updated regarding status  needs supplemental O2 with activities  incentive spirometry use encouraged  pt / family agree to proceed with thoracentesis / pt's son is translating  taper steroids / decrease dose today  hold coumadin and will get large volume thoracentesis during this admission  send fluid for analysis including cytology requested
78 y.o. male with complex past medical history including CLL/lymphoma on treatment, h/o recurrent pneumonia, Afib, HFpEF, Hep B, COPD, type 2DM, H/o PE DVT presents with s/p fall. Pt was walking up stairs when he tripped and fell on the right side. Pt reports right rib and shoulder pain. Denies lightheadedness or dizziness prior to falling. Pt has been short of breath that has been gradually worsening. He saw his pulmonologist yesterday and was noted to have fluid around the lung. Pt denies any subjective fever, chills, sore throat. Reports sharp on/off chest pain that's worse with coughing. Reports chronic cough and current cough is not new. +worsening abdominal distension over the past 2-3 weeks. Denies diarrhea or dysuria. (26 May 2018 19:39)    pt is seen and examined with wife and son at bedside  data reviewed with RN staff as well  case discussed with Dr parekh today 5/28  data reveiwed with RN staff 5/29  outpt data discussed as well    Cirrhosis with portal HTN  Bilateral pleural effusions L>R  ascites / peripheral edema related to above  diastolic CHF likely cont factor  Improved clinically on diuresis  Increased risk of hepatorenal syndrome  residual moderate sized left sided pleural effusions  Pt would clinically benefit from diagnostic / large volume left sided thoracentesis  after discussion with GI consultant , plan to proceed with large volume paracentesis  FLUID to be sent for analysis  Cirrhosis likley cause  also need fluid cytology with hx lymphoma for analysis  RN staff informed today 5/30  family and pt agreed to have it done once INR is lowered  data reviewed with DR Parekh as well 5/28  no sign of pneumonia  s/p thoracentesis with borderline exudate by protein and transudate by LDH    agree with repeat cxr now for assessment  hold coumadin tonight  IR consult for large volume paracentesis  when INR<2  family is updated regarding status  needs supplemental O2 with activities  incentive spirometry use encouraged  pt / family agree to proceed with thoracentesis / pt's son is translating  taper steroids / decrease dose today  s/p large volume thoracentesis and doing better clinically  After discussion with Dr Mchugh, no need for paracentesis  cont with lasix / aldactone, 20 mg and 25 mg  plan for discharge today  need PF cytology eval / can fu with me in 2 weeks with repeat cxr  send fluid for analysis including cytology requested
79yo male with cirrhosis, hep b  admitted s/p fall  clinically stable  would plan for outpt paracentesis
status post mechanical fall. He has been alert & he is alert & comfortable  since admission. He is in NSR on tele.  Chronic diastolic congestive heart failure.  Bilateral pleural effusion left greater than right.  History of CLL/lymphoma.  Paroxysmal atrial fibrillation he is normal sinus rhythm.  Cirrhosis of liver.  Calcified coronaries and aorta on CT scan of the chest.  Ascites.  Suggest  Continue with diuretics.   Continue with low doses of amiodarone since patient is symptomatic with  atrial fibrillation.  May decrease Amiodarone to 100 mg  every 48 hours   Intake and output/daily weights.  Follow with electrolytes.  He may need thoracocentesis.  Adjust INR 2.0  discussed with patient's family at bedside and hospitalist.
status post mechanical fall. He has been alert & he is alert & comfortable  since admission. He is in NSR on tele.  Chronic diastolic congestive heart failure.  Bilateral pleural effusion left greater than right.  History of CLL/lymphoma.  Paroxysmal atrial fibrillation he is normal sinus rhythm.  Cirrhosis of liver.  Calcified coronaries and aorta on CT scan of the chest.  Ascites.  Suggest  Continue with diuretics.   Continue with low doses of amiodarone since patient is symptomatic with  atrial fibrillation.  May decrease Amiodarone to 100 mg  every 48 hours   Intake and output/daily weights.  Follow with electrolytes.  He may need thoracocentesis.  Adjust INR 2.0  discussed with patient's family at bedside and hospitalist.
status post mechanical fall. He has been alert & he is alert & comfortable  since admission. He is in NSR on tele.  Chronic diastolic congestive heart failure.  Bilateral pleural effusion left greater than right.  Moderate ascites.  History of CLL/lymphoma.  Paroxysmal atrial fibrillation he is normal sinus rhythm.  Cirrhosis of liver.  Calcified coronaries and aorta on CT scan of the chest.  Ascites.  Suggest  Continue with diuretics.   Start aldactone 25 mg by mouth once daily.  Intake and output/daily weights.  Follow with electrolytes.  Adjust INR 2.0  discussed with patient's family at bedside and hospitalist.
status post mechanical fall. He has been alert & he is alert & comfortable  since admission. He is in NSR on tele.  Chronic diastolic congestive heart failure.  Bilateral pleural effusion left greater than right. He status post left thoracocentesis.  Moderate ascites.  History of CLL/lymphoma.  Paroxysmal atrial fibrillation he is normal sinus rhythm.  Cirrhosis of liver.  Calcified coronaries and aorta on CT scan of the chest.  Ascites.  Suggest  Continue with diuretics.   Start aldactone 25 mg by mouth once daily tomorrow.  Intake and output/daily weights.  Follow with electrolytes.  Adjust INR 2.0  discussed with patient's family at bedside and hospitalist.

## 2018-05-31 NOTE — PROGRESS NOTE ADULT - PROVIDER SPECIALTY LIST ADULT
Cardiology
Gastroenterology
Hospitalist
Pulmonology
Gastroenterology

## 2018-05-31 NOTE — PROGRESS NOTE ADULT - SUBJECTIVE AND OBJECTIVE BOX
Patient is a 78y old  Male who presents with a chief complaint of s/p fall  SOB (28 May 2018 09:09)      HPI:  History obtained from prior chart, patient and daughter.  Translation by daughter.    78 y.o. male with complex past medical history including CLL/lymphoma on treatment, h/o recurrent pneumonia, Afib, HFpEF, Hep B, COPD, type 2DM, H/o PE DVT presents with s/p fall. Pt was walking up stairs when he tripped and fell on the right side. Pt reports right rib and shoulder pain. Denies lightheadedness or dizziness prior to falling. Pt has been short of breath that has been gradually worsening. He saw his pulmonologist yesterday and was noted to have fluid around the lung. Pt denies any subjective fever, chills, sore throat. Reports sharp on/off chest pain that's worse with coughing. Reports chronic cough and current cough is not new. +worsening abdominal distension over the past 2-3 weeks. Denies diarrhea or dysuria. (26 May 2018 19:39)    pt is seen and examined with wife and son at bedside  data reveiwed with RN staff as well  case discussed with Dr mariano today   outpt data discussed as well      data reviewed with RN staff  coumadin on hold  plan for large volume thoracentesis after INR improved  resting comfortably this am  no distress   data reviewed with Dr ZAMARRIPA with plan for paracentesis / albumin infusion  data rev with RN staff today  pt is in agreement to proceed  his family are aware as well  no resp distress this am    tolertaed his procedure well  s/p large volume left sided thoracentesis  tolertaed well  feels better    PAST MEDICAL & SURGICAL HISTORY:  Hypogammaglobulinemia: received IVIG  Hepatitis B virus infection, unspecified chronicity: retrovirals  Chronic diastolic congestive heart failure  Essential hypertension  DM type 2 (diabetes mellitus, type 2)  B-cell lymphoma, unspecified B-cell lymphoma type, unspecified body region: CLL stage 4/SLL; met NHL  Atrial fibrillation, unspecified type  COPD (chronic obstructive pulmonary disease)  History of esophageal dilatation  S/P cholecystectomy  H/O prostatectomy      PREVIOUS DIAGNOSTIC TESTING:      MEDICATIONS  (STANDING):  amiodarone    Tablet 50 milliGRAM(s) Oral daily  dextrose 5%. 1000 milliLiter(s) (50 mL/Hr) IV Continuous <Continuous>  dextrose 50% Injectable 12.5 Gram(s) IV Push once  dextrose 50% Injectable 25 Gram(s) IV Push once  dextrose 50% Injectable 25 Gram(s) IV Push once  furosemide    Tablet 20 milliGRAM(s) Oral two times a day  gabapentin 300 milliGRAM(s) Oral three times a day  insulin glargine Injectable (LANTUS) 20 Unit(s) SubCutaneous every morning  insulin lispro (HumaLOG) corrective regimen sliding scale   SubCutaneous three times a day before meals  insulin lispro (HumaLOG) corrective regimen sliding scale   SubCutaneous at bedtime  metoprolol tartrate 25 milliGRAM(s) Oral two times a day  montelukast 10 milliGRAM(s) Oral at bedtime  predniSONE   Tablet 20 milliGRAM(s) Oral two times a day  simvastatin 10 milliGRAM(s) Oral at bedtime  tenofovir disoproxil fumarate (VIREAD) 300 milliGRAM(s) Oral daily          MEDICATIONS  (PRN):  acetaminophen   Tablet 650 milliGRAM(s) Oral every 6 hours PRN For Temp greater than 38 C (100.4 F), mild pain, HA  ALBUTerol/ipratropium for Nebulization 3 milliLiter(s) Nebulizer every 6 hours PRN Shortness of Breath and/or Wheezing  dextrose 40% Gel 15 Gram(s) Oral once PRN Blood Glucose LESS THAN 70 milliGRAM(s)/deciliter  glucagon  Injectable 1 milliGRAM(s) IntraMuscular once PRN Glucose LESS THAN 70 milligrams/deciliter  morphine  - Injectable 2 milliGRAM(s) IV Push every 6 hours PRN Severe Pain (7 - 10)      FAMILY HISTORY:  Family history of liver cancer (Father): father  85 liver CA  Family history of coronary arteriosclerosis (Mother): Mom  of MI age 68      SOCIAL HISTORY:  ***    REVIEW OF SYSTEM:  Pertinent items are noted in HPI.  Constitutional negative for chills, fevers, sweats and weight loss  throat, and face:  negative for epistaxis, pos nasal congestion, sore throat and   tinnitus  Respiratory: negative for cough,pos  dyspnea on exertion, pleuritic chest pain  and wheezing  Cardiovascular:  negative for chest pain,pos  dyspnea and palpitations  Gastrointestinal: negative for abdominal pain, diarrhea, nausea and vomiting  Genitourinary: negative for dysuria, frequency and urinary incontinence  Skin:  negative for redness, rash, pruritus, swelling, dryness and   fissures  Hematologic/lymphatic: negative for bleeding and easy bruising    Vital Signs Last 24 Hrs  T(C): 36 (31 May 2018 06:15), Max: 36.6 (30 May 2018 15:48)  T(F): 96.8 (31 May 2018 06:15), Max: 97.9 (30 May 2018 15:48)  HR: 65 (30 May 2018 18:52) (59 - 65)  BP: 122/81 (31 May 2018 06:20) (100/57 - 127/67)  BP(mean): 90 (31 May 2018 06:20) (68 - 90)  RR: 18 (31 May 2018 06:20) (18 - 18)  SpO2: 93% (31 May 2018 06:20) (93% - 98%)    I&O's Summary    27 May 2018 07:01  -  28 May 2018 07:00  --------------------------------------------------------  IN: 250 mL / OUT: 0 mL / NET: 250 mL      PHYSICAL EXAM  General Appearance: cooperative, no acute distress,   HEENT: PERRL, conjunctiva clear, EOM's intact, non injected pharynx, no exudate, TM   normal  Neck: Supple, , no adenopathy, thyroid: not enlarged, no carotid bruit or JVD  Back: Symmetric, no  tenderness,no soft tissue tenderness  Lungs: Dullness to percussion left mid to lower lung fields more than right base  no rhonchi  no wheeze  Heart: Regular rate and rhythm, S1, S2 normal, no murmur, rub or gallop  Abdomen: Soft, non-tender, bowel sounds active , no hepatosplenomegaly  Extremities: decreased pitting edema, no joint swelling  Skin: Skin color, texture normal, no rashes   Neurologic: Alert and oriented X3 , cranial nerves intact, sensory and motor normal,    ECG:    LABS:                          11.7   44.60 )-----------( 86       ( 31 May 2018 05:18 )             37.0   05-    142  |  106  |  32<H>  ----------------------------<  138<H>  4.4   |  30  |  0.99    Ca    8.0<L>      31 May 2018 05:18    TPro  5.6<L>  /  Alb  2.9<L>  /  TBili  0.5  /  DBili  x   /  AST  28  /  ALT  35  /  AlkPhos  96  0530                          11.0   36.65 )-----------( 86       ( 30 May 2018 04:21 )             34.7   05-30    140  |  104  |  35<H>  ----------------------------<  213<H>  5.0   |  30  |  1.07    Ca    8.3<L>      30 May 2018 04:21    TPro  5.6<L>  /  Alb  2.9<L>  /  TBili  0.5  /  DBili  x   /  AST  28  /  ALT  35  /  AlkPhos  96                                     10.7   35.56 )-----------( 92       ( 29 May 2018 04:40 )  PT/INR - ( 30 May 2018 04:21 )   PT: 20.1 sec;   INR: 1.84 ratio         PTT - ( 30 May 2018 04:21 )  PTT:30.5 sec           33.2   05-29    141  |  104  |  33<H>  ----------------------------<  165<H>  4.1   |  27  |  0.97    Ca    8.0<L>      29 May 2018 04:40                 11.2   26.46 )-----------( 100      ( 27 May 2018 05:42 )             35.9     05-28  PT/INR - ( 29 May 2018 04:40 )   PT: 28.6 sec;   INR: 2.60 ratio         PTT - ( 29 May 2018 04:40 )  PTT:32.5 sec  137  |  102  |  26<H>  ----------------------------<  273<H>  4.5   |  28  |  0.95    Ca    8.0<L>      28 May 2018 04:29    TPro  5.7<L>  /  Alb  2.8<L>  /  TBili  0.4  /  DBili  x   /  AST  47<H>  /  ALT  25  /  AlkPhos  107      CARDIAC MARKERS ( 26 May 2018 16:35 )  <0.015 ng/mL / x     / x     / x     / x            Pro BNP  516  @ 16:35  D Dimer  --  @ 16:35    PT/INR - ( 27 May 2018 05:42 )   PT: 27.9 sec;   INR: 2.53 ratio         PTT - ( 26 May 2018 16:35 )  PTT:34.2 sec          RADIOLOGY & ADDITIONAL STUDIES:    < from: CT Chest No Cont (18 @ 17:37) >  PROCEDURE DATE:  2018          INTERPRETATION:      CT chest without contrast         CLINICAL INFORMATION:       Cough and chest congestion, RIGHT rib pain    TECHNIQUE:  Contiguous axial 3 mm sections were obtained through the   chest using single helical acquisition.  In addition, 3 mm sagittal and   coronal reformatted images obtained.   This scan was performed using   automatic exposure control (radiation dose reduction software) to obtain   a diagnostic image quality scan with patient dose as low as reasonably   achievable.        FINDINGS:   CT dated 2018 available for review.    The thyroid gland appears intact.    The lungs significant for moderate BILATERAL pleural effusions, LEFT   greater than RIGHT, with underlying atelectasis.       The trachea and   major bronchi are patent.    Mildly enlarged axillary and mediastinal lymph nodes are again found.      The heart size is normal.   The mediastinum, chest wall and thoracic   spine show old RIGHT-sided rib fractures..    Limited evaluation of the upper abdomen is is again for ascites.      IMPRESSION:  moderate BILATERAL pleural effusions, LEFT greater than   RIGHT, with underlying atelectasis.   Mildly enlarged axillaryor   mediastinal lymph nodes.   Ascites. Old RIGHT-sided rib fractures.     < end of copied text >  < from: CT Abdomen and Pelvis w/ IV Cont (18 @ 17:41) >  IMPRESSION:     Moderate ascites, new from .    Cirrhosis and splenomegaly, suspect portal hypertension.    Extensive abdominal and pelvic lymphadenopathy, enlarged from 2015.    Other incidental comments as above.    < end of copied text >   xray with increased bilateral pleural effusions seen

## 2018-05-31 NOTE — BRIEF OPERATIVE NOTE - PROCEDURE
<<-----Click on this checkbox to enter Procedure Thoracentesis, with US guidance  05/31/2018    Active  CADY

## 2018-06-27 NOTE — H&P ADULT - NSHPPHYSICALEXAM_GEN_ALL_CORE
PHYSICAL EXAM:    Constitutional: NAD, awake and alert, well-developed  HEENT: PERR, EOMI, Normal Hearing, MMM  Neck: Soft and supple, No LAD, No JVD  Respiratory: Breath sounds are clear bilaterally, No wheezing, rales or rhonchi  Cardiovascular: S1 and S2, regular rate and rhythm, no Murmurs, gallops or rubs  Gastrointestinal: Bowel Sounds present, soft, nontender, nondistended, no guarding, no rebound  Extremities: No peripheral edema  Vascular: 2+ peripheral pulses  Neurological: A/O x 3, no focal deficits  Musculoskeletal: 5/5 strength b/l upper and lower extremities  Skin: No rashes. evidence of chronic venous stasis to LE

## 2018-06-27 NOTE — H&P ADULT - NSHPLABSRESULTS_GEN_ALL_CORE
12.0   22.40 )-----------( 118      ( 27 Jun 2018 09:37 )             37.6     27 Jun 2018 09:37    136    |  102    |  20     ----------------------------<  162    5.2     |  30     |  1.21     Ca    8.2        27 Jun 2018 09:37    TPro  5.6    /  Alb  2.9    /  TBili  0.4    /  DBili  x      /  AST  23     /  ALT  19     /  AlkPhos  94     27 Jun 2018 09:37    PT/INR - ( 27 Jun 2018 10:29 )   PT: 31.6 sec;   INR: 2.86 ratio         PTT - ( 27 Jun 2018 10:29 )  PTT:37.7 sec  CAPILLARY BLOOD GLUCOSE        LIVER FUNCTIONS - ( 27 Jun 2018 09:37 )  Alb: 2.9 g/dL / Pro: 5.6 gm/dL / ALK PHOS: 94 U/L / ALT: 19 U/L / AST: 23 U/L / GGT: x

## 2018-06-27 NOTE — CONSULT NOTE ADULT - ASSESSMENT
SLL/ CLL with mediastinal, retroperitoneal adenopathy / Prior + marrow  Hypogammaglobulinemia    Plan    Obinutuzumab ( Gazyva ) 100 mg IV day 1, then 900 mg IV x 1 on day 2   Further therapy on days 8, 15, of 28 day cycle for cycle 1  Anticipate transfusion reactions: including hypersensitivity reactions  ( Respiratory, cardiovascular ) , discussed with family/ patient   All risks and benefits discussed and they have agreed to proceed with treatment  For reactions: alter rate, steroids, benadryl, bronchodilators    If he does well with the treatment can be discharged home and FU in a week  for second infusion    For grade III / IV side effects: will have to select alternate therapy SLL/ CLL with mediastinal, retroperitoneal adenopathy / Prior + marrow  Hypogammaglobulinemia    Plan    Obinutuzumab ( Gazyva ) 100 mg IV day 1, then 900 mg IV x 1 on day 2   Further therapy on days 8, 15, of 28 day cycle for cycle 1  Anticipate transfusion reactions: including hypersensitivity reactions  ( Respiratory, cardiovascular ) , discussed with family/ patient   All risks and benefits discussed and they have agreed to proceed with treatment  For reactions: alter rate, steroids, benadryl, bronchodilators    Albuterol 2.5 mg nebulizer for dyspnea, wheezing/ bronchospasm  Solu-cortef 100 mg  for dyspnea, wheezing , low blood pressure, allergic reactions  Benadryl 25- 50 mg allergic reactions , hives, rash, pruritis  Demerol 12.5 mg rigors/ chills       If he does well with the treatment can be discharged home and FU in a week  for second infusion    For grade III / IV side effects: will have to select alternate therapy

## 2018-06-27 NOTE — CONSULT NOTE ADULT - SUBJECTIVE AND OBJECTIVE BOX
HPI:  78 y.o. male with Diabetes COPD, Arrythmia , Hypogammaglobulinemia, multiple pneumonias, prior + Hepatitis B, Cirrhosis and  CLL/lymphoma for the last 14 years; treatment has been minimal in terms of chemotherapy ( CVP many years ago) / Gammaglobulin infusions ( For recurrent pneumonia) ; Over the last year there has been a concern By Pulmonary medicine/ GI regarding progressive adenopathy / SLL contributing to his morbidity ; therefore we suggested he start on  Obinutuzumab ( Anti CD 20) single agent; admitted for first cycle in anticipation of side effects ( Respiratory, blood pressure/ etc) ; other therapies  were felt to be potentially risky, for example Ibrutinib ( Arrythmia, on anticoagulation ), Bendamustine ( Immune suppression and so on      PAST MEDICAL & SURGICAL HISTORY:  Hypogammaglobulinemia: received IVIG  Hepatitis B virus infection, unspecified chronicity: retrovirals  Chronic diastolic congestive heart failure  Essential hypertension  DM type 2 (diabetes mellitus, type 2)  B-cell lymphoma, unspecified B-cell lymphoma type, unspecified body region: CLL stage 4/SLL; met NHL  Atrial fibrillation, unspecified type  COPD (chronic obstructive pulmonary disease)  History of esophageal dilatation  S/P cholecystectomy  H/O prostatectomy      SOCIAL HISTORY: Negative smoke    FAMILY HISTORY:  Family history of liver cancer (Father): father  85 liver CA  Family history of coronary arteriosclerosis (Mother): Mom  of MI age 68        REVIEW OF SYSTEMS      General: Good appetite, no weight loss;  active;  able to care for self    Skin: No rash, no pruritis, no hives  	  Ophthalmologic: No visual blurring, no diplopia  	  HEENT: No neck pains, sore throat, hearing difficulty, tinnitus    Respiratory and Thorax:  No dyspnea, cough, sputum production, hoarseness, hemoptysis. No pleurisy, chest pains  	  Cardiovascular: No palpitations, chest pains, dyspnea on exertion; no PND, orthopnea    Gastrointestinal: No abdominal pains, bloating. No reflux symptoms, dysphagia; No change in bowel habits,   diarrhea,constipation;No blood in stools    Genitourinary: No dysuria, frequency, hematuria. No flank pains    Musculoskeletal: No musculoskeletal pains, no joint pains or swelling    Neurological: No headaches, diplopia, dysarthria, dysphagia, weakness, parasthesia	    Psychiatric: No depression, anxiety	    Hematology/Lymphatics: No swollen glands, masses, edema	    Allergic/Immunologic:	Negative  PHYSICAL EXAM:      Constitutional: Appears comfortable, in  NAD, A/O X 3     Eyes: EOMI, PERRLA ;No icterus    ENMT:  No oral lesions, thrush; pharynx not injected    Neck:  Supple; No masses, lymph nodes, no bruits    Breasts: NA    Back: No tenderness     Respiratory:  Clear to A/P, No wheezes, rhonchi, rales. No dullness to percussion    Cardiovascular: Normal rate and rhythm; normal S1 and S2; No murmurs. No gallop    Gastrointestinal: No distension, normal bowl sounds; No tendeness , guarding, rebound;  no masses, no hepatosplenimegaly, no fluid wave    Genitourinary: No flank pains, no inguninal adenopathy    Rectal: NA    Extremities:  No phlebitis, no edema    Vascular: No acrocyanosis, no edema    Neurological: Alert and oriented. Cranial nerves II-XII WNL, Upper extremity / lower extremity  strength WNL;  Reflexes WNL, Plantar downgoing ; No cerebellar signs    Skin: No rash, petichae, purpura, echymoses    Lymph Nodes: No cervical, supraclavicular, axillary, inguinal adenopathy    Musculoskeletal: No joint swelling    Psychiatric: Alert, oriented, normal affect        LABS: Pending                RADIOLOGY & ADDITIONAL STUDIES:

## 2018-06-27 NOTE — H&P ADULT - HISTORY OF PRESENT ILLNESS
78 y.o. male with complex past medical history including CLL/lymphoma on treatment, h/o recurrent pneumonia, Afib, HFpEF, Hep B, COPD, type 2DM, H/o PE DVT 78 y.o. male with complex past medical history including CLL/lymphoma on treatment, h/o recurrent pneumonia, Afib, HFpEF, Hep B, COPD, type 2DM, H/o PE DVT admitted for initiation of Obinutuzumab ( Anti CD 20) single agent treatment. Admitted for first cycle in anticipation of side effects (Respiratory, blood pressure/ etc). Patient offers no complaints.

## 2018-06-27 NOTE — H&P ADULT - ASSESSMENT
80M with CLL/SLL, here for initiation of chemo with high risk f respiratory side effects      *CLL/SLL  -admit to [facility]  -Onc Dr. Houser  -Obinutuzumab trial today  -prn benadryl, steroids, nebs  -monitor respiratory status, hemodynamic stability      *Hx of Hep B with Cirrhosis, Chronic Ascites  -well controlled  -c/w viread, aldactone, lasix      *AFib on coumadin  -well controlled, c/w coumadin, metoprolol  -daily INR, goal 2-3      *DM2:  -ISS ac/hs  -check HgA1c      *COPD  -stable respirator status  -c/w Spiriva, Singulair  -prn nebs      *DVT Px  -therapeutic INR on coumadin

## 2018-06-28 NOTE — PROGRESS NOTE ADULT - ASSESSMENT
SLL/ CLL with mediastinal, retroperitoneal adenopathy / Prior + marrow  Hypogammaglobulinemia    Plan    Obinutuzumab ( Gazyva ) 100 mg IV day 1, then 900 mg IV x 1 on day 2   tolerated well  for second dose today  may DC home after the infusion today if he remains well    hypotension may be related to diuretics and cardiac disease  asymptomatic

## 2018-06-28 NOTE — PROGRESS NOTE ADULT - SUBJECTIVE AND OBJECTIVE BOX
Subjective:  Patient is a 78y old  Male who presents with a chief complaint of initiation of chemotherapy      HPI:     78 y.o. male with complex past medical history including CLL/lymphoma on treatment, h/o recurrent pneumonia, Afib, HFpEF, Hep B, COPD, type 2DM, H/o PE DVT admitted  on 6/27/18 for initiation of Obinutuzumab ( Anti CD 20) single agent treatment. Admitted for first cycle in anticipation of side effects (Respiratory, blood pressure/ etc). Patient offers no complaints.  Noted low BP after first infusion.     6/28/18- Patient seen and examined at bedside earlier today, reports no dyspnea, urinary symptoms, afebrile, tolerates IV infusion well , POC discussed     Review of system- Rest of the review of system are negative except mentioned in HPI    OBJECTIVE:   T(C): 36.6 (06-28-18 @ 12:01), Max: 37.1 (06-27-18 @ 18:30)  HR: 91 (06-28-18 @ 16:52) (74 - 107)  BP: 95/57 (06-28-18 @ 16:52) (74/39 - 122/90)  RR: 17 (06-28-18 @ 12:01) (16 - 18)  SpO2: 93% (06-28-18 @ 12:01) (92% - 98%)  Wt(kg): --  Daily     Daily     PHYSICAL EXAM:  GENERAL: NAD  NERVOUS SYSTEM:  Alert & Oriented X3, non- focal exam, Motor Strength 5/5 B/L upper and lower extremities; DTRs 2+ intact and symmetric  HEAD:  Atraumatic, Normocephalic  EYES: EOMI, PERRLA, conjunctiva and sclera clear  HEENT: Moist mucous membranes  NECK: Supple, No JVD  CHEST/LUNG: Clear to auscultation bilaterally; No rales, no rhonchi, no wheezing, or rubs  HEART: Regular rate and rhythm; No murmurs, rubs, or gallops  ABDOMEN: Soft, Nontender, Nondistended; Bowel sounds present  GENITOURINARY- Voiding, no suprapubic tenderness  EXTREMITIES:  2+ Peripheral Pulses, No clubbing, cyanosis, or edema  MUSCULOSKELETAL:- No muscle tenderness, Muscle tone normal, No joint tenderness, no Joint swelling, Joint range of motion-normal  SKIN-no rash, no lesion    LABS:                        11.3   16.12 )-----------( 103      ( 28 Jun 2018 05:41 )             35.0     06-28    133<L>  |  100  |  29<H>  ----------------------------<  276<H>  4.6   |  23  |  1.45<H>    Ca    8.1<L>      28 Jun 2018 05:41    TPro  5.6<L>  /  Alb  2.9<L>  /  TBili  0.4  /  DBili  x   /  AST  23  /  ALT  19  /  AlkPhos  94  06-27    PT/INR - ( 28 Jun 2018 05:41 )   PT: 35.4 sec;   INR: 3.20 ratio       PTT - ( 27 Jun 2018 10:29 )  PTT:37.7 sec    CAPILLARY BLOOD GLUCOSE      POCT Blood Glucose.: 386 mg/dL (28 Jun 2018 16:57)  POCT Blood Glucose.: 272 mg/dL (28 Jun 2018 12:18)  POCT Blood Glucose.: 300 mg/dL (28 Jun 2018 08:14)  POCT Blood Glucose.: 326 mg/dL (27 Jun 2018 22:29)  POCT Blood Glucose.: 258 mg/dL (27 Jun 2018 17:23)  RECENT CULTURES:    RADIOLOGY & ADDITIONAL TESTS:    Current medications:  acetaminophen   Tablet 650 milliGRAM(s) Oral daily  ALBUTerol    0.083% 2.5 milliGRAM(s) Nebulizer every 4 hours PRN  amiodarone    Tablet 100 milliGRAM(s) Oral daily  dextrose 40% Gel 15 Gram(s) Oral once PRN  dextrose 5%. 1000 milliLiter(s) IV Continuous <Continuous>  dextrose 50% Injectable 12.5 Gram(s) IV Push once  dextrose 50% Injectable 25 Gram(s) IV Push once  dextrose 50% Injectable 25 Gram(s) IV Push once  diphenhydrAMINE   Injectable 25 milliGRAM(s) IV Push daily  diphenhydrAMINE   Injectable 50 milliGRAM(s) IV Push once PRN  furosemide    Tablet 20 milliGRAM(s) Oral two times a day  gabapentin 300 milliGRAM(s) Oral three times a day  glucagon  Injectable 1 milliGRAM(s) IntraMuscular once PRN  hydrocortisone sodium succinate Injectable 100 milliGRAM(s) IV Push daily PRN  insulin lispro (HumaLOG) corrective regimen sliding scale   SubCutaneous three times a day before meals  insulin lispro (HumaLOG) corrective regimen sliding scale   SubCutaneous at bedtime  meperidine     Injectable 12.5 milliGRAM(s) IV Push every 4 hours PRN  meperidine     Injectable 12.5 milliGRAM(s) IV Push once  metoprolol tartrate 25 milliGRAM(s) Oral two times a day  montelukast 10 milliGRAM(s) Oral at bedtime  multivitamin 1 Tablet(s) Oral daily  simvastatin 10 milliGRAM(s) Oral at bedtime  sodium chloride 0.9%. 1000 milliLiter(s) IV Continuous <Continuous>  spironolactone 25 milliGRAM(s) Oral daily  tenofovir disoproxil fumarate (VIREAD) 300 milliGRAM(s) Oral daily  tiotropium 18 MICROgram(s) Capsule 1 Capsule(s) Inhalation daily  warfarin 3.75 milliGRAM(s) Oral daily

## 2018-06-28 NOTE — CHART NOTE - NSCHARTNOTEFT_GEN_A_CORE
Nurse call from floor overnight:    Patient with BGM reading of 436. Patient currently on Lantus 5u with corrective, no nutritional. Last night no Lantus was given, Lantus was added. Will add 2u of Lispro before meals. Will also give 8u Lispro stat to lower BGM. continue to monitor.    a/p:  78yM with Uncontrolled Diabetes, and COPD, receiving Steroids IV.   -Hospitalist note appreciated, Lantus 5units already added  -Will give 8u Lispro + 4u Corrective  -Add premeal Lispro 2u  -f/u BGM's

## 2018-06-29 NOTE — DISCHARGE NOTE ADULT - CARE PLAN
Principal Discharge DX:	B-cell lymphoma, unspecified B-cell lymphoma type, unspecified body region  Goal:	control progression  Assessment and plan of treatment:	follow up with oncologist within 1 week ro further treatments  Secondary Diagnosis:	Atrial fibrillation, unspecified type  Assessment and plan of treatment:	take coumadin, check INR in 2-3 days adjust the dose  Secondary Diagnosis:	DM type 2 (diabetes mellitus, type 2)  Assessment and plan of treatment:	A1C 8.4, follow up with PCP within 1 week to adjust dose of insulin  Secondary Diagnosis:	Essential hypertension  Assessment and plan of treatment:	take home medications, follow up with PCP for monitoring  Secondary Diagnosis:	Acute renal failure  Assessment and plan of treatment:	repeat renal function in 1 week , follow up with PCP within 1 week Principal Discharge DX:	B-cell lymphoma, unspecified B-cell lymphoma type, unspecified body region  Goal:	control progression  Assessment and plan of treatment:	follow up with oncologist within 1 week ro further treatments  Secondary Diagnosis:	Atrial fibrillation, unspecified type  Assessment and plan of treatment:	hold coumadin today and tomorrow, restart 1 tab ( 2.5 mg on Sunday)  check INR in 3 days adjust the dose  Secondary Diagnosis:	DM type 2 (diabetes mellitus, type 2)  Assessment and plan of treatment:	A1C 8.4, follow up with PCP within 1 week to adjust dose of insulin  Secondary Diagnosis:	Essential hypertension  Assessment and plan of treatment:	take home medications, follow up with PCP for monitoring  Secondary Diagnosis:	Acute renal failure  Assessment and plan of treatment:	repeat renal function in 1 week , follow up with PCP within 1 week

## 2018-06-29 NOTE — DISCHARGE NOTE ADULT - MEDICATION SUMMARY - MEDICATIONS TO CHANGE
I will SWITCH the dose or number of times a day I take the medications listed below when I get home from the hospital:  None I will SWITCH the dose or number of times a day I take the medications listed below when I get home from the hospital:    warfarin 2.5 mg oral tablet  -- 1.5 tab(s) by mouth once a day (at bedtime)

## 2018-06-29 NOTE — DISCHARGE NOTE ADULT - SECONDARY DIAGNOSIS.
Atrial fibrillation, unspecified type DM type 2 (diabetes mellitus, type 2) Essential hypertension Acute renal failure

## 2018-06-29 NOTE — DISCHARGE NOTE ADULT - HOSPITAL COURSE
Subjective:  Patient is a 78y old  Male who presents with a chief complaint of initiation of chemotherapy      HPI:     78 y.o. male with complex past medical history including CLL/lymphoma on treatment, h/o recurrent pneumonia, Afib, HFpEF, Hep B, COPD, type 2DM, H/o PE DVT admitted  on 6/27/18 for initiation of Obinutuzumab ( Anti CD 20) single agent treatment. Admitted for first cycle in anticipation of side effects (Respiratory, blood pressure/ etc). Patient offers no complaints.  Noted low BP after first infusion.     6/28/18- Patient seen and examined at bedside earlier today, reports no dyspnea, urinary symptoms, afebrile, tolerates IV infusion well , POC discussed   6/29/18 - pt seen and examined feels better, afebrile , denies CP, palpitations, dizziness, abd pain  Review of system- Rest of the review of system are negative except mentioned in HPI  T(C): 36.3 (06-29-18 @ 12:28), Max: 36.6 (06-28-18 @ 21:06)  T(F): 97.4 (06-29-18 @ 12:28), Max: 97.8 (06-28-18 @ 21:06)  HR: 80 (06-29-18 @ 12:28) (70 - 100)  BP: 107/65 (06-29-18 @ 12:28) (90/63 - 116/63)  RR: 16 (06-29-18 @ 12:28) (16 - 17)  SpO2: 95% (06-29-18 @ 12:28) (94% - 99%)  Wt(kg): --  PHYSICAL EXAM:  GENERAL: NAD  NERVOUS SYSTEM:  Alert & Oriented X3, non- focal exam, Motor Strength 5/5 B/L upper and lower extremities; DTRs 2+ intact and symmetric  HEAD:  Atraumatic, Normocephalic  EYES: EOMI, PERRLA, conjunctiva and sclera clear  HEENT: Moist mucous membranes  NECK: Supple, No JVD  CHEST/LUNG: Clear to auscultation bilaterally; No rales, no rhonchi, no wheezing, or rubs  HEART: Regular rate and rhythm; No murmurs, rubs, or gallops  ABDOMEN: Soft, Nontender, Nondistended; Bowel sounds present  GENITOURINARY- Voiding, no suprapubic tenderness  EXTREMITIES:  2+ Peripheral Pulses, No clubbing, cyanosis, or edema  MUSCULOSKELETAL:- No muscle tenderness, Muscle tone normal, No joint tenderness, no Joint swelling, Joint range of motion-normal  SKIN-no rash, no lesion  *CLL/Lymphoma  for last 14 years for initiation of chemo   -admit to [facility]  -Onc Dr. Houser - can be discharged and f/u in 1 week   -Obinutuzumab trial   Obinutuzumab ( Gazyva ) 100 mg IV day 1, then 900 mg IV x 1 on day 2   Further therapy on days 8, 15, of 28 day cycle for cycle 1   -prn benadryl, steroids, nebs  -monitor respiratory status, hemodynamic stability    * Episodes of hypotension  * CHRISTINA , resolved  - hold lasix, BB  - IV hydration @ 50 overnight  - o/p renal function monitoring     *Hx of Hep B with Cirrhosis, Chronic Ascites  -well controlled  -c/w viread, hold aldactone, lasix due to hypotension     *AFib on coumadin  -well controlled, c/w coumadin, metoprolol  -daily INR, goal 2-3  - INR >3, hold coumadin tonight    Pt/INR now   *DM2: uncontrolled due to steroids   -ISS ac/hs  -check HgA1c 8.4  - add  lantus 5 hs-> c/w home insulin     *COPD  -stable respirator status  -c/w Spiriva, Singulair  -prn nebs    Disposition - medically optimized to be discharged home with close follow up with PCP, oncology within 1 week   return to ED if fever, abdominal pain, nausea, vomiting, chest pain, dyspnea  Discharge plan discussed with patient, RN  Patient advised to follow up with PCP within 3-7 days  time spend 40 min  discharge note faxed to PCP with my contact information to call me back

## 2018-06-29 NOTE — DISCHARGE NOTE ADULT - MEDICATION SUMMARY - MEDICATIONS TO TAKE
I will START or STAY ON the medications listed below when I get home from the hospital:    spironolactone 25 mg oral tablet  -- 1 tab(s) by mouth once a day (at bedtime)  -- Indication: For home meds    amiodarone 200 mg oral tablet  -- 0.5 tab(s) by mouth once a day  -- Indication: For home meds    warfarin 2.5 mg oral tablet  -- 1.5 tab(s) by mouth once a day (at bedtime)  -- Indication: For home meds    gabapentin 300 mg oral capsule  -- 1 cap(s) by mouth 3 times a day  -- Indication: For home meds    Tresiba FlexTouch 100 units/mL subcutaneous solution  -- 20 unit(s) subcutaneous once a day  -- Indication: For home meds    simvastatin 10 mg oral tablet  -- 1 tab(s) by mouth once a day (at bedtime)  -- Indication: For home meds    Viread 300 mg oral tablet  -- 1 tab(s) by mouth once a day  -- Indication: For home meds    metoprolol tartrate 25 mg oral tablet  -- 1 tab(s) by mouth 2 times a day  -- Indication: For home meds    Spiriva Respimat 1.25 mcg/inh inhalation aerosol  -- 2 puff(s) inhaled once a day   -- Check with your doctor before becoming pregnant.  For inhalation only.    -- Indication: For home meds    furosemide 20 mg oral tablet  -- 1 tab(s) by mouth 2 times a day  -- Indication: For home meds    montelukast 10 mg oral tablet  -- 1 tab(s) by mouth once a day (at bedtime)  -- Indication: For home meds    multivitamin  -- 1 tab(s) by mouth once a day  -- Indication: For home meds I will START or STAY ON the medications listed below when I get home from the hospital:    spironolactone 25 mg oral tablet  -- 1 tab(s) by mouth once a day (at bedtime)  -- Indication: For home meds    amiodarone 200 mg oral tablet  -- 0.5 tab(s) by mouth once a day  -- Indication: For home meds    warfarin 2.5 mg oral tablet  -- 1 tab(s) by mouth once a day (at bedtime)  hold for tonight and tomorow , restart on Sunday 1 tab   -- Indication: For a fib    gabapentin 300 mg oral capsule  -- 1 cap(s) by mouth 3 times a day  -- Indication: For home meds    Tresiba FlexTouch 100 units/mL subcutaneous solution  -- 20 unit(s) subcutaneous once a day  -- Indication: For home meds    simvastatin 10 mg oral tablet  -- 1 tab(s) by mouth once a day (at bedtime)  -- Indication: For home meds    Viread 300 mg oral tablet  -- 1 tab(s) by mouth once a day  -- Indication: For home meds    metoprolol tartrate 25 mg oral tablet  -- 1 tab(s) by mouth 2 times a day  -- Indication: For home meds    Spiriva Respimat 1.25 mcg/inh inhalation aerosol  -- 2 puff(s) inhaled once a day   -- Check with your doctor before becoming pregnant.  For inhalation only.    -- Indication: For home meds    furosemide 20 mg oral tablet  -- 1 tab(s) by mouth 2 times a day  -- Indication: For home meds    montelukast 10 mg oral tablet  -- 1 tab(s) by mouth once a day (at bedtime)  -- Indication: For home meds    multivitamin  -- 1 tab(s) by mouth once a day  -- Indication: For home meds

## 2018-06-29 NOTE — DISCHARGE NOTE ADULT - OTHER SIGNIFICANT FINDINGS
Complete Blood Count in AM (06.29.18 @ 05:36)    Nucleated RBC: 0 /100 WBCs    WBC Count: 20.25 K/uL    RBC Count: 3.59 M/uL    Hemoglobin: 10.3 g/dL    Hematocrit: 31.4 %    Mean Cell Volume: 87.5 fl    Mean Cell Hemoglobin: 28.7 pg    Mean Cell Hemoglobin Conc: 32.8 gm/dL    Red Cell Distrib Width: 16.9 %    Platelet Count - Automated: 94 K/uL    Basic Metabolic Panel in AM (06.29.18 @ 05:36)    Sodium, Serum: 135 mmol/L    Potassium, Serum: 4.9 mmol/L    Chloride, Serum: 101 mmol/L    Carbon Dioxide, Serum: 22 mmol/L    Anion Gap, Serum: 12 mmol/L    Blood Urea Nitrogen, Serum: 40 mg/dL    Creatinine, Serum: 1.29 mg/dL    Glucose, Serum: 212 mg/dL    Calcium, Total Serum: 8.1 mg/dL    eGFR if Non : 52: Interpretative comment  The units for eGFR are ml/min/1.73m2 (normalized body surface area). The  eGFR is calculated from a serum creatinine using the CKD-EPI equation.  Other variables required for calculation are race, age and sex. Among  patients with chronic kidney disease (CKD), the eGFR is useful in  determining the stage of disease according to KDOQI CKD classification.  All eGFR results are reported numerically with the following  interpretation.          GFR                    With                 Without     (ml/min/1.73 m2)    Kidney Damage       Kidney Damage        >= 90                    Stage 1                     Normal        60-89                    Stage 2                     Decreased GFR        30-59     Stage 3                     Stage 3        15-29                    Stage 4                     Stage 4        < 15                      Stage 5                     Stage 5  Each stage of CKD assumes that the associated GFR level has been in  effect for at least 3 months. Determination of stages one and two (with  eGFR > 59 ml/min/m2) requires estimation of kidney damage for at least 3  months as defined by structural or functional abnormalities.  Limitations: All estimates of GFR will be less accurate for patients at  extremes of muscle mass (including but not limited to frail elderly,  critically ill, or cancer patients), those with unusual diets, and those  with conditions associated with reduced secretion or extrarenal  elimination of creatinine. The eGFR equation is not recommended for use  in patients with unstable creatinine levels. mL/min/1.73M2    eGFR if African American: 60 mL/min/1.73M2

## 2018-06-29 NOTE — DISCHARGE NOTE ADULT - PATIENT PORTAL LINK FT
You can access the [facility] Patient Portal, offered by [facility], by registering with the following website: [email or web address]

## 2018-06-29 NOTE — DISCHARGE NOTE ADULT - PLAN OF CARE
control progression follow up with oncologist within 1 week ro further treatments take coumadin, check INR in 2-3 days adjust the dose A1C 8.4, follow up with PCP within 1 week to adjust dose of insulin take home medications, follow up with PCP for monitoring repeat renal function in 1 week , follow up with PCP within 1 week hold coumadin today and tomorrow, restart 1 tab ( 2.5 mg on Sunday)  check INR in 3 days adjust the dose

## 2018-06-29 NOTE — DISCHARGE NOTE ADULT - CARE PROVIDER_API CALL
Co, Miguel Angel CHOI), Internal Medicine  [address] [place] [address]  Phone: [phone]  Fax: [phone]    Ioana Kimble), Hematology; Internal Medicine; Medical Oncology  [address] [place] [address]  Phone: [phone]  Fax: [phone]

## 2018-07-16 PROBLEM — I10 ESSENTIAL (PRIMARY) HYPERTENSION: Chronic | Status: ACTIVE | Noted: 2017-05-10

## 2018-07-16 PROBLEM — B19.10 UNSPECIFIED VIRAL HEPATITIS B WITHOUT HEPATIC COMA: Chronic | Status: ACTIVE | Noted: 2017-05-10

## 2018-07-16 PROBLEM — C85.90 NON-HODGKIN LYMPHOMA, UNSPECIFIED, UNSPECIFIED SITE: Chronic | Status: INACTIVE | Noted: 2017-02-17 | Resolved: 2017-05-10

## 2018-07-16 PROBLEM — C85.10 UNSPECIFIED B-CELL LYMPHOMA, UNSPECIFIED SITE: Chronic | Status: ACTIVE | Noted: 2017-05-10

## 2018-07-16 PROBLEM — I50.32 CHRONIC DIASTOLIC (CONGESTIVE) HEART FAILURE: Chronic | Status: ACTIVE | Noted: 2017-05-10

## 2018-07-16 PROBLEM — E11.9 TYPE 2 DIABETES MELLITUS WITHOUT COMPLICATIONS: Chronic | Status: ACTIVE | Noted: 2017-05-10

## 2018-07-16 PROBLEM — I48.91 UNSPECIFIED ATRIAL FIBRILLATION: Chronic | Status: ACTIVE | Noted: 2017-02-17

## 2018-07-16 PROBLEM — D80.1 NONFAMILIAL HYPOGAMMAGLOBULINEMIA: Chronic | Status: ACTIVE | Noted: 2017-05-10

## 2018-10-08 PROBLEM — R79.1 SUPRATHERAPEUTIC INR: Status: ACTIVE | Noted: 2018-01-01

## 2018-10-22 PROBLEM — I27.82 OTHER CHRONIC PULMONARY EMBOLISM WITHOUT ACUTE COR PULMONALE: Status: ACTIVE | Noted: 2017-03-10

## 2018-10-29 PROBLEM — Z71.89 ENCOUNTER FOR ANTICOAGULATION DISCUSSION AND COUNSELING: Status: ACTIVE | Noted: 2017-03-10

## 2018-10-29 PROBLEM — I48.91 ATRIAL FIBRILLATION, NEW ONSET: Status: ACTIVE | Noted: 2017-03-10

## 2018-10-29 PROBLEM — Z51.81 ANTICOAGULATION GOAL OF INR 2 TO 3: Status: ACTIVE | Noted: 2017-03-10

## 2018-11-02 NOTE — H&P ADULT - HISTORY OF PRESENT ILLNESS
78 year old male with pmh of copd on home o2 2.5L, afib on coumadin, chronic diastolic chf, diabetes on insulin, htn, hep b infection, hypogammaglobulinemia w/ IVIG tx, b cell lymphoma, cholelithiasis s/p cholecystectomy, esophageal dilation, prostatectomy coming to ED with complaints of worsening shortness of breath and lower back pain. Patient stating was on toilet this morning when tried to get up put pants on when he had a sudden onset of lower back pain. No bladder or bowel incontinence. Per Dr Bejarano In ED bladder scan was done with no to low residual urine volume. Per Dr Bejarano patient able to ambulate with cane in ED. FOr shortness of breath has been going on for past 4-5 weeks and has been gradually worsening. States last echo over 1 year prior. states now able to walk 50 feet without stopping for shortness of breath

## 2018-11-02 NOTE — H&P ADULT - NSHPLANGTRANSLATORFT_GEN_A_CORE
Patient Daughter bedside would like daughter to translante Patient Daughter bedside, he would like daughter to translate

## 2018-11-02 NOTE — ED ADULT NURSE NOTE - OBJECTIVE STATEMENT
Pt alert and oriented x3. Pt presents with lower back pain, pt got on the toilet at 0600 and started having back pain. Pt unable to walk.

## 2018-11-02 NOTE — H&P ADULT - NSHPPHYSICALEXAM_GEN_ALL_CORE
Vital Signs Last 24 Hrs  T(C): 37.2 (02 Nov 2018 17:00), Max: 37.2 (02 Nov 2018 17:00)  T(F): 98.9 (02 Nov 2018 17:00), Max: 98.9 (02 Nov 2018 17:00)  HR: 98 (02 Nov 2018 21:47) (84 - 98)  BP: 122/70 (02 Nov 2018 21:47) (112/69 - 122/70)  BP(mean): --  RR: 18 (02 Nov 2018 21:47) (15 - 18)  SpO2: 97% (02 Nov 2018 21:47) (92% - 97%)    Physical Exam:  Constitutional: NAD, awake and alert, well-developed  Neck: Soft and supple, No LAD, No JVD  Respiratory: decreased breath sounds b/l bases L<R; + wheezing +rales; +accessory muscle usage  Cardiovascular: +s1/s2  Gastrointestinal: soft nt bs+; +distention  Extremities: no edema b/l le  Vascular: 2+ peripheral pulses  Neurological: A/O x 3, no focal deficits  Musculoskeletal: 5/5 strength, sensation, and motor b/l upper and lower extremities; able to move all extremity, able to ambulate with cane Vital Signs Last 24 Hrs  T(C): 37.2 (02 Nov 2018 17:00), Max: 37.2 (02 Nov 2018 17:00)  T(F): 98.9 (02 Nov 2018 17:00), Max: 98.9 (02 Nov 2018 17:00)  HR: 98 (02 Nov 2018 21:47) (84 - 98)  BP: 122/70 (02 Nov 2018 21:47) (112/69 - 122/70)    RR: 18 (02 Nov 2018 21:47) (15 - 18)  SpO2: 97% (02 Nov 2018 21:47) (92% - 97%)    Physical Exam:  Constitutional: NAD, awake and alert, well-developed  HEENT: NC, eyes KARINA, nares: no discharge , pharynx moist mucous membranes  Neck: Soft and supple, No LAD, No JVD  Respiratory: decreased breath sounds b/l bases L<R; + wheezing +rales; +accessory muscle usage  Cardiovascular: +s1/s2  Gastrointestinal: soft nt bs+; +distention  Extremities: no edema b/l le  Vascular: 2+ peripheral pulses  Neurological: A/O x 3, no focal deficits  Musculoskeletal: 5/5 strength, sensation, and motor b/l upper and lower extremities; able to move all extremity, able to ambulate with cane  Skin: No acute rash

## 2018-11-02 NOTE — H&P ADULT - ASSESSMENT
78 year old male as above     #shortness of breath   - admit to medicine step down   - secondary to pleural effusions   - imaging as above   - ct surgery consult- for possible drainage- spoke to Dr Crespo  - pulm consult   - continue lasix  - o2 as needed    #copd  - o2 as needed  - duoneb prn  - pulm consult   - monitor pulse ox  - montelukast    # diabetes  - iss   - check a1c  - tresiba switched to lantus per pharmacy 20 units   - continue gabapentin for neuropathic pain  - diabetic diet w/ dash     # chf- acute on chronic present on admit   - echo   - lasix, bb, start acei   - cardio consult     # back pain   - no deficits on sensation motor or strength  - ct as above   - tylenol for pain   - mri in am     #hld   - diabetic diet w/ dash   - lipid level   - statin    #hepatitis b w/ ascites  - GI Consult  - continue spironolactone  - start lactulose   - may need paracentesis     #afib  - tele monitor   - hold coumadin for possible ct surgery procedure for plueral effusion- spoke to patient and family that increased risk for stroke and they agree to stop coumadin ( Dr Mueller also witnessed conversation)  - monitor inr  - ekg in am     #dvt prophylaxis  - hold coumadin  - venodynes 78 year old male as above     #shortness of breath   - admit to medicine step down   - secondary to pleural effusions   - imaging as above   - ct surgery consult- for possible drainage- spoke to Dr Crespo  - pulm consult   - continue lasix  - o2 as needed    #copd  - o2 as needed  - duoneb prn  - pulm consult   - monitor pulse ox  - montelukast    # diabetes  - iss   - check a1c  - tresiba switched to lantus per pharmacy 20 units   - continue gabapentin for neuropathic pain  - diabetic diet w/ dash     # chf- acute on chronic present on admit   - echo   - lasix, bb, start acei   - cardio consult     # back pain   - no deficits on sensation motor or strength  - ct as above   - tylenol for pain   - mri in am   - if patient having urinary retention can do bladder scan if >250cc -  straight cath    #hld   - diabetic diet w/ dash   - lipid level   - statin    #hepatitis b w/ ascites  - GI Consult  - continue spironolactone  - start lactulose   - may need paracentesis     #afib  - tele monitor   - hold coumadin for possible ct surgery procedure for plueral effusion- spoke to patient and family that increased risk for stroke and they agree to stop coumadin ( Dr Mueller also witnessed conversation)  - monitor inr  - ekg in am     #dvt prophylaxis  - hold coumadin  - venodynes 78 year old male as above     #shortness of breath   - admit to medicine step down   - secondary to pleural effusions   - imaging as above   - ct surgery consult- for possible drainage- spoke to Dr Crespo  - pulm consult   - continue lasix  - o2 as needed    #copd  - o2 as needed  - duoneb prn  - pulm consult   - monitor pulse ox  - montelukast    # diabetes  - iss   - check a1c  - tresiba switched to lantus per pharmacy 20 units   - continue gabapentin for neuropathic pain  - diabetic diet w/ dash     # chf- acute on chronic present on admit   - echo   - lasix, bb, start acei   - cardio consult     # back pain   - no deficits on sensation motor or strength  - ct as above   - tylenol for pain   - mri in am   - if patient having urinary retention can do bladder scan if >250cc -  straight cath    #hld   - diabetic diet w/ dash   - lipid level   - statin    #hepatitis b w/ ascites  - GI Consult  - continue spironolactone  - start lactulose   - may need paracentesis     #afib  - tele monitor   - hold coumadin for possible ct surgery procedure for plueral effusion- spoke to patient and family that increased risk for stroke and they agree to stop coumadin ( Dr Mueller also witnessed conversation)  - monitor inr  - ekg in am     #dvt prophylaxis  - hold coumadin  - venodynes    #IMPROVE VTE Individual Risk Assessment    RISK                                                                Points    [  ] Previous VTE                                                  3    [  ] Thrombophilia                                               2    [  ] Lower limb paralysis                                      2        (unable to hold up >15 seconds)      [X  ] Current Cancer                                              2         (within 6 months)    [  ] Immobilization > 24 hrs                                1    [  ] ICU/CCU stay > 24 hours                              1    [ X ] Age > 60                                                      1    IMPROVE VTE Score ___3______

## 2018-11-02 NOTE — H&P ADULT - ATTENDING COMMENTS
Pt seen and examined by myself at bedside.   Pt's presentation, diagnostic data, and assessment and plan reviewed in detail with PGY3, Dr. Yoni Hope.    Agree with plan of care as detailed.    Pt is an 78 year old gentleman w/ PMHx of COPD on home O2 2.5L, Afib on coumadin, chronic diastolic CHF, diabetes on insulin, Htn, Hep b infection, hypogammaglobulinemia w/ IVIG tx, b cell lymphoma, cholelithiasis s/p cholecystectomy, esophageal dilation, prostatectomy   admitted now with  increasing SOB for 5 weeks  and found to have bilat effusions, moderate to large L Pleural effusion.   DDX: malignant effusion,  CHF,  Inflammatory process  Back pain and chronic compression fx also noted at T11.  Post void bladder scan in the ED reveals <60ml retained urine.  - adm to Stepdown  - pulse oximetry and O2 support  - CT surgery consult Dr. Crespo   - pulm consult Dr. Licea  - echo  - diuretics

## 2018-11-02 NOTE — H&P ADULT - NSHPSOCIALHISTORY_GEN_ALL_CORE
Lives at home with family    Denies EtOH or illicit drug use    States was a former smoker 2-3ppd x 20 years; quit appx 20 years prior

## 2018-11-02 NOTE — H&P ADULT - PMH
Atrial fibrillation, unspecified type    B-cell lymphoma, unspecified B-cell lymphoma type, unspecified body region  CLL stage 4/SLL; met NHL  Chronic diastolic congestive heart failure    COPD (chronic obstructive pulmonary disease)    DM type 2 (diabetes mellitus, type 2)    Essential hypertension    Hepatitis B virus infection, unspecified chronicity  retrovirals  Hyperlipidemia    Hypogammaglobulinemia  received IVIG

## 2018-11-02 NOTE — ED PROVIDER NOTE - OBJECTIVE STATEMENT
79 y/o male with a PMHx of COPD, Afib on Coumadin, Chronic diastolic congestive heart failure, DM type 2, HTN, hepatitis B virus infection, Hypogammaglobulinemia received IVIG, B-cell lymphoma s/p cholecystectomy, esophageal dilatation, prostatectomy presents to the ED c/o lower back pain x11.5 hours. Pt was sitting on the toilet this morning (6am) when he bent down to pull up his pants and had sudden onset of pain in his lower back. Pt has been unable to standup straight or walk since onset. Pt also states he has had a decrease in urin output since onset of back pain.  Pt has had abd bloating for the past 3-4 months from his medication. Pt is on Furosemide, spironolactone, amiodarone, metoprolol.  Cardiologist: Dg PCP:  Co. Oncologist: Dr. Houser.

## 2018-11-02 NOTE — ED PROVIDER NOTE - MEDICAL DECISION MAKING DETAILS
Pt with large pleural effusion found on imaging, likely causing his SOB.  CT lumbar spine did not reveal acute fx.  Pt able to walk and ambulate with assistance.  No signs of urinary retention on exam, bladder US 54 cc PVR.  Pt admitted to medicine service for pain control, spine evaluation, and evaluation for his pleural effusion.

## 2018-11-03 NOTE — CONSULT NOTE ADULT - SUBJECTIVE AND OBJECTIVE BOX
Patient is a 78y old  Male who presents with a chief complaint of CC: Lower back pain and shortness of breath (2018 22:27)      HPI:  78 year old male with pmh of copd on home o2 2.5L, afib on coumadin, chronic diastolic chf, diabetes on insulin, htn, hep b infection, hypogammaglobulinemia w/ IVIG tx, b cell lymphoma, cholelithiasis s/p cholecystectomy, esophageal dilation, prostatectomy coming to ED with complaints of worsening shortness of breath and lower back pain. Patient stating was on toilet this morning when tried to get up put pants on when he had a sudden onset of lower back pain. No bladder or bowel incontinence. Per Dr Bejarano In ED bladder scan was done with no to low residual urine volume. Per Dr Bejarano patient able to ambulate with cane in ED. FOr shortness of breath has been going on for past 4-5 weeks and has been gradually worsening. States last echo over 1 year prior. states now able to walk 50 feet without stopping for shortness of breath (2018 22:27)      PAST MEDICAL & SURGICAL HISTORY:  Hyperlipidemia  Hypogammaglobulinemia: received IVIG  Hepatitis B virus infection, unspecified chronicity: retrovirals  Chronic diastolic congestive heart failure  Essential hypertension  DM type 2 (diabetes mellitus, type 2)  B-cell lymphoma, unspecified B-cell lymphoma type, unspecified body region: CLL stage 4/SLL; met NHL  Atrial fibrillation, unspecified type  COPD (chronic obstructive pulmonary disease)  History of esophageal dilatation  S/P cholecystectomy  H/O prostatectomy      PREVIOUS DIAGNOSTIC TESTING:      MEDICATIONS  (STANDING):  amiodarone    Tablet 100 milliGRAM(s) Oral daily  atorvastatin 10 milliGRAM(s) Oral at bedtime  dextrose 5%. 1000 milliLiter(s) (50 mL/Hr) IV Continuous <Continuous>  dextrose 50% Injectable 12.5 Gram(s) IV Push once  dextrose 50% Injectable 25 Gram(s) IV Push once  dextrose 50% Injectable 25 Gram(s) IV Push once  docusate sodium 100 milliGRAM(s) Oral three times a day  furosemide    Tablet 20 milliGRAM(s) Oral two times a day  gabapentin 300 milliGRAM(s) Oral three times a day  insulin glargine Injectable (LANTUS) 20 Unit(s) SubCutaneous at bedtime  insulin lispro (HumaLOG) corrective regimen sliding scale   SubCutaneous three times a day before meals  insulin lispro (HumaLOG) corrective regimen sliding scale   SubCutaneous at bedtime  lactulose Syrup 10 Gram(s) Oral daily  lisinopril 2.5 milliGRAM(s) Oral daily  metoprolol tartrate 25 milliGRAM(s) Oral two times a day  montelukast 10 milliGRAM(s) Oral at bedtime  multivitamin 1 Tablet(s) Oral daily  spironolactone 25 milliGRAM(s) Oral at bedtime  tenofovir disoproxil fumarate (VIREAD) 300 milliGRAM(s) Oral daily    MEDICATIONS  (PRN):  acetaminophen   Tablet .. 650 milliGRAM(s) Oral every 6 hours PRN Temp greater or equal to 38C (100.4F), Mild Pain (1 - 3), Moderate Pain (4 - 6)  ALBUTerol/ipratropium for Nebulization 3 milliLiter(s) Nebulizer every 6 hours PRN Shortness of Breath and/or Wheezing  dextrose 40% Gel 15 Gram(s) Oral once PRN Blood Glucose LESS THAN 70 milliGRAM(s)/deciliter  glucagon  Injectable 1 milliGRAM(s) IntraMuscular once PRN Glucose LESS THAN 70 milligrams/deciliter  senna 2 Tablet(s) Oral at bedtime PRN Constipation      FAMILY HISTORY:  Family history of liver cancer (Father): father  85 liver CA  Family history of coronary arteriosclerosis (Mother): Mom  of MI age 68      SOCIAL HISTORY:  ***    REVIEW OF SYSTEM:  cough, dyspnea, fatigue, otherwise 12 point ROS negative     Vital Signs Last 24 Hrs  T(C): 36.9 (2018 05:00), Max: 37.2 (2018 17:00)  T(F): 98.4 (2018 05:00), Max: 98.9 (2018 17:00)  HR: 90 (2018 06:00) (84 - 98)  BP: 120/69 (2018 06:00) (98/65 - 128/76)  BP(mean): 82 (2018 06:00) (73 - 90)  RR: 18 (2018 06:00) (15 - 30)  SpO2: 98% (2018 06:00) (92% - 98%)    I&O's Summary    2018 07:01  -  2018 07:00  --------------------------------------------------------  IN: 0 mL / OUT: 1550 mL / NET: -1550 mL      PHYSICAL EXAM  General Appearance: cooperative, no acute distress,   HEENT: PERRL, conjunctiva clear, EOM's intact, non injected pharynx, no exudate, TM   normal  Neck: Supple, , no adenopathy, thyroid: not enlarged, no carotid bruit or JVD  Back: Symmetric, no  tenderness,no soft tissue tenderness  Lungs: Decreased breath sounds in the right lower lobe, diminished in the upper and lower lobes on the left, Right upper lobe inspiratory wheezes   Heart: Regular rate and rhythm, S1, S2 normal, no murmur, rub or gallop  Abdomen: Soft, non-tender, bowel sounds active , no hepatosplenomegaly  Extremities: no cyanosis or edema, no joint swelling  Skin: Skin color, texture normal, no rashes   Neurologic: Alert and oriented X3 , cranial nerves intact, sensory and motor normal,    ECG:    LABS:                          12.3   16.94 )-----------( 95       ( 2018 04:44 )             38.5     11    133<L>  |  98  |  14  ----------------------------<  113<H>  5.0   |  27  |  0.99    Ca    8.3<L>      2018 04:44  Phos  3.7       Mg     1.9         TPro  6.0  /  Alb  2.8<L>  /  TBili  0.5  /  DBili  x   /  AST  34  /  ALT  34  /  AlkPhos  162<H>      CARDIAC MARKERS ( 2018 17:42 )  <0.015 ng/mL / x     / x     / x     / x            Pro BNP  182  @ 17:42  D Dimer  --  @ 17:42    PT/INR - ( 2018 04:44 )   PT: 23.9 sec;   INR: 2.10 ratio         PTT - ( 2018 04:44 )  PTT:37.1 sec  Urinalysis Basic - ( 2018 19:11 )    Color: Yellow / Appearance: Clear / S.010 / pH: x  Gluc: x / Ketone: Negative  / Bili: Negative / Urobili: Negative mg/dL   Blood: x / Protein: Negative mg/dL / Nitrite: Negative   Leuk Esterase: Negative / RBC: x / WBC x   Sq Epi: x / Non Sq Epi: x / Bacteria: x            RADIOLOGY & ADDITIONAL STUDIES:    IMPRESSION: Moderate to large left pleural effusion with underlying   compressive atelectasis. Small right pleural effusion with underlying   compressive atelectasis.   Prominent lymph nodes in the bilateral axilla,   mediastinum, and hilum. Patient is a 78y old  Male who presents with a chief complaint of CC: Lower back pain and shortness of breath (2018 22:27)      HPI:  78 year old male with pmh of copd on home o2 2.5L, afib on coumadin, chronic diastolic chf, diabetes on insulin, htn, hep b infection, hypogammaglobulinemia w/ IVIG tx, b cell lymphoma, cholelithiasis s/p cholecystectomy, esophageal dilation, prostatectomy coming to ED with complaints of worsening shortness of breath and lower back pain. Patient stating was on toilet this morning when tried to get up put pants on when he had a sudden onset of lower back pain. No bladder or bowel incontinence. Per Dr Bejarano In ED bladder scan was done with no to low residual urine volume. Per Dr Bejarano patient able to ambulate with cane in ED. FOr shortness of breath has been going on for past 4-5 weeks and has been gradually worsening. States last echo over 1 year prior. states now able to walk 50 feet without stopping for shortness of breath (2018 22:27)      PAST MEDICAL & SURGICAL HISTORY:  Hyperlipidemia  Hypogammaglobulinemia: received IVIG  Hepatitis B virus infection, unspecified chronicity: retrovirals  Chronic diastolic congestive heart failure  Essential hypertension  DM type 2 (diabetes mellitus, type 2)  B-cell lymphoma, unspecified B-cell lymphoma type, unspecified body region: CLL stage 4/SLL; met NHL  Atrial fibrillation, unspecified type  COPD (chronic obstructive pulmonary disease)  History of esophageal dilatation  S/P cholecystectomy  H/O prostatectomy      PREVIOUS DIAGNOSTIC TESTING:      MEDICATIONS  (STANDING):  amiodarone    Tablet 100 milliGRAM(s) Oral daily  atorvastatin 10 milliGRAM(s) Oral at bedtime  dextrose 5%. 1000 milliLiter(s) (50 mL/Hr) IV Continuous <Continuous>  dextrose 50% Injectable 12.5 Gram(s) IV Push once  dextrose 50% Injectable 25 Gram(s) IV Push once  dextrose 50% Injectable 25 Gram(s) IV Push once  docusate sodium 100 milliGRAM(s) Oral three times a day  furosemide    Tablet 20 milliGRAM(s) Oral two times a day  gabapentin 300 milliGRAM(s) Oral three times a day  insulin glargine Injectable (LANTUS) 20 Unit(s) SubCutaneous at bedtime  insulin lispro (HumaLOG) corrective regimen sliding scale   SubCutaneous three times a day before meals  insulin lispro (HumaLOG) corrective regimen sliding scale   SubCutaneous at bedtime  lactulose Syrup 10 Gram(s) Oral daily  lisinopril 2.5 milliGRAM(s) Oral daily  metoprolol tartrate 25 milliGRAM(s) Oral two times a day  montelukast 10 milliGRAM(s) Oral at bedtime  multivitamin 1 Tablet(s) Oral daily  spironolactone 25 milliGRAM(s) Oral at bedtime  tenofovir disoproxil fumarate (VIREAD) 300 milliGRAM(s) Oral daily    MEDICATIONS  (PRN):  acetaminophen   Tablet .. 650 milliGRAM(s) Oral every 6 hours PRN Temp greater or equal to 38C (100.4F), Mild Pain (1 - 3), Moderate Pain (4 - 6)  ALBUTerol/ipratropium for Nebulization 3 milliLiter(s) Nebulizer every 6 hours PRN Shortness of Breath and/or Wheezing  dextrose 40% Gel 15 Gram(s) Oral once PRN Blood Glucose LESS THAN 70 milliGRAM(s)/deciliter  glucagon  Injectable 1 milliGRAM(s) IntraMuscular once PRN Glucose LESS THAN 70 milligrams/deciliter  senna 2 Tablet(s) Oral at bedtime PRN Constipation      FAMILY HISTORY:  Family history of liver cancer (Father): father  85 liver CA  Family history of coronary arteriosclerosis (Mother): Mom  of MI age 68      SOCIAL HISTORY: Lives at home     REVIEW OF SYSTEM:  cough, dyspnea, fatigue, otherwise 12 point ROS negative     Vital Signs Last 24 Hrs  T(C): 36.9 (2018 05:00), Max: 37.2 (2018 17:00)  T(F): 98.4 (2018 05:00), Max: 98.9 (2018 17:00)  HR: 90 (2018 06:00) (84 - 98)  BP: 120/69 (2018 06:00) (98/65 - 128/76)  BP(mean): 82 (2018 06:00) (73 - 90)  RR: 18 (2018 06:00) (15 - 30)  SpO2: 98% (2018 06:00) (92% - 98%)    I&O's Summary    2018 07:01  -  2018 07:00  --------------------------------------------------------  IN: 0 mL / OUT: 1550 mL / NET: -1550 mL      PHYSICAL EXAM  General Appearance: cooperative, no acute distress,   HEENT: PERRL, conjunctiva clear, EOM's intact, non injected pharynx, no exudate, TM   normal  Neck: Supple, , no adenopathy, thyroid: not enlarged, no carotid bruit or JVD  Back: Symmetric, no  tenderness,no soft tissue tenderness  Lungs: Decreased breath sounds in the right lower lobe, diminished in the upper and lower lobes on the left, Right upper lobe inspiratory wheezes   Heart: Regular rate and rhythm, S1, S2 normal, no murmur, rub or gallop  Abdomen: Soft, non-tender, bowel sounds active , no hepatosplenomegaly  Extremities: no cyanosis or edema, no joint swelling  Skin: Skin color, texture normal, no rashes   Neurologic: Alert and oriented X3 , cranial nerves intact, sensory and motor normal,    ECG:    LABS:                          12.3   16.94 )-----------( 95       ( 2018 04:44 )             38.5     11    133<L>  |  98  |  14  ----------------------------<  113<H>  5.0   |  27  |  0.99    Ca    8.3<L>      2018 04:44  Phos  3.7       Mg     1.9         TPro  6.0  /  Alb  2.8<L>  /  TBili  0.5  /  DBili  x   /  AST  34  /  ALT  34  /  AlkPhos  162<H>      CARDIAC MARKERS ( 2018 17:42 )  <0.015 ng/mL / x     / x     / x     / x            Pro BNP  182  @ 17:42  D Dimer  --  @ 17:42    PT/INR - ( 2018 04:44 )   PT: 23.9 sec;   INR: 2.10 ratio         PTT - ( 2018 04:44 )  PTT:37.1 sec  Urinalysis Basic - ( 2018 19:11 )    Color: Yellow / Appearance: Clear / S.010 / pH: x  Gluc: x / Ketone: Negative  / Bili: Negative / Urobili: Negative mg/dL   Blood: x / Protein: Negative mg/dL / Nitrite: Negative   Leuk Esterase: Negative / RBC: x / WBC x   Sq Epi: x / Non Sq Epi: x / Bacteria: x            RADIOLOGY & ADDITIONAL STUDIES:    IMPRESSION: Moderate to large left pleural effusion with underlying   compressive atelectasis. Small right pleural effusion with underlying   compressive atelectasis.   Prominent lymph nodes in the bilateral axilla,   mediastinum, and hilum.

## 2018-11-03 NOTE — PROGRESS NOTE ADULT - SUBJECTIVE AND OBJECTIVE BOX
Pt has been seen and examined with FP resident, resident supervised agree with a/p       Patient is a 78y old  Male who presents with a chief complaint of CC: Lower back pain and shortness of breath (03 Nov 2018 11:07)        HPI:  78 year old male with pmh of copd on home o2 2.5L, afib on coumadin, chronic diastolic chf, diabetes on insulin, htn, hep b infection, hypogammaglobulinemia w/ IVIG tx, b cell lymphoma, cholelithiasis s/p cholecystectomy, esophageal dilation, prostatectomy coming to ED with complaints of worsening shortness of breath and lower back pain.     PHYSICAL EXAM:  Vital Signs Last 24 Hrs  T(C): 37.7 (03 Nov 2018 09:04), Max: 37.7 (03 Nov 2018 09:04)  T(F): 99.8 (03 Nov 2018 09:04), Max: 99.8 (03 Nov 2018 09:04)  HR: 87 (03 Nov 2018 11:31) (84 - 98)  BP: 133/79 (03 Nov 2018 11:31) (95/54 - 133/79)  BP(mean): 92 (03 Nov 2018 11:31) (65 - 92)  RR: 20 (03 Nov 2018 11:31) (15 - 30)  SpO2: 94% (03 Nov 2018 11:31) (92% - 100%)  general- comfortable   -rs-decrease air entry on left side, rales present at bases   -cvs-s1s2 normal   -p/a-soft,bs+  -extremity- no asymmetrical swelling noted           A/P    # Pleural effusion- Dr. Crespo evaluation appreciated, chest tube placement probably tomorrow     #supportive care     #Discussed with Dr. Crespo and Dr. Valle

## 2018-11-03 NOTE — CONSULT NOTE ADULT - ASSESSMENT
77 yo male with ascites and pleural effusion. Will check hep B DNA and abdominal sono. Will need old records to look if patient is cirrhotic at baseline.

## 2018-11-03 NOTE — DIETITIAN INITIAL EVALUATION ADULT. - OTHER INFO
Nutrition consult for Poor intake 5 days or >. Pt is a 79yo male admitted with lower back pain/sob. History of COPD, DM on insulin, HTN, Hep. B w/ ascites, B Cell lymphoma, heart failure, hypogammaglobulinemia, Esophageal dilation, and bilateral effussions. As per spouse and son pt with declining appetite PTA x 1-2 days, however currently po intake >75% of meals w/ some assistance. No significant weight changes noted since last hospital admission x 1 year ago. No difficulty chewing or swallowing. Skin intact with +1 edema documented. Ahmet score= 20. Lasix therapy in place, explained contraindications of high sodium intake. Diet instruction provided with educational materials in [place] on consistent carbohydrate, DASH/TLC diet. Monitor daily weights and follow up prn.

## 2018-11-03 NOTE — PROGRESS NOTE ADULT - ASSESSMENT
78 year old male as above with SOB and acute back pain admitted     #shortness of breath   - admit to medicine step down   - secondary to pleural effusions   - imaging as above   - ct surgery consult- for possible drainage- spoke to Dr Crespo  - pulm consult   - continue lasix  - o2 as needed    #copd  - o2 as needed  - duoneb prn  - pulm consult   - monitor pulse ox  - montelukast    # diabetes  - iss   - check a1c  - tresiba switched to lantus per pharmacy 20 units   - continue gabapentin for neuropathic pain  - diabetic diet w/ dash     # chf- acute on chronic present on admit   - echo   - lasix, bb, start acei   - cardio consult     # back pain   - no deficits on sensation motor or strength  - ct as above   - tylenol for pain   - mri in am   - if patient having urinary retention can do bladder scan if >250cc -  straight cath    #hld   - diabetic diet w/ dash   - lipid level   - statin    #hepatitis b w/ ascites  - GI Consult  - continue spironolactone  - start lactulose   - may need paracentesis     #afib  - tele monitor   - hold coumadin for possible ct surgery procedure for plueral effusion- spoke to patient and family that increased risk for stroke and they agree to stop coumadin ( Dr Mueller also witnessed conversation)  - monitor inr  - ekg in am     #dvt prophylaxis  - hold coumadin  - venodynes    #IMPROVE VTE Individual Risk Assessment    RISK                                                                Points    [  ] Previous VTE                                                  3    [  ] Thrombophilia                                               2    [  ] Lower limb paralysis                                      2        (unable to hold up >15 seconds)      [X  ] Current Cancer                                              2         (within 6 months)    [  ] Immobilization > 24 hrs                                1    [  ] ICU/CCU stay > 24 hours                              1    [ X ] Age > 60                                                      1    IMPROVE VTE Score ___3______ 78 year old male as above with SOB and acute back pain admitted for acute on chronic decompensated heart failure    #Shortness of Breath   #Pleural effusions  - Chest CT shows moderate to large pleural effusions   - CT surgery consult appreciated - will place pigtail catheter tomorrow, given INR<2  - Pulm consult  appreciated- Symbicort/Spiriva started; Singulair discontinued in light of underlying hepatic issues; underlying bronchomalacia evident on bronchoscopy performed last year also playing role  - continue Lasix  - Supplemental O2 as needed    #COPD  - Supplemental O2 as needed  - Duoneb prn  - Pulm consult appreciated  - monitor pulse ox  - Singulair discontinued in light of underlying hepatic issues    # Diabetes   - A1c = 7.2% during this admission  - ISS  - BGMs  - Tresiba taken at home switched to Lantus per pharmacy 20 units   - continue gabapentin for neuropathic pain  - Diabetic diet w/ DASH    # Acute on chronic decompensated heart failure  - Echo shows LVEF 55-60%  - Continue Lasix and Betablocker  - Continue lisinopril  - F/U Cardio consult    # Acute Back Pain  - No deficits on sensation motor or strength  -  CT shows  no acute vertebral fracture. Mild chronic compression fracture along the superior endplate of T11.  Chronic degenerative changes as detailed above.  - Tylenol PRN pain  - MRI Lumbar spine shows acute mild superior L1 endplate compression fracture with moderate to severe spinal stenosis at L5/S1.  in am   - F/U Ortho Spine consult  - if patient having urinary retention can do bladder scan if >250cc -  straight cath    #HLD  - Diabetic diet w/ DASH  - Lipid profile reviewed - Low HDL  - Continue statin qHS    #Hepatitis B w/ ascites  - GI Consult appreciated   - US Abd shows moderate 4 quadrant abdominal and pelvic ascites  - continue spironolactone  - Continue tenofovir  - start lactulose   - may need paracentesis     #Afib  - Telemetry  - hold coumadin for anticipated procedures - agreed on admission by pt family  - monitor INR  - EKG in AM    #DVT Prophylaxis  - hold coumadin for anticipated procedures - agreed on admission by pt family  - lucero

## 2018-11-03 NOTE — DIETITIAN INITIAL EVALUATION ADULT. - ENERGY NEEDS
Ht. 67     "        Wt.190    #              BMI  29.8                   #               Pt is at   131 %  IBW  #

## 2018-11-03 NOTE — CONSULT NOTE ADULT - SUBJECTIVE AND OBJECTIVE BOX
Patient is a 78y old  Male who presents with a chief complaint of CC: Lower back pain and shortness of breath (2018 11:07)      HPI:  78 year old male with pmh of copd on home o2 2.5L, afib on coumadin, chronic diastolic chf, diabetes on insulin, htn, hep b infection, hypogammaglobulinemia w/ IVIG tx, b cell lymphoma, cholelithiasis s/p cholecystectomy, esophageal dilation, prostatectomy coming to ED with complaints of worsening shortness of breath and lower back pain. Patient stating was on toilet this morning when tried to get up put pants on when he had a sudden onset of lower back pain. No bladder or bowel incontinence. Per Dr Bejarano In ED bladder scan was done with no to low residual urine volume. Per Dr Bejarano patient able to ambulate with cane in ED. FOr shortness of breath has been going on for past 4-5 weeks and has been gradually worsening. States last echo over 1 year prior. states now able to walk 50 feet without stopping for shortness of breath (2018 22:27)  Patient notes to have ascites on exam. On therapy for hepatitis B with Vire[facility]. Further history limited.    PAST MEDICAL & SURGICAL HISTORY:  Hyperlipidemia  Hypogammaglobulinemia: received IVIG  Hepatitis B virus infection, unspecified chronicity: retrovirals  Chronic diastolic congestive heart failure  Essential hypertension  DM type 2 (diabetes mellitus, type 2)  B-cell lymphoma, unspecified B-cell lymphoma type, unspecified body region: CLL stage 4/SLL; met NHL  Atrial fibrillation, unspecified type  COPD (chronic obstructive pulmonary disease)  History of esophageal dilatation  S/P cholecystectomy  H/O prostatectomy      MEDICATIONS  (STANDING):  amiodarone    Tablet 100 milliGRAM(s) Oral daily  atorvastatin 10 milliGRAM(s) Oral at bedtime  dextrose 5%. 1000 milliLiter(s) (50 mL/Hr) IV Continuous <Continuous>  dextrose 50% Injectable 12.5 Gram(s) IV Push once  dextrose 50% Injectable 25 Gram(s) IV Push once  dextrose 50% Injectable 25 Gram(s) IV Push once  docusate sodium 100 milliGRAM(s) Oral three times a day  furosemide    Tablet 20 milliGRAM(s) Oral two times a day  gabapentin 300 milliGRAM(s) Oral three times a day  insulin glargine Injectable (LANTUS) 20 Unit(s) SubCutaneous at bedtime  insulin lispro (HumaLOG) corrective regimen sliding scale   SubCutaneous three times a day before meals  insulin lispro (HumaLOG) corrective regimen sliding scale   SubCutaneous at bedtime  lactulose Syrup 10 Gram(s) Oral daily  lisinopril 2.5 milliGRAM(s) Oral daily  metoprolol tartrate 25 milliGRAM(s) Oral two times a day  montelukast 10 milliGRAM(s) Oral at bedtime  multivitamin 1 Tablet(s) Oral daily  spironolactone 25 milliGRAM(s) Oral at bedtime  tenofovir disoproxil fumarate (VIREAD) 300 milliGRAM(s) Oral daily    MEDICATIONS  (PRN):  acetaminophen   Tablet .. 650 milliGRAM(s) Oral every 6 hours PRN Temp greater or equal to 38C (100.4F), Mild Pain (1 - 3), Moderate Pain (4 - 6)  ALBUTerol/ipratropium for Nebulization 3 milliLiter(s) Nebulizer every 6 hours PRN Shortness of Breath and/or Wheezing  dextrose 40% Gel 15 Gram(s) Oral once PRN Blood Glucose LESS THAN 70 milliGRAM(s)/deciliter  glucagon  Injectable 1 milliGRAM(s) IntraMuscular once PRN Glucose LESS THAN 70 milligrams/deciliter  senna 2 Tablet(s) Oral at bedtime PRN Constipation      Allergies    No Known Allergies    Intolerances        SOCIAL HISTORY:    FAMILY HISTORY:  Family history of liver cancer (Father): father  85 liver CA  Family history of coronary arteriosclerosis (Mother): Mom  of MI age 68      .    Vital Signs Last 24 Hrs  T(C): 37.7 (2018 09:04), Max: 37.7 (2018 09:04)  T(F): 99.8 (2018 09:04), Max: 99.8 (2018 09:04)  HR: 93 (2018 10:00) (84 - 98)  BP: 108/67 (2018 10:00) (95/54 - 128/76)  BP(mean): 77 (2018 10:00) (65 - 90)  RR: 24 (2018 10:00) (15 - 30)  SpO2: 97% (2018 10:00) (92% - 100%)    PHYSICAL EXAM:    Constitutional: NAD, well-developed  HEENT: MMM  Neck: No LAD  Respiratory decreased bilaterally  Cardiovascular: S1 and S2, RRR, no M/G/R  Gastrointestinal: BS+,ascites present, NT  LABS:                        12.3   16.94 )-----------( 95       ( 2018 04:44 )             38.5     11-    133<L>  |  98  |  14  ----------------------------<  113<H>  5.0   |  27  |  0.99    Ca    8.3<L>      2018 04:44  Phos  3.7     11-  Mg     1.9     -    TPro  6.0  /  Alb  2.8<L>  /  TBili  0.5  /  DBili  x   /  AST  34  /  ALT  34  /  AlkPhos  162<H>  11-03    PT/INR - ( 2018 04:44 )   PT: 23.9 sec;   INR: 2.10 ratio         PTT - ( 2018 04:44 )  PTT:37.1 sec  LIVER FUNCTIONS - ( 2018 04:44 )  Alb: 2.8 g/dL / Pro: 6.0 gm/dL / ALK PHOS: 162 U/L / ALT: 34 U/L / AST: 34 U/L / GGT: x             RADIOLOGY & ADDITIONAL STUDIES:

## 2018-11-03 NOTE — CONSULT NOTE ADULT - PROBLEM SELECTOR RECOMMENDATION 9
-Will place pigtail catheter, need INR <2  -Possible will place tomorrow, pt currently in no acute distress

## 2018-11-03 NOTE — CONSULT NOTE ADULT - ASSESSMENT
B/l Pleural effusion  Etiology ? sec to CHF vs lymphoma. BNP not elevated.   PAF  H/O HTN, currently hypotensive  Low back pain    Suggest:    Agree with thoracentesis / catheter drainage of pleural effusion. F/u chemistry, cell count, pathology  Coumadin on hold for thoracentesis. resume when no further invasive procedures planned.   BNP is low, no suggestion of pulm congestion on CT chest. Doubt sec to CHF exacerbation  Cautious diuretics, BP is low  Hold lisinopril if BP is low.  D/W family at bedside.

## 2018-11-03 NOTE — CONSULT NOTE ADULT - SUBJECTIVE AND OBJECTIVE BOX
Chief complaint of Lower back pain and shortness of breath (2018 09:08)      HPI:  78 year old male with pmh of copd on home o2 2.5L, afib on coumadin, chronic diastolic chf, diabetes on insulin, htn, hep b infection, hypogammaglobulinemia w/ IVIG tx, b cell lymphoma, cholelithiasis s/p cholecystectomy, esophageal dilation, prostatectomy coming to ED with complaints of worsening shortness of breath and lower back pain. Patient stating was on toilet this morning when tried to get up put pants on when he had a sudden onset of lower back pain. No bladder or bowel incontinence. Per Dr Bejarano In ED bladder scan was done with no to low residual urine volume. Per Dr Bejarano patient able to ambulate with cane in ED. FOr shortness of breath has been going on for past 4-5 weeks and has been gradually worsening. States last echo over 1 year prior. states now able to walk 50 feet without stopping for shortness of breath (2018 22:27)      PAST MEDICAL & SURGICAL HISTORY:  Hyperlipidemia  Hypogammaglobulinemia: received IVIG  Hepatitis B virus infection, unspecified chronicity: retrovirals  Chronic diastolic congestive heart failure  Essential hypertension  DM type 2 (diabetes mellitus, type 2)  B-cell lymphoma, unspecified B-cell lymphoma type, unspecified body region: CLL stage 4/SLL; met NHL  Atrial fibrillation, unspecified type  COPD (chronic obstructive pulmonary disease)  History of esophageal dilatation  S/P cholecystectomy  H/O prostatectomy      PREVIOUS CARDIAC WORKUP:      Echo:  Stress Test:  Cardiac Cath:    ALLERGIES:    No Known Allergies       MEDICATIONS  (STANDING):  amiodarone    Tablet 100 milliGRAM(s) Oral daily  atorvastatin 10 milliGRAM(s) Oral at bedtime  dextrose 5%. 1000 milliLiter(s) (50 mL/Hr) IV Continuous <Continuous>  dextrose 50% Injectable 12.5 Gram(s) IV Push once  dextrose 50% Injectable 25 Gram(s) IV Push once  dextrose 50% Injectable 25 Gram(s) IV Push once  docusate sodium 100 milliGRAM(s) Oral three times a day  furosemide    Tablet 20 milliGRAM(s) Oral two times a day  gabapentin 300 milliGRAM(s) Oral three times a day  insulin glargine Injectable (LANTUS) 20 Unit(s) SubCutaneous at bedtime  insulin lispro (HumaLOG) corrective regimen sliding scale   SubCutaneous three times a day before meals  insulin lispro (HumaLOG) corrective regimen sliding scale   SubCutaneous at bedtime  lactulose Syrup 10 Gram(s) Oral daily  lisinopril 2.5 milliGRAM(s) Oral daily  metoprolol tartrate 25 milliGRAM(s) Oral two times a day  montelukast 10 milliGRAM(s) Oral at bedtime  multivitamin 1 Tablet(s) Oral daily  spironolactone 25 milliGRAM(s) Oral at bedtime  tenofovir disoproxil fumarate (VIREAD) 300 milliGRAM(s) Oral daily    MEDICATIONS  (PRN):  acetaminophen   Tablet .. 650 milliGRAM(s) Oral every 6 hours PRN Temp greater or equal to 38C (100.4F), Mild Pain (1 - 3), Moderate Pain (4 - 6)  ALBUTerol/ipratropium for Nebulization 3 milliLiter(s) Nebulizer every 6 hours PRN Shortness of Breath and/or Wheezing  dextrose 40% Gel 15 Gram(s) Oral once PRN Blood Glucose LESS THAN 70 milliGRAM(s)/deciliter  glucagon  Injectable 1 milliGRAM(s) IntraMuscular once PRN Glucose LESS THAN 70 milligrams/deciliter  senna 2 Tablet(s) Oral at bedtime PRN Constipation      FAMILY HISTORY:  Family history of liver cancer (Father): father  85 liver CA  Family history of coronary arteriosclerosis (Mother): Mom  of MI age 68        SOCIAL HISTORY:  .scl     ROS:     .ros    Vital Signs Last 24 Hrs  T(C): 37.7 (2018 09:04), Max: 37.7 (2018 09:04)  T(F): 99.8 (2018 09:04), Max: 99.8 (2018 09:04)  HR: 89 (2018 08:00) (84 - 98)  BP: 95/54 (2018 08:00) (95/54 - 128/76)  BP(mean): 65 (2018 08:00) (65 - 90)  RR: 23 (2018 08:00) (15 - 30)  SpO2: 93% (2018 08:00) (92% - 99%)    I&O's Summary    2018 07:01  -  2018 07:00  --------------------------------------------------------  IN: 0 mL / OUT: 1550 mL / NET: -1550 mL    2018 08:01  -  2018 11:07  --------------------------------------------------------  IN: 0 mL / OUT: 200 mL / NET: -200 mL        PHYSICAL EXAM:    .phy      TELEMETRY:    ECG:    LABS:                          12.3   16.94 )-----------( 95       ( 2018 04:44 )             38.5         133<L>  |  98  |  14  ----------------------------<  113<H>  5.0   |  27  |  0.99    Ca    8.3<L>      2018 04:44  Phos  3.7       Mg     1.9         TPro  6.0  /  Alb  2.8<L>  /  TBili  0.5  /  DBili  x   /  AST  34  /  ALT  34  /  AlkPhos  162<H>      Lipid Panel  Chl 83  HDL 25  LDL 37  Trg 107       @ 17:42  Trop-I  <0.015    Pro BNP  182  @ 17:42    PT/INR - ( 2018 04:44 )   PT: 23.9 sec;   INR: 2.10 ratio    PTT - ( 2018 04:44 )  PTT:37.1 sec    Urinalysis Basic - ( 2018 19:11 )    Color: Yellow / Appearance: Clear / S.010 / pH: x  Gluc: x / Ketone: Negative  / Bili: Negative / Urobili: Negative mg/dL   Blood: x / Protein: Negative mg/dL / Nitrite: Negative   Leuk Esterase: Negative / RBC: x / WBC x   Sq Epi: x / Non Sq Epi: x / Bacteria: x      RADIOLOGY & ADDITIONAL STUDIES:    CT Chest No Cont (18 @ 19:34) >  IMPRESSION: Moderate to large left pleural effusion with underlying compressive atelectasis. Small right pleural effusion with underlying compressive atelectasis.   Prominent lymph nodes in the bilateral axilla, mediastinum, and hilum. Moderate to large amount of ascites in the upper abdomen. Chief complaint of Lower back pain and shortness of breath (2018 09:08)      HPI:  78-year-old male admitted with complaints of lower back pain and shortness of breath. He has bilateral pleural effusions. Similar presentation in May 2018 when he was admitted with shortness of breath and was noted to have bilateral moderate pleural effusions as well as moderate ascites. He was then treated for acute on chronic diastolic congestive heart failure. Currently no complaints of orthopnea. No chest pain. Denies fever. No complaints of cough. Scheduled for catheter drainage of pleural effusion.       PAST MEDICAL & SURGICAL HISTORY:  Hyperlipidemia  Hypogammaglobulinemia: received IVIG  Hepatitis B virus infection, unspecified chronicity: retrovirals  Chronic diastolic congestive heart failure  Essential hypertension  DM type 2 (diabetes mellitus, type 2)  B-cell lymphoma, unspecified B-cell lymphoma type, unspecified body region: CLL stage 4/SLL; met NHL  Paroxysmal Atrial fibrillation  COPD (chronic obstructive pulmonary disease)  History of esophageal dilatation  H/O DVT, PE  S/P cholecystectomy  H/O prostatectomy      CARDIAC WORKUP:      Echo:  Nml EF, mild MR, mild TR,       ALLERGIES:    No Known Allergies       MEDICATIONS  (STANDING):  amiodarone    Tablet 100 milliGRAM(s) Oral daily  atorvastatin 10 milliGRAM(s) Oral at bedtime  dextrose 5%. 1000 milliLiter(s) (50 mL/Hr) IV Continuous <Continuous>  dextrose 50% Injectable 12.5 Gram(s) IV Push once  dextrose 50% Injectable 25 Gram(s) IV Push once  dextrose 50% Injectable 25 Gram(s) IV Push once  docusate sodium 100 milliGRAM(s) Oral three times a day  furosemide    Tablet 20 milliGRAM(s) Oral two times a day  gabapentin 300 milliGRAM(s) Oral three times a day  insulin glargine Injectable (LANTUS) 20 Unit(s) SubCutaneous at bedtime  insulin lispro (HumaLOG) corrective regimen sliding scale   SubCutaneous three times a day before meals  insulin lispro (HumaLOG) corrective regimen sliding scale   SubCutaneous at bedtime  lactulose Syrup 10 Gram(s) Oral daily  lisinopril 2.5 milliGRAM(s) Oral daily  metoprolol tartrate 25 milliGRAM(s) Oral two times a day  montelukast 10 milliGRAM(s) Oral at bedtime  multivitamin 1 Tablet(s) Oral daily  spironolactone 25 milliGRAM(s) Oral at bedtime  tenofovir disoproxil fumarate (VIREAD) 300 milliGRAM(s) Oral daily    MEDICATIONS  (PRN):  acetaminophen   Tablet .. 650 milliGRAM(s) Oral every 6 hours PRN Temp greater or equal to 38C (100.4F), Mild Pain (1 - 3), Moderate Pain (4 - 6)  ALBUTerol/ipratropium for Nebulization 3 milliLiter(s) Nebulizer every 6 hours PRN Shortness of Breath and/or Wheezing  dextrose 40% Gel 15 Gram(s) Oral once PRN Blood Glucose LESS THAN 70 milliGRAM(s)/deciliter  glucagon  Injectable 1 milliGRAM(s) IntraMuscular once PRN Glucose LESS THAN 70 milligrams/deciliter  senna 2 Tablet(s) Oral at bedtime PRN Constipation      FAMILY HISTORY:  Family history of liver cancer (Father): father  85 liver CA  Family history of coronary arteriosclerosis (Mother): Mom  of MI age 68        SOCIAL HISTORY:  Nonsmoker. No ETOH abuse. No illicit drugs.     ROS:     Detailed ten system ROS was performed and was negative except for history as eluded to above.    no fever  no chills  no nausea  no vomiting  no diarrhea  no constipation  no melena  no hematochezia  no chest pain  no palpitations  + sob at rest  + dyspnea on exertion  no cough  no wheezing  no anorexia  no headache  no dizziness  no syncope  + weakness  no myalgia  no dysuria  no polyuria  no hematuria     Vital Signs Last 24 Hrs  T(C): 37.7 (2018 09:04), Max: 37.7 (2018 09:04)  T(F): 99.8 (2018 09:04), Max: 99.8 (2018 09:04)  HR: 89 (2018 08:00) (84 - 98)  BP: 95/54 (2018 08:00) (95/54 - 128/76)  BP(mean): 65 (2018 08:00) (65 - 90)  RR: 23 (2018 08:00) (15 - 30)  SpO2: 93% (2018 08:00) (92% - 99%)    I&O's Summary    2018 07:01  -  2018 07:00  --------------------------------------------------------  IN: 0 mL / OUT: 1550 mL / NET: -1550 mL    2018 08:01  -  2018 11:07  --------------------------------------------------------  IN: 0 mL / OUT: 200 mL / NET: -200 mL      PHYSICAL EXAM:    General:                Comfortable, AAO X 3, in no distress.  HEENT:                  Atraumatic, PERRLA, EOMI, conjunctiva clear.    Neck:                     Supple, no adenopathy, no thyromegaly, no JVD, no bruit.  Back:                     Symmetric, non tender.  Chest:                    B/L symmetric reduced basal air entry, no tachypnea  Heart:                     S1, S2 normal, no gallop, + murmur.  Abdomen:              Soft, non-tender, bowel sounds active. No palpable masses.  Extremities:           no cyanosis, + edema. Peripheral pulses normal.  Skin:                      Skin color, texture normal. No rashes.  Neurologic:            Grossly nonfocal.       TELEMETRY:  Normal sinus rhythm with no tachy or herlinda events     ECG:  NSR, LAHB, IVCD, PRWP, no ST T changes of ischemia or infarction.     LABS:                          12.3   16.94 )-----------( 95       ( 2018 04:44 )             38.5         133<L>  |  98  |  14  ----------------------------<  113<H>  5.0   |  27  |  0.99    Ca    8.3<L>      2018 04:44  Phos  3.7       Mg     1.9         TPro  6.0  /  Alb  2.8<L>  /  TBili  0.5  /  DBili  x   /  AST  34  /  ALT  34  /  AlkPhos  162<H>      Lipid Panel  Chl 83  HDL 25  LDL 37  Trg 107       @ 17:42  Trop-I  <0.015    Pro BNP  182 11-02 @ 17:42    PT/INR - ( 2018 04:44 )   PT: 23.9 sec;   INR: 2.10 ratio    PTT - ( 2018 04:44 )  PTT:37.1 sec    Urinalysis Basic - ( 2018 19:11 )    Color: Yellow / Appearance: Clear / S.010 / pH: x  Gluc: x / Ketone: Negative  / Bili: Negative / Urobili: Negative mg/dL   Blood: x / Protein: Negative mg/dL / Nitrite: Negative   Leuk Esterase: Negative / RBC: x / WBC x   Sq Epi: x / Non Sq Epi: x / Bacteria: x      RADIOLOGY & ADDITIONAL STUDIES:    CT Chest No Cont (18 @ 19:34) >  IMPRESSION: Moderate to large left pleural effusion with underlying compressive atelectasis. Small right pleural effusion with underlying compressive atelectasis.   Prominent lymph nodes in the bilateral axilla, mediastinum, and hilum. Moderate to large amount of ascites in the upper abdomen.

## 2018-11-03 NOTE — CONSULT NOTE ADULT - SUBJECTIVE AND OBJECTIVE BOX
78 y o hisp male w mult med problems  2 day hx of LBP and difficulty breathing  no specific hx of trauma      PE  nv intact  + tenderness TL jcn      MRI + sup endplate fx L1      L45 stenosis

## 2018-11-03 NOTE — DIETITIAN INITIAL EVALUATION ADULT. - PERTINENT MEDS FT
MEDICATIONS  (STANDING):  amiodarone    Tablet 100 milliGRAM(s) Oral daily  atorvastatin 10 milliGRAM(s) Oral at bedtime  dextrose 5%. 1000 milliLiter(s) (50 mL/Hr) IV Continuous <Continuous>  dextrose 50% Injectable 12.5 Gram(s) IV Push once  dextrose 50% Injectable 25 Gram(s) IV Push once  dextrose 50% Injectable 25 Gram(s) IV Push once  docusate sodium 100 milliGRAM(s) Oral three times a day  furosemide    Tablet 20 milliGRAM(s) Oral two times a day  gabapentin 300 milliGRAM(s) Oral three times a day  insulin glargine Injectable (LANTUS) 20 Unit(s) SubCutaneous at bedtime  insulin lispro (HumaLOG) corrective regimen sliding scale   SubCutaneous three times a day before meals  insulin lispro (HumaLOG) corrective regimen sliding scale   SubCutaneous at bedtime  lactulose Syrup 10 Gram(s) Oral daily  lisinopril 2.5 milliGRAM(s) Oral daily  metoprolol tartrate 25 milliGRAM(s) Oral two times a day  multivitamin 1 Tablet(s) Oral daily  spironolactone 25 milliGRAM(s) Oral at bedtime  tenofovir disoproxil fumarate (VIREAD) 300 milliGRAM(s) Oral daily    MEDICATIONS  (PRN):  acetaminophen   Tablet .. 650 milliGRAM(s) Oral every 6 hours PRN Temp greater or equal to 38C (100.4F), Mild Pain (1 - 3), Moderate Pain (4 - 6)  ALBUTerol/ipratropium for Nebulization 3 milliLiter(s) Nebulizer every 6 hours PRN Shortness of Breath and/or Wheezing  dextrose 40% Gel 15 Gram(s) Oral once PRN Blood Glucose LESS THAN 70 milliGRAM(s)/deciliter  glucagon  Injectable 1 milliGRAM(s) IntraMuscular once PRN Glucose LESS THAN 70 milligrams/deciliter  senna 2 Tablet(s) Oral at bedtime PRN Constipation

## 2018-11-03 NOTE — PROGRESS NOTE ADULT - SUBJECTIVE AND OBJECTIVE BOX
CHIEF COMPLAINT: Acute Low Back Pain/SOB    SUBJECTIVE: HPI:  78 year old male with pmh of copd on home o2 2.5L, afib on coumadin, chronic diastolic chf, diabetes on insulin, htn, hep b infection, hypogammaglobulinemia w/ IVIG tx, b cell lymphoma, cholelithiasis s/p cholecystectomy, esophageal dilation, prostatectomy coming to ED with complaints of worsening shortness of breath and lower back pain. Patient stating was on toilet on morning of admission when he tried to get up and put pants on when he had a sudden onset of lower back pain. No bladder or bowel incontinence. Per Dr Bejarano In ED bladder scan was done with no to low residual urine volume. Per Dr Bejarano patient able to ambulate with cane in ED. FOr shortness of breath has been going on for past 4-5 weeks and has been gradually worsening. States last echo over 1 year prior. states now able to walk 50 feet without stopping for shortness of breath (02 Nov 2018 22:27)    11/3: Pt was seen and examined at bedside this morning. His wife and son were also at bedside. He describes low back pain as constant 10/10 that began yesterday. Pain is worsened with movement. He is unable to sit up due to pain and has to lie flat. Pt also feels SOB and describes pleuritic chest pain that has progressively worsened over the past few weeks. CT Surg plans  to place chest tube tomorrow, pending INR.       REVIEW OF SYSTEMS:    CONSTITUTIONAL: No weakness, fevers or chills;  + Diffuse pain; +Fatigue  EYES/ENT: No visual changes;  No vertigo or throat pain   NECK: No pain or stiffness  RESPIRATORY: + Pleuritic Chest pain; +SOB; +Cough  CARDIOVASCULAR: No chest pain or palpitations  GASTROINTESTINAL: No abdominal or epigastric pain. No nausea, vomiting, or hematemesis; No diarrhea or constipation. No melena or hematochezia.  GENITOURINARY: No dysuria, frequency or hematuria  NEUROLOGICAL: No numbness or weakness +Lower back Pain  SKIN: No itching, burning, rashes, or lesions   All other review of systems is negative unless indicated above    Vital Signs Last 24 Hrs  T(C): 36.9 (03 Nov 2018 13:13), Max: 37.7 (03 Nov 2018 09:04)  T(F): 98.5 (03 Nov 2018 13:13), Max: 99.8 (03 Nov 2018 09:04)  HR: 88 (03 Nov 2018 15:00) (84 - 98)  BP: 125/68 (03 Nov 2018 15:00) (95/54 - 133/79)  BP(mean): 83 (03 Nov 2018 15:00) (65 - 92)  RR: 21 (03 Nov 2018 15:00) (15 - 30)  SpO2: 95% (03 Nov 2018 15:00) (92% - 100%)    I&O's Summary    02 Nov 2018 07:01  -  03 Nov 2018 07:00  --------------------------------------------------------  IN: 0 mL / OUT: 1550 mL / NET: -1550 mL    03 Nov 2018 08:01  -  03 Nov 2018 15:45  --------------------------------------------------------  IN: 0 mL / OUT: 200 mL / NET: -200 mL        CAPILLARY BLOOD GLUCOSE  POCT Blood Glucose.: 114 mg/dL (03 Nov 2018 12:35)  POCT Blood Glucose.: 97 mg/dL (03 Nov 2018 08:43)      PHYSICAL EXAM:  Constitutional: elderly Maltese-speaking man in distress due to pain , awake and alert, well-developed  HEENT: PERR, EOMI, Normal Hearing, MMM  Neck: Soft and supple, No LAD, No JVD  Respiratory: Bibasilar crackles appreciated  Cardiovascular: S1 and S2, regular rate and rhythm, no Murmurs, gallops or rubs  Gastrointestinal: Bowel Sounds present, soft, nontender, Distended, tympanic to percussion no guarding, no rebound  Extremities: No peripheral edema  Vascular: 2+ peripheral pulses  Neurological: A/O x 3, +Straight leg test  Musculoskeletal: Limited ROM due to back pain   Skin: No rashes    MEDICATIONS:  MEDICATIONS  (STANDING):  amiodarone    Tablet 100 milliGRAM(s) Oral daily  atorvastatin 10 milliGRAM(s) Oral at bedtime  dextrose 5%. 1000 milliLiter(s) (50 mL/Hr) IV Continuous <Continuous>  dextrose 50% Injectable 12.5 Gram(s) IV Push once  dextrose 50% Injectable 25 Gram(s) IV Push once  dextrose 50% Injectable 25 Gram(s) IV Push once  docusate sodium 100 milliGRAM(s) Oral three times a day  furosemide    Tablet 20 milliGRAM(s) Oral two times a day  gabapentin 300 milliGRAM(s) Oral three times a day  insulin glargine Injectable (LANTUS) 20 Unit(s) SubCutaneous at bedtime  insulin lispro (HumaLOG) corrective regimen sliding scale   SubCutaneous three times a day before meals  insulin lispro (HumaLOG) corrective regimen sliding scale   SubCutaneous at bedtime  lactulose Syrup 10 Gram(s) Oral daily  lisinopril 2.5 milliGRAM(s) Oral daily  metoprolol tartrate 25 milliGRAM(s) Oral two times a day  multivitamin 1 Tablet(s) Oral daily  spironolactone 25 milliGRAM(s) Oral at bedtime  tenofovir disoproxil fumarate (VIREAD) 300 milliGRAM(s) Oral daily    MEDICATIONS  (PRN):  acetaminophen   Tablet .. 650 milliGRAM(s) Oral every 6 hours PRN Temp greater or equal to 38C (100.4F), Mild Pain (1 - 3), Moderate Pain (4 - 6)  ALBUTerol/ipratropium for Nebulization 3 milliLiter(s) Nebulizer every 6 hours PRN Shortness of Breath and/or Wheezing  dextrose 40% Gel 15 Gram(s) Oral once PRN Blood Glucose LESS THAN 70 milliGRAM(s)/deciliter  glucagon  Injectable 1 milliGRAM(s) IntraMuscular once PRN Glucose LESS THAN 70 milligrams/deciliter  senna 2 Tablet(s) Oral at bedtime PRN Constipation      LABS: All Labs Reviewed:                        12.3   16.94 )-----------( 95       ( 03 Nov 2018 04:44 )             38.5     11-03    133<L>  |  98  |  14  ----------------------------<  113<H>  5.0   |  27  |  0.99    Ca    8.3<L>      03 Nov 2018 04:44  Phos  3.7     11-03  Mg     1.9     11-03    TPro  6.0  /  Alb  2.8<L>  /  TBili  0.5  /  DBili  x   /  AST  34  /  ALT  34  /  AlkPhos  162<H>  11-03    PT/INR - ( 03 Nov 2018 04:44 )   PT: 23.9 sec;   INR: 2.10 ratio         PTT - ( 03 Nov 2018 04:44 )  PTT:37.1 sec  CARDIAC MARKERS ( 02 Nov 2018 17:42 )  <0.015 ng/mL / x     / x     / x     / x          RADIOLOGY/EKG:     < from: Xray Chest 1 View- PORTABLE-Urgent (11.02.18 @ 18:59) >  Impression: small BILATERAL pleural effusions with worsening infiltrate   in the LEFT lung    < end of copied text >       < from: CT Chest No Cont (11.02.18 @ 19:34) >  IMPRESSION: Moderate to large left pleural effusion with underlying   compressive atelectasis. Small right pleural effusion with underlying   compressive atelectasis.   Prominent lymph nodes in the bilateral axilla,   mediastinum, and hilum.     Moderate to large amount of ascites in the upper abdomen.        < from: MR Lumbar Spine w/wo IV Cont (11.03.18 @ 11:20) >  IMPRESSION: Acute mild L1 superior endplate compression fracture.   Moderate to severe spinal canal stenosis at L5/S1.        < from: US Abdomen Limited (11.03.18 @ 14:05) >  IMPRESSION:    Moderate 4 quadrant abdominal and pelvic ascites.    < end of copied text >      DVT PPX: SCDs CHIEF COMPLAINT: Acute Low Back Pain/SOB    SUBJECTIVE: HPI:  78 year old male with pmh of copd on home o2 2.5L, afib on coumadin, chronic diastolic chf, diabetes on insulin, htn, hep b infection, hypogammaglobulinemia w/ IVIG tx, b cell lymphoma, cholelithiasis s/p cholecystectomy, esophageal dilation, prostatectomy coming to ED with complaints of worsening shortness of breath and lower back pain. Patient stating was on toilet on morning of admission when he tried to get up and put pants on when he had a sudden onset of lower back pain. No bladder or bowel incontinence. Per Dr Bejarano In ED bladder scan was done with no to low residual urine volume. Per Dr Bejarano patient able to ambulate with cane in ED. FOr shortness of breath has been going on for past 4-5 weeks and has been gradually worsening. States last echo over 1 year prior. states now able to walk 50 feet without stopping for shortness of breath (02 Nov 2018 22:27)    11/3: Pt was seen and examined at bedside this morning. His wife and son were also at bedside. He describes low back pain as constant 10/10 that began yesterday. Pain is worsened with movement. He is unable to sit up due to pain and has to lie flat. He denies any bladder or bowel incontinence. Pt also feels SOB and describes pleuritic chest pain that has progressively worsened over the past few weeks. CT Surg plans  to place chest tube tomorrow, pending INR.       REVIEW OF SYSTEMS:    CONSTITUTIONAL: No weakness, fevers or chills;  + Diffuse pain; +Fatigue  EYES/ENT: No visual changes;  No vertigo or throat pain   NECK: No pain or stiffness  RESPIRATORY: + Pleuritic Chest pain; +SOB; +Cough  CARDIOVASCULAR: No chest pain or palpitations  GASTROINTESTINAL: No abdominal or epigastric pain. No nausea, vomiting, or hematemesis; No diarrhea or constipation. No melena or hematochezia.  GENITOURINARY: No dysuria, frequency or hematuria  NEUROLOGICAL: No numbness or weakness +Lower back Pain  SKIN: No itching, burning, rashes, or lesions   All other review of systems is negative unless indicated above    Vital Signs Last 24 Hrs  T(C): 36.9 (03 Nov 2018 13:13), Max: 37.7 (03 Nov 2018 09:04)  T(F): 98.5 (03 Nov 2018 13:13), Max: 99.8 (03 Nov 2018 09:04)  HR: 88 (03 Nov 2018 15:00) (84 - 98)  BP: 125/68 (03 Nov 2018 15:00) (95/54 - 133/79)  BP(mean): 83 (03 Nov 2018 15:00) (65 - 92)  RR: 21 (03 Nov 2018 15:00) (15 - 30)  SpO2: 95% (03 Nov 2018 15:00) (92% - 100%)    I&O's Summary    02 Nov 2018 07:01  -  03 Nov 2018 07:00  --------------------------------------------------------  IN: 0 mL / OUT: 1550 mL / NET: -1550 mL    03 Nov 2018 08:01  -  03 Nov 2018 15:45  --------------------------------------------------------  IN: 0 mL / OUT: 200 mL / NET: -200 mL        CAPILLARY BLOOD GLUCOSE  POCT Blood Glucose.: 114 mg/dL (03 Nov 2018 12:35)  POCT Blood Glucose.: 97 mg/dL (03 Nov 2018 08:43)      PHYSICAL EXAM:  Constitutional: elderly Turkish-speaking man in distress due to pain , awake and alert, well-developed  HEENT: PERR, EOMI, Normal Hearing, MMM  Neck: Soft and supple, No LAD, No JVD  Respiratory: Bibasilar crackles appreciated  Cardiovascular: S1 and S2, regular rate and rhythm, no Murmurs, gallops or rubs  Gastrointestinal: Bowel Sounds present, soft, nontender, Distended, tympanic to percussion no guarding, no rebound  Extremities: No peripheral edema  Vascular: 2+ peripheral pulses  Neurological: A/O x 3, +Straight leg test, sensation and motor function intact  Musculoskeletal: Limited ROM due to back pain   Skin: No rashes    MEDICATIONS:  MEDICATIONS  (STANDING):  amiodarone    Tablet 100 milliGRAM(s) Oral daily  atorvastatin 10 milliGRAM(s) Oral at bedtime  dextrose 5%. 1000 milliLiter(s) (50 mL/Hr) IV Continuous <Continuous>  dextrose 50% Injectable 12.5 Gram(s) IV Push once  dextrose 50% Injectable 25 Gram(s) IV Push once  dextrose 50% Injectable 25 Gram(s) IV Push once  docusate sodium 100 milliGRAM(s) Oral three times a day  furosemide    Tablet 20 milliGRAM(s) Oral two times a day  gabapentin 300 milliGRAM(s) Oral three times a day  insulin glargine Injectable (LANTUS) 20 Unit(s) SubCutaneous at bedtime  insulin lispro (HumaLOG) corrective regimen sliding scale   SubCutaneous three times a day before meals  insulin lispro (HumaLOG) corrective regimen sliding scale   SubCutaneous at bedtime  lactulose Syrup 10 Gram(s) Oral daily  lisinopril 2.5 milliGRAM(s) Oral daily  metoprolol tartrate 25 milliGRAM(s) Oral two times a day  multivitamin 1 Tablet(s) Oral daily  spironolactone 25 milliGRAM(s) Oral at bedtime  tenofovir disoproxil fumarate (VIREAD) 300 milliGRAM(s) Oral daily    MEDICATIONS  (PRN):  acetaminophen   Tablet .. 650 milliGRAM(s) Oral every 6 hours PRN Temp greater or equal to 38C (100.4F), Mild Pain (1 - 3), Moderate Pain (4 - 6)  ALBUTerol/ipratropium for Nebulization 3 milliLiter(s) Nebulizer every 6 hours PRN Shortness of Breath and/or Wheezing  dextrose 40% Gel 15 Gram(s) Oral once PRN Blood Glucose LESS THAN 70 milliGRAM(s)/deciliter  glucagon  Injectable 1 milliGRAM(s) IntraMuscular once PRN Glucose LESS THAN 70 milligrams/deciliter  senna 2 Tablet(s) Oral at bedtime PRN Constipation      LABS: All Labs Reviewed:                        12.3   16.94 )-----------( 95       ( 03 Nov 2018 04:44 )             38.5     11-03    133<L>  |  98  |  14  ----------------------------<  113<H>  5.0   |  27  |  0.99    Ca    8.3<L>      03 Nov 2018 04:44  Phos  3.7     11-03  Mg     1.9     11-03    TPro  6.0  /  Alb  2.8<L>  /  TBili  0.5  /  DBili  x   /  AST  34  /  ALT  34  /  AlkPhos  162<H>  11-03    PT/INR - ( 03 Nov 2018 04:44 )   PT: 23.9 sec;   INR: 2.10 ratio         PTT - ( 03 Nov 2018 04:44 )  PTT:37.1 sec  CARDIAC MARKERS ( 02 Nov 2018 17:42 )  <0.015 ng/mL / x     / x     / x     / x          RADIOLOGY/EKG:     < from: Xray Chest 1 View- PORTABLE-Urgent (11.02.18 @ 18:59) >  Impression: small BILATERAL pleural effusions with worsening infiltrate   in the LEFT lung    < end of copied text >       < from: CT Chest No Cont (11.02.18 @ 19:34) >  IMPRESSION: Moderate to large left pleural effusion with underlying   compressive atelectasis. Small right pleural effusion with underlying   compressive atelectasis.   Prominent lymph nodes in the bilateral axilla,   mediastinum, and hilum.     Moderate to large amount of ascites in the upper abdomen.        < from: MR Lumbar Spine w/wo IV Cont (11.03.18 @ 11:20) >  IMPRESSION: Acute mild L1 superior endplate compression fracture.   Moderate to severe spinal canal stenosis at L5/S1.        < from: US Abdomen Limited (11.03.18 @ 14:05) >  IMPRESSION:    Moderate 4 quadrant abdominal and pelvic ascites.    < end of copied text >      DVT PPX: SCDs

## 2018-11-03 NOTE — CONSULT NOTE ADULT - SUBJECTIVE AND OBJECTIVE BOX
HPI:  78 year old male with pmh of copd on home o2 2.5L, afib on coumadin, chronic diastolic chf, diabetes on insulin, htn, hep b infection, hypogammaglobulinemia w/ IVIG tx, b cell lymphoma, cholelithiasis s/p cholecystectomy, esophageal dilation, prostatectomy coming to ED with complaints of worsening shortness of breath and lower back pain. Patient stating was on toilet this morning when tried to get up put pants on when he had a sudden onset of lower back pain. No bladder or bowel incontinence. Per Dr Bejarano In ED bladder scan was done with no to low residual urine volume. Per Dr Bejarano patient able to ambulate with cane in ED. FOr shortness of breath has been going on for past 4-5 weeks and has been gradually worsening. States last echo over 1 year prior. states now able to walk 50 feet without stopping for shortness of breath (2018 22:27)      PAST MEDICAL & SURGICAL HISTORY:  Hyperlipidemia  Hypogammaglobulinemia: received IVIG  Hepatitis B virus infection, unspecified chronicity: retrovirals  Chronic diastolic congestive heart failure  Essential hypertension  DM type 2 (diabetes mellitus, type 2)  B-cell lymphoma, unspecified B-cell lymphoma type, unspecified body region: CLL stage 4/SLL; met NHL  Atrial fibrillation, unspecified type  COPD (chronic obstructive pulmonary disease)  History of esophageal dilatation  S/P cholecystectomy  H/O prostatectomy      PREVIOUS DIAGNOSTIC TESTING:      MEDICATIONS  (STANDING):  amiodarone    Tablet 100 milliGRAM(s) Oral daily  atorvastatin 10 milliGRAM(s) Oral at bedtime  dextrose 5%. 1000 milliLiter(s) (50 mL/Hr) IV Continuous <Continuous>  dextrose 50% Injectable 12.5 Gram(s) IV Push once  dextrose 50% Injectable 25 Gram(s) IV Push once  dextrose 50% Injectable 25 Gram(s) IV Push once  docusate sodium 100 milliGRAM(s) Oral three times a day  furosemide    Tablet 20 milliGRAM(s) Oral two times a day  gabapentin 300 milliGRAM(s) Oral three times a day  insulin glargine Injectable (LANTUS) 20 Unit(s) SubCutaneous at bedtime  insulin lispro (HumaLOG) corrective regimen sliding scale   SubCutaneous three times a day before meals  insulin lispro (HumaLOG) corrective regimen sliding scale   SubCutaneous at bedtime  lactulose Syrup 10 Gram(s) Oral daily  lisinopril 2.5 milliGRAM(s) Oral daily  metoprolol tartrate 25 milliGRAM(s) Oral two times a day  montelukast 10 milliGRAM(s) Oral at bedtime  multivitamin 1 Tablet(s) Oral daily  spironolactone 25 milliGRAM(s) Oral at bedtime  tenofovir disoproxil fumarate (VIREAD) 300 milliGRAM(s) Oral daily    MEDICATIONS  (PRN):  acetaminophen   Tablet .. 650 milliGRAM(s) Oral every 6 hours PRN Temp greater or equal to 38C (100.4F), Mild Pain (1 - 3), Moderate Pain (4 - 6)  ALBUTerol/ipratropium for Nebulization 3 milliLiter(s) Nebulizer every 6 hours PRN Shortness of Breath and/or Wheezing  dextrose 40% Gel 15 Gram(s) Oral once PRN Blood Glucose LESS THAN 70 milliGRAM(s)/deciliter  glucagon  Injectable 1 milliGRAM(s) IntraMuscular once PRN Glucose LESS THAN 70 milligrams/deciliter  senna 2 Tablet(s) Oral at bedtime PRN Constipation      FAMILY HISTORY:  Family history of liver cancer (Father): father  85 liver CA  Family history of coronary arteriosclerosis (Mother): Mom  of MI age 68      SOCIAL HISTORY:  ***    REVIEW OF SYSTEM:  cough, dyspnea, fatigue, otherwise 12 point ROS negative       PHYSICAL EXAM  General Appearance: cooperative, no acute distress,   HEENT: PERRL, conjunctiva clear, EOM's intact, non injected pharynx, no exudate, TM   normal  Neck: Supple, , no adenopathy, thyroid: not enlarged, no carotid bruit or JVD  Back: Symmetric, no  tenderness,no soft tissue tenderness  Lungs: Decreased breath sounds in the right lower lobe, diminished in the upper and lower lobes on the left, Right upper lobe inspiratory wheezes   Heart: Regular rate and rhythm, S1, S2 normal, no murmur, rub or gallop  Abdomen: Soft, non-tender, bowel sounds active , no hepatosplenomegaly

## 2018-11-03 NOTE — PHYSICAL THERAPY INITIAL EVALUATION ADULT - PERTINENT HX OF CURRENT PROBLEM, REHAB EVAL
Chief complaint of Lower back pain and sob. CT; Mod to large L pleural effusion w/ underlying compressive atelectasis. Sm right pleural effusion with underlying compressive atelectasis.Prominent lymph nodes in the bilat axilla, mediastinum, and hilum. Moderate to large amount of ascites in the upper abdomen. plan is for pigtail cath placement, possibly tomorrow. MRI LS:  Acute mild L1 superior endplate compression fracture. Moderate to severe spinal canal stenosis at L5/S1.

## 2018-11-03 NOTE — DIETITIAN INITIAL EVALUATION ADULT. - NS AS NUTRI INTERV ED CONTENT
Provided in [place]/Purpose of the nutrition education/Priority modifications/Nutrition relationship to health/disease

## 2018-11-03 NOTE — CONSULT NOTE ADULT - ASSESSMENT
1) Bilateral Pleural Effusions  2) Abnormal CT Chest  3) History of Bronchomalacia  4) COPD  5) Dyspnea  6) History of Heart Failure   7) Passive Atelectasis/Compressive Atelectasis 1) Bilateral Pleural Effusions  2) Abnormal CT Chest  3) History of Bronchomalacia  4) COPD  5) Dyspnea  6) History of Heart Failure   7) Passive Atelectasis/Compressive Atelectasis   8) Ascites  9) History of Lymphoma 1) Bilateral Pleural Effusions  2) Abnormal CT Chest  3) History of Bronchomalacia  4) COPD  5) Dyspnea  6) History of Heart Failure   7) Passive Atelectasis/Compressive Atelectasis   8) Ascites  9) History of Lymphoma    78 year old male with pmh of copd on home o2 2.5L, afib on coumadin, chronic diastolic chf, diabetes on insulin, htn, hep b infection, hypogammaglobulinemia w/ IVIG tx, b cell lymphoma, cholelithiasis s/p cholecystectomy, esophageal dilation, prostatectomy coming to ED with complaints of worsening shortness of breath and lower back pain. Patient stating was on toilet this morning when tried to get up put pants on when he had a sudden onset of lower back pain.   Reviewed CT chest which shows bilateral pleural effusions L> R.  Once INR is reduced to < 2, can consider thoracentesis  Appreciate CTS assistance  Follow up pleural LDH, cytology, cell count, protein, glucose, triglycerides should also be performed given history of lymphoma.   Performed bronchoscopy on the patient last year which revealed bronchomalacia which is likely contributing to the patient's underlying dyspnea as well  Follow up echocardiogram  Patient may need paracentesis with fluid analysis  Follow up hematology recommendations   Continue O2, goal SaO2 >88% 1) Bilateral Pleural Effusions  2) Abnormal CT Chest  3) History of Bronchomalacia  4) COPD  5) Dyspnea  6) History of Heart Failure   7) Passive Atelectasis/Compressive Atelectasis   8) Ascites  9) History of Lymphoma    78 year old male with pmh of copd on home o2 2.5L, afib on coumadin, chronic diastolic chf, diabetes on insulin, htn, hep b infection, hypogammaglobulinemia w/ IVIG tx, b cell lymphoma, cholelithiasis s/p cholecystectomy, esophageal dilation, prostatectomy coming to ED with complaints of worsening shortness of breath and lower back pain. Patient stating was on toilet this morning when tried to get up put pants on when he had a sudden onset of lower back pain.   Reviewed CT chest which shows bilateral pleural effusions L> R.  Once INR is reduced to < 2, can consider thoracentesis  Appreciate CTS assistance  Follow up pleural LDH, cytology, cell count, protein, glucose, triglycerides should also be performed given history of lymphoma.   Performed bronchoscopy on the patient last year which revealed bronchomalacia which is likely contributing to the patient's underlying dyspnea as well  Follow up echocardiogram  Patient may need paracentesis with fluid analysis  Follow up hematology recommendations   Continue O2, goal SaO2 >88%  Will d/c Singulair given potential underlying hepatic issues.  Start Symbicort and Spiriva   Will continue to monitor

## 2018-11-03 NOTE — PHYSICAL THERAPY INITIAL EVALUATION ADULT - ACTIVE RANGE OF MOTION EXAMINATION, REHAB EVAL
B hip flex <90 deg/bilateral upper extremity Active ROM was WFL (within functional limits)/bilateral  lower extremity Active ROM was WFL (within functional limits)

## 2018-11-04 NOTE — PROGRESS NOTE ADULT - ASSESSMENT
78 year old male as above with SOB and acute back pain admitted for acute on chronic decompensated heart failure    #Shortness of breath in setting of b/L pleural effusions  - Chest CT shows moderate to large pleural effusions   - CT surgery consult appreciated - will place pigtail catheter tomorrow, given INR<2  - Pulm consult  appreciated- Symbicort/Spiriva started; Singulair discontinued in light of underlying hepatic issues; underlying bronchomalacia evident on bronchoscopy performed last year also playing role  - continue Lasix  - Supplemental O2 as needed    #COPD  - Supplemental O2 as needed  - Duoneb prn  - Pulm consult appreciated  - monitor pulse ox  - Singulair discontinued in light of underlying hepatic issues    # Diabetes   - A1c = 7.2% during this admission  - ISS  - BGMs  - Tresiba taken at home switched to Lantus per pharmacy 20 units   - continue gabapentin for neuropathic pain  - Diabetic diet w/ DASH    # Acute on chronic decompensated heart failure  - Echo shows LVEF 55-60%  - Continue Lasix and Betablocker  - Continue lisinopril  - F/U Cardio consult    # Acute Back Pain  - No deficits on sensation motor or strength  -  CT shows  no acute vertebral fracture. Mild chronic compression fracture along the superior endplate of T11.  Chronic degenerative changes as detailed above.  - Tylenol PRN pain  - MRI Lumbar spine shows acute mild superior L1 endplate compression fracture with moderate to severe spinal stenosis at L5/S1.  in am   - F/U Ortho Spine consult  - if patient having urinary retention can do bladder scan if >250cc -  straight cath    #HLD  - Diabetic diet w/ DASH  - Lipid profile reviewed - Low HDL  - Continue statin qHS    #Hepatitis B w/ ascites  - GI Consult appreciated   - US Abd shows moderate 4 quadrant abdominal and pelvic ascites  - continue spironolactone  - Continue tenofovir  - start lactulose   - may need paracentesis     #Afib  - Telemetry  - hold coumadin for anticipated procedures - agreed on admission by pt family  - monitor INR  - EKG in AM    #DVT Prophylaxis  - hold coumadin for anticipated procedures - agreed on admission by pt family  - lucero

## 2018-11-04 NOTE — PROGRESS NOTE ADULT - SUBJECTIVE AND OBJECTIVE BOX
HPI:  78 year old male with pmh of copd on home o2 2.5L, afib on coumadin, chronic diastolic chf, diabetes on insulin, htn, hep b infection, hypogammaglobulinemia w/ IVIG tx, b cell lymphoma, cholelithiasis s/p cholecystectomy, esophageal dilation, prostatectomy coming to ED with complaints of worsening shortness of breath and lower back pain. Patient stating was on toilet this morning when tried to get up put pants on when he had a sudden onset of lower back pain. No bladder or bowel incontinence. Per Dr Bejarano In ED bladder scan was done with no to low residual urine volume. Per Dr Bejarano patient able to ambulate with cane in ED. FOr shortness of breath has been going on for past 4-5 weeks and has been gradually worsening. States last echo over 1 year prior. states now able to walk 50 feet without stopping for shortness of breath (02 Nov 2018 22:27)    11/4/18 Thru family , patient with continued back pain    PAST MEDICAL & SURGICAL HISTORY:  Hyperlipidemia  Hypogammaglobulinemia: received IVIG  Hepatitis B virus infection, unspecified chronicity: retrovirals  Chronic diastolic congestive heart failure  Essential hypertension  DM type 2 (diabetes mellitus, type 2)  B-cell lymphoma, unspecified B-cell lymphoma type, unspecified body region: CLL stage 4/SLL; met NHL  Atrial fibrillation, unspecified type  COPD (chronic obstructive pulmonary disease)  History of esophageal dilatation  S/P cholecystectomy  H/O prostatectomy    MEDICATIONS  (STANDING):  amiodarone    Tablet 100 milliGRAM(s) Oral daily  atorvastatin 10 milliGRAM(s) Oral at bedtime  dextrose 5%. 1000 milliLiter(s) (50 mL/Hr) IV Continuous <Continuous>  dextrose 50% Injectable 12.5 Gram(s) IV Push once  dextrose 50% Injectable 25 Gram(s) IV Push once  dextrose 50% Injectable 25 Gram(s) IV Push once  docusate sodium 100 milliGRAM(s) Oral three times a day  furosemide    Tablet 20 milliGRAM(s) Oral two times a day  gabapentin 300 milliGRAM(s) Oral three times a day  insulin glargine Injectable (LANTUS) 20 Unit(s) SubCutaneous at bedtime  insulin lispro (HumaLOG) corrective regimen sliding scale   SubCutaneous three times a day before meals  insulin lispro (HumaLOG) corrective regimen sliding scale   SubCutaneous at bedtime  lactulose Syrup 10 Gram(s) Oral daily  lisinopril 2.5 milliGRAM(s) Oral daily  metoprolol tartrate 25 milliGRAM(s) Oral two times a day  multivitamin 1 Tablet(s) Oral daily  spironolactone 25 milliGRAM(s) Oral at bedtime  tenofovir disoproxil fumarate (VIREAD) 300 milliGRAM(s) Oral daily    MEDICATIONS  (PRN):  acetaminophen   Tablet .. 650 milliGRAM(s) Oral every 6 hours PRN Temp greater or equal to 38C (100.4F), Mild Pain (1 - 3), Moderate Pain (4 - 6)  ALBUTerol/ipratropium for Nebulization 3 milliLiter(s) Nebulizer every 6 hours PRN Shortness of Breath and/or Wheezing  dextrose 40% Gel 15 Gram(s) Oral once PRN Blood Glucose LESS THAN 70 milliGRAM(s)/deciliter  glucagon  Injectable 1 milliGRAM(s) IntraMuscular once PRN Glucose LESS THAN 70 milligrams/deciliter  senna 2 Tablet(s) Oral at bedtime PRN Constipation  traMADol 50 milliGRAM(s) Oral every 4 hours PRN Moderate Pain (4 - 6)    Allergies    No Known Allergies    Intolerances    PE:    Vital Signs Last 24 Hrs  T(C): 36.8 (04 Nov 2018 08:30), Max: 36.9 (03 Nov 2018 13:13)  T(F): 98.3 (04 Nov 2018 08:30), Max: 98.5 (03 Nov 2018 13:13)  HR: 82 (04 Nov 2018 07:00) (70 - 93)  BP: 96/73 (04 Nov 2018 07:00) (90/64 - 135/70)  BP(mean): 79 (04 Nov 2018 07:00) (69 - 92)  RR: 18 (04 Nov 2018 07:00) (17 - 33)  SpO2: 100% (04 Nov 2018 07:00) (92% - 100%)    Patient looks uncomfortable and complains of abdominal and back pain  Abdomen distended with diffuse mild tenderness  Tender T-L junction  No focal motor deficits                          11.8   16.86 )-----------( 93       ( 04 Nov 2018 06:09 )             38.0     11-04    134<L>  |  99  |  14  ----------------------------<  70  4.9   |  30  |  1.14    Ca    7.9<L>      04 Nov 2018 06:09  Phos  3.7     11-03  Mg     1.9     11-03    TPro  5.8<L>  /  Alb  2.5<L>  /  TBili  0.5  /  DBili  x   /  AST  28  /  ALT  31  /  AlkPhos  148<H>  11-04      MRI with acute L1 fracture

## 2018-11-04 NOTE — PROGRESS NOTE ADULT - PROBLEM SELECTOR PLAN 1
-Givne that this is recurrent effusion, pleurx catheter placement is the beeter option for drainage and long term management. D/W pt and family.  They are in agreement to proceed  -To OR tomorrow for pleurx catheter placement under sedation  -NPO after MN  Type and screen

## 2018-11-04 NOTE — PROGRESS NOTE ADULT - ASSESSMENT
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 Mchugh resumes care tomorrow

## 2018-11-04 NOTE — PROGRESS NOTE ADULT - ASSESSMENT
1) Bilateral Pleural Effusions  2) Abnormal CT Chest  3) History of Bronchomalacia  4) COPD  5) Dyspnea  6) History of Heart Failure   7) Passive Atelectasis/Compressive Atelectasis   8) Ascites  9) History of Lymphoma    78 year old male with pmh of copd on home o2 2.5L, afib on coumadin, chronic diastolic chf, diabetes on insulin, htn, hep b infection, hypogammaglobulinemia w/ IVIG tx, b cell lymphoma, cholelithiasis s/p cholecystectomy, esophageal dilation, prostatectomy coming to ED with complaints of worsening shortness of breath and lower back pain. Patient stating was on toilet this morning when tried to get up put pants on when he had a sudden onset of lower back pain.   Reviewed CT chest which shows bilateral pleural effusions L> R.  Once INR is reduced to < 2, can consider thoracentesis  Appreciate CTS assistance  Follow up pleural LDH, cytology, cell count, protein, glucose, triglycerides should also be performed given history of lymphoma.   Performed bronchoscopy on the patient last year which revealed bronchomalacia which is likely contributing to the patient's underlying dyspnea as well  Follow up echocardiogram  Patient may need paracentesis with fluid analysis  Follow up hematology recommendations   Continue O2, goal SaO2 >88%  Will d/c Singulair given potential underlying hepatic issues.  Start Symbicort and Spiriva   For Pleur-X Tomorrow  Will continue to monitor

## 2018-11-04 NOTE — PROGRESS NOTE ADULT - SUBJECTIVE AND OBJECTIVE BOX
78M w/PMH of COPD on home o2 2.5L, Afib on coumadin, HFpEF, diabetes on insulin, HTN, Hep b infection, hypogammaglobulinemia w/ IVIG tx, b cell lymphoma, cholelithiasis s/p cholecystectomy, esophageal dilation, prostatectomy presents w/ a 4 week hx of SoB and acute lower back pain. Patient stating was on toilet on morning of admission when he tried to get up and put pants on when he had a sudden onset of lower back pain. No bladder or bowel incontinence. Per Dr Bejarano In ED bladder scan was done with no to low residual urine volume. Per Dr Bejarano patient able to ambulate with cane in ED. States last echo over 1 year prior. states now able to walk 50 feet without stopping for shortness of breath (02 Nov 2018 22:27)    11/4/18: Pt seen and evaluated at bedside. Pt reports persistent lower back pain continues, controlled w/ meds. Denies bladder/bowel incontinence. SoB persists, likely 2/2 mod-large L pleural effusion. Pending Chest tube w/ CT surgery tomorrow.    REVIEW OF SYSTEMS:  CONSTITUTIONAL: No weakness, fevers or chills. Diffuse lower back pain.  EYES/ENT: No visual changes;  No vertigo or throat pain   NECK: No pain or stiffness  RESPIRATORY: POSITIVE cough, shortness of breath  CARDIOVASCULAR: No chest pain or palpitations  GASTROINTESTINAL: No abdominal or epigastric pain. No nausea, vomiting, or hematemesis; No diarrhea or constipation. No melena or hematochezia.  GENITOURINARY: No dysuria, frequency or hematuria  NEUROLOGICAL: No numbness or weakness  SKIN: POSITIVE diffuse rash groin  All other review of systems is negative unless indicated above    PHYSICAL EXAM:  Vital Signs Last 24 Hrs  T(C): 37 (04 Nov 2018 12:54), Max: 37 (04 Nov 2018 12:54)  T(F): 98.6 (04 Nov 2018 12:54), Max: 98.6 (04 Nov 2018 12:54)  HR: 86 (04 Nov 2018 12:03) (70 - 90)  BP: 100/54 (04 Nov 2018 12:03) (90/64 - 135/70)  BP(mean): 65 (04 Nov 2018 12:03) (65 - 88)  RR: 21 (04 Nov 2018 12:03) (17 - 33)  SpO2: 96% (04 Nov 2018 12:03) (92% - 100%)    Constitutional: Pt lying in bed, awake and alert, NAD. Obese, elderly.  HEENT: EOMI, normal hearing, moist mucous membranes  Neck: Soft and supple, no JVD  Respiratory: B/L LL rales  Cardiovascular: S1S2+, RRR, no M/G/R  Gastrointestinal: BS+, soft, NT no guarding, no rebound. Distended, tympanic.  Extremities: No peripheral edema  Vascular: 2+ peripheral pulses  Neurological: AAOx3, no focal deficits  Musculoskeletal: 5/5 strength b/l upper and lower extremities. Lumbar ROM limited 2/2 pain  Skin: Erythematous rash in groin and lower trunk.    MEDICATIONS:  MEDICATIONS  (STANDING):  amiodarone    Tablet 100 milliGRAM(s) Oral daily  atorvastatin 10 milliGRAM(s) Oral at bedtime  dextrose 5%. 1000 milliLiter(s) (50 mL/Hr) IV Continuous <Continuous>  dextrose 50% Injectable 12.5 Gram(s) IV Push once  dextrose 50% Injectable 25 Gram(s) IV Push once  dextrose 50% Injectable 25 Gram(s) IV Push once  docusate sodium 100 milliGRAM(s) Oral three times a day  furosemide    Tablet 40 milliGRAM(s) Oral two times a day  gabapentin 300 milliGRAM(s) Oral three times a day  insulin glargine Injectable (LANTUS) 20 Unit(s) SubCutaneous at bedtime  insulin lispro (HumaLOG) corrective regimen sliding scale   SubCutaneous three times a day before meals  insulin lispro (HumaLOG) corrective regimen sliding scale   SubCutaneous at bedtime  lactulose Syrup 10 Gram(s) Oral daily  lisinopril 2.5 milliGRAM(s) Oral daily  metoprolol tartrate 25 milliGRAM(s) Oral two times a day  multivitamin 1 Tablet(s) Oral daily  spironolactone 100 milliGRAM(s) Oral daily  tenofovir disoproxil fumarate (VIREAD) 300 milliGRAM(s) Oral daily      LABS: All Labs Reviewed:                        11.8   16.86 )-----------( 93       ( 04 Nov 2018 06:09 )             38.0     11-04    134<L>  |  99  |  14  ----------------------------<  70  4.9   |  30  |  1.14    Ca    7.9<L>      04 Nov 2018 06:09  Phos  3.7     11-03  Mg     1.9     11-03    TPro  5.8<L>  /  Alb  2.5<L>  /  TBili  0.5  /  DBili  x   /  AST  28  /  ALT  31  /  AlkPhos  148<H>  11-04    PT/INR - ( 04 Nov 2018 07:01 )   PT: 22.3 sec;   INR: 1.97 ratio         PTT - ( 04 Nov 2018 07:01 )  PTT:35.0 sec  CARDIAC MARKERS ( 02 Nov 2018 17:42 )  <0.015 ng/mL / x     / x     / x     / x          Blood Culture: 11-02 @ 19:11  Organism --  Gram Stain Blood -- Gram Stain --  Specimen Source .Urine Clean Catch (Midstream)  Culture-Blood --      I&O's Summary    03 Nov 2018 08:01  -  04 Nov 2018 07:00  --------------------------------------------------------  IN: 0 mL / OUT: 2575 mL / NET: -2575 mL    04 Nov 2018 07:01  -  04 Nov 2018 15:33  --------------------------------------------------------  IN: 0 mL / OUT: 775 mL / NET: -775 mL      CAPILLARY BLOOD GLUCOSE      POCT Blood Glucose.: 105 mg/dL (04 Nov 2018 11:46)  POCT Blood Glucose.: 68 mg/dL (04 Nov 2018 07:51)  POCT Blood Glucose.: 153 mg/dL (03 Nov 2018 21:10)  POCT Blood Glucose.: 136 mg/dL (03 Nov 2018 17:20)      RADIOLOGY/EKG:  < from: CT Chest No Cont (11.02.18 @ 19:34) >  IMPRESSION: Moderate to large left pleural effusion with underlying   compressive atelectasis. Small right pleural effusion with underlying   compressive atelectasis.   Prominent lymph nodes in the bilateral axilla,   mediastinum, and hilum.     Moderate to large amount of ascites in the upper abdomen.    < end of copied text >      < from: Xray Chest 1 View- PORTABLE-Urgent (11.02.18 @ 18:59) >  Impression: small BILATERAL pleural effusions with worsening infiltrate   in the LEFT lung    < end of copied text >      < from: MR Lumbar Spine w/wo IV Cont (11.03.18 @ 11:20) >  IMPRESSION: Acute mild L1 superior endplate compression fracture.   Moderate to severe spinal canal stenosis at L5/S1.        < from: US Abdomen Limited (11.03.18 @ 14:05) >  IMPRESSION:    Moderate 4 quadrant abdominal and pelvic ascites.    < end of copied text >      DVT PPX: SCDs

## 2018-11-04 NOTE — PROGRESS NOTE ADULT - SUBJECTIVE AND OBJECTIVE BOX
Patient is a 78y old  Male who presents with a chief complaint of CC: Lower back pain and shortness of breath (04 Nov 2018 09:25)      Subective:  No new complaints    PAST MEDICAL & SURGICAL HISTORY:  Hyperlipidemia  Hypogammaglobulinemia: received IVIG  Hepatitis B virus infection, unspecified chronicity: retrovirals  Chronic diastolic congestive heart failure  Essential hypertension  DM type 2 (diabetes mellitus, type 2)  B-cell lymphoma, unspecified B-cell lymphoma type, unspecified body region: CLL stage 4/SLL; met NHL  Atrial fibrillation, unspecified type  COPD (chronic obstructive pulmonary disease)  History of esophageal dilatation  S/P cholecystectomy  H/O prostatectomy      MEDICATIONS  (STANDING):  amiodarone    Tablet 100 milliGRAM(s) Oral daily  atorvastatin 10 milliGRAM(s) Oral at bedtime  dextrose 5%. 1000 milliLiter(s) (50 mL/Hr) IV Continuous <Continuous>  dextrose 50% Injectable 12.5 Gram(s) IV Push once  dextrose 50% Injectable 25 Gram(s) IV Push once  dextrose 50% Injectable 25 Gram(s) IV Push once  docusate sodium 100 milliGRAM(s) Oral three times a day  furosemide    Tablet 40 milliGRAM(s) Oral two times a day  gabapentin 300 milliGRAM(s) Oral three times a day  insulin glargine Injectable (LANTUS) 20 Unit(s) SubCutaneous at bedtime  insulin lispro (HumaLOG) corrective regimen sliding scale   SubCutaneous three times a day before meals  insulin lispro (HumaLOG) corrective regimen sliding scale   SubCutaneous at bedtime  lactulose Syrup 10 Gram(s) Oral daily  lisinopril 2.5 milliGRAM(s) Oral daily  metoprolol tartrate 25 milliGRAM(s) Oral two times a day  multivitamin 1 Tablet(s) Oral daily  spironolactone 100 milliGRAM(s) Oral daily  tenofovir disoproxil fumarate (VIREAD) 300 milliGRAM(s) Oral daily    MEDICATIONS  (PRN):  acetaminophen   Tablet .. 650 milliGRAM(s) Oral every 6 hours PRN Temp greater or equal to 38C (100.4F), Mild Pain (1 - 3), Moderate Pain (4 - 6)  ALBUTerol/ipratropium for Nebulization 3 milliLiter(s) Nebulizer every 6 hours PRN Shortness of Breath and/or Wheezing  dextrose 40% Gel 15 Gram(s) Oral once PRN Blood Glucose LESS THAN 70 milliGRAM(s)/deciliter  glucagon  Injectable 1 milliGRAM(s) IntraMuscular once PRN Glucose LESS THAN 70 milligrams/deciliter  senna 2 Tablet(s) Oral at bedtime PRN Constipation  traMADol 50 milliGRAM(s) Oral every 4 hours PRN Moderate Pain (4 - 6)      REVIEW OF SYSTEMS:    RESPIRATORY: No shortness of breath  CARDIOVASCULAR: No chest pain  All other review of systems is negative unless indicated above.    Vital Signs Last 24 Hrs  T(C): 36.8 (04 Nov 2018 08:30), Max: 36.9 (03 Nov 2018 13:13)  T(F): 98.3 (04 Nov 2018 08:30), Max: 98.5 (03 Nov 2018 13:13)  HR: 82 (04 Nov 2018 07:00) (70 - 93)  BP: 96/73 (04 Nov 2018 07:00) (90/64 - 135/70)  BP(mean): 79 (04 Nov 2018 07:00) (69 - 92)  RR: 18 (04 Nov 2018 07:00) (17 - 33)  SpO2: 100% (04 Nov 2018 07:00) (92% - 100%)    PHYSICAL EXAM:    Constitutional: NAD, well-developed  Respiratory: CTAB  Cardiovascular: S1 and S2, RRR  Gastrointestinal: BS+, soft, moderate ascites  Extremities: No peripheral edema  Psychiatric: Normal mood, normal affect    LABS:                        11.8   16.86 )-----------( 93       ( 04 Nov 2018 06:09 )             38.0     11-04    134<L>  |  99  |  14  ----------------------------<  70  4.9   |  30  |  1.14    Ca    7.9<L>      04 Nov 2018 06:09  Phos  3.7     11-03  Mg     1.9     11-03    TPro  5.8<L>  /  Alb  2.5<L>  /  TBili  0.5  /  DBili  x   /  AST  28  /  ALT  31  /  AlkPhos  148<H>  11-04    PT/INR - ( 04 Nov 2018 07:01 )   PT: 22.3 sec;   INR: 1.97 ratio         PTT - ( 04 Nov 2018 07:01 )  PTT:35.0 sec  LIVER FUNCTIONS - ( 04 Nov 2018 06:09 )  Alb: 2.5 g/dL / Pro: 5.8 gm/dL / ALK PHOS: 148 U/L / ALT: 31 U/L / AST: 28 U/L / GGT: x             RADIOLOGY & ADDITIONAL STUDIES:

## 2018-11-04 NOTE — PROGRESS NOTE ADULT - SUBJECTIVE AND OBJECTIVE BOX
Patient is a 78y old  Male who presents with a chief complaint of CC: Lower back pain and shortness of breath (2018 22:27)      HPI:  78 year old male with pmh of copd on home o2 2.5L, afib on coumadin, chronic diastolic chf, diabetes on insulin, htn, hep b infection, hypogammaglobulinemia w/ IVIG tx, b cell lymphoma, cholelithiasis s/p cholecystectomy, esophageal dilation, prostatectomy coming to ED with complaints of worsening shortness of breath and lower back pain. Patient stating was on toilet this morning when tried to get up put pants on when he had a sudden onset of lower back pain. No bladder or bowel incontinence. Per Dr Bejarano In ED bladder scan was done with no to low residual urine volume. Per Dr Bejarano patient able to ambulate with cane in ED. FOr shortness of breath has been going on for past 4-5 weeks and has been gradually worsening. States last echo over 1 year prior. states now able to walk 50 feet without stopping for shortness of breath (2018 22:27)    18  No acute events occurred overnight     MEDICATIONS  (STANDING):  amiodarone    Tablet 100 milliGRAM(s) Oral daily  atorvastatin 10 milliGRAM(s) Oral at bedtime  dextrose 5%. 1000 milliLiter(s) (50 mL/Hr) IV Continuous <Continuous>  dextrose 50% Injectable 12.5 Gram(s) IV Push once  dextrose 50% Injectable 25 Gram(s) IV Push once  dextrose 50% Injectable 25 Gram(s) IV Push once  docusate sodium 100 milliGRAM(s) Oral three times a day  furosemide    Tablet 20 milliGRAM(s) Oral two times a day  gabapentin 300 milliGRAM(s) Oral three times a day  insulin glargine Injectable (LANTUS) 20 Unit(s) SubCutaneous at bedtime  insulin lispro (HumaLOG) corrective regimen sliding scale   SubCutaneous three times a day before meals  insulin lispro (HumaLOG) corrective regimen sliding scale   SubCutaneous at bedtime  lactulose Syrup 10 Gram(s) Oral daily  lisinopril 2.5 milliGRAM(s) Oral daily  metoprolol tartrate 25 milliGRAM(s) Oral two times a day  multivitamin 1 Tablet(s) Oral daily  spironolactone 25 milliGRAM(s) Oral at bedtime  tenofovir disoproxil fumarate (VIREAD) 300 milliGRAM(s) Oral daily    MEDICATIONS  (PRN):  acetaminophen   Tablet .. 650 milliGRAM(s) Oral every 6 hours PRN Temp greater or equal to 38C (100.4F), Mild Pain (1 - 3), Moderate Pain (4 - 6)  ALBUTerol/ipratropium for Nebulization 3 milliLiter(s) Nebulizer every 6 hours PRN Shortness of Breath and/or Wheezing  dextrose 40% Gel 15 Gram(s) Oral once PRN Blood Glucose LESS THAN 70 milliGRAM(s)/deciliter  glucagon  Injectable 1 milliGRAM(s) IntraMuscular once PRN Glucose LESS THAN 70 milligrams/deciliter  senna 2 Tablet(s) Oral at bedtime PRN Constipation  traMADol 50 milliGRAM(s) Oral every 4 hours PRN Moderate Pain (4 - 6)          FAMILY HISTORY:  Family history of liver cancer (Father): father  85 liver CA  Family history of coronary arteriosclerosis (Mother): Mom  of MI age 68      SOCIAL HISTORY: Lives at home     REVIEW OF SYSTEM:  cough, dyspnea, fatigue, otherwise 12 point ROS negative     Vital Signs Last 24 Hrs  T(C): 36.8 (2018 08:30), Max: 36.9 (2018 13:13)  T(F): 98.3 (2018 08:30), Max: 98.5 (2018 13:13)  HR: 82 (2018 07:00) (70 - 93)  BP: 96/73 (2018 07:00) (90/64 - 135/70)  BP(mean): 79 (2018 07:00) (69 - 92)  RR: 18 (2018 07:00) (17 - 33)  SpO2: 100% (2018 07:00) (92% - 100%)    I&O's Summary    2018 07:01  -  2018 07:00  --------------------------------------------------------  IN: 0 mL / OUT: 1550 mL / NET: -1550 mL      PHYSICAL EXAM  General Appearance: cooperative, no acute distress,   HEENT: PERRL, conjunctiva clear, EOM's intact, non injected pharynx, no exudate, TM   normal  Neck: Supple, , no adenopathy, thyroid: not enlarged, no carotid bruit or JVD  Back: Symmetric, no  tenderness,no soft tissue tenderness  Lungs: Decreased breath sounds in the right lower lobe, diminished in the upper and lower lobes on the left, Right upper lobe inspiratory wheezes   Heart: Regular rate and rhythm, S1, S2 normal, no murmur, rub or gallop  Abdomen: Soft, non-tender, bowel sounds active , no hepatosplenomegaly  Extremities: no cyanosis or edema, no joint swelling  Skin: Skin color, texture normal, no rashes   Neurologic: Alert and oriented X3 , cranial nerves intact, sensory and motor normal,    ECG:    LABS:                          12.3   16.94 )-----------( 95       ( 2018 04:44 )             38.5     11-    133<L>  |  98  |  14  ----------------------------<  113<H>  5.0   |  27  |  0.99    Ca    8.3<L>      2018 04:44  Phos  3.7     -  Mg     1.9     -    TPro  6.0  /  Alb  2.8<L>  /  TBili  0.5  /  DBili  x   /  AST  34  /  ALT  34  /  AlkPhos  162<H>      CARDIAC MARKERS ( 2018 17:42 )  <0.015 ng/mL / x     / x     / x     / x            Pro BNP  182  @ 17:42  D Dimer  --  @ 17:42    PT/INR - ( 2018 04:44 )   PT: 23.9 sec;   INR: 2.10 ratio         PTT - ( 2018 04:44 )  PTT:37.1 sec  Urinalysis Basic - ( 2018 19:11 )    Color: Yellow / Appearance: Clear / S.010 / pH: x  Gluc: x / Ketone: Negative  / Bili: Negative / Urobili: Negative mg/dL   Blood: x / Protein: Negative mg/dL / Nitrite: Negative   Leuk Esterase: Negative / RBC: x / WBC x   Sq Epi: x / Non Sq Epi: x / Bacteria: x            RADIOLOGY & ADDITIONAL STUDIES:    IMPRESSION: Moderate to large left pleural effusion with underlying   compressive atelectasis. Small right pleural effusion with underlying   compressive atelectasis.   Prominent lymph nodes in the bilateral axilla,   mediastinum, and hilum.

## 2018-11-04 NOTE — PROGRESS NOTE ADULT - SUBJECTIVE AND OBJECTIVE BOX
Pt has been seen and examined with FP resident, resident supervised agree with a/p       Patient is a 78y old  Male who presents with a chief complaint of CC: Lower back pain and shortness of breath (03 Nov 2018 11:07)        HPI:  78 year old male with pmh of copd on home o2 2.5L, afib on coumadin, chronic diastolic chf, diabetes on insulin, htn, hep b infection, hypogammaglobulinemia w/ IVIG tx, b cell lymphoma, cholelithiasis s/p cholecystectomy, esophageal dilation, prostatectomy coming to ED with complaints of worsening shortness of breath and lower back pain.     PHYSICAL EXAM:    general- comfortable   -rs-decrease air entry on left side, rales present at bases   -cvs-s1s2 normal   -p/a-soft,bs+  -extremity- no asymmetrical swelling noted           A/P    # Pleural effusion- Dr. Crespo evaluation appreciated, chest tube placement probably tomorrow     #supportive care     #Discussed with Dr. Crespo and Dr. Valle

## 2018-11-04 NOTE — PROGRESS NOTE ADULT - ASSESSMENT
78 year old male as above     * back pain with compression fracture L1  -awaiting brace  -pain management  -IV Tylenol prn    Multiple Medical problems as per Medicine  #shortness of breath   #copd  # diabetes  # chf- acute on chronic present on admit   #hld   #hepatitis b w/ ascites  #afib  #dvt prophylaxis

## 2018-11-04 NOTE — PROGRESS NOTE ADULT - ASSESSMENT
B/l Pleural effusion  Etiology ? sec to CHF vs lymphoma. BNP not elevated.   PAF  H/O HTN, currently hypotensive  Low back pain    Suggest:    Agree with thoracentesis / catheter drainage of pleural effusion. F/u chemistry, cell count, pathology. r/o lymphoma related  Coumadin on hold for thoracentesis. Resume when no further invasive procedures planned. Currently in sinus rhythm  BNP is low, no suggestion of pulm congestion on CT chest. Doubt sec to CHF exacerbation.  Cautious diuretics  Hold lisinopril if BP is low.  D/W family at bedside.

## 2018-11-04 NOTE — PROGRESS NOTE ADULT - SUBJECTIVE AND OBJECTIVE BOX
CURRENT CARDIAC WORKUP:       Echo:  Stress Test:  Cardiac Cath:    Allergies:   No Known Allergies      MEDICATIONS  (STANDING):  amiodarone    Tablet 100 milliGRAM(s) Oral daily  atorvastatin 10 milliGRAM(s) Oral at bedtime  dextrose 5%. 1000 milliLiter(s) (50 mL/Hr) IV Continuous <Continuous>  dextrose 50% Injectable 12.5 Gram(s) IV Push once  dextrose 50% Injectable 25 Gram(s) IV Push once  dextrose 50% Injectable 25 Gram(s) IV Push once  docusate sodium 100 milliGRAM(s) Oral three times a day  furosemide    Tablet 40 milliGRAM(s) Oral two times a day  gabapentin 300 milliGRAM(s) Oral three times a day  insulin glargine Injectable (LANTUS) 20 Unit(s) SubCutaneous at bedtime  insulin lispro (HumaLOG) corrective regimen sliding scale   SubCutaneous three times a day before meals  insulin lispro (HumaLOG) corrective regimen sliding scale   SubCutaneous at bedtime  lactulose Syrup 10 Gram(s) Oral daily  lisinopril 2.5 milliGRAM(s) Oral daily  metoprolol tartrate 25 milliGRAM(s) Oral two times a day  multivitamin 1 Tablet(s) Oral daily  spironolactone 100 milliGRAM(s) Oral daily  tenofovir disoproxil fumarate (VIREAD) 300 milliGRAM(s) Oral daily    MEDICATIONS  (PRN):  acetaminophen   Tablet .. 650 milliGRAM(s) Oral every 6 hours PRN Temp greater or equal to 38C (100.4F), Mild Pain (1 - 3), Moderate Pain (4 - 6)  ALBUTerol/ipratropium for Nebulization 3 milliLiter(s) Nebulizer every 6 hours PRN Shortness of Breath and/or Wheezing  dextrose 40% Gel 15 Gram(s) Oral once PRN Blood Glucose LESS THAN 70 milliGRAM(s)/deciliter  glucagon  Injectable 1 milliGRAM(s) IntraMuscular once PRN Glucose LESS THAN 70 milligrams/deciliter  senna 2 Tablet(s) Oral at bedtime PRN Constipation  traMADol 50 milliGRAM(s) Oral every 4 hours PRN Moderate Pain (4 - 6)      ROS:     .ros      Vital Signs Last 24 Hrs  T(C): 36.8 (2018 08:30), Max: 36.9 (2018 13:13)  T(F): 98.3 (2018 08:30), Max: 98.5 (2018 13:13)  HR: 88 (2018 10:00) (70 - 91)  BP: 107/59 (2018 10:00) (90/64 - 135/70)  BP(mean): 72 (2018 10:00) (68 - 92)  RR: 21 (2018 10:00) (17 - 33)  SpO2: 98% (2018 10:00) (92% - 100%)    I&O's Summary    2018 08:01  -  2018 07:00  --------------------------------------------------------  IN: 0 mL / OUT: 2575 mL / NET: -2575 mL    2018 07:01  -  2018 10:12  --------------------------------------------------------  IN: 0 mL / OUT: 200 mL / NET: -200 mL        PHYSICAL EXAM:    .phy      INTERPRETATION OF TELEMETRY:    ECG:    LABS:                        11.8   16.86 )-----------( 93       ( 2018 06:09 )             38.0     11-04    134<L>  |  99  |  14  ----------------------------<  70  4.9   |  30  |  1.14    Ca    7.9<L>      2018 06:09  Phos  3.7     11-  Mg     1.9     -03    TPro  5.8<L>  /  Alb  2.5<L>  /  TBili  0.5  /  DBili  x   /  AST  28  /  ALT  31  /  AlkPhos  148<H>  11-04      Lipid Panel  Chl 83  HDL 25  LDL 37  Trg 107         @ 17:42  Trop-I <0.015  CK     --  CK MB  --    Pro BNP  182  @ 17:42  D Dimer  --  @ 17:42    PT/INR - ( 2018 07:01 )   PT: 22.3 sec;   INR: 1.97 ratio         PTT - ( 2018 07:01 )  PTT:35.0 sec  Urinalysis Basic - ( 2018 19:11 )    Color: Yellow / Appearance: Clear / S.010 / pH: x  Gluc: x / Ketone: Negative  / Bili: Negative / Urobili: Negative mg/dL   Blood: x / Protein: Negative mg/dL / Nitrite: Negative   Leuk Esterase: Negative / RBC: x / WBC x   Sq Epi: x / Non Sq Epi: x / Bacteria: x            RADIOLOGY & ADDITIONAL STUDIES: 78-year-old male admitted with complaints of lower back pain and shortness of breath. He has bilateral pleural effusions. Similar presentation in May 2018 when he was admitted with shortness of breath and was noted to have bilateral moderate pleural effusions as well as moderate ascites. He was then treated for acute on chronic diastolic congestive heart failure. Currently no complaints of orthopnea. No chest pain. Denies fever. No complaints of cough. Scheduled for catheter drainage of pleural effusion.     Today, he feels better. No new events. Awaiting throacentesis.      PAST MEDICAL & SURGICAL HISTORY:  Hyperlipidemia  Hypogammaglobulinemia: received IVIG  Hepatitis B virus infection, unspecified chronicity: retrovirals  Chronic diastolic congestive heart failure  Essential hypertension  DM type 2 (diabetes mellitus, type 2)  B-cell lymphoma, unspecified B-cell lymphoma type, unspecified body region: CLL stage 4/SLL; met NHL  Paroxysmal Atrial fibrillation  COPD (chronic obstructive pulmonary disease)  History of esophageal dilatation  H/O DVT, PE  S/P cholecystectomy  H/O prostatectomy      CARDIAC WORKUP:      Echo:  Nml EF, mild MR, mild TR,     Allergies:   No Known Allergies      MEDICATIONS  (STANDING):  amiodarone    Tablet 100 milliGRAM(s) Oral daily  atorvastatin 10 milliGRAM(s) Oral at bedtime  dextrose 5%. 1000 milliLiter(s) (50 mL/Hr) IV Continuous <Continuous>  dextrose 50% Injectable 12.5 Gram(s) IV Push once  dextrose 50% Injectable 25 Gram(s) IV Push once  dextrose 50% Injectable 25 Gram(s) IV Push once  docusate sodium 100 milliGRAM(s) Oral three times a day  furosemide    Tablet 40 milliGRAM(s) Oral two times a day  gabapentin 300 milliGRAM(s) Oral three times a day  insulin glargine Injectable (LANTUS) 20 Unit(s) SubCutaneous at bedtime  insulin lispro (HumaLOG) corrective regimen sliding scale   SubCutaneous three times a day before meals  insulin lispro (HumaLOG) corrective regimen sliding scale   SubCutaneous at bedtime  lactulose Syrup 10 Gram(s) Oral daily  lisinopril 2.5 milliGRAM(s) Oral daily  metoprolol tartrate 25 milliGRAM(s) Oral two times a day  multivitamin 1 Tablet(s) Oral daily  spironolactone 100 milliGRAM(s) Oral daily  tenofovir disoproxil fumarate (VIREAD) 300 milliGRAM(s) Oral daily    MEDICATIONS  (PRN):  acetaminophen   Tablet .. 650 milliGRAM(s) Oral every 6 hours PRN Temp greater or equal to 38C (100.4F), Mild Pain (1 - 3), Moderate Pain (4 - 6)  ALBUTerol/ipratropium for Nebulization 3 milliLiter(s) Nebulizer every 6 hours PRN Shortness of Breath and/or Wheezing  dextrose 40% Gel 15 Gram(s) Oral once PRN Blood Glucose LESS THAN 70 milliGRAM(s)/deciliter  glucagon  Injectable 1 milliGRAM(s) IntraMuscular once PRN Glucose LESS THAN 70 milligrams/deciliter  senna 2 Tablet(s) Oral at bedtime PRN Constipation  traMADol 50 milliGRAM(s) Oral every 4 hours PRN Moderate Pain (4 - 6)      ROS:     Detailed ten system ROS was performed and was negative except for history as eluded to above.    no fever  no chills  no nausea  no vomiting  no diarrhea  no constipation  no melena  no hematochezia  no chest pain  no palpitations  + sob at rest  + dyspnea on exertion  no cough  no wheezing  no anorexia  no headache  no dizziness  no syncope  no weakness  no myalgia  no dysuria  no polyuria  no hematuria       Vital Signs Last 24 Hrs  T(C): 36.8 (2018 08:30), Max: 36.9 (2018 13:13)  T(F): 98.3 (2018 08:30), Max: 98.5 (2018 13:13)  HR: 88 (2018 10:00) (70 - 91)  BP: 107/59 (2018 10:00) (90/64 - 135/70)  BP(mean): 72 (2018 10:00) (68 - 92)  RR: 21 (2018 10:00) (17 - 33)  SpO2: 98% (2018 10:00) (92% - 100%)    I&O's Summary    2018 08:01  -  2018 07:00  --------------------------------------------------------  IN: 0 mL / OUT: 2575 mL / NET: -2575 mL    2018 07:01  -  2018 10:12  --------------------------------------------------------  IN: 0 mL / OUT: 200 mL / NET: -200 mL        PHYSICAL EXAM:    .phy      INTERPRETATION OF TELEMETRY:    ECG:    LABS:                        11.8   16.86 )-----------( 93       ( 2018 06:09 )             38.0     11-04    134<L>  |  99  |  14  ----------------------------<  70  4.9   |  30  |  1.14    Ca    7.9<L>      2018 06:09  Phos  3.7     11-03  Mg     1.9     11-03    TPro  5.8<L>  /  Alb  2.5<L>  /  TBili  0.5  /  DBili  x   /  AST  28  /  ALT  31  /  AlkPhos  148<H>        Lipid Panel  Chl 83  HDL 25  LDL 37  Trg 107         @ 17:42  Trop-I <0.015  CK     --  CK MB  --    Pro BNP  182  @ 17:42  D Dimer  --  @ 17:42    PT/INR - ( 2018 07:01 )   PT: 22.3 sec;   INR: 1.97 ratio         PTT - ( 2018 07:01 )  PTT:35.0 sec  Urinalysis Basic - ( 2018 19:11 )    Color: Yellow / Appearance: Clear / S.010 / pH: x  Gluc: x / Ketone: Negative  / Bili: Negative / Urobili: Negative mg/dL   Blood: x / Protein: Negative mg/dL / Nitrite: Negative   Leuk Esterase: Negative / RBC: x / WBC x   Sq Epi: x / Non Sq Epi: x / Bacteria: x            RADIOLOGY & ADDITIONAL STUDIES: 78-year-old male admitted with complaints of lower back pain and shortness of breath. He has bilateral pleural effusions. Similar presentation in May 2018 when he was admitted with shortness of breath and was noted to have bilateral moderate pleural effusions as well as moderate ascites. He was then treated for acute on chronic diastolic congestive heart failure. Currently no complaints of orthopnea. No chest pain. Denies fever. No complaints of cough. Scheduled for catheter drainage of pleural effusion.     Today, he feels better. No new events. Awaiting throacentesis.      PAST MEDICAL & SURGICAL HISTORY:  Hyperlipidemia  Hypogammaglobulinemia: received IVIG  Hepatitis B virus infection, unspecified chronicity: retrovirals  Chronic diastolic congestive heart failure  Essential hypertension  DM type 2 (diabetes mellitus, type 2)  B-cell lymphoma, unspecified B-cell lymphoma type, unspecified body region: CLL stage 4/SLL; met NHL  Paroxysmal Atrial fibrillation  COPD (chronic obstructive pulmonary disease)  History of esophageal dilatation  H/O DVT, PE  S/P cholecystectomy  H/O prostatectomy      CARDIAC WORKUP:      Echo:  Nml EF, mild MR, mild TR,     Allergies:   No Known Allergies      MEDICATIONS  (STANDING):  amiodarone    Tablet 100 milliGRAM(s) Oral daily  atorvastatin 10 milliGRAM(s) Oral at bedtime  dextrose 5%. 1000 milliLiter(s) (50 mL/Hr) IV Continuous <Continuous>  dextrose 50% Injectable 12.5 Gram(s) IV Push once  dextrose 50% Injectable 25 Gram(s) IV Push once  dextrose 50% Injectable 25 Gram(s) IV Push once  docusate sodium 100 milliGRAM(s) Oral three times a day  furosemide    Tablet 40 milliGRAM(s) Oral two times a day  gabapentin 300 milliGRAM(s) Oral three times a day  insulin glargine Injectable (LANTUS) 20 Unit(s) SubCutaneous at bedtime  insulin lispro (HumaLOG) corrective regimen sliding scale   SubCutaneous three times a day before meals  insulin lispro (HumaLOG) corrective regimen sliding scale   SubCutaneous at bedtime  lactulose Syrup 10 Gram(s) Oral daily  lisinopril 2.5 milliGRAM(s) Oral daily  metoprolol tartrate 25 milliGRAM(s) Oral two times a day  multivitamin 1 Tablet(s) Oral daily  spironolactone 100 milliGRAM(s) Oral daily  tenofovir disoproxil fumarate (VIREAD) 300 milliGRAM(s) Oral daily    MEDICATIONS  (PRN):  acetaminophen   Tablet .. 650 milliGRAM(s) Oral every 6 hours PRN Temp greater or equal to 38C (100.4F), Mild Pain (1 - 3), Moderate Pain (4 - 6)  ALBUTerol/ipratropium for Nebulization 3 milliLiter(s) Nebulizer every 6 hours PRN Shortness of Breath and/or Wheezing  dextrose 40% Gel 15 Gram(s) Oral once PRN Blood Glucose LESS THAN 70 milliGRAM(s)/deciliter  glucagon  Injectable 1 milliGRAM(s) IntraMuscular once PRN Glucose LESS THAN 70 milligrams/deciliter  senna 2 Tablet(s) Oral at bedtime PRN Constipation  traMADol 50 milliGRAM(s) Oral every 4 hours PRN Moderate Pain (4 - 6)      ROS:     Detailed ten system ROS was performed and was negative except for history as eluded to above.    no fever  no chills  no nausea  no vomiting  no diarrhea  no constipation  no melena  no hematochezia  no chest pain  no palpitations  + sob at rest  + dyspnea on exertion  no cough  no wheezing  no anorexia  no headache  no dizziness  no syncope  no weakness  no myalgia  no dysuria  no polyuria  no hematuria       Vital Signs Last 24 Hrs  T(C): 36.8 (04 Nov 2018 08:30), Max: 36.9 (03 Nov 2018 13:13)  T(F): 98.3 (04 Nov 2018 08:30), Max: 98.5 (03 Nov 2018 13:13)  HR: 88 (04 Nov 2018 10:00) (70 - 91)  BP: 107/59 (04 Nov 2018 10:00) (90/64 - 135/70)  BP(mean): 72 (04 Nov 2018 10:00) (68 - 92)  RR: 21 (04 Nov 2018 10:00) (17 - 33)  SpO2: 98% (04 Nov 2018 10:00) (92% - 100%)    I&O's Summary    03 Nov 2018 08:01  -  04 Nov 2018 07:00  --------------------------------------------------------  IN: 0 mL / OUT: 2575 mL / NET: -2575 mL    04 Nov 2018 07:01  -  04 Nov 2018 10:12  --------------------------------------------------------  IN: 0 mL / OUT: 200 mL / NET: -200 mL        PHYSICAL EXAM:    General:                Comfortable, AAO X 3, in no distress.  HEENT:                  Atraumatic, PERRLA, EOMI, conjunctiva clear.    Neck:                     Supple, no adenopathy, no thyromegaly, no JVD, no bruit.  Back:                     Symmetric, non tender.  Chest:                    B/L symmetric reduced basal air entry, no tachypnea  Heart:                     S1, S2 normal, no gallop, + murmur.  Abdomen:              Soft, non-tender, bowel sounds active. No palpable masses.  Extremities:           no cyanosis, + edema. Peripheral pulses normal.  Skin:                      Skin color, texture normal. No rashes.  Neurologic:            Grossly nonfocal.       TELEMETRY:  Normal sinus rhythm with no tachy or herlinda events     ECG:  NSR, LAHB, IVCD, PRWP, no ST T changes of ischemia or infarction.       LABS:                        11.8   16.86 )-----------( 93       ( 04 Nov 2018 06:09 )             38.0     11-04    134<L>  |  99  |  14  ----------------------------<  70  4.9   |  30  |  1.14    Ca    7.9<L>      04 Nov 2018 06:09  Phos  3.7     11-03  Mg     1.9     11-03    TPro  5.8<L>  /  Alb  2.5<L>  /  TBili  0.5  /  DBili  x   /  AST  28  /  ALT  31  /  AlkPhos  148<H>  11-04      Lipid Panel  Chl 83  HDL 25  LDL 37  Trg 107    11-02 @ 17:42  Trop-I  <0.015    Pro BNP  182 11-02 @ 17:42    PT/INR - ( 04 Nov 2018 07:01 )   PT: 22.3 sec;   INR: 1.97 ratio    PTT - ( 04 Nov 2018 07:01 )  PTT:35.0 sec      RADIOLOGY & ADDITIONAL STUDIES:    US Abdomen Limited (11.03.18 @ 14:05) >  IMPRESSION:  Moderate 4 quadrant abdominal and pelvic ascites.    MR Lumbar Spine w/wo IV Cont (11.03.18 @ 11:20) >  IMPRESSION: Acute mild L1 superior endplate compression fracture. Moderate to severe spinal canal stenosis at L5/S1.

## 2018-11-05 PROBLEM — E78.5 HYPERLIPIDEMIA, UNSPECIFIED: Chronic | Status: ACTIVE | Noted: 2018-01-01

## 2018-11-05 NOTE — BRIEF OPERATIVE NOTE - PROCEDURE
<<-----Click on this checkbox to enter Procedure VATS (video-assisted thoracoscopic surgery)  11/05/2018    Active  [id number]

## 2018-11-05 NOTE — PROGRESS NOTE ADULT - ASSESSMENT
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 performed last year also playing role  - continue Lasix  - Supplemental O2 as needed    #COPD  - Supplemental O2 as needed  - Duoneb prn  - Pulm consult appreciated  - monitor pulse ox  - Singulair discontinued in light of underlying hepatic issues    # Diabetes   - A1c = 7.2% during this admission  - ISS  - BGMs  - Tresiba taken at home switched to Lantus per pharmacy 20 units   - continue gabapentin for neuropathic pain  - Diabetic diet w/ DASH    # Acute on chronic decompensated heart failure  - Echo shows LVEF 55-60%  - Continue Lasix and Betablocker  - Continue lisinopril  - F/U Cardio consult    # Acute Back Pain  - No deficits on sensation motor or strength  -  CT shows  no acute vertebral fracture. Mild chronic compression fracture along the superior endplate of T11.  Chronic degenerative changes as detailed above.  - Tylenol PRN pain  - MRI Lumbar spine shows acute mild superior L1 endplate compression fracture with moderate to severe spinal stenosis at L5/S1.  in am   - F/U Ortho Spine consult  - if patient having urinary retention can do bladder scan if >250cc -  straight cath    #HLD  - Diabetic diet w/ DASH  - Lipid profile reviewed - Low HDL  - Continue statin qHS    #Hepatitis B w/ ascites  - GI Consult appreciated   - US Abd shows moderate 4 quadrant abdominal and pelvic ascites  - continue spironolactone  - Continue tenofovir  - start lactulose   - may need paracentesis     #Afib  - Telemetry  - hold coumadin for anticipated procedures - agreed on admission by pt family  - monitor INR    #DVT Prophylaxis  - hold coumadin for anticipated procedures - agreed on admission by pt family  - lucero

## 2018-11-05 NOTE — PROGRESS NOTE ADULT - SUBJECTIVE AND OBJECTIVE BOX
Subjective:    11/5/18:  patient seen and examined with son and wife at bedside.  States his breathing is labored but stable.  NPO for paracentesis and VATS pleur X placement today.    Vital Signs:  Vital Signs Last 24 Hrs  T(C): 37 (11-05-18 @ 05:37), Max: 37 (11-04-18 @ 12:54)  T(F): 98.6 (11-05-18 @ 05:37), Max: 98.6 (11-04-18 @ 12:54)  HR: 73 (11-05-18 @ 09:00) (73 - 102)  BP: 97/49 (11-05-18 @ 09:00) (82/49 - 114/60)  RR: 26 (11-05-18 @ 09:00) (15 - 26)  SpO2: 100% (11-05-18 @ 09:00) (93% - 100%) on NC.    Telemetry/Alarms: sinus rhythm    Relevant labs, radiology and Medications reviewed                        11.2   13.79 )-----------( 90       ( 05 Nov 2018 04:58 )             36.0     11-05    134<L>  |  97  |  22  ----------------------------<  86  4.0   |  32<H>  |  1.15    Ca    7.9<L>      05 Nov 2018 04:58    TPro  5.8<L>  /  Alb  2.5<L>  /  TBili  0.5  /  DBili  x   /  AST  28  /  ALT  31  /  AlkPhos  148<H>  11-04    PT/INR - ( 05 Nov 2018 04:58 )   PT: 20.1 sec;   INR: 1.78 ratio         PTT - ( 05 Nov 2018 04:58 )  PTT:33.7 sec  MEDICATIONS  (STANDING):  amiodarone    Tablet 100 milliGRAM(s) Oral daily  atorvastatin 10 milliGRAM(s) Oral at bedtime  clotrimazole 1% Cream 1 Application(s) Topical two times a day  dextrose 5%. 1000 milliLiter(s) (50 mL/Hr) IV Continuous <Continuous>  dextrose 50% Injectable 12.5 Gram(s) IV Push once  dextrose 50% Injectable 25 Gram(s) IV Push once  dextrose 50% Injectable 25 Gram(s) IV Push once  docusate sodium 100 milliGRAM(s) Oral three times a day  furosemide    Tablet 40 milliGRAM(s) Oral two times a day  gabapentin 300 milliGRAM(s) Oral three times a day  insulin lispro (HumaLOG) corrective regimen sliding scale   SubCutaneous three times a day before meals  insulin lispro (HumaLOG) corrective regimen sliding scale   SubCutaneous at bedtime  lactulose Syrup 10 Gram(s) Oral daily  lisinopril 2.5 milliGRAM(s) Oral daily  metoprolol tartrate 25 milliGRAM(s) Oral two times a day  multivitamin 1 Tablet(s) Oral daily  spironolactone 100 milliGRAM(s) Oral daily  tenofovir disoproxil fumarate (VIREAD) 300 milliGRAM(s) Oral daily    MEDICATIONS  (PRN):  acetaminophen   Tablet .. 650 milliGRAM(s) Oral every 6 hours PRN Temp greater or equal to 38C (100.4F), Mild Pain (1 - 3), Moderate Pain (4 - 6)  ALBUTerol/ipratropium for Nebulization 3 milliLiter(s) Nebulizer every 6 hours PRN Shortness of Breath and/or Wheezing  dextrose 40% Gel 15 Gram(s) Oral once PRN Blood Glucose LESS THAN 70 milliGRAM(s)/deciliter  glucagon  Injectable 1 milliGRAM(s) IntraMuscular once PRN Glucose LESS THAN 70 milligrams/deciliter  senna 2 Tablet(s) Oral at bedtime PRN Constipation  traMADol 50 milliGRAM(s) Oral every 4 hours PRN Moderate Pain (4 - 6)      Physical exam  Gen: mild respiratory distress.  Neuro: AO x 3.  Card: S1 S2 RRR.  Pulm: Decreased breath sounds on the left.  Abd: Softly distended, nontender.  Ext: No edema.    I&O's Summary    04 Nov 2018 07:01  -  05 Nov 2018 07:00  --------------------------------------------------------  IN: 0 mL / OUT: 2150 mL / NET: -2150 mL        Assessment  78y Male  w/ PAST MEDICAL & SURGICAL HISTORY:  Hyperlipidemia  Hypogammaglobulinemia: received IVIG  Hepatitis B virus infection, unspecified chronicity: retrovirals  Chronic diastolic congestive heart failure  Essential hypertension  DM type 2 (diabetes mellitus, type 2)  B-cell lymphoma, unspecified B-cell lymphoma type, unspecified body region: CLL stage 4/SLL; met NHL  Atrial fibrillation, unspecified type  COPD (chronic obstructive pulmonary disease)  History of esophageal dilatation  S/P cholecystectomy  H/O prostatectomy  admitted with Lower back pain and shortness of breath (05 Nov 2018 09:21)  .      PLAN  Paracentesis today.  NPO preop for VATS pleurX this afternoon. Subjective:    11/5/18:  patient seen and examined with son and wife at bedside.  States his breathing is labored but stable.  NPO for paracentesis and VATS pleur X placement today.    Vital Signs:  Vital Signs Last 24 Hrs  T(C): 37 (11-05-18 @ 05:37), Max: 37 (11-04-18 @ 12:54)  T(F): 98.6 (11-05-18 @ 05:37), Max: 98.6 (11-04-18 @ 12:54)  HR: 73 (11-05-18 @ 09:00) (73 - 102)  BP: 97/49 (11-05-18 @ 09:00) (82/49 - 114/60)  RR: 26 (11-05-18 @ 09:00) (15 - 26)  SpO2: 100% (11-05-18 @ 09:00) (93% - 100%) on NC.    Telemetry/Alarms: sinus rhythm    Relevant labs, radiology and Medications reviewed                        11.2   13.79 )-----------( 90       ( 05 Nov 2018 04:58 )             36.0     11-05    134<L>  |  97  |  22  ----------------------------<  86  4.0   |  32<H>  |  1.15    Ca    7.9<L>      05 Nov 2018 04:58    TPro  5.8<L>  /  Alb  2.5<L>  /  TBili  0.5  /  DBili  x   /  AST  28  /  ALT  31  /  AlkPhos  148<H>  11-04    PT/INR - ( 05 Nov 2018 04:58 )   PT: 20.1 sec;   INR: 1.78 ratio         PTT - ( 05 Nov 2018 04:58 )  PTT:33.7 sec  MEDICATIONS  (STANDING):  amiodarone    Tablet 100 milliGRAM(s) Oral daily  atorvastatin 10 milliGRAM(s) Oral at bedtime  clotrimazole 1% Cream 1 Application(s) Topical two times a day  dextrose 5%. 1000 milliLiter(s) (50 mL/Hr) IV Continuous <Continuous>  dextrose 50% Injectable 12.5 Gram(s) IV Push once  dextrose 50% Injectable 25 Gram(s) IV Push once  dextrose 50% Injectable 25 Gram(s) IV Push once  docusate sodium 100 milliGRAM(s) Oral three times a day  furosemide    Tablet 40 milliGRAM(s) Oral two times a day  gabapentin 300 milliGRAM(s) Oral three times a day  insulin lispro (HumaLOG) corrective regimen sliding scale   SubCutaneous three times a day before meals  insulin lispro (HumaLOG) corrective regimen sliding scale   SubCutaneous at bedtime  lactulose Syrup 10 Gram(s) Oral daily  lisinopril 2.5 milliGRAM(s) Oral daily  metoprolol tartrate 25 milliGRAM(s) Oral two times a day  multivitamin 1 Tablet(s) Oral daily  spironolactone 100 milliGRAM(s) Oral daily  tenofovir disoproxil fumarate (VIREAD) 300 milliGRAM(s) Oral daily    MEDICATIONS  (PRN):  acetaminophen   Tablet .. 650 milliGRAM(s) Oral every 6 hours PRN Temp greater or equal to 38C (100.4F), Mild Pain (1 - 3), Moderate Pain (4 - 6)  ALBUTerol/ipratropium for Nebulization 3 milliLiter(s) Nebulizer every 6 hours PRN Shortness of Breath and/or Wheezing  dextrose 40% Gel 15 Gram(s) Oral once PRN Blood Glucose LESS THAN 70 milliGRAM(s)/deciliter  glucagon  Injectable 1 milliGRAM(s) IntraMuscular once PRN Glucose LESS THAN 70 milligrams/deciliter  senna 2 Tablet(s) Oral at bedtime PRN Constipation  traMADol 50 milliGRAM(s) Oral every 4 hours PRN Moderate Pain (4 - 6)      Physical exam  Gen: mild respiratory distress.  Neuro: AO x 3.  Card: S1 S2 RRR.  Pulm: Decreased breath sounds on the left.  Abd: Softly distended, nontender.  Ext: No edema.    I&O's Summary    04 Nov 2018 07:01  -  05 Nov 2018 07:00  --------------------------------------------------------  IN: 0 mL / OUT: 2150 mL / NET: -2150 mL        Assessment  78y Male  w/ PAST MEDICAL & SURGICAL HISTORY:  Hyperlipidemia  Hypogammaglobulinemia: received IVIG  Hepatitis B virus infection, unspecified chronicity: retrovirals  Chronic diastolic congestive heart failure  Essential hypertension  DM type 2 (diabetes mellitus, type 2)  B-cell lymphoma, unspecified B-cell lymphoma type, unspecified body region: CLL stage 4/SLL; met NHL  Atrial fibrillation, unspecified type  COPD (chronic obstructive pulmonary disease)  History of esophageal dilatation  S/P cholecystectomy  H/O prostatectomy  admitted with Lower back pain and shortness of breath (05 Nov 2018 09:21)  .      PLAN  Paracentesis tomorrow.	  NPO preop for VATS pleurX this afternoon.

## 2018-11-05 NOTE — PROGRESS NOTE ADULT - SUBJECTIVE AND OBJECTIVE BOX
78 year old male with pmh of copd on home o2 2.5L, afib on coumadin, chronic diastolic chf, diabetes on insulin, htn, hep b infection, hypogammaglobulinemia w/ IVIG tx, b cell lymphoma, cholelithiasis s/p cholecystectomy, esophageal dilation, prostatectomy coming to ED with complaints of worsening shortness of breath and lower back pain.  CT showed : Moderate to large Lt pleural effusion. Small Rt pleural effusion. Moderate to large ascites. Pt s/p VATs and 700 cc of serous fluid removed, Pleurx catheter placed on 11/5/2018.    REVIEW OF SYSTEMS:  CONSTITUTIONAL: No fevers or chills  RESPIRATORY: +shortness of breath  CARDIOVASCULAR: No chest pain or palpitations  GASTROINTESTINAL: No abdominal or epigastric pain; +dry mouth  GENITOURINARY: No dysuria, frequency or hematuria  All other review of systems is negative unless indicated above    Vital Signs Last 24 Hrs  T(C): 36.5 (05 Nov 2018 19:10), Max: 37 (05 Nov 2018 05:37)  T(F): 97.7 (05 Nov 2018 19:10), Max: 98.6 (05 Nov 2018 05:37)  HR: 80 (05 Nov 2018 19:30) (72 - 102)  BP: 94/55 (05 Nov 2018 19:30) (82/49 - 117/60)  BP(mean): 74 (05 Nov 2018 17:01) (57 - 78)  RR: 18 (05 Nov 2018 19:30) (12 - 28)  SpO2: 96% (05 Nov 2018 19:30) (93% - 100%)    I&O's Summary  04 Nov 2018 07:01  -  05 Nov 2018 07:00  --------------------------------------------------------  IN: 0 mL / OUT: 2150 mL / NET: -2150 mL    05 Nov 2018 07:01  -  05 Nov 2018 19:47  --------------------------------------------------------  IN: 300 mL / OUT: 850 mL / NET: -550 mL    POCT Blood Glucose.: 79 mg/dL (05 Nov 2018 19:18)  POCT Blood Glucose.: 59 mg/dL (05 Nov 2018 17:35)  POCT Blood Glucose.: 91 mg/dL (05 Nov 2018 13:25)  POCT Blood Glucose.: 58 mg/dL (05 Nov 2018 12:18)  POCT Blood Glucose.: 113 mg/dL (04 Nov 2018 21:03)      PHYSICAL EXAM:  Constitutional: NAD, awake and alert, well-developed  HEENT: EOMI, Normal Hearing, DMM  Respiratory: scattered rhonci, +decreased bibasilar lung sounds  Cardiovascular: S1 and S2, regular rate and rhythm  Gastrointestinal: Bowel Sounds present, soft, nontender, nondistended, no guarding, no rebound  Extremities: No peripheral edema    MEDICATIONS:  MEDICATIONS  (STANDING):  amiodarone    Tablet 100 milliGRAM(s) Oral daily  atorvastatin 10 milliGRAM(s) Oral at bedtime  clotrimazole 1% Cream 1 Application(s) Topical two times a day  dextrose 5%. 1000 milliLiter(s) (50 mL/Hr) IV Continuous <Continuous>  dextrose 50% Injectable 12.5 Gram(s) IV Push once  dextrose 50% Injectable 25 Gram(s) IV Push once  dextrose 50% Injectable 25 Gram(s) IV Push once  docusate sodium 100 milliGRAM(s) Oral three times a day  furosemide    Tablet 40 milliGRAM(s) Oral two times a day  gabapentin 300 milliGRAM(s) Oral three times a day  insulin lispro (HumaLOG) corrective regimen sliding scale   SubCutaneous three times a day before meals  insulin lispro (HumaLOG) corrective regimen sliding scale   SubCutaneous at bedtime  lactated ringers. 1000 milliLiter(s) (75 mL/Hr) IV Continuous <Continuous>  lactated ringers. 1000 milliLiter(s) (75 mL/Hr) IV Continuous <Continuous>  lactulose Syrup 10 Gram(s) Oral daily  lidocaine 1% (Preservative-free) Injectable 25 milliLiter(s) Local Injection once  lisinopril 2.5 milliGRAM(s) Oral daily  metoprolol tartrate 25 milliGRAM(s) Oral two times a day  multivitamin 1 Tablet(s) Oral daily  spironolactone 100 milliGRAM(s) Oral daily  tenofovir disoproxil fumarate (VIREAD) 300 milliGRAM(s) Oral daily      LABS: All Labs Reviewed:                      11.2   13.79 )-----------( 90       ( 05 Nov 2018 04:58 )             36.0     11-05  134<L>  |  97  |  22  ----------------------------<  86  4.0   |  32<H>  |  1.15    Ca    7.9<L>      05 Nov 2018 04:58    TPro  5.8<L>  /  Alb  2.5<L>  /  TBili  0.5  /  DBili  x   /  AST  28  /  ALT  31  /  AlkPhos  148<H>  11-04    PT/INR - ( 05 Nov 2018 04:58 )   PT: 20.1 sec;   INR: 1.78 ratio         PTT - ( 05 Nov 2018 04:58 )  PTT:33.7 sec      Blood Culture: 11-02 @ 19:11  Organism --  Gram Stain Blood -- Gram Stain --  Specimen Source .Urine Clean Catch (Midstream)  Culture-Blood --    RADIOLOGY/EKG:  < from: US Abdomen Limited (11.03.18 @ 14:05) >  COMPARISON: Chest CT 1 day prior    FINDINGS:    There is moderate abdominal and pelvic 4 quadrant ascites. Bilateral   pleural effusions again noted.    IMPRESSION:    Moderate 4 quadrant abdominal and pelvic ascites.

## 2018-11-05 NOTE — PROGRESS NOTE ADULT - ASSESSMENT
1) Bilateral Pleural Effusions  2) Abnormal CT Chest  3) History of Bronchomalacia  4) COPD  5) Dyspnea  6) History of Heart Failure   7) Passive Atelectasis/Compressive Atelectasis   8) Ascites  9) History of Lymphoma    78 year old male with pmh of copd on home o2 2.5L, afib on coumadin, chronic diastolic chf, diabetes on insulin, htn, hep b infection, hypogammaglobulinemia w/ IVIG tx, b cell lymphoma, cholelithiasis s/p cholecystectomy, esophageal dilation, prostatectomy coming to ED with complaints of worsening shortness of breath and lower back pain. Patient stating was on toilet this morning when tried to get up put pants on when he had a sudden onset of lower back pain.   Reviewed CT chest which shows bilateral pleural effusions L> R.  Once INR is reduced to < 2, can consider thoracentesis  Appreciate CTS assistance  Follow up pleural LDH, cytology, cell count, protein, glucose, triglycerides should also be performed given history of lymphoma.   Performed bronchoscopy on the patient last year which revealed bronchomalacia which is likely contributing to the patient's underlying dyspnea as well  Follow up echocardiogram  Patient may need paracentesis with fluid analysis  Follow up hematology recommendations   Continue O2, goal SaO2 >88%  Will d/c Singulair given potential underlying hepatic issues.  Start Symbicort and Spiriva   For Pleur-X Tomorrow  Will continue to monitor  get repeat cxr  send PF for analysis / ordered

## 2018-11-05 NOTE — PROGRESS NOTE ADULT - SUBJECTIVE AND OBJECTIVE BOX
Patient is a 78y old  Male who presents with a chief complaint of CC: Lower back pain (2018 08:54)      HPI:  78 year old male with pmh of copd on home o2 2.5L, afib on coumadin, chronic diastolic chf, diabetes on insulin, htn, hep b infection, hypogammaglobulinemia w/ IVIG tx, b cell lymphoma, cholelithiasis s/p cholecystectomy, esophageal dilation, prostatectomy coming to ED with complaints of worsening shortness of breath and lower back pain. Patient stating was on toilet this morning when tried to get up put pants on when he had a sudden onset of lower back pain. No bladder or bowel incontinence. Per Dr Bejarano In ED bladder scan was done with no to low residual urine volume. Per Dr Bejarano patient able to ambulate with cane in ED. FOr shortness of breath has been going on for past 4-5 weeks and has been gradually worsening. States last echo over 1 year prior. states now able to walk 50 feet without stopping for shortness of breath (2018 22:27)    history per patient, wife and son.  Son often pitches into give additional history.  They state that patient has been compliant with his hepatitis B medication at home. However, the prescription that they have/with a bottle, states that prescription was refilled on  with 30 tablets and there is 6 tablets left within it. When asked about this, wife states that she has another bottle from last month at home.  Importance of compliance with medication risk of resistance discussed with them especially with noncompliance with hepatitis B medications.  He does complain of some low back pain and states that he has injury to the spine and has seen Dr. Caballero.  He denies any chest pain but does have some shortness of breath.  Positive fatigue.  Has been able to tolerate by mouth well.            PAST MEDICAL & SURGICAL HISTORY:  Hyperlipidemia  Hypogammaglobulinemia: received IVIG  Hepatitis B virus infection, unspecified chronicity: retrovirals  Chronic diastolic congestive heart failure  Essential hypertension  DM type 2 (diabetes mellitus, type 2)  B-cell lymphoma, unspecified B-cell lymphoma type, unspecified body region: CLL stage 4/SLL; met NHL  Atrial fibrillation, unspecified type  COPD (chronic obstructive pulmonary disease)  History of esophageal dilatation  S/P cholecystectomy  H/O prostatectomy      MEDICATIONS  (STANDING):  amiodarone    Tablet 100 milliGRAM(s) Oral daily  atorvastatin 10 milliGRAM(s) Oral at bedtime  clotrimazole 1% Cream 1 Application(s) Topical two times a day  dextrose 5%. 1000 milliLiter(s) (50 mL/Hr) IV Continuous <Continuous>  dextrose 50% Injectable 12.5 Gram(s) IV Push once  dextrose 50% Injectable 25 Gram(s) IV Push once  dextrose 50% Injectable 25 Gram(s) IV Push once  docusate sodium 100 milliGRAM(s) Oral three times a day  furosemide    Tablet 40 milliGRAM(s) Oral two times a day  gabapentin 300 milliGRAM(s) Oral three times a day  insulin lispro (HumaLOG) corrective regimen sliding scale   SubCutaneous three times a day before meals  insulin lispro (HumaLOG) corrective regimen sliding scale   SubCutaneous at bedtime  lactulose Syrup 10 Gram(s) Oral daily  lisinopril 2.5 milliGRAM(s) Oral daily  metoprolol tartrate 25 milliGRAM(s) Oral two times a day  multivitamin 1 Tablet(s) Oral daily  spironolactone 100 milliGRAM(s) Oral daily  tenofovir disoproxil fumarate (VIREAD) 300 milliGRAM(s) Oral daily    MEDICATIONS  (PRN):  acetaminophen   Tablet .. 650 milliGRAM(s) Oral every 6 hours PRN Temp greater or equal to 38C (100.4F), Mild Pain (1 - 3), Moderate Pain (4 - 6)  ALBUTerol/ipratropium for Nebulization 3 milliLiter(s) Nebulizer every 6 hours PRN Shortness of Breath and/or Wheezing  dextrose 40% Gel 15 Gram(s) Oral once PRN Blood Glucose LESS THAN 70 milliGRAM(s)/deciliter  glucagon  Injectable 1 milliGRAM(s) IntraMuscular once PRN Glucose LESS THAN 70 milligrams/deciliter  senna 2 Tablet(s) Oral at bedtime PRN Constipation  traMADol 50 milliGRAM(s) Oral every 4 hours PRN Moderate Pain (4 - 6)      Allergies    No Known Allergies    Intolerances        SOCIAL HISTORY:Neg ETOh, NC    FAMILY HISTORY:  Family history of liver cancer (Father): father  85 liver CA  Family history of coronary arteriosclerosis (Mother): Mom  of MI age 68      REVIEW OF SYSTEMS:    CONSTITUTIONAL: pos weakness, neg fevers or chills  EYES/ENT: No visual changes;  No vertigo or throat pain   NECK: No pain or stiffness  RESPIRATORY: No cough, wheezing, hemoptysis; No shortness of breath  CARDIOVASCULAR: No chest pain or palpitations  GENITOURINARY: No dysuria, frequency or hematuria  NEUROLOGICAL: No numbness or weakness  SKIN: No itching, burning, rashes, or lesions   All other review of systems is negative unless indicated above.    Vital Signs Last 24 Hrs  T(C): 37 (2018 05:37), Max: 37 (2018 12:54)  T(F): 98.6 (2018 05:37), Max: 98.6 (2018 12:54)  HR: 87 (2018 04:00) (75 - 102)  BP: 93/61 (2018 04:00) (93/61 - 114/60)  BP(mean): 68 (2018 04:00) (63 - 78)  RR: 15 (2018 04:00) (15 - 25)  SpO2: 95% (2018 04:00) (93% - 100%)    PHYSICAL EXAM:    Constitutional: NAD, well-developed  HEENT: EOMI, throat clear  Neck: No LAD, supple  Respiratory: CTA and P x dec BS L base and dull to percussion  Cardiovascular: S1 and S2, RRR, no M  Gastrointestinal: BS+, soft, NT/ND, neg HSM,pos ascites  Extremities: No peripheral edema, neg clubing, cyanosis  Vascular: 2+ peripheral pulses  Neurological: A/O x 3, no focal deficits  Psychiatric: Normal mood, normal affect  Skin: No rashes    LABS:  CBC Full  -  ( 2018 04:58 )  WBC Count : 13.79 K/uL  Hemoglobin : 11.2 g/dL  Hematocrit : 36.0 %  Platelet Count - Automated : 90 K/uL  Mean Cell Volume : 84.3 fl  Mean Cell Hemoglobin : 26.2 pg  Mean Cell Hemoglobin Concentration : 31.1 gm/dL  Auto Neutrophil # : x  Auto Lymphocyte # : x  Auto Monocyte # : x  Auto Eosinophil # : x  Auto Basophil # : x  Auto Neutrophil % : x  Auto Lymphocyte % : x  Auto Monocyte % : x  Auto Eosinophil % : x  Auto Basophil % : x    11-05    134<L>  |  97  |  22  ----------------------------<  86  4.0   |  32<H>  |  1.15    Ca    7.9<L>      2018 04:58    TPro  5.8<L>  /  Alb  2.5<L>  /  TBili  0.5  /  DBili  x   /  AST  28  /  ALT  31  /  AlkPhos  148<H>  11-04    PT/INR - ( 2018 04:58 )   PT: 20.1 sec;   INR: 1.78 ratio         PTT - ( 2018 04:58 )  PTT:33.7 sec        RADIOLOGY & ADDITIONAL STUDIES:  < from: US Abdomen Limited (18 @ 14:05) >  EXAM:  US ABDOMEN LIMITED                            PROCEDURE DATE:  2018          INTERPRETATION:  US ABDOMEN LIMITED    HISTORY:  ascites, low back pain, shortness of breath, hepatitis B    TECHNIQUE: Selected transverse and longitudinal images are acquired   during real-time ultrasound examination of the abdomen utilizing a curved   array transducer.    COMPARISON: Chest CT 1 day prior    FINDINGS:    There is moderate abdominal and pelvic 4 quadrant ascites. Bilateral   pleural effusions again noted.    IMPRESSION:    Moderate 4 quadrant abdominal and pelvic ascites.    < end of copied text >      < from: CT Chest No Cont (18 @ 19:34) >  EXAM:  CT CHEST                            PROCEDURE DATE:  2018          INTERPRETATION:  Exam Date: 2018 7:34 PM    CT chest without contrast    CLINICAL INFORMATION:  pleural effusion left, further eval    TECHNIQUE:  Contiguous axial3 mm sections were obtained through the   chest using single helical acquisition.  In addition, 2D sagittal and   coronal reformatted images obtained.   This scan was performed using   automatic exposure control (radiation dose reduction software) toobtain   a diagnostic image quality scan with patient dose as low as reasonably   achievable.        FINDINGS: Comparison is made to prior CT chest of 2018.    The thyroid gland appears intact.    Moderate to large left pleural effusion with underlying compressive   atelectasis. Small right pleural effusion with underlying compressive   atelectasis.   No pneumothorax. No chronic interstitial findings. The   trachea and major bronchi are patent.    Prominent lymph nodes in the bilateral axilla, mediastinum, and hilum.   The heart size is normal.      Limited evaluation of the upper abdomen demonstrates a moderate to large   amount of ascites in the upper abdomen.      IMPRESSION: Moderate to large left pleural effusion with underlying   compressive atelectasis. Small right pleural effusion with underlying   compressive atelectasis.   Prominent lymph nodes in the bilateral axilla,   mediastinum, and hilum.     Moderate to large amount of ascites in the upper abdomen.                MAYRA JAIN M.D., ATTENDING RADIOLOGIST    < end of copied text >

## 2018-11-05 NOTE — PROGRESS NOTE ADULT - SUBJECTIVE AND OBJECTIVE BOX
HPI:  78 year old male with pmh of copd on home o2 2.5L, afib on coumadin, chronic diastolic chf, diabetes on insulin, htn, hep b infection, hypogammaglobulinemia w/ IVIG tx, b cell lymphoma, cholelithiasis s/p cholecystectomy, esophageal dilation, prostatectomy coming to ED with complaints of worsening shortness of breath and lower back pain. Patient stating was on toilet this morning when tried to get up put pants on when he had a sudden onset of lower back pain. No bladder or bowel incontinence. Per Dr Bejarano In ED bladder scan was done with no to low residual urine volume. Per Dr Bejarano patient able to ambulate with cane in ED. FOr shortness of breath has been going on for past 4-5 weeks and has been gradually worsening. States last echo over 1 year prior. states now able to walk 50 feet without stopping for shortness of breath (02 Nov 2018 22:27)    11/4/18 Thru family , patient with continued back pain  11/5/18 Patient with persistent BP, abdomen feels better    PAST MEDICAL & SURGICAL HISTORY:  Hyperlipidemia  Hypogammaglobulinemia: received IVIG  Hepatitis B virus infection, unspecified chronicity: retrovirals  Chronic diastolic congestive heart failure  Essential hypertension  DM type 2 (diabetes mellitus, type 2)  B-cell lymphoma, unspecified B-cell lymphoma type, unspecified body region: CLL stage 4/SLL; met NHL  Atrial fibrillation, unspecified type  COPD (chronic obstructive pulmonary disease)  History of esophageal dilatation  S/P cholecystectomy  H/O prostatectomy    MEDICATIONS  (STANDING):  amiodarone    Tablet 100 milliGRAM(s) Oral daily  atorvastatin 10 milliGRAM(s) Oral at bedtime  dextrose 5%. 1000 milliLiter(s) (50 mL/Hr) IV Continuous <Continuous>  dextrose 50% Injectable 12.5 Gram(s) IV Push once  dextrose 50% Injectable 25 Gram(s) IV Push once  dextrose 50% Injectable 25 Gram(s) IV Push once  docusate sodium 100 milliGRAM(s) Oral three times a day  furosemide    Tablet 20 milliGRAM(s) Oral two times a day  gabapentin 300 milliGRAM(s) Oral three times a day  insulin glargine Injectable (LANTUS) 20 Unit(s) SubCutaneous at bedtime  insulin lispro (HumaLOG) corrective regimen sliding scale   SubCutaneous three times a day before meals  insulin lispro (HumaLOG) corrective regimen sliding scale   SubCutaneous at bedtime  lactulose Syrup 10 Gram(s) Oral daily  lisinopril 2.5 milliGRAM(s) Oral daily  metoprolol tartrate 25 milliGRAM(s) Oral two times a day  multivitamin 1 Tablet(s) Oral daily  spironolactone 25 milliGRAM(s) Oral at bedtime  tenofovir disoproxil fumarate (VIREAD) 300 milliGRAM(s) Oral daily    MEDICATIONS  (PRN):  acetaminophen   Tablet .. 650 milliGRAM(s) Oral every 6 hours PRN Temp greater or equal to 38C (100.4F), Mild Pain (1 - 3), Moderate Pain (4 - 6)  ALBUTerol/ipratropium for Nebulization 3 milliLiter(s) Nebulizer every 6 hours PRN Shortness of Breath and/or Wheezing  dextrose 40% Gel 15 Gram(s) Oral once PRN Blood Glucose LESS THAN 70 milliGRAM(s)/deciliter  glucagon  Injectable 1 milliGRAM(s) IntraMuscular once PRN Glucose LESS THAN 70 milligrams/deciliter  senna 2 Tablet(s) Oral at bedtime PRN Constipation  traMADol 50 milliGRAM(s) Oral every 4 hours PRN Moderate Pain (4 - 6)    Allergies    No Known Allergies    Intolerances    PE:    Vital Signs Last 24 Hrs  T(C): 37 (05 Nov 2018 05:37), Max: 37 (04 Nov 2018 12:54)  T(F): 98.6 (05 Nov 2018 05:37), Max: 98.6 (04 Nov 2018 12:54)  HR: 87 (05 Nov 2018 04:00) (75 - 102)  BP: 93/61 (05 Nov 2018 04:00) (93/61 - 114/60)  BP(mean): 68 (05 Nov 2018 04:00) (63 - 78)  RR: 15 (05 Nov 2018 04:00) (15 - 25)  SpO2: 95% (05 Nov 2018 04:00) (93% - 100%)    Patient looks more comfortable and complains of abdominal and back pain  Abdomen less distended with mild tenderness  Tender T-L junction  increased pain with change inposition  No focal motor deficits    LABS                        11.2   13.79 )-----------( 90       ( 05 Nov 2018 04:58 )             36.0     11-05    134<L>  |  97  |  22  ----------------------------<  86  4.0   |  32<H>  |  1.15    Ca    7.9<L>      05 Nov 2018 04:58    TPro  5.8<L>  /  Alb  2.5<L>  /  TBili  0.5  /  DBili  x   /  AST  28  /  ALT  31  /  AlkPhos  148<H>  11-04        MRI with acute L1 fracture

## 2018-11-05 NOTE — PROGRESS NOTE ADULT - SUBJECTIVE AND OBJECTIVE BOX
Pt has been seen and examined with FP resident, resident supervised agree with a/p       Patient is a 78y old  Male who presents with a chief complaint of CC: Lower back pain and shortness of breath (03 Nov 2018 11:07)        HPI:  78 year old male with pmh of copd on home o2 2.5L, afib on coumadin, chronic diastolic chf, diabetes on insulin, htn, hep b infection, hypogammaglobulinemia w/ IVIG tx, b cell lymphoma, cholelithiasis s/p cholecystectomy, esophageal dilation, prostatectomy coming to ED with complaints of worsening shortness of breath and lower back pain.     PHYSICAL EXAM:    general- comfortable   -rs-decrease air entry on left side, rales present at bases   -cvs-s1s2 normal   -p/a-soft,bs+  -extremity- no asymmetrical swelling noted           A/P    # Pleural effusion- pleurax catheter placement today     #Ascitis - tap tomorrow     #supportive care     #Discussed with Dr. Conte

## 2018-11-05 NOTE — PROGRESS NOTE ADULT - SUBJECTIVE AND OBJECTIVE BOX
Patient is a 78y old  Male who presents with a chief complaint of CC: Lower back pain and shortness of breath (2018 22:27)      HPI:  78 year old male with pmh of copd on home o2 2.5L, afib on coumadin, chronic diastolic chf, diabetes on insulin, htn, hep b infection, hypogammaglobulinemia w/ IVIG tx, b cell lymphoma, cholelithiasis s/p cholecystectomy, esophageal dilation, prostatectomy coming to ED with complaints of worsening shortness of breath and lower back pain. Patient stating was on toilet this morning when tried to get up put pants on when he had a sudden onset of lower back pain. No bladder or bowel incontinence. Per Dr Bejarano In ED bladder scan was done with no to low residual urine volume. Per Dr Bejarano patient able to ambulate with cane in ED. FOr shortness of breath has been going on for past 4-5 weeks and has been gradually worsening. States last echo over 1 year prior. states now able to walk 50 feet without stopping for shortness of breath (2018 22:27)    18  No acute events occurred overnight       his family updated at bedside  feels ok  pain is better controlled  no difficulty breathing    MEDICATIONS  (STANDING):  amiodarone    Tablet 100 milliGRAM(s) Oral daily  atorvastatin 10 milliGRAM(s) Oral at bedtime  dextrose 5%. 1000 milliLiter(s) (50 mL/Hr) IV Continuous <Continuous>  dextrose 50% Injectable 12.5 Gram(s) IV Push once  dextrose 50% Injectable 25 Gram(s) IV Push once  dextrose 50% Injectable 25 Gram(s) IV Push once  docusate sodium 100 milliGRAM(s) Oral three times a day  furosemide    Tablet 20 milliGRAM(s) Oral two times a day  gabapentin 300 milliGRAM(s) Oral three times a day  insulin glargine Injectable (LANTUS) 20 Unit(s) SubCutaneous at bedtime  insulin lispro (HumaLOG) corrective regimen sliding scale   SubCutaneous three times a day before meals  insulin lispro (HumaLOG) corrective regimen sliding scale   SubCutaneous at bedtime  lactulose Syrup 10 Gram(s) Oral daily  lisinopril 2.5 milliGRAM(s) Oral daily  metoprolol tartrate 25 milliGRAM(s) Oral two times a day  multivitamin 1 Tablet(s) Oral daily  spironolactone 25 milliGRAM(s) Oral at bedtime  tenofovir disoproxil fumarate (VIREAD) 300 milliGRAM(s) Oral daily    MEDICATIONS  (PRN):  acetaminophen   Tablet .. 650 milliGRAM(s) Oral every 6 hours PRN Temp greater or equal to 38C (100.4F), Mild Pain (1 - 3), Moderate Pain (4 - 6)  ALBUTerol/ipratropium for Nebulization 3 milliLiter(s) Nebulizer every 6 hours PRN Shortness of Breath and/or Wheezing  dextrose 40% Gel 15 Gram(s) Oral once PRN Blood Glucose LESS THAN 70 milliGRAM(s)/deciliter  glucagon  Injectable 1 milliGRAM(s) IntraMuscular once PRN Glucose LESS THAN 70 milligrams/deciliter  senna 2 Tablet(s) Oral at bedtime PRN Constipation  traMADol 50 milliGRAM(s) Oral every 4 hours PRN Moderate Pain (4 - 6)          FAMILY HISTORY:  Family history of liver cancer (Father): father  85 liver CA  Family history of coronary arteriosclerosis (Mother): Mom  of MI age 68      SOCIAL HISTORY: Lives at home     REVIEW OF SYSTEM:  cough, dyspnea, fatigue, otherwise 12 point ROS negative     Vital Signs Last 24 Hrs  T(C): 37 (2018 05:37), Max: 37 (2018 12:54)  T(F): 98.6 (2018 05:37), Max: 98.6 (2018 12:54)  HR: 87 (2018 04:00) (75 - 102)  BP: 93/61 (2018 04:00) (93/61 - 114/60)  BP(mean): 68 (2018 04:00) (63 - 78)  RR: 15 (2018 04:00) (15 - 25)  SpO2: 95% (2018 04:00) (93% - 100%)  I&O's Summary    2018 07:01  -  2018 07:00  --------------------------------------------------------  IN: 0 mL / OUT: 1550 mL / NET: -1550 mL      PHYSICAL EXAM  General Appearance: cooperative, no acute distress,   HEENT: PERRL, conjunctiva clear, EOM's intact, non injected pharynx, no exudate, TM   normal  Neck: Supple, , no adenopathy, thyroid: not enlarged, no carotid bruit or JVD  Back: Symmetric, no  tenderness,no soft tissue tenderness  Lungs: Decreased breath sounds in the right lower lobe, diminished in the upper and lower lobes on the left, Right upper lobe inspiratory wheezes   Heart: Regular rate and rhythm, S1, S2 normal, no murmur, rub or gallop  Abdomen: Soft, non-tender, bowel sounds active , no hepatosplenomegaly  Extremities: no cyanosis or edema, no joint swelling  Skin: Skin color, texture normal, no rashes   Neurologic: Alert and oriented X3 , cranial nerves intact, sensory and motor normal,    ECG:    LABS:                        11.2   13.79 )-----------( 90       ( 2018 04:58 )             36.0   11    134<L>  |  97  |  22  ----------------------------<  86  4.0   |  32<H>  |  1.15    Ca    7.9<L>      2018 04:58    TPro  5.8<L>  /  Alb  2.5<L>  /  TBili  0.5  /  DBili  x   /  AST  28  /  ALT  31  /  AlkPhos  148<H>                            12.3   16.94 )-----------( 95       ( 2018 04:44 )             38.5     -    133<L>  |  98  |  14  ----------------------------<  113<H>  5.0   |  27  |  0.99    Ca    8.3<L>      2018 04:44  Phos  3.7     -  Mg     1.9     -    TPro  6.0  /  Alb  2.8<L>  /  TBili  0.5  /  DBili  x   /  AST  34  /  ALT  34  /  AlkPhos  162<H>      CARDIAC MARKERS ( 2018 17:42 )  <0.015 ng/mL / x     / x     / x     / x            Pro BNP  182 11-02 @ 17:42  D Dimer  --  @ 17:42    PT/INR - ( 2018 04:44 )   PT: 23.9 sec;   INR: 2.10 ratio         PTT - ( 2018 04:44 )  PTT:37.1 sec  Urinalysis Basic - ( 2018 19:11 )    Color: Yellow / Appearance: Clear / S.010 / pH: x  Gluc: x / Ketone: Negative  / Bili: Negative / Urobili: Negative mg/dL   Blood: x / Protein: Negative mg/dL / Nitrite: Negative   Leuk Esterase: Negative / RBC: x / WBC x   Sq Epi: x / Non Sq Epi: x / Bacteria: x            RADIOLOGY & ADDITIONAL STUDIES:    IMPRESSION: Moderate to large left pleural effusion with underlying   compressive atelectasis. Small right pleural effusion with underlying   compressive atelectasis.   Prominent lymph nodes in the bilateral axilla,   mediastinum, and hilum.

## 2018-11-05 NOTE — PROGRESS NOTE ADULT - ASSESSMENT
78 y.o. male with complex past medical history including CLL/lymphoma on treatment, h/o recurrent pneumonia, Afib, HFpEF, Hep B, COPD, type 2DM, H/o PE DVT      Large-volume ascites, likelymultifactorial , the may be a component of progression of lymphoma and cirrhosis also contributing to this.  prior SAAG was low and likely component of malignant ascites and P effusion  plan for pulmonary catheter placement noted and appreciated from CT surgery note. Gentle fluid removal and would watch patient's creatinine closely.      gentle diuresis        Hepatitis B with liver cirrhosis and ascites, noncompliance discussed with patient and family. we'll check hepatitis B viral level.

## 2018-11-05 NOTE — PROGRESS NOTE ADULT - SUBJECTIVE AND OBJECTIVE BOX
Patient is a 78y old  Male who presents with a chief complaint of CC: Lower back pain and shortness of breath       HPI:  Patient is a 78-year-old with history of hypertension, chronic diastolic congestive heart failure, COPD, paroxysmal atrial fibrillation, history of DVT and pulmonary emboli in the past , he has chronic bilateral pleural effusion, CLL/lymphoma being treated, history of recurrent pneumonia, He was admitted with lower back pain and shortness of breath and after admission he was diagnosed to have bilateral pleural effusion and he also has moderate ascites. He denies any chest pain. Since admission he feels improved and now he is comfortable and is in no acute distress. He is in normal sinus rhythm on telemetry. He scheduled for thoracocentesis.     Transthoracic Echocardiogram (11.03.18   indings     Mitral Valve   Normal appearing mitral valve structure and function.   Mild (1+) mitral regurgitation is present.     Aortic Valve   The aortic valve is not well visualized, appears mildly calcified. Valve   opening seems to be normal. No stenosis or regurgitation.     Tricuspid Valve   Normal appearing tricuspid valve structure and function.   Mild (1+) tricuspid valve regurgitation is present.     Pulmonic Valve   Pulmonic valve not well seen.   Trace pulmonic valvular regurgitation is present.     Left Atrium   Normal appearing left atrium.     Left Ventricle   The left ventricle is normal in size, wall thickness, wall motion and   contractility. Estimated left ventricular ejection fraction is 55-60%.     Right Atrium   The right atrium is normal.     Right Ventricle   The right ventricle is normal in size.     Pericardial Effusion   A pericardial effusion is not present.     Pleural Effusion   Pleural effusion - left pleural effusion.       CT Chest No Cont (11.02.18   IMPRESSION: Moderate to large left pleural effusion with underlying   compressive atelectasis. Small right pleural effusion with underlying   compressive atelectasis.   Prominent lymph nodes in the bilateral axilla,   mediastinum, and hilum.     Moderate to large amount of ascites in the upper abdomen.        PAST MEDICAL & SURGICAL HISTORY:  Hyperlipidemia  Hypogammaglobulinemia: received IVIG  Hepatitis B virus infection, unspecified chronicity: retrovirals  Chronic diastolic congestive heart failure  Essential hypertension  DM type 2 (diabetes mellitus, type 2)  B-cell lymphoma, unspecified B-cell lymphoma type, unspecified body region: CLL stage 4/SLL; met NHL  Atrial fibrillation, unspecified type  COPD (chronic obstructive pulmonary disease)  History of esophageal dilatation  S/P cholecystectomy  H/O prostatectomy        MEDICATIONS  (STANDING):  amiodarone    Tablet 100 milliGRAM(s) Oral daily  atorvastatin 10 milliGRAM(s) Oral at bedtime  clotrimazole 1% Cream 1 Application(s) Topical two times a day  dextrose 5%. 1000 milliLiter(s) (50 mL/Hr) IV Continuous <Continuous>  dextrose 50% Injectable 12.5 Gram(s) IV Push once  dextrose 50% Injectable 25 Gram(s) IV Push once  dextrose 50% Injectable 25 Gram(s) IV Push once  docusate sodium 100 milliGRAM(s) Oral three times a day  furosemide    Tablet 40 milliGRAM(s) Oral two times a day  gabapentin 300 milliGRAM(s) Oral three times a day  insulin lispro (HumaLOG) corrective regimen sliding scale   SubCutaneous three times a day before meals  insulin lispro (HumaLOG) corrective regimen sliding scale   SubCutaneous at bedtime  lactulose Syrup 10 Gram(s) Oral daily  lisinopril 2.5 milliGRAM(s) Oral daily  metoprolol tartrate 25 milliGRAM(s) Oral two times a day  multivitamin 1 Tablet(s) Oral daily  spironolactone 100 milliGRAM(s) Oral daily  tenofovir disoproxil fumarate (VIREAD) 300 milliGRAM(s) Oral daily    MEDICATIONS  (PRN):  acetaminophen   Tablet .. 650 milliGRAM(s) Oral every 6 hours PRN Temp greater or equal to 38C (100.4F), Mild Pain (1 - 3), Moderate Pain (4 - 6)  ALBUTerol/ipratropium for Nebulization 3 milliLiter(s) Nebulizer every 6 hours PRN Shortness of Breath and/or Wheezing  dextrose 40% Gel 15 Gram(s) Oral once PRN Blood Glucose LESS THAN 70 milliGRAM(s)/deciliter  glucagon  Injectable 1 milliGRAM(s) IntraMuscular once PRN Glucose LESS THAN 70 milligrams/deciliter  senna 2 Tablet(s) Oral at bedtime PRN Constipation  traMADol 50 milliGRAM(s) Oral every 4 hours PRN Moderate Pain (4 - 6)      REVIEW OF SYSTEM:  Pertinent items are noted in HPI.  Constitutional	Negative for chills, fevers, sweats.    Eyes: 	Negative for visual disturbance.  Ears, nose, mouth, throat, and face: Negative for epistaxis, nasal congestion, sore throat and tinnitus.  Neck:	Negative for enlargement, pain and difficulty in swallowing  Respiration : Negative for cough,  pleuritic chest pain and wheezing  Cardiovascular: Negative for chest pain,  and palpitations . He has FRANZ.    Gastrointestinal : Negative for abdominal pain, diarrhea, nausea and vomiting  Genitourinary: Negative for dysuria, frequency and urinary incontinence .  Skin: Negative for  rash, pruritus, swelling, dryness .  	  Hematologic/lymphatic: Negative for bleeding and easy bruising  Musculoskeletal: Notable for low back pain  Neurological: Negative for dizziness, headaches, seizures and tremors  Behavioral/Psych: Negative for mood change, depression.  Endocrine:	Negative for blurry vision, polydipsia and polyuria, diaphoresis.   Allergic/Immunologic:	Negative for anaphylaxis, angioedema and urticaria.      Vital Signs Last 24 Hrs  T(C): 37 (05 Nov 2018 05:37), Max: 37 (04 Nov 2018 12:54)  T(F): 98.6 (05 Nov 2018 05:37), Max: 98.6 (04 Nov 2018 12:54)  HR: 73 (05 Nov 2018 09:00) (73 - 102)  BP: 97/49 (05 Nov 2018 09:00) (82/49 - 114/60)  BP(mean): 62 (05 Nov 2018 09:00) (57 - 78)  RR: 26 (05 Nov 2018 09:00) (15 - 26)  SpO2: 100% (05 Nov 2018 09:00) (93% - 100%)    I&O's Summary    04 Nov 2018 07:01  -  05 Nov 2018 07:00  --------------------------------------------------------  IN: 0 mL / OUT: 2150 mL / NET: -2150 mL      PHYSICAL EXAM  General Appearance: cooperative, no acute distress,   HEENT: PERRL, conjunctiva clear, EOM's intact   Neck: Supple, , no adenopathy, thyroid: not enlarged, no carotid bruit or JVD    Lungs: diminished breath sounds on both sides bilateral scattered basal rales and rhonchi's.  Heart: Regular rate and rhythm, S1, S2 normal, 1/6 HS murmur, no  rub or gallop  Abdomen: Soft, non-tender, bowel sounds active , no hepatosplenomegaly , he has  ascites.  Extremities: no cyanosis or edema, no joint swelling  Skin: Skin color, texture normal, no rashes   Neurologic: Alert and oriented X3 , cranial nerves intact, sensory and motor normal,        INTERPRETATION OF TELEMETRY: NSR    ECG: NSR LAD PRWP        LABS:                          11.2   13.79 )-----------( 90       ( 05 Nov 2018 04:58 )             36.0     11-05    134<L>  |  97  |  22  ----------------------------<  86  4.0   |  32<H>  |  1.15    Ca    7.9<L>      05 Nov 2018 04:58    TPro  5.8<L>  /  Alb  2.5<L>  /  TBili  0.5  /  DBili  x   /  AST  28  /  ALT  31  /  AlkPhos  148<H>  11-04        Lipid Panel  83  25  37  107    Pro BNP  182 11-02 @ 17:42  D Dimer  -- 11-02 @ 17:42    PT/INR - ( 05 Nov 2018 04:58 )   PT: 20.1 sec;   INR: 1.78 ratio         PTT - ( 05 Nov 2018 04:58 )  PTT:33.7 sec          RADIOLOGY & ADDITIONAL STUDIES:

## 2018-11-05 NOTE — PROGRESS NOTE ADULT - ASSESSMENT
78 year old male as above     * back pain with compression fracture L1  -awaiting brace  -pain management  -IV Tylenol prn    Multiple Medical problems as per Medicine  #shortness of breath   #copd  # diabetes  # chf- acute on chronic present on admit   #hld   #hepatitis b w/ ascites-for drainage today?  #afib  #dvt prophylaxis

## 2018-11-05 NOTE — PROGRESS NOTE ADULT - ASSESSMENT
Chronic diastolic congestive heart failure.  Bilateral pleural effusion l. He will need thoracocentesis again.  Moderate ascites.  History of CLL/lymphoma.  Paroxysmal atrial fibrillation he is normal sinus rhythm.  Cirrhosis of liver.  Calcified coronaries and aorta on CT scan of the chest.  Ascites.  Suggest  Agree with thoracentesis / catheter drainage of pleural effusion. F/u chemistry, cell count, pathology. r/o lymphoma related  Continue with diuretics.   Intake and output/daily weights.  Follow with electrolytes.  Hold lisinopril if BP is low.  Coumadin on hold for thoracentesis. Resume when no further invasive procedures planned , Adjust INR 2.0  discussed with patient's family at bedside and hospitalist.

## 2018-11-06 NOTE — PROGRESS NOTE ADULT - SUBJECTIVE AND OBJECTIVE BOX
HPI:  Patient is a 78-year-old with history of hypertension, chronic diastolic congestive heart failure, COPD, paroxysmal atrial fibrillation, history of DVT and pulmonary emboli in the past , he has chronic bilateral pleural effusion, CLL/lymphoma being treated, history of recurrent pneumonia, He was admitted with lower back pain and shortness of breath and after admission he was diagnosed to have bilateral pleural effusion and he also has moderate ascites. He denies any chest pain. Since admission he feels improved and now he is comfortable and is in no acute distress. He is in normal sinus rhythm on telemetry. He is status post thoracocentesis and PEG tube placement.      Transthoracic Echocardiogram (11.03.18   indings     Mitral Valve   Normal appearing mitral valve structure and function.   Mild (1+) mitral regurgitation is present.     Aortic Valve   The aortic valve is not well visualized, appears mildly calcified. Valve   opening seems to be normal. No stenosis or regurgitation.     Tricuspid Valve   Normal appearing tricuspid valve structure and function.   Mild (1+) tricuspid valve regurgitation is present.     Pulmonic Valve   Pulmonic valve not well seen.   Trace pulmonic valvular regurgitation is present.     Left Atrium   Normal appearing left atrium.     Left Ventricle   The left ventricle is normal in size, wall thickness, wall motion and   contractility. Estimated left ventricular ejection fraction is 55-60%.     Right Atrium   The right atrium is normal.     Right Ventricle   The right ventricle is normal in size.      CT Chest No Cont (11.02.18   IMPRESSION: Moderate to large left pleural effusion with underlying   compressive atelectasis. Small right pleural effusion with underlying   compressive atelectasis.   Prominent lymph nodes in the bilateral axilla,   mediastinum, and hilum.     Moderate to large amount of ascites in the upper abdomen.        MEDICATIONS  (STANDING):  amiodarone    Tablet 100 milliGRAM(s) Oral daily  atorvastatin 10 milliGRAM(s) Oral at bedtime  clotrimazole 1% Cream 1 Application(s) Topical two times a day  dextrose 5%. 1000 milliLiter(s) (50 mL/Hr) IV Continuous <Continuous>  dextrose 50% Injectable 12.5 Gram(s) IV Push once  dextrose 50% Injectable 25 Gram(s) IV Push once  dextrose 50% Injectable 25 Gram(s) IV Push once  docusate sodium 100 milliGRAM(s) Oral three times a day  furosemide    Tablet 40 milliGRAM(s) Oral two times a day  gabapentin 300 milliGRAM(s) Oral three times a day  heparin  Injectable 5000 Unit(s) SubCutaneous every 8 hours  insulin glargine Injectable (LANTUS) 20 Unit(s) SubCutaneous at bedtime  insulin lispro (HumaLOG) corrective regimen sliding scale   SubCutaneous three times a day before meals  insulin lispro (HumaLOG) corrective regimen sliding scale   SubCutaneous at bedtime  lactated ringers. 1000 milliLiter(s) (75 mL/Hr) IV Continuous <Continuous>  lactulose Syrup 10 Gram(s) Oral daily  lidocaine 1% (Preservative-free) Injectable 25 milliLiter(s) Local Injection once  lisinopril 2.5 milliGRAM(s) Oral daily  metoprolol tartrate 25 milliGRAM(s) Oral two times a day  multivitamin 1 Tablet(s) Oral daily  senna 2 Tablet(s) Oral at bedtime  spironolactone 100 milliGRAM(s) Oral daily  tenofovir disoproxil fumarate (VIREAD) 300 milliGRAM(s) Oral daily    MEDICATIONS  (PRN):  ALBUTerol/ipratropium for Nebulization 3 milliLiter(s) Nebulizer every 6 hours PRN Shortness of Breath and/or Wheezing  dextrose 40% Gel 15 Gram(s) Oral once PRN Blood Glucose LESS THAN 70 milliGRAM(s)/deciliter  glucagon  Injectable 1 milliGRAM(s) IntraMuscular once PRN Glucose LESS THAN 70 milligrams/deciliter  morphine  - Injectable 4 milliGRAM(s) IV Push every 3 hours PRN Severe Pain (7 - 10)  oxyCODONE    5 mG/acetaminophen 325 mG 1 Tablet(s) Oral every 4 hours PRN Mild Pain (1 - 3)  oxyCODONE    5 mG/acetaminophen 325 mG 2 Tablet(s) Oral every 4 hours PRN Moderate Pain (4 - 6)          REVIEW OF SYSTEM:  Pertinent items are noted in HPI.  Constitutional	Negative for chills, fevers, sweats.    Eyes: 	Negative for visual disturbance.  Ears, nose, mouth, throat, and face: Negative for epistaxis, nasal congestion, sore throat and tinnitus.  Neck:	Negative for enlargement, pain and difficulty in swallowing  Respiration : Negative for cough, pleuritic chest pain and wheezing  Cardiovascular: Negative for chest pain,  and palpitations , he has FRANZ.    Gastrointestinal : Negative for abdominal pain, diarrhea, nausea and vomiting  Genitourinary: Negative for dysuria, frequency and urinary incontinence .  Skin: Negative for  rash, pruritus, swelling, dryness .  	  Hematologic/lymphatic: Negative for bleeding and easy bruising  Musculoskeletal: Negative for arthralgias, back pain and muscle weakness.  Neurological: Negative for dizziness, headaches, seizures and tremors  Behavioral/Psych: Negative for mood change, depression.  Endocrine:	Negative for blurry vision, polydipsia and polyuria, diaphoresis.   Allergic/Immunologic:	Negative for anaphylaxis, angioedema and urticaria.      Vital Signs Last 24 Hrs  T(C): 37 (06 Nov 2018 06:46), Max: 37 (06 Nov 2018 06:46)  T(F): 98.6 (06 Nov 2018 06:46), Max: 98.6 (06 Nov 2018 06:46)  HR: 85 (06 Nov 2018 09:30) (72 - 85)  BP: 103/68 (06 Nov 2018 09:30) (74/55 - 117/60)  BP(mean): 72 (06 Nov 2018 06:00) (59 - 74)  RR: 19 (06 Nov 2018 09:30) (12 - 28)  SpO2: 97% (06 Nov 2018 09:30) (91% - 100%)    I&O's Summary    05 Nov 2018 07:01  -  06 Nov 2018 07:00  --------------------------------------------------------  IN: 1240 mL / OUT: 1710 mL / NET: -470 mL      PHYSICAL EXAM  General Appearance: cooperative, no acute distress,   HEENT: PERRL, conjunctiva clear, EOM's intact .   Neck: Supple, , no adenopathy, thyroid: not enlarged, no carotid bruit or JVD  Lungs: diminished breath sounds on both sides and scattered basal rales and rhonchi's. He has chest tube left side,  Heart: Regular rate and rhythm, S1, S2 normal, no murmur, rub or gallop  Abdomen: Soft, non-tender, bowel sounds active , no hepatosplenomegaly , he has ascites.  Extremities: no cyanosis or edema, no joint swelling  Skin: Skin color, texture normal, no rashes   Neurologic: Alert and oriented X3 , cranial nerves intact, sensory and motor normal,        INTERPRETATION OF TELEMETRY: NSR           LABS:                          10.9   13.29 )-----------( 86       ( 05 Nov 2018 19:41 )             35.0     11-05    135  |  98  |  23  ----------------------------<  75  3.8   |  29  |  1.13    Ca    7.9<L>      05 Nov 2018 19:41          Lipid Panel  83  25  37  107    Pro BNP  182 11-02 @ 17:42  D Dimer  -- 11-02 @ 17:42    PT/INR - ( 05 Nov 2018 04:58 )   PT: 20.1 sec;   INR: 1.78 ratio         PTT - ( 05 Nov 2018 04:58 )  PTT:33.7 sec HPI:  Patient is a 78-year-old with history of hypertension, chronic diastolic congestive heart failure, COPD, paroxysmal atrial fibrillation, history of DVT and pulmonary emboli in the past , he has chronic bilateral pleural effusion, CLL/lymphoma being treated, history of recurrent pneumonia, He was admitted with lower back pain and shortness of breath and after admission he was diagnosed to have bilateral pleural effusion and he also has moderate ascites. He denies any chest pain. Since admission he feels improved and now he is comfortable and is in no acute distress. He is in normal sinus rhythm on telemetry. He is status post thoracocentesis and chest tube placement.      Transthoracic Echocardiogram (11.03.18   indings     Mitral Valve   Normal appearing mitral valve structure and function.   Mild (1+) mitral regurgitation is present.     Aortic Valve   The aortic valve is not well visualized, appears mildly calcified. Valve   opening seems to be normal. No stenosis or regurgitation.     Tricuspid Valve   Normal appearing tricuspid valve structure and function.   Mild (1+) tricuspid valve regurgitation is present.     Pulmonic Valve   Pulmonic valve not well seen.   Trace pulmonic valvular regurgitation is present.     Left Atrium   Normal appearing left atrium.     Left Ventricle   The left ventricle is normal in size, wall thickness, wall motion and   contractility. Estimated left ventricular ejection fraction is 55-60%.     Right Atrium   The right atrium is normal.     Right Ventricle   The right ventricle is normal in size.      CT Chest No Cont (11.02.18   IMPRESSION: Moderate to large left pleural effusion with underlying   compressive atelectasis. Small right pleural effusion with underlying   compressive atelectasis.   Prominent lymph nodes in the bilateral axilla,   mediastinum, and hilum.     Moderate to large amount of ascites in the upper abdomen.        MEDICATIONS  (STANDING):  amiodarone    Tablet 100 milliGRAM(s) Oral daily  atorvastatin 10 milliGRAM(s) Oral at bedtime  clotrimazole 1% Cream 1 Application(s) Topical two times a day  dextrose 5%. 1000 milliLiter(s) (50 mL/Hr) IV Continuous <Continuous>  dextrose 50% Injectable 12.5 Gram(s) IV Push once  dextrose 50% Injectable 25 Gram(s) IV Push once  dextrose 50% Injectable 25 Gram(s) IV Push once  docusate sodium 100 milliGRAM(s) Oral three times a day  furosemide    Tablet 40 milliGRAM(s) Oral two times a day  gabapentin 300 milliGRAM(s) Oral three times a day  heparin  Injectable 5000 Unit(s) SubCutaneous every 8 hours  insulin glargine Injectable (LANTUS) 20 Unit(s) SubCutaneous at bedtime  insulin lispro (HumaLOG) corrective regimen sliding scale   SubCutaneous three times a day before meals  insulin lispro (HumaLOG) corrective regimen sliding scale   SubCutaneous at bedtime  lactated ringers. 1000 milliLiter(s) (75 mL/Hr) IV Continuous <Continuous>  lactulose Syrup 10 Gram(s) Oral daily  lidocaine 1% (Preservative-free) Injectable 25 milliLiter(s) Local Injection once  lisinopril 2.5 milliGRAM(s) Oral daily  metoprolol tartrate 25 milliGRAM(s) Oral two times a day  multivitamin 1 Tablet(s) Oral daily  senna 2 Tablet(s) Oral at bedtime  spironolactone 100 milliGRAM(s) Oral daily  tenofovir disoproxil fumarate (VIREAD) 300 milliGRAM(s) Oral daily    MEDICATIONS  (PRN):  ALBUTerol/ipratropium for Nebulization 3 milliLiter(s) Nebulizer every 6 hours PRN Shortness of Breath and/or Wheezing  dextrose 40% Gel 15 Gram(s) Oral once PRN Blood Glucose LESS THAN 70 milliGRAM(s)/deciliter  glucagon  Injectable 1 milliGRAM(s) IntraMuscular once PRN Glucose LESS THAN 70 milligrams/deciliter  morphine  - Injectable 4 milliGRAM(s) IV Push every 3 hours PRN Severe Pain (7 - 10)  oxyCODONE    5 mG/acetaminophen 325 mG 1 Tablet(s) Oral every 4 hours PRN Mild Pain (1 - 3)  oxyCODONE    5 mG/acetaminophen 325 mG 2 Tablet(s) Oral every 4 hours PRN Moderate Pain (4 - 6)          REVIEW OF SYSTEM:  Pertinent items are noted in HPI.  Constitutional	Negative for chills, fevers, sweats.    Eyes: 	Negative for visual disturbance.  Ears, nose, mouth, throat, and face: Negative for epistaxis, nasal congestion, sore throat and tinnitus.  Neck:	Negative for enlargement, pain and difficulty in swallowing  Respiration : Negative for cough, pleuritic chest pain and wheezing  Cardiovascular: Negative for chest pain,  and palpitations , he has FRANZ.    Gastrointestinal : Negative for abdominal pain, diarrhea, nausea and vomiting  Genitourinary: Negative for dysuria, frequency and urinary incontinence .  Skin: Negative for  rash, pruritus, swelling, dryness .  	  Hematologic/lymphatic: Negative for bleeding and easy bruising  Musculoskeletal: Negative for arthralgias, back pain and muscle weakness.  Neurological: Negative for dizziness, headaches, seizures and tremors  Behavioral/Psych: Negative for mood change, depression.  Endocrine:	Negative for blurry vision, polydipsia and polyuria, diaphoresis.   Allergic/Immunologic:	Negative for anaphylaxis, angioedema and urticaria.      Vital Signs Last 24 Hrs  T(C): 37 (06 Nov 2018 06:46), Max: 37 (06 Nov 2018 06:46)  T(F): 98.6 (06 Nov 2018 06:46), Max: 98.6 (06 Nov 2018 06:46)  HR: 85 (06 Nov 2018 09:30) (72 - 85)  BP: 103/68 (06 Nov 2018 09:30) (74/55 - 117/60)  BP(mean): 72 (06 Nov 2018 06:00) (59 - 74)  RR: 19 (06 Nov 2018 09:30) (12 - 28)  SpO2: 97% (06 Nov 2018 09:30) (91% - 100%)    I&O's Summary    05 Nov 2018 07:01  -  06 Nov 2018 07:00  --------------------------------------------------------  IN: 1240 mL / OUT: 1710 mL / NET: -470 mL      PHYSICAL EXAM  General Appearance: cooperative, no acute distress,   HEENT: PERRL, conjunctiva clear, EOM's intact .   Neck: Supple, , no adenopathy, thyroid: not enlarged, no carotid bruit or JVD  Lungs: diminished breath sounds on both sides and scattered basal rales and rhonchi's. He has chest tube left side,  Heart: Regular rate and rhythm, S1, S2 normal, no murmur, rub or gallop  Abdomen: Soft, non-tender, bowel sounds active , no hepatosplenomegaly , he has ascites.  Extremities: no cyanosis or edema, no joint swelling  Skin: Skin color, texture normal, no rashes   Neurologic: Alert and oriented X3 , cranial nerves intact, sensory and motor normal,        INTERPRETATION OF TELEMETRY: NSR           LABS:                          10.9   13.29 )-----------( 86       ( 05 Nov 2018 19:41 )             35.0     11-05    135  |  98  |  23  ----------------------------<  75  3.8   |  29  |  1.13    Ca    7.9<L>      05 Nov 2018 19:41          Lipid Panel  83  25  37  107    Pro BNP  182 11-02 @ 17:42  D Dimer  -- 11-02 @ 17:42    PT/INR - ( 05 Nov 2018 04:58 )   PT: 20.1 sec;   INR: 1.78 ratio         PTT - ( 05 Nov 2018 04:58 )  PTT:33.7 sec

## 2018-11-06 NOTE — PROGRESS NOTE ADULT - SUBJECTIVE AND OBJECTIVE BOX
Patient is a 78y old  Male who presents with a chief complaint of CC: Lower back pain and shortness of breath (2018 22:27)      HPI:  78 year old male with pmh of copd on home o2 2.5L, afib on coumadin, chronic diastolic chf, diabetes on insulin, htn, hep b infection, hypogammaglobulinemia w/ IVIG tx, b cell lymphoma, cholelithiasis s/p cholecystectomy, esophageal dilation, prostatectomy coming to ED with complaints of worsening shortness of breath and lower back pain. Patient stating was on toilet this morning when tried to get up put pants on when he had a sudden onset of lower back pain. No bladder or bowel incontinence. Per Dr Bejarano In ED bladder scan was done with no to low residual urine volume. Per Dr Bejarano patient able to ambulate with cane in ED. FOr shortness of breath has been going on for past 4-5 weeks and has been gradually worsening. States last echo over 1 year prior. states now able to walk 50 feet without stopping for shortness of breath (2018 22:27)    18  No acute events occurred overnight       his family updated at bedside  feels ok  pain is better controlled  no difficulty breathing    had Pleurx done  tolerated ok  no issues iwth breathing  data discussed with Dr ZAMARRIPA regarding eval for esophageal varices eval needed    MEDICATIONS  (STANDING):  amiodarone    Tablet 100 milliGRAM(s) Oral daily  atorvastatin 10 milliGRAM(s) Oral at bedtime  dextrose 5%. 1000 milliLiter(s) (50 mL/Hr) IV Continuous <Continuous>  dextrose 50% Injectable 12.5 Gram(s) IV Push once  dextrose 50% Injectable 25 Gram(s) IV Push once  dextrose 50% Injectable 25 Gram(s) IV Push once  docusate sodium 100 milliGRAM(s) Oral three times a day  furosemide    Tablet 20 milliGRAM(s) Oral two times a day  gabapentin 300 milliGRAM(s) Oral three times a day  insulin glargine Injectable (LANTUS) 20 Unit(s) SubCutaneous at bedtime  insulin lispro (HumaLOG) corrective regimen sliding scale   SubCutaneous three times a day before meals  insulin lispro (HumaLOG) corrective regimen sliding scale   SubCutaneous at bedtime  lactulose Syrup 10 Gram(s) Oral daily  lisinopril 2.5 milliGRAM(s) Oral daily  metoprolol tartrate 25 milliGRAM(s) Oral two times a day  multivitamin 1 Tablet(s) Oral daily  spironolactone 25 milliGRAM(s) Oral at bedtime  tenofovir disoproxil fumarate (VIREAD) 300 milliGRAM(s) Oral daily    MEDICATIONS  (PRN):  acetaminophen   Tablet .. 650 milliGRAM(s) Oral every 6 hours PRN Temp greater or equal to 38C (100.4F), Mild Pain (1 - 3), Moderate Pain (4 - 6)  ALBUTerol/ipratropium for Nebulization 3 milliLiter(s) Nebulizer every 6 hours PRN Shortness of Breath and/or Wheezing  dextrose 40% Gel 15 Gram(s) Oral once PRN Blood Glucose LESS THAN 70 milliGRAM(s)/deciliter  glucagon  Injectable 1 milliGRAM(s) IntraMuscular once PRN Glucose LESS THAN 70 milligrams/deciliter  senna 2 Tablet(s) Oral at bedtime PRN Constipation  traMADol 50 milliGRAM(s) Oral every 4 hours PRN Moderate Pain (4 - 6)          FAMILY HISTORY:  Family history of liver cancer (Father): father  85 liver CA  Family history of coronary arteriosclerosis (Mother): Mom  of MI age 68      SOCIAL HISTORY: Lives at home     REVIEW OF SYSTEM:  cough, dyspnea, fatigue, otherwise 12 point ROS negative     Vital Signs Last 24 Hrs  T(C): 37 (2018 06:46), Max: 37 (2018 06:46)  T(F): 98.6 (2018 06:46), Max: 98.6 (2018 06:46)  HR: 85 (2018 06:00) (72 - 85)  BP: 106/61 (2018 06:00) (74/55 - 117/60)  BP(mean): 72 (2018 06:00) (57 - 74)  RR: 19 (2018 06:00) (12 - 28)  SpO2: 93% (2018 06:00) (91% - 100%)  I&O's Summary    2018 07:01  -  2018 07:00  --------------------------------------------------------  IN: 1240 mL / OUT: 1710 mL / NET: -470 mL        PHYSICAL EXAM  General Appearance: cooperative, no acute distress,   HEENT: PERRL, conjunctiva clear, EOM's intact, non injected pharynx, no exudate, TM   normal  Neck: Supple, , no adenopathy, thyroid: not enlarged, no carotid bruit or JVD  Back: Symmetric, no  tenderness,no soft tissue tenderness  Lungs: Decreased breath sounds in the right lower lobe, diminished in the upper and lower lobes on the left, Right upper lobe inspiratory wheezes   Heart: Regular rate and rhythm, S1, S2 normal, no murmur, rub or gallop  Abdomen: Soft, non-tender, bowel sounds active , no hepatosplenomegaly  Extremities: no cyanosis or edema, no joint swelling  Skin: Skin color, texture normal, no rashes   Neurologic: Alert and oriented X3 , cranial nerves intact, sensory and motor normal,    ECG:    LABS:                                     11.2   13.79 )-----------( 90       ( 2018 04:58 )             36.0       134<L>  |  97  |  22  ----------------------------<  86  4.0   |  32<H>  |  1.15    Ca    7.9<L>      2018 04:58    TPro  5.8<L>  /  Alb  2.5<L>  /  TBili  0.5  /  DBili  x   /  AST  28  /  ALT  31  /  AlkPhos  148<H>                            12.3   16.94 )-----------( 95       ( 2018 04:44 )             38.5         133<L>  |  98  |  14  ----------------------------<  113<H>  5.0   |  27  |  0.99    Ca    8.3<L>      2018 04:44  Phos  3.7       Mg     1.9         TPro  6.0  /  Alb  2.8<L>  /  TBili  0.5  /  DBili  x   /  AST  34  /  ALT  34  /  AlkPhos  162<H>      CARDIAC MARKERS ( 2018 17:42 )  <0.015 ng/mL / x     / x     / x     / x            Pro BNP  182  @ 17:42  D Dimer  --  @ 17:42    PT/INR - ( 2018 04:44 )   PT: 23.9 sec;   INR: 2.10 ratio         PTT - ( 2018 04:44 )  PTT:37.1 sec  Urinalysis Basic - ( 2018 19:11 )    Color: Yellow / Appearance: Clear / S.010 / pH: x  Gluc: x / Ketone: Negative  / Bili: Negative / Urobili: Negative mg/dL   Blood: x / Protein: Negative mg/dL / Nitrite: Negative   Leuk Esterase: Negative / RBC: x / WBC x   Sq Epi: x / Non Sq Epi: x / Bacteria: x            RADIOLOGY & ADDITIONAL STUDIES:    IMPRESSION: Moderate to large left pleural effusion with underlying   compressive atelectasis. Small right pleural effusion with underlying   compressive atelectasis.   Prominent lymph nodes in the bilateral axilla,   mediastinum, and hilum.

## 2018-11-06 NOTE — PROGRESS NOTE ADULT - SUBJECTIVE AND OBJECTIVE BOX
78 year old male with pmh of copd on home o2 2.5L, afib on coumadin, chronic diastolic chf, diabetes on insulin, htn, hep b infection, hypogammaglobulinemia w/ IVIG tx, b cell lymphoma, cholelithiasis s/p cholecystectomy, esophageal dilation, prostatectomy coming to ED with complaints of worsening shortness of breath and lower back pain.  CT showed : Moderate to large Lt pleural effusion. Small Rt pleural effusion. Moderate to large ascites. Pt s/p VATs and 700 cc of serous fluid removed, Pleurx catheter placed on 11/5/2018.      SUBJECTIVE:     REVIEW OF SYSTEMS:  CONSTITUTIONAL: No fevers or chills  RESPIRATORY: +shortness of breath  CARDIOVASCULAR: No chest pain or palpitations  GASTROINTESTINAL: No abdominal or epigastric pain; +dry mouth  GENITOURINARY: No dysuria, frequency or hematuria  All other review of systems is negative unless indicated above      Vital Signs Last 24 Hrs  T(C): 37.1 (06 Nov 2018 16:10), Max: 37.1 (06 Nov 2018 14:31)  T(F): 98.7 (06 Nov 2018 16:10), Max: 98.7 (06 Nov 2018 14:31)  HR: 105 (06 Nov 2018 14:00) (79 - 105)  BP: 101/64 (06 Nov 2018 14:00) (74/55 - 112/64)  BP(mean): 73 (06 Nov 2018 14:00) (59 - 77)  RR: 13 (06 Nov 2018 14:00) (12 - 20)  SpO2: 100% (06 Nov 2018 14:00) (91% - 100%)    I&O's Summary    05 Nov 2018 07:01  -  06 Nov 2018 07:00  --------------------------------------------------------  IN: 1240 mL / OUT: 1710 mL / NET: -470 mL    06 Nov 2018 07:01  -  06 Nov 2018 17:22  --------------------------------------------------------  IN: 225 mL / OUT: 275 mL / NET: -50 mL        CAPILLARY BLOOD GLUCOSE      POCT Blood Glucose.: 90 mg/dL (06 Nov 2018 12:57)  POCT Blood Glucose.: 143 mg/dL (06 Nov 2018 09:23)  POCT Blood Glucose.: 65 mg/dL (06 Nov 2018 08:13)  POCT Blood Glucose.: 58 mg/dL (06 Nov 2018 08:08)  POCT Blood Glucose.: 68 mg/dL (05 Nov 2018 22:11)  POCT Blood Glucose.: 79 mg/dL (05 Nov 2018 19:18)  POCT Blood Glucose.: 59 mg/dL (05 Nov 2018 17:35)      PHYSICAL EXAM:  Constitutional: NAD, awake and alert, well-developed  HEENT: PERR, EOMI, Normal Hearing, MMM  Neck: Soft and supple, No LAD, No JVD  Respiratory: Breath sounds are clear bilaterally, No wheezing, rales or rhonchi  Cardiovascular: S1 and S2, regular rate and rhythm, no Murmurs, gallops or rubs  Gastrointestinal: Bowel Sounds present, soft, nontender, nondistended, no guarding, no rebound  Extremities: No peripheral edema  Vascular: 2+ peripheral pulses  Neurological: A/O x 3, no focal deficits  Musculoskeletal: 5/5 strength b/l upper and lower extremities  Skin: No rashes    MEDICATIONS:  MEDICATIONS  (STANDING):  amiodarone    Tablet 100 milliGRAM(s) Oral daily  atorvastatin 10 milliGRAM(s) Oral at bedtime  clotrimazole 1% Cream 1 Application(s) Topical two times a day  dextrose 5%. 1000 milliLiter(s) (50 mL/Hr) IV Continuous <Continuous>  dextrose 50% Injectable 12.5 Gram(s) IV Push once  dextrose 50% Injectable 25 Gram(s) IV Push once  dextrose 50% Injectable 25 Gram(s) IV Push once  docusate sodium 100 milliGRAM(s) Oral three times a day  furosemide    Tablet 40 milliGRAM(s) Oral two times a day  gabapentin 300 milliGRAM(s) Oral three times a day  heparin  Injectable 5000 Unit(s) SubCutaneous every 8 hours  insulin lispro (HumaLOG) corrective regimen sliding scale   SubCutaneous three times a day before meals  insulin lispro (HumaLOG) corrective regimen sliding scale   SubCutaneous at bedtime  lactated ringers. 1000 milliLiter(s) (75 mL/Hr) IV Continuous <Continuous>  lactulose Syrup 10 Gram(s) Oral daily  lidocaine 1% (Preservative-free) Injectable 25 milliLiter(s) Local Injection once  lisinopril 2.5 milliGRAM(s) Oral daily  metoprolol tartrate 25 milliGRAM(s) Oral two times a day  multivitamin 1 Tablet(s) Oral daily  senna 2 Tablet(s) Oral at bedtime  spironolactone 100 milliGRAM(s) Oral daily  tenofovir disoproxil fumarate (VIREAD) 300 milliGRAM(s) Oral daily      LABS: All Labs Reviewed:                        11.9   11.98 )-----------( 89       ( 06 Nov 2018 09:24 )             37.9     11-06    136  |  99  |  21  ----------------------------<  130<H>  4.0   |  28  |  1.10    Ca    8.0<L>      06 Nov 2018 09:24      PT/INR - ( 05 Nov 2018 04:58 )   PT: 20.1 sec;   INR: 1.78 ratio         PTT - ( 05 Nov 2018 04:58 )  PTT:33.7 sec      Blood Culture: 11-05 @ 18:25  Organism --  Gram Stain Blood -- Gram Stain   polymorphonuclear leukocytes seen per low power field  No organisms seen per oil power field  by cytocentrifuge  Specimen Source .Body Fluid LEFT PLEURAL EFFUSION CUL & GRM STAIN  Culture-Blood --    11-02 @ 19:11  Organism --  Gram Stain Blood -- Gram Stain --  Specimen Source .Urine Clean Catch (Midstream)  Culture-Blood --      RADIOLOGY/EKG:  < from: US Abdomen Limited (11.03.18 @ 14:05) >  COMPARISON: Chest CT 1 day prior    FINDINGS:    There is moderate abdominal and pelvic 4 quadrant ascites. Bilateral   pleural effusions again noted.    IMPRESSION:    Moderate 4 quadrant abdominal and pelvic ascites. 78 year old male with pmh of copd on home o2 2.5L, afib on coumadin, chronic diastolic chf, diabetes on insulin, htn, hep b infection, hypogammaglobulinemia w/ IVIG tx, b cell lymphoma, cholelithiasis s/p cholecystectomy, esophageal dilation, prostatectomy coming to ED with complaints of worsening shortness of breath and lower back pain.  CT showed : Moderate to large Lt pleural effusion. Small Rt pleural effusion. Moderate to large ascites. Pt s/p VATs and 700 cc of serous fluid removed, Pleurx catheter placed on 11/5/2018.    ID#[id number]  SUBJECTIVE: Pt seen and examined at bedside. States improved breathing, denies SOB. States went to get thoracentesis and it was cancelled so he came back up to his room. States ascites is unchanged. Pt feels stronger because he is able to breath better. States hungry and would like to eat. States was told that he no longer needed the thoracentesis in the setting of improved respiratory states.  Wife at bedside. Understanding assessed via teach back method. All other questions answered.    REVIEW OF SYSTEMS:  CONSTITUTIONAL: No fevers or chills  RESPIRATORY: Denies shortness of breath  CARDIOVASCULAR: No chest pain or palpitations  GASTROINTESTINAL: No abdominal or epigastric pain; +dry mouth  GENITOURINARY: No dysuria, frequency or hematuria  All other review of systems is negative unless indicated above      Vital Signs Last 24 Hrs  T(C): 37.1 (06 Nov 2018 16:10), Max: 37.1 (06 Nov 2018 14:31)  T(F): 98.7 (06 Nov 2018 16:10), Max: 98.7 (06 Nov 2018 14:31)  HR: 105 (06 Nov 2018 14:00) (79 - 105)  BP: 101/64 (06 Nov 2018 14:00) (74/55 - 112/64)  BP(mean): 73 (06 Nov 2018 14:00) (59 - 77)  RR: 13 (06 Nov 2018 14:00) (12 - 20)  SpO2: 100% (06 Nov 2018 14:00) (91% - 100%)    I&O's Summary    05 Nov 2018 07:01  -  06 Nov 2018 07:00  --------------------------------------------------------  IN: 1240 mL / OUT: 1710 mL / NET: -470 mL    06 Nov 2018 07:01  -  06 Nov 2018 17:22  --------------------------------------------------------  IN: 225 mL / OUT: 275 mL / NET: -50 mL    CAPILLARY BLOOD GLUCOSE    POCT Blood Glucose.: 90 mg/dL (06 Nov 2018 12:57)  POCT Blood Glucose.: 143 mg/dL (06 Nov 2018 09:23)  POCT Blood Glucose.: 65 mg/dL (06 Nov 2018 08:13)  POCT Blood Glucose.: 58 mg/dL (06 Nov 2018 08:08)  POCT Blood Glucose.: 68 mg/dL (05 Nov 2018 22:11)  POCT Blood Glucose.: 79 mg/dL (05 Nov 2018 19:18)  POCT Blood Glucose.: 59 mg/dL (05 Nov 2018 17:35)      PHYSICAL EXAM:  Constitutional: NAD, awake and alert, well-developed  HEENT: EOMI, Normal Hearing, DMM  Respiratory: Improved air movement BL  Cardiovascular: S1 and S2, regular rate and rhythm  Gastrointestinal: Bowel Sounds present, soft, nontender, nondistended, no guarding, no rebound  Extremities: No peripheral edema      MEDICATIONS:  MEDICATIONS  (STANDING):  amiodarone    Tablet 100 milliGRAM(s) Oral daily  atorvastatin 10 milliGRAM(s) Oral at bedtime  clotrimazole 1% Cream 1 Application(s) Topical two times a day  dextrose 5%. 1000 milliLiter(s) (50 mL/Hr) IV Continuous <Continuous>  dextrose 50% Injectable 12.5 Gram(s) IV Push once  dextrose 50% Injectable 25 Gram(s) IV Push once  dextrose 50% Injectable 25 Gram(s) IV Push once  docusate sodium 100 milliGRAM(s) Oral three times a day  furosemide    Tablet 40 milliGRAM(s) Oral two times a day  gabapentin 300 milliGRAM(s) Oral three times a day  heparin  Injectable 5000 Unit(s) SubCutaneous every 8 hours  insulin lispro (HumaLOG) corrective regimen sliding scale   SubCutaneous three times a day before meals  insulin lispro (HumaLOG) corrective regimen sliding scale   SubCutaneous at bedtime  lactated ringers. 1000 milliLiter(s) (75 mL/Hr) IV Continuous <Continuous>  lactulose Syrup 10 Gram(s) Oral daily  lidocaine 1% (Preservative-free) Injectable 25 milliLiter(s) Local Injection once  lisinopril 2.5 milliGRAM(s) Oral daily  metoprolol tartrate 25 milliGRAM(s) Oral two times a day  multivitamin 1 Tablet(s) Oral daily  senna 2 Tablet(s) Oral at bedtime  spironolactone 100 milliGRAM(s) Oral daily  tenofovir disoproxil fumarate (VIREAD) 300 milliGRAM(s) Oral daily      LABS: All Labs Reviewed:                        11.9   11.98 )-----------( 89       ( 06 Nov 2018 09:24 )             37.9     11-06    136  |  99  |  21  ----------------------------<  130<H>  4.0   |  28  |  1.10    Ca    8.0<L>      06 Nov 2018 09:24      PT/INR - ( 05 Nov 2018 04:58 )   PT: 20.1 sec;   INR: 1.78 ratio         PTT - ( 05 Nov 2018 04:58 )  PTT:33.7 sec      Blood Culture: 11-05 @ 18:25  Organism --  Gram Stain Blood -- Gram Stain   polymorphonuclear leukocytes seen per low power field  No organisms seen per oil power field  by cytocentrifuge  Specimen Source .Body Fluid LEFT PLEURAL EFFUSION CUL & GRM STAIN  Culture-Blood --    11-02 @ 19:11  Organism --  Gram Stain Blood -- Gram Stain --  Specimen Source .Urine Clean Catch (Midstream)  Culture-Blood --      RADIOLOGY/EKG:  < from: Xray Chest 1 View- PORTABLE-Routine (11.06.18 @ 09:15) >  Findings:    The heart is enlarged. Moderate right pleural effusion with small to mild   left pleural effusion. Bibasilar atelectasis. Degenerative changes of the   visualized osseous structures.    Left-sided chest tube. Left chestwall subcutaneous emphysema.    Impression:    Moderate right pleural effusion with small to mild left pleural effusion.   Bibasilar atelectasis    < end of copied text >        < from: US Abdomen Limited (11.03.18 @ 14:05) >  COMPARISON: Chest CT 1 day prior    FINDINGS:    There is moderate abdominal and pelvic 4 quadrant ascites. Bilateral   pleural effusions again noted.    IMPRESSION:    Moderate 4 quadrant abdominal and pelvic ascites.

## 2018-11-06 NOTE — PROGRESS NOTE ADULT - ASSESSMENT
Chronic diastolic congestive heart failure.  Bilateral pleural effusion l. He is s/p PEG tube placement on left side.  Moderate ascites.  History of CLL/lymphoma.  Paroxysmal atrial fibrillation he is normal sinus rhythm.  Cirrhosis of liver.  Calcified coronaries and aorta on CT scan of the chest.  Ascites.  Suggest  He is status post thoracocentesis and PEG tube placement.  He will need Peritoneal tap.  Continue with diuretics.   Intake and output/daily weights.  Follow with electrolytes.  Hold lisinopril if BP is low.  Coumadin on hold for thoracentesis. Resume when no further invasive procedures planned , Adjust INR 2.0  discussed with patient's family at bedside and hospitalist. Chronic diastolic congestive heart failure.  Bilateral pleural effusion l. He is s/p chest  tube placement on left side.  Moderate ascites.  History of CLL/lymphoma.  Paroxysmal atrial fibrillation he is normal sinus rhythm.  Cirrhosis of liver.  Calcified coronaries and aorta on CT scan of the chest.  Ascites.  Suggest  He is status post thoracocentesis and chest tube placement.  He will need Peritoneal tap.  Continue with diuretics.   Intake and output/daily weights.  Follow with electrolytes.  Hold lisinopril if BP is low.  Coumadin on hold for thoracentesis. Resume when no further invasive procedures planned , Adjust INR 2.0  discussed with patient's family at bedside and hospitalist.

## 2018-11-06 NOTE — PROGRESS NOTE ADULT - SUBJECTIVE AND OBJECTIVE BOX
Subjective:    11/5/18:  patient seen and examined with son and wife at bedside.  States his breathing is labored but stable.  NPO for paracentesis and VATS pleur X placement today.    11/6/18:  POD 1 VATs Left pleural effusion drainage and PleurX placement.  Jaret  #[id number] used for interview.  Wife states he is feeling weak and having some pain at surgical site.  Breathing has improved since pleural effusion drainage.    Vital Signs:  Vital Signs Last 24 Hrs  T(C): 37 (11-06-18 @ 06:46), Max: 37 (11-06-18 @ 06:46)  T(F): 98.6 (11-06-18 @ 06:46), Max: 98.6 (11-06-18 @ 06:46)  HR: 85 (11-06-18 @ 06:00) (72 - 85)  BP: 106/61 (11-06-18 @ 06:00) (74/55 - 117/60)  RR: 19 (11-06-18 @ 06:00) (12 - 28)  SpO2: 93% (11-06-18 @ 06:00) (91% - 100%) on NC.    Telemetry/Alarms: Sinus rhythm.    Relevant labs, radiology and Medications reviewed    CXR:  resolution of left pleural effusion, PleurX good placement.                        10.9   13.29 )-----------( 86       ( 05 Nov 2018 19:41 )             35.0     11-05    135  |  98  |  23  ----------------------------<  75  3.8   |  29  |  1.13    Ca    7.9<L>      05 Nov 2018 19:41      PT/INR - ( 05 Nov 2018 04:58 )   PT: 20.1 sec;   INR: 1.78 ratio         PTT - ( 05 Nov 2018 04:58 )  PTT:33.7 sec  MEDICATIONS  (STANDING):  amiodarone    Tablet 100 milliGRAM(s) Oral daily  atorvastatin 10 milliGRAM(s) Oral at bedtime  clotrimazole 1% Cream 1 Application(s) Topical two times a day  dextrose 5%. 1000 milliLiter(s) (50 mL/Hr) IV Continuous <Continuous>  dextrose 50% Injectable 12.5 Gram(s) IV Push once  dextrose 50% Injectable 25 Gram(s) IV Push once  dextrose 50% Injectable 25 Gram(s) IV Push once  dextrose 50% Injectable 50 milliLiter(s) IV Push once  docusate sodium 100 milliGRAM(s) Oral three times a day  furosemide    Tablet 40 milliGRAM(s) Oral two times a day  gabapentin 300 milliGRAM(s) Oral three times a day  insulin glargine Injectable (LANTUS) 20 Unit(s) SubCutaneous at bedtime  insulin lispro (HumaLOG) corrective regimen sliding scale   SubCutaneous three times a day before meals  insulin lispro (HumaLOG) corrective regimen sliding scale   SubCutaneous at bedtime  lactated ringers. 1000 milliLiter(s) (75 mL/Hr) IV Continuous <Continuous>  lactulose Syrup 10 Gram(s) Oral daily  lidocaine 1% (Preservative-free) Injectable 25 milliLiter(s) Local Injection once  lisinopril 2.5 milliGRAM(s) Oral daily  metoprolol tartrate 25 milliGRAM(s) Oral two times a day  multivitamin 1 Tablet(s) Oral daily  spironolactone 100 milliGRAM(s) Oral daily  tenofovir disoproxil fumarate (VIREAD) 300 milliGRAM(s) Oral daily    MEDICATIONS  (PRN):  acetaminophen   Tablet .. 650 milliGRAM(s) Oral every 6 hours PRN Temp greater or equal to 38C (100.4F), Mild Pain (1 - 3), Moderate Pain (4 - 6)  ALBUTerol/ipratropium for Nebulization 3 milliLiter(s) Nebulizer every 6 hours PRN Shortness of Breath and/or Wheezing  dextrose 40% Gel 15 Gram(s) Oral once PRN Blood Glucose LESS THAN 70 milliGRAM(s)/deciliter  glucagon  Injectable 1 milliGRAM(s) IntraMuscular once PRN Glucose LESS THAN 70 milligrams/deciliter  senna 2 Tablet(s) Oral at bedtime PRN Constipation  traMADol 50 milliGRAM(s) Oral every 4 hours PRN Moderate Pain (4 - 6)      Physical exam  Gen: NAD, breathing comfortably.  Neuro: AO x 3.  Card: S1 S2 RRR.  Pulm: CTA bilaterally.  Abd: Soft, NT ND.  Ext: No edema.    Tubes: Left pleurX to suction, no appreciable air leak, moderate serosanguinous drainage.    I&O's Summary    05 Nov 2018 07:01  -  06 Nov 2018 07:00  --------------------------------------------------------  IN: 1240 mL / OUT: 1710 mL / NET: -470 mL      Assessment  78y Male  w/ PAST MEDICAL & SURGICAL HISTORY:  Hyperlipidemia  Hypogammaglobulinemia: received IVIG  Hepatitis B virus infection, unspecified chronicity: retrovirals  Chronic diastolic congestive heart failure  Essential hypertension  DM type 2 (diabetes mellitus, type 2)  B-cell lymphoma, unspecified B-cell lymphoma type, unspecified body region: CLL stage 4/SLL; met NHL  Atrial fibrillation, unspecified type  COPD (chronic obstructive pulmonary disease)  History of esophageal dilatation  S/P cholecystectomy  H/O prostatectomy  admitted with Lower back pain and shortness of breath (05 Nov 2018 09:21)  .  Underwent:  11/5/18: Left VATS, flex bronch, pleural effusion drainage, Pleur X placement.    PLAN  Paracentesis by IR today.  Resume diet.  IVL.  PO medications.  Pain control.  IS, deep breathing.  PleurX to water seal.  Strict I/Os.

## 2018-11-06 NOTE — PROGRESS NOTE ADULT - SUBJECTIVE AND OBJECTIVE BOX
Pt has been seen and examined with FP resident, resident supervised agree with a/p       Patient is a 78y old  Male who presents with a chief complaint of CC: Lower back pain and shortness of breath (03 Nov 2018 11:07)        HPI:  78 year old male with pmh of copd on home o2 2.5L, afib on coumadin, chronic diastolic chf, diabetes on insulin, htn, hep b infection, hypogammaglobulinemia w/ IVIG tx, b cell lymphoma, cholelithiasis s/p cholecystectomy, esophageal dilation, prostatectomy coming to ED with complaints of worsening shortness of breath and lower back pain.     PHYSICAL EXAM:    general- comfortable   -rs-decrease air entry on left side, rales present at bases   -cvs-s1s2 normal   -p/a-soft,bs+  -extremity- no asymmetrical swelling noted           A/P    # Pleural effusion- s/p chest tube placement, management as per surgical team     #Ascitis - tap today possiblly    #supportive care     #Discussed with Dr. Conte

## 2018-11-06 NOTE — PROGRESS NOTE ADULT - SUBJECTIVE AND OBJECTIVE BOX
HPI:  78 year old male with pmh of copd on home o2 2.5L, afib on coumadin, chronic diastolic chf, diabetes on insulin, htn, hep b infection, hypogammaglobulinemia w/ IVIG tx, b cell lymphoma, cholelithiasis s/p cholecystectomy, esophageal dilation, prostatectomy coming to ED with complaints of worsening shortness of breath and lower back pain. Patient stating was on toilet this morning when tried to get up put pants on when he had a sudden onset of lower back pain. No bladder or bowel incontinence. Per Dr Bejarano In ED bladder scan was done with no to low residual urine volume. Per Dr Bejarano patient able to ambulate with cane in ED. FOr shortness of breath has been going on for past 4-5 weeks and has been gradually worsening. States last echo over 1 year prior. states now able to walk 50 feet without stopping for shortness of breath (02 Nov 2018 22:27)    11/4/18 Thru family , patient with continued back pain  11/5/18 Patient with persistent BP, abdomen feels better  11/6/18 Patient more comfortable. Brace delivered. S/P drainage PE    PAST MEDICAL & SURGICAL HISTORY:  Hyperlipidemia  Hypogammaglobulinemia: received IVIG  Hepatitis B virus infection, unspecified chronicity: retrovirals  Chronic diastolic congestive heart failure  Essential hypertension  DM type 2 (diabetes mellitus, type 2)  B-cell lymphoma, unspecified B-cell lymphoma type, unspecified body region: CLL stage 4/SLL; met NHL  Atrial fibrillation, unspecified type  COPD (chronic obstructive pulmonary disease)  History of esophageal dilatation  S/P cholecystectomy  H/O prostatectomy    MEDICATIONS  (STANDING):  amiodarone    Tablet 100 milliGRAM(s) Oral daily  atorvastatin 10 milliGRAM(s) Oral at bedtime  dextrose 5%. 1000 milliLiter(s) (50 mL/Hr) IV Continuous <Continuous>  dextrose 50% Injectable 12.5 Gram(s) IV Push once  dextrose 50% Injectable 25 Gram(s) IV Push once  dextrose 50% Injectable 25 Gram(s) IV Push once  docusate sodium 100 milliGRAM(s) Oral three times a day  furosemide    Tablet 20 milliGRAM(s) Oral two times a day  gabapentin 300 milliGRAM(s) Oral three times a day  insulin glargine Injectable (LANTUS) 20 Unit(s) SubCutaneous at bedtime  insulin lispro (HumaLOG) corrective regimen sliding scale   SubCutaneous three times a day before meals  insulin lispro (HumaLOG) corrective regimen sliding scale   SubCutaneous at bedtime  lactulose Syrup 10 Gram(s) Oral daily  lisinopril 2.5 milliGRAM(s) Oral daily  metoprolol tartrate 25 milliGRAM(s) Oral two times a day  multivitamin 1 Tablet(s) Oral daily  spironolactone 25 milliGRAM(s) Oral at bedtime  tenofovir disoproxil fumarate (VIREAD) 300 milliGRAM(s) Oral daily    MEDICATIONS  (PRN):  acetaminophen   Tablet .. 650 milliGRAM(s) Oral every 6 hours PRN Temp greater or equal to 38C (100.4F), Mild Pain (1 - 3), Moderate Pain (4 - 6)  ALBUTerol/ipratropium for Nebulization 3 milliLiter(s) Nebulizer every 6 hours PRN Shortness of Breath and/or Wheezing  dextrose 40% Gel 15 Gram(s) Oral once PRN Blood Glucose LESS THAN 70 milliGRAM(s)/deciliter  glucagon  Injectable 1 milliGRAM(s) IntraMuscular once PRN Glucose LESS THAN 70 milligrams/deciliter  senna 2 Tablet(s) Oral at bedtime PRN Constipation  traMADol 50 milliGRAM(s) Oral every 4 hours PRN Moderate Pain (4 - 6)    Allergies    No Known Allergies    Intolerances    PE:    Vital Signs Last 24 Hrs  T(C): 37 (06 Nov 2018 06:46), Max: 37 (06 Nov 2018 06:46)  T(F): 98.6 (06 Nov 2018 06:46), Max: 98.6 (06 Nov 2018 06:46)  HR: 91 (06 Nov 2018 10:00) (72 - 91)  BP: 98/61 (06 Nov 2018 10:00) (74/55 - 117/60)  BP(mean): 69 (06 Nov 2018 10:00) (59 - 77)  RR: 20 (06 Nov 2018 10:00) (12 - 28)  SpO2: 99% (06 Nov 2018 10:00) (91% - 100%)    Patient more comfortable and complains mild back pain, brace in place  Abdomen less distended with mild tenderness  (+) CT  Mild tender T-L junction  No focal motor deficits    LABS                                   11.9   11.98 )-----------( 89       ( 06 Nov 2018 09:24 )             37.9     11-06    136  |  99  |  21  ----------------------------<  130<H>  4.0   |  28  |  1.10    Ca    8.0<L>      06 Nov 2018 09:24            MRI with acute L1 fracture

## 2018-11-06 NOTE — PROGRESS NOTE ADULT - ASSESSMENT
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 performed last year also playing role  - continue Lasix  - Supplemental O2 as needed    #COPD  - Supplemental O2 as needed  - Duoneb prn  - Pulm consult appreciated  - monitor pulse ox  - Singulair discontinued in light of underlying hepatic issues    # Diabetes   - A1c = 7.2% during this admission  - ISS  - BGMs  - Tresiba taken at home switched to Lantus per pharmacy 20 units   - continue gabapentin for neuropathic pain  - Diabetic diet w/ DASH    # Acute on chronic decompensated heart failure  - Echo shows LVEF 55-60%  - Continue Lasix and Betablocker  - Continue lisinopril  - F/U Cardio consult    # Acute Back Pain  - No deficits on sensation motor or strength  -  CT shows  no acute vertebral fracture. Mild chronic compression fracture along the superior endplate of T11.  Chronic degenerative changes as detailed above.  - Tylenol PRN pain  - MRI Lumbar spine shows acute mild superior L1 endplate compression fracture with moderate to severe spinal stenosis at L5/S1.  in am   - F/U Ortho Spine consult  - if patient having urinary retention can do bladder scan if >250cc -  straight cath    #HLD  - Diabetic diet w/ DASH  - Lipid profile reviewed - Low HDL  - Continue statin qHS    #Hepatitis B w/ ascites  - GI Consult appreciated   - US Abd shows moderate 4 quadrant abdominal and pelvic ascites  - continue spironolactone  - Continue tenofovir  - start lactulose   - may need paracentesis     #Afib  - Telemetry  - hold coumadin for anticipated procedures - agreed on admission by pt family  - monitor INR    #DVT Prophylaxis  - hold coumadin for anticipated procedures - agreed on admission by pt family  - lucero 78 year old male as above with SOB and acute back pain admitted for acute on chronic decompensated heart failure.    #Shortness of breath in setting of b/L pleural effusions  - Chest CT w/moderate to large pleural effusions   - CT surgery consult appreciated - pigtail catheter placed  - Pulm consult  appreciated- Symbicort/Spiriva started; Singulair discontinued in light of underlying hepatic issues; underlying bronchomalacia evident on bronchoscopy performed last year also playing role  - continue Lasix  - Supplemental O2 as needed    #COPD  - Supplemental O2 as needed  - Duoneb prn  - Pulm consult appreciated  - monitor pulse ox  - Singulair discontinued in light of underlying hepatic issues    # Diabetes   - A1c = 7.2% during this admission  - ISS  - BGMs  - Tresiba taken at home switched to Lantus per pharmacy 20 units   - continue gabapentin for neuropathic pain  - Diabetic diet w/ DASH    # Acute on chronic decompensated heart failure  - Echo shows LVEF 55-60%  - Continue Lasix and Betablocker  - Continue lisinopril  - F/U Cardio consult    # Acute Back Pain  - No deficits on sensation motor or strength  -  CT shows  no acute vertebral fracture. Mild chronic compression fracture along the superior endplate of T11.  Chronic degenerative changes as detailed above.  - Tylenol PRN pain  - MRI Lumbar spine shows acute mild superior L1 endplate compression fracture with moderate to severe spinal stenosis at L5/S1.  in am   - F/U Ortho Spine consult:  Back brace for compression  - if patient having urinary retention can do bladder scan if >250cc -  straight cath    #HLD  - Diabetic diet w/ DASH  - Lipid profile reviewed - Low HDL  - Continue statin qHS    #Hepatitis B w/ ascites  - GI Consult appreciated   - US Abd shows moderate 4 quadrant abdominal and pelvic ascites  - continue spironolactone  - Continue tenofovir  - start lactulose   - may need paracentesis     #Afib  - Telemetry  - hold coumadin for anticipated procedures - agreed on admission by pt family  - monitor INR    # Hx of B cell lymphoma  - Heme Onc Consult pending    #DVT Prophylaxis  - hold coumadin for anticipated procedures - agreed on admission by pt family  - lucero

## 2018-11-06 NOTE — PROGRESS NOTE ADULT - ASSESSMENT
1) Bilateral Pleural Effusions  2) Abnormal CT Chest  3) History of Bronchomalacia  4) COPD  5) Dyspnea  6) History of Heart Failure   7) Passive Atelectasis/Compressive Atelectasis   8) Ascites  9) History of Lymphoma    78 year old male with pmh of copd on home o2 2.5L, afib on coumadin, chronic diastolic chf, diabetes on insulin, htn, hep b infection, hypogammaglobulinemia w/ IVIG tx, b cell lymphoma, cholelithiasis s/p cholecystectomy, esophageal dilation, prostatectomy coming to ED with complaints of worsening shortness of breath and lower back pain. Patient stating was on toilet this morning when tried to get up put pants on when he had a sudden onset of lower back pain.   Reviewed CT chest which shows bilateral pleural effusions L> R.  Once INR is reduced to < 2, can consider thoracentesis  Appreciate CTS assistance  Follow up pleural LDH, cytology, cell count, protein, glucose, triglycerides should also be performed given history of lymphoma.   Performed bronchoscopy on the patient last year which revealed bronchomalacia which is likely contributing to the patient's underlying dyspnea as well  Follow up echocardiogram  Patient may need paracentesis with fluid analysis  Follow up hematology recommendations   Continue O2, goal SaO2 >88%  Will d/c Singulair given potential underlying hepatic issues.  Start Symbicort and Spiriva   s/p pleurex done  Will continue to monitor  get repeat cxr  send PF for analysis / ordered  check ABG and cxr today

## 2018-11-06 NOTE — PROGRESS NOTE ADULT - ASSESSMENT
78 y.o. male with complex past medical history including CLL/lymphoma on treatment, h/o recurrent pneumonia, Afib, HFpEF, Hep B, COPD, type 2DM, H/o PE DVT      Large-volume ascites, likely multifactorial , the may be a component of progression of lymphoma and cirrhosis also contributing to this.  prior SAAG was low and likely component of malignant ascites and P effusion  pulmonary catheter placement noted and appreciated from CT surgery note. Gentle fluid removal and would watch patient's creatinine closely.    history of cirrhosis, increased risk of esophageal varices and bleeding reviewed however, patient is also had increased risk of endoscopy.  Last endoscopy was in 2014 at which time he did not have esophageal varices.  Discussed with pulmonary, patient is considering      gentle diuresis        Hepatitis B with liver cirrhosis and ascites, noncompliance discussed with patient and family.

## 2018-11-06 NOTE — PROGRESS NOTE ADULT - SUBJECTIVE AND OBJECTIVE BOX
Patient is a 78y old  Male who presents with a chief complaint of CC: Lower back pain and shortness of breath (2018 15:54)      HPI:  78 year old male with pmh of copd on home o2 2.5L, afib on coumadin, chronic diastolic chf, diabetes on insulin, htn, hep b infection, hypogammaglobulinemia w/ IVIG tx, b cell lymphoma, cholelithiasis s/p cholecystectomy, esophageal dilation, prostatectomy coming to ED with complaints of worsening shortness of breath and lower back pain. Patient stating was on toilet this morning when tried to get up put pants on when he had a sudden onset of lower back pain. No bladder or bowel incontinence. Per Dr Bejarano In ED bladder scan was done with no to low residual urine volume. Per Dr Bejarano patient able to ambulate with cane in ED. FOr shortness of breath has been going on for past 4-5 weeks and has been gradually worsening. States last echo over 1 year prior. states now able to walk 50 feet without stopping for shortness of breath (2018 22:27)    Patient with fatigue. He feels his breathing is a little bit better. Negative nausea or vomiting.  Did have some reflux last night when laying flat but this has improved. Negative abdominal pain.      PAST MEDICAL & SURGICAL HISTORY:  Hyperlipidemia  Hypogammaglobulinemia: received IVIG  Hepatitis B virus infection, unspecified chronicity: retrovirals  Chronic diastolic congestive heart failure  Essential hypertension  DM type 2 (diabetes mellitus, type 2)  B-cell lymphoma, unspecified B-cell lymphoma type, unspecified body region: CLL stage 4/SLL; met NHL  Atrial fibrillation, unspecified type  COPD (chronic obstructive pulmonary disease)  History of esophageal dilatation  S/P cholecystectomy  H/O prostatectomy      MEDICATIONS  (STANDING):  amiodarone    Tablet 100 milliGRAM(s) Oral daily  atorvastatin 10 milliGRAM(s) Oral at bedtime  buDESOnide  80 MICROgram(s)/formoterol 4.5 MICROgram(s) Inhaler 2 Puff(s) Inhalation two times a day  clotrimazole 1% Cream 1 Application(s) Topical two times a day  dextrose 5%. 1000 milliLiter(s) (50 mL/Hr) IV Continuous <Continuous>  dextrose 50% Injectable 12.5 Gram(s) IV Push once  dextrose 50% Injectable 25 Gram(s) IV Push once  dextrose 50% Injectable 25 Gram(s) IV Push once  docusate sodium 100 milliGRAM(s) Oral three times a day  furosemide    Tablet 40 milliGRAM(s) Oral two times a day  gabapentin 300 milliGRAM(s) Oral three times a day  heparin  Injectable 5000 Unit(s) SubCutaneous every 8 hours  insulin lispro (HumaLOG) corrective regimen sliding scale   SubCutaneous three times a day before meals  insulin lispro (HumaLOG) corrective regimen sliding scale   SubCutaneous at bedtime  lactated ringers. 1000 milliLiter(s) (75 mL/Hr) IV Continuous <Continuous>  lactulose Syrup 10 Gram(s) Oral daily  lidocaine 1% (Preservative-free) Injectable 25 milliLiter(s) Local Injection once  lisinopril 2.5 milliGRAM(s) Oral daily  metoprolol tartrate 25 milliGRAM(s) Oral two times a day  multivitamin 1 Tablet(s) Oral daily  senna 2 Tablet(s) Oral at bedtime  spironolactone 100 milliGRAM(s) Oral daily  tenofovir disoproxil fumarate (VIREAD) 300 milliGRAM(s) Oral daily  tiotropium 18 MICROgram(s) Capsule 1 Capsule(s) Inhalation daily    MEDICATIONS  (PRN):  ALBUTerol/ipratropium for Nebulization 3 milliLiter(s) Nebulizer every 6 hours PRN Shortness of Breath and/or Wheezing  dextrose 40% Gel 15 Gram(s) Oral once PRN Blood Glucose LESS THAN 70 milliGRAM(s)/deciliter  glucagon  Injectable 1 milliGRAM(s) IntraMuscular once PRN Glucose LESS THAN 70 milligrams/deciliter  morphine  - Injectable 4 milliGRAM(s) IV Push every 3 hours PRN Severe Pain (7 - 10)  oxyCODONE    5 mG/acetaminophen 325 mG 1 Tablet(s) Oral every 4 hours PRN Mild Pain (1 - 3)  oxyCODONE    5 mG/acetaminophen 325 mG 2 Tablet(s) Oral every 4 hours PRN Moderate Pain (4 - 6)      Allergies    No Known Allergies    Intolerances        SOCIAL HISTORY:NC    FAMILY HISTORY:  Family history of liver cancer (Father): father  85 liver CA  Family history of coronary arteriosclerosis (Mother): Mom  of MI age 68      REVIEW OF SYSTEMS:    CONSTITUTIONAL: No weakness, fevers or chills  EYES/ENT: No visual changes;  No vertigo or throat pain   NECK: No pain or stiffness  RESPIRATORY: No cough, wheezing, hemoptysis; No shortness of breath  CARDIOVASCULAR: No chest pain or palpitations  GENITOURINARY: No dysuria, frequency or hematuria  NEUROLOGICAL: No numbness or weakness  SKIN: No itching, burning, rashes, or lesions   All other review of systems is negative unless indicated above.    Vital Signs Last 24 Hrs  T(C): 37.1 (2018 16:10), Max: 37.1 (2018 14:31)  T(F): 98.7 (2018 16:10), Max: 98.7 (2018 14:31)  HR: 105 (2018 14:00) (79 - 105)  BP: 101/64 (2018 14:00) (74/55 - 112/64)  BP(mean): 73 (2018 14:00) (59 - 77)  RR: 13 (2018 14:00) (12 - 20)  SpO2: 100% (2018 14:00) (91% - 100%)    PHYSICAL EXAM:    Constitutional: NAD, well-developed  HEENT: EOMI, throat clear  Neck: No LAD, supple  Respiratory: CTA and P X dec BS L side and CT in place  Cardiovascular: S1 and S2, RRR, no M  Gastrointestinal: BS+, soft, NT/ND, neg HSM,  Extremities: No peripheral edema, neg clubing, cyanosis  Vascular: 2+ peripheral pulses  Neurological: A/O x 3, no focal deficits  Psychiatric: Normal mood, normal affect  Skin: No rashes    LABS:  CBC Full  -  ( 2018 09:24 )  WBC Count : 11.98 K/uL  Hemoglobin : 11.9 g/dL  Hematocrit : 37.9 %  Platelet Count - Automated : 89 K/uL  Mean Cell Volume : 83.5 fl  Mean Cell Hemoglobin : 26.2 pg  Mean Cell Hemoglobin Concentration : 31.4 gm/dL  Auto Neutrophil # : x  Auto Lymphocyte # : x  Auto Monocyte # : x  Auto Eosinophil # : x  Auto Basophil # : x  Auto Neutrophil % : x  Auto Lymphocyte % : x  Auto Monocyte % : x  Auto Eosinophil % : x  Auto Basophil % : x    11    136  |  99  |  21  ----------------------------<  130<H>  4.0   |  28  |  1.10    Ca    8.0<L>      2018 09:24      PT/INR - ( 2018 04:58 )   PT: 20.1 sec;   INR: 1.78 ratio         PTT - ( 2018 04:58 )  PTT:33.7 sec    1103 @ 12:36  Hep A Igm --  Hep A total ab, IgA and M --  Hep B core Ab total --  Hep B core IgM --  Hep B DNA PCR [facility]                RADIOLOGY & ADDITIONAL STUDIES:  < from: Xray Chest 1 View- PORTABLE-Routine (18 @ 09:15) >  EXAM:  XR CHEST PORTABLE ROUTINE 1V                            PROCEDURE DATE:  2018          INTERPRETATION:  Exam Date: 2018 9:15 AM    History: Labored breathing    Technique: Single frontal portable view of the chest with comparison to    2018    Findings:    The heart is enlarged. Moderate right pleural effusion with small to mild   left pleural effusion. Bibasilar atelectasis. Degenerative changes of the   visualized osseous structures.    Left-sided chest tube. Left chestwall subcutaneous emphysema.    Impression:    Moderate right pleural effusion with small to mild left pleural effusion.   Bibasilar atelectasis        < end of copied text >

## 2018-11-07 NOTE — PROGRESS NOTE ADULT - ASSESSMENT
78 year old male as above     * back pain with compression fracture L1  -continue brace when OOB  -pain management  -IV Tylenol prn    Multiple Medical problems as per Medicine  #shortness of breath   #copd  # diabetes  # chf- acute on chronic present on admit   #hld   #hepatitis b w/ ascites-for drainage today?  #afib  #dvt prophylaxis

## 2018-11-07 NOTE — PROGRESS NOTE ADULT - ASSESSMENT
Chronic diastolic congestive heart failure.  Bilateral pleural effusion . He is s/p PEG tube placement on left side.  Moderate ascites.  History of CLL/lymphoma.  Paroxysmal atrial fibrillation he is normal sinus rhythm.  Cirrhosis of liver.  Calcified coronaries and aorta on CT scan of the chest.  Suggest  He is status post thoracocentesis and PEG tube placement.  Due to borderline low blood pressure decrease diuretics. Stop lisinopril.  Stop IV fluids.  Intake and output/daily weights.  Follow with electrolytes.  Hold lisinopril if BP is low.  Adjust INR 2.0  discussed with patient's family at bedside and hospitalist. Chronic diastolic congestive heart failure.  Bilateral pleural effusion . He is s/p Chest tube placement on left side.  Moderate ascites.  History of CLL/lymphoma.  Paroxysmal atrial fibrillation he is normal sinus rhythm.  Cirrhosis of liver.  Calcified coronaries and aorta on CT scan of the chest.  Suggest  He is status post thoracocentesis and Chest tube placement.  Due to borderline low blood pressure decrease diuretics. Stop lisinopril.  Stop IV fluids.  Intake and output/daily weights.  Follow with electrolytes.  Hold lisinopril if BP is low.  Adjust INR 2.0  discussed with patient's family at bedside and hospitalist.

## 2018-11-07 NOTE — PROGRESS NOTE ADULT - ASSESSMENT
78 y.o. male with complex past medical history including CLL/lymphoma on treatment, h/o recurrent pneumonia, Afib, HFpEF, Hep B, COPD, type 2DM, H/o PE DVT      med -volume ascites, likely multifactorial , the may be a component of progression of lymphoma and cirrhosis also contributing to this.  prior SAAG was low and likely component of malignant ascites and P effusion  pulmonary catheter placement noted and appreciated from CT surgery note. Gentle fluid removal and would watch patient's creatinine closely.  will inc diuretics and DC IVF    history of cirrhosis, increased risk of esophageal varices and bleeding reviewed however, patient is also had increased risk of endoscopy.  Last endoscopy was in 2014 at which time he did not have esophageal varices.  Discussed with pulmonary, patient is refusing  inc risk bleed due to possible EV and AC DW pt and pt accepts risk    discussed with pulmonary and hospitalist.        gentle diuresis        Hepatitis B with liver cirrhosis and ascites, noncompliance discussed with patient and family.

## 2018-11-07 NOTE — PROGRESS NOTE ADULT - SUBJECTIVE AND OBJECTIVE BOX
Patient is a 78y old  Male who presents with a chief complaint of CC: Lower back pain and shortness of breath (2018 13:05)      HPI:  78 year old male with pmh of copd on home o2 2.5L, afib on coumadin, chronic diastolic chf, diabetes on insulin, htn, hep b infection, hypogammaglobulinemia w/ IVIG tx, b cell lymphoma, cholelithiasis s/p cholecystectomy, esophageal dilation, prostatectomy coming to ED with complaints of worsening shortness of breath and lower back pain. Patient stating was on toilet this morning when tried to get up put pants on when he had a sudden onset of lower back pain. No bladder or bowel incontinence. Per Dr Bejarano In ED bladder scan was done with no to low residual urine volume. Per Dr Bejarano patient able to ambulate with cane in ED. FOr shortness of breath has been going on for past 4-5 weeks and has been gradually worsening. States last echo over 1 year prior. states now able to walk 50 feet without stopping for shortness of breath (2018 22:27)    Patient with continued shortness of breath however, feels that it is improved. Has some fatigue.  Negative abdominal pain although he has been having some mild pain around the chest tube site.  This increases with movement and is a sharp pain. Without movement is well controlled.  Denies any fevers.        PAST MEDICAL & SURGICAL HISTORY:  Hyperlipidemia  Hypogammaglobulinemia: received IVIG  Hepatitis B virus infection, unspecified chronicity: retrovirals  Chronic diastolic congestive heart failure  Essential hypertension  DM type 2 (diabetes mellitus, type 2)  B-cell lymphoma, unspecified B-cell lymphoma type, unspecified body region: CLL stage 4/SLL; met NHL  Atrial fibrillation, unspecified type  COPD (chronic obstructive pulmonary disease)  History of esophageal dilatation  S/P cholecystectomy  H/O prostatectomy      MEDICATIONS  (STANDING):  amiodarone    Tablet 100 milliGRAM(s) Oral daily  atorvastatin 10 milliGRAM(s) Oral at bedtime  buDESOnide  80 MICROgram(s)/formoterol 4.5 MICROgram(s) Inhaler 2 Puff(s) Inhalation two times a day  clotrimazole 1% Cream 1 Application(s) Topical two times a day  dextrose 5%. 1000 milliLiter(s) (50 mL/Hr) IV Continuous <Continuous>  dextrose 50% Injectable 12.5 Gram(s) IV Push once  dextrose 50% Injectable 25 Gram(s) IV Push once  dextrose 50% Injectable 25 Gram(s) IV Push once  docusate sodium 100 milliGRAM(s) Oral three times a day  furosemide    Tablet 40 milliGRAM(s) Oral daily  gabapentin 300 milliGRAM(s) Oral three times a day  heparin  Injectable 5000 Unit(s) SubCutaneous every 8 hours  insulin lispro (HumaLOG) corrective regimen sliding scale   SubCutaneous three times a day before meals  insulin lispro (HumaLOG) corrective regimen sliding scale   SubCutaneous at bedtime  lactulose Syrup 10 Gram(s) Oral daily  lidocaine 1% (Preservative-free) Injectable 25 milliLiter(s) Local Injection once  metoprolol tartrate 25 milliGRAM(s) Oral two times a day  multivitamin 1 Tablet(s) Oral daily  senna 2 Tablet(s) Oral at bedtime  spironolactone 25 milliGRAM(s) Oral daily  tenofovir disoproxil fumarate (VIREAD) 300 milliGRAM(s) Oral daily  tiotropium 18 MICROgram(s) Capsule 1 Capsule(s) Inhalation daily  warfarin 2.5 milliGRAM(s) Oral at bedtime    MEDICATIONS  (PRN):  ALBUTerol/ipratropium for Nebulization 3 milliLiter(s) Nebulizer every 6 hours PRN Shortness of Breath and/or Wheezing  dextrose 40% Gel 15 Gram(s) Oral once PRN Blood Glucose LESS THAN 70 milliGRAM(s)/deciliter  glucagon  Injectable 1 milliGRAM(s) IntraMuscular once PRN Glucose LESS THAN 70 milligrams/deciliter  morphine  - Injectable 4 milliGRAM(s) IV Push every 3 hours PRN Severe Pain (7 - 10)  oxyCODONE    5 mG/acetaminophen 325 mG 1 Tablet(s) Oral every 4 hours PRN Mild Pain (1 - 3)  oxyCODONE    5 mG/acetaminophen 325 mG 2 Tablet(s) Oral every 4 hours PRN Moderate Pain (4 - 6)      Allergies    No Known Allergies    Intolerances        SOCIAL HISTORY:    FAMILY HISTORY:  Family history of liver cancer (Father): father  85 liver CA  Family history of coronary arteriosclerosis (Mother): Mom  of MI age 68      REVIEW OF SYSTEMS:    CONSTITUTIONAL: pos weakness, no fevers or chills  EYES/ENT: No visual changes;  No vertigo or throat pain   NECK: No pain or stiffness  RESPIRATORY: No cough, wheezing, hemoptysis; No shortness of breath  CARDIOVASCULAR: No chest pain or palpitations  GENITOURINARY: No dysuria, frequency or hematuria  NEUROLOGICAL: No numbness or weakness  SKIN: No itching, burning, rashes, or lesions   All other review of systems is negative unless indicated above.    Vital Signs Last 24 Hrs  T(C): 37.2 (2018 16:05), Max: 37.4 (2018 14:26)  T(F): 99 (2018 16:05), Max: 99.3 (2018 14:26)  HR: 107 (2018 17:00) (88 - 108)  BP: 96/55 (2018 17:00) (87/56 - 110/68)  BP(mean): 64 (2018 17:00) (64 - 78)  RR: 17 (2018 17:00) (14 - 24)  SpO2: 96% (2018 17:00) (88% - 98%)    PHYSICAL EXAM:    Constitutional: NAD, well-developed  HEENT: EOMI, throat clear  Neck: No LAD, supple  Respiratory: CTA and P, Dec BS L base  Cardiovascular: S1 and S2, RRR, no M  Gastrointestinal: BS+, soft, NT/ND, neg HSM,pos ascites  Extremities: No peripheral edema, neg clubing, cyanosis  Vascular: 2+ peripheral pulses  Neurological: A/O x 3, no focal deficits  Psychiatric: Normal mood, normal affect  Skin: No rashes    LABS:  CBC Full  -  ( 2018 04:46 )  WBC Count : 12.47 K/uL  Hemoglobin : 11.0 g/dL  Hematocrit : 34.7 %  Platelet Count - Automated : 96 K/uL  Mean Cell Volume : 81.3 fl  Mean Cell Hemoglobin : 25.8 pg  Mean Cell Hemoglobin Concentration : 31.7 gm/dL  Auto Neutrophil # : 3.24 K/uL  Auto Lymphocyte # : 6.48 K/uL  Auto Monocyte # : 1.87 K/uL  Auto Eosinophil # : 0.25 K/uL  Auto Basophil # : 0.00 K/uL  Auto Neutrophil % : 26.0 %  Auto Lymphocyte % : 52.0 %  Auto Monocyte % : 15.0 %  Auto Eosinophil % : 2.0 %  Auto Basophil % : 0.0 %        135  |  99  |  22  ----------------------------<  80  4.1   |  31  |  1.08    Ca    7.7<L>      2018 04:46      PT/INR - ( 2018 04:46 )   PT: 23.8 sec;   INR: 2.10 ratio         PTT - ( 2018 04:46 )  PTT:35.6 sec    11 @ 12:27  Hep A Igm --  Hep A total ab, IgA and M --  Hep B core Ab total --  Hep B core IgM --  Hep B DNA PCR NotDetec  Hep BSAg --  Hep BSAb --  Hep BSAb --  HCV Ab --  HCV RNA Log --  HCV RNA interp --             @ 12:36  Hep A Igm --  Hep A total ab, IgA and M --  Hep B core Ab total --  Hep B core IgM --  Hep B DNA PCR NotDetec  Hep BSAg --  Hep BSAb --  Hep BSAb --  HCV Ab --  HCV RNA Log --  HCV RNA interp --                RADIOLOGY & ADDITIONAL STUDIES:  < from: Xray Chest 1 View- PORTABLE-Routine (18 @ 09:15) >  EXAM:  XR CHEST PORTABLE ROUTINE 1V                            PROCEDURE DATE:  2018          INTERPRETATION:  Exam Date: 2018 9:15 AM    History: Labored breathing    Technique: Single frontal portable view of the chest with comparison to    2018    Findings:    The heart is enlarged. Moderate right pleural effusion with small to mild   left pleural effusion. Bibasilar atelectasis. Degenerative changes of the   visualized osseous structures.    Left-sided chest tube. Left chestwall subcutaneous emphysema.    Impression:    Moderate right pleural effusion with small to mild left pleural effusion.   Bibasilar atelectasis              < end of copied text >

## 2018-11-07 NOTE — PROGRESS NOTE ADULT - SUBJECTIVE AND OBJECTIVE BOX
HPI:  78 year old male with pmh of copd on home o2 2.5L, afib on coumadin, chronic diastolic chf, diabetes on insulin, htn, hep b infection, hypogammaglobulinemia w/ IVIG tx, b cell lymphoma, cholelithiasis s/p cholecystectomy, esophageal dilation, prostatectomy coming to ED with complaints of worsening shortness of breath and lower back pain. Patient stating was on toilet this morning when tried to get up put pants on when he had a sudden onset of lower back pain. No bladder or bowel incontinence. Per Dr Bejarano In ED bladder scan was done with no to low residual urine volume. Per Dr Bejarano patient able to ambulate with cane in ED. FOr shortness of breath has been going on for past 4-5 weeks and has been gradually worsening. States last echo over 1 year prior. states now able to walk 50 feet without stopping for shortness of breath (02 Nov 2018 22:27)    11/4/18 Thru family , patient with continued back pain  11/5/18 Patient with persistent BP, abdomen feels better  11/6/18 Patient more comfortable. Brace delivered. S/P drainage PE  11/7/18 Patient much more comfortable today. S/P centesis for ascites and PE    PAST MEDICAL & SURGICAL HISTORY:  Hyperlipidemia  Hypogammaglobulinemia: received IVIG  Hepatitis B virus infection, unspecified chronicity: retrovirals  Chronic diastolic congestive heart failure  Essential hypertension  DM type 2 (diabetes mellitus, type 2)  B-cell lymphoma, unspecified B-cell lymphoma type, unspecified body region: CLL stage 4/SLL; met NHL  Atrial fibrillation, unspecified type  COPD (chronic obstructive pulmonary disease)  History of esophageal dilatation  S/P cholecystectomy  H/O prostatectomy    MEDICATIONS  (STANDING):  amiodarone    Tablet 100 milliGRAM(s) Oral daily  atorvastatin 10 milliGRAM(s) Oral at bedtime  dextrose 5%. 1000 milliLiter(s) (50 mL/Hr) IV Continuous <Continuous>  dextrose 50% Injectable 12.5 Gram(s) IV Push once  dextrose 50% Injectable 25 Gram(s) IV Push once  dextrose 50% Injectable 25 Gram(s) IV Push once  docusate sodium 100 milliGRAM(s) Oral three times a day  furosemide    Tablet 20 milliGRAM(s) Oral two times a day  gabapentin 300 milliGRAM(s) Oral three times a day  insulin glargine Injectable (LANTUS) 20 Unit(s) SubCutaneous at bedtime  insulin lispro (HumaLOG) corrective regimen sliding scale   SubCutaneous three times a day before meals  insulin lispro (HumaLOG) corrective regimen sliding scale   SubCutaneous at bedtime  lactulose Syrup 10 Gram(s) Oral daily  lisinopril 2.5 milliGRAM(s) Oral daily  metoprolol tartrate 25 milliGRAM(s) Oral two times a day  multivitamin 1 Tablet(s) Oral daily  spironolactone 25 milliGRAM(s) Oral at bedtime  tenofovir disoproxil fumarate (VIREAD) 300 milliGRAM(s) Oral daily    MEDICATIONS  (PRN):  acetaminophen   Tablet .. 650 milliGRAM(s) Oral every 6 hours PRN Temp greater or equal to 38C (100.4F), Mild Pain (1 - 3), Moderate Pain (4 - 6)  ALBUTerol/ipratropium for Nebulization 3 milliLiter(s) Nebulizer every 6 hours PRN Shortness of Breath and/or Wheezing  dextrose 40% Gel 15 Gram(s) Oral once PRN Blood Glucose LESS THAN 70 milliGRAM(s)/deciliter  glucagon  Injectable 1 milliGRAM(s) IntraMuscular once PRN Glucose LESS THAN 70 milligrams/deciliter  senna 2 Tablet(s) Oral at bedtime PRN Constipation  traMADol 50 milliGRAM(s) Oral every 4 hours PRN Moderate Pain (4 - 6)    Allergies    No Known Allergies    Intolerances    PE:    Vital Signs Last 24 Hrs  T(C): 37.1 (07 Nov 2018 09:00), Max: 37.2 (06 Nov 2018 20:54)  T(F): 98.7 (07 Nov 2018 09:00), Max: 98.9 (06 Nov 2018 20:54)  HR: 88 (07 Nov 2018 07:53) (88 - 105)  BP: 100/59 (07 Nov 2018 07:00) (87/56 - 104/60)  BP(mean): 69 (07 Nov 2018 07:00) (63 - 75)  RR: 17 (07 Nov 2018 07:00) (13 - 20)  SpO2: 96% (07 Nov 2018 07:00) (88% - 100%))    Patient more comfortable and complains mild back pain, laying in bed  Abdomen less distended without tenderness  (+) CT  Mild tender T-L junction  No focal motor deficits    LABS                                              11.0   12.47 )-----------( 96       ( 07 Nov 2018 04:46 )             34.7     11-07    135  |  99  |  22  ----------------------------<  80  4.1   |  31  |  1.08    Ca    7.7<L>      07 Nov 2018 04:46                MRI with acute L1 fracture

## 2018-11-07 NOTE — PROGRESS NOTE ADULT - SUBJECTIVE AND OBJECTIVE BOX
HPI:  Patient is a 78-year-old with history of hypertension, chronic diastolic congestive heart failure, COPD, paroxysmal atrial fibrillation, history of DVT and pulmonary emboli in the past , he has chronic bilateral pleural effusion, CLL/lymphoma being treated, history of recurrent pneumonia, He was admitted with lower back pain and shortness of breath and after admission he was diagnosed to have bilateral pleural effusion and he also has moderate ascites. He denies any chest pain. Since admission he feels improved and now he is comfortable and is in no acute distress. He is in normal sinus rhythm on telemetry. He is status post thoracocentesis and PEG tube placement.      Transthoracic Echocardiogram (11.03.18   indings     Mitral Valve   Normal appearing mitral valve structure and function.   Mild (1+) mitral regurgitation is present.     Aortic Valve   The aortic valve is not well visualized, appears mildly calcified. Valve   opening seems to be normal. No stenosis or regurgitation.     Tricuspid Valve   Normal appearing tricuspid valve structure and function.   Mild (1+) tricuspid valve regurgitation is present.     Pulmonic Valve   Pulmonic valve not well seen.   Trace pulmonic valvular regurgitation is present.     Left Atrium   Normal appearing left atrium.     Left Ventricle   The left ventricle is normal in size, wall thickness, wall motion and   contractility. Estimated left ventricular ejection fraction is 55-60%.     Right Atrium   The right atrium is normal.     Right Ventricle   The right ventricle is normal in size.      CT Chest No Cont (11.02.18   IMPRESSION: Moderate to large left pleural effusion with underlying   compressive atelectasis. Small right pleural effusion with underlying   compressive atelectasis.   Prominent lymph nodes in the bilateral axilla,   mediastinum, and hilum.     MEDICATIONS  (STANDING):  amiodarone    Tablet 100 milliGRAM(s) Oral daily  atorvastatin 10 milliGRAM(s) Oral at bedtime  buDESOnide  80 MICROgram(s)/formoterol 4.5 MICROgram(s) Inhaler 2 Puff(s) Inhalation two times a day  clotrimazole 1% Cream 1 Application(s) Topical two times a day  dextrose 5%. 1000 milliLiter(s) (50 mL/Hr) IV Continuous <Continuous>  dextrose 50% Injectable 12.5 Gram(s) IV Push once  dextrose 50% Injectable 25 Gram(s) IV Push once  dextrose 50% Injectable 25 Gram(s) IV Push once  docusate sodium 100 milliGRAM(s) Oral three times a day  furosemide    Tablet 40 milliGRAM(s) Oral daily  gabapentin 300 milliGRAM(s) Oral three times a day  heparin  Injectable 5000 Unit(s) SubCutaneous every 8 hours  insulin lispro (HumaLOG) corrective regimen sliding scale   SubCutaneous three times a day before meals  insulin lispro (HumaLOG) corrective regimen sliding scale   SubCutaneous at bedtime  lactulose Syrup 10 Gram(s) Oral daily  lidocaine 1% (Preservative-free) Injectable 25 milliLiter(s) Local Injection once  metoprolol tartrate 25 milliGRAM(s) Oral two times a day  multivitamin 1 Tablet(s) Oral daily  senna 2 Tablet(s) Oral at bedtime  spironolactone 25 milliGRAM(s) Oral daily  tenofovir disoproxil fumarate (VIREAD) 300 milliGRAM(s) Oral daily  tiotropium 18 MICROgram(s) Capsule 1 Capsule(s) Inhalation daily    MEDICATIONS  (PRN):  ALBUTerol/ipratropium for Nebulization 3 milliLiter(s) Nebulizer every 6 hours PRN Shortness of Breath and/or Wheezing  dextrose 40% Gel 15 Gram(s) Oral once PRN Blood Glucose LESS THAN 70 milliGRAM(s)/deciliter  glucagon  Injectable 1 milliGRAM(s) IntraMuscular once PRN Glucose LESS THAN 70 milligrams/deciliter  morphine  - Injectable 4 milliGRAM(s) IV Push every 3 hours PRN Severe Pain (7 - 10)  oxyCODONE    5 mG/acetaminophen 325 mG 1 Tablet(s) Oral every 4 hours PRN Mild Pain (1 - 3)  oxyCODONE    5 mG/acetaminophen 325 mG 2 Tablet(s) Oral every 4 hours PRN Moderate Pain (4 - 6)          REVIEW OF SYSTEM:  Pertinent items are noted in HPI.  Constitutional	Negative for chills, fevers, sweats.    Eyes: 	Negative for visual disturbance.  Ears, nose, mouth, throat, and face: Negative for epistaxis, nasal congestion, sore throat and tinnitus.  Neck:	Negative for enlargement, pain and difficulty in swallowing  Respiration : Negative for cough,  pleuritic chest pain and wheezing  Cardiovascular: Negative for chest pain, and palpitations , he hs FRANZ    Gastrointestinal : Negative for abdominal pain, diarrhea, nausea and vomiting  Genitourinary: Negative for dysuria, frequency and urinary incontinence .  Skin: Negative for  rash, pruritus, swelling, dryness .  	  Hematologic/lymphatic: Negative for bleeding and easy bruising  Musculoskeletal: Negative for arthralgias, back pain and muscle weakness.  Neurological: Negative for dizziness, headaches, seizures and tremors  Behavioral/Psych: Negative for mood change, depression.  Endocrine:	Negative for blurry vision, polydipsia and polyuria, diaphoresis.   Allergic/Immunologic:	Negative for anaphylaxis, angioedema and urticaria.      Vital Signs Last 24 Hrs  T(C): 37.1 (07 Nov 2018 09:00), Max: 37.2 (06 Nov 2018 20:54)  T(F): 98.7 (07 Nov 2018 09:00), Max: 98.9 (06 Nov 2018 20:54)  HR: 96 (07 Nov 2018 09:00) (88 - 105)  BP: 106/60 (07 Nov 2018 09:00) (87/56 - 106/60)  BP(mean): 72 (07 Nov 2018 09:00) (63 - 75)  RR: 16 (07 Nov 2018 09:00) (13 - 20)  SpO2: 98% (07 Nov 2018 09:00) (88% - 100%)    I&O's Summary    06 Nov 2018 07:01  -  07 Nov 2018 07:00  --------------------------------------------------------  IN: 975 mL / OUT: 1745 mL / NET: -770 mL      PHYSICAL EXAM  General Appearance: cooperative, no acute distress,   HEENT: PERRL, conjunctiva clear, EOM's intact .   Neck: Supple, , no adenopathy, thyroid: not enlarged, no carotid bruit or JVD  Lungs: diminished breath sounds on both sides, patient is a Pleurx catheter on the left side.  Heart: Regular rate and rhythm, S1, S2 normal, 1/6 HS murmur,no rub or gallop  Abdomen: Soft, non-tender, bowel sounds active , no hepatosplenomegaly , ascites is present.  Extremities: no cyanosis or edema, no joint swelling  Skin: Skin color, texture normal, no rashes   Neurologic: Alert and oriented X3 , cranial nerves intact, sensory and motor normal,        INTERPRETATION OF TELEMETRY: NSR            LABS:                          11.0   12.47 )-----------( 96       ( 07 Nov 2018 04:46 )             34.7     11-07    135  |  99  |  22  ----------------------------<  80  4.1   |  31  |  1.08    Ca    7.7<L>      07 Nov 2018 04:46            Pro BNP  182 11-02 @ 17:42  D Dimer  -- 11-02 @ 17:42    PT/INR - ( 07 Nov 2018 04:46 )   PT: 23.8 sec;   INR: 2.10 ratio         PTT - ( 07 Nov 2018 04:46 )  PTT:35.6 sec HPI:  Patient is a 78-year-old with history of hypertension, chronic diastolic congestive heart failure, COPD, paroxysmal atrial fibrillation, history of DVT and pulmonary emboli in the past , he has chronic bilateral pleural effusion, CLL/lymphoma being treated, history of recurrent pneumonia, He was admitted with lower back pain and shortness of breath and after admission he was diagnosed to have bilateral pleural effusion and he also has moderate ascites. He denies any chest pain. Since admission he feels improved and now he is comfortable and is in no acute distress. He is in normal sinus rhythm on telemetry. He is status post thoracocentesis and chest tube placement.      Transthoracic Echocardiogram (11.03.18   indings     Mitral Valve   Normal appearing mitral valve structure and function.   Mild (1+) mitral regurgitation is present.     Aortic Valve   The aortic valve is not well visualized, appears mildly calcified. Valve   opening seems to be normal. No stenosis or regurgitation.     Tricuspid Valve   Normal appearing tricuspid valve structure and function.   Mild (1+) tricuspid valve regurgitation is present.     Pulmonic Valve   Pulmonic valve not well seen.   Trace pulmonic valvular regurgitation is present.     Left Atrium   Normal appearing left atrium.     Left Ventricle   The left ventricle is normal in size, wall thickness, wall motion and   contractility. Estimated left ventricular ejection fraction is 55-60%.     Right Atrium   The right atrium is normal.     Right Ventricle   The right ventricle is normal in size.      CT Chest No Cont (11.02.18   IMPRESSION: Moderate to large left pleural effusion with underlying   compressive atelectasis. Small right pleural effusion with underlying   compressive atelectasis.   Prominent lymph nodes in the bilateral axilla,   mediastinum, and hilum.     MEDICATIONS  (STANDING):  amiodarone    Tablet 100 milliGRAM(s) Oral daily  atorvastatin 10 milliGRAM(s) Oral at bedtime  buDESOnide  80 MICROgram(s)/formoterol 4.5 MICROgram(s) Inhaler 2 Puff(s) Inhalation two times a day  clotrimazole 1% Cream 1 Application(s) Topical two times a day  dextrose 5%. 1000 milliLiter(s) (50 mL/Hr) IV Continuous <Continuous>  dextrose 50% Injectable 12.5 Gram(s) IV Push once  dextrose 50% Injectable 25 Gram(s) IV Push once  dextrose 50% Injectable 25 Gram(s) IV Push once  docusate sodium 100 milliGRAM(s) Oral three times a day  furosemide    Tablet 40 milliGRAM(s) Oral daily  gabapentin 300 milliGRAM(s) Oral three times a day  heparin  Injectable 5000 Unit(s) SubCutaneous every 8 hours  insulin lispro (HumaLOG) corrective regimen sliding scale   SubCutaneous three times a day before meals  insulin lispro (HumaLOG) corrective regimen sliding scale   SubCutaneous at bedtime  lactulose Syrup 10 Gram(s) Oral daily  lidocaine 1% (Preservative-free) Injectable 25 milliLiter(s) Local Injection once  metoprolol tartrate 25 milliGRAM(s) Oral two times a day  multivitamin 1 Tablet(s) Oral daily  senna 2 Tablet(s) Oral at bedtime  spironolactone 25 milliGRAM(s) Oral daily  tenofovir disoproxil fumarate (VIREAD) 300 milliGRAM(s) Oral daily  tiotropium 18 MICROgram(s) Capsule 1 Capsule(s) Inhalation daily    MEDICATIONS  (PRN):  ALBUTerol/ipratropium for Nebulization 3 milliLiter(s) Nebulizer every 6 hours PRN Shortness of Breath and/or Wheezing  dextrose 40% Gel 15 Gram(s) Oral once PRN Blood Glucose LESS THAN 70 milliGRAM(s)/deciliter  glucagon  Injectable 1 milliGRAM(s) IntraMuscular once PRN Glucose LESS THAN 70 milligrams/deciliter  morphine  - Injectable 4 milliGRAM(s) IV Push every 3 hours PRN Severe Pain (7 - 10)  oxyCODONE    5 mG/acetaminophen 325 mG 1 Tablet(s) Oral every 4 hours PRN Mild Pain (1 - 3)  oxyCODONE    5 mG/acetaminophen 325 mG 2 Tablet(s) Oral every 4 hours PRN Moderate Pain (4 - 6)          REVIEW OF SYSTEM:  Pertinent items are noted in HPI.  Constitutional	Negative for chills, fevers, sweats.    Eyes: 	Negative for visual disturbance.  Ears, nose, mouth, throat, and face: Negative for epistaxis, nasal congestion, sore throat and tinnitus.  Neck:	Negative for enlargement, pain and difficulty in swallowing  Respiration : Negative for cough,  pleuritic chest pain and wheezing  Cardiovascular: Negative for chest pain, and palpitations , he hs FRANZ    Gastrointestinal : Negative for abdominal pain, diarrhea, nausea and vomiting  Genitourinary: Negative for dysuria, frequency and urinary incontinence .  Skin: Negative for  rash, pruritus, swelling, dryness .  	  Hematologic/lymphatic: Negative for bleeding and easy bruising  Musculoskeletal: Negative for arthralgias, back pain and muscle weakness.  Neurological: Negative for dizziness, headaches, seizures and tremors  Behavioral/Psych: Negative for mood change, depression.  Endocrine:	Negative for blurry vision, polydipsia and polyuria, diaphoresis.   Allergic/Immunologic:	Negative for anaphylaxis, angioedema and urticaria.      Vital Signs Last 24 Hrs  T(C): 37.1 (07 Nov 2018 09:00), Max: 37.2 (06 Nov 2018 20:54)  T(F): 98.7 (07 Nov 2018 09:00), Max: 98.9 (06 Nov 2018 20:54)  HR: 96 (07 Nov 2018 09:00) (88 - 105)  BP: 106/60 (07 Nov 2018 09:00) (87/56 - 106/60)  BP(mean): 72 (07 Nov 2018 09:00) (63 - 75)  RR: 16 (07 Nov 2018 09:00) (13 - 20)  SpO2: 98% (07 Nov 2018 09:00) (88% - 100%)    I&O's Summary    06 Nov 2018 07:01  -  07 Nov 2018 07:00  --------------------------------------------------------  IN: 975 mL / OUT: 1745 mL / NET: -770 mL      PHYSICAL EXAM  General Appearance: cooperative, no acute distress,   HEENT: PERRL, conjunctiva clear, EOM's intact .   Neck: Supple, , no adenopathy, thyroid: not enlarged, no carotid bruit or JVD  Lungs: diminished breath sounds on both sides, patient is a Pleurx catheter on the left side.  Heart: Regular rate and rhythm, S1, S2 normal, 1/6 HS murmur,no rub or gallop  Abdomen: Soft, non-tender, bowel sounds active , no hepatosplenomegaly , ascites is present.  Extremities: no cyanosis or edema, no joint swelling  Skin: Skin color, texture normal, no rashes   Neurologic: Alert and oriented X3 , cranial nerves intact, sensory and motor normal,        INTERPRETATION OF TELEMETRY: NSR            LABS:                          11.0   12.47 )-----------( 96       ( 07 Nov 2018 04:46 )             34.7     11-07    135  |  99  |  22  ----------------------------<  80  4.1   |  31  |  1.08    Ca    7.7<L>      07 Nov 2018 04:46            Pro BNP  182 11-02 @ 17:42  D Dimer  -- 11-02 @ 17:42    PT/INR - ( 07 Nov 2018 04:46 )   PT: 23.8 sec;   INR: 2.10 ratio         PTT - ( 07 Nov 2018 04:46 )  PTT:35.6 sec

## 2018-11-07 NOTE — CONSULT NOTE ADULT - CONSULT REASON
79 yo male with CLL / SLL admitted with left pleural effusion
Cardiology Consult
ascites
fx L1
left pleural effusion
Dyspnea

## 2018-11-07 NOTE — CONSULT NOTE ADULT - SUBJECTIVE AND OBJECTIVE BOX
HPI:  78 year old male with COPD,  SLL/ CLL : Mediastinal / retroperitoneal nodes, anemia, on Obinutuzumab ( Ant-CD 20 antibody)  ; in addition receives Gammaglobulin infusion q 4 weeks for documented hypogammaglobulinemia associated with repeated pneumonia/ admitted with worsening shortness of breath and lower back pain. CT imaging moderate to large left pleural effusion with underlying compressive atelectasis. Small right pleural effusion with underlying compressive atelectasis.  Prominent lymph nodes in the bilateral axilla mediastinum  S/P thoracentesis less dyspnea, + back pains; MRI reveals T 1 acute compression fracture        PAST MEDICAL & SURGICAL HISTORY:  Hyperlipidemia  Hypogammaglobulinemia: received IVIG  Hepatitis B virus infection, unspecified chronicity: retrovirals  Chronic diastolic congestive heart failure  Essential hypertension  DM type 2 (diabetes mellitus, type 2)  B-cell lymphoma, unspecified B-cell lymphoma type, unspecified body region: CLL stage 4/SLL; met NHL  Atrial fibrillation, unspecified type  COPD (chronic obstructive pulmonary disease)  History of esophageal dilatation  S/P cholecystectomy  H/O prostatectomy      MEDICATIONS  (STANDING):  amiodarone    Tablet 100 milliGRAM(s) Oral daily  atorvastatin 10 milliGRAM(s) Oral at bedtime  buDESOnide  80 MICROgram(s)/formoterol 4.5 MICROgram(s) Inhaler 2 Puff(s) Inhalation two times a day  clotrimazole 1% Cream 1 Application(s) Topical two times a day  dextrose 5%. 1000 milliLiter(s) (50 mL/Hr) IV Continuous <Continuous>  dextrose 50% Injectable 12.5 Gram(s) IV Push once  dextrose 50% Injectable 25 Gram(s) IV Push once  dextrose 50% Injectable 25 Gram(s) IV Push once  docusate sodium 100 milliGRAM(s) Oral three times a day  furosemide    Tablet 40 milliGRAM(s) Oral daily  gabapentin 300 milliGRAM(s) Oral three times a day  heparin  Injectable 5000 Unit(s) SubCutaneous every 8 hours  insulin lispro (HumaLOG) corrective regimen sliding scale   SubCutaneous three times a day before meals  insulin lispro (HumaLOG) corrective regimen sliding scale   SubCutaneous at bedtime  lactulose Syrup 10 Gram(s) Oral daily  lidocaine 1% (Preservative-free) Injectable 25 milliLiter(s) Local Injection once  metoprolol tartrate 25 milliGRAM(s) Oral two times a day  multivitamin 1 Tablet(s) Oral daily  senna 2 Tablet(s) Oral at bedtime  spironolactone 25 milliGRAM(s) Oral daily  tenofovir disoproxil fumarate (VIREAD) 300 milliGRAM(s) Oral daily  tiotropium 18 MICROgram(s) Capsule 1 Capsule(s) Inhalation daily    MEDICATIONS  (PRN):  ALBUTerol/ipratropium for Nebulization 3 milliLiter(s) Nebulizer every 6 hours PRN Shortness of Breath and/or Wheezing  dextrose 40% Gel 15 Gram(s) Oral once PRN Blood Glucose LESS THAN 70 milliGRAM(s)/deciliter  glucagon  Injectable 1 milliGRAM(s) IntraMuscular once PRN Glucose LESS THAN 70 milligrams/deciliter  morphine  - Injectable 4 milliGRAM(s) IV Push every 3 hours PRN Severe Pain (7 - 10)  oxyCODONE    5 mG/acetaminophen 325 mG 1 Tablet(s) Oral every 4 hours PRN Mild Pain (1 - 3)  oxyCODONE    5 mG/acetaminophen 325 mG 2 Tablet(s) Oral every 4 hours PRN Moderate Pain (4 - 6)      Allergies    No Known Allergies    Intolerances        SOCIAL HISTORY:    FAMILY HISTORY:  Family history of liver cancer (Father): father  85 liver CA  Family history of coronary arteriosclerosis (Mother): Mom  of MI age 68      Vital Signs Last 24 Hrs  T(C): 37.1 (2018 09:00), Max: 37.2 (2018 20:54)  T(F): 98.7 (2018 09:00), Max: 98.9 (2018 20:54)  HR: 88 (2018 07:53) (85 - 105)  BP: 100/59 (2018 07:00) (87/56 - 104/60)  BP(mean): 69 (2018 07:00) (63 - 75)  RR: 17 (2018 07:00) (13 - 20)  SpO2: 96% (2018 07:00) (88% - 100%)    NAD  A/O X 3  Neck neg  No sig peripheral adenopathy  Abd soft/ no splenomegaly  Ext edema                          11.0   12.47 )-----------( 96       ( 2018 04:46 )             34.7     11-07    135  |  99  |  22  ----------------------------<  80  4.1   |  31  |  1.08    Ca    7.7<L>      2018 04:46      PT/INR - ( 2018 04:46 )   PT: 23.8 sec;   INR: 2.10 ratio      RADIOLOGY & ADDITIONAL STUDIES: HPI:  78 year old male with COPD,  SLL/ CLL : Mediastinal / retroperitoneal nodes, anemia, on Obinutuzumab ( Ant-CD 20 antibody)  ; in addition receives Gammaglobulin infusion q 4 weeks for documented hypogammaglobulinemia associated with repeated pneumonia/ admitted with worsening shortness of breath and lower back pain. CT imaging moderate to large left pleural effusion with underlying compressive atelectasis. Small right pleural effusion with underlying compressive atelectasis.  Prominent lymph nodes in the bilateral axilla mediastinum  S/P thoracentesis less dyspnea, + back pains; MRI reveals T 1 acute compression fracture        PAST MEDICAL & SURGICAL HISTORY:  Hyperlipidemia  Hypogammaglobulinemia: received IVIG  Hepatitis B virus infection, unspecified chronicity: retrovirals  Chronic diastolic congestive heart failure  Essential hypertension  DM type 2 (diabetes mellitus, type 2)  B-cell lymphoma, unspecified B-cell lymphoma type, unspecified body region: CLL stage 4/SLL; met NHL  Atrial fibrillation, unspecified type  COPD (chronic obstructive pulmonary disease)  History of esophageal dilatation  S/P cholecystectomy  H/O prostatectomy      MEDICATIONS  (STANDING):  amiodarone    Tablet 100 milliGRAM(s) Oral daily  atorvastatin 10 milliGRAM(s) Oral at bedtime  buDESOnide  80 MICROgram(s)/formoterol 4.5 MICROgram(s) Inhaler 2 Puff(s) Inhalation two times a day  clotrimazole 1% Cream 1 Application(s) Topical two times a day  dextrose 5%. 1000 milliLiter(s) (50 mL/Hr) IV Continuous <Continuous>  dextrose 50% Injectable 12.5 Gram(s) IV Push once  dextrose 50% Injectable 25 Gram(s) IV Push once  dextrose 50% Injectable 25 Gram(s) IV Push once  docusate sodium 100 milliGRAM(s) Oral three times a day  furosemide    Tablet 40 milliGRAM(s) Oral daily  gabapentin 300 milliGRAM(s) Oral three times a day  heparin  Injectable 5000 Unit(s) SubCutaneous every 8 hours  insulin lispro (HumaLOG) corrective regimen sliding scale   SubCutaneous three times a day before meals  insulin lispro (HumaLOG) corrective regimen sliding scale   SubCutaneous at bedtime  lactulose Syrup 10 Gram(s) Oral daily  lidocaine 1% (Preservative-free) Injectable 25 milliLiter(s) Local Injection once  metoprolol tartrate 25 milliGRAM(s) Oral two times a day  multivitamin 1 Tablet(s) Oral daily  senna 2 Tablet(s) Oral at bedtime  spironolactone 25 milliGRAM(s) Oral daily  tenofovir disoproxil fumarate (VIREAD) 300 milliGRAM(s) Oral daily  tiotropium 18 MICROgram(s) Capsule 1 Capsule(s) Inhalation daily    MEDICATIONS  (PRN):  ALBUTerol/ipratropium for Nebulization 3 milliLiter(s) Nebulizer every 6 hours PRN Shortness of Breath and/or Wheezing  dextrose 40% Gel 15 Gram(s) Oral once PRN Blood Glucose LESS THAN 70 milliGRAM(s)/deciliter  glucagon  Injectable 1 milliGRAM(s) IntraMuscular once PRN Glucose LESS THAN 70 milligrams/deciliter  morphine  - Injectable 4 milliGRAM(s) IV Push every 3 hours PRN Severe Pain (7 - 10)  oxyCODONE    5 mG/acetaminophen 325 mG 1 Tablet(s) Oral every 4 hours PRN Mild Pain (1 - 3)  oxyCODONE    5 mG/acetaminophen 325 mG 2 Tablet(s) Oral every 4 hours PRN Moderate Pain (4 - 6)      Allergies    No Known Allergies    Intolerances        SOCIAL HISTORY:    FAMILY HISTORY:  Family history of liver cancer (Father): father  85 liver CA  Family history of coronary arteriosclerosis (Mother): Mom  of MI age 68      Vital Signs Last 24 Hrs  T(C): 37.1 (2018 09:00), Max: 37.2 (2018 20:54)  T(F): 98.7 (2018 09:00), Max: 98.9 (2018 20:54)  HR: 88 (2018 07:53) (85 - 105)  BP: 100/59 (2018 07:00) (87/56 - 104/60)  BP(mean): 69 (2018 07:00) (63 - 75)  RR: 17 (2018 07:00) (13 - 20)  SpO2: 96% (2018 07:00) (88% - 100%)    NAD  A/O X 3  Neck neg  No sig peripheral adenopathy  Abd soft/ no splenomegaly  Ext edema                          11.0   12.47 )-----------( 96       ( 2018 04:46 )             34.7     11-07    135  |  99  |  22  ----------------------------<  80  4.1   |  31  |  1.08    Ca    7.7<L>      2018 04:46      PT/INR - ( 2018 04:46 )   PT: 23.8 sec;   INR: 2.10 ratio      RADIOLOGY & ADDITIONAL STUDIES:    < from: CT Chest No Cont (18 @ 19:34) >  EXAM:  CT CHEST                            PROCEDURE DATE:  2018          INTERPRETATION:  Exam Date: 2018 7:34 PM    CT chest without contrast    CLINICAL INFORMATION:  pleural effusion left, further eval    TECHNIQUE:  Contiguous axial3 mm sections were obtained through the   chest using single helical acquisition.  In addition, 2D sagittal and   coronal reformatted images obtained.   This scan was performed using   automatic exposure control (radiation dose reduction software) toobtain   a diagnostic image quality scan with patient dose as low as reasonably   achievable.        FINDINGS: Comparison is made to prior CT chest of 2018.    The thyroid gland appears intact.    Moderate to large left pleural effusion with underlying compressive   atelectasis. Small right pleural effusion with underlying compressive   atelectasis.   No pneumothorax. No chronic interstitial findings. The   trachea and major bronchi are patent.    Prominent lymph nodes in the bilateral axilla, mediastinum, and hilum.   The heart size is normal.      Limited evaluation of the upper abdomen demonstrates a moderate to large   amount of ascites in the upper abdomen.      IMPRESSION: Moderate to large left pleural effusion with underlying   compressive atelectasis. Small right pleural effusion with underlying   compressive atelectasis.   Prominent lymph nodes in the bilateral axilla,   mediastinum, and hilum.     Moderate to large a    < end of copied text >

## 2018-11-07 NOTE — PROGRESS NOTE ADULT - SUBJECTIVE AND OBJECTIVE BOX
Patient is a 78y old  Male who presents with a chief complaint of CC: Lower back pain and shortness of breath (2018 22:27)      HPI:  78 year old male with pmh of copd on home o2 2.5L, afib on coumadin, chronic diastolic chf, diabetes on insulin, htn, hep b infection, hypogammaglobulinemia w/ IVIG tx, b cell lymphoma, cholelithiasis s/p cholecystectomy, esophageal dilation, prostatectomy coming to ED with complaints of worsening shortness of breath and lower back pain. Patient stating was on toilet this morning when tried to get up put pants on when he had a sudden onset of lower back pain. No bladder or bowel incontinence. Per Dr Bejarano In ED bladder scan was done with no to low residual urine volume. Per Dr Bejarano patient able to ambulate with cane in ED. FOr shortness of breath has been going on for past 4-5 weeks and has been gradually worsening. States last echo over 1 year prior. states now able to walk 50 feet without stopping for shortness of breath (2018 22:27)    18  No acute events occurred overnight       his family updated at bedside  feels ok  pain is better controlled  no difficulty breathing    had Pleurx done  tolerated ok  no issues iwth breathing  data discussed with Dr ZAMARRIPA regarding eval for esophageal varices eval needed    his wife at bedside  CT drainage noted  he is at high risk for endoscopy due to compromised pulm status  data rev with Dr Conte and Dr Mchugh today    MEDICATIONS  (STANDING):  amiodarone    Tablet 100 milliGRAM(s) Oral daily  atorvastatin 10 milliGRAM(s) Oral at bedtime  dextrose 5%. 1000 milliLiter(s) (50 mL/Hr) IV Continuous <Continuous>  dextrose 50% Injectable 12.5 Gram(s) IV Push once  dextrose 50% Injectable 25 Gram(s) IV Push once  dextrose 50% Injectable 25 Gram(s) IV Push once  docusate sodium 100 milliGRAM(s) Oral three times a day  furosemide    Tablet 20 milliGRAM(s) Oral two times a day  gabapentin 300 milliGRAM(s) Oral three times a day  insulin glargine Injectable (LANTUS) 20 Unit(s) SubCutaneous at bedtime  insulin lispro (HumaLOG) corrective regimen sliding scale   SubCutaneous three times a day before meals  insulin lispro (HumaLOG) corrective regimen sliding scale   SubCutaneous at bedtime  lactulose Syrup 10 Gram(s) Oral daily  lisinopril 2.5 milliGRAM(s) Oral daily  metoprolol tartrate 25 milliGRAM(s) Oral two times a day  multivitamin 1 Tablet(s) Oral daily  spironolactone 25 milliGRAM(s) Oral at bedtime  tenofovir disoproxil fumarate (VIREAD) 300 milliGRAM(s) Oral daily    MEDICATIONS  (PRN):  acetaminophen   Tablet .. 650 milliGRAM(s) Oral every 6 hours PRN Temp greater or equal to 38C (100.4F), Mild Pain (1 - 3), Moderate Pain (4 - 6)  ALBUTerol/ipratropium for Nebulization 3 milliLiter(s) Nebulizer every 6 hours PRN Shortness of Breath and/or Wheezing  dextrose 40% Gel 15 Gram(s) Oral once PRN Blood Glucose LESS THAN 70 milliGRAM(s)/deciliter  glucagon  Injectable 1 milliGRAM(s) IntraMuscular once PRN Glucose LESS THAN 70 milligrams/deciliter  senna 2 Tablet(s) Oral at bedtime PRN Constipation  traMADol 50 milliGRAM(s) Oral every 4 hours PRN Moderate Pain (4 - 6)          FAMILY HISTORY:  Family history of liver cancer (Father): father  85 liver CA  Family history of coronary arteriosclerosis (Mother): Mom  of MI age 68      SOCIAL HISTORY: Lives at home     REVIEW OF SYSTEM:  cough, dyspnea, fatigue, otherwise 12 point ROS negative     Vital Signs Last 24 Hrs  T(C): 36.7 (2018 05:00), Max: 37.2 (2018 20:54)  T(F): 98.1 (2018 05:00), Max: 98.9 (2018 20:54)  HR: 88 (2018 07:53) (85 - 105)  BP: 100/59 (2018 07:00) (87/56 - 104/60)  BP(mean): 69 (2018 07:00) (63 - 75)  RR: 17 (2018 07:00) (13 - 20)  SpO2: 96% (2018 07:00) (88% - 100%)    I&O's Summary    2018 07:01  -  2018 07:00  --------------------------------------------------------  IN: 975 mL / OUT: 1745 mL / NET: -770 mL            PHYSICAL EXAM  General Appearance: cooperative, no acute distress,   HEENT: PERRL, conjunctiva clear, EOM's intact, non injected pharynx, no exudate, TM   normal  Neck: Supple, , no adenopathy, thyroid: not enlarged, no carotid bruit or JVD  Back: Symmetric, no  tenderness,no soft tissue tenderness  Lungs: Decreased breath sounds in the right lower lobe, diminished in the upper and lower lobes on the left, Right upper lobe inspiratory wheezes   Heart: Regular rate and rhythm, S1, S2 normal, no murmur, rub or gallop  Abdomen: Soft, non-tender, bowel sounds active , no hepatosplenomegaly  Extremities: no cyanosis or edema, no joint swelling  Skin: Skin color, texture normal, no rashes   Neurologic: Alert and oriented X3 , cranial nerves intact, sensory and motor normal,    ECG:    LABS:                                                11.0   12.47 )-----------( 96       ( 2018 04:46 )             34.7       135  |  99  |  22  ----------------------------<  80  4.1   |  31  |  1.08    Ca    7.7<L>      2018 04:46               11.2   13.79 )-----------( 90       ( 2018 04:58 )             36.0       134<L>  |  97  |  22  ----------------------------<  86  4.0   |  32<H>  |  1.15    Ca    7.9<L>      2018 04:58    TPro  5.8<L>  /  Alb  2.5<L>  /  TBili  0.5  /  DBili  x   /  AST  28  /  ALT  31  /  AlkPhos  148<H>                            12.3   16.94 )-----------( 95       ( 2018 04:44 )             38.5         133<L>  |  98  |  14  ----------------------------<  113<H>  5.0   |  27  |  0.99    Ca    8.3<L>      2018 04:44  Phos  3.7       Mg     1.9         TPro  6.0  /  Alb  2.8<L>  /  TBili  0.5  /  DBili  x   /  AST  34  /  ALT  34  /  AlkPhos  162<H>      CARDIAC MARKERS ( 2018 17:42 )  <0.015 ng/mL / x     / x     / x     / x            Pro BNP  182  @ 17:42  D Dimer  --  @ 17:42    PT/INR - ( 2018 04:44 )   PT: 23.9 sec;   INR: 2.10 ratio         PTT - ( 2018 04:44 )  PTT:37.1 sec  Urinalysis Basic - ( 2018 19:11 )    Color: Yellow / Appearance: Clear / S.010 / pH: x  Gluc: x / Ketone: Negative  / Bili: Negative / Urobili: Negative mg/dL   Blood: x / Protein: Negative mg/dL / Nitrite: Negative   Leuk Esterase: Negative / RBC: x / WBC x   Sq Epi: x / Non Sq Epi: x / Bacteria: x            RADIOLOGY & ADDITIONAL STUDIES:    IMPRESSION: Moderate to large left pleural effusion with underlying   compressive atelectasis. Small right pleural effusion with underlying   compressive atelectasis.   Prominent lymph nodes in the bilateral axilla,   mediastinum, and hilum.         < from: Xray Chest 1 View- PORTABLE-Routine (18 @ 09:15) >  PROCEDURE DATE:  2018          INTERPRETATION:  Exam Date: 2018 9:15 AM    History: Labored breathing    Technique: Single frontal portable view of the chest with comparison to    2018    Findings:    The heart is enlarged. Moderate right pleural effusion with small to mild   left pleural effusion. Bibasilar atelectasis. Degenerative changes of the   visualized osseous structures.    Left-sided chest tube. Left chestwall subcutaneous emphysema.    Impression:    Moderate right pleural effusion with small to mild left pleural effusion.   Bibasilar atelectasis    < end of copied text >

## 2018-11-07 NOTE — PROGRESS NOTE ADULT - SUBJECTIVE AND OBJECTIVE BOX
CHIEF COMPLAINT:  78 year old male with pmh of copd on home o2 2.5L, afib on coumadin, chronic diastolic chf, diabetes on insulin, htn, hep b infection, hypogammaglobulinemia w/ IVIG tx, b cell lymphoma, cholelithiasis s/p cholecystectomy, esophageal dilation, prostatectomy coming to ED with complaints of worsening shortness of breath and lower back pain. Patient stating was on toilet this morning when tried to get up put pants on when he had a sudden onset of lower back pain. No bladder or bowel incontinence. Per Dr Bejarano In ED bladder scan was done with no to low residual urine volume. Per Dr Bejarano patient able to ambulate with cane in ED. FOr shortness of breath has been going on for past 4-5 weeks and has been gradually worsening. States last echo over 1 year prior. states now able to walk 50 feet without stopping for shortness of breath    SUBJECTIVE:     11/7/18    Patient seen and evaluated at bedside. L Chest tube placed yesterday and patient complaining of pain at the site, 10/10. Pt received morphine and percocet overnight per nurse. Otherwise no complaints such as CP or SOB. Currently on NC 4L with good saturation. 430 cc fluid serosanguinous drainage noted from chest tube. Encouraged pt to use IS.   Plan for CXR today, D/C IVF and decrease Lasix dose. Patient has been seen by pulmonary today.     # [id number] used.    REVIEW OF SYSTEMS:    CONSTITUTIONAL: No weakness, fevers or chills  EYES/ENT: No visual changes;  No vertigo or throat pain   NECK: No pain or stiffness  RESPIRATORY: No cough, wheezing, hemoptysis; No shortness of breath  CARDIOVASCULAR: No chest pain or palpitations  GASTROINTESTINAL: No abdominal or epigastric pain. No nausea, vomiting, or hematemesis; No diarrhea or constipation. No melena or hematochezia.  GENITOURINARY: No dysuria, frequency or hematuria  NEUROLOGICAL: No numbness or weakness  SKIN: No itching, burning, rashes, or lesions   All other review of systems is negative unless indicated above    Vital Signs Last 24 Hrs  T(C): 37.1 (07 Nov 2018 09:00), Max: 37.2 (06 Nov 2018 20:54)  T(F): 98.7 (07 Nov 2018 09:00), Max: 98.9 (06 Nov 2018 20:54)  HR: 88 (07 Nov 2018 07:53) (85 - 105)  BP: 100/59 (07 Nov 2018 07:00) (87/56 - 104/60)  BP(mean): 69 (07 Nov 2018 07:00) (63 - 75)  RR: 17 (07 Nov 2018 07:00) (13 - 20)  SpO2: 96% (07 Nov 2018 07:00) (88% - 100%)    I&O's Summary    06 Nov 2018 07:01  -  07 Nov 2018 07:00  --------------------------------------------------------  IN: 975 mL / OUT: 1745 mL / NET: -770 mL        CAPILLARY BLOOD GLUCOSE      POCT Blood Glucose.: 83 mg/dL (07 Nov 2018 09:01)  POCT Blood Glucose.: 116 mg/dL (06 Nov 2018 21:18)  POCT Blood Glucose.: 110 mg/dL (06 Nov 2018 17:30)  POCT Blood Glucose.: 90 mg/dL (06 Nov 2018 12:57)  POCT Blood Glucose.: 143 mg/dL (06 Nov 2018 09:23)      PHYSICAL EXAM:    Constitutional: NAD, awake and alert, well-developed  Respiratory: Breath sounds are clear bilaterally, No wheezing, rales or rhonchi  Cardiovascular: S1 and S2, regular rate and rhythm, no Murmurs, gallops or rubs  Gastrointestinal: Bowel Sounds present, soft, nontender, nondistended, no guarding, no rebound  Extremities: No peripheral edema  Vascular: 2+ peripheral pulses  Skin: No rashes    MEDICATIONS:  MEDICATIONS  (STANDING):  amiodarone    Tablet 100 milliGRAM(s) Oral daily  atorvastatin 10 milliGRAM(s) Oral at bedtime  buDESOnide  80 MICROgram(s)/formoterol 4.5 MICROgram(s) Inhaler 2 Puff(s) Inhalation two times a day  clotrimazole 1% Cream 1 Application(s) Topical two times a day  dextrose 5%. 1000 milliLiter(s) (50 mL/Hr) IV Continuous <Continuous>  dextrose 50% Injectable 12.5 Gram(s) IV Push once  dextrose 50% Injectable 25 Gram(s) IV Push once  dextrose 50% Injectable 25 Gram(s) IV Push once  docusate sodium 100 milliGRAM(s) Oral three times a day  furosemide    Tablet 40 milliGRAM(s) Oral daily  gabapentin 300 milliGRAM(s) Oral three times a day  heparin  Injectable 5000 Unit(s) SubCutaneous every 8 hours  insulin lispro (HumaLOG) corrective regimen sliding scale   SubCutaneous three times a day before meals  insulin lispro (HumaLOG) corrective regimen sliding scale   SubCutaneous at bedtime  lactulose Syrup 10 Gram(s) Oral daily  lidocaine 1% (Preservative-free) Injectable 25 milliLiter(s) Local Injection once  metoprolol tartrate 25 milliGRAM(s) Oral two times a day  multivitamin 1 Tablet(s) Oral daily  senna 2 Tablet(s) Oral at bedtime  spironolactone 25 milliGRAM(s) Oral daily  tenofovir disoproxil fumarate (VIREAD) 300 milliGRAM(s) Oral daily  tiotropium 18 MICROgram(s) Capsule 1 Capsule(s) Inhalation daily      LABS: All Labs Reviewed:                        11.0   12.47 )-----------( 96       ( 07 Nov 2018 04:46 )             34.7     11-07    135  |  99  |  22  ----------------------------<  80  4.1   |  31  |  1.08    Ca    7.7<L>      07 Nov 2018 04:46      PT/INR - ( 07 Nov 2018 04:46 )   PT: 23.8 sec;   INR: 2.10 ratio         PTT - ( 07 Nov 2018 04:46 )  PTT:35.6 sec      Blood Culture: 11-05 @ 18:25  Organism --  Gram Stain Blood -- Gram Stain   polymorphonuclear leukocytes seen per low power field  No organisms seen per oil power field  by cytocentrifuge  Specimen Source .Body Fluid LEFT PLEURAL EFFUSION CUL & GRM STAIN  Culture-Blood --    11-02 @ 19:11  Organism --  Gram Stain Blood -- Gram Stain --  Specimen Source .Urine Clean Catch (Midstream)  Culture-Blood --        RADIOLOGY/EKG:    DVT PPX:    ADVANCED DIRECTIVE:    DISPOSITION: CHIEF COMPLAINT:  78 year old male with pmh of copd on home o2 2.5L, afib on coumadin, chronic diastolic chf, diabetes on insulin, htn, hep b infection, hypogammaglobulinemia w/ IVIG tx, b cell lymphoma, cholelithiasis s/p cholecystectomy, esophageal dilation, prostatectomy coming to ED with complaints of worsening shortness of breath and lower back pain. Patient stating was on toilet this morning when tried to get up put pants on when he had a sudden onset of lower back pain. No bladder or bowel incontinence. Per Dr Bejarano In ED bladder scan was done with no to low residual urine volume. Per Dr Bejarano patient able to ambulate with cane in ED. FOr shortness of breath has been going on for past 4-5 weeks and has been gradually worsening. States last echo over 1 year prior. states now able to walk 50 feet without stopping for shortness of breath    SUBJECTIVE:     11/7/18    Patient seen and evaluated at bedside. L Chest tube placed yesterday and patient complaining of pain at the site, 10/10. Pt received morphine and percocet overnight per nurse. Otherwise no complaints such as CP or SOB. Currently on NC 4L with good saturation. 430 cc fluid serosanguinous drainage noted from chest tube. Encouraged pt to use IS.   Plan for CXR today, D/C IVF and decrease Lasix dose.     # [id number] used.    REVIEW OF SYSTEMS:    CONSTITUTIONAL: No weakness, fevers or chills  EYES/ENT: No visual changes;  No vertigo or throat pain   NECK: No pain or stiffness  RESPIRATORY: No cough, wheezing, hemoptysis; No shortness of breath  CARDIOVASCULAR: No chest pain or palpitations  GASTROINTESTINAL: No abdominal or epigastric pain. No nausea, vomiting, or hematemesis; No diarrhea or constipation. No melena or hematochezia.  GENITOURINARY: No dysuria, frequency or hematuria  NEUROLOGICAL: No numbness or weakness  SKIN: No itching, burning, rashes, or lesions   All other review of systems is negative unless indicated above    Vital Signs Last 24 Hrs  T(C): 37.1 (07 Nov 2018 09:00), Max: 37.2 (06 Nov 2018 20:54)  T(F): 98.7 (07 Nov 2018 09:00), Max: 98.9 (06 Nov 2018 20:54)  HR: 88 (07 Nov 2018 07:53) (85 - 105)  BP: 100/59 (07 Nov 2018 07:00) (87/56 - 104/60)  BP(mean): 69 (07 Nov 2018 07:00) (63 - 75)  RR: 17 (07 Nov 2018 07:00) (13 - 20)  SpO2: 96% (07 Nov 2018 07:00) (88% - 100%)    I&O's Summary    06 Nov 2018 07:01  -  07 Nov 2018 07:00  --------------------------------------------------------  IN: 975 mL / OUT: 1745 mL / NET: -770 mL        CAPILLARY BLOOD GLUCOSE      POCT Blood Glucose.: 83 mg/dL (07 Nov 2018 09:01)  POCT Blood Glucose.: 116 mg/dL (06 Nov 2018 21:18)  POCT Blood Glucose.: 110 mg/dL (06 Nov 2018 17:30)  POCT Blood Glucose.: 90 mg/dL (06 Nov 2018 12:57)  POCT Blood Glucose.: 143 mg/dL (06 Nov 2018 09:23)      PHYSICAL EXAM:    Constitutional: NAD, awake and alert, well-developed  Respiratory: Breath sounds are clear bilaterally, No wheezing, rales or rhonchi  Cardiovascular: S1 and S2, regular rate and rhythm, no Murmurs, gallops or rubs  Chest: L chest tube in place   Gastrointestinal: Bowel Sounds present, soft, nontender, nondistended, no guarding, no rebound  Extremities: No peripheral edema  Vascular: 2+ peripheral pulses  Skin: No rashes    MEDICATIONS:  MEDICATIONS  (STANDING):  amiodarone    Tablet 100 milliGRAM(s) Oral daily  atorvastatin 10 milliGRAM(s) Oral at bedtime  buDESOnide  80 MICROgram(s)/formoterol 4.5 MICROgram(s) Inhaler 2 Puff(s) Inhalation two times a day  clotrimazole 1% Cream 1 Application(s) Topical two times a day  dextrose 5%. 1000 milliLiter(s) (50 mL/Hr) IV Continuous <Continuous>  dextrose 50% Injectable 12.5 Gram(s) IV Push once  dextrose 50% Injectable 25 Gram(s) IV Push once  dextrose 50% Injectable 25 Gram(s) IV Push once  docusate sodium 100 milliGRAM(s) Oral three times a day  furosemide    Tablet 40 milliGRAM(s) Oral daily  gabapentin 300 milliGRAM(s) Oral three times a day  heparin  Injectable 5000 Unit(s) SubCutaneous every 8 hours  insulin lispro (HumaLOG) corrective regimen sliding scale   SubCutaneous three times a day before meals  insulin lispro (HumaLOG) corrective regimen sliding scale   SubCutaneous at bedtime  lactulose Syrup 10 Gram(s) Oral daily  lidocaine 1% (Preservative-free) Injectable 25 milliLiter(s) Local Injection once  metoprolol tartrate 25 milliGRAM(s) Oral two times a day  multivitamin 1 Tablet(s) Oral daily  senna 2 Tablet(s) Oral at bedtime  spironolactone 25 milliGRAM(s) Oral daily  tenofovir disoproxil fumarate (VIREAD) 300 milliGRAM(s) Oral daily  tiotropium 18 MICROgram(s) Capsule 1 Capsule(s) Inhalation daily      LABS: All Labs Reviewed:                        11.0   12.47 )-----------( 96       ( 07 Nov 2018 04:46 )             34.7     11-07    135  |  99  |  22  ----------------------------<  80  4.1   |  31  |  1.08    Ca    7.7<L>      07 Nov 2018 04:46      PT/INR - ( 07 Nov 2018 04:46 )   PT: 23.8 sec;   INR: 2.10 ratio         PTT - ( 07 Nov 2018 04:46 )  PTT:35.6 sec      Blood Culture: 11-05 @ 18:25  Organism --  Gram Stain Blood -- Gram Stain   polymorphonuclear leukocytes seen per low power field  No organisms seen per oil power field  by cytocentrifuge  Specimen Source .Body Fluid LEFT PLEURAL EFFUSION CUL & GRM STAIN  Culture-Blood --    11-02 @ 19:11  Organism --  Gram Stain Blood -- Gram Stain --  Specimen Source .Urine Clean Catch (Midstream)  Culture-Blood --        RADIOLOGY/EKG:    DVT PPX:    ADVANCED DIRECTIVE:    DISPOSITION:

## 2018-11-07 NOTE — PROGRESS NOTE ADULT - ASSESSMENT
78 year old male with pmh of copd on home o2 2.5L, afib on coumadin, chronic diastolic chf, diabetes on insulin, htn, hep b infection, hypogammaglobulinemia w/ IVIG tx, b cell lymphoma, cholelithiasis s/p cholecystectomy, esophageal dilation, prostatectomy admitted for worsening shortness of breath 2/2 acute on chronic CHF.    #Acute on chronic CHF  ~s/p L chest tube placement   -continue spironolactone 25 mg PO QD   -monitor chest tube output  -f/u pleural fluid analysis   -Daily CXRs    #COPD  -On home O2 2.5 L  -Continue NC O2  -Continue tiotropium  -Continue budesonide/formoterol    #Afib  -Continue warfarin   -Continue amiodarone    #DM  -ISS premeal and bedtime   -gabapentin 300 mg PO TID    #HTN  -BP range 90/60s noted- likely 2/2 to age related changes in vasculature   -Continue Lasix 40 mg PO QD- BP can tolerate , holding parameter , hold if SBP<80   -Continue metoprolol 25 mg PO BID    #Hep B and cirrhosis  -Continue lactulose  -GI consulted- high risk for endoscopy  -Paracentesis to be further discussed    #Hypogammaglobulinemia  - Heme/onc consulted  -If IgG <600- <700 -proceed with IVIG (Dr. Tee following)    #B cell lymphoma  - Heme/onc consulted

## 2018-11-07 NOTE — CONSULT NOTE ADULT - REASON FOR ADMISSION
CC: Lower back pain and shortness of breath

## 2018-11-07 NOTE — PROGRESS NOTE ADULT - ASSESSMENT
1) Bilateral Pleural Effusions  2) Abnormal CT Chest  3) History of Bronchomalacia  4) COPD  5) Dyspnea  6) History of Heart Failure   7) Passive Atelectasis/Compressive Atelectasis   8) Ascites  9) History of Lymphoma    78 year old male with pmh of copd on home o2 2.5L, afib on coumadin, chronic diastolic chf, diabetes on insulin, htn, hep b infection, hypogammaglobulinemia w/ IVIG tx, b cell lymphoma, cholelithiasis s/p cholecystectomy, esophageal dilation, prostatectomy coming to ED with complaints of worsening shortness of breath and lower back pain. Patient stating was on toilet this morning when tried to get up put pants on when he had a sudden onset of lower back pain.   Reviewed CT chest which shows bilateral pleural effusions L> R.  Once INR is reduced to < 2, can consider thoracentesis  Appreciate CTS assistance  Follow up pleural LDH, cytology, cell count, protein, glucose, triglycerides should also be performed given history of lymphoma.   Performed bronchoscopy on the patient last year which revealed bronchomalacia which is likely contributing to the patient's underlying dyspnea as well  Follow up echocardiogram  Patient may need paracentesis with fluid analysis  Follow up hematology recommendations   Continue O2, goal SaO2 >88%  Will d/c Singulair given potential underlying hepatic issues.  Start Symbicort and Spiriva   s/p pleurex done  Will continue to monitor  get repeat cxr  send PF for analysis / ordered    CT drainage noted  he is at high risk for endoscopy due to compromised pulm status  data rev with Dr Conte and Dr Mchugh today  family is aware of status  decrease diuretic dose  Dc IVf -done

## 2018-11-07 NOTE — PROGRESS NOTE ADULT - SUBJECTIVE AND OBJECTIVE BOX
11/5/18:  patient seen and examined with son and wife at bedside.  States his breathing is labored but stable.  NPO for paracentesis and VATS pleur X placement today.    11/6/18:  POD 1 VATs Left pleural effusion drainage and PleurX placement.  Jaret  #[id number] used for interview.  Wife states he is feeling weak and having some pain at surgical site.  Breathing has improved since pleural effusion drainage.    11/7/18: POD 2 pleurX placement, did not undergo IR Paracentesis yesterday.  Ethan  # [id number] used for interview.  Wife states he is feeling better.    Vital Signs:  Vital Signs Last 24 Hrs  T(C): 37.1 (11-07-18 @ 09:00), Max: 37.2 (11-06-18 @ 20:54)  T(F): 98.7 (11-07-18 @ 09:00), Max: 98.9 (11-06-18 @ 20:54)  HR: 96 (11-07-18 @ 09:00) (88 - 105)  BP: 106/60 (11-07-18 @ 09:00) (87/56 - 106/60)  RR: 16 (11-07-18 @ 09:00) (13 - 20)  SpO2: 98% (11-07-18 @ 09:00) (88% - 100%) on NC.    Telemetry/Alarms: Sinus rhythm    Relevant labs, radiology and Medications reviewed                        11.0   12.47 )-----------( 96       ( 07 Nov 2018 04:46 )             34.7     11-07    135  |  99  |  22  ----------------------------<  80  4.1   |  31  |  1.08    Ca    7.7<L>      07 Nov 2018 04:46      PT/INR - ( 07 Nov 2018 04:46 )   PT: 23.8 sec;   INR: 2.10 ratio         PTT - ( 07 Nov 2018 04:46 )  PTT:35.6 sec  MEDICATIONS  (STANDING):  amiodarone    Tablet 100 milliGRAM(s) Oral daily  atorvastatin 10 milliGRAM(s) Oral at bedtime  buDESOnide  80 MICROgram(s)/formoterol 4.5 MICROgram(s) Inhaler 2 Puff(s) Inhalation two times a day  clotrimazole 1% Cream 1 Application(s) Topical two times a day  docusate sodium 100 milliGRAM(s) Oral three times a day  furosemide    Tablet 40 milliGRAM(s) Oral daily  gabapentin 300 milliGRAM(s) Oral three times a day  heparin  Injectable 5000 Unit(s) SubCutaneous every 8 hours  insulin lispro (HumaLOG) corrective regimen sliding scale   SubCutaneous three times a day before meals  insulin lispro (HumaLOG) corrective regimen sliding scale   SubCutaneous at bedtime  lactulose Syrup 10 Gram(s) Oral daily  lidocaine 1% (Preservative-free) Injectable 25 milliLiter(s) Local Injection once  metoprolol tartrate 25 milliGRAM(s) Oral two times a day  multivitamin 1 Tablet(s) Oral daily  senna 2 Tablet(s) Oral at bedtime  spironolactone 25 milliGRAM(s) Oral daily  tenofovir disoproxil fumarate (VIREAD) 300 milliGRAM(s) Oral daily  tiotropium 18 MICROgram(s) Capsule 1 Capsule(s) Inhalation daily    MEDICATIONS  (PRN):  ALBUTerol/ipratropium for Nebulization 3 milliLiter(s) Nebulizer every 6 hours PRN Shortness of Breath and/or Wheezing  dextrose 40% Gel 15 Gram(s) Oral once PRN Blood Glucose LESS THAN 70 milliGRAM(s)/deciliter  glucagon  Injectable 1 milliGRAM(s) IntraMuscular once PRN Glucose LESS THAN 70 milligrams/deciliter  morphine  - Injectable 4 milliGRAM(s) IV Push every 3 hours PRN Severe Pain (7 - 10)  oxyCODONE    5 mG/acetaminophen 325 mG 1 Tablet(s) Oral every 4 hours PRN Mild Pain (1 - 3)  oxyCODONE    5 mG/acetaminophen 325 mG 2 Tablet(s) Oral every 4 hours PRN Moderate Pain (4 - 6)      Physical exam  Gen: NAD, breathing comfortably.  Neuro: AO x 3.  Card: S1 S2 RRR.  Pulm: CTA bilaterally.  Abd: Soft NT ND.  Ext: No edema.    Tubes: Left pleurX in place, capped today.    I&O's Summary    06 Nov 2018 07:01  -  07 Nov 2018 07:00  --------------------------------------------------------  IN: 975 mL / OUT: 1745 mL / NET: -770 mL      Assessment  78y Male  w/ PAST MEDICAL & SURGICAL HISTORY:  Hyperlipidemia  Hypogammaglobulinemia: received IVIG  Hepatitis B virus infection, unspecified chronicity: retrovirals  Chronic diastolic congestive heart failure  Essential hypertension  DM type 2 (diabetes mellitus, type 2)  B-cell lymphoma, unspecified B-cell lymphoma type, unspecified body region: CLL stage 4/SLL; met NHL  Atrial fibrillation, unspecified type  COPD (chronic obstructive pulmonary disease)  History of esophageal dilatation  S/P cholecystectomy  H/O prostatectomy  admitted with Lower back pain and shortness of breath (05 Nov 2018 09:21)  .  Underwent:  11/5/18: Left VATS, flex bronch, pleural effusion drainage, Pleur X placement.    PLAN  CC regular diet.  IVL.  PO medications.  Pain control.  IS, deep breathing.  PleurX capped: drain every 72 hours max of 1.5L.  Strict I/Os.

## 2018-11-07 NOTE — CONSULT NOTE ADULT - ASSESSMENT
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  L1 fracture: Unlikely to be related to the SLL / CLL  Anemia: Overall improved since initiation of Lymphoma therapy  Hypogammaglobulinemia: Will recheck levels/ Gets IV IG if IgG < 600-700

## 2018-11-08 NOTE — PROGRESS NOTE ADULT - SUBJECTIVE AND OBJECTIVE BOX
Patient is a 78y old  Male who presents with a chief complaint of CC: Lower back pain and shortness of breath (2018 22:27)      HPI:  78 year old male with pmh of copd on home o2 2.5L, afib on coumadin, chronic diastolic chf, diabetes on insulin, htn, hep b infection, hypogammaglobulinemia w/ IVIG tx, b cell lymphoma, cholelithiasis s/p cholecystectomy, esophageal dilation, prostatectomy coming to ED with complaints of worsening shortness of breath and lower back pain. Patient stating was on toilet this morning when tried to get up put pants on when he had a sudden onset of lower back pain. No bladder or bowel incontinence. Per Dr Bejarano In ED bladder scan was done with no to low residual urine volume. Per Dr Bejarano patient able to ambulate with cane in ED. FOr shortness of breath has been going on for past 4-5 weeks and has been gradually worsening. States last echo over 1 year prior. states now able to walk 50 feet without stopping for shortness of breath (2018 22:27)    18  No acute events occurred overnight       his family updated at bedside  feels ok  pain is better controlled  no difficulty breathing    had Pleurx done  tolerated ok  no issues iwth breathing  data discussed with Dr ZAMARRIPA regarding eval for esophageal varices eval needed    his wife at bedside  CT drainage noted  he is at high risk for endoscopy due to compromised pulm status  data rev with Dr Conte and Dr Mchugh today    data rev with surgical PA  plan for fluid analysis  also more frequent drainage  not walking  family to decide on rehab placement  wife and DTR updated today regarding status    MEDICATIONS  (STANDING):  amiodarone    Tablet 100 milliGRAM(s) Oral daily  atorvastatin 10 milliGRAM(s) Oral at bedtime  dextrose 5%. 1000 milliLiter(s) (50 mL/Hr) IV Continuous <Continuous>  dextrose 50% Injectable 12.5 Gram(s) IV Push once  dextrose 50% Injectable 25 Gram(s) IV Push once  dextrose 50% Injectable 25 Gram(s) IV Push once  docusate sodium 100 milliGRAM(s) Oral three times a day  furosemide    Tablet 20 milliGRAM(s) Oral two times a day  gabapentin 300 milliGRAM(s) Oral three times a day  insulin glargine Injectable (LANTUS) 20 Unit(s) SubCutaneous at bedtime  insulin lispro (HumaLOG) corrective regimen sliding scale   SubCutaneous three times a day before meals  insulin lispro (HumaLOG) corrective regimen sliding scale   SubCutaneous at bedtime  lactulose Syrup 10 Gram(s) Oral daily  lisinopril 2.5 milliGRAM(s) Oral daily  metoprolol tartrate 25 milliGRAM(s) Oral two times a day  multivitamin 1 Tablet(s) Oral daily  spironolactone 25 milliGRAM(s) Oral at bedtime  tenofovir disoproxil fumarate (VIREAD) 300 milliGRAM(s) Oral daily    MEDICATIONS  (PRN):  acetaminophen   Tablet .. 650 milliGRAM(s) Oral every 6 hours PRN Temp greater or equal to 38C (100.4F), Mild Pain (1 - 3), Moderate Pain (4 - 6)  ALBUTerol/ipratropium for Nebulization 3 milliLiter(s) Nebulizer every 6 hours PRN Shortness of Breath and/or Wheezing  dextrose 40% Gel 15 Gram(s) Oral once PRN Blood Glucose LESS THAN 70 milliGRAM(s)/deciliter  glucagon  Injectable 1 milliGRAM(s) IntraMuscular once PRN Glucose LESS THAN 70 milligrams/deciliter  senna 2 Tablet(s) Oral at bedtime PRN Constipation  traMADol 50 milliGRAM(s) Oral every 4 hours PRN Moderate Pain (4 - 6)          FAMILY HISTORY:  Family history of liver cancer (Father): father  85 liver CA  Family history of coronary arteriosclerosis (Mother): Mom  of MI age 68      SOCIAL HISTORY: Lives at home     REVIEW OF SYSTEM:  cough, dyspnea, fatigue, otherwise 12 point ROS negative     Vital Signs Last 24 Hrs  T(C): 36.7 (2018 05:00), Max: 37.2 (2018 20:54)  T(F): 98.1 (2018 05:00), Max: 98.9 (2018 20:54)  HR: 88 (2018 07:53) (85 - 105)  BP: 100/59 (2018 07:00) (87/56 - 104/60)  BP(mean): 69 (2018 07:00) (63 - 75)  RR: 17 (2018 07:00) (13 - 20)  SpO2: 96% (2018 07:00) (88% - 100%)    I&O's Summary    2018 07:01  -  2018 07:00  --------------------------------------------------------  IN: 975 mL / OUT: 1745 mL / NET: -770 mL            PHYSICAL EXAM  General Appearance: cooperative, no acute distress,   HEENT: PERRL, conjunctiva clear, EOM's intact, non injected pharynx, no exudate, TM   normal  Neck: Supple, , no adenopathy, thyroid: not enlarged, no carotid bruit or JVD  Back: Symmetric, no  tenderness,no soft tissue tenderness  Lungs: Decreased breath sounds in the right lower lobe, diminished in the upper and lower lobes on the left, Right upper lobe inspiratory wheezes   Heart: Regular rate and rhythm, S1, S2 normal, no murmur, rub or gallop  Abdomen: Soft, non-tender, bowel sounds active , no hepatosplenomegaly  Extremities: no cyanosis or edema, no joint swelling  Skin: Skin color, texture normal, no rashes   Neurologic: Alert and oriented X3 , cranial nerves intact, sensory and motor normal,    ECG:    LABS:                                                11.0   12.47 )-----------( 96       ( 2018 04:46 )             34.7   11-    135  |  99  |  22  ----------------------------<  80  4.1   |  31  |  1.08    Ca    7.7<L>      2018 04:46               11.2   13.79 )-----------( 90       ( 2018 04:58 )             36.0   11-    134<L>  |  97  |  22  ----------------------------<  86  4.0   |  32<H>  |  1.15    Ca    7.9<L>      2018 04:58    TPro  5.8<L>  /  Alb  2.5<L>  /  TBili  0.5  /  DBili  x   /  AST  28  /  ALT  31  /  AlkPhos  148<H>                            12.3   16.94 )-----------( 95       ( 2018 04:44 )             38.5         133<L>  |  98  |  14  ----------------------------<  113<H>  5.0   |  27  |  0.99    Ca    8.3<L>      2018 04:44  Phos  3.7       Mg     1.9         TPro  6.0  /  Alb  2.8<L>  /  TBili  0.5  /  DBili  x   /  AST  34  /  ALT  34  /  AlkPhos  162<H>      CARDIAC MARKERS ( 2018 17:42 )  <0.015 ng/mL / x     / x     / x     / x            Pro BNP  182  @ 17:42  D Dimer  --  @ 17:42    PT/INR - ( 2018 04:44 )   PT: 23.9 sec;   INR: 2.10 ratio         PTT - ( 2018 04:44 )  PTT:37.1 sec  Urinalysis Basic - ( 2018 19:11 )    Color: Yellow / Appearance: Clear / S.010 / pH: x  Gluc: x / Ketone: Negative  / Bili: Negative / Urobili: Negative mg/dL   Blood: x / Protein: Negative mg/dL / Nitrite: Negative   Leuk Esterase: Negative / RBC: x / WBC x   Sq Epi: x / Non Sq Epi: x / Bacteria: x            RADIOLOGY & ADDITIONAL STUDIES:    IMPRESSION: Moderate to large left pleural effusion with underlying   compressive atelectasis. Small right pleural effusion with underlying   compressive atelectasis.   Prominent lymph nodes in the bilateral axilla,   mediastinum, and hilum.         < from: Xray Chest 1 View- PORTABLE-Routine (18 @ 09:15) >  PROCEDURE DATE:  2018          INTERPRETATION:  Exam Date: 2018 9:15 AM    History: Labored breathing    Technique: Single frontal portable view of the chest with comparison to    2018    Findings:    The heart is enlarged. Moderate right pleural effusion with small to mild   left pleural effusion. Bibasilar atelectasis. Degenerative changes of the   visualized osseous structures.    Left-sided chest tube. Left chestwall subcutaneous emphysema.    Impression:    Moderate right pleural effusion with small to mild left pleural effusion.   Bibasilar atelectasis    < end of copied text >

## 2018-11-08 NOTE — PROGRESS NOTE ADULT - SUBJECTIVE AND OBJECTIVE BOX
CHIEF COMPLAINT:  78 year old male with pmh of copd on home o2 2.5L, afib on coumadin, chronic diastolic chf, diabetes on insulin, htn, hep b infection, hypogammaglobulinemia w/ IVIG tx, b cell lymphoma, cholelithiasis s/p cholecystectomy, esophageal dilation, prostatectomy coming to ED with complaints of worsening shortness of breath and lower back pain. Patient stating was on toilet this morning when tried to get up put pants on when he had a sudden onset of lower back pain. No bladder or bowel incontinence. Per Dr Bejarano In ED bladder scan was done with no to low residual urine volume. Per Dr Bejarano patient able to ambulate with cane in ED. For shortness of breath has been going on for past 4-5 weeks and has been gradually worsening. States last echo over 1 year prior. states now able to walk 50 feet without stopping for shortness of breath.     SUBJECTIVE:     11/7/18    Patient seen and evaluated at bedside. L Chest tube placed yesterday and patient complaining of pain at the site, 10/10. Pt received morphine and percocet overnight per nurse. Otherwise no complaints such as CP or SOB. Currently on NC 4L with good saturation. 430 cc fluid serosanguinous drainage noted from chest tube. Encouraged pt to use IS.   Plan for CXR today, D/C IVF and decrease Lasix dose.     # [id number] used.    -----  11/8/18   used for encounter patient states he has pain similar to day before on L side where chest tube is placed. Instructed nurse to give pain medications as needed.   Denies CP, and SOB. Appears comfortable and seen out of bed in chair later in day.    REVIEW OF SYSTEMS:    CONSTITUTIONAL: No weakness, fevers or chills  EYES/ENT: No visual changes;  No vertigo or throat pain   NECK: No pain or stiffness  RESPIRATORY: No cough, wheezing, hemoptysis; No shortness of breath  CARDIOVASCULAR: No chest pain or palpitations  GASTROINTESTINAL: No abdominal or epigastric pain. No nausea, vomiting, or hematemesis; No diarrhea or constipation. No melena or hematochezia.  GENITOURINARY: No dysuria, frequency or hematuria  NEUROLOGICAL: No numbness or weakness  SKIN: No itching, burning, rashes, or lesions   All other review of systems is negative unless indicated above    Vital Signs Last 24 Hrs  T(C): 37 (08 Nov 2018 14:30), Max: 37.2 (07 Nov 2018 21:44)  T(F): 98.6 (08 Nov 2018 14:30), Max: 99 (07 Nov 2018 21:44)  HR: 104 (08 Nov 2018 14:01) (88 - 109)  BP: 103/64 (08 Nov 2018 14:01) (93/60 - 121/73)  BP(mean): 72 (08 Nov 2018 14:01) (64 - 84)  RR: 22 (08 Nov 2018 14:01) (14 - 23)  SpO2: 95% (08 Nov 2018 14:01) (92% - 99%)  I&O's Summary    I&O's Summary    07 Nov 2018 07:01  -  08 Nov 2018 07:00  --------------------------------------------------------  IN: 0 mL / OUT: 100 mL / NET: -100 mL    08 Nov 2018 07:01  -  08 Nov 2018 16:06  --------------------------------------------------------  IN: 0 mL / OUT: 350 mL / NET: -350 mL      CAPILLARY BLOOD GLUCOSE      POCT Blood Glucose.: 198 mg/dL (08 Nov 2018 12:58)  POCT Blood Glucose.: 250 mg/dL (08 Nov 2018 10:55)  POCT Blood Glucose.: 241 mg/dL (07 Nov 2018 21:28)  POCT Blood Glucose.: 192 mg/dL (07 Nov 2018 17:21)      PHYSICAL EXAM:    Constitutional: NAD, awake and alert, well-developed  Respiratory: Breath sounds are clear bilaterally, No wheezing, rales or rhonchi  Cardiovascular: S1 and S2, regular rate and rhythm, no Murmurs, gallops or rubs  Chest: L chest tube in place   Gastrointestinal: Bowel Sounds present, soft, nontender, nondistended, no guarding, no rebound  Extremities: No peripheral edema  Vascular: 2+ peripheral pulses  Skin: No rashes    MEDICATIONS  (STANDING):  amiodarone    Tablet 100 milliGRAM(s) Oral daily  atorvastatin 10 milliGRAM(s) Oral at bedtime  buDESOnide  80 MICROgram(s)/formoterol 4.5 MICROgram(s) Inhaler 2 Puff(s) Inhalation two times a day  clotrimazole 1% Cream 1 Application(s) Topical two times a day  dextrose 5%. 1000 milliLiter(s) (50 mL/Hr) IV Continuous <Continuous>  dextrose 50% Injectable 12.5 Gram(s) IV Push once  dextrose 50% Injectable 25 Gram(s) IV Push once  dextrose 50% Injectable 25 Gram(s) IV Push once  docusate sodium 100 milliGRAM(s) Oral three times a day  furosemide    Tablet 40 milliGRAM(s) Oral daily  gabapentin 300 milliGRAM(s) Oral three times a day  heparin  Injectable 5000 Unit(s) SubCutaneous every 8 hours  insulin glargine Injectable (LANTUS) 10 Unit(s) SubCutaneous at bedtime  insulin lispro (HumaLOG) corrective regimen sliding scale   SubCutaneous three times a day before meals  insulin lispro (HumaLOG) corrective regimen sliding scale   SubCutaneous at bedtime  lactulose Syrup 10 Gram(s) Oral daily  lidocaine 1% (Preservative-free) Injectable 25 milliLiter(s) Local Injection once  metoprolol tartrate 25 milliGRAM(s) Oral two times a day  multivitamin 1 Tablet(s) Oral daily  senna 2 Tablet(s) Oral at bedtime  spironolactone 25 milliGRAM(s) Oral daily  tenofovir disoproxil fumarate (VIREAD) 300 milliGRAM(s) Oral daily  tiotropium 18 MICROgram(s) Capsule 1 Capsule(s) Inhalation daily  warfarin 2.5 milliGRAM(s) Oral at bedtime    MEDICATIONS  (PRN):  ALBUTerol/ipratropium for Nebulization 3 milliLiter(s) Nebulizer every 6 hours PRN Shortness of Breath and/or Wheezing  dextrose 40% Gel 15 Gram(s) Oral once PRN Blood Glucose LESS THAN 70 milliGRAM(s)/deciliter  glucagon  Injectable 1 milliGRAM(s) IntraMuscular once PRN Glucose LESS THAN 70 milligrams/deciliter  morphine  - Injectable 4 milliGRAM(s) IV Push every 3 hours PRN Severe Pain (7 - 10)  oxyCODONE    5 mG/acetaminophen 325 mG 1 Tablet(s) Oral every 4 hours PRN Mild Pain (1 - 3)  oxyCODONE    5 mG/acetaminophen 325 mG 2 Tablet(s) Oral every 4 hours PRN Moderate Pain (4 - 6)                            11.1   15.07 )-----------( 107      ( 08 Nov 2018 05:37 )             34.7       11-08    132<L>  |  97  |  23  ----------------------------<  150<H>  4.3   |  27  |  1.17    Ca    7.8<L>      08 Nov 2018 05:38      PT/INR - ( 08 Nov 2018 05:38 )   PT: 22.8 sec;   INR: 2.01 ratio         PTT - ( 08 Nov 2018 05:38 )  PTT:35.0 sec      Blood Culture: 11-05 @ 18:25  Organism --  Gram Stain Blood -- Gram Stain   polymorphonuclear leukocytes seen per low power field  No organisms seen per oil power field  by cytocentrifuge  Specimen Source .Body Fluid LEFT PLEURAL EFFUSION CUL & GRM STAIN  Culture-Blood --    11-02 @ 19:11  Organism --  Gram Stain Blood -- Gram Stain --  Specimen Source .Urine Clean Catch (Midstream)  Culture-Blood --        RADIOLOGY/EKG:    DVT PPX:    ADVANCED DIRECTIVE:    DISPOSITION:

## 2018-11-08 NOTE — PROGRESS NOTE ADULT - ASSESSMENT
78 y.o. male with complex past medical history including CLL/lymphoma on treatment, h/o recurrent pneumonia, Afib, HFpEF, Hep B, COPD, type 2DM, H/o PE DVT      med -volume ascites, likely multifactorial , the may be a component of progression of lymphoma and cirrhosis also contributing to this.  prior SAAG was low and likely component of malignant ascites and P effusion  pulmonary catheter placement noted and appreciated from CT surgery note. DCed  will inc diuretics and DC IVF if pt luis  pts with cirrhosis ususally run lower BP and lisinopril DCed  DW cardiology    history of cirrhosis, increased risk of esophageal varices and bleeding reviewed however, patient is also had increased risk of endoscopy.  Last endoscopy was in 2014 at which time he did not have esophageal varices.  Discussed with pulmonary, patient is refusing  inc risk bleed due to possible EV and AC DW pt and pt accepts risk    discussed with pulmonary and hospitalist.        gentle diuresis        Hepatitis B with liver cirrhosis and ascites, noncompliance discussed with patient and family.

## 2018-11-08 NOTE — PROGRESS NOTE ADULT - ASSESSMENT
1) Bilateral Pleural Effusions  2) Abnormal CT Chest  3) History of Bronchomalacia  4) COPD  5) Dyspnea  6) History of Heart Failure   7) Passive Atelectasis/Compressive Atelectasis   8) Ascites  9) History of Lymphoma    78 year old male with pmh of copd on home o2 2.5L, afib on coumadin, chronic diastolic chf, diabetes on insulin, htn, hep b infection, hypogammaglobulinemia w/ IVIG tx, b cell lymphoma, cholelithiasis s/p cholecystectomy, esophageal dilation, prostatectomy coming to ED with complaints of worsening shortness of breath and lower back pain. Patient stating was on toilet this morning when tried to get up put pants on when he had a sudden onset of lower back pain.   Reviewed CT chest which shows bilateral pleural effusions L> R.  Once INR is reduced to < 2, can consider thoracentesis  Appreciate CTS assistance  Follow up pleural LDH, cytology, cell count, protein, glucose, triglycerides should also be performed given history of lymphoma.   Performed bronchoscopy on the patient last year which revealed bronchomalacia which is likely contributing to the patient's underlying dyspnea as well  Follow up echocardiogram  Patient may need paracentesis with fluid analysis  Follow up hematology recommendations   Continue O2, goal SaO2 >88%  Will d/c Singulair given potential underlying hepatic issues.  Start Symbicort and Spiriva   s/p pleurex done  Will continue to monitor  get repeat cxr  send PF for analysis / ordered    CT drainage noted  he is at high risk for endoscopy due to compromised pulm status  data rev with Dr Conte and Dr Mchugh today  family is aware of status  decrease diuretic dose  Dc IVf -done  11/8  data rev with surgical PA  plan for fluid analysis  also more frequent drainage  not walking  family to decide on rehab placement  wife and DTR updated today regarding status  check repeat cxr after drainage  overall prognosis remains guarded

## 2018-11-08 NOTE — PROGRESS NOTE ADULT - ASSESSMENT
78 year old male with pmh of copd on home o2 2.5L, afib on coumadin, chronic diastolic chf, diabetes on insulin, htn, hep b infection, hypogammaglobulinemia w/ IVIG tx, b cell lymphoma, cholelithiasis s/p cholecystectomy, esophageal dilation, prostatectomy admitted for worsening shortness of breath 2/2 acute on chronic CHF.    #Acute on chronic CHF  ~s/p L chest tube placement   -continue spironolactone 25 mg PO QD   -monitor chest tube output  -f/u pleural fluid analysis   -Daily CXRs- repeat CXR after drainage recommended  -drainage to be placed q72h     #COPD  -On home O2 2.5 L  -Continue NC O2  -Continue tiotropium  -Continue budesonide/formoterol    #Afib  -Continue warfarin   -Continue amiodarone    #DM  -ISS premeal and bedtime   -gabapentin 300 mg PO TID    #HTN  -BP range /60s-70s noted  -Continue Lasix 40 mg PO QD- BP can tolerate , holding parameter , hold if SBP<80   -Continue metoprolol 25 mg PO BID    #Hep B and cirrhosis  -Continue lactulose  -GI consulted- high risk for endoscopy  -Paracentesis to be further discussed    #Hypogammaglobulinemia  - Heme/onc consulted  -If IgG <600- <700 -proceed with IVIG (Dr. Tee following)    #B cell lymphoma  - Heme/onc consulted 78 year old male with pmh of copd on home o2 2.5L, afib on coumadin, chronic diastolic chf, diabetes on insulin, htn, hep b infection, hypogammaglobulinemia w/ IVIG tx, b cell lymphoma, cholelithiasis s/p cholecystectomy, esophageal dilation, prostatectomy admitted for worsening shortness of breath 2/2 acute on chronic CHF.    #Acute on chronic CHF  ~s/p L chest tube placement   -continue spironolactone 25 mg PO QD   -monitor chest tube output  -f/u pleural fluid analysis   -Daily CXRs- repeat CXR after drainage recommended  -drainage to be placed q72h     #COPD  -On home O2 2.5 L  -Continue NC O2  -Continue tiotropium  -Continue budesonide/formoterol    #Afib  -Continue warfarin   -Continue amiodarone    #DM  -ISS premeal and bedtime   -gabapentin 300 mg PO TID    #HTN  -BP range /60s-70s noted  -Continue Lasix 40 mg PO QD- BP can tolerate , holding parameter , hold if SBP<80   -Continue metoprolol 25 mg PO BID    #Hep B and cirrhosis  -Continue lactulose  -GI consulted- high risk for endoscopy      #Hypogammaglobulinemia  - Heme/onc consulted  -If IgG <600- <700 -proceed with IVIG (Dr. Tee following)    #B cell lymphoma  - Heme/onc consulted 78 year old male with pmh of copd on home o2 2.5L, afib on coumadin, chronic diastolic chf, diabetes on insulin, htn, hep b infection, hypogammaglobulinemia w/ IVIG tx, b cell lymphoma, cholelithiasis s/p cholecystectomy, esophageal dilation, prostatectomy admitted for worsening shortness of breath 2/2 acute on chronic CHF.    #Acute on chronic CHF  ~s/p L chest tube placement   -continue spironolactone 25 mg PO QD   -monitor chest tube output  -f/u pleural fluid analysis   -Daily CXRs- repeat CXR after drainage recommended   -drainage to be placed q72h   -Pulm and thoracic surgery following -- recommendations appreciated    #COPD  -On home O2 2.5 L  -Continue NC O2  -Continue tiotropium  -Continue budesonide/formoterol    #Afib  -Continue warfarin   -Continue amiodarone    #DM  -ISS premeal and bedtime   -gabapentin 300 mg PO TID    #HTN  -BP range /60s-70s noted  -Continue Lasix 40 mg PO QD- BP can tolerate , holding parameter , hold if SBP<80   -Continue metoprolol 25 mg PO BID    #Hep B and cirrhosis  -Continue lactulose  -GI consulted- high risk for endoscopy      #Hypogammaglobulinemia  - Heme/onc consulted  -If IgG <600- <700 -proceed with IVIG (Dr. Tee following)    #B cell lymphoma  - Heme/onc consulted

## 2018-11-08 NOTE — PROGRESS NOTE ADULT - SUBJECTIVE AND OBJECTIVE BOX
Pt has been seen and examined with FP resident, resident supervised agree with a/p       Patient is a 78y old  Male who presents with a chief complaint of CC: Lower back pain and shortness of breath (03 Nov 2018 11:07)        HPI:  78 year old male with pmh of copd on home o2 2.5L, afib on coumadin, chronic diastolic chf, diabetes on insulin, htn, hep b infection, hypogammaglobulinemia w/ IVIG tx, b cell lymphoma, cholelithiasis s/p cholecystectomy, esophageal dilation, prostatectomy coming to ED with complaints of worsening shortness of breath and lower back pain.     PHYSICAL EXAM:    general- comfortable   -rs-improved air entry, rales present    -cvs-s1s2 normal   -p/a-soft,bs+  -extremity- no asymmetrical swelling noted           A/P    # Pleural effusion-s/p pleurax catheter placement and drainage     #supportive care     #Back pain- due to vertebral fracture -brace     #Supportive care     #discharge plan

## 2018-11-08 NOTE — PROGRESS NOTE ADULT - SUBJECTIVE AND OBJECTIVE BOX
Subjective:  78 year old male with COPD,  SLL/ CLL : Mediastinal / retroperitoneal nodes, anemia, on Obinutuzumab and in addition receives Gammaglobulin infusion q 4 weeks for hypogammaglobulinemia associated with repeated pneumonia/ admitted with worsening shortness of breath and lower back pain. Found to have a  moderate to large left pleural effusion with underlying compressive atelectasis. Small right pleural effusion with underlying compressive atelectasis.  Prominent lymph nodes in the bilateral axilla mediastinum  MRI reveals T 1 acute compression fracture  11/5/18:  patient seen and examined with son and wife at bedside.  States his breathing is labored but stable.  NPO for paracentesis and VATS pleur X placement today.    11/6/18:  POD 1 VATs Left pleural effusion drainage and PleurX placement.  Jaret  #[id number] used for interview.  Wife states he is feeling weak and having some pain at surgical site.  Breathing has improved since pleural effusion drainage.    11/7/18: POD 2 pleurX placement, did not undergo IR Paracentesis yesterday.  Ethan  # [id number] used for interview.  Wife states he is feeling better.    11/8/18 POD 3 Pt seen and examined. Breathing OK. According to RN staff unable to walk at this time, being evaluated. Back brace in place for compression fx.       Vital Signs:  Vital Signs Last 24 Hrs  T(C): 36.4 (11-08-18 @ 08:45), Max: 37.4 (11-07-18 @ 14:26)  T(F): 97.5 (11-08-18 @ 08:45), Max: 99.3 (11-07-18 @ 14:26)  HR: 88 (11-08-18 @ 08:00) (88 - 109)  BP: 96/62 (11-08-18 @ 08:00) (93/60 - 115/61)  RR: 16 (11-08-18 @ 08:00) (14 - 24)  SpO2: 96% (11-08-18 @ 08:00) (89% - 99%) on (O2)    Telemetry/Alarms:    Relevant labs, radiology and Medications reviewed                        11.1   15.07 )-----------( 107      ( 08 Nov 2018 05:37 )             34.7     11-08    132<L>  |  97  |  23  ----------------------------<  150<H>  4.3   |  27  |  1.17    Ca    7.8<L>      08 Nov 2018 05:38      PT/INR - ( 08 Nov 2018 05:38 )   PT: 22.8 sec;   INR: 2.01 ratio         PTT - ( 08 Nov 2018 05:38 )  PTT:35.0 sec  MEDICATIONS  (STANDING):  amiodarone    Tablet 100 milliGRAM(s) Oral daily  atorvastatin 10 milliGRAM(s) Oral at bedtime  buDESOnide  80 MICROgram(s)/formoterol 4.5 MICROgram(s) Inhaler 2 Puff(s) Inhalation two times a day  clotrimazole 1% Cream 1 Application(s) Topical two times a day  dextrose 5%. 1000 milliLiter(s) (50 mL/Hr) IV Continuous <Continuous>  dextrose 50% Injectable 12.5 Gram(s) IV Push once  dextrose 50% Injectable 25 Gram(s) IV Push once  dextrose 50% Injectable 25 Gram(s) IV Push once  docusate sodium 100 milliGRAM(s) Oral three times a day  furosemide    Tablet 40 milliGRAM(s) Oral daily  gabapentin 300 milliGRAM(s) Oral three times a day  heparin  Injectable 5000 Unit(s) SubCutaneous every 8 hours  insulin lispro (HumaLOG) corrective regimen sliding scale   SubCutaneous three times a day before meals  insulin lispro (HumaLOG) corrective regimen sliding scale   SubCutaneous at bedtime  lactulose Syrup 10 Gram(s) Oral daily  lidocaine 1% (Preservative-free) Injectable 25 milliLiter(s) Local Injection once  metoprolol tartrate 25 milliGRAM(s) Oral two times a day  multivitamin 1 Tablet(s) Oral daily  senna 2 Tablet(s) Oral at bedtime  spironolactone 25 milliGRAM(s) Oral daily  tenofovir disoproxil fumarate (VIREAD) 300 milliGRAM(s) Oral daily  tiotropium 18 MICROgram(s) Capsule 1 Capsule(s) Inhalation daily  warfarin 2.5 milliGRAM(s) Oral at bedtime    MEDICATIONS  (PRN):  ALBUTerol/ipratropium for Nebulization 3 milliLiter(s) Nebulizer every 6 hours PRN Shortness of Breath and/or Wheezing  dextrose 40% Gel 15 Gram(s) Oral once PRN Blood Glucose LESS THAN 70 milliGRAM(s)/deciliter  glucagon  Injectable 1 milliGRAM(s) IntraMuscular once PRN Glucose LESS THAN 70 milligrams/deciliter  morphine  - Injectable 4 milliGRAM(s) IV Push every 3 hours PRN Severe Pain (7 - 10)  oxyCODONE    5 mG/acetaminophen 325 mG 1 Tablet(s) Oral every 4 hours PRN Mild Pain (1 - 3)  oxyCODONE    5 mG/acetaminophen 325 mG 2 Tablet(s) Oral every 4 hours PRN Moderate Pain (4 - 6)      Physical exam  Gen NAD  Neuro AAOx3  Card RRR  Pulm decreased bases  Abd soft, distended  Ext warm    Tubes: Left pleurx in place, capped, dressing intact    I&O's Summary    07 Nov 2018 07:01  -  08 Nov 2018 07:00  --------------------------------------------------------  IN: 0 mL / OUT: 100 mL / NET: -100 mL        Assessment  78y Male  w/ PAST MEDICAL & SURGICAL HISTORY:  Hyperlipidemia  Hypogammaglobulinemia: received IVIG  Hepatitis B virus infection, unspecified chronicity: retrovirals  Chronic diastolic congestive heart failure  Essential hypertension  DM type 2 (diabetes mellitus, type 2)  B-cell lymphoma, unspecified B-cell lymphoma type, unspecified body region: CLL stage 4/SLL; met NHL  Atrial fibrillation, unspecified type  COPD (chronic obstructive pulmonary disease)  History of esophageal dilatation  S/P cholecystectomy  H/O prostatectomy  admitted with complaints of Patient is a 78y old  Male who presents with a chief complaint of CC: Lower back pain and shortness of breath (08 Nov 2018 08:25)  POD 3 FB left pleural effusion drainage PLEURX    PLAN  at Dr. Lakhani's request, will place pleurx to waterseal  fluid to be sent for analysis  f/u path  daily CXR    Discussed with Cardiothoracic Team at AM rounds.

## 2018-11-08 NOTE — PROGRESS NOTE ADULT - SUBJECTIVE AND OBJECTIVE BOX
Patient is a 78y old  Male who presents with a chief complaint of CC: Lower back pain and shortness of breath (2018 16:01)      HPI:  78 year old male with pmh of copd on home o2 2.5L, afib on coumadin, chronic diastolic chf, diabetes on insulin, htn, hep b infection, hypogammaglobulinemia w/ IVIG tx, b cell lymphoma, cholelithiasis s/p cholecystectomy, esophageal dilation, prostatectomy coming to ED with complaints of worsening shortness of breath and lower back pain. Patient stating was on toilet this morning when tried to get up put pants on when he had a sudden onset of lower back pain. No bladder or bowel incontinence. Per Dr Bejarano In ED bladder scan was done with no to low residual urine volume. Per Dr Bejarano patient able to ambulate with cane in ED. FOr shortness of breath has been going on for past 4-5 weeks and has been gradually worsening. States last echo over 1 year prior. states now able to walk 50 feet without stopping for shortness of breath (2018 22:27)    Complains of fatigue, had some mild reflux last night. Negative nausea vomiting and tolerating by mouth intake.  Diuresis was modified yesterday secondary to hypotension.  PAST MEDICAL & SURGICAL HISTORY:  Hyperlipidemia  Hypogammaglobulinemia: received IVIG  Hepatitis B virus infection, unspecified chronicity: retrovirals  Chronic diastolic congestive heart failure  Essential hypertension  DM type 2 (diabetes mellitus, type 2)  B-cell lymphoma, unspecified B-cell lymphoma type, unspecified body region: CLL stage 4/SLL; met NHL  Atrial fibrillation, unspecified type  COPD (chronic obstructive pulmonary disease)  History of esophageal dilatation  S/P cholecystectomy  H/O prostatectomy      MEDICATIONS  (STANDING):  amiodarone    Tablet 100 milliGRAM(s) Oral daily  atorvastatin 10 milliGRAM(s) Oral at bedtime  buDESOnide  80 MICROgram(s)/formoterol 4.5 MICROgram(s) Inhaler 2 Puff(s) Inhalation two times a day  clotrimazole 1% Cream 1 Application(s) Topical two times a day  dextrose 5%. 1000 milliLiter(s) (50 mL/Hr) IV Continuous <Continuous>  dextrose 50% Injectable 12.5 Gram(s) IV Push once  dextrose 50% Injectable 25 Gram(s) IV Push once  dextrose 50% Injectable 25 Gram(s) IV Push once  docusate sodium 100 milliGRAM(s) Oral three times a day  furosemide    Tablet 40 milliGRAM(s) Oral daily  gabapentin 300 milliGRAM(s) Oral three times a day  heparin  Injectable 5000 Unit(s) SubCutaneous every 8 hours  insulin glargine Injectable (LANTUS) 10 Unit(s) SubCutaneous at bedtime  insulin lispro (HumaLOG) corrective regimen sliding scale   SubCutaneous three times a day before meals  insulin lispro (HumaLOG) corrective regimen sliding scale   SubCutaneous at bedtime  lactulose Syrup 10 Gram(s) Oral daily  lidocaine 1% (Preservative-free) Injectable 25 milliLiter(s) Local Injection once  metoprolol tartrate 25 milliGRAM(s) Oral two times a day  multivitamin 1 Tablet(s) Oral daily  senna 2 Tablet(s) Oral at bedtime  spironolactone 25 milliGRAM(s) Oral daily  tenofovir disoproxil fumarate (VIREAD) 300 milliGRAM(s) Oral daily  tiotropium 18 MICROgram(s) Capsule 1 Capsule(s) Inhalation daily  warfarin 2.5 milliGRAM(s) Oral at bedtime    MEDICATIONS  (PRN):  ALBUTerol/ipratropium for Nebulization 3 milliLiter(s) Nebulizer every 6 hours PRN Shortness of Breath and/or Wheezing  dextrose 40% Gel 15 Gram(s) Oral once PRN Blood Glucose LESS THAN 70 milliGRAM(s)/deciliter  glucagon  Injectable 1 milliGRAM(s) IntraMuscular once PRN Glucose LESS THAN 70 milligrams/deciliter  morphine  - Injectable 4 milliGRAM(s) IV Push every 3 hours PRN Severe Pain (7 - 10)  oxyCODONE    5 mG/acetaminophen 325 mG 1 Tablet(s) Oral every 4 hours PRN Mild Pain (1 - 3)  oxyCODONE    5 mG/acetaminophen 325 mG 2 Tablet(s) Oral every 4 hours PRN Moderate Pain (4 - 6)      Allergies    No Known Allergies    Intolerances        SOCIAL HISTORY:NC    FAMILY HISTORY:  Family history of liver cancer (Father): father  85 liver CA  Family history of coronary arteriosclerosis (Mother): Mom  of MI age 68      REVIEW OF SYSTEMS:    CONSTITUTIONAL: No weakness, fevers or chills  EYES/ENT: No visual changes;  No vertigo or throat pain   NECK: No pain or stiffness  RESPIRATORY: No cough, wheezing, hemoptysis; No shortness of breath  CARDIOVASCULAR: No chest pain or palpitations  GENITOURINARY: No dysuria, frequency or hematuria  NEUROLOGICAL: No numbness or weakness  SKIN: No itching, burning, rashes, or lesions   All other review of systems is negative unless indicated above.    Vital Signs Last 24 Hrs  T(C): 36.7 (2018 16:25), Max: 37.2 (2018 21:44)  T(F): 98.1 (2018 16:25), Max: 99 (2018 21:44)  HR: 100 (2018 18:00) (88 - 109)  BP: 99/64 (2018 18:00) (93/60 - 121/73)  BP(mean): 72 (2018 18:00) (66 - 84)  RR: 19 (2018 18:00) (14 - 23)  SpO2: 99% (2018 18:00) (92% - 99%)    PHYSICAL EXAM:    Constitutional: NAD, well-developed  HEENT: EOMI, throat clear  Neck: No LAD, supple  Respiratory: CTA and P, Dec BS on L side  Cardiovascular: S1 and S2, RRR, no M  Gastrointestinal: BS+, soft, NT/ND, neg HSM,ascites  Extremities: No peripheral edema, neg clubing, cyanosis  Vascular: 2+ peripheral pulses  Neurological: A/O x 3, no focal deficits  Psychiatric: Normal mood, normal affect  Skin: No rashes    LABS:  CBC Full  -  ( 2018 05:37 )  WBC Count : 15.07 K/uL  Hemoglobin : 11.1 g/dL  Hematocrit : 34.7 %  Platelet Count - Automated : 107 K/uL  Mean Cell Volume : 82.0 fl  Mean Cell Hemoglobin : 26.2 pg  Mean Cell Hemoglobin Concentration : 32.0 gm/dL  Auto Neutrophil # : x  Auto Lymphocyte # : x  Auto Monocyte # : x  Auto Eosinophil # : x  Auto Basophil # : x  Auto Neutrophil % : x  Auto Lymphocyte % : x  Auto Monocyte % : x  Auto Eosinophil % : x  Auto Basophil % : x    11-08    132<L>  |  97  |  23  ----------------------------<  150<H>  4.3   |  27  |  1.17    Ca    7.8<L>      2018 05:38      PT/INR - ( 2018 05:38 )   PT: 22.8 sec;   INR: 2.01 ratio         PTT - ( 2018 05:38 )  PTT:35.0 sec     @ 12:27  Hep A Igm --  Hep A total ab, IgA and M --  Hep B core Ab total --  Hep B core IgM --  Hep B DNA PCR NotDetec  Hep BSAg --  Hep BSAb --  Hep BSAb --  HCV Ab --  HCV RNA Log --  HCV RNA interp --             @ 12:36  Hep A Igm --  Hep A total ab, IgA and M --  Hep B core Ab total --  Hep B core IgM --  Hep B DNA PCR NotDetec  Hep BSAg --  Hep BSAb --  Hep BSAb --  HCV Ab --  HCV RNA Log --  HCV RNA interp --                RADIOLOGY & ADDITIONAL STUDIES:  < from: Xray Chest 1 View- PORTABLE-Routine (18 @ 09:38) >  EXAM:  XR CHEST PORTABLE ROUTINE 1V                          EXAM:  XR CHEST PORTABLE ROUTINE 1V                            PROCEDURE DATE:  2018          INTERPRETATION:      REASON FOR EXAM: Shortness of breath status post line placement    TECHNIQUE: 2 frontal views of chest compared to prior study of 2018    FINDINGS/  IMPRESSION:    There is left-sided chest tube. Small right greater than left pleural   effusion and congestive changes. Mild interstitial edema. Borderline   cardiomegaly. Osteopenia and degenerative spondylosis.              < end of copied text >

## 2018-11-09 NOTE — PROGRESS NOTE ADULT - SUBJECTIVE AND OBJECTIVE BOX
CHIEF COMPLAINT:  78 year old male with pmh of copd on home o2 2.5L, afib on coumadin, chronic diastolic chf, diabetes on insulin, htn, hep b infection, hypogammaglobulinemia w/ IVIG tx, b cell lymphoma, cholelithiasis s/p cholecystectomy, esophageal dilation, prostatectomy coming to ED with complaints of worsening shortness of breath and lower back pain. Patient stating was on toilet this morning when tried to get up put pants on when he had a sudden onset of lower back pain. No bladder or bowel incontinence. Per Dr Bejarano In ED bladder scan was done with no to low residual urine volume. Per Dr Bejarano patient able to ambulate with cane in ED. For shortness of breath has been going on for past 4-5 weeks and has been gradually worsening. States last echo over 1 year prior. states now able to walk 50 feet without stopping for shortness of breath.     SUBJECTIVE:     11/7/18    Patient seen and evaluated at bedside. L Chest tube placed yesterday and patient complaining of pain at the site, 10/10. Pt received morphine and percocet overnight per nurse. Otherwise no complaints such as CP or SOB. Currently on NC 4L with good saturation. 430 cc fluid serosanguinous drainage noted from chest tube. Encouraged pt to use IS.   Plan for CXR today, D/C IVF and decrease Lasix dose.     # [id number] used.    -----  11/8/18   used for encounter patient states he has pain similar to day before on L side where chest tube is placed. Instructed nurse to give pain medications as needed.   Denies CP, and SOB. Appears comfortable and seen out of bed in chair later in day.      ----  11/9/18  Patient seen and evaluated at bedside with   #[id number]. No issues at this time no CP, SOB or pain. Patient feeling a bit better today. 450 cc drainage from L chest tube. Family later at bedside and all questions answered. Recommending increase ambulation and PT PRN. D/C planning briefly discussed.    REVIEW OF SYSTEMS:    CONSTITUTIONAL: No weakness, fevers or chills  EYES/ENT: No visual changes;  No vertigo or throat pain   NECK: No pain or stiffness  RESPIRATORY: No cough, wheezing, hemoptysis; No shortness of breath  CARDIOVASCULAR: No chest pain or palpitations  GASTROINTESTINAL: No abdominal or epigastric pain. No nausea, vomiting, or hematemesis; No diarrhea or constipation. No melena or hematochezia.  GENITOURINARY: No dysuria, frequency or hematuria  NEUROLOGICAL: No numbness or weakness  SKIN: No itching, burning, rashes, or lesions   All other review of systems is negative unless indicated above    ICU Vital Signs Last 24 Hrs  T(C): 36.6 (09 Nov 2018 16:04), Max: 36.6 (08 Nov 2018 21:05)  T(F): 97.8 (09 Nov 2018 16:04), Max: 97.9 (09 Nov 2018 13:46)  HR: 88 (09 Nov 2018 17:59) (82 - 104)  BP: 113/73 (09 Nov 2018 17:50) (91/66 - 126/59)  BP(mean): 83 (09 Nov 2018 17:50) (64 - 86)  ABP: --  ABP(mean): --  RR: 18 (09 Nov 2018 17:59) (11 - 24)  SpO2: 97% (09 Nov 2018 17:59) (93% - 100%)    I&O's Summary    08 Nov 2018 07:01  -  09 Nov 2018 07:00  --------------------------------------------------------  IN: 0 mL / OUT: 1650 mL / NET: -1650 mL    09 Nov 2018 07:01  -  09 Nov 2018 18:37  --------------------------------------------------------  IN: 0 mL / OUT: 200 mL / NET: -200 mL        CAPILLARY BLOOD GLUCOSE      POCT Blood Glucose.: 167 mg/dL (09 Nov 2018 18:10)  POCT Blood Glucose.: 237 mg/dL (09 Nov 2018 12:25)  POCT Blood Glucose.: 149 mg/dL (09 Nov 2018 08:01)  POCT Blood Glucose.: 227 mg/dL (08 Nov 2018 21:07)        PHYSICAL EXAM:    Constitutional: NAD, awake and alert, well-developed  Respiratory: Breath sounds are clear bilaterally, No wheezing, rales or rhonchi  Cardiovascular: S1 and S2, regular rate and rhythm, no Murmurs, gallops or rubs  Chest: L chest tube in place   Gastrointestinal: Bowel Sounds present, soft, nontender, nondistended, no guarding, no rebound  Extremities: No peripheral edema  Vascular: 2+ peripheral pulses  Skin: No rashes    MEDICATIONS  (STANDING):  amiodarone    Tablet 100 milliGRAM(s) Oral daily  atorvastatin 10 milliGRAM(s) Oral at bedtime  buDESOnide  80 MICROgram(s)/formoterol 4.5 MICROgram(s) Inhaler 2 Puff(s) Inhalation two times a day  clotrimazole 1% Cream 1 Application(s) Topical two times a day  dextrose 5%. 1000 milliLiter(s) (50 mL/Hr) IV Continuous <Continuous>  dextrose 50% Injectable 12.5 Gram(s) IV Push once  dextrose 50% Injectable 25 Gram(s) IV Push once  dextrose 50% Injectable 25 Gram(s) IV Push once  docusate sodium 100 milliGRAM(s) Oral three times a day  furosemide    Tablet 40 milliGRAM(s) Oral daily  gabapentin 300 milliGRAM(s) Oral three times a day  heparin  Injectable 5000 Unit(s) SubCutaneous every 8 hours  insulin glargine Injectable (LANTUS) 10 Unit(s) SubCutaneous at bedtime  insulin lispro (HumaLOG) corrective regimen sliding scale   SubCutaneous three times a day before meals  insulin lispro (HumaLOG) corrective regimen sliding scale   SubCutaneous at bedtime  lactulose Syrup 10 Gram(s) Oral daily  lidocaine 1% (Preservative-free) Injectable 25 milliLiter(s) Local Injection once  metoprolol tartrate 25 milliGRAM(s) Oral two times a day  multivitamin 1 Tablet(s) Oral daily  senna 2 Tablet(s) Oral at bedtime  spironolactone 25 milliGRAM(s) Oral two times a day  tenofovir disoproxil fumarate (VIREAD) 300 milliGRAM(s) Oral daily  tiotropium 18 MICROgram(s) Capsule 1 Capsule(s) Inhalation daily  warfarin 2.5 milliGRAM(s) Oral at bedtime    MEDICATIONS  (PRN):  ALBUTerol/ipratropium for Nebulization 3 milliLiter(s) Nebulizer every 6 hours PRN Shortness of Breath and/or Wheezing  dextrose 40% Gel 15 Gram(s) Oral once PRN Blood Glucose LESS THAN 70 milliGRAM(s)/deciliter  glucagon  Injectable 1 milliGRAM(s) IntraMuscular once PRN Glucose LESS THAN 70 milligrams/deciliter  morphine  - Injectable 4 milliGRAM(s) IV Push every 3 hours PRN Severe Pain (7 - 10)  oxyCODONE    5 mG/acetaminophen 325 mG 1 Tablet(s) Oral every 4 hours PRN Mild Pain (1 - 3)  oxyCODONE    5 mG/acetaminophen 325 mG 2 Tablet(s) Oral every 4 hours PRN Moderate Pain (4 - 6)                          11.8   17.94 )-----------( 102      ( 09 Nov 2018 05:34 )             37.7         11-09    132<L>  |  96  |  22  ----------------------------<  142<H>  4.7   |  30  |  1.21    Ca    8.0<L>      09 Nov 2018 05:34        PT/INR - ( 09 Nov 2018 05:34 )   PT: 20.4 sec;   INR: 1.80 ratio         PTT - ( 09 Nov 2018 05:34 )  PTT:34.5 sec        Blood Culture: 11-05 @ 18:25  Organism --  Gram Stain Blood -- Gram Stain   polymorphonuclear leukocytes seen per low power field  No organisms seen per oil power field  by cytocentrifuge  Specimen Source .Body Fluid LEFT PLEURAL EFFUSION CUL & GRM STAIN  Culture-Blood --    11-02 @ 19:11  Organism --  Gram Stain Blood -- Gram Stain --  Specimen Source .Urine Clean Catch (Midstream)  Culture-Blood --    RADIOLOGY/EKG:      EXAM:  XR CHEST PORTABLE ROUTINE 1V                          EXAM:  XR CHEST PORTABLE ROUTINE 1V                            PROCEDURE DATE:  11/07/2018          INTERPRETATION:      REASON FOR EXAM: Shortness of breath status post line placement    TECHNIQUE: 2 frontal views of chest compared to prior study of 11/6/2018    FINDINGS/  IMPRESSION:    There is left-sided chest tube. Small right greater than left pleural   effusion and congestive changes. Mild interstitial edema. Borderline   cardiomegaly. Osteopenia and degenerative spondylosis.          DVT PPX: Heparin SQ    ADVANCED DIRECTIVE:    DISPOSITION: D/C planning

## 2018-11-09 NOTE — PROGRESS NOTE ADULT - ASSESSMENT
78 year old male with pmh of copd on home o2 2.5L, afib on coumadin, chronic diastolic chf, diabetes on insulin, htn, hep b infection, hypogammaglobulinemia w/ IVIG tx, b cell lymphoma, cholelithiasis s/p cholecystectomy, esophageal dilation, prostatectomy admitted for worsening shortness of breath 2/2 acute on chronic CHF.    #Acute on chronic CHF  ~s/p L chest tube placement   -continue spironolactone 25 mg PO QD   -monitor chest tube output  -f/u pleural fluid analysis   -Daily CXRs- repeat CXR after drainage recommended   -drain to waterseal until ready for d/c - per thoracic surgery   -Pulm and thoracic surgery following -- recommendations appreciated    #COPD  -On home O2 2.5 L  -Continue NC O2  -Continue tiotropium  -Continue budesonide/formoterol    #Afib  -Continue warfarin   -Continue amiodarone    #DM  -ISS premeal and bedtime   -gabapentin 300 mg PO TID    #HTN  -BP range /60s-70s noted  -Continue Lasix 40 mg PO QD- BP can tolerate , holding parameter , hold if SBP<80   -Continue metoprolol 25 mg PO BID    #Hep B and cirrhosis  -Continue lactulose  -GI consulted- high risk for endoscopy      #Hypogammaglobulinemia  - Heme/onc consulted  -If IgG <600- <700 -proceed with IVIG (Dr. Tee following)    #B cell lymphoma  - Heme/onc consulted  -paracentesis per GI recs on 11/9- diagnostic   -F/U pleural results

## 2018-11-09 NOTE — PROGRESS NOTE ADULT - SUBJECTIVE AND OBJECTIVE BOX
Patient is a 78y old  Male who presents with a chief complaint of CC: Lower back pain and shortness of breath (2018 09:48)      HPI:  78 year old male with pmh of copd on home o2 2.5L, afib on coumadin, chronic diastolic chf, diabetes on insulin, htn, hep b infection, hypogammaglobulinemia w/ IVIG tx, b cell lymphoma, cholelithiasis s/p cholecystectomy, esophageal dilation, prostatectomy coming to ED with complaints of worsening shortness of breath and lower back pain. Patient stating was on toilet this morning when tried to get up put pants on when he had a sudden onset of lower back pain. No bladder or bowel incontinence. Per Dr Bejarano In ED bladder scan was done with no to low residual urine volume. Per Dr Bejarano patient able to ambulate with cane in ED. FOr shortness of breath has been going on for past 4-5 weeks and has been gradually worsening. States last echo over 1 year prior. states now able to walk 50 feet without stopping for shortness of breath (2018 22:27)    pt comf and cont to feel weak and mild N  neg abd pain  pos BM  Hx per pta nd wife  mild reflux last night with laying flat and improved withraising head of bed  card and pulm note appreciated  DW oncology        PAST MEDICAL & SURGICAL HISTORY:  Hyperlipidemia  Hypogammaglobulinemia: received IVIG  Hepatitis B virus infection, unspecified chronicity: retrovirals  Chronic diastolic congestive heart failure  Essential hypertension  DM type 2 (diabetes mellitus, type 2)  B-cell lymphoma, unspecified B-cell lymphoma type, unspecified body region: CLL stage 4/SLL; met NHL  Atrial fibrillation, unspecified type  COPD (chronic obstructive pulmonary disease)  History of esophageal dilatation  S/P cholecystectomy  H/O prostatectomy      MEDICATIONS  (STANDING):  amiodarone    Tablet 100 milliGRAM(s) Oral daily  atorvastatin 10 milliGRAM(s) Oral at bedtime  buDESOnide  80 MICROgram(s)/formoterol 4.5 MICROgram(s) Inhaler 2 Puff(s) Inhalation two times a day  clotrimazole 1% Cream 1 Application(s) Topical two times a day  dextrose 5%. 1000 milliLiter(s) (50 mL/Hr) IV Continuous <Continuous>  dextrose 50% Injectable 12.5 Gram(s) IV Push once  dextrose 50% Injectable 25 Gram(s) IV Push once  dextrose 50% Injectable 25 Gram(s) IV Push once  docusate sodium 100 milliGRAM(s) Oral three times a day  furosemide    Tablet 40 milliGRAM(s) Oral daily  gabapentin 300 milliGRAM(s) Oral three times a day  heparin  Injectable 5000 Unit(s) SubCutaneous every 8 hours  insulin glargine Injectable (LANTUS) 10 Unit(s) SubCutaneous at bedtime  insulin lispro (HumaLOG) corrective regimen sliding scale   SubCutaneous three times a day before meals  insulin lispro (HumaLOG) corrective regimen sliding scale   SubCutaneous at bedtime  lactulose Syrup 10 Gram(s) Oral daily  lidocaine 1% (Preservative-free) Injectable 25 milliLiter(s) Local Injection once  metoprolol tartrate 25 milliGRAM(s) Oral two times a day  multivitamin 1 Tablet(s) Oral daily  senna 2 Tablet(s) Oral at bedtime  spironolactone 25 milliGRAM(s) Oral two times a day  tenofovir disoproxil fumarate (VIREAD) 300 milliGRAM(s) Oral daily  tiotropium 18 MICROgram(s) Capsule 1 Capsule(s) Inhalation daily  warfarin 2.5 milliGRAM(s) Oral at bedtime    MEDICATIONS  (PRN):  ALBUTerol/ipratropium for Nebulization 3 milliLiter(s) Nebulizer every 6 hours PRN Shortness of Breath and/or Wheezing  dextrose 40% Gel 15 Gram(s) Oral once PRN Blood Glucose LESS THAN 70 milliGRAM(s)/deciliter  glucagon  Injectable 1 milliGRAM(s) IntraMuscular once PRN Glucose LESS THAN 70 milligrams/deciliter  morphine  - Injectable 4 milliGRAM(s) IV Push every 3 hours PRN Severe Pain (7 - 10)  oxyCODONE    5 mG/acetaminophen 325 mG 1 Tablet(s) Oral every 4 hours PRN Mild Pain (1 - 3)  oxyCODONE    5 mG/acetaminophen 325 mG 2 Tablet(s) Oral every 4 hours PRN Moderate Pain (4 - 6)      Allergies    No Known Allergies    Intolerances        SOCIAL HISTORY:NC    FAMILY HISTORY:  Family history of liver cancer (Father): father  85 liver CA  Family history of coronary arteriosclerosis (Mother): Mom  of MI age 68      REVIEW OF SYSTEMS:    CONSTITUTIONAL: No weakness, fevers or chills  EYES/ENT: No visual changes;  No vertigo or throat pain   NECK: No pain or stiffness  RESPIRATORY: No cough, wheezing, hemoptysis; No shortness of breath  CARDIOVASCULAR: No chest pain or palpitations  GENITOURINARY: No dysuria, frequency or hematuria  NEUROLOGICAL: No numbness or weakness  SKIN: No itching, burning, rashes, or lesions   All other review of systems is negative unless indicated above.    Vital Signs Last 24 Hrs  T(C): 36.4 (2018 08:33), Max: 37 (2018 14:30)  T(F): 97.5 (2018 08:33), Max: 98.6 (2018 14:30)  HR: 87 (2018 09:00) (82 - 106)  BP: 94/56 (2018 09:00) (91/66 - 121/73)  BP(mean): 64 (2018 09:00) (64 - 86)  RR: 13 (2018 09:00) (11 - 24)  SpO2: 98% (2018 09:00) (92% - 100%)    PHYSICAL EXAM:    Constitutional: NAD, well-developed  HEENT: EOMI, throat clear  Neck: No LAD, supple  Respiratory: CTA and P  Cardiovascular: S1 and S2, RRR, no M  Gastrointestinal: BS+, soft, NT/ND, neg HSM, ascites  Extremities: No peripheral edema, neg clubing, cyanosis  Vascular: 2+ peripheral pulses  Neurological: A/O x 3, no focal deficits  Psychiatric: Normal mood, normal affect  Skin: No rashes    LABS:  CBC Full  -  ( 2018 05:34 )  WBC Count : 17.94 K/uL  Hemoglobin : 11.8 g/dL  Hematocrit : 37.7 %  Platelet Count - Automated : 102 K/uL  Mean Cell Volume : 81.8 fl  Mean Cell Hemoglobin : 25.6 pg  Mean Cell Hemoglobin Concentration : 31.3 gm/dL  Auto Neutrophil # : x  Auto Lymphocyte # : x  Auto Monocyte # : x  Auto Eosinophil # : x  Auto Basophil # : x  Auto Neutrophil % : x  Auto Lymphocyte % : x  Auto Monocyte % : x  Auto Eosinophil % : x  Auto Basophil % : x        132<L>  |  96  |  22  ----------------------------<  142<H>  4.7   |  30  |  1.21    Ca    8.0<L>      2018 05:34      PT/INR - ( 2018 05:34 )   PT: 20.4 sec;   INR: 1.80 ratio         PTT - ( 2018 05:34 )  PTT:34.5 sec     @ 12:27  Hep A Igm --  Hep A total ab, IgA and M --  Hep B core Ab total --  Hep B core IgM --  Hep B DNA PCR NotDetec  Hep BSAg --  Hep BSAb --  Hep BSAb --  HCV Ab --  HCV RNA Log --  HCV RNA interp --             @ 12:36  Hep A Igm --  Hep A total ab, IgA and M --  Hep B core Ab total --  Hep B core IgM --  Hep B DNA PCR NotDetec  Hep BSAg --  Hep BSAb --  Hep BSAb --  HCV Ab --  HCV RNA Log --  HCV RNA interp --                RADIOLOGY & ADDITIONAL STUDIES:  < from: Xray Chest 1 View- PORTABLE-Routine (18 @ 09:38) >  EXAM:  XR CHEST PORTABLE ROUTINE 1V                          EXAM:  XR CHEST PORTABLE ROUTINE 1V                            PROCEDURE DATE:  2018          INTERPRETATION:      REASON FOR EXAM: Shortness of breath status post line placement    TECHNIQUE: 2 frontal views of chest compared to prior study of 2018    FINDINGS/  IMPRESSION:    There is left-sided chest tube. Small right greater than left pleural   effusion and congestive changes. Mild interstitial edema. Borderline   cardiomegaly. Osteopenia and degenerative spondylosis.                KAT DE LA ROSA     < end of copied text >

## 2018-11-09 NOTE — PROGRESS NOTE ADULT - ASSESSMENT
CLL/ SLL On Obinutuzumab  Hypogammaglobulinemia  l effusion  Ascites  Lymphocytosis    A/P    Paracentesis as per GI  If there is documented CLL/ SLL progression to account for his presentation consider salvage therapies/  although I think the presentation/ complaints are multifactorial from Cardiovascular/ liver / Pulmonary  disease

## 2018-11-09 NOTE — PROGRESS NOTE ADULT - ASSESSMENT
78 y.o. male with complex past medical history including CLL/lymphoma on treatment, h/o recurrent pneumonia, Afib, HFpEF, Hep B, COPD, type 2DM, H/o PE DVT      inc WBC noted and would consider paracentesis to RO SBP  DW nursing and family  MOM left for hospitalist, Dr Cheek    please send fluid for cell count and diff with   albumin, cytology    diagnostic paracentesis only    med -volume ascites, likely multifactorial , the may be a component of progression of lymphoma and cirrhosis also contributing to this.  prior SAAG was low and likely component of malignant ascites and P effusion  pulmonary catheter placement noted and appreciated from CT surgery note. DCed  will inc diuretics and DC IVF if pt luis  pts with cirrhosis ususally run lower BP and lisinopril DCed  DW cardiology    history of cirrhosis, increased risk of esophageal varices and bleeding reviewed however, patient is also had increased risk of endoscopy.  Last endoscopy was in 2014 at which time he did not have esophageal varices.  Discussed with pulmonary, patient is refusing  inc risk bleed due to possible EV and AC DW pt and pt accepts risk    discussed with pulmonary and hospitalist.        gentle diuresis and would inc dose if pt not with SBP        Hepatitis B with liver cirrhosis and ascites, noncompliance discussed with patient and family.

## 2018-11-09 NOTE — PROGRESS NOTE ADULT - SUBJECTIVE AND OBJECTIVE BOX
Patient is a 78y old  Male who presents with a chief complaint of CC: Lower back pain and shortness of breath (2018 22:27)      HPI:  78 year old male with pmh of copd on home o2 2.5L, afib on coumadin, chronic diastolic chf, diabetes on insulin, htn, hep b infection, hypogammaglobulinemia w/ IVIG tx, b cell lymphoma, cholelithiasis s/p cholecystectomy, esophageal dilation, prostatectomy coming to ED with complaints of worsening shortness of breath and lower back pain. Patient stating was on toilet this morning when tried to get up put pants on when he had a sudden onset of lower back pain. No bladder or bowel incontinence. Per Dr Bejarano In ED bladder scan was done with no to low residual urine volume. Per Dr Bejarano patient able to ambulate with cane in ED. FOr shortness of breath has been going on for past 4-5 weeks and has been gradually worsening. States last echo over 1 year prior. states now able to walk 50 feet without stopping for shortness of breath (2018 22:27)    18  No acute events occurred overnight       his family updated at bedside  feels ok  pain is better controlled  no difficulty breathing    had Pleurx done  tolerated ok  no issues iwth breathing  data discussed with Dr ZAMARRIPA regarding eval for esophageal varices eval needed    his wife at bedside  CT drainage noted  he is at high risk for endoscopy due to compromised pulm status  data rev with Dr Conte and Dr Mchugh today    data rev with surgical PA  plan for fluid analysis  also more frequent drainage  not walking  family to decide on rehab placement  wife and DTR updated today regarding status    despite cont drainage, no significant xray improvement  family is made aware today at bedside  deciding on pulm rehab  very weak  o2 sat 100%  and not labored breathing at all  minimal cough    MEDICATIONS  (STANDING):  amiodarone    Tablet 100 milliGRAM(s) Oral daily  atorvastatin 10 milliGRAM(s) Oral at bedtime  dextrose 5%. 1000 milliLiter(s) (50 mL/Hr) IV Continuous <Continuous>  dextrose 50% Injectable 12.5 Gram(s) IV Push once  dextrose 50% Injectable 25 Gram(s) IV Push once  dextrose 50% Injectable 25 Gram(s) IV Push once  docusate sodium 100 milliGRAM(s) Oral three times a day  furosemide    Tablet 20 milliGRAM(s) Oral two times a day  gabapentin 300 milliGRAM(s) Oral three times a day  insulin glargine Injectable (LANTUS) 20 Unit(s) SubCutaneous at bedtime  insulin lispro (HumaLOG) corrective regimen sliding scale   SubCutaneous three times a day before meals  insulin lispro (HumaLOG) corrective regimen sliding scale   SubCutaneous at bedtime  lactulose Syrup 10 Gram(s) Oral daily  lisinopril 2.5 milliGRAM(s) Oral daily  metoprolol tartrate 25 milliGRAM(s) Oral two times a day  multivitamin 1 Tablet(s) Oral daily  spironolactone 25 milliGRAM(s) Oral at bedtime  tenofovir disoproxil fumarate (VIREAD) 300 milliGRAM(s) Oral daily    MEDICATIONS  (PRN):  acetaminophen   Tablet .. 650 milliGRAM(s) Oral every 6 hours PRN Temp greater or equal to 38C (100.4F), Mild Pain (1 - 3), Moderate Pain (4 - 6)  ALBUTerol/ipratropium for Nebulization 3 milliLiter(s) Nebulizer every 6 hours PRN Shortness of Breath and/or Wheezing  dextrose 40% Gel 15 Gram(s) Oral once PRN Blood Glucose LESS THAN 70 milliGRAM(s)/deciliter  glucagon  Injectable 1 milliGRAM(s) IntraMuscular once PRN Glucose LESS THAN 70 milligrams/deciliter  senna 2 Tablet(s) Oral at bedtime PRN Constipation  traMADol 50 milliGRAM(s) Oral every 4 hours PRN Moderate Pain (4 - 6)          FAMILY HISTORY:  Family history of liver cancer (Father): father  85 liver CA  Family history of coronary arteriosclerosis (Mother): Mom  of MI age 68      SOCIAL HISTORY: Lives at home     REVIEW OF SYSTEM:  cough, dyspnea, fatigue, otherwise 12 point ROS negative     Vital Signs Last 24 Hrs  T(C): 36.4 (2018 08:33), Max: 37 (2018 14:30)  T(F): 97.5 (2018 08:33), Max: 98.6 (2018 14:30)  HR: 86 (2018 07:00) (82 - 106)  BP: 106/65 (2018 07:00) (91/66 - 121/73)  BP(mean): 76 (2018 07:00) (68 - 86)  RR: 14 (2018 07:00) (11 - 24)  SpO2: 95% (2018 07:00) (92% - 100%)    I&O's Detail    2018 07:01  -  2018 07:00  --------------------------------------------------------  IN:  Total IN: 0 mL    OUT:    Chest Tube: 450 mL    Voided: 1200 mL  Total OUT: 1650 mL    Total NET: -1650 mL                PHYSICAL EXAM  General Appearance: cooperative, no acute distress,   HEENT: PERRL, conjunctiva clear, EOM's intact, non injected pharynx, no exudate, TM   normal  Neck: Supple, , no adenopathy, thyroid: not enlarged, no carotid bruit or JVD  Back: Symmetric, no  tenderness,no soft tissue tenderness  Lungs: Decreased breath sounds in the right lower lobe, diminished in the upper and lower lobes on the left, Right upper lobe inspiratory wheezes   Heart: Regular rate and rhythm, S1, S2 normal, no murmur, rub or gallop  Abdomen: Soft, non-tender, bowel sounds active , no hepatosplenomegaly  Extremities: no cyanosis or edema, no joint swelling  Skin: Skin color, texture normal, no rashes   Neurologic: Alert and oriented X3 , cranial nerves intact, sensory and motor normal,    ECG:    LABS:                                              11.8   17.94 )-----------( 102      ( 2018 05:34 )             37.7       132<L>  |  96  |  22  ----------------------------<  142<H>  4.7   |  30  |  1.21    Ca    8.0<L>      2018 05:34                            11.0   12.47 )-----------( 96       ( 2018 04:46 )             34.7       135  |  99  |  22  ----------------------------<  80  4.1   |  31  |  1.08    Ca    7.7<L>      2018 04:46               11.2   13.79 )-----------( 90       ( 2018 04:58 )             36.0   11-05    134<L>  |  97  |  22  ----------------------------<  86  4.0   |  32<H>  |  1.15    Ca    7.9<L>      2018 04:58    TPro  5.8<L>  /  Alb  2.5<L>  /  TBili  0.5  /  DBili  x   /  AST  28  /  ALT  31  /  AlkPhos  148<H>                            12.3   16.94 )-----------( 95       ( 2018 04:44 )             38.5     11-03    133<L>  |  98  |  14  ----------------------------<  113<H>  5.0   |  27  |  0.99    Ca    8.3<L>      2018 04:44  Phos  3.7     11-  Mg     1.9         TPro  6.0  /  Alb  2.8<L>  /  TBili  0.5  /  DBili  x   /  AST  34  /  ALT  34  /  AlkPhos  162<H>  1103    CARDIAC MARKERS ( 2018 17:42 )  <0.015 ng/mL / x     / x     / x     / x            Pro BNP  182  @ 17:42  D Dimer  --  @ 17:42    PT/INR - ( 2018 04:44 )   PT: 23.9 sec;   INR: 2.10 ratio         PTT - ( 2018 04:44 )  PTT:37.1 sec  Urinalysis Basic - ( 2018 19:11 )    Color: Yellow / Appearance: Clear / S.010 / pH: x  Gluc: x / Ketone: Negative  / Bili: Negative / Urobili: Negative mg/dL   Blood: x / Protein: Negative mg/dL / Nitrite: Negative   Leuk Esterase: Negative / RBC: x / WBC x   Sq Epi: x / Non Sq Epi: x / Bacteria: x            RADIOLOGY & ADDITIONAL STUDIES:    IMPRESSION: Moderate to large left pleural effusion with underlying   compressive atelectasis. Small right pleural effusion with underlying   compressive atelectasis.   Prominent lymph nodes in the bilateral axilla,   mediastinum, and hilum.         < from: Xray Chest 1 View- PORTABLE-Routine (18 @ 09:15) >  PROCEDURE DATE:  2018          INTERPRETATION:  Exam Date: 2018 9:15 AM    History: Labored breathing    Technique: Single frontal portable view of the chest with comparison to    2018    Findings:    The heart is enlarged. Moderate right pleural effusion with small to mild   left pleural effusion. Bibasilar atelectasis. Degenerative changes of the   visualized osseous structures.    Left-sided chest tube. Left chestwall subcutaneous emphysema.    Impression:    Moderate right pleural effusion with small to mild left pleural effusion.   Bibasilar atelectasis    < end of copied text >  < from: Xray Chest 1 View- PORTABLE-Routine (18 @ 09:38) >  PROCEDURE DATE:  2018          INTERPRETATION:      REASON FOR EXAM: Shortness of breath status post line placement    TECHNIQUE: 2 frontal views of chest compared to prior study of 2018    FINDINGS/  IMPRESSION:    There is left-sided chest tube. Small right greater than left pleural   effusion and congestive changes. Mild interstitial edema. Borderline   cardiomegaly. Osteopenia and degenerative spondylosis.    < end of copied text >

## 2018-11-09 NOTE — PROGRESS NOTE ADULT - ASSESSMENT
Chronic diastolic congestive heart failure.  Bilateral pleural effusion . He is s/p chest  tube placement on left side.  Moderate ascites.  History of CLL/lymphoma.  Paroxysmal atrial fibrillation he is normal sinus rhythm.  Cirrhosis of liver.  Calcified coronaries and aorta on CT scan of the chest.  Suggest  He is status post thoracocentesis and chest tube placement.  Due to borderline low blood pressure  lisinopril was stopped.  Intake and output/daily weights.  Follow with electrolytes.  Adjust INR 2.0  discussed with patient's family at bedside and hospitalist.

## 2018-11-09 NOTE — PROGRESS NOTE ADULT - SUBJECTIVE AND OBJECTIVE BOX
INTERVAL HISTORY:    S/P thoracentesis  Cytology negative for overt lymphoma  CT has small volume mediastinal adenopathy  Kevin lymphocytosis/ likley represents CLL    Being evaluated for ascites: As per GI plan is paracentesis      REVIEW OF SYSTEMS:    Back pain    Allergies    No Known Allergies    Intolerances        MEDICATIONS  (STANDING):  amiodarone    Tablet 100 milliGRAM(s) Oral daily  atorvastatin 10 milliGRAM(s) Oral at bedtime  buDESOnide  80 MICROgram(s)/formoterol 4.5 MICROgram(s) Inhaler 2 Puff(s) Inhalation two times a day  clotrimazole 1% Cream 1 Application(s) Topical two times a day  dextrose 5%. 1000 milliLiter(s) (50 mL/Hr) IV Continuous <Continuous>  dextrose 50% Injectable 12.5 Gram(s) IV Push once  dextrose 50% Injectable 25 Gram(s) IV Push once  dextrose 50% Injectable 25 Gram(s) IV Push once  docusate sodium 100 milliGRAM(s) Oral three times a day  furosemide    Tablet 40 milliGRAM(s) Oral daily  gabapentin 300 milliGRAM(s) Oral three times a day  heparin  Injectable 5000 Unit(s) SubCutaneous every 8 hours  insulin glargine Injectable (LANTUS) 10 Unit(s) SubCutaneous at bedtime  insulin lispro (HumaLOG) corrective regimen sliding scale   SubCutaneous three times a day before meals  insulin lispro (HumaLOG) corrective regimen sliding scale   SubCutaneous at bedtime  lactulose Syrup 10 Gram(s) Oral daily  lidocaine 1% (Preservative-free) Injectable 25 milliLiter(s) Local Injection once  metoprolol tartrate 25 milliGRAM(s) Oral two times a day  multivitamin 1 Tablet(s) Oral daily  senna 2 Tablet(s) Oral at bedtime  spironolactone 25 milliGRAM(s) Oral two times a day  tenofovir disoproxil fumarate (VIREAD) 300 milliGRAM(s) Oral daily  tiotropium 18 MICROgram(s) Capsule 1 Capsule(s) Inhalation daily  warfarin 2.5 milliGRAM(s) Oral at bedtime    MEDICATIONS  (PRN):  ALBUTerol/ipratropium for Nebulization 3 milliLiter(s) Nebulizer every 6 hours PRN Shortness of Breath and/or Wheezing  dextrose 40% Gel 15 Gram(s) Oral once PRN Blood Glucose LESS THAN 70 milliGRAM(s)/deciliter  glucagon  Injectable 1 milliGRAM(s) IntraMuscular once PRN Glucose LESS THAN 70 milligrams/deciliter  morphine  - Injectable 4 milliGRAM(s) IV Push every 3 hours PRN Severe Pain (7 - 10)  oxyCODONE    5 mG/acetaminophen 325 mG 1 Tablet(s) Oral every 4 hours PRN Mild Pain (1 - 3)  oxyCODONE    5 mG/acetaminophen 325 mG 2 Tablet(s) Oral every 4 hours PRN Moderate Pain (4 - 6)      Vital Signs Last 24 Hrs  T(C): 36.4 (09 Nov 2018 08:33), Max: 37 (08 Nov 2018 14:30)  T(F): 97.5 (09 Nov 2018 08:33), Max: 98.6 (08 Nov 2018 14:30)  HR: 87 (09 Nov 2018 09:00) (82 - 106)  BP: 94/56 (09 Nov 2018 09:00) (91/66 - 121/73)  BP(mean): 64 (09 Nov 2018 09:00) (64 - 86)  RR: 13 (09 Nov 2018 09:00) (11 - 24)  SpO2: 98% (09 Nov 2018 09:00) (92% - 100%)    PHYSICAL EXAM:    GENERAL: NAD,   HEAD:  Atraumatic, Normocephalic  EYES: EOMI, PERRLA, conjunctiva and sclera clear    NECK: Supple, No JVD, Normal thyroid  NERVOUS SYSTEM:    CHEST/LUNG: Clear to percussion bilaterally; No rales, rhonchi,   HEART: Regular rate and rhythm;   ABDOMEN: Soft, Nontender.  EXTREMITIES:   edema:-  LYMPH: No lymphadenopathy noted        LABS:                        11.8   17.94 )-----------( 102      ( 09 Nov 2018 05:34 )             37.7     11-09    132<L>  |  96  |  22  ----------------------------<  142<H>  4.7   |  30  |  1.21    Ca    8.0<L>      09 Nov 2018 05:34      PT/INR - ( 09 Nov 2018 05:34 )   PT: 20.4 sec;   INR: 1.80 ratio         PTT - ( 09 Nov 2018 05:34 )  PTT:34.5 sec        RADIOLOGY & ADDITIONAL STUDIES:    PATHOLOGY:

## 2018-11-09 NOTE — PROGRESS NOTE ADULT - SUBJECTIVE AND OBJECTIVE BOX
HPI:  78 year old male with pmh of copd on home o2 2.5L, afib on coumadin, chronic diastolic chf, diabetes on insulin, htn, hep b infection, hypogammaglobulinemia w/ IVIG tx, b cell lymphoma, cholelithiasis s/p cholecystectomy, esophageal dilation, prostatectomy coming to ED with complaints of worsening shortness of breath and lower back pain. Patient stating was on toilet this morning when tried to get up put pants on when he had a sudden onset of lower back pain. No bladder or bowel incontinence. Per Dr Bejarano In ED bladder scan was done with no to low residual urine volume. Per Dr Bejarano patient able to ambulate with cane in ED. FOr shortness of breath has been going on for past 4-5 weeks and has been gradually worsening. States last echo over 1 year prior. states now able to walk 50 feet without stopping for shortness of breath (02 Nov 2018 22:27)    11/4/18 Thru family , patient with continued back pain  11/5/18 Patient with persistent BP, abdomen feels better  11/6/18 Patient more comfortable. Brace delivered. S/P drainage PE  11/7/18 Patient much more comfortable today. S/P centesis for ascites and PE  11/9/18 Patient with persistent back pain, OOB with brace    PAST MEDICAL & SURGICAL HISTORY:  Hyperlipidemia  Hypogammaglobulinemia: received IVIG  Hepatitis B virus infection, unspecified chronicity: retrovirals  Chronic diastolic congestive heart failure  Essential hypertension  DM type 2 (diabetes mellitus, type 2)  B-cell lymphoma, unspecified B-cell lymphoma type, unspecified body region: CLL stage 4/SLL; met NHL  Atrial fibrillation, unspecified type  COPD (chronic obstructive pulmonary disease)  History of esophageal dilatation  S/P cholecystectomy  H/O prostatectomy    MEDICATIONS  (STANDING):  amiodarone    Tablet 100 milliGRAM(s) Oral daily  atorvastatin 10 milliGRAM(s) Oral at bedtime  dextrose 5%. 1000 milliLiter(s) (50 mL/Hr) IV Continuous <Continuous>  dextrose 50% Injectable 12.5 Gram(s) IV Push once  dextrose 50% Injectable 25 Gram(s) IV Push once  dextrose 50% Injectable 25 Gram(s) IV Push once  docusate sodium 100 milliGRAM(s) Oral three times a day  furosemide    Tablet 20 milliGRAM(s) Oral two times a day  gabapentin 300 milliGRAM(s) Oral three times a day  insulin glargine Injectable (LANTUS) 20 Unit(s) SubCutaneous at bedtime  insulin lispro (HumaLOG) corrective regimen sliding scale   SubCutaneous three times a day before meals  insulin lispro (HumaLOG) corrective regimen sliding scale   SubCutaneous at bedtime  lactulose Syrup 10 Gram(s) Oral daily  lisinopril 2.5 milliGRAM(s) Oral daily  metoprolol tartrate 25 milliGRAM(s) Oral two times a day  multivitamin 1 Tablet(s) Oral daily  spironolactone 25 milliGRAM(s) Oral at bedtime  tenofovir disoproxil fumarate (VIREAD) 300 milliGRAM(s) Oral daily    MEDICATIONS  (PRN):  acetaminophen   Tablet .. 650 milliGRAM(s) Oral every 6 hours PRN Temp greater or equal to 38C (100.4F), Mild Pain (1 - 3), Moderate Pain (4 - 6)  ALBUTerol/ipratropium for Nebulization 3 milliLiter(s) Nebulizer every 6 hours PRN Shortness of Breath and/or Wheezing  dextrose 40% Gel 15 Gram(s) Oral once PRN Blood Glucose LESS THAN 70 milliGRAM(s)/deciliter  glucagon  Injectable 1 milliGRAM(s) IntraMuscular once PRN Glucose LESS THAN 70 milligrams/deciliter  senna 2 Tablet(s) Oral at bedtime PRN Constipation  traMADol 50 milliGRAM(s) Oral every 4 hours PRN Moderate Pain (4 - 6)    Allergies    No Known Allergies    Intolerances    PE:    Vital Signs Last 24 Hrs  T(C): 36.4 (09 Nov 2018 08:33), Max: 37 (08 Nov 2018 14:30)  T(F): 97.5 (09 Nov 2018 08:33), Max: 98.6 (08 Nov 2018 14:30)  HR: 82 (09 Nov 2018 08:30) (82 - 106)  BP: 106/65 (09 Nov 2018 07:00) (91/66 - 121/73)  BP(mean): 76 (09 Nov 2018 07:00) (68 - 86)  RR: 14 (09 Nov 2018 07:00) (11 - 24)  SpO2: 95% (09 Nov 2018 07:00) (92% - 100%)    Patient appears comfortable and complains mild back pain, laying in bed  Abdomen less distended without tenderness  Mild tender T-L junction  No focal motor deficits    LABS                                              11.8   17.94 )-----------( 102      ( 09 Nov 2018 05:34 )             37.7     11-09    132<L>  |  96  |  22  ----------------------------<  142<H>  4.7   |  30  |  1.21    Ca    8.0<L>      09 Nov 2018 05:34    MRI with acute L1 fracture

## 2018-11-09 NOTE — PROGRESS NOTE ADULT - SUBJECTIVE AND OBJECTIVE BOX
HPI:  Patient is a 78-year-old with history of hypertension, chronic diastolic congestive heart failure, COPD, paroxysmal atrial fibrillation, history of DVT and pulmonary emboli in the past , he has chronic bilateral pleural effusion, CLL/lymphoma being treated, history of recurrent pneumonia, He was admitted with lower back pain and shortness of breath and after admission he was diagnosed to have bilateral pleural effusion and he also has moderate ascites. He denies any chest pain. Since admission he feels improved and now he is comfortable and is in no acute distress. He is in normal sinus rhythm on telemetry. He is status post thoracocentesis and chest tube placement.  He is comfortable & is in NSR . He has pain chest tube site.     Transthoracic Echocardiogram (11.03.18   indings     Mitral Valve   Normal appearing mitral valve structure and function.   Mild (1+) mitral regurgitation is present.     Aortic Valve   The aortic valve is not well visualized, appears mildly calcified. Valve   opening seems to be normal. No stenosis or regurgitation.     Tricuspid Valve   Normal appearing tricuspid valve structure and function.   Mild (1+) tricuspid valve regurgitation is present.     Pulmonic Valve   Pulmonic valve not well seen.   Trace pulmonic valvular regurgitation is present.     Left Atrium   Normal appearing left atrium.     Left Ventricle   The left ventricle is normal in size, wall thickness, wall motion and   contractility. Estimated left ventricular ejection fraction is 55-60%.     Right Atrium   The right atrium is normal.     Right Ventricle   The right ventricle is normal in size.      CT Chest No Cont (11.02.18   IMPRESSION: Moderate to large left pleural effusion with underlying   compressive atelectasis. Small right pleural effusion with underlying   compressive atelectasis.   Prominent lymph nodes in the bilateral axilla,   mediastinum, and hilum.       PAST MEDICAL & SURGICAL HISTORY:  Hyperlipidemia  Hypogammaglobulinemia: received IVIG  Hepatitis B virus infection, unspecified chronicity: retrovirals  Chronic diastolic congestive heart failure  Essential hypertension  DM type 2 (diabetes mellitus, type 2)  B-cell lymphoma, unspecified B-cell lymphoma type, unspecified body region: CLL stage 4/SLL; met NHL  Atrial fibrillation, unspecified type  COPD (chronic obstructive pulmonary disease)  History of esophageal dilatation  S/P cholecystectomy  H/O prostatectomy          MEDICATIONS  (STANDING):  amiodarone    Tablet 100 milliGRAM(s) Oral daily  atorvastatin 10 milliGRAM(s) Oral at bedtime  buDESOnide  80 MICROgram(s)/formoterol 4.5 MICROgram(s) Inhaler 2 Puff(s) Inhalation two times a day  clotrimazole 1% Cream 1 Application(s) Topical two times a day  dextrose 5%. 1000 milliLiter(s) (50 mL/Hr) IV Continuous <Continuous>  dextrose 50% Injectable 12.5 Gram(s) IV Push once  dextrose 50% Injectable 25 Gram(s) IV Push once  dextrose 50% Injectable 25 Gram(s) IV Push once  docusate sodium 100 milliGRAM(s) Oral three times a day  furosemide    Tablet 40 milliGRAM(s) Oral daily  gabapentin 300 milliGRAM(s) Oral three times a day  heparin  Injectable 5000 Unit(s) SubCutaneous every 8 hours  insulin glargine Injectable (LANTUS) 10 Unit(s) SubCutaneous at bedtime  insulin lispro (HumaLOG) corrective regimen sliding scale   SubCutaneous three times a day before meals  insulin lispro (HumaLOG) corrective regimen sliding scale   SubCutaneous at bedtime  lactulose Syrup 10 Gram(s) Oral daily  lidocaine 1% (Preservative-free) Injectable 25 milliLiter(s) Local Injection once  metoprolol tartrate 25 milliGRAM(s) Oral two times a day  multivitamin 1 Tablet(s) Oral daily  senna 2 Tablet(s) Oral at bedtime  spironolactone 25 milliGRAM(s) Oral two times a day  tenofovir disoproxil fumarate (VIREAD) 300 milliGRAM(s) Oral daily  tiotropium 18 MICROgram(s) Capsule 1 Capsule(s) Inhalation daily  warfarin 2.5 milliGRAM(s) Oral at bedtime    MEDICATIONS  (PRN):  ALBUTerol/ipratropium for Nebulization 3 milliLiter(s) Nebulizer every 6 hours PRN Shortness of Breath and/or Wheezing  dextrose 40% Gel 15 Gram(s) Oral once PRN Blood Glucose LESS THAN 70 milliGRAM(s)/deciliter  glucagon  Injectable 1 milliGRAM(s) IntraMuscular once PRN Glucose LESS THAN 70 milligrams/deciliter  morphine  - Injectable 4 milliGRAM(s) IV Push every 3 hours PRN Severe Pain (7 - 10)  oxyCODONE    5 mG/acetaminophen 325 mG 1 Tablet(s) Oral every 4 hours PRN Mild Pain (1 - 3)  oxyCODONE    5 mG/acetaminophen 325 mG 2 Tablet(s) Oral every 4 hours PRN Moderate Pain (4 - 6)        REVIEW OF SYSTEM:  Pertinent items are noted in HPI.  Constitutional	Negative for chills, fevers, sweats.    Eyes: 	Negative for visual disturbance.  Ears, nose, mouth, throat, and face: Negative for epistaxis, nasal congestion, sore throat and tinnitus.  Neck:	Negative for enlargement, pain and difficulty in swallowing  Respiration : Negative for cough, and wheezing . He has pain chest tube site.  Cardiovascular: Negative for chest pain,  and palpitations    Gastrointestinal : Negative for abdominal pain, diarrhea, nausea and vomiting  Genitourinary: Negative for dysuria, frequency and urinary incontinence .  Skin: Negative for  rash, pruritus, swelling, dryness .  	  Hematologic/lymphatic: Negative for bleeding and easy bruising  Musculoskeletal: Negative for arthralgias, back pain and muscle weakness.  Neurological: Negative for dizziness, headaches, seizures and tremors  Behavioral/Psych: Negative for mood change, depression.  Endocrine:	Negative for blurry vision, polydipsia and polyuria, diaphoresis.   Allergic/Immunologic:	Negative for anaphylaxis, angioedema and urticaria.      Vital Signs Last 24 Hrs  T(C): 36.4 (09 Nov 2018 08:33), Max: 37 (08 Nov 2018 14:30)  T(F): 97.5 (09 Nov 2018 08:33), Max: 98.6 (08 Nov 2018 14:30)  HR: 82 (09 Nov 2018 08:30) (82 - 106)  BP: 106/65 (09 Nov 2018 07:00) (91/66 - 121/73)  BP(mean): 76 (09 Nov 2018 07:00) (68 - 86)  RR: 14 (09 Nov 2018 07:00) (11 - 24)  SpO2: 95% (09 Nov 2018 07:00) (92% - 100%)    I&O's Summary    08 Nov 2018 07:01  -  09 Nov 2018 07:00  --------------------------------------------------------  IN: 0 mL / OUT: 1650 mL / NET: -1650 mL      PHYSICAL EXAM  General Appearance: cooperative, no acute distress,   HEENT: PERRL, conjunctiva clear, EOM's intact .   Neck: Supple, , no adenopathy, thyroid: not enlarged, no carotid bruit or JVD  Back: Symmetric, no  tenderness,no soft tissue tenderness  Lungs: diminished breath sounds on both sides no rales.  Heart: Regular rate and rhythm, S1, S2 normal, 1/6 HS murmur,  No rub or gallop  Abdomen: Soft, non-tender, bowel sounds active , no hepatosplenomegaly  Extremities: no cyanosis or edema, no joint swelling  Skin: Skin color, texture normal, no rashes   Neurologic: Alert and oriented X3 , cranial nerves intact, sensory and motor normal,        INTERPRETATION OF TELEMETRY: NSR             LABS:                          11.8   17.94 )-----------( 102      ( 09 Nov 2018 05:34 )             37.7     11-09    132<L>  |  96  |  22  ----------------------------<  142<H>  4.7   |  30  |  1.21    Ca    8.0<L>      09 Nov 2018 05:34            Pro BNP  672 11-07 @ 04:46  D Dimer  -- 11-07 @ 04:46  Pro BNP  182 11-02 @ 17:42  D Dimer  -- 11-02 @ 17:42    PT/INR - ( 09 Nov 2018 05:34 )   PT: 20.4 sec;   INR: 1.80 ratio         PTT - ( 09 Nov 2018 05:34 )  PTT:34.5 sec          RADIOLOGY & ADDITIONAL STUDIES:

## 2018-11-10 NOTE — PROGRESS NOTE ADULT - ASSESSMENT
77yo male with cirrhosis s/p paracentesis  pt with elevated wbc in ascitic fluid but also many red cells  await culture of ascites for ?abx  wbc in blood decreased slightly  continue to observe and follow

## 2018-11-10 NOTE — PROGRESS NOTE ADULT - ASSESSMENT
78 year old male with pmh of copd on home o2 2.5L, afib on coumadin, chronic diastolic chf, diabetes on insulin, htn, hep b infection, hypogammaglobulinemia w/ IVIG tx, b cell lymphoma, cholelithiasis s/p cholecystectomy, esophageal dilation, prostatectomy admitted for worsening shortness of breath 2/2 acute on chronic CHF.    #Acute on chronic CHF  ~s/p L chest tube placement   -continue spironolactone 25 mg PO QD   -monitor chest tube output  -f/u pleural fluid analysis   -Daily CXRs- repeat CXR after drainage recommended   -drain to waterseal until ready for d/c - per thoracic surgery   -Pulm and thoracic surgery following -- recommendations appreciated    #COPD  -On home O2 2.5 L  -Continue NC O2  -Continue tiotropium  -Continue budesonide/formoterol    #Afib  -warfarin - not currently continued  -Continue amiodarone    #DM  -ISS premeal and bedtime   -gabapentin 300 mg PO TID    #HTN  -BP range /60s-70s noted  -Continue Lasix 40 mg PO QD- BP can tolerate , holding parameter , hold if SBP<80   -Continue metoprolol 25 mg PO BID    #Hep B and cirrhosis  -Continue lactulose  -GI consulted- high risk for endoscopy      #Hypogammaglobulinemia  - Heme/onc consulted  -If IgG <600- <700 -proceed with IVIG (Dr. Tee following)    #B cell lymphoma  - Heme/onc consulted  -paracentesis per GI recs on 11/9- diagnostic   -F/U pleural results     DISPO- D/C TO REHAB

## 2018-11-10 NOTE — PROGRESS NOTE ADULT - SUBJECTIVE AND OBJECTIVE BOX
CHIEF COMPLAINT:  78 year old male with pmh of copd on home o2 2.5L, afib on coumadin, chronic diastolic chf, diabetes on insulin, htn, hep b infection, hypogammaglobulinemia w/ IVIG tx, b cell lymphoma, cholelithiasis s/p cholecystectomy, esophageal dilation, prostatectomy coming to ED with complaints of worsening shortness of breath and lower back pain. Patient stating was on toilet this morning when tried to get up put pants on when he had a sudden onset of lower back pain. No bladder or bowel incontinence. Per Dr Bejarano In ED bladder scan was done with no to low residual urine volume. Per Dr Bejarano patient able to ambulate with cane in ED. For shortness of breath has been going on for past 4-5 weeks and has been gradually worsening. States last echo over 1 year prior. states now able to walk 50 feet without stopping for shortness of breath.     SUBJECTIVE:     11/7/18    Patient seen and evaluated at bedside. L Chest tube placed yesterday and patient complaining of pain at the site, 10/10. Pt received morphine and percocet overnight per nurse. Otherwise no complaints such as CP or SOB. Currently on NC 4L with good saturation. 430 cc fluid serosanguinous drainage noted from chest tube. Encouraged pt to use IS.   Plan for CXR today, D/C IVF and decrease Lasix dose.     # [id number] used.    -----  11/8/18   used for encounter patient states he has pain similar to day before on L side where chest tube is placed. Instructed nurse to give pain medications as needed.   Denies CP, and SOB. Appears comfortable and seen out of bed in chair later in day.      ----  11/9/18  Patient seen and evaluated at bedside with   #[id number]. No issues at this time no CP, SOB or pain. Patient feeling a bit better today. 450 cc drainage from L chest tube. Family later at bedside and all questions answered. Recommending increase ambulation and PT PRN. D/C planning briefly discussed. Chest tube drainage noted.     -----  11/10/18  Patient seen and evaluated at bedside with  #[id number]. No issues such as pain, SOB, abdominal pain or CP. Patient has no questions at this time. D/C planning pending thoracic surgery and GI clearance.    REVIEW OF SYSTEMS:    CONSTITUTIONAL: No weakness, fevers or chills  EYES/ENT: No visual changes;  No vertigo or throat pain   NECK: No pain or stiffness  RESPIRATORY: No cough, wheezing, hemoptysis; No shortness of breath  CARDIOVASCULAR: No chest pain or palpitations  GASTROINTESTINAL: No abdominal or epigastric pain. No nausea, vomiting, or hematemesis; No diarrhea or constipation. No melena or hematochezia.  GENITOURINARY: No dysuria, frequency or hematuria  NEUROLOGICAL: No numbness or weakness  SKIN: No itching, burning, rashes, or lesions   All other review of systems is negative unless indicated above    Vital Signs Last 24 Hrs  T(C): 36.7 (10 Nov 2018 05:40), Max: 36.7 (09 Nov 2018 20:45)  T(F): 98.1 (10 Nov 2018 05:40), Max: 98.1 (09 Nov 2018 20:45)  HR: 107 (10 Nov 2018 06:00) (82 - 112)  BP: 102/69 (10 Nov 2018 06:00) (93/63 - 126/59)  BP(mean): 77 (10 Nov 2018 06:00) (64 - 83)  RR: 19 (10 Nov 2018 06:00) (13 - 22)  SpO2: 97% (10 Nov 2018 06:00) (88% - 100%)      I&O's Summary    09 Nov 2018 07:01  -  10 Nov 2018 07:00  --------------------------------------------------------  IN: 0 mL / OUT: 1285 mL / NET: -1285 mL        CAPILLARY BLOOD GLUCOSE      POCT Blood Glucose.: 183 mg/dL (09 Nov 2018 21:27)  POCT Blood Glucose.: 167 mg/dL (09 Nov 2018 18:10)  POCT Blood Glucose.: 237 mg/dL (09 Nov 2018 12:25)        PHYSICAL EXAM:    Constitutional: NAD, awake and alert, well-developed  Respiratory: R and L upper lobe rhonci, no wheezing   Cardiovascular: S1 and S2, regular rate and rhythm, no Murmurs, gallops or rubs  Chest: L chest tube in place   Gastrointestinal: Bowel Sounds present, soft, nontender, nondistended, no guarding, no rebound  Extremities: No peripheral edema  Vascular: 2+ peripheral pulses  Skin: No rashes    MEDICATIONS  (STANDING):  amiodarone    Tablet 100 milliGRAM(s) Oral daily  atorvastatin 10 milliGRAM(s) Oral at bedtime  buDESOnide  80 MICROgram(s)/formoterol 4.5 MICROgram(s) Inhaler 2 Puff(s) Inhalation two times a day  clotrimazole 1% Cream 1 Application(s) Topical two times a day  dextrose 5%. 1000 milliLiter(s) (50 mL/Hr) IV Continuous <Continuous>  dextrose 50% Injectable 12.5 Gram(s) IV Push once  dextrose 50% Injectable 25 Gram(s) IV Push once  dextrose 50% Injectable 25 Gram(s) IV Push once  docusate sodium 100 milliGRAM(s) Oral three times a day  furosemide    Tablet 40 milliGRAM(s) Oral daily  gabapentin 300 milliGRAM(s) Oral three times a day  heparin  Injectable 5000 Unit(s) SubCutaneous every 8 hours  insulin glargine Injectable (LANTUS) 10 Unit(s) SubCutaneous at bedtime  insulin lispro (HumaLOG) corrective regimen sliding scale   SubCutaneous three times a day before meals  insulin lispro (HumaLOG) corrective regimen sliding scale   SubCutaneous at bedtime  lactulose Syrup 10 Gram(s) Oral daily  lidocaine 1% (Preservative-free) Injectable 25 milliLiter(s) Local Injection once  metoprolol tartrate 25 milliGRAM(s) Oral two times a day  multivitamin 1 Tablet(s) Oral daily  senna 2 Tablet(s) Oral at bedtime  spironolactone 25 milliGRAM(s) Oral two times a day  tenofovir disoproxil fumarate (VIREAD) 300 milliGRAM(s) Oral daily  tiotropium 18 MICROgram(s) Capsule 1 Capsule(s) Inhalation daily    MEDICATIONS  (PRN):  ALBUTerol/ipratropium for Nebulization 3 milliLiter(s) Nebulizer every 6 hours PRN Shortness of Breath and/or Wheezing  dextrose 40% Gel 15 Gram(s) Oral once PRN Blood Glucose LESS THAN 70 milliGRAM(s)/deciliter  glucagon  Injectable 1 milliGRAM(s) IntraMuscular once PRN Glucose LESS THAN 70 milligrams/deciliter  morphine  - Injectable 4 milliGRAM(s) IV Push every 3 hours PRN Severe Pain (7 - 10)  oxyCODONE    5 mG/acetaminophen 325 mG 1 Tablet(s) Oral every 4 hours PRN Mild Pain (1 - 3)  oxyCODONE    5 mG/acetaminophen 325 mG 2 Tablet(s) Oral every 4 hours PRN Moderate Pain (4 - 6)                          11.8   16.82 )-----------( 102      ( 10 Nov 2018 06:05 )             37.2     11-10    132<L>  |  96  |  20  ----------------------------<  143<H>  4.6   |  29  |  0.98    Ca    7.9<L>      10 Nov 2018 06:05        PT/INR - ( 10 Nov 2018 06:05 )   PT: 32.3 sec;   INR: 2.82 ratio         PTT - ( 10 Nov 2018 06:05 )  PTT:36.8 sec        Blood Culture: 11-05 @ 18:25  Organism --  Gram Stain Blood -- Gram Stain   polymorphonuclear leukocytes seen per low power field  No organisms seen per oil power field  by cytocentrifuge  Specimen Source .Body Fluid LEFT PLEURAL EFFUSION CUL & GRM STAIN  Culture-Blood --    11-02 @ 19:11  Organism --  Gram Stain Blood -- Gram Stain --  Specimen Source .Urine Clean Catch (Midstream)  Culture-Blood --    RADIOLOGY/EKG:      EXAM:  XR CHEST PORTABLE ROUTINE 1V                          EXAM:  XR CHEST PORTABLE ROUTINE 1V                            PROCEDURE DATE:  11/07/2018          INTERPRETATION:      REASON FOR EXAM: Shortness of breath status post line placement    TECHNIQUE: 2 frontal views of chest compared to prior study of 11/6/2018    FINDINGS/  IMPRESSION:    There is left-sided chest tube. Small right greater than left pleural   effusion and congestive changes. Mild interstitial edema. Borderline   cardiomegaly. Osteopenia and degenerative spondylosis.          DVT PPX: Heparin SQ    ADVANCED DIRECTIVE:    DISPOSITION: D/C planning

## 2018-11-10 NOTE — PROGRESS NOTE ADULT - SUBJECTIVE AND OBJECTIVE BOX
Patient is a 78y old  Male who presents with a chief complaint of Lower back pain (10 Nov 2018 10:57)      HPI:  pt feeling ok sitting in bed  some back pain  no abdominal pain    PAST MEDICAL & SURGICAL HISTORY:  Hyperlipidemia  Hypogammaglobulinemia: received IVIG  Hepatitis B virus infection, unspecified chronicity: retrovirals  Chronic diastolic congestive heart failure  Essential hypertension  DM type 2 (diabetes mellitus, type 2)  B-cell lymphoma, unspecified B-cell lymphoma type, unspecified body region: CLL stage 4/SLL; met NHL  Atrial fibrillation, unspecified type  COPD (chronic obstructive pulmonary disease)  History of esophageal dilatation  S/P cholecystectomy  H/O prostatectomy    MEDICATIONS  (STANDING):  amiodarone    Tablet 100 milliGRAM(s) Oral daily  atorvastatin 10 milliGRAM(s) Oral at bedtime  buDESOnide  80 MICROgram(s)/formoterol 4.5 MICROgram(s) Inhaler 2 Puff(s) Inhalation two times a day  clotrimazole 1% Cream 1 Application(s) Topical two times a day  dextrose 5%. 1000 milliLiter(s) (50 mL/Hr) IV Continuous <Continuous>  dextrose 50% Injectable 12.5 Gram(s) IV Push once  dextrose 50% Injectable 25 Gram(s) IV Push once  dextrose 50% Injectable 25 Gram(s) IV Push once  docusate sodium 100 milliGRAM(s) Oral three times a day  furosemide    Tablet 40 milliGRAM(s) Oral daily  gabapentin 300 milliGRAM(s) Oral three times a day  heparin  Injectable 5000 Unit(s) SubCutaneous every 8 hours  insulin glargine Injectable (LANTUS) 10 Unit(s) SubCutaneous at bedtime  insulin lispro (HumaLOG) corrective regimen sliding scale   SubCutaneous three times a day before meals  insulin lispro (HumaLOG) corrective regimen sliding scale   SubCutaneous at bedtime  lactulose Syrup 10 Gram(s) Oral daily  lidocaine 1% (Preservative-free) Injectable 25 milliLiter(s) Local Injection once  metoprolol tartrate 25 milliGRAM(s) Oral two times a day  multivitamin 1 Tablet(s) Oral daily  senna 2 Tablet(s) Oral at bedtime  spironolactone 25 milliGRAM(s) Oral two times a day  tenofovir disoproxil fumarate (VIREAD) 300 milliGRAM(s) Oral daily  tiotropium 18 MICROgram(s) Capsule 1 Capsule(s) Inhalation daily    MEDICATIONS  (PRN):  ALBUTerol/ipratropium for Nebulization 3 milliLiter(s) Nebulizer every 6 hours PRN Shortness of Breath and/or Wheezing  dextrose 40% Gel 15 Gram(s) Oral once PRN Blood Glucose LESS THAN 70 milliGRAM(s)/deciliter  glucagon  Injectable 1 milliGRAM(s) IntraMuscular once PRN Glucose LESS THAN 70 milligrams/deciliter  morphine  - Injectable 4 milliGRAM(s) IV Push every 3 hours PRN Severe Pain (7 - 10)  oxyCODONE    5 mG/acetaminophen 325 mG 1 Tablet(s) Oral every 4 hours PRN Mild Pain (1 - 3)  oxyCODONE    5 mG/acetaminophen 325 mG 2 Tablet(s) Oral every 4 hours PRN Moderate Pain (4 - 6)    Allergies  No Known Allergies    Vital Signs Last 24 Hrs  T(C): 37.1 (10 Nov 2018 09:03), Max: 37.1 (10 Nov 2018 09:03)  T(F): 98.7 (10 Nov 2018 09:03), Max: 98.7 (10 Nov 2018 09:03)  HR: 99 (10 Nov 2018 12:00) (86 - 112)  BP: 92/61 (10 Nov 2018 12:00) (92/54 - 126/59)  BP(mean): 67 (10 Nov 2018 12:00) (64 - 83)  RR: 20 (10 Nov 2018 12:00) (16 - 22)  SpO2: 97% (10 Nov 2018 06:00) (88% - 98%)    PHYSICAL EXAM:    Constitutional: NAD, well-developed  Respiratory: CTA  Cardiovascular: S1 and S2  Gastrointestinal: BS+, soft, NT/ND    LABS:                        11.8   16.82 )-----------( 102      ( 10 Nov 2018 06:05 )             37.2     11-10    132<L>  |  96  |  20  ----------------------------<  143<H>  4.6   |  29  |  0.98    Ca    7.9<L>      10 Nov 2018 06:05      PT/INR - ( 10 Nov 2018 06:05 )   PT: 32.3 sec;   INR: 2.82 ratio         PTT - ( 10 Nov 2018 06:05 )  PTT:36.8 sec      RADIOLOGY & ADDITIONAL STUDIES:

## 2018-11-10 NOTE — PROGRESS NOTE ADULT - SUBJECTIVE AND OBJECTIVE BOX
HPI:  78 year old male with pmh of copd on home o2 2.5L, afib on coumadin, chronic diastolic chf, diabetes on insulin, htn, hep b infection, hypogammaglobulinemia w/ IVIG tx, b cell lymphoma, cholelithiasis s/p cholecystectomy, esophageal dilation, prostatectomy coming to ED with complaints of worsening shortness of breath and lower back pain. Patient stating was on toilet this morning when tried to get up put pants on when he had a sudden onset of lower back pain. No bladder or bowel incontinence. Per Dr Bejarano In ED bladder scan was done with no to low residual urine volume. Per Dr Bejarano patient able to ambulate with cane in ED. FOr shortness of breath has been going on for past 4-5 weeks and has been gradually worsening. States last echo over 1 year prior. states now able to walk 50 feet without stopping for shortness of breath (02 Nov 2018 22:27)    11/4/18 Thru family , patient with continued back pain  11/5/18 Patient with persistent BP, abdomen feels better  11/6/18 Patient more comfortable. Brace delivered. S/P drainage PE  11/7/18 Patient much more comfortable today. S/P centesis for ascites and PE  11/9/18 Patient with persistent back pain, OOB with brace  11/10/18 Patient with moderate back pain. Transferring with brace. Minimal ambulation    PAST MEDICAL & SURGICAL HISTORY:  Hyperlipidemia  Hypogammaglobulinemia: received IVIG  Hepatitis B virus infection, unspecified chronicity: retrovirals  Chronic diastolic congestive heart failure  Essential hypertension  DM type 2 (diabetes mellitus, type 2)  B-cell lymphoma, unspecified B-cell lymphoma type, unspecified body region: CLL stage 4/SLL; met NHL  Atrial fibrillation, unspecified type  COPD (chronic obstructive pulmonary disease)  History of esophageal dilatation  S/P cholecystectomy  H/O prostatectomy    MEDICATIONS  (STANDING):  amiodarone    Tablet 100 milliGRAM(s) Oral daily  atorvastatin 10 milliGRAM(s) Oral at bedtime  dextrose 5%. 1000 milliLiter(s) (50 mL/Hr) IV Continuous <Continuous>  dextrose 50% Injectable 12.5 Gram(s) IV Push once  dextrose 50% Injectable 25 Gram(s) IV Push once  dextrose 50% Injectable 25 Gram(s) IV Push once  docusate sodium 100 milliGRAM(s) Oral three times a day  furosemide    Tablet 20 milliGRAM(s) Oral two times a day  gabapentin 300 milliGRAM(s) Oral three times a day  insulin glargine Injectable (LANTUS) 20 Unit(s) SubCutaneous at bedtime  insulin lispro (HumaLOG) corrective regimen sliding scale   SubCutaneous three times a day before meals  insulin lispro (HumaLOG) corrective regimen sliding scale   SubCutaneous at bedtime  lactulose Syrup 10 Gram(s) Oral daily  lisinopril 2.5 milliGRAM(s) Oral daily  metoprolol tartrate 25 milliGRAM(s) Oral two times a day  multivitamin 1 Tablet(s) Oral daily  spironolactone 25 milliGRAM(s) Oral at bedtime  tenofovir disoproxil fumarate (VIREAD) 300 milliGRAM(s) Oral daily    MEDICATIONS  (PRN):  acetaminophen   Tablet .. 650 milliGRAM(s) Oral every 6 hours PRN Temp greater or equal to 38C (100.4F), Mild Pain (1 - 3), Moderate Pain (4 - 6)  ALBUTerol/ipratropium for Nebulization 3 milliLiter(s) Nebulizer every 6 hours PRN Shortness of Breath and/or Wheezing  dextrose 40% Gel 15 Gram(s) Oral once PRN Blood Glucose LESS THAN 70 milliGRAM(s)/deciliter  glucagon  Injectable 1 milliGRAM(s) IntraMuscular once PRN Glucose LESS THAN 70 milligrams/deciliter  senna 2 Tablet(s) Oral at bedtime PRN Constipation  traMADol 50 milliGRAM(s) Oral every 4 hours PRN Moderate Pain (4 - 6)    Allergies    No Known Allergies    Intolerances    PE:    Vital Signs Last 24 Hrs  T(C): 36.7 (10 Nov 2018 05:40), Max: 36.7 (09 Nov 2018 20:45)  T(F): 98.1 (10 Nov 2018 05:40), Max: 98.1 (09 Nov 2018 20:45)  HR: 92 (10 Nov 2018 08:56) (88 - 112)  BP: 102/69 (10 Nov 2018 06:00) (93/63 - 126/59)  BP(mean): 77 (10 Nov 2018 06:00) (68 - 83)  RR: 19 (10 Nov 2018 06:00) (14 - 22)  SpO2: 97% (10 Nov 2018 06:00) (88% - 98%)    Patient appears comfortable and complains mild back pain, transferring to chair  Abdomen less distended without tenderness  Persistent tender T-L junction  No focal motor deficits    LABS                                              11.8   16.82 )-----------( 102      ( 10 Nov 2018 06:05 )             37.2     11-10    132<L>  |  96  |  20  ----------------------------<  143<H>  4.6   |  29  |  0.98    Ca    7.9<L>      10 Nov 2018 06:05        MRI with acute L1 fracture

## 2018-11-11 NOTE — PROGRESS NOTE ADULT - SUBJECTIVE AND OBJECTIVE BOX
HPI:  78 year old male with pmh of copd on home o2 2.5L, afib on coumadin, chronic diastolic chf, diabetes on insulin, htn, hep b infection, hypogammaglobulinemia w/ IVIG tx, b cell lymphoma, cholelithiasis s/p cholecystectomy, esophageal dilation, prostatectomy coming to ED with complaints of worsening shortness of breath and lower back pain. Patient stating was on toilet this morning when tried to get up put pants on when he had a sudden onset of lower back pain. No bladder or bowel incontinence. Per Dr Bejarano In ED bladder scan was done with no to low residual urine volume. Per Dr Bejarano patient able to ambulate with cane in ED. FOr shortness of breath has been going on for past 4-5 weeks and has been gradually worsening. States last echo over 1 year prior. states now able to walk 50 feet without stopping for shortness of breath (02 Nov 2018 22:27)    11/4/18 Thru family , patient with continued back pain  11/5/18 Patient with persistent BP, abdomen feels better  11/6/18 Patient more comfortable. Brace delivered. S/P drainage PE  11/7/18 Patient much more comfortable today. S/P centesis for ascites and PE  11/9/18 Patient with persistent back pain, OOB with brace  11/10/18 Patient with moderate back pain. Transferring with brace. Minimal ambulation  11/11/18 Patient more comfortable in bed than sitting and standing    PAST MEDICAL & SURGICAL HISTORY:  Hyperlipidemia  Hypogammaglobulinemia: received IVIG  Hepatitis B virus infection, unspecified chronicity: retrovirals  Chronic diastolic congestive heart failure  Essential hypertension  DM type 2 (diabetes mellitus, type 2)  B-cell lymphoma, unspecified B-cell lymphoma type, unspecified body region: CLL stage 4/SLL; met NHL  Atrial fibrillation, unspecified type  COPD (chronic obstructive pulmonary disease)  History of esophageal dilatation  S/P cholecystectomy  H/O prostatectomy    MEDICATIONS  (STANDING):  amiodarone    Tablet 100 milliGRAM(s) Oral daily  atorvastatin 10 milliGRAM(s) Oral at bedtime  dextrose 5%. 1000 milliLiter(s) (50 mL/Hr) IV Continuous <Continuous>  dextrose 50% Injectable 12.5 Gram(s) IV Push once  dextrose 50% Injectable 25 Gram(s) IV Push once  dextrose 50% Injectable 25 Gram(s) IV Push once  docusate sodium 100 milliGRAM(s) Oral three times a day  furosemide    Tablet 20 milliGRAM(s) Oral two times a day  gabapentin 300 milliGRAM(s) Oral three times a day  insulin glargine Injectable (LANTUS) 20 Unit(s) SubCutaneous at bedtime  insulin lispro (HumaLOG) corrective regimen sliding scale   SubCutaneous three times a day before meals  insulin lispro (HumaLOG) corrective regimen sliding scale   SubCutaneous at bedtime  lactulose Syrup 10 Gram(s) Oral daily  lisinopril 2.5 milliGRAM(s) Oral daily  metoprolol tartrate 25 milliGRAM(s) Oral two times a day  multivitamin 1 Tablet(s) Oral daily  spironolactone 25 milliGRAM(s) Oral at bedtime  tenofovir disoproxil fumarate (VIREAD) 300 milliGRAM(s) Oral daily    MEDICATIONS  (PRN):  acetaminophen   Tablet .. 650 milliGRAM(s) Oral every 6 hours PRN Temp greater or equal to 38C (100.4F), Mild Pain (1 - 3), Moderate Pain (4 - 6)  ALBUTerol/ipratropium for Nebulization 3 milliLiter(s) Nebulizer every 6 hours PRN Shortness of Breath and/or Wheezing  dextrose 40% Gel 15 Gram(s) Oral once PRN Blood Glucose LESS THAN 70 milliGRAM(s)/deciliter  glucagon  Injectable 1 milliGRAM(s) IntraMuscular once PRN Glucose LESS THAN 70 milligrams/deciliter  senna 2 Tablet(s) Oral at bedtime PRN Constipation  traMADol 50 milliGRAM(s) Oral every 4 hours PRN Moderate Pain (4 - 6)    Allergies    No Known Allergies    Intolerances    PE:    Vital Signs Last 24 Hrs  T(C): 36.8 (11 Nov 2018 06:15), Max: 37.2 (10 Nov 2018 14:37)  T(F): 98.2 (11 Nov 2018 06:15), Max: 98.9 (10 Nov 2018 14:37)  HR: 87 (11 Nov 2018 08:55) (84 - 100)  BP: 101/65 (11 Nov 2018 07:00) (80/54 - 117/65)  BP(mean): 72 (11 Nov 2018 07:00) (57 - 78)  RR: 18 (11 Nov 2018 07:00) (14 - 20)  SpO2: 94% (11 Nov 2018 07:00) (91% - 100%)    Patient appears comfortable and complains mild back pain,laying in bed  Abdomen less distended  Persistent tender T-L junction  No focal motor deficits    LABS                           11.8   16.58 )-----------( 110      ( 11 Nov 2018 04:50 )             36.7     11-11    132<L>  |  95<L>  |  23  ----------------------------<  107<H>  4.7   |  28  |  0.99    Ca    7.7<L>      11 Nov 2018 04:50          MRI with acute L1 fracture

## 2018-11-11 NOTE — PROGRESS NOTE ADULT - SUBJECTIVE AND OBJECTIVE BOX
pt denies abd pain  no n/v  has chest pain beginning yesterday radiating to back , not affected by meals or eating  no dyspnea  no brbpr or melena    Allergies    No Known Allergies    Intolerances        MEDICATIONS  (STANDING):  amiodarone    Tablet 100 milliGRAM(s) Oral daily  atorvastatin 10 milliGRAM(s) Oral at bedtime  buDESOnide  80 MICROgram(s)/formoterol 4.5 MICROgram(s) Inhaler 2 Puff(s) Inhalation two times a day  clotrimazole 1% Cream 1 Application(s) Topical two times a day  dextrose 5%. 1000 milliLiter(s) (50 mL/Hr) IV Continuous <Continuous>  dextrose 50% Injectable 12.5 Gram(s) IV Push once  dextrose 50% Injectable 25 Gram(s) IV Push once  dextrose 50% Injectable 25 Gram(s) IV Push once  docusate sodium 100 milliGRAM(s) Oral three times a day  furosemide    Tablet 40 milliGRAM(s) Oral daily  gabapentin 300 milliGRAM(s) Oral three times a day  heparin  Injectable 5000 Unit(s) SubCutaneous every 8 hours  insulin glargine Injectable (LANTUS) 10 Unit(s) SubCutaneous at bedtime  insulin lispro (HumaLOG) corrective regimen sliding scale   SubCutaneous three times a day before meals  insulin lispro (HumaLOG) corrective regimen sliding scale   SubCutaneous at bedtime  lactulose Syrup 10 Gram(s) Oral daily  lidocaine 1% (Preservative-free) Injectable 25 milliLiter(s) Local Injection once  metoprolol tartrate 25 milliGRAM(s) Oral two times a day  multivitamin 1 Tablet(s) Oral daily  senna 2 Tablet(s) Oral at bedtime  spironolactone 25 milliGRAM(s) Oral two times a day  tenofovir disoproxil fumarate (VIREAD) 300 milliGRAM(s) Oral daily  tiotropium 18 MICROgram(s) Capsule 1 Capsule(s) Inhalation daily  warfarin 1 milliGRAM(s) Oral daily    MEDICATIONS  (PRN):  ALBUTerol/ipratropium for Nebulization 3 milliLiter(s) Nebulizer every 6 hours PRN Shortness of Breath and/or Wheezing  dextrose 40% Gel 15 Gram(s) Oral once PRN Blood Glucose LESS THAN 70 milliGRAM(s)/deciliter  glucagon  Injectable 1 milliGRAM(s) IntraMuscular once PRN Glucose LESS THAN 70 milligrams/deciliter  morphine  - Injectable 4 milliGRAM(s) IV Push every 3 hours PRN Severe Pain (7 - 10)  oxyCODONE    5 mG/acetaminophen 325 mG 1 Tablet(s) Oral every 4 hours PRN Mild Pain (1 - 3)  oxyCODONE    5 mG/acetaminophen 325 mG 2 Tablet(s) Oral every 4 hours PRN Moderate Pain (4 - 6)      REVIEW OF SYSTEMS      General:	No fever or chills    Skin/Breast: No jaundice or rash   		  ENMT: denies sore throat or thrush    Respiratory and Thorax: Denies dyspnea or cough or shortness of breath  	  Cardiovascular: Denies  palpitations 	    Gastrointestinal: Denies jaundice or pruritis    Genitourinary: Denies dysuria or hematuria	    Musculoskeletal: Denies muscular pain or swelling	    Neurological: Denies confusion or tremor	    Hematology/Lymphatics: Denies easy bruising 	    Endocrine:	Denies polyphagia or polyuria    See above hx otherwise neg for any major organ systems    PHYSICAL EXAM:    Vital Signs Last 24 Hrs  T(C): 36.8 (11 Nov 2018 06:15), Max: 37.2 (10 Nov 2018 14:37)  T(F): 98.2 (11 Nov 2018 06:15), Max: 98.9 (10 Nov 2018 14:37)  HR: 97 (11 Nov 2018 10:00) (85 - 100)  BP: 98/64 (11 Nov 2018 10:00) (80/54 - 117/65)  BP(mean): 72 (11 Nov 2018 10:00) (57 - 78)  RR: 20 (11 Nov 2018 10:00) (14 - 20)  SpO2: 98% (11 Nov 2018 10:00) (91% - 100%)    Constitutional: no acute distress    ENMT: NC/AT scl anicteric opm pink no lesions     Neck: supple. No jvd or LN    Respiratory: Clear     Cardiovascular: RRR s1s2     Gastrointestinal: Pos bs , soft , pos ascites, non tender, mild to mod distension       Back: No CVA tenderness    Extremities: NO cce     Neurological: Alert and oriented x 3     Skin: No rash or jaundice    Date/Time:11-11 @ 04:51    ALT/SGPT: --  Albumin: --  AST/SGOT: --  Bilirubin Direct: --  Bilirubin Total: --  Ca: --  eGFR : --  eGFR Non-African American: --  Lipase: --  Amylase: --  INR: 2.47  PTT: --        Date/Time:11-11 @ 04:50    ALT/SGPT: --  Albumin: --  AST/SGOT: --  Bilirubin Direct: --  Bilirubin Total: --  Ca: 7.7  eGFR : 83  eGFR Non-: 72  Lipase: --  Amylase: --  INR: --  PTT: --        Date/Time:11-10 @ 06:05    ALT/SGPT: --  Albumin: --  AST/SGOT: --  Bilirubin Direct: --  Bilirubin Total: --  Ca: 7.9  eGFR : 84  eGFR Non-: 73  Lipase: --  Amylase: --  INR: 2.82  PTT: --        Date/Time:11-09 @ 05:34    ALT/SGPT: --  Albumin: --  AST/SGOT: --  Bilirubin Direct: --  Bilirubin Total: --  Ca: 8.0  eGFR : 65  eGFR Non-: 56  Lipase: --  Amylase: --  INR: 1.80  PTT: --            11-11    132<L>  |  95<L>  |  23  ----------------------------<  107<H>  4.7   |  28  |  0.99    Ca    7.7<L>      11 Nov 2018 04:50                              11.8   16.58 )-----------( 110      ( 11 Nov 2018 04:50 )             36.7     Culture - Body Fluid with Gram Stain (11.09.18 @ 18:11)    Gram Stain:   polymorphonuclear leukocytes seen  No organisms seen  by cytocentrifuge    Specimen Source: .Body Fluid Peritoneal Fluid    Culture Results:   No growth    Cell Count, Body Fluid (11.09.18 @ 18:11)    Mesothelial Cells - Body Fluid: 1 %    Fluid Type: Paracentesis    BF Lymphocytes: 81 %    Body Fluid Appearance: Turbid    Fluid Segmented Granulocytes: 3 %    Monocyte/Macrophage Count - Body Fluid: 15 %    Tube Type: Lavender    Color - Body Fluid: Orange    Total Nucleated Cell Count, Body Fluid: 1406 /uL    Total RBC Count: 39159 /uL

## 2018-11-11 NOTE — PROGRESS NOTE ADULT - ASSESSMENT
78 year old male as above     * back pain with compression fracture L1  -continue brace when OOB  -pain management  -IV Tylenol prn    Multiple Medical problems as per Medicine  #shortness of breath   #copd  # diabetes  # chf- acute on chronic present on admit   #hld   #hepatitis b w/ ascites  #afib  #dvt prophylaxis

## 2018-11-11 NOTE — PROGRESS NOTE ADULT - ASSESSMENT
Chronic diastolic congestive heart failure.  Bilateral pleural effusion . He is s/p chest  tube placement on left side.  Moderate ascites.  History of CLL/lymphoma.  Paroxysmal atrial fibrillation he is normal sinus rhythm.  Cirrhosis of liver.  Calcified coronaries and aorta on CT scan of the chest.  Suggest  He is status post thoracocentesis and chest tube placement.  Increase aldactone to 50 mg PO BID if his BP tolerates.  Due to borderline low blood pressure  lisinopril was stopped.  Intake and output/daily weights.  Follow with electrolytes.  Adjust INR 2.0  discussed with patient's family  and hospitalist.  Ambulate.

## 2018-11-11 NOTE — PROVIDER CONTACT NOTE (OTHER) - DATE AND TIME:
03-Nov-2018 15:20
02-Nov-2018 23:44
02-Nov-2018 23:45
02-Nov-2018 23:48
02-Nov-2018 23:53
02-Nov-2018 23:59
06-Nov-2018 18:02
11-Nov-2018 13:48

## 2018-11-11 NOTE — PROVIDER CONTACT NOTE (OTHER) - NAME OF MD/NP/PA/DO NOTIFIED:
Dr. Conte (Cardiology)
Dr. Crespo (Cardiothoracic Surgery)
Dr. Houser
Dr. Houser (Oncology)
Dr. Lakhani (Pulmonary)
Dr. Mchugh (Gastroenterology)
MD Deshawn Crespo
Dr French

## 2018-11-11 NOTE — PROGRESS NOTE ADULT - ASSESSMENT
78 year old male with pmh of copd on home o2 2.5L, afib on coumadin, chronic diastolic chf, diabetes on insulin, htn, hep b infection, hypogammaglobulinemia w/ IVIG tx, b cell lymphoma, cholelithiasis s/p cholecystectomy, esophageal dilation, prostatectomy admitted for worsening shortness of breath 2/2 acute on chronic CHF.    #Acute on chronic CHF  ~s/p L chest tube placement: now set to water seal   -Increasespironolactone 25 to 50 mg PO QD   -monitor chest tube output  -f/u pleural fluid analysis   -Daily CXRs- repeat CXR after drainage recommended   -drain to waterseal until ready for d/c - per thoracic surgery   -Pulm and thoracic surgery following -- recommendations appreciated    #COPD  -On home O2 2.5 L  -Continue NC O2  -Continue tiotropium  -Continue budesonide/formoterol    #Afib  -warfarin 1mg adjust prn per INR  -Continue amiodarone    #DM  -ISS premeal and bedtime   -gabapentin 300 mg PO TID    #HTN  -BP range /60s-70s noted  -Continue Lasix 40 mg PO QD- BP can tolerate , holding parameter , hold if SBP<80   -Continue metoprolol 25 mg PO BID    #Hep B and cirrhosis  -Continue lactulose  -GI consulted- high risk for endoscopy  - s/p paracentesis on 11/9  - f/u fluid analysis      #Hypogammaglobulinemia  - Heme/onc consulted  -If IgG <600- <700 -proceed with IVIG (Dr. Tee following)    #B cell lymphoma  - Heme/onc consulted  -paracentesis per GI recs on 11/9- diagnostic   -F/U pleural results     DISPO- D/C TO REHAB

## 2018-11-11 NOTE — PROGRESS NOTE ADULT - ATTENDING COMMENTS
on exam comfortable     #RS- aeeb, cta     #DVt pr     #Discussed with Dr. Conte
Patient seen and examined with Family Medicine Residents Kristina Arizmendi and Urvashi Bates on the Family Medicine Teaching Service.  Agree with history, physical, labs and plan which were reviewed in detail after a face to face encounter with the patient.
Patient seen and examined with Family Medicine Residents Kristina Smith and Mona Harley on the Family Medicine Teaching Service.  Agree with history, physical, labs and plan which were reviewed in detail after a face to face encounter with the patient.
Patient seen and examined with Family Medicine Residents Kristina Smith, Urvashi Bates and Mona Harley on the Family Medicine Teaching Service.  Agree with history, physical, labs and plan which were reviewed in detail after a face to face encounter with the patient.

## 2018-11-11 NOTE — PROGRESS NOTE ADULT - SUBJECTIVE AND OBJECTIVE BOX
HPI:  Patient is a 78-year-old with history of hypertension, chronic diastolic congestive heart failure, COPD, paroxysmal atrial fibrillation, history of DVT and pulmonary emboli in the past , he has chronic bilateral pleural effusion, CLL/lymphoma being treated, history of recurrent pneumonia, He was admitted with lower back pain and shortness of breath and after admission he was diagnosed to have bilateral pleural effusion and he also has moderate ascites. He denies any chest pain. Since admission he feels improved and now he is comfortable and is in no acute distress. He is in normal sinus rhythm on telemetry. He is status post thoracocentesis and chest tube placement.  He is comfortable & is in NSR . He has pain chest tube site.     Transthoracic Echocardiogram (11.03.18   indings     Mitral Valve   Normal appearing mitral valve structure and function.   Mild (1+) mitral regurgitation is present.     Aortic Valve   The aortic valve is not well visualized, appears mildly calcified. Valve   opening seems to be normal. No stenosis or regurgitation.     Tricuspid Valve   Normal appearing tricuspid valve structure and function.   Mild (1+) tricuspid valve regurgitation is present.     Pulmonic Valve   Pulmonic valve not well seen.   Trace pulmonic valvular regurgitation is present.     Left Atrium   Normal appearing left atrium.     Left Ventricle   The left ventricle is normal in size, wall thickness, wall motion and   contractility. Estimated left ventricular ejection fraction is 55-60%          CT Chest No Cont (11.02.18   IMPRESSION: Moderate to large left pleural effusion with underlying   compressive atelectasis. Small right pleural effusion with underlying   compressive atelectasis.   Prominent lymph nodes in the bilateral axilla,   mediastinum, and hilum.           PAST MEDICAL & SURGICAL HISTORY:  Hyperlipidemia  Hypogammaglobulinemia: received IVIG  Hepatitis B virus infection, unspecified chronicity: retrovirals  Chronic diastolic congestive heart failure  Essential hypertension  DM type 2 (diabetes mellitus, type 2)  B-cell lymphoma, unspecified B-cell lymphoma type, unspecified body region: CLL stage 4/SLL; met NHL  Atrial fibrillation, unspecified type  COPD (chronic obstructive pulmonary disease)  History of esophageal dilatation  S/P cholecystectomy  H/O prostatectomy          MEDICATIONS  (STANDING):  amiodarone    Tablet 100 milliGRAM(s) Oral daily  atorvastatin 10 milliGRAM(s) Oral at bedtime  buDESOnide  80 MICROgram(s)/formoterol 4.5 MICROgram(s) Inhaler 2 Puff(s) Inhalation two times a day  clotrimazole 1% Cream 1 Application(s) Topical two times a day  dextrose 5%. 1000 milliLiter(s) (50 mL/Hr) IV Continuous <Continuous>  dextrose 50% Injectable 12.5 Gram(s) IV Push once  dextrose 50% Injectable 25 Gram(s) IV Push once  dextrose 50% Injectable 25 Gram(s) IV Push once  docusate sodium 100 milliGRAM(s) Oral three times a day  furosemide    Tablet 40 milliGRAM(s) Oral daily  gabapentin 300 milliGRAM(s) Oral three times a day  heparin  Injectable 5000 Unit(s) SubCutaneous every 8 hours  insulin glargine Injectable (LANTUS) 10 Unit(s) SubCutaneous at bedtime  insulin lispro (HumaLOG) corrective regimen sliding scale   SubCutaneous three times a day before meals  insulin lispro (HumaLOG) corrective regimen sliding scale   SubCutaneous at bedtime  lactulose Syrup 10 Gram(s) Oral daily  lidocaine 1% (Preservative-free) Injectable 25 milliLiter(s) Local Injection once  metoprolol tartrate 25 milliGRAM(s) Oral two times a day  multivitamin 1 Tablet(s) Oral daily  senna 2 Tablet(s) Oral at bedtime  spironolactone 25 milliGRAM(s) Oral two times a day  tenofovir disoproxil fumarate (VIREAD) 300 milliGRAM(s) Oral daily  tiotropium 18 MICROgram(s) Capsule 1 Capsule(s) Inhalation daily  warfarin 1 milliGRAM(s) Oral daily    MEDICATIONS  (PRN):  ALBUTerol/ipratropium for Nebulization 3 milliLiter(s) Nebulizer every 6 hours PRN Shortness of Breath and/or Wheezing  dextrose 40% Gel 15 Gram(s) Oral once PRN Blood Glucose LESS THAN 70 milliGRAM(s)/deciliter  glucagon  Injectable 1 milliGRAM(s) IntraMuscular once PRN Glucose LESS THAN 70 milligrams/deciliter  morphine  - Injectable 4 milliGRAM(s) IV Push every 3 hours PRN Severe Pain (7 - 10)  oxyCODONE    5 mG/acetaminophen 325 mG 1 Tablet(s) Oral every 4 hours PRN Mild Pain (1 - 3)  oxyCODONE    5 mG/acetaminophen 325 mG 2 Tablet(s) Oral every 4 hours PRN Moderate Pain (4 - 6)          REVIEW OF SYSTEM:  Pertinent items are noted in HPI.  Constitutional	Negative for chills, fevers, sweats.    Eyes: 	Negative for visual disturbance.  Ears, nose, mouth, throat, and face: Negative for epistaxis, nasal congestion, sore throat and tinnitus.  Neck:	Negative for enlargement, pain and difficulty in swallowing  Respiration : Negative for cough, dyspnea on exertion,  and wheezing  Cardiovascular: Negative for chest pain, and palpitations    Gastrointestinal : Negative for abdominal pain, diarrhea, nausea and vomiting  Genitourinary: Negative for dysuria, frequency and urinary incontinence .  Skin: Negative for  rash, pruritus, swelling, dryness .  	  Hematologic/lymphatic: Negative for bleeding and easy bruising  Musculoskeletal: Negative for arthralgias, back pain and muscle weakness.  Neurological: Negative for dizziness, headaches, seizures and tremors  Behavioral/Psych: Negative for mood change, depression.  Endocrine:	Negative for blurry vision, polydipsia and polyuria, diaphoresis.   Allergic/Immunologic:	Negative for anaphylaxis, angioedema and urticaria.      Vital Signs Last 24 Hrs  T(C): 36.8 (11 Nov 2018 06:15), Max: 37.2 (10 Nov 2018 14:37)  T(F): 98.2 (11 Nov 2018 06:15), Max: 98.9 (10 Nov 2018 14:37)  HR: 87 (11 Nov 2018 08:55) (84 - 100)  BP: 101/65 (11 Nov 2018 07:00) (80/54 - 117/65)  BP(mean): 72 (11 Nov 2018 07:00) (57 - 78)  RR: 18 (11 Nov 2018 07:00) (14 - 20)  SpO2: 94% (11 Nov 2018 07:00) (91% - 100%)    I&O's Summary    10 Nov 2018 07:01  -  11 Nov 2018 07:00  --------------------------------------------------------  IN: 0 mL / OUT: 720 mL / NET: -720 mL      PHYSICAL EXAM  General Appearance: cooperative, no acute distress,   HEENT: PERRL, conjunctiva clear, EOM's intact .   Neck: Supple, , no adenopathy, thyroid: not enlarged, no carotid bruit or JVD    Lungs: diminished breath sounds on both side Scattered  rhonchi's.  Heart: Regular rate and rhythm, S1, S2 normal, 1/6 HS murmur, no rub or gallop  Abdomen: Soft, non-tender, bowel sounds active , no hepatosplenomegaly  Extremities: no cyanosis or edema, no joint swelling  Skin: Skin color, texture normal, no rashes   Neurologic: Alert and oriented X3 , cranial nerves intact, sensory and motor normal,        INTERPRETATION OF TELEMETRY: NSR          LABS:                          11.8   16.58 )-----------( 110      ( 11 Nov 2018 04:50 )             36.7     11-11    132<L>  |  95<L>  |  23  ----------------------------<  107<H>  4.7   |  28  |  0.99    Ca    7.7<L>      11 Nov 2018 04:50            Pro BNP  672 11-07 @ 04:46  D Dimer  -- 11-07 @ 04:46    PT/INR - ( 11 Nov 2018 04:51 )   PT: 28.2 sec;   INR: 2.47 ratio         PTT - ( 11 Nov 2018 04:51 )  PTT:41.6 sec          RADIOLOGY & ADDITIONAL STUDIES:

## 2018-11-11 NOTE — PROVIDER CONTACT NOTE (OTHER) - SITUATION
Pt. with pigtail catheter placed for drainage of pleural effusions, pigtail became dislodged while pt was getting OOB.

## 2018-11-11 NOTE — PROGRESS NOTE ADULT - SUBJECTIVE AND OBJECTIVE BOX
78 year old male with pmh of copd on home o2 2.5L, afib on coumadin, chronic diastolic chf, diabetes on insulin, htn, hep b infection, hypogammaglobulinemia w/ IVIG tx, b cell lymphoma, cholelithiasis s/p cholecystectomy, esophageal dilation, prostatectomy coming to ED with complaints of worsening shortness of breath and lower back pain.  CT showed : Moderate to large Lt pleural effusion. Small Rt pleural effusion. Moderate to large ascites. Pt s/p VATs and 700 cc of serous fluid removed, Pleurx catheter placed on 11/5/2018.    ID#[id number]    SUBJECTIVE: Pt seen and examined at bedside. States improved breathing, denies SOB.  +Mild pain at  chest tube insertion site. Tolerating PO.     REVIEW OF SYSTEMS:  CONSTITUTIONAL: No fevers or chills  RESPIRATORY: Denies shortness of breath  CARDIOVASCULAR: No chest pain or palpitations  GASTROINTESTINAL: No abdominal or epigastric pain  GENITOURINARY: No dysuria, frequency or hematuria  All other review of systems is negative unless indicated above    Vital Signs Last 24 Hrs  T(C): 36.8 (11 Nov 2018 06:15), Max: 37.2 (10 Nov 2018 14:37)  T(F): 98.2 (11 Nov 2018 06:15), Max: 98.9 (10 Nov 2018 14:37)  HR: 87 (11 Nov 2018 08:55) (84 - 100)  BP: 101/65 (11 Nov 2018 07:00) (80/54 - 117/65)  BP(mean): 72 (11 Nov 2018 07:00) (57 - 78)  RR: 18 (11 Nov 2018 07:00) (14 - 20)  SpO2: 94% (11 Nov 2018 07:00) (91% - 100%)    I&O's Summary    10 Nov 2018 07:01  -  11 Nov 2018 07:00  --------------------------------------------------------  IN: 0 mL / OUT: 720 mL / NET: -720 mL    POCT Blood Glucose.: 154 mg/dL (10 Nov 2018 21:29)  POCT Blood Glucose.: 159 mg/dL (10 Nov 2018 14:16)    PPHYSICAL EXAM:  Constitutional: NAD, awake and alert, well-developed  HEENT: EOMI, Normal Hearing, DMM  Respiratory: Improved air movement BL  Cardiovascular: S1 and S2, regular rate and rhythm  Gastrointestinal: Bowel Sounds present, soft, nontender, nondistended, no guarding, no rebound  Extremities: No peripheral edema; 2+pulses BL      MEDICATIONS  (STANDING):  amiodarone    Tablet 100 milliGRAM(s) Oral daily  atorvastatin 10 milliGRAM(s) Oral at bedtime  buDESOnide  80 MICROgram(s)/formoterol 4.5 MICROgram(s) Inhaler 2 Puff(s) Inhalation two times a day  clotrimazole 1% Cream 1 Application(s) Topical two times a day  dextrose 5%. 1000 milliLiter(s) (50 mL/Hr) IV Continuous <Continuous>  dextrose 50% Injectable 12.5 Gram(s) IV Push once  dextrose 50% Injectable 25 Gram(s) IV Push once  dextrose 50% Injectable 25 Gram(s) IV Push once  docusate sodium 100 milliGRAM(s) Oral three times a day  furosemide    Tablet 40 milliGRAM(s) Oral daily  gabapentin 300 milliGRAM(s) Oral three times a day  heparin  Injectable 5000 Unit(s) SubCutaneous every 8 hours  insulin glargine Injectable (LANTUS) 10 Unit(s) SubCutaneous at bedtime  insulin lispro (HumaLOG) corrective regimen sliding scale   SubCutaneous three times a day before meals  insulin lispro (HumaLOG) corrective regimen sliding scale   SubCutaneous at bedtime  lactulose Syrup 10 Gram(s) Oral daily  lidocaine 1% (Preservative-free) Injectable 25 milliLiter(s) Local Injection once  metoprolol tartrate 25 milliGRAM(s) Oral two times a day  multivitamin 1 Tablet(s) Oral daily  senna 2 Tablet(s) Oral at bedtime  spironolactone 25 milliGRAM(s) Oral two times a day  tenofovir disoproxil fumarate (VIREAD) 300 milliGRAM(s) Oral daily  tiotropium 18 MICROgram(s) Capsule 1 Capsule(s) Inhalation daily  warfarin 1 milliGRAM(s) Oral daily      LABS: All Labs Reviewed:                        11.8   16.58 )-----------( 110      ( 11 Nov 2018 04:50 )             36.7     11-11    132<L>  |  95<L>  |  23  ----------------------------<  107<H>  4.7   |  28  |  0.99    Ca    7.7<L>      11 Nov 2018 04:50      PT/INR - ( 11 Nov 2018 04:51 )   PT: 28.2 sec;   INR: 2.47 ratio         PTT - ( 11 Nov 2018 04:51 )  PTT:41.6 sec      Blood Culture: 11-09 @ 18:11  Organism --  Gram Stain Blood -- Gram Stain   polymorphonuclear leukocytes seen  No organisms seen  by cytocentrifuge  Specimen Source .Body Fluid Peritoneal Fluid  Culture-Blood --      RADIOLOGY/EKG:  < from: Xray Chest 1 View- PORTABLE-Urgent (11.09.18 @ 09:53) >  TECHNIQUE:  Single frontal view ofthe chest was obtained.    COMPARISON: 11/8/2018    FINDINGS/IMPRESSION:    Left-sided chest tube is in stable position. No pneumothorax. Small to   moderate right pleural effusion and small left pleural effusion are   present. Extensive subcutaneous emphysema in the left lateral chest wall.    < end of copied text >

## 2018-11-11 NOTE — CHART NOTE - NSCHARTNOTEFT_GEN_A_CORE
called to see pt after plurex cath fell out.  Pt seen and examined resting  comfortably       ICU Vital Signs Last 24 Hrs  T(C): 36.8 (11 Nov 2018 06:15), Max: 37.2 (10 Nov 2018 14:37)  T(F): 98.2 (11 Nov 2018 06:15), Max: 98.9 (10 Nov 2018 14:37)  HR: 89 (11 Nov 2018 12:00) (85 - 100)  BP: 111/73 (11 Nov 2018 12:00) (80/54 - 117/65)  BP(mean): 83 (11 Nov 2018 12:00) (57 - 83)  ABP: --  ABP(mean): --  RR: 18 (11 Nov 2018 12:00) (14 - 20)  SpO2: 98% (11 Nov 2018 12:00) (91% - 100%)        cxr- no ptx        plan: will continue to monitor  -occlusive dressing  -discussed with dr clemens

## 2018-11-11 NOTE — PROGRESS NOTE ADULT - ASSESSMENT
Cirrhosis , hep b    B cell lymphoma    Ascites (low saag on prior) multifactorial including b cell lymphoma/separate from hep b cirrhosis portal htn.    elevated blood wbc but only PMN's with neg ascites culture     pt has no abd pain and his ascites body fluids neg with mostly LYMPPHOCYTES  likely reflective of b cell lymphoma-further studies including flow cytometry can yield more definitive diagnosis to confirm this    aldactone 50 mg daily  (systolic bp 98)  lasix 40 mg daily  lactulose 10 gram daily (appears lucid and mentating well, alert)  tenofovir for hep b    consider addition of PPI     for chest pain -pt should have continued cardiology/hospitalist follow up and eval  consider further eval to rule out p embolism and or cardiac ischemia     hx diastolic heart failure and coronary artery disease on radiologic findings    recommend id consult for elevated blood wbc    Dr. Rodriguez to resume gi care tomorrow

## 2018-11-12 NOTE — PROGRESS NOTE ADULT - SUBJECTIVE AND OBJECTIVE BOX
Patient is a 78y old  Male who presents with a chief complaint of CC: Lower back pain and shortness of breath (2018 06:48)      HPI:  78 year old male with pmh of copd on home o2 2.5L, afib on coumadin, chronic diastolic chf, diabetes on insulin, htn, hep b infection, hypogammaglobulinemia w/ IVIG tx, b cell lymphoma, cholelithiasis s/p cholecystectomy, esophageal dilation, prostatectomy coming to ED with complaints of worsening shortness of breath and lower back pain. Patient stating was on toilet this morning when tried to get up put pants on when he had a sudden onset of lower back pain. No bladder or bowel incontinence. Per Dr Bejarano In ED bladder scan was done with no to low residual urine volume. Per Dr Bejarnao patient able to ambulate with cane in ED. FOr shortness of breath has been going on for past 4-5 weeks and has been gradually worsening. States last echo over 1 year prior. states now able to walk 50 feet without stopping for shortness of breath (2018 22:27)    History per patient, wife and son.  Negative abdominal pain. Negative chest pain. Positive fatigue. Shortness of breath improving. Negative blood in the stool.  Case discussed with hospitalist.        PAST MEDICAL & SURGICAL HISTORY:  Hyperlipidemia  Hypogammaglobulinemia: received IVIG  Hepatitis B virus infection, unspecified chronicity: retrovirals  Chronic diastolic congestive heart failure  Essential hypertension  DM type 2 (diabetes mellitus, type 2)  B-cell lymphoma, unspecified B-cell lymphoma type, unspecified body region: CLL stage 4/SLL; met NHL  Atrial fibrillation, unspecified type  COPD (chronic obstructive pulmonary disease)  History of esophageal dilatation  S/P cholecystectomy  H/O prostatectomy      MEDICATIONS  (STANDING):  amiodarone    Tablet 100 milliGRAM(s) Oral daily  atorvastatin 10 milliGRAM(s) Oral at bedtime  buDESOnide  80 MICROgram(s)/formoterol 4.5 MICROgram(s) Inhaler 2 Puff(s) Inhalation two times a day  clotrimazole 1% Cream 1 Application(s) Topical two times a day  dextrose 5%. 1000 milliLiter(s) (50 mL/Hr) IV Continuous <Continuous>  dextrose 50% Injectable 12.5 Gram(s) IV Push once  dextrose 50% Injectable 25 Gram(s) IV Push once  dextrose 50% Injectable 25 Gram(s) IV Push once  docusate sodium 100 milliGRAM(s) Oral three times a day  furosemide    Tablet 40 milliGRAM(s) Oral daily  gabapentin 300 milliGRAM(s) Oral three times a day  insulin glargine Injectable (LANTUS) 10 Unit(s) SubCutaneous at bedtime  insulin lispro (HumaLOG) corrective regimen sliding scale   SubCutaneous three times a day before meals  insulin lispro (HumaLOG) corrective regimen sliding scale   SubCutaneous at bedtime  lactulose Syrup 10 Gram(s) Oral daily  lidocaine 1% (Preservative-free) Injectable 25 milliLiter(s) Local Injection once  metoprolol tartrate 25 milliGRAM(s) Oral two times a day  multivitamin 1 Tablet(s) Oral daily  senna 2 Tablet(s) Oral at bedtime  spironolactone 100 milliGRAM(s) Oral daily  tenofovir disoproxil fumarate (VIREAD) 300 milliGRAM(s) Oral daily  tiotropium 18 MICROgram(s) Capsule 1 Capsule(s) Inhalation daily  warfarin 1 milliGRAM(s) Oral daily    MEDICATIONS  (PRN):  ALBUTerol/ipratropium for Nebulization 3 milliLiter(s) Nebulizer every 6 hours PRN Shortness of Breath and/or Wheezing  dextrose 40% Gel 15 Gram(s) Oral once PRN Blood Glucose LESS THAN 70 milliGRAM(s)/deciliter  glucagon  Injectable 1 milliGRAM(s) IntraMuscular once PRN Glucose LESS THAN 70 milligrams/deciliter  morphine  - Injectable 4 milliGRAM(s) IV Push every 3 hours PRN Severe Pain (7 - 10)  oxyCODONE    5 mG/acetaminophen 325 mG 1 Tablet(s) Oral every 4 hours PRN Mild Pain (1 - 3)  oxyCODONE    5 mG/acetaminophen 325 mG 2 Tablet(s) Oral every 4 hours PRN Moderate Pain (4 - 6)      Allergies    No Known Allergies    Intolerances        SOCIAL HISTORY:NC    FAMILY HISTORY:  Family history of liver cancer (Father): father  85 liver CA  Family history of coronary arteriosclerosis (Mother): Mom  of MI age 68      REVIEW OF SYSTEMS:    CONSTITUTIONAL: No weakness, fevers or chills  EYES/ENT: No visual changes;  No vertigo or throat pain   NECK: No pain or stiffness  RESPIRATORY: No cough, wheezing, hemoptysis; No shortness of breath  CARDIOVASCULAR: No chest pain or palpitations  GENITOURINARY: No dysuria, frequency or hematuria  NEUROLOGICAL: No numbness or weakness  SKIN: No itching, burning, rashes, or lesions   All other review of systems is negative unless indicated above.    Vital Signs Last 24 Hrs  T(C): 36.7 (2018 08:50), Max: 37.1 (2018 21:15)  T(F): 98 (2018 08:50), Max: 98.7 (2018 21:15)  HR: 76 (2018 08:00) (76 - 100)  BP: 96/57 (2018 08:00) (94/57 - 111/73)  BP(mean): 66 (2018 08:00) (60 - 83)  RR: 19 (2018 08:00) (15 - 23)  SpO2: 100% (2018 08:00) (91% - 100%)    PHYSICAL EXAM:    Constitutional: NAD, well-developed  HEENT: EOMI, throat clear  Neck: No LAD, supple  Respiratory: CTA and P, dec BS L base  Cardiovascular: S1 and S2, RRR, no M  Gastrointestinal: BS+, soft, NT/ND, neg HSM,pos ascites  Extremities: No peripheral edema, neg clubing, cyanosis  Vascular: 2+ peripheral pulses  Neurological: A/O x 3, no focal deficits  Psychiatric: Normal mood, normal affect  Skin: No rashes    LABS:  CBC Full  -  ( 2018 03:58 )  WBC Count : 16.03 K/uL  Hemoglobin : 11.6 g/dL  Hematocrit : 36.7 %  Platelet Count - Automated : 113 K/uL  Mean Cell Volume : 81.0 fl  Mean Cell Hemoglobin : 25.6 pg  Mean Cell Hemoglobin Concentration : 31.6 gm/dL  Auto Neutrophil # : x  Auto Lymphocyte # : x  Auto Monocyte # : x  Auto Eosinophil # : x  Auto Basophil # : x  Auto Neutrophil % : x  Auto Lymphocyte % : x  Auto Monocyte % : x  Auto Eosinophil % : x  Auto Basophil % : x        131<L>  |  96  |  25<H>  ----------------------------<  106<H>  4.6   |  27  |  1.09    Ca    7.6<L>      2018 03:58      PT/INR - ( 2018 03:58 )   PT: 28.6 sec;   INR: 2.50 ratio         PTT - ( 2018 03:58 )  PTT:39.2 sec     @ 12:27  Hep A Igm --  Hep A total ab, IgA and M --  Hep B core Ab total --  Hep B core IgM --  Hep B DNA PCR NotDetec  Hep BSAg --  Hep BSAb --  Hep BSAb --  HCV Ab --  HCV RNA Log --  HCV RNA interp --             @ 12:36  Hep A Igm --  Hep A total ab, IgA and M --  Hep B core Ab total --  Hep B core IgM --  Hep B DNA PCR NotDetec  Hep BSAg --  Hep BSAb --  Hep BSAb --  HCV Ab --  HCV RNA Log --  HCV RNA interp --                RADIOLOGY & ADDITIONAL STUDIES:

## 2018-11-12 NOTE — DISCHARGE NOTE ADULT - NS AS DC FU [facility] CONTRAINDICATIONS ACEI/ARB MEDS
other reasons documented by MD/NP/PA or Pharmacist/Concern for hypOtension as needs aldactone 100mg qd as well as furosemid 40mg

## 2018-11-12 NOTE — PROGRESS NOTE ADULT - SUBJECTIVE AND OBJECTIVE BOX
Subjective:  78 year old male with COPD on home O2, hep B/cirrohsis, Afib on AC,  SLL/ CLL on Obinutuzumab and  Gammaglobulin infusion for hypogammaglobulinemia admitted with worsening shortness of breath and lower back pain. Found to have a  moderate to large left pleural effusion with underlying compressive atelectasis. Small right pleural effusion with underlying compressive atelectasis.  Prominent lymph nodes in the bilateral axilla mediastinum  MRI reveals T 1 acute compression fracture  11/5/18:  patient seen and examined with son and wife at bedside.  States his breathing is labored but stable.  NPO for paracentesis and VATS pleur X placement today.    11/6/18:  POD 1 VATs Left pleural effusion drainage and PleurX placement.  Jaret  #[id number] used for interview.  Wife states he is feeling weak and having some pain at surgical site.  Breathing has improved since pleural effusion drainage.    11/7/18: POD 2 pleurX placement, did not undergo IR Paracentesis yesterday.  Ethan  # [id number] used for interview.  Wife states he is feeling better.    11/8/18 POD 3 Pt seen and examined. Breathing OK. According to RN staff unable to walk at this time, being evaluated. Back brace in place for compression fx.     11/9/18 Pleurx set up to waterseal yesterday, 500cc drained, sent for analysis. Pt seen this am, continues to c/o back pain. Family looking into rehab placement.    11/12/18 Patient interviewed with son and wife at bedside.  Patient is not short of breath or having difficulty breathing.  PleurX became dislodged yesterday.  CXR shows mild bilateral pleural effusions.  Patient told to follow up with Dr. Crespo as outpatient for redo pleurX.    Vital Signs:  Vital Signs Last 24 Hrs  T(C): 36.7 (11-12-18 @ 08:50), Max: 37.1 (11-11-18 @ 21:15)  T(F): 98 (11-12-18 @ 08:50), Max: 98.7 (11-11-18 @ 21:15)  HR: 79 (11-12-18 @ 12:00) (76 - 100)  BP: 86/51 (11-12-18 @ 12:00) (86/51 - 108/72)  RR: 17 (11-12-18 @ 12:00) (15 - 23)  SpO2: 99% (11-12-18 @ 12:00) (91% - 100%) on NC.    Telemetry/Alarms: sinus rhythm    Relevant labs, radiology and Medications reviewed                        11.6   16.03 )-----------( 113      ( 12 Nov 2018 03:58 )             36.7     11-12    131<L>  |  96  |  25<H>  ----------------------------<  106<H>  4.6   |  27  |  1.09    Ca    7.6<L>      12 Nov 2018 03:58      PT/INR - ( 12 Nov 2018 03:58 )   PT: 28.6 sec;   INR: 2.50 ratio         PTT - ( 12 Nov 2018 03:58 )  PTT:39.2 sec  MEDICATIONS  (STANDING):  amiodarone    Tablet 100 milliGRAM(s) Oral daily  atorvastatin 10 milliGRAM(s) Oral at bedtime  buDESOnide  80 MICROgram(s)/formoterol 4.5 MICROgram(s) Inhaler 2 Puff(s) Inhalation two times a day  clotrimazole 1% Cream 1 Application(s) Topical two times a day  docusate sodium 100 milliGRAM(s) Oral three times a day  furosemide    Tablet 40 milliGRAM(s) Oral daily  gabapentin 300 milliGRAM(s) Oral three times a day  insulin glargine Injectable (LANTUS) 10 Unit(s) SubCutaneous at bedtime  insulin lispro (HumaLOG) corrective regimen sliding scale   SubCutaneous three times a day before meals  insulin lispro (HumaLOG) corrective regimen sliding scale   SubCutaneous at bedtime  lactulose Syrup 10 Gram(s) Oral daily  lidocaine 1% (Preservative-free) Injectable 25 milliLiter(s) Local Injection once  metoprolol tartrate 25 milliGRAM(s) Oral two times a day  multivitamin 1 Tablet(s) Oral daily  senna 2 Tablet(s) Oral at bedtime  spironolactone 100 milliGRAM(s) Oral daily  tenofovir disoproxil fumarate (VIREAD) 300 milliGRAM(s) Oral daily  tiotropium 18 MICROgram(s) Capsule 1 Capsule(s) Inhalation daily  warfarin 1 milliGRAM(s) Oral daily    MEDICATIONS  (PRN):  ALBUTerol/ipratropium for Nebulization 3 milliLiter(s) Nebulizer every 6 hours PRN Shortness of Breath and/or Wheezing  dextrose 40% Gel 15 Gram(s) Oral once PRN Blood Glucose LESS THAN 70 milliGRAM(s)/deciliter  glucagon  Injectable 1 milliGRAM(s) IntraMuscular once PRN Glucose LESS THAN 70 milligrams/deciliter  morphine  - Injectable 4 milliGRAM(s) IV Push every 3 hours PRN Severe Pain (7 - 10)  oxyCODONE    5 mG/acetaminophen 325 mG 1 Tablet(s) Oral every 4 hours PRN Mild Pain (1 - 3)  oxyCODONE    5 mG/acetaminophen 325 mG 2 Tablet(s) Oral every 4 hours PRN Moderate Pain (4 - 6)      Physical exam  Gen: NAD, breathing comfortably.  Neuro: AO x 3.  Card: S1 S2.  Pulm: Decreased bibasilar lung sounds.  Abd: soft NT ND.  Ext: No edema.      I&O's Summary    11 Nov 2018 07:01  -  12 Nov 2018 07:00  --------------------------------------------------------  IN: 0 mL / OUT: 875 mL / NET: -875 mL    12 Nov 2018 07:01  -  12 Nov 2018 12:59  --------------------------------------------------------  IN: 0 mL / OUT: 250 mL / NET: -250 mL      Assessment  78y Male  w/ PAST MEDICAL & SURGICAL HISTORY:  Hyperlipidemia  Hypogammaglobulinemia: received IVIG  Hepatitis B virus infection, unspecified chronicity: retrovirals  Chronic diastolic congestive heart failure  Essential hypertension  DM type 2 (diabetes mellitus, type 2)  B-cell lymphoma, unspecified B-cell lymphoma type, unspecified body region: CLL stage 4/SLL; met NHL  Atrial fibrillation, unspecified type  COPD (chronic obstructive pulmonary disease)  History of esophageal dilatation  S/P cholecystectomy  H/O prostatectomy  admitted with Lower back pain and shortness of breath (09 Nov 2018 09:13)  11/5/18 VATS (video-assisted thoracoscopic surgery) pleurx  11/11/18 PleurX dislodged, CXR stable.      PLAN  Discharge to rehab.  Told family to see Dr. Crespo as outpatient and schedule redo PleurX if need be.  [phone]

## 2018-11-12 NOTE — PROGRESS NOTE ADULT - ASSESSMENT
78 y.o. male with complex past medical history including CLL/lymphoma on treatment, h/o recurrent pneumonia, Afib, HFpEF, Hep B, COPD, type 2DM, H/o PE DVT      inc WBC noted   ascites likely mixed with cirhosis and ca  diuretics modified and all risks DW family,         med -volume ascites, likely multifactorial , the may be a component of progression of lymphoma and cirrhosis also contributing to this.  prior SAAG was low and likely component of malignant ascites and P effusion  pulmonary catheter placement noted and appreciated from CT surgery note. DCed    pts with cirrhosis usually run lower BP and lisinopril DCed  DW cardiology    history of cirrhosis, increased risk of esophageal varices and bleeding reviewed however, patient is also had increased risk of endoscopy.  Last endoscopy was in 2014 at which time he did not have esophageal varices.  Discussed with pulmonary, patient is refusing  inc risk bleed due to possible EV and AC DW pt and pt accepts risk    discussed with pulmonary and hospitalist.        gentle diuresis and would inc dose if pt not with SBP        Hepatitis B with liver cirrhosis and ascites, noncompliance discussed with patient and family.

## 2018-11-12 NOTE — DISCHARGE NOTE ADULT - MEDICATION SUMMARY - MEDICATIONS TO CHANGE
I will SWITCH the dose or number of times a day I take the medications listed below when I get home from the hospital:    spironolactone 25 mg oral tablet  -- 1 tab(s) by mouth once a day (at bedtime)    furosemide 20 mg oral tablet  -- 1 tab(s) by mouth 2 times a day

## 2018-11-12 NOTE — PROGRESS NOTE ADULT - REASON FOR ADMISSION
CC: Lower back pain and shortness of breath
CC: Lower back pain
CC: Lower back pain and shortness of breath
CC: Lower back pain and shortness of breath
LBP
Lower back pain
Lower back pain
CC: Lower back pain and shortness of breath

## 2018-11-12 NOTE — DISCHARGE NOTE ADULT - CARE PLAN
Goal:	monitor for symptoms of shortness of breath  Assessment and plan of treatment:	Pleurx catheter drained fluid 11/5-11/11  Continue to monitor for severe pain and symptoms of shortness of breath  Follow up with Primary Medical Doctor after discharge  Assessment and plan of treatment:	Follow up with Primary Medical Doctor after discharge  Assessment and plan of treatment:	Follow up with Primary Medical Doctor after discharge  Assessment and plan of treatment:	Follow up with Primary Medical Doctor after discharge  Assessment and plan of treatment:	Follow up with Primary Medical Doctor after discharge  Assessment and plan of treatment:	Follow up with Primary Medical Doctor after discharge  Assessment and plan of treatment:	Follow up with Primary Medical Doctor after discharge Principal Discharge DX:	Chronic diastolic congestive heart failure  Goal:	monitor for symptoms of shortness of breath  Assessment and plan of treatment:	Pleurx catheter drained fluid 11/5-11/11  ******Follow up with Thoracic surgeon, Dr. Crespo ( [phone]) as outpatient for redo pleurX catheter placement  Continue to monitor for severe pain and symptoms of shortness of breath  Follow up with Primary Medical Doctor, Dr. Ibarra after discharge  Secondary Diagnosis:	DM type 2 (diabetes mellitus, type 2)  Assessment and plan of treatment:	Follow up with Primary Medical Doctor , Dr. Ibarra after discharge  Secondary Diagnosis:	Essential hypertension  Assessment and plan of treatment:	Follow up with Primary Medical Doctor, Dr. Ibarra  after discharge  Secondary Diagnosis:	Atrial fibrillation, unspecified type  Assessment and plan of treatment:	Follow up with Primary Medical Doctor after discharge within one week  Follow up with Cardiologist, Dr. Conte, in 5 days for INR  blood work  Secondary Diagnosis:	B-cell lymphoma, unspecified B-cell lymphoma type, unspecified body region  Assessment and plan of treatment:	Follow up with Primary Medical Doctor after discharge  Follow up Gastroenterologist, Dr. Albarran, to discuss results of paracentesis cytology and to monitor symptoms of abdominal swelling and shortness of breath  Secondary Diagnosis:	Closed compression fracture of first lumbar vertebra, initial encounter  Assessment and plan of treatment:	Follow up with Primary Medical Doctor after discharge  ********Follow up with Orthopaedic doctor, DR. FREGOSO. IN 2 WEEKS WITH XRAYS AP/LAT TO OFFICE IF IN REHAB AT TIME OF FOLLOW-UP  Secondary Diagnosis:	Hyperlipidemia  Assessment and plan of treatment:	Follow up with Primary Medical Doctor after discharge. Principal Discharge DX:	Chronic diastolic congestive heart failure  Goal:	monitor for symptoms of shortness of breath  Assessment and plan of treatment:	Pleurx catheter drained fluid 11/5-11/11  ******Follow up with Thoracic surgeon, Dr. Crespo ( [phone]) as outpatient for redo pleurX catheter placement  Continue to monitor for severe pain and symptoms of shortness of breath  Follow up with Primary Medical Doctor, Dr. Ibarra after discharge  Secondary Diagnosis:	DM type 2 (diabetes mellitus, type 2)  Assessment and plan of treatment:	Follow up with Primary Medical Doctor , Dr. Ibarra after discharge  Secondary Diagnosis:	Essential hypertension  Assessment and plan of treatment:	Follow up with Primary Medical Doctor, Dr. Ibarra  after discharge  Secondary Diagnosis:	Atrial fibrillation, unspecified type  Assessment and plan of treatment:	Follow up with Primary Medical Doctor after discharge within one week  Follow up with Cardiologist, Dr. Conte, in 5 days for INR  blood work  Secondary Diagnosis:	B-cell lymphoma, unspecified B-cell lymphoma type, unspecified body region  Assessment and plan of treatment:	Follow up with Primary Medical Doctor after discharge  Follow up Gastroenterologist, Dr. Albarran, to discuss results of paracentesis cytology and to monitor symptoms of abdominal swelling and shortness of breath  Secondary Diagnosis:	Closed compression fracture of first lumbar vertebra, initial encounter  Assessment and plan of treatment:	Follow up with Primary Medical Doctor after discharge  ********Follow up with Orthopaedic doctor, DR. FREGOSO. IN 2 WEEKS WITH XRAYS AP/LAT TO OFFICE IF IN REHAB AT TIME OF FOLLOW-UP  Secondary Diagnosis:	Hyperlipidemia  Assessment and plan of treatment:	Follow up with Primary Medical Doctor after discharge.  Continue statin as prescribed Principal Discharge DX:	Chronic diastolic congestive heart failure  Goal:	monitor for symptoms of shortness of breath  Assessment and plan of treatment:	Pleurx catheter drained fluid 11/5-11/11  ******Follow up with Thoracic surgeon, Dr. Crespo ([phone]) as outpatient for redo pleurX catheter placement  Continue to monitor for severe pain and symptoms of shortness of breath  Follow up with Primary Medical Doctor, Dr. Ibarra after discharge  Secondary Diagnosis:	DM type 2 (diabetes mellitus, type 2)  Goal:	Continue to medical management and follow up  Assessment and plan of treatment:	- If you experience, dizziness, blurry vision, shaking, sweating after you take your diabetes medication, please drink a glass of juice and if symptoms persist call/have your care taker your doctor or 9-1-1 immediately.  - Low carbohydrate/low fat diet  - Please measure and keep a log of your blood sugars   - Please take your medication as prescribed  - Follow up with Primary Medical Doctor , Dr. Ibarra after discharge  Secondary Diagnosis:	Essential hypertension  Goal:	To maintain normal blood pressures  Assessment and plan of treatment:	- If you experience headache, blurry vision, chest pain, numbness/tingling in your extremities, call your primary care physician or go to the ED immediately  - Check your blood pressure at home and keep a log  - Low sodium diet  - Take medication as prescribed  Follow up with Primary Medical Doctor, Dr. Ibarra  after discharge  Secondary Diagnosis:	Atrial fibrillation, unspecified type  Goal:	To medically manage  Assessment and plan of treatment:	Follow up with Primary Medical Doctor after discharge within one week  Follow up with Cardiologist, Dr. Conte, in 3 days for INR  blood work  Secondary Diagnosis:	B-cell lymphoma, unspecified B-cell lymphoma type, unspecified body region  Goal:	To continue monitor  Assessment and plan of treatment:	Follow up with Primary Medical Doctor after discharge  Follow up Gastroenterologist, Dr. Albarran, to discuss results of paracentesis cytology and to monitor symptoms of abdominal swelling and shortness of breath  Follow up with hematologist/oncologist outpatient  Secondary Diagnosis:	Closed compression fracture of first lumbar vertebra, initial encounter  Goal:	To continue to monitor and follow up with orthopedics on discharge  Assessment and plan of treatment:	Follow up with Primary Medical Doctor after discharge  ********Follow up with Orthopaedic doctor, DR. FREGOSO. IN 2 WEEKS WITH XRAYS AP/LAT TO OFFICE IF IN REHAB AT TIME OF FOLLOW-UP  Secondary Diagnosis:	Hyperlipidemia  Goal:	To maintain normal lipid levels  Assessment and plan of treatment:	- Low fat diet  - Take medication as prescribed  - Follow up with Primary Medical Doctor after discharge.  - Continue statin as prescribed

## 2018-11-12 NOTE — PROGRESS NOTE ADULT - NSHPATTENDINGPLANDISCUSS_GEN_ALL_CORE
patient, family, nursing, Medicine
patient, family, nursing, Medicine, Dr. French
Dr Angel Burrell
pt, RN

## 2018-11-12 NOTE — DISCHARGE NOTE ADULT - MEDICATION SUMMARY - MEDICATIONS TO TAKE
I will START or STAY ON the medications listed below when I get home from the hospital:    Aldactone 100 mg oral tablet  -- 1 tab(s) by mouth once a day  Please follow up with Dr. Pope  -- Indication: For Diuresing    acetaminophen 500 mg oral tablet  -- 2 tab(s) by mouth every 6 hours, As Needed - for moderate pain  -- Indication: For Pain (1-3)    amiodarone 200 mg oral tablet  -- 0.5 tab(s) by mouth once a day  -- Indication: For Atrial fibrillation    warfarin 2.5 mg oral tablet  -- 1 tab(s) by mouth once a day (at bedtime)    -- Indication: For Atrial fibrillation    gabapentin 300 mg oral capsule  -- 1 cap(s) by mouth 3 times a day  -- Indication: For Neuralgia    Tresiba FlexTouch 100 units/mL subcutaneous solution  -- 20 unit(s) subcutaneous once a day  -- Indication: For Diabetes    pravastatin 10 mg oral tablet  -- 1 tab(s) by mouth once a day  -- Indication: For HLD    tenofovir disoproxil fumarate 300 mg oral tablet  -- 1 tab(s) by mouth once a day  -- Indication: For Hepatitis    metoprolol tartrate 25 mg oral tablet  -- 1 tab(s) by mouth 2 times a day  -- Indication: For Atrial fibrillation    albuterol 2.5 mg/3 mL (0.083%) inhalation solution  -- inhaled once a day  -- Indication: For COPD    furosemide 40 mg oral tablet  -- 1 tab(s) by mouth once a day  -- Indication: For CHF    montelukast 10 mg oral tablet  -- 1 tab(s) by mouth once a day (at bedtime)  -- Indication: For COPD    multivitamin  -- 1 tab(s) by mouth once a day  -- Indication: For Supplement

## 2018-11-12 NOTE — DISCHARGE NOTE ADULT - PATIENT PORTAL LINK FT
You can access the [facility] Patient Portal, offered by [facility], by registering with the following website: [email or web address]

## 2018-11-12 NOTE — DISCHARGE NOTE ADULT - ADDITIONAL INSTRUCTIONS
If severe fever >100.4 F, chills, chest pain, shortness of breath, severe abdominal pain, and back pain or worsening of breathing occur, return to the emergency department. Patient to be discharged to rehab facility and follow up with primary care, Dr. Ibarra, Pulmonary Dr. Hall, Cardiologist Dr. Conte, and Gastroenterologist, Dr. Albarran after discharge. If severe fever >100.4 F, chills, chest pain, shortness of breath, severe abdominal pain, and back pain or worsening of breathing occur, return to the emergency department. Patient to be discharged to rehab facility and follow up with:   primary care, Dr. Ibarra,      Pulmonary Dr. Hall    Cardiologist, Dr. Conte,    Gastroenterologist, Dr. Albarran,    ******Thoracic surgeon, Dr. Crespo ( [phone]) as outpatient for redo pleurX catheter placement  ********Orthopaedic doctor, DR. FREGOSO. IN 2 WEEKS WITH XRAYS AP/LAT TO OFFICE IF IN REHAB AT TIME OF FOLLOW-UP If severe fever >100.4 F, chills, chest pain, shortness of breath, severe abdominal pain, and back pain or worsening of breathing occur, return to the emergency department. Patient to be discharged to rehab facility and follow up with:   primary care, Dr. Ibarra,      Pulmonary Dr. Hall    Cardiologist, Dr. Conte,    Gastroenterologist, Dr. Albarran,    ******Thoracic surgeon, Dr. Crespo ( [phone]) as outpatient for redo pleurX catheter placement  ********Orthopaedic doctor, DR. FREGOSO. IN 2 WEEKS WITH XRAYS AP/LAT TO OFFICE IF IN REHAB AT TIME OF FOLLOW-UP  Please take all medications as prescribed.

## 2018-11-12 NOTE — DISCHARGE NOTE ADULT - CARE PROVIDER_API CALL
Co, Miguel Angel CHOI), Internal Medicine  [address] [place] [address]  Phone: [phone]  Fax: [phone]    Raul Mchugh), Gastroenterology  [address] [place] [address]  Phone: [phone]  Fax: [phone]    Geo Conte), Cardiovascular Disease; Internal Medicine  [address] [place] [address]  Phone: [phone]  Fax: [phone]    KELLY Lakhani (), Pulmonary Disease; Sleep Medicine  [address] [place] [address]  Phone: [phone]  Fax: [phone]

## 2018-11-12 NOTE — CHART NOTE - NSCHARTNOTEFT_GEN_A_CORE
Voicemail left for Dr. Ibarra answering service to update on patient's hospitalization. No answer.    D/W Dr. Smith

## 2018-11-12 NOTE — DISCHARGE NOTE ADULT - HOSPITAL COURSE
78 year old male with pmh of copd on home o2 2.5L, afib on coumadin, chronic diastolic chf, diabetes on insulin, htn, hep b infection, hypogammaglobulinemia w/ IVIG tx, b cell lymphoma, cholelithiasis s/p cholecystectomy, esophageal dilation, prostatectomy admitted to [facility] on 11/2/18 for shortness of breath 2/2 to pleural effusion and acute on chronic CHF and back pain. Patient was seen by Thoracic surgery and L pleurx catheter was placed. Patient later went for paracentesis and cytology was also sent off. Given history of compression fracture, patient has been seen by Orthopaedics, and Physical therapy Patient respiratory status has improved and he is medically stable and cleared for discharge from [facility] on 11/12/18 to rehab for further therapy. 78 year old male with pmh of copd on home o2 2.5L, afib on coumadin, chronic diastolic chf, diabetes on insulin, htn, hep b infection, hypogammaglobulinemia w/ IVIG tx, b cell lymphoma, cholelithiasis s/p cholecystectomy, esophageal dilation, prostatectomy admitted to [facility] on 11/2/18 for shortness of breath 2/2 to pleural effusion and acute on chronic CHF and back pain. Patient was seen by Thoracic surgery and L pleurx catheter was placed. Patient later went for paracentesis and cytology was also sent off. Given history of compression fracture, patient has been seen by Orthopaedics- who recommended a back brace when pt is out of bed. Pt was also evaluated by Physical therapy. Patient's respiratory status has significantly improved and he is medically stable and cleared for discharge from [facility] on 11/12/18 to rehab for further therapy.     SUBJECTIVE:     REVIEW OF SYSTEMS:  CONSTITUTIONAL: No weakness, fevers or chills  EYES/ENT: No visual changes;  No vertigo or throat pain   NECK: No pain or stiffness  RESPIRATORY: No cough, wheezing, hemoptysis; No shortness of breath  CARDIOVASCULAR: No chest pain or palpitations  GASTROINTESTINAL: No abdominal or epigastric pain. No nausea, vomiting, or hematemesis; No diarrhea or constipation. No melena or hematochezia.  GENITOURINARY: No dysuria, frequency or hematuria  NEUROLOGICAL: No numbness or weakness  SKIN: No itching, burning, rashes, or lesions   All other review of systems is negative unless indicated above    Vital Signs Last 24 Hrs  T(C): 36.7 (12 Nov 2018 08:50), Max: 37.1 (11 Nov 2018 21:15)  T(F): 98 (12 Nov 2018 08:50), Max: 98.7 (11 Nov 2018 21:15)  HR: 76 (12 Nov 2018 08:00) (76 - 100)  BP: 96/57 (12 Nov 2018 08:00) (94/57 - 111/73)  BP(mean): 66 (12 Nov 2018 08:00) (60 - 83)  RR: 19 (12 Nov 2018 08:00) (15 - 23)  SpO2: 100% (12 Nov 2018 08:00) (91% - 100%)    I&O's Summary  11 Nov 2018 07:01  -  12 Nov 2018 07:00  --------------------------------------------------------  IN: 0 mL / OUT: 875 mL / NET: -875 mL      PHYSICAL EXAM:  Constitutional: NAD, awake and alert, well-developed  HEENT: PERR, EOMI, Normal Hearing, MMM  Neck: Soft and supple, No LAD, No JVD  Respiratory: Breath sounds are clear bilaterally, No wheezing, rales or rhonchi  Cardiovascular: S1 and S2, regular rate and rhythm, no Murmurs, gallops or rubs  Gastrointestinal: Bowel Sounds present, soft, nontender, nondistended, no guarding, no rebound  Extremities: No peripheral edema  Vascular: 2+ peripheral pulses  Neurological: A/O x 3, no focal deficits  Musculoskeletal: 5/5 strength b/l upper and lower extremities  Skin: No rashes      LABS: All Labs Reviewed:                      11.6   16.03 )-----------( 113      ( 12 Nov 2018 03:58 )             36.7     11-12  131<L>  |  96  |  25<H>  ----------------------------<  106<H>  4.6   |  27  |  1.09  Ca    7.6<L>      12 Nov 2018 03:58    PT/INR - ( 12 Nov 2018 03:58 )   PT: 28.6 sec;   INR: 2.50 ratio    PTT - ( 12 Nov 2018 03:58 )  PTT:39.2 sec  CARDIAC MARKERS ( 11 Nov 2018 11:27 )  0.017 ng/mL / x     / 13 U/L / x     / x        Blood Culture: 11-09 @ 18:11  polymorphonuclear leukocytes seen; No organisms seen by cytocentrifuge  Specimen Source .Body Fluid Peritoneal Fluid- 78 year old male with pmh of copd on home o2 2.5L, afib on coumadin, chronic diastolic chf, diabetes on insulin, htn, hep b infection, hypogammaglobulinemia w/ IVIG tx, b cell lymphoma, cholelithiasis s/p cholecystectomy, esophageal dilation, prostatectomy admitted to [facility] on 11/2/18 for shortness of breath 2/2 to pleural effusion and acute on chronic CHF and back pain. Patient was seen by Thoracic surgery and L pleurx catheter was placed. Patient later went for paracentesis and cytology was also sent off. Given history of compression fracture, patient has been seen by Orthopaedics- who recommended a back brace when pt is out of bed. Pt was also evaluated by Physical therapy. Patient's respiratory status has significantly improved and he is medically stable and cleared for discharge from [facility] on 11/12/18 to rehab for further therapy.     SUBJECTIVE:   11/12/18  Patient seen and evaluated today. States his pain is mild currently and he has no issues currently. Will discharge today. Noted by nurse that overnight chest tube fell out.    REVIEW OF SYSTEMS:  CONSTITUTIONAL: No weakness, fevers or chills  EYES/ENT: No visual changes;  No vertigo or throat pain   NECK: No pain or stiffness  RESPIRATORY: No cough, wheezing, hemoptysis; No shortness of breath  CARDIOVASCULAR: No chest pain or palpitations  GASTROINTESTINAL: No abdominal or epigastric pain. No nausea, vomiting, or hematemesis; No diarrhea or constipation. No melena or hematochezia.  GENITOURINARY: No dysuria, frequency or hematuria  NEUROLOGICAL: No numbness or weakness  SKIN: No itching, burning, rashes, or lesions   All other review of systems is negative unless indicated above    Vital Signs Last 24 Hrs  T(C): 36.7 (12 Nov 2018 08:50), Max: 37.1 (11 Nov 2018 21:15)  T(F): 98 (12 Nov 2018 08:50), Max: 98.7 (11 Nov 2018 21:15)  HR: 76 (12 Nov 2018 08:00) (76 - 100)  BP: 96/57 (12 Nov 2018 08:00) (94/57 - 111/73)  BP(mean): 66 (12 Nov 2018 08:00) (60 - 83)  RR: 19 (12 Nov 2018 08:00) (15 - 23)  SpO2: 100% (12 Nov 2018 08:00) (91% - 100%)    I&O's Summary  11 Nov 2018 07:01  -  12 Nov 2018 07:00  --------------------------------------------------------  IN: 0 mL / OUT: 875 mL / NET: -875 mL      PHYSICAL EXAM:  Constitutional: NAD, awake and alert,   Respiratory: Course breath sounds b/l in anterior on posterior upper lung L>R, No wheezing, rales or rhonchi  Cardiovascular: S1 and S2, regular rate and rhythm, no Murmurs, gallops or rubs  Gastrointestinal: Bowel Sounds present, soft, nontender, nondistended, no guarding, no rebound  Extremities: No peripheral edema  Vascular: 2+ peripheral pulses  Skin: No rashes      LABS: All Labs Reviewed:                      11.6   16.03 )-----------( 113      ( 12 Nov 2018 03:58 )             36.7     11-12  131<L>  |  96  |  25<H>  ----------------------------<  106<H>  4.6   |  27  |  1.09  Ca    7.6<L>      12 Nov 2018 03:58    PT/INR - ( 12 Nov 2018 03:58 )   PT: 28.6 sec;   INR: 2.50 ratio    PTT - ( 12 Nov 2018 03:58 )  PTT:39.2 sec  CARDIAC MARKERS ( 11 Nov 2018 11:27 )  0.017 ng/mL / x     / 13 U/L / x     / x        Blood Culture: 11-09 @ 18:11  polymorphonuclear leukocytes seen; No organisms seen by cytocentrifuge  Specimen Source .Body Fluid Peritoneal Fluid- 78 year old male with pmh of copd on home o2 2.5L, afib on coumadin, chronic diastolic chf, diabetes on insulin, htn, hep b infection, hypogammaglobulinemia w/ IVIG tx, b cell lymphoma, cholelithiasis s/p cholecystectomy, esophageal dilation, prostatectomy admitted to [facility] on 11/2/18 for shortness of breath 2/2 to pleural effusion and acute on chronic CHF and back pain. Patient was seen by Thoracic surgery and L pleurx catheter was placed. Patient later went for paracentesis and cytology was also sent off. Given history of compression fracture, patient has been seen by Orthopaedics- who recommended a back brace when pt is out of bed. Pt was also evaluated by Physical therapy. Patient's respiratory status has significantly improved and he is medically stable and cleared for discharge from [facility] on 11/12/18 to rehab for further therapy. Patient to follow up with Dr. French, Orthopadeics outpatient and Dr. Crespo, Thoracic surgeon for replacement of L pleuX catheter, in addition to Dr. Ibarra, PCP, Dr. Mchugh, Dr. Morrell and Dr. Conte, Cardiologist.     SUBJECTIVE:   11/12/18  Patient seen and evaluated today. States his pain is mild currently and he has no issues currently. Will discharge today. Noted by nurse that overnight chest tube fell out.    REVIEW OF SYSTEMS:  CONSTITUTIONAL: No weakness, fevers or chills  EYES/ENT: No visual changes;  No vertigo or throat pain   NECK: No pain or stiffness  RESPIRATORY: No cough, wheezing, hemoptysis; No shortness of breath  CARDIOVASCULAR: No chest pain or palpitations  GASTROINTESTINAL: No abdominal or epigastric pain. No nausea, vomiting, or hematemesis; No diarrhea or constipation. No melena or hematochezia.  GENITOURINARY: No dysuria, frequency or hematuria  NEUROLOGICAL: No numbness or weakness  SKIN: No itching, burning, rashes, or lesions   All other review of systems is negative unless indicated above    Vital Signs Last 24 Hrs  T(C): 36.7 (12 Nov 2018 08:50), Max: 37.1 (11 Nov 2018 21:15)  T(F): 98 (12 Nov 2018 08:50), Max: 98.7 (11 Nov 2018 21:15)  HR: 76 (12 Nov 2018 08:00) (76 - 100)  BP: 96/57 (12 Nov 2018 08:00) (94/57 - 111/73)  BP(mean): 66 (12 Nov 2018 08:00) (60 - 83)  RR: 19 (12 Nov 2018 08:00) (15 - 23)  SpO2: 100% (12 Nov 2018 08:00) (91% - 100%)    I&O's Summary  11 Nov 2018 07:01  -  12 Nov 2018 07:00  --------------------------------------------------------  IN: 0 mL / OUT: 875 mL / NET: -875 mL      PHYSICAL EXAM:  Constitutional: NAD, awake and alert,   Respiratory: Course breath sounds b/l in anterior on posterior upper lung L>R, No wheezing, rales or rhonchi  Cardiovascular: S1 and S2, regular rate and rhythm, no Murmurs, gallops or rubs  Gastrointestinal: Bowel Sounds present, soft, nontender, nondistended, no guarding, no rebound  Extremities: No peripheral edema  Vascular: 2+ peripheral pulses  Skin: No rashes      LABS: All Labs Reviewed:                      11.6   16.03 )-----------( 113      ( 12 Nov 2018 03:58 )             36.7     11-12  131<L>  |  96  |  25<H>  ----------------------------<  106<H>  4.6   |  27  |  1.09  Ca    7.6<L>      12 Nov 2018 03:58    PT/INR - ( 12 Nov 2018 03:58 )   PT: 28.6 sec;   INR: 2.50 ratio    PTT - ( 12 Nov 2018 03:58 )  PTT:39.2 sec  CARDIAC MARKERS ( 11 Nov 2018 11:27 )  0.017 ng/mL / x     / 13 U/L / x     / x        Blood Culture: 11-09 @ 18:11  polymorphonuclear leukocytes seen; No organisms seen by cytocentrifuge  Specimen Source .Body Fluid Peritoneal Fluid- 78 year old male with pmh of copd on home o2 2.5L, afib on coumadin, chronic diastolic chf, diabetes on insulin, htn, hep b infection, hypogammaglobulinemia w/ IVIG tx, b cell lymphoma, cholelithiasis s/p cholecystectomy, esophageal dilation, prostatectomy admitted to [facility] on 11/2/18 for shortness of breath 2/2 to pleural effusion and acute on chronic CHF and back pain. Patient was seen by Thoracic surgery and L pleurx catheter was placed. Patient later went for paracentesis and cytology was also sent off. Given history of compression fracture, patient has been seen by Orthopaedics- who recommended a back brace when pt is out of bed. Pt was also evaluated by Physical therapy. Patient's respiratory status has significantly improved and he is medically stable and cleared for discharge from [facility] on 11/12/18 to rehab for further therapy. Patient to follow up with Dr. French, Orthopadeics outpatient and Dr. Crespo, Thoracic surgeon for replacement of L pleuX catheter, in addition to Dr. Ibarra, PCP, Dr. Mchugh, Dr. Morrell and Dr. Conte, Cardiologist.     SUBJECTIVE:   11/12/18  Patient seen and evaluated today. States his pain is mild currently and he has no issues currently. Will discharge today. Noted by nurse that overnight chest tube fell out.    REVIEW OF SYSTEMS:  CONSTITUTIONAL: No weakness, fevers or chills  EYES/ENT: No visual changes;  No vertigo or throat pain   NECK: No pain or stiffness  RESPIRATORY: + chronic nonproductive cough, No wheezing, hemoptysis; No shortness of breath  CARDIOVASCULAR: No chest pain or palpitations  GASTROINTESTINAL: No abdominal or epigastric pain. No nausea, vomiting, or hematemesis; No diarrhea or constipation. No melena or hematochezia.  GENITOURINARY: No dysuria, frequency or hematuria  NEUROLOGICAL: No numbness or weakness  SKIN: No itching, burning, rashes, or lesions   All other review of systems is negative unless indicated above    Vital Signs Last 24 Hrs  T(C): 36.7 (12 Nov 2018 08:50), Max: 37.1 (11 Nov 2018 21:15)  T(F): 98 (12 Nov 2018 08:50), Max: 98.7 (11 Nov 2018 21:15)  HR: 76 (12 Nov 2018 08:00) (76 - 100)  BP: 96/57 (12 Nov 2018 08:00) (94/57 - 111/73)  BP(mean): 66 (12 Nov 2018 08:00) (60 - 83)  RR: 19 (12 Nov 2018 08:00) (15 - 23)  SpO2: 100% (12 Nov 2018 08:00) (91% - 100%)    I&O's Summary  11 Nov 2018 07:01  -  12 Nov 2018 07:00  --------------------------------------------------------  IN: 0 mL / OUT: 875 mL / NET: -875 mL      PHYSICAL EXAM:  Constitutional: NAD, awake and alert,   Respiratory: Course breath sounds b/l in anterior on posterior upper lung L>R, No wheezing, rales or rhonchi  Cardiovascular: S1 and S2, regular rate and rhythm, no Murmurs, gallops or rubs  Gastrointestinal: Bowel Sounds present, soft, nontender, nondistended, no guarding, no rebound  Extremities: No peripheral edema  Vascular: 2+ peripheral pulses  Skin: No rashes      LABS: All Labs Reviewed:                      11.6   16.03 )-----------( 113      ( 12 Nov 2018 03:58 )             36.7     11-12  131<L>  |  96  |  25<H>  ----------------------------<  106<H>  4.6   |  27  |  1.09  Ca    7.6<L>      12 Nov 2018 03:58    PT/INR - ( 12 Nov 2018 03:58 )   PT: 28.6 sec;   INR: 2.50 ratio    PTT - ( 12 Nov 2018 03:58 )  PTT:39.2 sec  CARDIAC MARKERS ( 11 Nov 2018 11:27 )  0.017 ng/mL / x     / 13 U/L / x     / x        Blood Culture: 11-09 @ 18:11  polymorphonuclear leukocytes seen; No organisms seen by cytocentrifuge  Specimen Source .Body Fluid Peritoneal Fluid-

## 2018-11-12 NOTE — PROGRESS NOTE ADULT - SUBJECTIVE AND OBJECTIVE BOX
HPI:  78 year old male with pmh of copd on home o2 2.5L, afib on coumadin, chronic diastolic chf, diabetes on insulin, htn, hep b infection, hypogammaglobulinemia w/ IVIG tx, b cell lymphoma, cholelithiasis s/p cholecystectomy, esophageal dilation, prostatectomy coming to ED with complaints of worsening shortness of breath and lower back pain. Patient stating was on toilet this morning when tried to get up put pants on when he had a sudden onset of lower back pain. No bladder or bowel incontinence. Per Dr Bejarano In ED bladder scan was done with no to low residual urine volume. Per Dr Bejarano patient able to ambulate with cane in ED. FOr shortness of breath has been going on for past 4-5 weeks and has been gradually worsening. States last echo over 1 year prior. states now able to walk 50 feet without stopping for shortness of breath (02 Nov 2018 22:27)    11/4/18 Thru family , patient with continued back pain  11/5/18 Patient with persistent BP, abdomen feels better  11/6/18 Patient more comfortable. Brace delivered. S/P drainage PE  11/7/18 Patient much more comfortable today. S/P centesis for ascites and PE  11/9/18 Patient with persistent back pain, OOB with brace  11/10/18 Patient with moderate back pain. Transferring with brace. Minimal ambulation  11/11/18 Patient more comfortable in bed than sitting and standing  11/12/18 Patient more comfortable-ambulating with brace in room    PAST MEDICAL & SURGICAL HISTORY:  Hyperlipidemia  Hypogammaglobulinemia: received IVIG  Hepatitis B virus infection, unspecified chronicity: retrovirals  Chronic diastolic congestive heart failure  Essential hypertension  DM type 2 (diabetes mellitus, type 2)  B-cell lymphoma, unspecified B-cell lymphoma type, unspecified body region: CLL stage 4/SLL; met NHL  Atrial fibrillation, unspecified type  COPD (chronic obstructive pulmonary disease)  History of esophageal dilatation  S/P cholecystectomy  H/O prostatectomy    MEDICATIONS  (STANDING):  amiodarone    Tablet 100 milliGRAM(s) Oral daily  atorvastatin 10 milliGRAM(s) Oral at bedtime  dextrose 5%. 1000 milliLiter(s) (50 mL/Hr) IV Continuous <Continuous>  dextrose 50% Injectable 12.5 Gram(s) IV Push once  dextrose 50% Injectable 25 Gram(s) IV Push once  dextrose 50% Injectable 25 Gram(s) IV Push once  docusate sodium 100 milliGRAM(s) Oral three times a day  furosemide    Tablet 20 milliGRAM(s) Oral two times a day  gabapentin 300 milliGRAM(s) Oral three times a day  insulin glargine Injectable (LANTUS) 20 Unit(s) SubCutaneous at bedtime  insulin lispro (HumaLOG) corrective regimen sliding scale   SubCutaneous three times a day before meals  insulin lispro (HumaLOG) corrective regimen sliding scale   SubCutaneous at bedtime  lactulose Syrup 10 Gram(s) Oral daily  lisinopril 2.5 milliGRAM(s) Oral daily  metoprolol tartrate 25 milliGRAM(s) Oral two times a day  multivitamin 1 Tablet(s) Oral daily  spironolactone 25 milliGRAM(s) Oral at bedtime  tenofovir disoproxil fumarate (VIREAD) 300 milliGRAM(s) Oral daily    MEDICATIONS  (PRN):  acetaminophen   Tablet .. 650 milliGRAM(s) Oral every 6 hours PRN Temp greater or equal to 38C (100.4F), Mild Pain (1 - 3), Moderate Pain (4 - 6)  ALBUTerol/ipratropium for Nebulization 3 milliLiter(s) Nebulizer every 6 hours PRN Shortness of Breath and/or Wheezing  dextrose 40% Gel 15 Gram(s) Oral once PRN Blood Glucose LESS THAN 70 milliGRAM(s)/deciliter  glucagon  Injectable 1 milliGRAM(s) IntraMuscular once PRN Glucose LESS THAN 70 milligrams/deciliter  senna 2 Tablet(s) Oral at bedtime PRN Constipation  traMADol 50 milliGRAM(s) Oral every 4 hours PRN Moderate Pain (4 - 6)    Allergies    No Known Allergies    Intolerances    PE:    Vital Signs Last 24 Hrs  T(C): 36.7 (12 Nov 2018 08:50), Max: 37.1 (11 Nov 2018 21:15)  T(F): 98 (12 Nov 2018 08:50), Max: 98.7 (11 Nov 2018 21:15)  HR: 76 (12 Nov 2018 08:00) (76 - 100)  BP: 96/57 (12 Nov 2018 08:00) (94/57 - 111/73)  BP(mean): 66 (12 Nov 2018 08:00) (60 - 83)  RR: 19 (12 Nov 2018 08:00) (15 - 23)  SpO2: 100% (12 Nov 2018 08:00) (91% - 100%)    Patient appears comfortable and complains mild back pain,laying in bed-ambulated yesterday in room with brace  Less tender T-L junction  No focal motor deficits    LABS                           11.8   16.58 )-----------( 110      ( 11 Nov 2018 04:50 )             36.7     11-11    132<L>  |  95<L>  |  23  ----------------------------<  107<H>  4.7   |  28  |  0.99    Ca    7.7<L>      11 Nov 2018 04:50          MRI with acute L1 fracture

## 2018-11-12 NOTE — DISCHARGE NOTE ADULT - FINDINGS/TREATMENT
11/5/18: Left VATS, flex bronch, pleural effusion drainage, Pleur X placement. < from: IR Procedure (11.09.18 @ 18:34) >    Findings:  Limited left lower quadrant ultrasound examination demonstrates a small   amount of intra-abdominal ascites.  There are no vessels or intervening   structures in the region of the percutaneous approach.    860 cc of cloudy serosanguineous fluid was readily drained. A sample was   sent for further laboratory analysis and culture.    Impression:  Successful ultrasound-guided paracentesis.    < end of copied text >

## 2018-11-12 NOTE — PROGRESS NOTE ADULT - PROVIDER SPECIALTY LIST ADULT
CT Surgery
CT Surgery
Cardiology
Family Medicine
Gastroenterology
Heme/Onc
Hospitalist
Orthopedics
Pulmonology
Thoracic Surgery
Gastroenterology
Family Medicine
Family Medicine

## 2018-11-12 NOTE — PROGRESS NOTE ADULT - ASSESSMENT
78 year old male as above     * back pain with compression fracture L1  -continue brace when OOB  -pain management  -IV Tylenol prn  STABLE FOR DISCHARGE FROM SPINE-NEEDS F/U WITH DR. FREGOSO IN 2 WEEKS WITH XRAYS AP/LAT TO OFFICE IF IN REHAB AT TIME OF FOLLOW-UP    Multiple Medical problems as per Medicine  #shortness of breath   #copd  # diabetes  # chf- acute on chronic present on admit   #hld   #hepatitis b w/ ascites  #afib  #dvt prophylaxis

## 2018-11-12 NOTE — DISCHARGE NOTE ADULT - CARE PROVIDERS DIRECT ADDRESSES
,[email or web address],DirectAddress_Unknown,DirectAddress_Unknown

## 2018-11-12 NOTE — DISCHARGE NOTE ADULT - PLAN OF CARE
monitor for symptoms of shortness of breath Pleurx catheter drained fluid 11/5-11/11  Continue to monitor for severe pain and symptoms of shortness of breath  Follow up with Primary Medical Doctor after discharge Follow up with Primary Medical Doctor after discharge Pleurx catheter drained fluid 11/5-11/11  ******Follow up with Thoracic surgeon, Dr. Crespo ( [phone]) as outpatient for redo pleurX catheter placement  Continue to monitor for severe pain and symptoms of shortness of breath  Follow up with Primary Medical Doctor, Dr. Ibarra after discharge Follow up with Primary Medical Doctor , Dr. Ibarra after discharge Follow up with Primary Medical Doctor, Dr. Ibarra  after discharge Follow up with Primary Medical Doctor after discharge within one week  Follow up with Cardiologist, Dr. Conte, in 5 days for INR  blood work Follow up with Primary Medical Doctor after discharge  Follow up Gastroenterologist, Dr. Albarran, to discuss results of paracentesis cytology and to monitor symptoms of abdominal swelling and shortness of breath Follow up with Primary Medical Doctor after discharge  ********Follow up with Orthopaedic doctor, DR. FREGOSO. IN 2 WEEKS WITH XRAYS AP/LAT TO OFFICE IF IN REHAB AT TIME OF FOLLOW-UP Follow up with Primary Medical Doctor after discharge. Follow up with Primary Medical Doctor after discharge.  Continue statin as prescribed Pleurx catheter drained fluid 11/5-11/11  ******Follow up with Thoracic surgeon, Dr. Crespo ([phone]) as outpatient for redo pleurX catheter placement  Continue to monitor for severe pain and symptoms of shortness of breath  Follow up with Primary Medical Doctor, Dr. Ibarra after discharge Continue to medical management and follow up - If you experience, dizziness, blurry vision, shaking, sweating after you take your diabetes medication, please drink a glass of juice and if symptoms persist call/have your care taker your doctor or 9-1-1 immediately.  - Low carbohydrate/low fat diet  - Please measure and keep a log of your blood sugars   - Please take your medication as prescribed  - Follow up with Primary Medical Doctor , Dr. Ibarra after discharge To maintain normal blood pressures - If you experience headache, blurry vision, chest pain, numbness/tingling in your extremities, call your primary care physician or go to the ED immediately  - Check your blood pressure at home and keep a log  - Low sodium diet  - Take medication as prescribed  Follow up with Primary Medical Doctor, Dr. Ibarra  after discharge To medically manage Follow up with Primary Medical Doctor after discharge within one week  Follow up with Cardiologist, Dr. Conte, in 3 days for INR  blood work To continue monitor Follow up with Primary Medical Doctor after discharge  Follow up Gastroenterologist, Dr. Albarran, to discuss results of paracentesis cytology and to monitor symptoms of abdominal swelling and shortness of breath  Follow up with hematologist/oncologist outpatient To continue to monitor and follow up with orthopedics on discharge To maintain normal lipid levels - Low fat diet  - Take medication as prescribed  - Follow up with Primary Medical Doctor after discharge.  - Continue statin as prescribed

## 2018-11-12 NOTE — DISCHARGE NOTE ADULT - OTHER SIGNIFICANT FINDINGS
EXAM:  XR CHEST PORTABLE URGENT 1V                            PROCEDURE DATE:  11/11/2018          INTERPRETATION:  Clinical information: Patient pulled chest tube out    Portable AP chest radiograph from 1341 hours:    COMPARISON:  November 09, 2018    FINDINGS:  The left chest tube has been removed. There is no   pneumothorax. Left chest wall subcutaneous emphysema noted. Small right   pleural effusion and right lung base opacity.    IMPRESSION:    No pneumothorax.  Small right pleural effusion.  Right lung base opacity could be due to atelectasis or pneumonia.           EXAM:  ECHO TTE WO CON COMP W DOP         PROCEDURE DATE:  11/03/2018        INTERPRETATION:  Transthoracic Echocardiography Report (TTE)                                 Findings     Mitral Valve   Normal appearing mitral valve structure and function.   Mild (1+) mitral regurgitation is present.     Aortic Valve   The aortic valve is not well visualized, appears mildly calcified. Valve   opening seems to be normal. No stenosis or regurgitation.     Tricuspid Valve   Normal appearing tricuspid valve structure and function.   Mild (1+) tricuspid valve regurgitation is present.     Pulmonic Valve   Pulmonic valve not well seen.   Trace pulmonic valvular regurgitation is present.     Left Atrium   Normal appearing left atrium.     Left Ventricle   The left ventricle is normal in size, wall thickness, wall motion and   contractility. Estimated left ventricular ejection fraction is 55-60%.     Right Atrium   The right atrium is normal.     Right Ventricle   The right ventricle is normal in size.     Pericardial Effusion   A pericardial effusion is not present.     Pleural Effusion   Pleural effusion - left pleural effusion.     Miscellaneous   The IVC was not visualized.     Impression

## 2018-11-14 NOTE — DISCHARGE NOTE ADULT - DOCTOR BEFORE STARTING, STOPPING, OR CHANGING THE DOSE OF ANY PRESCRIPTION OR OVER-THE-COUNTER MEDICATIONS. ANY PRODUCT CONTAINING ASPIRIN LESSENS THE BLOOD’S ABILITY TO FORM CLOTS AND ADDS TO THE
Health Maintenance Due   Topic Date Due   • DTaP/Tdap/Td Vaccine (1 - Tdap) 03/17/1974   • Shingles Vaccine (1 of 2) 03/17/2005   • Influenza Vaccine (1) 08/01/2018       Patient is up to date, no discussion needed.      Unaddressed Risk Adjusted HCC Categories and Diagnoses   - Vascular Disease   Unaddressed Dx:Thoracic Aortic Ectasia (Cms/Hcc)   - Dialysis Status   Unaddressed Dx:Renal Dialysis Status(V45.11)         Statement Selected

## 2018-11-21 NOTE — CDI QUERY NOTE - NSCDIOTHERTXTBX_GEN_ALL_CORE_HH
78 year old male with pmh of copd on home o2 2.5L,  History of Bronchomalacia, chronic diastolic chf, diabetes on insulin, htn, admitted for worsening shortness of breath .    Progress Note Adult-Pulmonology Attending [Authored: 09-Nov-2018 08:52]-  Stated "Performed bronchoscopy on the patient last year which revealed bronchomalacia which is likely contributing to the patient's underlying dyspnea as well"    Progress Note Adult-Family Medicine Resident Authored: 04-Nov-2018 15:28]-   also stated that " underlying bronchomalacia evident on bronchoscopy performed last year also playing role    Please clarify if   A) bronchomalacia evident on bronchoscopy performed last year is likely contributing to the patient's underlying dyspnea   B) bronchomalacia evident on bronchoscopy performed last year is  not  likely contributing to the patient's underlying dyspnea   C) Other please clarify  D) Clinically insignificant.

## 2018-11-21 NOTE — CHART NOTE - NSCHARTNOTEFT_GEN_A_CORE
Bronchomalacia evident on bronchoscopy performed last year is likely contributing to the patient's underlying dyspnea

## 2018-12-26 NOTE — ED PROVIDER NOTE - OBJECTIVE STATEMENT
79 y/o male with a PMHx of chronic PNA, CLL receiving IVIG infusions and chemotherapy, cirrhosis, HTN, DM, Afib, COPD presents to the ED c/o generalized weakness x2 weeks. November pt was hospitalized for fluid in lungs and abdomen, back fx. Pt then went to [facility] rehab for 2 weeks in mid November, the family took pt out. +loss of appetite. 2 days ago pt had 100.6 temperature. +dry cough. Family brought pt to ED today regarding worsening generalized weakness and SOB. No dysuria. Former EtOH use, quit 30 years ago. Former smoker (quit 30 years ago). PMD: Dr. Ibarra. Onc: Dr. Houser.

## 2018-12-26 NOTE — ED ADULT NURSE REASSESSMENT NOTE - NS ED NURSE REASSESS COMMENT FT1
patient resting comfortably in stretcher in no acute distress with family at bedside. states he is beginning to feel better. comfort and safety maintained.

## 2018-12-26 NOTE — ED ADULT NURSE REASSESSMENT NOTE - NS ED NURSE REASSESS COMMENT FT1
report received from previous RN. patient resting comfortably in stretcher. no acute distress noted. patient noted to be jaundiced. skin warm and dry. respirations unlabored. family at bedside. patient previously medicated as ordered. NS bolus was administered. awaiting all lab results. patient aware of pending urine needed.

## 2018-12-26 NOTE — ED ADULT NURSE NOTE - NSIMPLEMENTINTERV_GEN_ALL_ED
Implemented All Fall with Harm Risk Interventions:  Terry to call system. Call bell, personal items and telephone within reach. Instruct patient to call for assistance. Room bathroom lighting operational. Non-slip footwear when patient is off stretcher. Physically safe environment: no spills, clutter or unnecessary equipment. Stretcher in lowest position, wheels locked, appropriate side rails in place. Provide visual cue, wrist band, yellow gown, etc. Monitor gait and stability. Monitor for mental status changes and reorient to person, place, and time. Review medications for side effects contributing to fall risk. Reinforce activity limits and safety measures with patient and family. Provide visual clues: red socks.

## 2018-12-26 NOTE — ED ADULT NURSE NOTE - OBJECTIVE STATEMENT
pt presents to ED on NRB from EMS in respiratory distress for last week worsening today. Lowest O2 recorded as 92% in route. Pt has leukemia on chemo last tx this past week. Family member states he is on 2.5L NC at home, hx of back fracture in November with unknown etiology (no falls). Family states pmhx HTN, hep C +, and cardiac issues. Upon arrival pt placed on NC sats 99% appears no active distress, non retracting, mildy tachypneic. Labs sent, otm. Will ctm

## 2018-12-26 NOTE — ED PROVIDER NOTE - CARE PLAN
Principal Discharge DX:	Hyponatremia  Secondary Diagnosis:	CLL (chronic lymphocytic leukemia)  Secondary Diagnosis:	Dehydration  Secondary Diagnosis:	Weakness

## 2018-12-27 NOTE — CHART NOTE - NSCHARTNOTEFT_GEN_A_CORE
Notified by RN, pt is hypotensive with Bp: 84/60. Pt asymptomatic.    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  Pt was seen and examined. Pt denies headaches, dizziness, lightheadedness.     VS: 84/60 Bp; 86 HR; 98.2F; 18RR; 100% room air.   Gen: frail, NAD  Pulm: CTAB, no crackles  Cardio: RRR, no M/R/G  Abdo: soft, NT/ND  Extre: No edema    A/P:   - Possibly dehydration   - 500ml NS bolus  - Maintainance IVF@50ml/hr for 10hrs.  - Continue to monitor    d/w Dr. Pichardo, PGY3

## 2018-12-27 NOTE — H&P ADULT - NSHPPHYSICALEXAM_GEN_ALL_CORE
Vital Signs Last 24 Hrs  T(C): 36.4 (12-27-18 @ 07:43)  T(F): 97.6 (12-27-18 @ 07:43), Max: 98.8 (12-27-18 @ 01:37)  HR: 85 (12-27-18 @ 07:43) (85 - 104)  BP: 103/61 (12-27-18 @ 07:43)  BP(mean): --  RR: 16 (12-27-18 @ 07:43) (16 - 17)  SpO2: 100% (12-27-18 @ 07:43) (100% - 100%)  Wt(kg): --    12-26 @ 07:01  -  12-27 @ 07:00  --------------------------------------------------------  IN: 2000 mL / OUT: 0 mL / NET: 2000 mL

## 2018-12-27 NOTE — ED ADULT NURSE REASSESSMENT NOTE - NS ED NURSE REASSESS COMMENT FT1
Patient A&Ox3, resting comfortably in bed. VSS, tylenol administered for mild generalized pain per patient. IV abx infusing as prescribed. No s/s of distress present. Hand-off report to DAYAMI Nascimento, transport in progress to . Safety & comfort measures in place, family bedside. Purposeful active rounding on my time.

## 2018-12-27 NOTE — H&P ADULT - RS GEN PE MLT RESP DETAILS PC
no rales/airway patent/no wheezes/no rhonchi/respirations non-labored/clear to auscultation bilaterally/breath sounds equal/good air movement

## 2018-12-27 NOTE — ED ADULT NURSE REASSESSMENT NOTE - NS ED NURSE REASSESS COMMENT FT1
Patient care received from DAYAMI Fletcher. Patient currently sleeping comfortably, no s/s of distress present. Safety & comfort measures in place. Hospitalist consult pending. Will continue to monitor.

## 2018-12-27 NOTE — H&P ADULT - ATTENDING COMMENTS
Patient seen and examined with Family Medicine Residents Kristina Denise,  Eloise Alfaro, Zaida Barnett and Lisbeth Mccollum on the Family Medicine Teaching Service.  Agree with history, physical, labs and plan which were reviewed in detail after a face to face encounter with the patient.

## 2018-12-27 NOTE — H&P ADULT - NEGATIVE GASTROINTESTINAL SYMPTOMS
no abdominal pain/no hematochezia/no diarrhea/no vomiting/no nausea/no constipation/no change in bowel habits/no melena

## 2018-12-27 NOTE — H&P ADULT - HISTORY OF PRESENT ILLNESS
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 Wife also reports he had a low grade fever ~100 F a few days ago. He has a productive cough w/ yellow sputum. He is unable to ambulate due to back pain from compression fractures, and even though the pain is helped w/ medications he still feels very weak with associated decrease in appetite and PO intake. No chills, N/V, constipation, diarrhea, dysuria. + chest pain associated w/ cough. He has worsening cough when he lays flat so sleeps sitting up.

## 2018-12-27 NOTE — ED ADULT NURSE REASSESSMENT NOTE - NS ED NURSE REASSESS COMMENT FT1
patient resting comfortably in stretcher in no acute distress. family remains at bedside. awaiting repeat troponin results and admission orders.

## 2018-12-27 NOTE — H&P ADULT - ASSESSMENT
78M w/ PMH as above admitted w/ worsening weakness - multifactorial: HCAP, hyponatremia, deconditioning from chronic illnesses (CLL, B cell lymphoma, COPD, dCHF).    1. HCAP  Admit to med-surg  Given 1x cefepime in ED  Cont IV abx - ID consult  f/u RVP  Cont tylenol PRN pain/fever - chest pain likely from coughing, troponin negative x3    2. Hyponatremia - due to dehydration  Today 129 <-- 126 yesterday  Improved w/ IVF  Will continue IVF    3. CHRISTINA - due to dehydration  Continue IVF  Cr today 1.46 <-- 1.78 yesterday  Daily BMP    4. Chronic dCHF  Pt does have small b/l pleural effusions, but is saturating well on room air  Will hold lasix 20 qd while recovering from CHRISTINA  Cont spironolactone 25 qd  Strict I&O  Na-restricted diet    5. CLL, B cell lymphoma  Dr. Houser is heme/onc - will consult  Nutrition consult    6. COPD  Stable, no wheezing - do not suspect exacerbation at this time  Continue supplemental O2 as needed  Continue albuterol PRN, montelukast    7. DM2  JOSE GUADALUPE  Will change home insulin degludec (long-acting) to insulin glargine while in hospital  Cont gabapentin for neuropathic pain    8. Hepatitis B  Continue tenofovir home med    9. HLD  Continue statin    10. Atrial fibrillation  On metoprolol tartrate - old dose is 25 BID, patient is not sure of current dose  Restarted lopressor 25 BID w/ holding parameters  Cont amiodarone  Cont warfarin 2.5 qhs - home dose  Goal INR 2-3    11. DVT prophylaxis  No chemical prophylaxis needed while on coumadin  Cont SCDs    Discussed w/ Dr. Cheek 78M w/ PMH as above admitted w/ worsening weakness - multifactorial: HCAP, hyponatremia, deconditioning from chronic illnesses (CLL, B cell lymphoma, COPD, dCHF).    1. HCAP  Admit to med-surg  Given 1x cefepime in ED  Cont IV abx - ID consult  f/u Bcx  Ucx ordered - would have canceled given patient's lack of symptoms, however it's already sent  f/u RVP  Cont tylenol PRN pain/fever - chest pain likely from coughing, troponin negative x3    2. Hyponatremia - due to dehydration  Today 129 <-- 126 yesterday  Improved w/ IVF  Will continue IVF    3. CHRISTINA - due to dehydration  Continue IVF  Cr today 1.46 <-- 1.78 yesterday  Daily BMP    4. Chronic dCHF  Pt does have small b/l pleural effusions, but is saturating well on room air  Will hold lasix 20 qd while recovering from CHRISTINA  Cont spironolactone 25 qd  Strict I&O  Na-restricted diet    5. CLL, B cell lymphoma  Dr. Houser is heme/onc - will consult  Nutrition consult    6. COPD  Stable, no wheezing - do not suspect exacerbation at this time  Continue supplemental O2 as needed  Continue albuterol PRN, montelukast    7. DM2  JOSE GUADALUPE  Will change home insulin degludec (long-acting) to insulin glargine while in hospital  Cont gabapentin for neuropathic pain    8. Hepatitis B  Continue tenofovir home med    9. HLD  Continue statin    10. Atrial fibrillation  On metoprolol tartrate - old dose is 25 BID, patient is not sure of current dose  Restarted lopressor 25 BID w/ holding parameters  Cont amiodarone  Cont warfarin 2.5 qhs - home dose  Goal INR 2-3    11. DVT prophylaxis  No chemical prophylaxis needed while on coumadin  Cont SCDs    Discussed w/ Dr. Cheek 78M w/ PMH as above admitted w/ worsening weakness - multifactorial: HCAP, hyponatremia, deconditioning from chronic illnesses (CLL, B cell lymphoma, COPD, dCHF).    1. HCAP  Admit to med-surg  Given 1x cefepime in ED  Cont cefepime and started vanco for MRSA coverage  f/u ID consult  f/u Bcx  Ucx ordered - would have canceled given patient's lack of symptoms, however it's already sent  f/u RVP  Cont tylenol PRN pain/fever - chest pain likely from coughing, troponin negative x3  f/u CT chest    2. Hyponatremia - due to dehydration  Today 129 <-- 126 yesterday  Improved w/ IVF  Will continue IVF    3. CHRISTINA - due to dehydration  Continue IVF  Cr today 1.46 <-- 1.78 yesterday  Daily BMP    4. Chronic dCHF  Pt does have small b/l pleural effusions, but is saturating well on room air  Will hold lasix 20 qd while recovering from CHRISTINA  Cont spironolactone 25 qd  Strict I&O  Na-restricted diet    5. CLL, B cell lymphoma  Dr. Houser is heme/onc - will consult  f/u nutrition consult  f/u palliative consult    6. COPD  Stable, no wheezing - do not suspect exacerbation at this time  Continue supplemental O2 as needed  Continue albuterol PRN, montelukast    7. DM2  JOSE GUADALUPE  Will change home insulin degludec (long-acting) to insulin glargine 10 qhs while in hospital  Cont gabapentin for neuropathic pain  Diabetic diet    8. Hepatitis B  Continue tenofovir home med    9. HLD  Continue statin    10. Atrial fibrillation  On metoprolol tartrate - old dose is 25 BID, patient is not sure of current dose  Restarted lopressor 25 BID w/ holding parameters  Cont amiodarone  Cont warfarin 2.5 qhs - home dose  Goal INR 2-3  Daily INR    11. DVT prophylaxis  No chemical prophylaxis needed while on coumadin  Cont SCDs    Discussed w/ Dr. Cheek 78M w/ PMH as above admitted w/ worsening weakness - multifactorial: HCAP, hyponatremia, deconditioning from chronic illnesses (CLL, B cell lymphoma, COPD, dCHF).    1. HCAP  Admit to med-surg  With leukocytosis to 27 today  Given 1x cefepime in ED  Cont cefepime and started vanco for MRSA coverage  f/u ID consult  f/u Bcx  Ucx ordered - would have canceled given patient's lack of symptoms, however it's already sent  f/u RVP  Cont tylenol PRN pain/fever - chest pain likely from coughing, troponin negative x3  f/u CT chest    2. Hyponatremia - due to dehydration  Today 129 <-- 126 yesterday  Improved w/ IVF  Will continue IVF    3. CHRISTINA - due to dehydration  Continue IVF  Cr today 1.46 <-- 1.78 yesterday  Daily BMP    4. Chronic dCHF  Pt does have small b/l pleural effusions, but is saturating well on room air  Will hold lasix 20 qd while recovering from CHRISTINA  Cont spironolactone 25 qd  Strict I&O  Na-restricted diet    5. CLL, B cell lymphoma  Dr. Houser is heme/onc - will consult  f/u nutrition consult  f/u palliative consult    6. COPD  Stable, no wheezing - do not suspect exacerbation at this time  Continue supplemental O2 as needed  Continue albuterol PRN, montelukast    7. DM2  JOSE GUADALUPE  Will change home insulin degludec (long-acting) to insulin glargine 10 qhs while in hospital  Cont gabapentin for neuropathic pain  Diabetic diet    8. Hepatitis B  Continue tenofovir home med    9. HLD  Continue statin    10. Atrial fibrillation  On metoprolol tartrate - old dose is 25 BID, patient is not sure of current dose  Restarted lopressor 25 BID w/ holding parameters  Cont amiodarone - will get TFTs  INR elevated to 4.3 on admission, will hold home dose warfarin 2.5 qhs. No evidence of bleeding at this time.  Goal INR 2-3  Daily INR    11. DVT prophylaxis  No chemical prophylaxis needed while on coumadin  Cont SCDs    Discussed w/ Dr. Cheek

## 2018-12-27 NOTE — CONSULT NOTE ADULT - ASSESSMENT
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  1. Patient admitted with pneumonia which will treat as health care associated pneumonia given recent hospitalizations; also noted with leukocytosis most likely secondary to infection  - follow up cultures   - serial cbc and monitor temperature   - iv hydration and supportive care   - reviewed prior medical records to evaluate for resistant or atypical pathogens   - oxygen and nebs as needed   - will continue cefepime but optimize dose to one gram q 12 hours  - will add doxycycline to cover atypical and resistant pathogens  - continue viread for hepatitis B  2. 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

## 2018-12-27 NOTE — PATIENT PROFILE ADULT - FALL HARM RISK
bones(Osteoporosis,prev fx,steroid use,metastatic bone ca)/other/coagulation(Bleeding disorder R/T clinical cond/anti-coags)

## 2018-12-27 NOTE — ED ADULT NURSE REASSESSMENT NOTE - NS ED NURSE REASSESS COMMENT FT1
IVF infusing as ordered. family remains at bedside. color good. skin warm and dry. respirations even and unlabored.

## 2018-12-28 NOTE — PROGRESS NOTE ADULT - ASSESSMENT
78M w/ PMH as above admitted w/ worsening weakness - multifactorial: HCAP, hyponatremia, deconditioning from chronic illnesses (CLL, B cell lymphoma, COPD, dCHF).    # HCAP  - Leukocytosis improvin -> 21 today  - afebrile since admission  - Rhino/Enterovirus +   - Continue Cefepime 1G BID and Doxycycline 100mg BID  - ID recommendations appreciated    # Hyponatremia 2/2 dehydration  - improving 126 ->129 -> 130  - will continue IV NS @50cc/hr x 12 hours  - will evaluate for pulmonary edema and edema prior to initiation of further hydration    # Pre-renal CHRISTINA   -resolved  - Cr 1.7 -> 1.4- >1.12  - almost at baseline ~1  - will continue to monitor with daily BMP    # Chronic dCHF  - will restart Lasix 20mg daily (previously held 2/2 CHRISTINA)  - Cont spironolactone 25 qd  - Strict I&O      # CLL, B cell lymphoma  Dr. Houser is heme/onc - will consult  f/u nutrition consult  f/u palliative consult    6. COPD  Stable, no wheezing - do not suspect exacerbation at this time  Continue supplemental O2 as needed  Continue albuterol PRN, montelukast    7. DM2  JOSE GUADALUPE  Will change home insulin degludec (long-acting) to insulin glargine 10 qhs while in hospital  Cont gabapentin for neuropathic pain  Diabetic diet    8. Hepatitis B  Continue tenofovir home med    9. HLD  Continue statin    10. Atrial fibrillation  On metoprolol tartrate - old dose is 25 BID, patient is not sure of current dose  Restarted lopressor 25 BID w/ holding parameters  Cont amiodarone - will get TFTs  INR elevated to 4.3 on admission, will hold home dose warfarin 2.5 qhs. No evidence of bleeding at this time.  Goal INR 2-3  Daily INR    11. DVT prophylaxis  No chemical prophylaxis needed while on coumadin  Cont SCDs 78M w/ PMH as above admitted w/ worsening weakness - multifactorial: HCAP, hyponatremia, deconditioning from chronic illnesses (CLL, B cell lymphoma, COPD, dCHF).    # HCAP  - Leukocytosis improvin -> 21 today  - afebrile since admission  - Rhino/Enterovirus +   - Continue Cefepime 1G BID and Doxycycline 100mg BID  - ID recommendations appreciated    # Hyponatremia 2/2 dehydration  - improving 126 ->129 -> 130  - will continue IV NS @50cc/hr x 12 hours  - will evaluate for pulmonary edema and edema prior to initiation of further hydration    #Severe Protein Malnutrition  - Diet switched to regular diet today with 1500ml fluid restriction  - Glucerna supplements TID  - Zinc supplements BID x 10 days  - Vitamin C supplements daily  - Daily weights  - Nutrition recommendations appreciated    #Lower Back Pain 2/2 Compression fractures  - Hip XRay negative today for fracture  - Tylenol PRN mild pain  - Oxycodone 5mg Q4 PRN moderate pain  - Oxycodone 10mg q4 PRN severe pain  - Palliative Care recommendations appreciated    # Pre-renal CHRISTINA   - resolved  - Cr 1.7 -> 1.4- >1.12  - almost at baseline ~1  - will continue to monitor with daily BMP    # Chronic dCHF  - will restart Lasix 20mg daily (previously held 2/2 CHRISTINA)  - Cont spironolactone 25 qd  - Strict I&O    # CLL, B cell lymphoma  - Heme-onc recommendations appreciated  - Palliative Care recommendations appreciated    # COPD  Stable, no wheezing - do not suspect exacerbation at this time  Continue supplemental O2 as needed  Continue albuterol PRN, montelukast    # DM2  - BGM 77, 74, 100 in past 24 hours  - decreased Lantus 10 units QHS to 5 units QHS  - continue pre-meal and QHS BGM with sliding scale  - Cont gabapentin for neuropathic pain    # Hepatitis B  Continue tenofovir home med    # HLD  Continue statin    # Atrial fibrillation  - continue lopressor 25 BID w/ holding parameters  - Cont amiodarone  - Hold Warfarin 2.5mg daily as INR remains elevated  - Goal INR 2-3  - Daily INR    # DVT prophylaxis  No chemical prophylaxis needed while on coumadin  Cont SCDs    #Dispo: continue hospitalization 78M w/ PMH as above admitted w/ worsening weakness - multifactorial: HCAP, hyponatremia, deconditioning from chronic illnesses (CLL, B cell lymphoma, COPD, dCHF).    # HCAP  - Leukocytosis improvin -> 21 today  - afebrile since admission  - Rhino/Enterovirus +   - Continue Cefepime 1G BID and Doxycycline 100mg BID  - ID recommendations appreciated    # Hyponatremia 2/2 dehydration  - improving 126 ->129 -> 130  - will continue IV NS @50cc/hr x 12 hours  - will evaluate for pulmonary edema and edema prior to initiation of further hydration    #Severe Protein Malnutrition  - Diet switched to regular diet today with 1500ml fluid restriction  - Glucerna supplements TID  - Zinc supplements BID x 10 days  - Vitamin C supplements daily  - Daily weights  - Nutrition recommendations appreciated    #Lower Back Pain 2/2 Compression fractures  - Hip XRay negative today for fracture  - Tylenol PRN mild pain  - Oxycodone 5mg Q4 PRN moderate pain  - Oxycodone 10mg q4 PRN severe pain  - Palliative Care recommendations appreciated    #Fungal infection on back  - wide spread infection  - will     # Pre-renal CHRISTINA   - resolved  - Cr 1.7 -> 1.4- >1.12  - almost at baseline ~1  - will continue to monitor with daily BMP    # Chronic dCHF  - will restart Lasix 20mg daily (previously held 2/2 CHRISTINA)  - Cont spironolactone 25 qd  - Strict I&O    # CLL, B cell lymphoma  - Heme-onc recommendations appreciated  - Palliative Care recommendations appreciated    # COPD  Stable, no wheezing - do not suspect exacerbation at this time  Continue supplemental O2 as needed  Continue albuterol PRN, montelukast    # DM2  - BGM 77, 74, 100 in past 24 hours  - decreased Lantus 10 units QHS to 5 units QHS  - continue pre-meal and QHS BGM with sliding scale  - Cont gabapentin for neuropathic pain    # Hepatitis B  Continue tenofovir home med    # HLD  Continue statin    # Atrial fibrillation  - continue lopressor 25 BID w/ holding parameters  - Cont amiodarone  - Hold Warfarin 2.5mg daily as INR remains elevated  - Goal INR 2-3  - Daily INR    # DVT prophylaxis  No chemical prophylaxis needed while on coumadin  Cont SCDs    #Dispo: continue hospitalization 78M w/ PMH as above admitted w/ worsening weakness - multifactorial: HCAP, hyponatremia, deconditioning from chronic illnesses (CLL, B cell lymphoma, COPD, dCHF).    # HCAP  - Leukocytosis improvin -> 21 today  - afebrile since admission  - Rhino/Enterovirus +   - Continue Cefepime 1G BID and Doxycycline 100mg BID  - ID recommendations appreciated    # Hyponatremia 2/2 dehydration  - improving 126 ->129 -> 130  - will continue IV NS @50cc/hr x 12 hours  - will evaluate for pulmonary edema and edema prior to initiation of further hydration    #Severe Protein Malnutrition  - Diet switched to regular diet today with 1500ml fluid restriction  - Glucerna supplements TID  - Zinc supplements BID x 10 days  - Vitamin C supplements daily  - Daily weights  - Nutrition recommendations appreciated    #Lower Back Pain 2/2 Compression fractures  - Hip XRay negative today for fracture  - Tylenol PRN mild pain  - Oxycodone 5mg Q4 PRN moderate pain  - Oxycodone 10mg q4 PRN severe pain  - Palliative Care recommendations appreciated    #Fungal infection on back  - wide spread infection possibly tinea corporis  - will give Fluconazole today  - will assess liver function in AM and determine further treatment  - Infectious disease recommendations appreciated    # Pre-renal CHRISTINA   - resolved  - Cr 1.7 -> 1.4- >1.12  - almost at baseline ~1  - will continue to monitor with daily BMP    # Chronic dCHF  - will restart Lasix 20mg daily (previously held 2/2 CHRISTINA)  - Cont spironolactone 25 qd  - Strict I&O    # CLL, B cell lymphoma  - Heme-onc recommendations appreciated  - Palliative Care recommendations appreciated    # COPD  Stable, no wheezing - do not suspect exacerbation at this time  Continue supplemental O2 as needed  Continue albuterol PRN, montelukast    # DM2  - BGM 77, 74, 100 in past 24 hours  - decreased Lantus 10 units QHS to 5 units QHS  - continue pre-meal and QHS BGM with sliding scale  - Cont gabapentin for neuropathic pain    # Hepatitis B  Continue tenofovir home med    # HLD  Continue statin    # Atrial fibrillation  - continue lopressor 25 BID w/ holding parameters  - Cont amiodarone  - Hold Warfarin 2.5mg daily as INR remains elevated  - Goal INR 2-3  - Daily INR    # DVT prophylaxis  No chemical prophylaxis needed while on coumadin  Cont SCDs    #Dispo: continue hospitalization 78M w/ PMH as above admitted w/ worsening weakness - multifactorial: HCAP, hyponatremia, deconditioning from chronic illnesses (CLL, B cell lymphoma, COPD, dCHF).    # HCAP  - Leukocytosis improvin -> 21 today  - afebrile since admission  - Rhino/Enterovirus +   - Continue Cefepime 1G BID and Doxycycline 100mg BID  - ID recommendations appreciated    # Hyponatremia 2/2 dehydration  - improving 126 ->129 -> 130  - will continue IV NS @50cc/hr x 12 hours  - will evaluate for pulmonary edema and peripheral edema prior to initiation of further hydration    #Severe Protein Malnutrition  - Diet switched to regular diet today with 1500ml fluid restriction  - Glucerna supplements TID  - Zinc supplements BID x 10 days  - Vitamin C supplements daily  - Daily weights  - Nutrition recommendations appreciated    #Lower Back Pain 2/2 Compression fractures  - Hip XRay negative today for fracture  - Tylenol PRN mild pain  - Oxycodone 5mg Q4 PRN moderate pain  - Oxycodone 10mg q4 PRN severe pain  - Palliative Care recommendations appreciated    #Fungal infection on back  - wide spread infection possibly tinea corporis  - will give Fluconazole today  - will assess liver function in AM and determine further treatment  - Infectious disease recommendations appreciated    # Pre-renal CHRISTINA   - improving  - Cr 1.7 -> 1.4- >1.12  - almost at baseline ~1  - will continue to monitor with daily BMP    # Chronic dCHF  - hold Lasix 20mg daily 2/2 CHRISTINA  - Cont spironolactone 25 qd  - Strict I&O    # CLL, B cell lymphoma  - Heme-onc recommendations appreciated  - Palliative Care recommendations appreciated    # COPD  Stable, no wheezing - do not suspect exacerbation at this time  Continue supplemental O2 as needed  Continue albuterol PRN, montelukast    # DM2  - BGM 77, 74, 100 in past 24 hours  - decreased Lantus 10 units QHS to 5 units QHS  - continue pre-meal and QHS BGM with sliding scale  - Cont gabapentin for neuropathic pain    # Hepatitis B  Continue tenofovir home med    # HLD  Continue statin    # Atrial fibrillation  - continue lopressor 25 BID w/ holding parameters  - Cont amiodarone  - Hold Warfarin 2.5mg daily as INR remains elevated  - Goal INR 2-3  - Daily INR    # DVT prophylaxis  No chemical prophylaxis needed while on coumadin  Cont SCDs    #Dispo: continue hospitalization

## 2018-12-28 NOTE — CONSULT NOTE ADULT - SUBJECTIVE AND OBJECTIVE BOX
HPI:  78M w/ PMH chronic lymphocytic leukemia,, hypogammaglobulinemia, multiple pneumonias and multiple medical problems. In terms of the CLL current therapy consists of Obiutuzumab which is a humanized antibody toward CD20 epitope on the CLL cells. In addition he receives gammaglobulin monthly. There has been no recent treatment with chemotherapy. He has had multiple admissions to this hospital for COPD exacerbation, pneumonia.  medical issues include COPD on home O2 2.5L, afib on coumadin, chronic diastolic CHF, DM2 on insulin, HTN, Hep B infection, / now admitted with a 2 week ho of worsening weakness. Wife also reports he had a low grade fever ~100 F a few days ago. He has a productive cough w/ yellow sputum. He is unable to ambulate due to back pain from compression fractures, and even though the pain is helped w/ medications he still feels very weak with associated decrease in appetite and PO intake. No chills, N/V, constipation, diarrhea, dysuria. + chest pain associated w/ cough. He has worsening cough when he lays flat so sleeps sitting up. (27 Dec 2018 08:56)      PAST MEDICAL & SURGICAL HISTORY:  Hyperlipidemia  Hypogammaglobulinemia: received IVIG  Hepatitis B virus infection, unspecified chronicity: retrovirals  Chronic diastolic congestive heart failure  Essential hypertension  DM type 2 (diabetes mellitus, type 2)  B-cell lymphoma, unspecified B-cell lymphoma type, unspecified body region: CLL stage 4/SLL; met NHL  Atrial fibrillation, unspecified type  COPD (chronic obstructive pulmonary disease)  History of esophageal dilatation  S/P cholecystectomy  H/O prostatectomy      MEDICATIONS  (STANDING):  amiodarone    Tablet 100 milliGRAM(s) Oral daily  ascorbic acid 500 milliGRAM(s) Oral two times a day  atorvastatin 10 milliGRAM(s) Oral at bedtime  cefepime  Injectable. 1000 milliGRAM(s) IV Push every 12 hours  clotrimazole 1% Cream 1 Application(s) Topical two times a day  dextrose 5%. 1000 milliLiter(s) (50 mL/Hr) IV Continuous <Continuous>  dextrose 50% Injectable 12.5 Gram(s) IV Push once  dextrose 50% Injectable 25 Gram(s) IV Push once  dextrose 50% Injectable 25 Gram(s) IV Push once  doxycycline hyclate Capsule 100 milliGRAM(s) Oral every 12 hours  gabapentin 300 milliGRAM(s) Oral three times a day  insulin glargine Injectable (LANTUS) 5 Unit(s) SubCutaneous at bedtime  insulin lispro (HumaLOG) corrective regimen sliding scale   SubCutaneous three times a day before meals  metoprolol tartrate 25 milliGRAM(s) Oral two times a day  montelukast 10 milliGRAM(s) Oral at bedtime  multivitamin 1 Tablet(s) Oral daily  spironolactone 25 milliGRAM(s) Oral daily  tenofovir disoproxil fumarate (VIREAD) 300 milliGRAM(s) Oral daily  zinc sulfate 220 milliGRAM(s) Oral two times a day    MEDICATIONS  (PRN):  acetaminophen   Tablet .. 650 milliGRAM(s) Oral every 6 hours PRN Temp greater or equal to 38C (100.4F), Mild Pain (1 - 3)  ALBUTerol    90 MICROgram(s) HFA Inhaler 1 Puff(s) Inhalation every 4 hours PRN Shortness of Breath and/or Wheezing  dextrose 40% Gel 15 Gram(s) Oral once PRN Blood Glucose LESS THAN 70 milliGRAM(s)/deciliter  glucagon  Injectable 1 milliGRAM(s) IntraMuscular once PRN Glucose LESS THAN 70 milligrams/deciliter  oxyCODONE    IR 5 milliGRAM(s) Oral every 4 hours PRN Moderate Pain (4 - 6)  oxyCODONE    IR 10 milliGRAM(s) Oral every 4 hours PRN Severe Pain (7 - 10)  senna 2 Tablet(s) Oral at bedtime PRN Constipation      Allergies    No Known Allergies    Intolerances      SOCIAL HISTORY:    FAMILY HISTORY:  Family history of liver cancer (Father): father  85 liver CA  Family history of coronary arteriosclerosis (Mother): Mom  of MI age 68      Vital Signs Last 24 Hrs  T(C): 36.8 (28 Dec 2018 14:13), Max: 36.8 (27 Dec 2018 20:57)  T(F): 98.2 (28 Dec 2018 14:13), Max: 98.3 (28 Dec 2018 04:53)  HR: 98 (28 Dec 2018 14:13) (86 - 98)  BP: 90/63 (28 Dec 2018 14:13) (84/60 - 99/60)  BP(mean): --  RR: 16 (28 Dec 2018 14:13) (16 - 18)  SpO2: 97% (28 Dec 2018 14:13) (97% - 100%)      LABS:                        12.6   21.83 )-----------( 183      ( 28 Dec 2018 05:53 )             39.0     12-    130<L>  |  99  |  38<H>  ----------------------------<  66<L>  4.3   |  19<L>  |  1.12    Ca    8.0<L>      28 Dec 2018 05:53      PT/INR - ( 28 Dec 2018 05:53 )   PT: 52.6 sec;   INR: 4.52 ratio         PTT - ( 26 Dec 2018 18:55 )  PTT:56.8 sec  Urinalysis Basic - ( 26 Dec 2018 23:06 )    Color: Yellow / Appearance: Clear / S.005 / pH: x  Gluc: x / Ketone: Negative  / Bili: Negative / Urobili: Negative mg/dL   Blood: x / Protein: 15 mg/dL / Nitrite: Negative   Leuk Esterase: Trace / RBC: 0-2 /HPF / WBC 6-10   Sq Epi: x / Non Sq Epi: Few / Bacteria: Moderate        RADIOLOGY & ADDITIONAL STUDIES:      < from: CT Chest No Cont (12.27.18 @ 11:26) >  CHEST:     LUNGS AND LARGE AIRWAYS: Patent central airways.  The left greater than   right pleural effusion with adjacent passive atelectasis. Mild   peribronchial thickening is present in the partially collapsed right   lower lobe with additional findings of mild patchy airspace opacity,   raising a question of underlying pneumonia. Similar findingsof   peribronchial thickening and patchy airspace opacity are present in the   right middle lobe.    A right perihilar pulmonary nodule measures 7 mm, unchanged from prior   examination 2018 but new compared to prior examination 2018.   (3:60)  PLEURA: As above  VESSELS: Atherosclerotic calcifications.  HEART: Heart size is normal. No pericardial effusion.  MEDIASTINUM AND JEFFREY: Nonspecific mediastinal adenopathy, without   significant change from prior exam.  CHEST WALL AND LOWER NECK: Bilateral enlarged axillary lymph nodes. Small   left lower pole thyroid nodule.  VISUALIZED UPPER ABDOMEN: The spleen is enlarged, measuring 14.1 cm in   craniocaudal diameter.  BONES: The bones are osteopenic. There are old bilateral rib fractures.    IMPRESSION:   *  Small bilateral pleural effusions with adjacent passive atelectasis.  *  Peribronchial thickening in the right lower middle lobes with adjacent   patchy airspace opacity, concerning for pneumonia.  *  Mediastinal and axillary lymphadenopathy, without significant change   from prior exam.   *  Splenomegaly.      < end of copied text >
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 Wife also reports he had a low grade fever ~100 F a few days ago. He has a productive cough w/ yellow sputum. He is unable to ambulate due to back pain from compression fractures, and even though the pain is helped w/ medications he still feels very weak with associated decrease in appetite and PO intake. Palliative Medicine Consult to establish GOC.   18 Seen and examined at bedside with wife and daughter present. C/O persistent back/right hip pain and mild dyspnea at rest.      PAIN: (X )Yes   ( )No  Level: mod  Location: lower back/hip  Intensity:   5 /10  Quality: constant  Aggravating Factors: standing/ambulating  Alleviating Factors: Tylenol   Radiation: right hip  Duration/Timing:  Impact on ADLs: severe    DYSPNEA: (X ) Yes  ( ) No  Level: mild at rest    PAST MEDICAL & SURGICAL HISTORY:  Hyperlipidemia  Hypogammaglobulinemia: received IVIG  Hepatitis B virus infection, unspecified chronicity: retrovirals  Chronic diastolic congestive heart failure  Essential hypertension  DM type 2 (diabetes mellitus, type 2)  B-cell lymphoma, unspecified B-cell lymphoma type, unspecified body region: CLL stage 4/SLL; met NHL  Atrial fibrillation, unspecified type  COPD (chronic obstructive pulmonary disease)  History of esophageal dilatation  S/P cholecystectomy  H/O prostatectomy      SOCIAL HX:    Hx opiate tolerance ( )YES  ( )NO    Baseline ADLs  (Prior to Admission)  ( ) Independent   (X )Dependent    FAMILY HISTORY:  Family history of liver cancer (Father): father  85 liver CA  Family history of coronary arteriosclerosis (Mother): Mom  of MI age 68      Review of Systems:    Anxiety-mild  Depression-situational  Physical Discomfort-mod  Dyspnea-mild  Constipation-denies  Diarrhea-denies  Anorexia-mod-severe  Weight Loss-   Cough-mod  Secretions-mod  Fatigue-severe  Weakness-severe      All other systems reviewed and negative  Unable to obtain/Limited due to:      PHYSICAL EXAM:    Vital Signs Last 24 Hrs  T(C): 36.8 (28 Dec 2018 04:53), Max: 36.8 (27 Dec 2018 20:57)  T(F): 98.3 (28 Dec 2018 04:53), Max: 98.3 (28 Dec 2018 04:53)  HR: 90 (28 Dec 2018 04:53) (82 - 90)  BP: 99/60 (28 Dec 2018 04:53) (84/58 - 110/72)  BP(mean): --  RR: 17 (28 Dec 2018 04:53) (16 - 18)  SpO2: 97% (28 Dec 2018 04:53) (94% - 100%)       Daily Weight in k (28 Dec 2018 07:57)    PPSV2: 20-30 %      General: Elderly male in bed in NAD  Mental Status: alert and oriented X3  HEENT: nasal O2 intact  Lungs: clear to auscultation  Cardiac: S1S2+  GI: abd soft NT ND + BS  : voids  Ext: Right YASHIRA externally rotated/no edema BLE/BUE=strength  Neuro: no focal def      LABS:                        12.6   21.83 )-----------( 183      ( 28 Dec 2018 05:53 )             39.0     12-28    130<L>  |  99  |  38<H>  ----------------------------<  66<L>  4.3   |  19<L>  |  1.12    Ca    8.0<L>      28 Dec 2018 05:53      PT/INR - ( 28 Dec 2018 05:53 )   PT: 52.6 sec;   INR: 4.52 ratio         PTT - ( 26 Dec 2018 18:55 )  PTT:56.8 sec  Albumin:     Allergies    No Known Allergies    Intolerances      MEDICATIONS  (STANDING):  amiodarone    Tablet 100 milliGRAM(s) Oral daily  atorvastatin 10 milliGRAM(s) Oral at bedtime  cefepime  Injectable. 1000 milliGRAM(s) IV Push every 12 hours  clotrimazole 1% Cream 1 Application(s) Topical two times a day  dextrose 5%. 1000 milliLiter(s) (50 mL/Hr) IV Continuous <Continuous>  dextrose 50% Injectable 12.5 Gram(s) IV Push once  dextrose 50% Injectable 25 Gram(s) IV Push once  dextrose 50% Injectable 25 Gram(s) IV Push once  doxycycline hyclate Capsule 100 milliGRAM(s) Oral every 12 hours  gabapentin 300 milliGRAM(s) Oral three times a day  insulin glargine Injectable (LANTUS) 10 Unit(s) SubCutaneous at bedtime  insulin lispro (HumaLOG) corrective regimen sliding scale   SubCutaneous three times a day before meals  metoprolol tartrate 25 milliGRAM(s) Oral two times a day  montelukast 10 milliGRAM(s) Oral at bedtime  multivitamin 1 Tablet(s) Oral daily  sodium chloride 0.9%. 1000 milliLiter(s) (50 mL/Hr) IV Continuous <Continuous>  sodium chloride 0.9%. 1000 milliLiter(s) (250 mL/Hr) IV Continuous <Continuous>  spironolactone 25 milliGRAM(s) Oral daily  tenofovir disoproxil fumarate (VIREAD) 300 milliGRAM(s) Oral daily    MEDICATIONS  (PRN):  acetaminophen   Tablet .. 650 milliGRAM(s) Oral every 6 hours PRN Temp greater or equal to 38C (100.4F), Mild Pain (1 - 3)  ALBUTerol    90 MICROgram(s) HFA Inhaler 1 Puff(s) Inhalation every 4 hours PRN Shortness of Breath and/or Wheezing  dextrose 40% Gel 15 Gram(s) Oral once PRN Blood Glucose LESS THAN 70 milliGRAM(s)/deciliter  glucagon  Injectable 1 milliGRAM(s) IntraMuscular once PRN Glucose LESS THAN 70 milligrams/deciliter  oxyCODONE    IR 5 milliGRAM(s) Oral every 4 hours PRN Moderate Pain (4 - 6)  oxyCODONE    IR 10 milliGRAM(s) Oral every 4 hours PRN Severe Pain (7 - 10)  senna 2 Tablet(s) Oral at bedtime PRN Constipation      RADIOLOGY/ADDITIONAL STUDIES:    < from: CT Chest No Cont (12.27.18 @ 11:26) >  EXAM:  CT CHEST                            PROCEDURE DATE:  2018          INTERPRETATION:  CLINICAL INFORMATION: 78-year-old male with leukocytosis   and yellow sputum. History of B-cell lymphoma.    COMPARISON: Chest CT scan 2018 and 3/28/2016    PROCEDURE:   CT of the Chest was performed without intravenous contrast.  Sagittal and coronal reformats were performed.      FINDINGS:    CHEST:     LUNGS AND LARGE AIRWAYS: Patent central airways.  The left greater than   right pleural effusion with adjacent passive atelectasis. Mild   peribronchial thickening is present in the partially collapsed right   lower lobe with additional findings of mild patchy airspace opacity,   raising a question of underlying pneumonia. Similar findingsof   peribronchial thickening and patchy airspace opacity are present in the   right middle lobe.    A right perihilar pulmonary nodule measures 7 mm, unchanged from prior   examination 2018 but new compared to prior examination 2018.   (3:60)  PLEURA: As above  VESSELS: Atherosclerotic calcifications.  HEART: Heart size is normal. No pericardial effusion.  MEDIASTINUM AND JEFFREY: Nonspecific mediastinal adenopathy, without   significant change from prior exam.  CHEST WALL AND LOWER NECK: Bilateral enlarged axillary lymph nodes. Small   left lower pole thyroid nodule.  VISUALIZED UPPER ABDOMEN: The spleen is enlarged, measuring 14.1 cm in   craniocaudal diameter.  BONES: The bones are osteopenic. There are old bilateral rib fractures.    IMPRESSION:   *  Small bilateral pleural effusions with adjacent passive atelectasis.  *  Peribronchial thickening in the right lower middle lobes with adjacent   patchy airspace opacity, concerning for pneumonia.  *  Mediastinal and axillary lymphadenopathy, without significant change   from prior exam.   *  Splenomegaly.
Patient is a 78y old  Male who presents with a chief complaint of Weakness (27 Dec 2018 08:56)    HPI:  78M w/ PMH of COPD on home O2 2.5L, afib on coumadin, chronic diastolic CHF, DM2 on insulin, HTN, Hep B infection, CLL/B cell lymphoma on chemotx,  hypogammaglobulinemia w/ IVIG tx, s/p cholecystectomy, esophageal dilation, prostatectomy admitted on  for evaluation of increased weakness, fever to 100 and productive cough over the last 2 weeks; decreased po intake with worsening cough; patient has compression fractures and is unable to ambulate without pain.          PMH: as above  PSH: as above  Meds: per reconciliation sheet, noted below  MEDICATIONS  (STANDING):  amiodarone    Tablet 100 milliGRAM(s) Oral daily  atorvastatin 10 milliGRAM(s) Oral at bedtime  cefepime  Injectable. 1000 milliGRAM(s) IV Push daily  dextrose 5%. 1000 milliLiter(s) (50 mL/Hr) IV Continuous <Continuous>  dextrose 50% Injectable 12.5 Gram(s) IV Push once  dextrose 50% Injectable 25 Gram(s) IV Push once  dextrose 50% Injectable 25 Gram(s) IV Push once  doxycycline hyclate Capsule 100 milliGRAM(s) Oral every 12 hours  gabapentin 300 milliGRAM(s) Oral three times a day  insulin glargine Injectable (LANTUS) 10 Unit(s) SubCutaneous at bedtime  insulin lispro (HumaLOG) corrective regimen sliding scale   SubCutaneous three times a day before meals  metoprolol tartrate 25 milliGRAM(s) Oral two times a day  montelukast 10 milliGRAM(s) Oral at bedtime  multivitamin 1 Tablet(s) Oral daily  sodium chloride 0.9%. 1000 milliLiter(s) (250 mL/Hr) IV Continuous <Continuous>  spironolactone 25 milliGRAM(s) Oral daily  tenofovir disoproxil fumarate (VIREAD) 300 milliGRAM(s) Oral daily    MEDICATIONS  (PRN):  acetaminophen   Tablet .. 650 milliGRAM(s) Oral every 6 hours PRN Temp greater or equal to 38C (100.4F), Mild Pain (1 - 3)  ALBUTerol    90 MICROgram(s) HFA Inhaler 1 Puff(s) Inhalation every 4 hours PRN Shortness of Breath and/or Wheezing  dextrose 40% Gel 15 Gram(s) Oral once PRN Blood Glucose LESS THAN 70 milliGRAM(s)/deciliter  glucagon  Injectable 1 milliGRAM(s) IntraMuscular once PRN Glucose LESS THAN 70 milligrams/deciliter  senna 2 Tablet(s) Oral at bedtime PRN Constipation    Allergies    No Known Allergies    Intolerances      Social: no smoking, no alcohol, no illegal drugs; no recent travel, no exposure to TB  FAMILY HISTORY:  Family history of liver cancer (Father): father  85 liver CA  Family history of coronary arteriosclerosis (Mother): Mom  of MI age 68     no history of premature cardiovascular disease in first degree relatives  ROS: the patient denies fever, no chills, no HA, no dizziness, no sore throat, no blurry vision, no CP, no palpitations, no abdominal pain, no diarrhea, no N/V, no dysuria, no leg pain, no claudication, no rash, no joint aches, no rectal pain or bleeding, no night sweats  All other systems reviewed and are negative    Vital Signs Last 24 Hrs  T(C): 36.6 (27 Dec 2018 12:15), Max: 37.1 (27 Dec 2018 01:37)  T(F): 97.9 (27 Dec 2018 12:15), Max: 98.8 (27 Dec 2018 01:37)  HR: 86 (27 Dec 2018 12:15) (85 - 104)  BP: 96/64 (27 Dec 2018 12:15) (86/63 - 106/71)  BP(mean): --  RR: 16 (27 Dec 2018 12:15) (16 - 17)  SpO2: 94% (27 Dec 2018 12:15) (94% - 100%)  Daily Height in cm: 175.26 (26 Dec 2018 17:15)    Daily     PE:    Constitutional: frail looking  HEENT: NC/AT, EOMI, PERRLA, conjunctivae clear; ears and nose atraumatic; pharynx clear  Neck: supple; thyroid not palpable  Back: no tenderness  Respiratory: respiratory effort normal; diminished breath sounds at bases  Cardiovascular: S1S2 regular, no murmurs  Abdomen: soft, not tender, not distended, positive BS; no liver or spleen organomegaly  Genitourinary: no suprapubic tenderness  Musculoskeletal: no muscle tenderness, no joint swelling or tenderness  Neurological/ Psychiatric: AxOx3, judgement and insight normal;  moving all extremities  Skin: no rashes; no palpable lesions    Labs: all available labs reviewed                        14.0   27.75 )-----------( 214      ( 26 Dec 2018 18:55 )             43.3     12-    129<L>  |  96  |  42<H>  ----------------------------<  152<H>  4.7   |  24  |  1.46<H>    Ca    7.8<L>      27 Dec 2018 01:07         Urinalysis Basic - ( 26 Dec 2018 23:06 )    Color: Yellow / Appearance: Clear / S.005 / pH: x  Gluc: x / Ketone: Negative  / Bili: Negative / Urobili: Negative mg/dL   Blood: x / Protein: 15 mg/dL / Nitrite: Negative   Leuk Esterase: Trace / RBC: 0-2 /HPF / WBC 6-10   Sq Epi: x / Non Sq Epi: Few / Bacteria: Moderate    < from: CT Chest No Cont (18 @ 11:26) >    EXAM:  CT CHEST                            PROCEDURE DATE:  2018          INTERPRETATION:  CLINICAL INFORMATION: 78-year-old male with leukocytosis   and yellow sputum. History of B-cell lymphoma.    COMPARISON: Chest CT scan 2018 and 3/28/2016    PROCEDURE:   CT of the Chest was performed without intravenous contrast.  Sagittal and coronal reformats were performed.      FINDINGS:    CHEST:     LUNGS AND LARGE AIRWAYS: Patent central airways.  The left greater than   right pleural effusion with adjacent passive atelectasis. Mild   peribronchial thickening is present in the partially collapsed right   lower lobe with additional findings of mild patchy airspace opacity,   raising a question of underlying pneumonia. Similar findingsof   peribronchial thickening and patchy airspace opacity are present in the   right middle lobe.    A right perihilar pulmonary nodule measures 7 mm, unchanged from prior   examination 2018 but new compared to prior examination 2018.   (3:60)  PLEURA: As above  VESSELS: Atherosclerotic calcifications.  HEART: Heart size is normal. No pericardial effusion.  MEDIASTINUM AND JEFFREY: Nonspecific mediastinal adenopathy, without   significant change from prior exam.  CHEST WALL AND LOWER NECK: Bilateral enlarged axillary lymph nodes. Small   left lower pole thyroid nodule.  VISUALIZED UPPER ABDOMEN: The spleen is enlarged, measuring 14.1 cm in   craniocaudal diameter.  BONES: The bones are osteopenic. There are old bilateral rib fractures.    IMPRESSION:   *  Small bilateral pleural effusions with adjacent passive atelectasis.  *  Peribronchial thickening in the right lower middle lobes with adjacent   patchy airspace opacity, concerning for pneumonia.  *  Mediastinal and axillary lymphadenopathy, without significant change   from prior exam.     < end of copied text >        Radiology: all available radiological tests reviewed    Advanced directives addressed: full resuscitation

## 2018-12-28 NOTE — DIETITIAN INITIAL EVALUATION ADULT. - PERTINENT LABORATORY DATA
12-28 Na130 mmol/L<L> Glu 66 mg/dL<L> K+ 4.3 mmol/L Cr  1.12 mg/dL BUN 38 mg/dL<H> Phos n/a   Alb n/a   PAB n/a

## 2018-12-28 NOTE — PROGRESS NOTE ADULT - SUBJECTIVE AND OBJECTIVE BOX
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 Wife also reports he had a low grade fever ~100 F a few days ago. He has a productive cough w/ yellow sputum. He is unable to ambulate due to back pain from compression fractures, and even though the pain is helped w/ medications he still feels very weak with associated decrease in appetite and PO intake. No chills, N/V, constipation, diarrhea, dysuria. + chest pain associated w/ cough. He has worsening cough when he lays flat so sleeps sitting up.    SUBJECTIVE: This morning, patient notes that he continues to have right hip pain and lower back pain.     REVIEW OF SYSTEMS:  CONSTITUTIONAL:+ weakness, fevers or chills  EYES/ENT: No visual changes;  No vertigo or throat pain   NECK: No pain or stiffness  RESPIRATORY: No cough, wheezing, hemoptysis; No shortness of breath  CARDIOVASCULAR: No chest pain or palpitations  GASTROINTESTINAL: No abdominal or epigastric pain. No nausea, vomiting, or hematemesis; No diarrhea or constipation. No melena or hematochezia.  GENITOURINARY: No dysuria, frequency or hematuria  NEUROLOGICAL: No numbness or weakness  SKIN: No itching, burning, rashes, or lesions   All other review of systems is negative unless indicated above    Vital Signs Last 24 Hrs  T(C): 36.8 (28 Dec 2018 14:13), Max: 36.8 (27 Dec 2018 20:57)  T(F): 98.2 (28 Dec 2018 14:13), Max: 98.3 (28 Dec 2018 04:53)  HR: 98 (28 Dec 2018 14:13) (86 - 98)  BP: 90/63 (28 Dec 2018 14:13) (84/60 - 110/72)  BP(mean): --  RR: 16 (28 Dec 2018 14:13) (16 - 18)  SpO2: 97% (28 Dec 2018 14:13) (97% - 100%)    I&O's Summary    27 Dec 2018 07:01  -  28 Dec 2018 07:00  --------------------------------------------------------  IN: 1500 mL / OUT: 201 mL / NET: 1299 mL        CAPILLARY BLOOD GLUCOSE      POCT Blood Glucose.: 77 mg/dL (28 Dec 2018 13:22)  POCT Blood Glucose.: 74 mg/dL (28 Dec 2018 08:41)  POCT Blood Glucose.: 95 mg/dL (27 Dec 2018 22:35)  POCT Blood Glucose.: 107 mg/dL (27 Dec 2018 18:22)        MEDICATIONS:  MEDICATIONS  (STANDING):  amiodarone    Tablet 100 milliGRAM(s) Oral daily  ascorbic acid 500 milliGRAM(s) Oral two times a day  atorvastatin 10 milliGRAM(s) Oral at bedtime  cefepime  Injectable. 1000 milliGRAM(s) IV Push every 12 hours  clotrimazole 1% Cream 1 Application(s) Topical two times a day  dextrose 5%. 1000 milliLiter(s) (50 mL/Hr) IV Continuous <Continuous>  dextrose 50% Injectable 12.5 Gram(s) IV Push once  dextrose 50% Injectable 25 Gram(s) IV Push once  dextrose 50% Injectable 25 Gram(s) IV Push once  doxycycline hyclate Capsule 100 milliGRAM(s) Oral every 12 hours  gabapentin 300 milliGRAM(s) Oral three times a day  insulin glargine Injectable (LANTUS) 10 Unit(s) SubCutaneous at bedtime  insulin lispro (HumaLOG) corrective regimen sliding scale   SubCutaneous three times a day before meals  metoprolol tartrate 25 milliGRAM(s) Oral two times a day  montelukast 10 milliGRAM(s) Oral at bedtime  multivitamin 1 Tablet(s) Oral daily  sodium chloride 0.9%. 1000 milliLiter(s) (50 mL/Hr) IV Continuous <Continuous>  sodium chloride 0.9%. 1000 milliLiter(s) (250 mL/Hr) IV Continuous <Continuous>  spironolactone 25 milliGRAM(s) Oral daily  tenofovir disoproxil fumarate (VIREAD) 300 milliGRAM(s) Oral daily  zinc sulfate 220 milliGRAM(s) Oral two times a day      LABS: All Labs Reviewed:                        12.6   21.83 )-----------( 183      ( 28 Dec 2018 05:53 )             39.0     12-28    130<L>  |  99  |  38<H>  ----------------------------<  66<L>  4.3   |  19<L>  |  1.12    Ca    8.0<L>      28 Dec 2018 05:53      PT/INR - ( 28 Dec 2018 05:53 )   PT: 52.6 sec;   INR: 4.52 ratio         PTT - ( 26 Dec 2018 18:55 )  PTT:56.8 sec  CARDIAC MARKERS ( 27 Dec 2018 03:25 )  0.016 ng/mL / x     / x     / x     / x      CARDIAC MARKERS ( 27 Dec 2018 01:07 )  0.017 ng/mL / x     / x     / x     / x      CARDIAC MARKERS ( 26 Dec 2018 19:38 )  <0.015 ng/mL / x     / x     / x     / x          Blood Culture: 12-26 @ 23:06  Organism --  Gram Stain Blood -- Gram Stain --  Specimen Source .Urine None  Culture-Blood --    12-26 @ 19:38  Organism --  Gram Stain Blood -- Gram Stain --  Specimen Source .Blood Blood-Peripheral  Culture-Blood --    12-26 @ 17:36  Organism --  Gram Stain Blood -- Gram Stain --  Specimen Source .Blood Blood-Peripheral  Culture-Blood --        RADIOLOGY/EKG:    DVT PPX:    ADVANCED DIRECTIVE:    DISPOSITION: 78M w/ PMH of chronic PNA, COPD on home O2 2.5L, afib on coumadin, chronic diastolic CHF, DM2 on insulin, HTN, Hep B infection, 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 Wife also reports he had a low grade fever ~100 F a few days ago. He has a productive cough w/ yellow sputum. He is unable to ambulate due to back pain from compression fractures, and even though the pain is helped w/ medications he still feels very weak with associated decrease in appetite and PO intake. No chills, N/V, constipation, diarrhea, dysuria. + chest pain associated w/ cough. He has worsening cough when he lays flat so sleeps sitting up.    SUBJECTIVE: This morning, patient notes that he continues to have right hip pain and lower back pain.     REVIEW OF SYSTEMS:  CONSTITUTIONAL:+ weakness, fevers or chills  EYES/ENT: No visual changes;  No vertigo or throat pain   NECK: No pain or stiffness  RESPIRATORY: No cough, wheezing, hemoptysis; No shortness of breath  CARDIOVASCULAR: No chest pain or palpitations  GASTROINTESTINAL: No abdominal or epigastric pain. No nausea, vomiting, or hematemesis; No diarrhea or constipation. No melena or hematochezia.  GENITOURINARY: No dysuria, frequency or hematuria  NEUROLOGICAL: No numbness or weakness  SKIN: No itching, burning, rashes, or lesions   All other review of systems is negative unless indicated above    Vital Signs Last 24 Hrs  T(C): 36.8 (28 Dec 2018 14:13), Max: 36.8 (27 Dec 2018 20:57)  T(F): 98.2 (28 Dec 2018 14:13), Max: 98.3 (28 Dec 2018 04:53)  HR: 98 (28 Dec 2018 14:13) (86 - 98)  BP: 90/63 (28 Dec 2018 14:13) (84/60 - 110/72)  BP(mean): --  RR: 16 (28 Dec 2018 14:13) (16 - 18)  SpO2: 97% (28 Dec 2018 14:13) (97% - 100%)    I&O's Summary    27 Dec 2018 07:01  -  28 Dec 2018 07:00  --------------------------------------------------------  IN: 1500 mL / OUT: 201 mL / NET: 1299 mL        CAPILLARY BLOOD GLUCOSE      POCT Blood Glucose.: 77 mg/dL (28 Dec 2018 13:22)  POCT Blood Glucose.: 74 mg/dL (28 Dec 2018 08:41)  POCT Blood Glucose.: 95 mg/dL (27 Dec 2018 22:35)  POCT Blood Glucose.: 107 mg/dL (27 Dec 2018 18:22)        MEDICATIONS:  MEDICATIONS  (STANDING):  amiodarone    Tablet 100 milliGRAM(s) Oral daily  ascorbic acid 500 milliGRAM(s) Oral two times a day  atorvastatin 10 milliGRAM(s) Oral at bedtime  cefepime  Injectable. 1000 milliGRAM(s) IV Push every 12 hours  clotrimazole 1% Cream 1 Application(s) Topical two times a day  dextrose 5%. 1000 milliLiter(s) (50 mL/Hr) IV Continuous <Continuous>  dextrose 50% Injectable 12.5 Gram(s) IV Push once  dextrose 50% Injectable 25 Gram(s) IV Push once  dextrose 50% Injectable 25 Gram(s) IV Push once  doxycycline hyclate Capsule 100 milliGRAM(s) Oral every 12 hours  gabapentin 300 milliGRAM(s) Oral three times a day  insulin glargine Injectable (LANTUS) 10 Unit(s) SubCutaneous at bedtime  insulin lispro (HumaLOG) corrective regimen sliding scale   SubCutaneous three times a day before meals  metoprolol tartrate 25 milliGRAM(s) Oral two times a day  montelukast 10 milliGRAM(s) Oral at bedtime  multivitamin 1 Tablet(s) Oral daily  sodium chloride 0.9%. 1000 milliLiter(s) (50 mL/Hr) IV Continuous <Continuous>  sodium chloride 0.9%. 1000 milliLiter(s) (250 mL/Hr) IV Continuous <Continuous>  spironolactone 25 milliGRAM(s) Oral daily  tenofovir disoproxil fumarate (VIREAD) 300 milliGRAM(s) Oral daily  zinc sulfate 220 milliGRAM(s) Oral two times a day      LABS: All Labs Reviewed:                        12.6   21.83 )-----------( 183      ( 28 Dec 2018 05:53 )             39.0     12-28    130<L>  |  99  |  38<H>  ----------------------------<  66<L>  4.3   |  19<L>  |  1.12    Ca    8.0<L>      28 Dec 2018 05:53      PT/INR - ( 28 Dec 2018 05:53 )   PT: 52.6 sec;   INR: 4.52 ratio         PTT - ( 26 Dec 2018 18:55 )  PTT:56.8 sec  CARDIAC MARKERS ( 27 Dec 2018 03:25 )  0.016 ng/mL / x     / x     / x     / x      CARDIAC MARKERS ( 27 Dec 2018 01:07 )  0.017 ng/mL / x     / x     / x     / x      CARDIAC MARKERS ( 26 Dec 2018 19:38 )  <0.015 ng/mL / x     / x     / x     / x          Blood Culture: 12-26 @ 23:06  Organism --  Gram Stain Blood -- Gram Stain --  Specimen Source .Urine None  Culture-Blood --    12-26 @ 19:38  Organism --  Gram Stain Blood -- Gram Stain --  Specimen Source .Blood Blood-Peripheral  Culture-Blood --    12-26 @ 17:36  Organism --  Gram Stain Blood -- Gram Stain --  Specimen Source .Blood Blood-Peripheral  Culture-Blood --      < from: Xray Hip w/ Pelvis 2 or 3 Views, Right (12.28.18 @ 12:44) >  EXAM:  XR HIP WITH PELV 2-3V RT                            PROCEDURE DATE:  12/28/2018          INTERPRETATION:  Exam Date: 12/28/2018 12:44 PM    Radiographs of the right hip and pelvis    CLINICAL INFORMATION:  external shortened right leg    TECHNIQUE:   Frontal and extension views of the right hip and pelvis were   obtained.    FINDINGS/  IMPRESSION:   No prior exams are available for comparison.    No fracture is seen. Pelvis is intact.  The hip remains located.  No   loose body is identified.  The joint space is preserved.  No significant   productive changes or other cortical or trabecular abnormalities are   recognized.  The femoral head is intact without cortical collapse or   radiographic evidence of osteonecrosis.         No softtissue abnormality is recognized.    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 Wife also reports he had a low grade fever ~100 F a few days ago. He has a productive cough w/ yellow sputum. He is unable to ambulate due to back pain from compression fractures, and even though the pain is helped w/ medications he still feels very weak with associated decrease in appetite and PO intake. No chills, N/V, constipation, diarrhea, dysuria. + chest pain associated w/ cough. He has worsening cough when he lays flat so sleeps sitting up.    SUBJECTIVE: This morning, patient notes that he continues to have right hip pain and lower back pain. He still endorses a cough and generalized weakness.     REVIEW OF SYSTEMS:  CONSTITUTIONAL:+ weakness, fevers or chills  EYES/ENT: No visual changes;  No vertigo or throat pain   NECK: No pain or stiffness  RESPIRATORY: + cough; no wheezing, hemoptysis; No shortness of breath  CARDIOVASCULAR: No chest pain or palpitations  GASTROINTESTINAL: No abdominal or epigastric pain. No nausea, vomiting, or hematemesis; No diarrhea or constipation. No melena or hematochezia.  GENITOURINARY: No dysuria, frequency or hematuria  MSK: + lower back pain  NEUROLOGICAL: No numbness or weakness  SKIN: No itching, burning, rashes, or lesions   All other review of systems is negative unless indicated above    Vital Signs Last 24 Hrs  T(C): 36.8 (28 Dec 2018 14:13), Max: 36.8 (27 Dec 2018 20:57)  T(F): 98.2 (28 Dec 2018 14:13), Max: 98.3 (28 Dec 2018 04:53)  HR: 98 (28 Dec 2018 14:13) (86 - 98)  BP: 90/63 (28 Dec 2018 14:13) (84/60 - 110/72)  BP(mean): --  RR: 16 (28 Dec 2018 14:13) (16 - 18)  SpO2: 97% (28 Dec 2018 14:13) (97% - 100%)    I&O's Summary    27 Dec 2018 07:01  -  28 Dec 2018 07:00  --------------------------------------------------------  IN: 1500 mL / OUT: 201 mL / NET: 1299 mL        CAPILLARY BLOOD GLUCOSE      POCT Blood Glucose.: 77 mg/dL (28 Dec 2018 13:22)  POCT Blood Glucose.: 74 mg/dL (28 Dec 2018 08:41)  POCT Blood Glucose.: 95 mg/dL (27 Dec 2018 22:35)  POCT Blood Glucose.: 107 mg/dL (27 Dec 2018 18:22)        MEDICATIONS:  MEDICATIONS  (STANDING):  amiodarone    Tablet 100 milliGRAM(s) Oral daily  ascorbic acid 500 milliGRAM(s) Oral two times a day  atorvastatin 10 milliGRAM(s) Oral at bedtime  cefepime  Injectable. 1000 milliGRAM(s) IV Push every 12 hours  clotrimazole 1% Cream 1 Application(s) Topical two times a day  dextrose 5%. 1000 milliLiter(s) (50 mL/Hr) IV Continuous <Continuous>  dextrose 50% Injectable 12.5 Gram(s) IV Push once  dextrose 50% Injectable 25 Gram(s) IV Push once  dextrose 50% Injectable 25 Gram(s) IV Push once  doxycycline hyclate Capsule 100 milliGRAM(s) Oral every 12 hours  gabapentin 300 milliGRAM(s) Oral three times a day  insulin glargine Injectable (LANTUS) 10 Unit(s) SubCutaneous at bedtime  insulin lispro (HumaLOG) corrective regimen sliding scale   SubCutaneous three times a day before meals  metoprolol tartrate 25 milliGRAM(s) Oral two times a day  montelukast 10 milliGRAM(s) Oral at bedtime  multivitamin 1 Tablet(s) Oral daily  sodium chloride 0.9%. 1000 milliLiter(s) (50 mL/Hr) IV Continuous <Continuous>  sodium chloride 0.9%. 1000 milliLiter(s) (250 mL/Hr) IV Continuous <Continuous>  spironolactone 25 milliGRAM(s) Oral daily  tenofovir disoproxil fumarate (VIREAD) 300 milliGRAM(s) Oral daily  zinc sulfate 220 milliGRAM(s) Oral two times a day      LABS: All Labs Reviewed:                        12.6   21.83 )-----------( 183      ( 28 Dec 2018 05:53 )             39.0     12-28    130<L>  |  99  |  38<H>  ----------------------------<  66<L>  4.3   |  19<L>  |  1.12    Ca    8.0<L>      28 Dec 2018 05:53      PT/INR - ( 28 Dec 2018 05:53 )   PT: 52.6 sec;   INR: 4.52 ratio         PTT - ( 26 Dec 2018 18:55 )  PTT:56.8 sec  CARDIAC MARKERS ( 27 Dec 2018 03:25 )  0.016 ng/mL / x     / x     / x     / x      CARDIAC MARKERS ( 27 Dec 2018 01:07 )  0.017 ng/mL / x     / x     / x     / x      CARDIAC MARKERS ( 26 Dec 2018 19:38 )  <0.015 ng/mL / x     / x     / x     / x          Blood Culture: 12-26 @ 23:06  Organism --  Gram Stain Blood -- Gram Stain --  Specimen Source .Urine None  Culture-Blood --    12-26 @ 19:38  Organism --  Gram Stain Blood -- Gram Stain --  Specimen Source .Blood Blood-Peripheral  Culture-Blood --    12-26 @ 17:36  Organism --  Gram Stain Blood -- Gram Stain --  Specimen Source .Blood Blood-Peripheral  Culture-Blood --      < from: Xray Hip w/ Pelvis 2 or 3 Views, Right (12.28.18 @ 12:44) >  EXAM:  XR HIP WITH PELV 2-3V RT                            PROCEDURE DATE:  12/28/2018          INTERPRETATION:  Exam Date: 12/28/2018 12:44 PM    Radiographs of the right hip and pelvis    CLINICAL INFORMATION:  external shortened right leg    TECHNIQUE:   Frontal and extension views of the right hip and pelvis were   obtained.    FINDINGS/  IMPRESSION:   No prior exams are available for comparison.    No fracture is seen. Pelvis is intact.  The hip remains located.  No   loose body is identified.  The joint space is preserved.  No significant   productive changes or other cortical or trabecular abnormalities are   recognized.  The femoral head is intact without cortical collapse or   radiographic evidence of osteonecrosis.         No softtissue abnormality is recognized.    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 Wife also reports he had a low grade fever ~100 F a few days ago. He has a productive cough w/ yellow sputum. He is unable to ambulate due to back pain from compression fractures, and even though the pain is helped w/ medications he still feels very weak with associated decrease in appetite and PO intake. No chills, N/V, constipation, diarrhea, dysuria. + chest pain associated w/ cough. He has worsening cough when he lays flat so sleeps sitting up.    SUBJECTIVE: This morning, patient notes that he continues to have right hip pain and lower back pain. He still endorses a cough and generalized weakness.     REVIEW OF SYSTEMS:  CONSTITUTIONAL:+ weakness, fevers or chills  EYES/ENT: No visual changes;  No vertigo or throat pain   NECK: No pain or stiffness  RESPIRATORY: + cough; no wheezing, hemoptysis; No shortness of breath  CARDIOVASCULAR: No chest pain or palpitations  GASTROINTESTINAL: No abdominal or epigastric pain. No nausea, vomiting, or hematemesis; No diarrhea or constipation. No melena or hematochezia.  GENITOURINARY: No dysuria, frequency or hematuria  MSK: + lower back pain  NEUROLOGICAL: No numbness or weakness  SKIN: No itching, burning, rashes, or lesions   All other review of systems is negative unless indicated above    Vital Signs Last 24 Hrs  T(C): 36.8 (28 Dec 2018 14:13), Max: 36.8 (27 Dec 2018 20:57)  T(F): 98.2 (28 Dec 2018 14:13), Max: 98.3 (28 Dec 2018 04:53)  HR: 98 (28 Dec 2018 14:13) (86 - 98)  BP: 90/63 (28 Dec 2018 14:13) (84/60 - 110/72)  BP(mean): --  RR: 16 (28 Dec 2018 14:13) (16 - 18)  SpO2: 97% (28 Dec 2018 14:13) (97% - 100%)    PHYSICAL EXAM:  Constitutional: NAD, awake and alert, well-developed, elderly, thin, Italian speaking  HEENT: PERR, EOMI, Normal Hearing, MMM  Neck: Soft and supple, No LAD, No JVD  Respiratory: Breath sounds are clear bilaterally, No wheezing, rales or rhonchi  Cardiovascular: S1 and S2, regular rate and rhythm, no Murmurs, gallops or rubs  Gastrointestinal: Bowel Sounds present, soft, nontender, nondistended, no guarding, no rebound  Extremities: No peripheral edema; R leg externally rotated and shorter than left  Vascular: 2+ peripheral pulses  Neurological: A/O x 3, no focal deficits  Skin: No rashes      I&O's Summary    27 Dec 2018 07:01  -  28 Dec 2018 07:00  --------------------------------------------------------  IN: 1500 mL / OUT: 201 mL / NET: 1299 mL        CAPILLARY BLOOD GLUCOSE      POCT Blood Glucose.: 77 mg/dL (28 Dec 2018 13:22)  POCT Blood Glucose.: 74 mg/dL (28 Dec 2018 08:41)  POCT Blood Glucose.: 95 mg/dL (27 Dec 2018 22:35)  POCT Blood Glucose.: 107 mg/dL (27 Dec 2018 18:22)        MEDICATIONS:  MEDICATIONS  (STANDING):  amiodarone    Tablet 100 milliGRAM(s) Oral daily  ascorbic acid 500 milliGRAM(s) Oral two times a day  atorvastatin 10 milliGRAM(s) Oral at bedtime  cefepime  Injectable. 1000 milliGRAM(s) IV Push every 12 hours  clotrimazole 1% Cream 1 Application(s) Topical two times a day  dextrose 5%. 1000 milliLiter(s) (50 mL/Hr) IV Continuous <Continuous>  dextrose 50% Injectable 12.5 Gram(s) IV Push once  dextrose 50% Injectable 25 Gram(s) IV Push once  dextrose 50% Injectable 25 Gram(s) IV Push once  doxycycline hyclate Capsule 100 milliGRAM(s) Oral every 12 hours  gabapentin 300 milliGRAM(s) Oral three times a day  insulin glargine Injectable (LANTUS) 10 Unit(s) SubCutaneous at bedtime  insulin lispro (HumaLOG) corrective regimen sliding scale   SubCutaneous three times a day before meals  metoprolol tartrate 25 milliGRAM(s) Oral two times a day  montelukast 10 milliGRAM(s) Oral at bedtime  multivitamin 1 Tablet(s) Oral daily  sodium chloride 0.9%. 1000 milliLiter(s) (50 mL/Hr) IV Continuous <Continuous>  sodium chloride 0.9%. 1000 milliLiter(s) (250 mL/Hr) IV Continuous <Continuous>  spironolactone 25 milliGRAM(s) Oral daily  tenofovir disoproxil fumarate (VIREAD) 300 milliGRAM(s) Oral daily  zinc sulfate 220 milliGRAM(s) Oral two times a day      LABS: All Labs Reviewed:                        12.6   21.83 )-----------( 183      ( 28 Dec 2018 05:53 )             39.0     12-28    130<L>  |  99  |  38<H>  ----------------------------<  66<L>  4.3   |  19<L>  |  1.12    Ca    8.0<L>      28 Dec 2018 05:53      PT/INR - ( 28 Dec 2018 05:53 )   PT: 52.6 sec;   INR: 4.52 ratio         PTT - ( 26 Dec 2018 18:55 )  PTT:56.8 sec  CARDIAC MARKERS ( 27 Dec 2018 03:25 )  0.016 ng/mL / x     / x     / x     / x      CARDIAC MARKERS ( 27 Dec 2018 01:07 )  0.017 ng/mL / x     / x     / x     / x      CARDIAC MARKERS ( 26 Dec 2018 19:38 )  <0.015 ng/mL / x     / x     / x     / x          Blood Culture: 12-26 @ 23:06  Organism --  Gram Stain Blood -- Gram Stain --  Specimen Source .Urine None  Culture-Blood --    12-26 @ 19:38  Organism --  Gram Stain Blood -- Gram Stain --  Specimen Source .Blood Blood-Peripheral  Culture-Blood --    12-26 @ 17:36  Organism --  Gram Stain Blood -- Gram Stain --  Specimen Source .Blood Blood-Peripheral  Culture-Blood --      < from: Xray Hip w/ Pelvis 2 or 3 Views, Right (12.28.18 @ 12:44) >  EXAM:  XR HIP WITH PELV 2-3V RT                            PROCEDURE DATE:  12/28/2018          INTERPRETATION:  Exam Date: 12/28/2018 12:44 PM    Radiographs of the right hip and pelvis    CLINICAL INFORMATION:  external shortened right leg    TECHNIQUE:   Frontal and extension views of the right hip and pelvis were   obtained.    FINDINGS/  IMPRESSION:   No prior exams are available for comparison.    No fracture is seen. Pelvis is intact.  The hip remains located.  No   loose body is identified.  The joint space is preserved.  No significant   productive changes or other cortical or trabecular abnormalities are   recognized.  The femoral head is intact without cortical collapse or   radiographic evidence of osteonecrosis.         No softtissue abnormality is recognized.    < end of copied text >

## 2018-12-28 NOTE — DIETITIAN INITIAL EVALUATION ADULT. - OTHER INFO
Pt is 78y/o M w/PMH CAD, PD, stroke, T2DM, advanced dementia, COPD, HTN, Prostate Ca s/p seeds, Vocal cord Ca s/p XRT. Per EMR,  Pt is very lethargic, not easily arousable w/ sternal rub.   Dtrs noted that he'd had dry cough over past several days.   He's baseline mental status and function, per wife, he does not communicate much, pretty much nonverbal, sleeps often and has poor po intake.  He did have swallow eval on prior admission in August during which it was recommended to place him on thin liq/nectar liq/soft solid/solids and to sit upright.  Wife has him on regular diet and has noted that he does not swallow his food.   Wife does endorse that pt has had intermittent diarrhea over past 4 days. per EMR assessment:  Acute respiratory failure with hypoxia, multifocal PNA, likely aspiration, hypokalemia, chronic encephalopathy 2/2 dementia, DM.  Diet is NPO.  Pt is non-verbal. Edema 1+ right and left ankle.  Ahmet 6.  Skin intact.  Pt non-verbal.  Asleep on interview, not rousable.  SLP consult ordered.  HgbA1c ordered.    Pt meets criteria for severe protein-calorie malnutrition in context of chronic disease. nutrition focused physical exam reveals severe muscle wasting over entire body.  Severe fat wasting over entire body.  PO intake < 75% nutritional needs > one month.  Suggest advance diet when medically feasible to regular with texture as per recommendation of SLP.  Add Ensure Enlive 8 oz tid, texture as recommended by SLP.  Pt is aspiration risk.  Weekly weights.  Record PO intake in EMR after each meal.  Will monitor PO intake, tolerance, labs and weight. 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 He is unable to ambulate due to back pain from compression fractures, has  decrease in appetite and PO intake. Nursing reports pt has been in bed for 2 weeks. per EMR per EMR: HCAP, hyponatremia, dehydration, CHRISTINA, CLL, chronic CHF, COPD, DM2, Afib, HLD.  No edema noted.  Pt has pressure ulcers stage III ribs, stage II right buttock, coccyx, ribs, right heel.  Pt speaks Belgian, family members speak English.  Information from family.  Pt  has not been eating well, is rejecting breakfast.  Pt requires feeding assist at this point.  No edema noted.  Ahmet 12. Pt on low sodium, DB 2200 diet with 1000 FR.  Pt meets criteria for severe protein-calorie malnutrition in context of chronic disease. Nutrition focused physical exam reveals severe muscle wasting over most of body, moderate/severe fat loss (triceps, rib.)  PO intake < 75% nutritional needs > one month.  Wife reports pt lost 12% of UBW in past 7 weeks (25 lbs.)amount unknown.   Suggest liberalize diet to regular to maximize caloric intake and nutrient intake.  HgbA1c is 7.2 Add Glucerna 8 oz tid.  Feeding assist.  Daily weights.  Add Zn 220 mg BID for 10 days.  Add vit C 500 mg BID.  Add Gelatein one container tid.    Will monitor PO intake, tolerance, labs and weight.

## 2018-12-28 NOTE — DIETITIAN INITIAL EVALUATION ADULT. - SIGNS/SYMPTOMS
muscle wasting and fat depletion (severe), poor PO intake at home muscle wasting and fat depletion,  poor PO intake, wt loss

## 2018-12-28 NOTE — PHYSICAL THERAPY INITIAL EVALUATION ADULT - ADDITIONAL COMMENTS
Pt was only able to transfer from [facility] and [facility] with assist and RW, pt has not ambulated since many days, pt had gone to [facility] rehab in november, presently pt comes from home

## 2018-12-28 NOTE — CONSULT NOTE ADULT - ASSESSMENT
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 Wife also reports he had a low grade fever ~100 F a few days ago. He has a productive cough w/ yellow sputum. He is unable to ambulate due to back pain from compression fractures, and even though the pain is helped w/ medications he still feels very weak with associated decrease in appetite and PO intake. Palliative Medicine Consult to establish GOC.   12/28/18 Seen and examined at bedside with wife and daughter present. C/O persistent back/right hip pain and mild dyspnea at rest.      Assessment and Plan:    1) HCAP  - leukocytosis   -ID eval noted  -Abx as per ID  -CT chest= Small bilateral pleural effusions with adjacent passive atelectasis.  Peribronchial thickening in the right lower middle lobes with adjacent   patchy airspace opacity, concerning for pneumonia.  Mediastinal and axillary lymphadenopathy, without significant change   from prior exam.   Splenomegaly  -Entero/Rhinovirus +  -Rapid RVP +  -Blood/Urines C&S NGTD    2) Dyspnea  -R/T Pneumonia  -Supplemental O2 PRN  -Currently on nasal canula   -Supportive care    3) Pain  -R/T Compression fx T11  -Cont Tylenol as ordered PRN mild pain  -Add Oxycodone 5 mg PO Q4H PRN mod pain  -Add Oxycodone 10 mg PO Q4H PRN severe pain  -Xray right hip pending    4) Weakness  -R/T Pain  -Pneumonia  -PT eval  -Recently at [facility]    5) CLL/BCell lymphoma  -Currently on chemo/IVIG  -Onc eval pending    6) Advanced Directives  -Pt with capacity ( Kyrgyz primary language)  -Daughter Maira named HCP  -Will schedule GOC discussion when treatment options available HPI: Pt is a 78y old Male with a PMH of chronic PNA, COPD on home O2 2.5L, Afib on coumadin, chronic diastolic CHF, DM2 on insulin, HTN, Hep B infection, CLL/B cell lymphoma on chemo tx,  hypogammaglobulinemia w/ IVIG tx, s/p cholecystectomy, esophageal dilation, prostatectomy comes to ED w/ ~2 weeks worsening weakness. Wife also reports he had a low grade fever ~100 F a few days ago. He has a productive cough w/ yellow sputum. He is unable to ambulate due to back pain from compression fractures, and even though the pain is helped w/ medications he still feels very weak with associated decrease in appetite and PO intake. Palliative Medicine Consult to establish GOC.   12/28/18 Seen and examined at bedside with wife and daughter present. C/O persistent back/right hip pain and mild dyspnea at rest.      Assessment and Plan:    1) HCAP  - leukocytosis   -ID eval noted  -Abx as per ID  -CT chest= Small bilateral pleural effusions with adjacent passive atelectasis.  Peribronchial thickening in the right lower middle lobes with adjacent   patchy airspace opacity, concerning for pneumonia.  Mediastinal and axillary lymphadenopathy, without significant change   from prior exam.   Splenomegaly  -Entero/Rhinovirus +  -Rapid RVP +      2) Dyspnea  -R/T Pneumonia  -Supplemental O2 PRN  -Currently on nasal canula   -Supportive care    3) Pain  -R/T Compression fx T11  -Cont Tylenol as ordered PRN mild pain  -Add Oxycodone 5 mg PO Q4H PRN mod pain  -Add Oxycodone 10 mg PO Q4H PRN severe pain  -Xray right hip pending    4) Weakness  -R/T Pain  -Pneumonia  -PT eval  -Recently at [facility]    5) CLL/BCell lymphoma  -Currently on chemo/IVIG  -Onc eval pending    6) Advanced Directives  -Pt with capacity ( Italian primary language)  -Daughter Maira named HCP  -Will schedule GOC discussion when treatment options available

## 2018-12-28 NOTE — DIETITIAN INITIAL EVALUATION ADULT. - PHYSICAL APPEARANCE
Pt meets criteria for severe protein-calorie malnutrition in context of chronic disease/emaciated Pt meets criteria for severe protein-calorie malnutrition in context of chronic disease

## 2018-12-28 NOTE — DIETITIAN INITIAL EVALUATION ADULT. - PERTINENT MEDS FT
MEDICATIONS  (STANDING):  amiodarone    Tablet 100 milliGRAM(s) Oral daily  atorvastatin 10 milliGRAM(s) Oral at bedtime  cefepime  Injectable. 1000 milliGRAM(s) IV Push every 12 hours  clotrimazole 1% Cream 1 Application(s) Topical two times a day  dextrose 5%. 1000 milliLiter(s) (50 mL/Hr) IV Continuous <Continuous>  dextrose 50% Injectable 12.5 Gram(s) IV Push once  dextrose 50% Injectable 25 Gram(s) IV Push once  dextrose 50% Injectable 25 Gram(s) IV Push once  doxycycline hyclate Capsule 100 milliGRAM(s) Oral every 12 hours  gabapentin 300 milliGRAM(s) Oral three times a day  insulin glargine Injectable (LANTUS) 10 Unit(s) SubCutaneous at bedtime  insulin lispro (HumaLOG) corrective regimen sliding scale   SubCutaneous three times a day before meals  metoprolol tartrate 25 milliGRAM(s) Oral two times a day  montelukast 10 milliGRAM(s) Oral at bedtime  multivitamin 1 Tablet(s) Oral daily  sodium chloride 0.9%. 1000 milliLiter(s) (50 mL/Hr) IV Continuous <Continuous>  sodium chloride 0.9%. 1000 milliLiter(s) (250 mL/Hr) IV Continuous <Continuous>  spironolactone 25 milliGRAM(s) Oral daily  tenofovir disoproxil fumarate (VIREAD) 300 milliGRAM(s) Oral daily    MEDICATIONS  (PRN):  acetaminophen   Tablet .. 650 milliGRAM(s) Oral every 6 hours PRN Temp greater or equal to 38C (100.4F), Mild Pain (1 - 3)  ALBUTerol    90 MICROgram(s) HFA Inhaler 1 Puff(s) Inhalation every 4 hours PRN Shortness of Breath and/or Wheezing  dextrose 40% Gel 15 Gram(s) Oral once PRN Blood Glucose LESS THAN 70 milliGRAM(s)/deciliter  glucagon  Injectable 1 milliGRAM(s) IntraMuscular once PRN Glucose LESS THAN 70 milligrams/deciliter  senna 2 Tablet(s) Oral at bedtime PRN Constipation  Home Medications:  albuterol 2.5 mg/3 mL (0.083%) inhalation solution: inhaled once a day (27 Dec 2018 09:08)  amiodarone 200 mg oral tablet: 0.5 tab(s) orally once a day (27 Dec 2018 09:08)  furosemide 20 mg oral tablet: 1 tab(s) orally once a day (27 Dec 2018 09:08)  gabapentin 300 mg oral capsule: 1 cap(s) orally 3 times a day (27 Dec 2018 09:08)  insulin degludec 100 units/mL subcutaneous solution: 20 unit(s) subcutaneous once a day (27 Dec 2018 09:08)    multivitamin: 1 tab(s) orally once a day (27 Dec 2018 09:08)     (27 Dec 2018 09:08)

## 2018-12-28 NOTE — ADVANCED PRACTICE NURSE CONSULT - RECOMMEDATIONS
1) Turn and position every 2 hours  2) Elevate heels off mattress  3) Derm and or infectious disease consult to further assess rash

## 2018-12-28 NOTE — DIETITIAN INITIAL EVALUATION ADULT. - ENERGY NEEDS
Ht.     71 "        Wt.    59    kg               BMI        18.1          IBW  78     kg               Pt is at  75 %  IBW Ht.     69 "        Wt.    79    kg               BMI     25.9        IBW  73     kg               Pt is at  108 %  IBW

## 2018-12-28 NOTE — DIETITIAN INITIAL EVALUATION ADULT. - NUTRITIONGOAL OUTCOME1
diet advance, Pt will consume,  tolerate > 80% nutritional needs, reduction in malnutrition severity Pt will consume and tolerate > 80% nutritional needs, reduction in severity of malnutrition criteria

## 2018-12-28 NOTE — PHYSICAL THERAPY INITIAL EVALUATION ADULT - PERTINENT HX OF CURRENT PROBLEM, REHAB EVAL
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

## 2018-12-28 NOTE — CONSULT NOTE ADULT - ASSESSMENT
chronic lymphocytic leukemia  Hypogammaglobulinemia  Adenopathy/non-bulky  No overt evidence of pneumonia    Plan    in terms of the CLL he appears to be stable with current treatment. There is no suggestion of progressive leukemia, conversion to aggressive lymphoma.  there is leukocytosis however this does not constitute progression of his disease status.    plan    Will reevaluate gammaglobulin  Pulmonary/medical management  He receives his immunotherapy  as an outpatient and this can wait until his discharge.

## 2018-12-28 NOTE — CHART NOTE - NSCHARTNOTEFT_GEN_A_CORE
Upon Nutritional Assessment by the Registered Dietitian your patient was determined to meet criteria / has evidence of the following diagnosis/diagnoses:          [ ]  Mild Protein Calorie Malnutrition        [ ]  Moderate Protein Calorie Malnutrition        [x ] Severe Protein Calorie Malnutrition        [ ] Unspecified Protein Calorie Malnutrition        [ ] Underweight / BMI <19                                        BMI 18.1        [ ] Morbid Obesity / BMI > 40      Findings as based on:  •  Comprehensive nutrition assessment and consultation  •  Calorie counts (nutrient intake analysis)  •  Food acceptance and intake status from observations by staff  •  Follow up  •  Patient education  •  Intervention secondary to interdisciplinary rounds  •   concerns    Pt meets criteria for severe protein-calorie malnutrition in context of chronic disease.   Nutrition focused physical exam reveals severe muscle wasting over entire body.    Severe fat wasting over entire body.    PO intake < 75% nutritional needs > one month.      Treatment:    The following diet has been recommended:  SLP consult ordered  Suggest advance diet when medically feasible to regular with texture as per recommendation of SLP.    Add Ensure Enlive 8 oz tid, texture as recommended by SLP.  Pt is aspiration risk.    Weekly weights.    Record PO intake in EMR after each meal.    Monitor PO intake, tolerance, labs and weight.      PROVIDER Section:     By signing this assessment you are acknowledging and agree with the diagnosis/diagnoses assigned by the Registered Dietitian    Comments: Upon Nutritional Assessment by the Registered Dietitian your patient was determined to meet criteria / has evidence of the following diagnosis/diagnoses:          [ ]  Mild Protein Calorie Malnutrition        [ ]  Moderate Protein Calorie Malnutrition        [x ] Severe Protein Calorie Malnutrition        [ ] Unspecified Protein Calorie Malnutrition        [ ] Underweight / BMI <19                                            [ ] Morbid Obesity / BMI > 40      Findings as based on:  •  Comprehensive nutrition assessment and consultation  •  Calorie counts (nutrient intake analysis)  •  Food acceptance and intake status from observations by staff  •  Follow up  •  Patient education  •  Intervention secondary to interdisciplinary rounds  •   concerns    Pt meets criteria for severe protein-calorie malnutrition in context of chronic disease.   Nutrition focused physical exam reveals moderate/ severe muscle wasting over entire body.      PO intake < 75% nutritional needs > one month.    Wt loss of 12% of UBW in past 7 weeks  multiple stage 2 and stage 3 decubiti    Treatment:    The following diet has been recommended:  Liberalize diet to regular to maximize caloric intake  1500 cc fluid restriction  add Glucerna 8 oz tid  Daily weights  add Zn Sulfate 220 mg BID x 10 days  add vit C 500 mg BID  Record PO intake in EMR after each meal.    Monitor PO intake, tolerance, labs and weight.      PROVIDER Section:     By signing this assessment you are acknowledging and agree with the diagnosis/diagnoses assigned by the Registered Dietitian    Comments:

## 2018-12-28 NOTE — PHYSICAL THERAPY INITIAL EVALUATION ADULT - IMPAIRMENTS FOUND, PT EVAL
muscle strength/integumentary integrity/gait, locomotion, and balance/joint integrity and mobility/gross motor

## 2018-12-28 NOTE — ADVANCED PRACTICE NURSE CONSULT - ASSESSMENT
This is an 78 year old male that was admitted to the hospital on 12/26/2018 for hyponatremia, CLL, dehydration and weakness.  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    Requested by staff to assess patient's skin status. Patient presents on an [employer] 9000 MRS on his backside. Family present at bedside.     Right lateral malleolus assessed to have a [id number] DTI. Wound intact. No odor, periwound intact. Heels elevated off mattress. Both lower extremity feet noted to have onychomycosis.    Patient's right side of backside from shoulder blade to buttocks noted to have plaque like scaling and fungal looking rash . Shoulder blade with herpetic zoster like lesions as well. Patient with a history of herpes zoster per daughter. Patient reports itching and appears to have scratch areas along the rash as there are partial thickness wounds noted. Wound on right buttock noted to be partial thickness as well. Most likely related to moisture and friction, due to fragility of tissue. Medical residents called and spoken to regarding rash. Suggesting dermatology and/or infectious disease consult for proper systemic treatment of rash. Topical antifungal is not enough.

## 2018-12-28 NOTE — PHYSICAL THERAPY INITIAL EVALUATION ADULT - GENERAL OBSERVATIONS, REHAB EVAL
Pt was found lying in bed, pt is Serbian speaking, family in room translating, pt is willing to participate in PT

## 2018-12-29 NOTE — PROGRESS NOTE ADULT - SUBJECTIVE AND OBJECTIVE BOX
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 Wife also reports he had a low grade fever ~100 F a few days ago. He has a productive cough w/ yellow sputum. He is unable to ambulate due to back pain from compression fractures, and even though the pain is helped w/ medications he still feels very weak with associated decrease in appetite and PO intake. No chills, N/V, constipation, diarrhea, dysuria. + chest pain associated w/ cough. He has worsening cough when he lays flat so sleeps sitting up.    12/29/2018: Patient seen and ecamined at bedside this AM.     REVIEW OF SYSTEMS:  CONSTITUTIONAL: No weakness, fevers or chills  EYES/ENT: No visual changes;  No vertigo or throat pain   NECK: No pain or stiffness  RESPIRATORY: No cough, wheezing, hemoptysis; No shortness of breath  CARDIOVASCULAR: No chest pain or palpitations  GASTROINTESTINAL: No abdominal or epigastric pain. No nausea, vomiting, or hematemesis; No diarrhea or constipation. No melena or hematochezia.  GENITOURINARY: No dysuria, frequency or hematuria  NEUROLOGICAL: No numbness or weakness  SKIN: No itching, burning, rashes, or lesions   All other review of systems is negative unless indicated above    Vital Signs Last 24 Hrs  T(C): 36.3 (29 Dec 2018 12:12), Max: 36.8 (28 Dec 2018 14:13)  T(F): 97.4 (29 Dec 2018 12:12), Max: 98.2 (28 Dec 2018 14:13)  HR: 87 (29 Dec 2018 12:12) (87 - 98)  BP: 88/58 (29 Dec 2018 12:31) (84/61 - 95/62)  BP(mean): --  RR: 18 (29 Dec 2018 12:12) (16 - 18)  SpO2: 94% (29 Dec 2018 12:12) (94% - 97%)    I&O's Summary    28 Dec 2018 07:01  -  29 Dec 2018 07:00  --------------------------------------------------------  IN: 1000 mL / OUT: 0 mL / NET: 1000 mL    29 Dec 2018 07:01  -  29 Dec 2018 13:18  --------------------------------------------------------  IN: 0 mL / OUT: 300 mL / NET: -300 mL        CAPILLARY BLOOD GLUCOSE      POCT Blood Glucose.: 161 mg/dL (29 Dec 2018 11:50)  POCT Blood Glucose.: 129 mg/dL (29 Dec 2018 07:56)  POCT Blood Glucose.: 147 mg/dL (28 Dec 2018 21:55)  POCT Blood Glucose.: 81 mg/dL (28 Dec 2018 17:46)  POCT Blood Glucose.: 77 mg/dL (28 Dec 2018 13:22)      PHYSICAL EXAM:  Constitutional: NAD, awake and alert, well-developed  HEENT: PERR, EOMI, Normal Hearing, MMM  Neck: Soft and supple, No LAD, No JVD  Respiratory: Breath sounds are clear bilaterally, No wheezing, rales or rhonchi  Cardiovascular: S1 and S2, regular rate and rhythm, no Murmurs, gallops or rubs  Gastrointestinal: Bowel Sounds present, soft, nontender, nondistended, no guarding, no rebound  Extremities: No peripheral edema  Vascular: 2+ peripheral pulses  Neurological: A/O x 3, no focal deficits  Musculoskeletal: 5/5 strength b/l upper and lower extremities  Skin: No rashes    MEDICATIONS:  MEDICATIONS  (STANDING):  amiodarone    Tablet 100 milliGRAM(s) Oral daily  ascorbic acid 500 milliGRAM(s) Oral two times a day  atorvastatin 10 milliGRAM(s) Oral at bedtime  cefepime  Injectable. 1000 milliGRAM(s) IV Push every 12 hours  clotrimazole 1% Cream 1 Application(s) Topical two times a day  dextrose 5%. 1000 milliLiter(s) (50 mL/Hr) IV Continuous <Continuous>  dextrose 50% Injectable 12.5 Gram(s) IV Push once  dextrose 50% Injectable 25 Gram(s) IV Push once  dextrose 50% Injectable 25 Gram(s) IV Push once  doxycycline hyclate Capsule 100 milliGRAM(s) Oral every 12 hours  fluconAZOLE   Tablet 150 milliGRAM(s) Oral daily  gabapentin 300 milliGRAM(s) Oral three times a day  insulin glargine Injectable (LANTUS) 5 Unit(s) SubCutaneous at bedtime  insulin lispro (HumaLOG) corrective regimen sliding scale   SubCutaneous three times a day before meals  metoprolol tartrate 25 milliGRAM(s) Oral two times a day  montelukast 10 milliGRAM(s) Oral at bedtime  multivitamin 1 Tablet(s) Oral daily  nystatin Cream 1 Application(s) Topical two times a day  sodium chloride 0.9%. 1000 milliLiter(s) (50 mL/Hr) IV Continuous <Continuous>  spironolactone 25 milliGRAM(s) Oral daily  tenofovir disoproxil fumarate (VIREAD) 300 milliGRAM(s) Oral daily  zinc sulfate 220 milliGRAM(s) Oral two times a day      LABS: All Labs Reviewed:                        13.5   23.30 )-----------( 177      ( 29 Dec 2018 05:55 )             41.7     12-29    128<L>  |  97  |  40<H>  ----------------------------<  115<H>  4.7   |  18<L>  |  1.21    Ca    8.0<L>      29 Dec 2018 05:55    TPro  5.2<L>  /  Alb  2.4<L>  /  TBili  0.6  /  DBili  x   /  AST  25  /  ALT  29  /  AlkPhos  201<H>  12-29    PT/INR - ( 29 Dec 2018 05:55 )   PT: 51.4 sec;   INR: 4.42 ratio               Blood Culture: 12-26 @ 23:06  Organism --  Gram Stain Blood -- Gram Stain --  Specimen Source .Urine None  Culture-Blood --    12-26 @ 19:38  Organism --  Gram Stain Blood -- Gram Stain --  Specimen Source .Blood Blood-Peripheral  Culture-Blood --    12-26 @ 17:36  Organism --  Gram Stain Blood -- Gram Stain --  Specimen Source .Blood Blood-Peripheral  Culture-Blood --        RADIOLOGY/EKG:    DVT PPX:    ADVANCED DIRECTIVE:    DISPOSITION: 78M w/ PMH of chronic PNA, COPD on home O2 2.5L, afib on coumadin, chronic diastolic CHF, DM2 on insulin, HTN, Hep B infection, 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 Wife also reports he had a low grade fever ~100 F a few days ago. He has a productive cough w/ yellow sputum. He is unable to ambulate due to back pain from compression fractures, and even though the pain is helped w/ medications he still feels very weak with associated decrease in appetite and PO intake. No chills, N/V, constipation, diarrhea, dysuria. + chest pain associated w/ cough. He has worsening cough when he lays flat so sleeps sitting up.    12/29/2018: Patient seen and examined at bedside this AM. Patient notes that the pain is managed well with the pain medicines at this time. He notes that he feels like he is getting stronger.     REVIEW OF SYSTEMS:  CONSTITUTIONAL: mild weakness; no fevers or chills  EYES/ENT: No visual changes;  No vertigo or throat pain   NECK: No pain or stiffness  RESPIRATORY: No cough, wheezing, hemoptysis; No shortness of breath  CARDIOVASCULAR: No chest pain or palpitations  GASTROINTESTINAL: No abdominal or epigastric pain. No nausea, vomiting, or hematemesis; No diarrhea or constipation. No melena or hematochezia.  GENITOURINARY: No dysuria, frequency or hematuria  NEUROLOGICAL: No numbness or weakness  SKIN: No itching, burning, rashes, or lesions   All other review of systems is negative unless indicated above    Vital Signs Last 24 Hrs  T(C): 36.3 (29 Dec 2018 12:12), Max: 36.8 (28 Dec 2018 14:13)  T(F): 97.4 (29 Dec 2018 12:12), Max: 98.2 (28 Dec 2018 14:13)  HR: 87 (29 Dec 2018 12:12) (87 - 98)  BP: 88/58 (29 Dec 2018 12:31) (84/61 - 95/62)  BP(mean): --  RR: 18 (29 Dec 2018 12:12) (16 - 18)  SpO2: 94% (29 Dec 2018 12:12) (94% - 97%)    I&O's Summary    28 Dec 2018 07:01  -  29 Dec 2018 07:00  --------------------------------------------------------  IN: 1000 mL / OUT: 0 mL / NET: 1000 mL    29 Dec 2018 07:01  -  29 Dec 2018 13:18  --------------------------------------------------------  IN: 0 mL / OUT: 300 mL / NET: -300 mL        CAPILLARY BLOOD GLUCOSE      POCT Blood Glucose.: 161 mg/dL (29 Dec 2018 11:50)  POCT Blood Glucose.: 129 mg/dL (29 Dec 2018 07:56)  POCT Blood Glucose.: 147 mg/dL (28 Dec 2018 21:55)  POCT Blood Glucose.: 81 mg/dL (28 Dec 2018 17:46)  POCT Blood Glucose.: 77 mg/dL (28 Dec 2018 13:22)      PHYSICAL EXAM:  Constitutional: NAD, awake and alert, well-developed, elderly, thin, Ethiopian speaking  HEENT: PERR, EOMI, Normal Hearing, MMM  Neck: Soft and supple, No LAD, No JVD  Respiratory: Breath sounds are clear bilaterally, No wheezing, rales or rhonchi  Cardiovascular: S1 and S2, regular rate and rhythm, no Murmurs, gallops or rubs  Gastrointestinal: Bowel Sounds present, soft, nontender, nondistended, no guarding, no rebound  Extremities: No peripheral edema; R leg externally rotated  Vascular: 2+ peripheral pulses  Neurological: A/O x 3, no focal deficits  Skin: No rashes    MEDICATIONS:  MEDICATIONS  (STANDING):  amiodarone    Tablet 100 milliGRAM(s) Oral daily  ascorbic acid 500 milliGRAM(s) Oral two times a day  atorvastatin 10 milliGRAM(s) Oral at bedtime  cefepime  Injectable. 1000 milliGRAM(s) IV Push every 12 hours  clotrimazole 1% Cream 1 Application(s) Topical two times a day  dextrose 5%. 1000 milliLiter(s) (50 mL/Hr) IV Continuous <Continuous>  dextrose 50% Injectable 12.5 Gram(s) IV Push once  dextrose 50% Injectable 25 Gram(s) IV Push once  dextrose 50% Injectable 25 Gram(s) IV Push once  doxycycline hyclate Capsule 100 milliGRAM(s) Oral every 12 hours  fluconAZOLE   Tablet 150 milliGRAM(s) Oral daily  gabapentin 300 milliGRAM(s) Oral three times a day  insulin glargine Injectable (LANTUS) 5 Unit(s) SubCutaneous at bedtime  insulin lispro (HumaLOG) corrective regimen sliding scale   SubCutaneous three times a day before meals  metoprolol tartrate 25 milliGRAM(s) Oral two times a day  montelukast 10 milliGRAM(s) Oral at bedtime  multivitamin 1 Tablet(s) Oral daily  nystatin Cream 1 Application(s) Topical two times a day  sodium chloride 0.9%. 1000 milliLiter(s) (50 mL/Hr) IV Continuous <Continuous>  spironolactone 25 milliGRAM(s) Oral daily  tenofovir disoproxil fumarate (VIREAD) 300 milliGRAM(s) Oral daily  zinc sulfate 220 milliGRAM(s) Oral two times a day      LABS: All Labs Reviewed:                        13.5   23.30 )-----------( 177      ( 29 Dec 2018 05:55 )             41.7     12-29    128<L>  |  97  |  40<H>  ----------------------------<  115<H>  4.7   |  18<L>  |  1.21    Ca    8.0<L>      29 Dec 2018 05:55    TPro  5.2<L>  /  Alb  2.4<L>  /  TBili  0.6  /  DBili  x   /  AST  25  /  ALT  29  /  AlkPhos  201<H>  12-29    PT/INR - ( 29 Dec 2018 05:55 )   PT: 51.4 sec;   INR: 4.42 ratio               Blood Culture: 12-26 @ 23:06  Organism --  Gram Stain Blood -- Gram Stain --  Specimen Source .Urine None  Culture-Blood --    12-26 @ 19:38  Organism --  Gram Stain Blood -- Gram Stain --  Specimen Source .Blood Blood-Peripheral  Culture-Blood --    12-26 @ 17:36  Organism --  Gram Stain Blood -- Gram Stain --  Specimen Source .Blood Blood-Peripheral  Culture-Blood --        RADIOLOGY/EKG:    DVT PPX:    ADVANCED DIRECTIVE:    DISPOSITION: 78M w/ PMH of chronic PNA, COPD on home O2 2.5L, afib on coumadin, chronic diastolic CHF, DM2 on insulin, HTN, Hep B infection, 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 Wife also reports he had a low grade fever ~100 F a few days ago. He has a productive cough w/ yellow sputum. He is unable to ambulate due to back pain from compression fractures, and even though the pain is helped w/ medications he still feels very weak with associated decrease in appetite and PO intake. No chills, N/V, constipation, diarrhea, dysuria. + chest pain associated w/ cough. He has worsening cough when he lays flat so sleeps sitting up.    12/29/2018: Patient seen and examined at bedside this AM. Patient notes that the pain is managed well with the pain medicines at this time. He notes that he feels like he is getting stronger. Overnight patient did not wish to have IV fluids as the beeping disturbs his sleep.     REVIEW OF SYSTEMS:  CONSTITUTIONAL: mild weakness; no fevers or chills  EYES/ENT: No visual changes;  No vertigo or throat pain   NECK: No pain or stiffness  RESPIRATORY: No cough, wheezing, hemoptysis; No shortness of breath  CARDIOVASCULAR: No chest pain or palpitations  GASTROINTESTINAL: No abdominal or epigastric pain. No nausea, vomiting, or hematemesis; No diarrhea or constipation. No melena or hematochezia.  GENITOURINARY: No dysuria, frequency or hematuria  NEUROLOGICAL: No numbness or weakness  SKIN: No itching, burning, rashes, or lesions   All other review of systems is negative unless indicated above    Vital Signs Last 24 Hrs  T(C): 36.3 (29 Dec 2018 12:12), Max: 36.8 (28 Dec 2018 14:13)  T(F): 97.4 (29 Dec 2018 12:12), Max: 98.2 (28 Dec 2018 14:13)  HR: 87 (29 Dec 2018 12:12) (87 - 98)  BP: 88/58 (29 Dec 2018 12:31) (84/61 - 95/62)  BP(mean): --  RR: 18 (29 Dec 2018 12:12) (16 - 18)  SpO2: 94% (29 Dec 2018 12:12) (94% - 97%)    I&O's Summary    28 Dec 2018 07:01  -  29 Dec 2018 07:00  --------------------------------------------------------  IN: 1000 mL / OUT: 0 mL / NET: 1000 mL    29 Dec 2018 07:01  -  29 Dec 2018 13:18  --------------------------------------------------------  IN: 0 mL / OUT: 300 mL / NET: -300 mL        CAPILLARY BLOOD GLUCOSE      POCT Blood Glucose.: 161 mg/dL (29 Dec 2018 11:50)  POCT Blood Glucose.: 129 mg/dL (29 Dec 2018 07:56)  POCT Blood Glucose.: 147 mg/dL (28 Dec 2018 21:55)  POCT Blood Glucose.: 81 mg/dL (28 Dec 2018 17:46)  POCT Blood Glucose.: 77 mg/dL (28 Dec 2018 13:22)      PHYSICAL EXAM:  Constitutional: NAD, awake and alert, well-developed, elderly, thin, Japanese speaking  HEENT: PERR, EOMI, Normal Hearing, MMM  Neck: Soft and supple, No LAD, No JVD  Respiratory: Breath sounds are clear bilaterally, No wheezing, rales or rhonchi  Cardiovascular: S1 and S2, regular rate and rhythm, no Murmurs, gallops or rubs  Gastrointestinal: Bowel Sounds present, soft, nontender, nondistended, no guarding, no rebound  Extremities: No peripheral edema; R leg externally rotated  Vascular: 2+ peripheral pulses  Neurological: A/O x 3, no focal deficits  Skin: No rashes    MEDICATIONS:  MEDICATIONS  (STANDING):  amiodarone    Tablet 100 milliGRAM(s) Oral daily  ascorbic acid 500 milliGRAM(s) Oral two times a day  atorvastatin 10 milliGRAM(s) Oral at bedtime  cefepime  Injectable. 1000 milliGRAM(s) IV Push every 12 hours  clotrimazole 1% Cream 1 Application(s) Topical two times a day  dextrose 5%. 1000 milliLiter(s) (50 mL/Hr) IV Continuous <Continuous>  dextrose 50% Injectable 12.5 Gram(s) IV Push once  dextrose 50% Injectable 25 Gram(s) IV Push once  dextrose 50% Injectable 25 Gram(s) IV Push once  doxycycline hyclate Capsule 100 milliGRAM(s) Oral every 12 hours  fluconAZOLE   Tablet 150 milliGRAM(s) Oral daily  gabapentin 300 milliGRAM(s) Oral three times a day  insulin glargine Injectable (LANTUS) 5 Unit(s) SubCutaneous at bedtime  insulin lispro (HumaLOG) corrective regimen sliding scale   SubCutaneous three times a day before meals  metoprolol tartrate 25 milliGRAM(s) Oral two times a day  montelukast 10 milliGRAM(s) Oral at bedtime  multivitamin 1 Tablet(s) Oral daily  nystatin Cream 1 Application(s) Topical two times a day  sodium chloride 0.9%. 1000 milliLiter(s) (50 mL/Hr) IV Continuous <Continuous>  spironolactone 25 milliGRAM(s) Oral daily  tenofovir disoproxil fumarate (VIREAD) 300 milliGRAM(s) Oral daily  zinc sulfate 220 milliGRAM(s) Oral two times a day      LABS: All Labs Reviewed:                        13.5   23.30 )-----------( 177      ( 29 Dec 2018 05:55 )             41.7     12-29    128<L>  |  97  |  40<H>  ----------------------------<  115<H>  4.7   |  18<L>  |  1.21    Ca    8.0<L>      29 Dec 2018 05:55    TPro  5.2<L>  /  Alb  2.4<L>  /  TBili  0.6  /  DBili  x   /  AST  25  /  ALT  29  /  AlkPhos  201<H>  12-29    PT/INR - ( 29 Dec 2018 05:55 )   PT: 51.4 sec;   INR: 4.42 ratio            Blood Culture: 12-26 @ 23:06  Organism --  Gram Stain Blood -- Gram Stain --  Specimen Source .Urine None  Culture-Blood --    12-26 @ 19:38  Organism --  Gram Stain Blood -- Gram Stain --  Specimen Source .Blood Blood-Peripheral  Culture-Blood --    12-26 @ 17:36  Organism --  Gram Stain Blood -- Gram Stain --  Specimen Source .Blood Blood-Peripheral  Culture-Blood --        RADIOLOGY/EKG:    DVT PPX:    ADVANCED DIRECTIVE:    DISPOSITION: 78M w/ PMH of chronic PNA, COPD on home O2 2.5L, afib on coumadin, chronic diastolic CHF, DM2 on insulin, HTN, Hep B infection, 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 Wife also reports he had a low grade fever ~100 F a few days ago. He has a productive cough w/ yellow sputum. He is unable to ambulate due to back pain from compression fractures, and even though the pain is helped w/ medications he still feels very weak with associated decrease in appetite and PO intake. No chills, N/V, constipation, diarrhea, dysuria. + chest pain associated w/ cough. He has worsening cough when he lays flat so sleeps sitting up.    12/29/2018: Patient seen and examined at bedside this AM. Patient notes that the pain is managed well with the pain medicines at this time. He notes that he feels like he is getting stronger. Overnight patient did not wish to have IV fluids as the beeping disturbs his sleep.     REVIEW OF SYSTEMS:  CONSTITUTIONAL: mild weakness; no fevers or chills  EYES/ENT: No visual changes;  No vertigo or throat pain   NECK: No pain or stiffness  RESPIRATORY: No cough, wheezing, hemoptysis; No shortness of breath  CARDIOVASCULAR: No chest pain or palpitations  GASTROINTESTINAL: No abdominal or epigastric pain. No nausea, vomiting, or hematemesis; No diarrhea or constipation. No melena or hematochezia.  GENITOURINARY: No dysuria, frequency or hematuria  NEUROLOGICAL: No numbness or weakness  SKIN: No itching, burning, rashes, or lesions   All other review of systems is negative unless indicated above    Vital Signs Last 24 Hrs  T(C): 36.3 (29 Dec 2018 12:12), Max: 36.8 (28 Dec 2018 14:13)  T(F): 97.4 (29 Dec 2018 12:12), Max: 98.2 (28 Dec 2018 14:13)  HR: 87 (29 Dec 2018 12:12) (87 - 98)  BP: 88/58 (29 Dec 2018 12:31) (84/61 - 95/62)  BP(mean): --  RR: 18 (29 Dec 2018 12:12) (16 - 18)  SpO2: 94% (29 Dec 2018 12:12) (94% - 97%)    I&O's Summary    28 Dec 2018 07:01  -  29 Dec 2018 07:00  --------------------------------------------------------  IN: 1000 mL / OUT: 0 mL / NET: 1000 mL    29 Dec 2018 07:01  -  29 Dec 2018 13:18  --------------------------------------------------------  IN: 0 mL / OUT: 300 mL / NET: -300 mL        CAPILLARY BLOOD GLUCOSE      POCT Blood Glucose.: 161 mg/dL (29 Dec 2018 11:50)  POCT Blood Glucose.: 129 mg/dL (29 Dec 2018 07:56)  POCT Blood Glucose.: 147 mg/dL (28 Dec 2018 21:55)  POCT Blood Glucose.: 81 mg/dL (28 Dec 2018 17:46)  POCT Blood Glucose.: 77 mg/dL (28 Dec 2018 13:22)      PHYSICAL EXAM:  Constitutional: NAD, awake and alert, well-developed, elderly, thin, Kiswahili speaking  HEENT: PERR, EOMI, Normal Hearing, MMM  Neck: Soft and supple, No LAD, No JVD  Respiratory: Breath sounds are clear bilaterally, No wheezing, rales or rhonchi  Cardiovascular: S1 and S2, regular rate and rhythm, no Murmurs, gallops or rubs  Gastrointestinal: Bowel Sounds present, soft, nontender, nondistended, no guarding, no rebound  Extremities: No peripheral edema; R leg externally rotated  Vascular: 2+ peripheral pulses  Neurological: A/O x 3, no focal deficits  Skin: No rashes    MEDICATIONS:  MEDICATIONS  (STANDING):  amiodarone    Tablet 100 milliGRAM(s) Oral daily  ascorbic acid 500 milliGRAM(s) Oral two times a day  atorvastatin 10 milliGRAM(s) Oral at bedtime  cefepime  Injectable. 1000 milliGRAM(s) IV Push every 12 hours  clotrimazole 1% Cream 1 Application(s) Topical two times a day  dextrose 5%. 1000 milliLiter(s) (50 mL/Hr) IV Continuous <Continuous>  dextrose 50% Injectable 12.5 Gram(s) IV Push once  dextrose 50% Injectable 25 Gram(s) IV Push once  dextrose 50% Injectable 25 Gram(s) IV Push once  doxycycline hyclate Capsule 100 milliGRAM(s) Oral every 12 hours  fluconAZOLE   Tablet 150 milliGRAM(s) Oral daily  gabapentin 300 milliGRAM(s) Oral three times a day  insulin glargine Injectable (LANTUS) 5 Unit(s) SubCutaneous at bedtime  insulin lispro (HumaLOG) corrective regimen sliding scale   SubCutaneous three times a day before meals  metoprolol tartrate 25 milliGRAM(s) Oral two times a day  montelukast 10 milliGRAM(s) Oral at bedtime  multivitamin 1 Tablet(s) Oral daily  nystatin Cream 1 Application(s) Topical two times a day  sodium chloride 0.9%. 1000 milliLiter(s) (50 mL/Hr) IV Continuous <Continuous>  spironolactone 25 milliGRAM(s) Oral daily  tenofovir disoproxil fumarate (VIREAD) 300 milliGRAM(s) Oral daily  zinc sulfate 220 milliGRAM(s) Oral two times a day      LABS: All Labs Reviewed:                        13.5   23.30 )-----------( 177      ( 29 Dec 2018 05:55 )             41.7     12-29    128<L>  |  97  |  40<H>  ----------------------------<  115<H>  4.7   |  18<L>  |  1.21    Ca    8.0<L>      29 Dec 2018 05:55    TPro  5.2<L>  /  Alb  2.4<L>  /  TBili  0.6  /  DBili  x   /  AST  25  /  ALT  29  /  AlkPhos  201<H>  12-29    PT/INR - ( 29 Dec 2018 05:55 )   PT: 51.4 sec;   INR: 4.42 ratio      Immunoglobulins Panel (12.28.18 @ 19:48)    DARRELL Kappa: 4.15 mg/dL    DARRELL Lambda: 0.53 mg/dL    [place]/Lambda Free Light Chain Ratio, Serum: 7.83 Ratio 78M w/ PMH of chronic PNA, COPD on home O2 2.5L, afib on coumadin, chronic diastolic CHF, DM2 on insulin, HTN, Hep B infection, 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 Wife also reports he had a low grade fever ~100 F a few days ago. He has a productive cough w/ yellow sputum. He is unable to ambulate due to back pain from compression fractures, and even though the pain is helped w/ medications he still feels very weak with associated decrease in appetite and PO intake. No chills, N/V, constipation, diarrhea, dysuria. + chest pain associated w/ cough. He has worsening cough when he lays flat so sleeps sitting up.    2018: Patient seen and examined at bedside this AM. Patient notes that the pain is managed well with the pain medicines at this time. He notes that he feels like he is getting stronger. Overnight patient did not wish to have IV fluids as the beeping disturbs his sleep.     REVIEW OF SYSTEMS:  CONSTITUTIONAL: mild weakness; no fevers or chills  EYES/ENT: No visual changes;  No vertigo or throat pain   NECK: No pain or stiffness  RESPIRATORY: No cough, wheezing, hemoptysis; No shortness of breath  CARDIOVASCULAR: No chest pain or palpitations  GASTROINTESTINAL: No abdominal or epigastric pain. No nausea, vomiting, or hematemesis; No diarrhea or constipation. No melena or hematochezia.  GENITOURINARY: No dysuria, frequency or hematuria  NEUROLOGICAL: No numbness or weakness  SKIN: No itching, burning, rashes, or lesions   All other review of systems is negative unless indicated above    Vital Signs Last 24 Hrs  T(C): 36.3 (29 Dec 2018 12:12), Max: 36.8 (28 Dec 2018 14:13)  T(F): 97.4 (29 Dec 2018 12:12), Max: 98.2 (28 Dec 2018 14:13)  HR: 87 (29 Dec 2018 12:12) (87 - 98)  BP: 88/58 (29 Dec 2018 12:31) (84/61 - 95/62)  BP(mean): --  RR: 18 (29 Dec 2018 12:12) (16 - 18)  SpO2: 94% (29 Dec 2018 12:12) (94% - 97%)    I&O's Summary    28 Dec 2018 07:  -  29 Dec 2018 07:00  --------------------------------------------------------  IN: 1000 mL / OUT: 0 mL / NET: 1000 mL    29 Dec 2018 07:01  -  29 Dec 2018 13:18  --------------------------------------------------------  IN: 0 mL / OUT: 300 mL / NET: -300 mL        CAPILLARY BLOOD GLUCOSE      POCT Blood Glucose.: 161 mg/dL (29 Dec 2018 11:50)  POCT Blood Glucose.: 129 mg/dL (29 Dec 2018 07:56)  POCT Blood Glucose.: 147 mg/dL (28 Dec 2018 21:55)  POCT Blood Glucose.: 81 mg/dL (28 Dec 2018 17:46)  POCT Blood Glucose.: 77 mg/dL (28 Dec 2018 13:22)      PHYSICAL EXAM:  Constitutional: NAD, awake and alert, well-developed, elderly, thin, Italian speaking  HEENT: PERR, EOMI, Normal Hearing, MMM  Neck: Soft and supple, No LAD, No JVD  Respiratory: Breath sounds are clear bilaterally, No wheezing, rales or rhonchi  Cardiovascular: S1 and S2, regular rate and rhythm, no Murmurs, gallops or rubs  Gastrointestinal: Bowel Sounds present, soft, nontender, nondistended, no guarding, no rebound  Extremities: No peripheral edema; R leg externally rotated  Vascular: 2+ peripheral pulses  Neurological: A/O x 3, no focal deficits  Skin: No rashes    MEDICATIONS:  MEDICATIONS  (STANDING):  amiodarone    Tablet 100 milliGRAM(s) Oral daily  ascorbic acid 500 milliGRAM(s) Oral two times a day  atorvastatin 10 milliGRAM(s) Oral at bedtime  cefepime  Injectable. 1000 milliGRAM(s) IV Push every 12 hours  clotrimazole 1% Cream 1 Application(s) Topical two times a day  dextrose 5%. 1000 milliLiter(s) (50 mL/Hr) IV Continuous <Continuous>  dextrose 50% Injectable 12.5 Gram(s) IV Push once  dextrose 50% Injectable 25 Gram(s) IV Push once  dextrose 50% Injectable 25 Gram(s) IV Push once  doxycycline hyclate Capsule 100 milliGRAM(s) Oral every 12 hours  fluconAZOLE   Tablet 150 milliGRAM(s) Oral daily  gabapentin 300 milliGRAM(s) Oral three times a day  insulin glargine Injectable (LANTUS) 5 Unit(s) SubCutaneous at bedtime  insulin lispro (HumaLOG) corrective regimen sliding scale   SubCutaneous three times a day before meals  metoprolol tartrate 25 milliGRAM(s) Oral two times a day  montelukast 10 milliGRAM(s) Oral at bedtime  multivitamin 1 Tablet(s) Oral daily  nystatin Cream 1 Application(s) Topical two times a day  sodium chloride 0.9%. 1000 milliLiter(s) (50 mL/Hr) IV Continuous <Continuous>  spironolactone 25 milliGRAM(s) Oral daily  tenofovir disoproxil fumarate (VIREAD) 300 milliGRAM(s) Oral daily  zinc sulfate 220 milliGRAM(s) Oral two times a day      LABS: All Labs Reviewed:                        13.5   23.30 )-----------( 177      ( 29 Dec 2018 05:55 )             41.7         128<L>  |  97  |  40<H>  ----------------------------<  115<H>  4.7   |  18<L>  |  1.21    Ca    8.0<L>      29 Dec 2018 05:55    TPro  5.2<L>  /  Alb  2.4<L>  /  TBili  0.6  /  DBili  x   /  AST  25  /  ALT  29  /  AlkPhos  201<H>  -    PT/INR - ( 29 Dec 2018 05:55 )   PT: 51.4 sec;   INR: 4.42 ratio      Immunoglobulins Panel (18 @ 19:48)    Quantitative Ig    Quantitative IgA: 7    Quantitative IgM: 30    DARRELL Kappa: 4.15 mg/dL    DARRELL Lambda: 0.53 mg/dL    [place]/Lambda Free Light Chain Ratio, Serum: 7.83 Ratio

## 2018-12-29 NOTE — PROVIDER CONTACT NOTE (OTHER) - RECOMMENDATIONS
pass on to day team that pt. didn't want fluids, MD aware of situation, alert in AM.
 to monitor for now, re-evaluate and possibly add extra orders soon, wait for further instructions

## 2018-12-29 NOTE — PROGRESS NOTE ADULT - SUBJECTIVE AND OBJECTIVE BOX
INTERVAL HISTORY:    Fatigue, decreased appetite/ No dyspnea today    REVIEW OF SYSTEMS:      Allergies    No Known Allergies    Intolerances        MEDICATIONS  (STANDING):  amiodarone    Tablet 100 milliGRAM(s) Oral daily  ascorbic acid 500 milliGRAM(s) Oral two times a day  atorvastatin 10 milliGRAM(s) Oral at bedtime  cefepime  Injectable. 1000 milliGRAM(s) IV Push every 12 hours  clotrimazole 1% Cream 1 Application(s) Topical two times a day  dextrose 5%. 1000 milliLiter(s) (50 mL/Hr) IV Continuous <Continuous>  dextrose 50% Injectable 12.5 Gram(s) IV Push once  dextrose 50% Injectable 25 Gram(s) IV Push once  dextrose 50% Injectable 25 Gram(s) IV Push once  doxycycline hyclate Capsule 100 milliGRAM(s) Oral every 12 hours  fluconAZOLE   Tablet 150 milliGRAM(s) Oral daily  gabapentin 300 milliGRAM(s) Oral three times a day  insulin glargine Injectable (LANTUS) 5 Unit(s) SubCutaneous at bedtime  insulin lispro (HumaLOG) corrective regimen sliding scale   SubCutaneous three times a day before meals  metoprolol tartrate 25 milliGRAM(s) Oral two times a day  montelukast 10 milliGRAM(s) Oral at bedtime  multivitamin 1 Tablet(s) Oral daily  nystatin Cream 1 Application(s) Topical two times a day  sodium chloride 0.9%. 1000 milliLiter(s) (50 mL/Hr) IV Continuous <Continuous>  spironolactone 25 milliGRAM(s) Oral daily  tenofovir disoproxil fumarate (VIREAD) 300 milliGRAM(s) Oral daily  zinc sulfate 220 milliGRAM(s) Oral two times a day    MEDICATIONS  (PRN):  acetaminophen   Tablet .. 650 milliGRAM(s) Oral every 6 hours PRN Temp greater or equal to 38C (100.4F), Mild Pain (1 - 3)  ALBUTerol    90 MICROgram(s) HFA Inhaler 1 Puff(s) Inhalation every 4 hours PRN Shortness of Breath and/or Wheezing  dextrose 40% Gel 15 Gram(s) Oral once PRN Blood Glucose LESS THAN 70 milliGRAM(s)/deciliter  glucagon  Injectable 1 milliGRAM(s) IntraMuscular once PRN Glucose LESS THAN 70 milligrams/deciliter  oxyCODONE    IR 5 milliGRAM(s) Oral every 4 hours PRN Moderate Pain (4 - 6)  oxyCODONE    IR 10 milliGRAM(s) Oral every 4 hours PRN Severe Pain (7 - 10)  senna 2 Tablet(s) Oral at bedtime PRN Constipation      Vital Signs Last 24 Hrs  T(C): 36.3 (29 Dec 2018 12:12), Max: 36.6 (29 Dec 2018 06:05)  T(F): 97.4 (29 Dec 2018 12:12), Max: 97.8 (29 Dec 2018 06:05)  HR: 87 (29 Dec 2018 12:12) (87 - 98)  BP: 88/58 (29 Dec 2018 12:31) (84/61 - 95/62)  BP(mean): --  RR: 18 (29 Dec 2018 12:12) (18 - 18)  SpO2: 94% (29 Dec 2018 12:12) (94% - 96%)    PHYSICAL EXAM:    GENERAL: NAD,   HEAD:  Atraumatic, Normocephalic  EYES: EOMI, PERRLA, conjunctiva and sclera clear    NECK: Supple, No JVD, Normal thyroid  NERVOUS SYSTEM:    CHEST/LUNG: Clear to percussion bilaterally; No rales, rhonchi,   HEART: Regular rate and rhythm;   ABDOMEN: Soft, Nontender.  EXTREMITIES:   edema:-  LYMPH: No lymphadenopathy noted        LABS:                        13.5   23.30 )-----------( 177      ( 29 Dec 2018 05:55 )             41.7     12-29    128<L>  |  97  |  40<H>  ----------------------------<  115<H>  4.7   |  18<L>  |  1.21    Ca    8.0<L>      29 Dec 2018 05:55    TPro  5.2<L>  /  Alb  2.4<L>  /  TBili  0.6  /  DBili  x   /  AST  25  /  ALT  29  /  AlkPhos  201<H>  12-29    PT/INR - ( 29 Dec 2018 05:55 )   PT: 51.4 sec;   INR: 4.42 ratio

## 2018-12-29 NOTE — PROVIDER CONTACT NOTE (OTHER) - REASON
Consult
HYPOTENSION
PT. had refused IV fluids at night med pass time
Pts. BP hypotensive
Detail Level: Generalized

## 2018-12-29 NOTE — PROGRESS NOTE ADULT - ASSESSMENT
78M w/ PMH as above admitted w/ worsening weakness - multifactorial: HCAP, hyponatremia, deconditioning from chronic illnesses (CLL, B cell lymphoma, COPD, dCHF).    # HCAP  - Leukocytosis value labile: 27 -> 21 -> 23 today  - afebrile since admission with no cough  - Rhino/Enterovirus +   - Continue Cefepime 1G BID and Doxycycline 100mg BID (day 3 of abx)  - ID recommendations appreciated    # Hyponatremia 2/2 dehydration  - improving 126 ->129 -> 130  - will continue IV NS @50cc/hr x 12 hours  - no SOB, pulmonary edema and peripheral edema noted  - will reassess hyponatremia in AM and need for further IV fluids    #Severe Protein Malnutrition  - Diet switched to regular diet today with 1500ml fluid restriction  - Glucerna supplements TID  - Zinc supplements BID x 10 days  - Vitamin C supplements daily  - Daily weights  - Nutrition recommendations appreciated    #Lower Back Pain 2/2 Compression fractures  - Tylenol PRN mild pain  - Oxycodone 5mg Q4 PRN moderate pain  - Oxycodone 10mg q4 PRN severe pain  - Palliative Care recommendations appreciated    #Fungal infection of skin  - wide spread infection possibly tinea corporis   -liver function WNL  - will continue Fluconazole 150mg PO daily for 6 additional days (7 day course)  - will start Nystatin cream BID on rash in groin  - will continue clotrimazole cream  - Infectious disease recommendations appreciated    # Pre-renal CHRISTINA   - Cr 1.7 -> 1.4- >1.12 ->1.2  - almost at baseline ~1  - will continue to monitor with daily BMP  - encourage PO hydration    # Chronic dCHF  - hold Lasix 20mg daily 2/2 CHRISTINA  - Cont spironolactone 25 qd  - Strict I&O    # CLL, B cell lymphoma  - Heme-onc recommendations appreciated  - Palliative Care recommendations appreciated    # COPD  Stable, no wheezing - do not suspect exacerbation at this time  Continue supplemental O2 as needed  Continue albuterol PRN, montelukast    # DM2  - BGM WNL today  - continue Lantus 5 units QHS  - continue pre-meal and QHS BGM with sliding scale  - Cont gabapentin for neuropathic pain    # Hepatitis B  Continue tenofovir home med    # HLD  Continue statin    # Atrial fibrillation  - continue lopressor 25 BID w/ holding parameters  - Cont amiodarone  - Hold Warfarin 2.5mg daily as INR remains elevated  - Goal INR 2-3  - Daily INR    # DVT prophylaxis  No chemical prophylaxis needed while on coumadin  Cont SCDs    #Dispo: continue hospitalization 78M w/ PMH as above admitted w/ worsening weakness - multifactorial: HCAP, hyponatremia, deconditioning from chronic illnesses (CLL, B cell lymphoma, COPD, dCHF).    # HCAP  - Leukocytosis value labile: 27 -> 21 -> 23 today  - afebrile since admission with no cough  - Rhino/Enterovirus +   - Continue Cefepime 1G BID and Doxycycline 100mg BID (day 3 of abx)  - ID recommendations appreciated    # Hyponatremia 2/2 dehydration  - improving 126 ->129 -> 130  - will continue IV NS @50cc/hr x 12 hours  - no SOB, pulmonary edema and peripheral edema noted  - will reassess hyponatremia in AM and need for further IV fluids    #Severe Protein Malnutrition  - Diet switched to regular diet today with 1500ml fluid restriction  - Glucerna supplements TID  - Zinc supplements BID x 10 days  - Vitamin C supplements daily  - Daily weights  - Nutrition recommendations appreciated    #Lower Back Pain 2/2 Compression fractures  - Tylenol PRN mild pain  - Oxycodone 5mg Q4 PRN moderate pain  - Oxycodone 10mg q4 PRN severe pain  - Palliative Care recommendations appreciated    #Fungal infection of skin  - wide spread infection possibly tinea corporis   -liver function WNL  - will continue Fluconazole 150mg PO daily for 6 additional days (7 day course)  - will start Nystatin cream BID on rash in groin  - will continue clotrimazole cream  - Infectious disease recommendations appreciated    # Pre-renal CHRISTINA   - Cr 1.7 -> 1.4- >1.12 ->1.2  - almost at baseline ~1  - will continue to monitor with daily BMP  - encourage PO hydration    # Chronic dCHF  - hold Lasix 20mg daily 2/2 CHRISTINA  - Cont spironolactone 25 qd  - Strict I&O    # CLL, B cell lymphoma  - Immunoglobulins as above  - Heme-onc recommendations appreciated- at baseline; can resume therapy on discharge as outpatient  - Palliative Care recommendations appreciated    # COPD  Stable, no wheezing - do not suspect exacerbation at this time  Continue supplemental O2 as needed  Continue albuterol PRN, montelukast    # DM2  - BGM WNL today  - continue Lantus 5 units QHS  - continue pre-meal and QHS BGM with sliding scale  - Cont gabapentin for neuropathic pain    # Hepatitis B  Continue tenofovir home med    # HLD  Continue statin    # Atrial fibrillation  - continue lopressor 25 BID w/ holding parameters  - Cont amiodarone  - Hold Warfarin 2.5mg daily as INR remains elevated  - Goal INR 2-3  - Daily INR    # DVT prophylaxis  No chemical prophylaxis needed while on coumadin  Cont SCDs    #Dispo: continue hospitalization 78M w/ PMH as above admitted w/ worsening weakness - multifactorial: HCAP, hyponatremia, deconditioning from chronic illnesses (CLL, B cell lymphoma, COPD, dCHF).    # HCAP  - Leukocytosis value labile: 27 -> 21 -> 23 today  - afebrile since admission with no cough  - Rhino/Enterovirus +   - Continue Cefepime 1G BID and Doxycycline 100mg BID (day 3 of abx)  - ID recommendations appreciated    # Hyponatremia 2/2 dehydration  - improving 126 ->129 -> 130  - will consider continuing IV NS @50cc/hr x 12 hours  - no SOB, pulmonary edema and peripheral edema noted  - will reassess hyponatremia in AM and need for further IV fluids    #Severe Protein Malnutrition  - Diet switched to regular diet today with 1500ml fluid restriction  - Glucerna supplements TID  - Zinc supplements BID x 10 days  - Vitamin C supplements daily  - Daily weights  - Nutrition recommendations appreciated    # CLL, B cell lymphoma  - Immunoglobulins as above  - Per Dr. Houser will need IVIG inpatient as IGG <700  - Heme-onc recommendations appreciated- at baseline; can resume therapy on discharge as outpatient  - Palliative Care recommendations appreciated    #Lower Back Pain 2/2 Compression fractures  - Tylenol PRN mild pain  - Oxycodone 5mg Q4 PRN moderate pain  - Oxycodone 10mg q4 PRN severe pain  - Palliative Care recommendations appreciated    #Fungal infection of skin  - wide spread infection possibly tinea corporis   -liver function WNL  - will continue Fluconazole 150mg PO daily for 6 additional days (7 day course)  - will start Nystatin cream BID on rash in groin  - will continue clotrimazole cream  - Infectious disease recommendations appreciated    # Pre-renal CHRISTINA   - Cr 1.7 -> 1.4- >1.12 ->1.2  - almost at baseline ~1  - will continue to monitor with daily BMP  - encourage PO hydration    # Chronic dCHF  - hold Lasix 20mg daily 2/2 CHRISTINA  - Cont spironolactone 25 qd  - Strict I&O    # COPD  Stable, no wheezing - do not suspect exacerbation at this time  Continue supplemental O2 as needed  Continue albuterol PRN, montelukast    # DM2  - BGM WNL today  - continue Lantus 5 units QHS  - continue pre-meal and QHS BGM with sliding scale  - Cont gabapentin for neuropathic pain    # Hepatitis B  Continue tenofovir home med    # HLD  Continue statin    # Atrial fibrillation  - continue lopressor 25 BID w/ holding parameters  - Cont amiodarone  - Hold Warfarin 2.5mg daily as INR remains elevated  - Goal INR 2-3  - Daily INR    # DVT prophylaxis  No chemical prophylaxis needed while on coumadin  Cont SCDs    #Dispo: continue hospitalization 78M w/ PMH as above admitted w/ worsening weakness - multifactorial: HCAP, hyponatremia, deconditioning from chronic illnesses (CLL, B cell lymphoma, COPD, dCHF).    # HCAP  - Leukocytosis value labile: 27 -> 21 -> 23 today  - afebrile since admission with no cough  - Rhino/Enterovirus +   - Continue Cefepime 1G BID and Doxycycline 100mg BID (day 3 of abx)  - ID recommendations appreciated    # Hyponatremia 2/2 dehydration  - improving 126 ->129 -> 130  - will consider continuing IV NS @50cc/hr x 12 hours  - no SOB, pulmonary edema and peripheral edema noted  - will reassess hyponatremia in AM and need for further IV fluids    #Severe Protein Malnutrition  - Diet switched to regular diet today with 1500ml fluid restriction  - Glucerna supplements TID  - Zinc supplements BID x 10 days  - Vitamin C supplements daily  - Daily weights  - Nutrition recommendations appreciated    # CLL, B cell lymphoma  - Immunoglobulins as above  - Per Dr. Houser will need IVIG inpatient as IGG <700  - Heme-onc recommendations appreciated- at baseline; can resume therapy on discharge as outpatient  - Palliative Care recommendations appreciated    #Lower Back Pain 2/2 Compression fractures  - Tylenol PRN mild pain  - Oxycodone 5mg Q4 PRN moderate pain  - Oxycodone 10mg q4 PRN severe pain  - Palliative Care recommendations appreciated    #Fungal infection of skin  - wide spread infection possibly tinea corporis   -liver function WNL  - will continue Fluconazole 150mg PO daily for 6 additional days (7 day course)  - will start Nystatin cream BID on rash in groin  - d/c clotrimazole cream as Fluconazole should treat Tinea  - Infectious disease recommendations appreciated    # Pre-renal CHRISTINA   - Cr 1.7 -> 1.4- >1.12 ->1.2  - almost at baseline ~1  - will continue to monitor with daily BMP  - encourage PO hydration    # Chronic dCHF  - hold Lasix 20mg daily 2/2 CHRISTINA  - Cont spironolactone 25 qd  - Strict I&O    # COPD  Stable, no wheezing - do not suspect exacerbation at this time  Continue supplemental O2 as needed  Continue albuterol PRN, montelukast    # DM2  - BGM WNL today  - continue Lantus 5 units QHS  - continue pre-meal and QHS BGM with sliding scale  - Cont gabapentin for neuropathic pain    # Hepatitis B  Continue tenofovir home med    # HLD  Continue statin    # Atrial fibrillation  - continue lopressor 25 BID w/ holding parameters  - Cont amiodarone  - Hold Warfarin 2.5mg daily as INR remains elevated  - Goal INR 2-3  - Daily INR    # DVT prophylaxis  No chemical prophylaxis needed while on coumadin  Cont SCDs    #Dispo: continue hospitalization

## 2018-12-29 NOTE — PROGRESS NOTE ADULT - ASSESSMENT
CLL / SLL: Stable  COPD/ pneumonia ?    A/P    Will continue therapy for CLL as an out patient upon full recovery  Check Gamma Glob; if < 700 mg/ will administer in patient

## 2018-12-30 NOTE — PROGRESS NOTE ADULT - ASSESSMENT
78M w/ PMH as above admitted with worsening weakness.    # HCAP  - Labile leukocytosis  - Rhino/Enterovirus +   - Continue Cefepime 1G BID and Doxycycline 100mg BID (day 4 of abx)  - ID recommendations appreciated    # Hyponatremia 2/2 dehydration  - not improving  - Pt on IV NS @50cc/hr x 12 hours  - will continue to monitor    #Severe Protein Malnutrition  - COntinue fluid restriction.  - Glucerna supplements TID  - Zinc supplements BID x 10 days  - Vitamin C supplements daily  - Daily weights  - Nutrition recommendations appreciated    # CLL, B cell lymphoma  - S/p IVIG today  - Heme-onc recommendations appreciated- at baseline; can resume therapy on discharge as outpatient  - Palliative Care recommendations appreciated    #Lower Back Pain 2/2 Compression fractures  - Tylenol PRN mild pain  - Oxycodone 5mg Q4 PRN moderate pain  - Oxycodone 10mg q4 PRN severe pain  - Palliative Care recommendations appreciated    #Fungal infection of skin  - wide spread infection possibly tinea corporis  - Continue Fluconazole 150mg PO daily (2/7)  - Continue Nystatin cream BID on rash in groin  - Infectious disease recommendations appreciated    #CHRISTINA  - likely pre-renal  - Creatinine improved  - will continue to monitor with daily BMP  - encourage PO hydration    #Chronic dCHF  - Lasix held  - Continue aldactone 25 qd  - Strict I&O    #COPD  - Currently no active disease  - Continue supplemental O2 as needed  - Continue albuterol PRN, montelukast    #DM2  - continue lantus, humalog and ISS  - Continue gabapentin for neuropathic pain    #Hepatitis B  Continue tenofovir home med    #HLD  Continue statin    #Atrial fibrillation  - continue lopressor 25 BID w/ holding parameters  - Cont amiodarone  - Hold Warfarin 2.5mg daily as INR remains elevated  - Goal INR 2-3  - Daily INR    #DVT prophylaxis  - on Coumadin  - Cont SCDs

## 2018-12-30 NOTE — PROGRESS NOTE ADULT - SUBJECTIVE AND OBJECTIVE BOX
INTERVAL HISTORY:    CLL on Obinutuzumab  Immunosuppressed  Hypogammaglobulinemia  Pneumonia    Fatigue/ poor appetite/ weakness    Allergies    No Known Allergies    Intolerances        MEDICATIONS  (STANDING):  acetaminophen   Tablet .. 650 milliGRAM(s) Oral once  amiodarone    Tablet 100 milliGRAM(s) Oral daily  ascorbic acid 500 milliGRAM(s) Oral two times a day  atorvastatin 10 milliGRAM(s) Oral at bedtime  cefepime  Injectable. 1000 milliGRAM(s) IV Push every 12 hours  dextrose 5%. 1000 milliLiter(s) (50 mL/Hr) IV Continuous <Continuous>  dextrose 50% Injectable 12.5 Gram(s) IV Push once  dextrose 50% Injectable 25 Gram(s) IV Push once  dextrose 50% Injectable 25 Gram(s) IV Push once  diphenhydrAMINE   Injectable 50 milliGRAM(s) IV Push once  doxycycline hyclate Capsule 100 milliGRAM(s) Oral every 12 hours  fluconAZOLE   Tablet 150 milliGRAM(s) Oral daily  gabapentin 300 milliGRAM(s) Oral three times a day  immune   globulin 10% (GAMMAGARD) IVPB 20 Gram(s) IV Intermittent once  insulin glargine Injectable (LANTUS) 5 Unit(s) SubCutaneous at bedtime  insulin lispro (HumaLOG) corrective regimen sliding scale   SubCutaneous three times a day before meals  methylPREDNISolone sodium succinate Injectable 60 milliGRAM(s) IV Push once  metoprolol tartrate 25 milliGRAM(s) Oral two times a day  montelukast 10 milliGRAM(s) Oral at bedtime  multivitamin 1 Tablet(s) Oral daily  nystatin Cream 1 Application(s) Topical two times a day  sodium chloride 0.9%. 1000 milliLiter(s) (50 mL/Hr) IV Continuous <Continuous>  spironolactone 25 milliGRAM(s) Oral daily  tenofovir disoproxil fumarate (VIREAD) 300 milliGRAM(s) Oral daily  zinc sulfate 220 milliGRAM(s) Oral two times a day    MEDICATIONS  (PRN):  acetaminophen   Tablet .. 650 milliGRAM(s) Oral every 6 hours PRN Temp greater or equal to 38C (100.4F), Mild Pain (1 - 3)  ALBUTerol    90 MICROgram(s) HFA Inhaler 1 Puff(s) Inhalation every 4 hours PRN Shortness of Breath and/or Wheezing  dextrose 40% Gel 15 Gram(s) Oral once PRN Blood Glucose LESS THAN 70 milliGRAM(s)/deciliter  diphenhydrAMINE   Injectable 50 milliGRAM(s) IntraMuscular every 6 hours PRN Rash and/or Itching  glucagon  Injectable 1 milliGRAM(s) IntraMuscular once PRN Glucose LESS THAN 70 milligrams/deciliter  oxyCODONE    IR 5 milliGRAM(s) Oral every 4 hours PRN Moderate Pain (4 - 6)  oxyCODONE    IR 10 milliGRAM(s) Oral every 4 hours PRN Severe Pain (7 - 10)  senna 2 Tablet(s) Oral at bedtime PRN Constipation      Vital Signs Last 24 Hrs  T(C): 36.6 (30 Dec 2018 11:55), Max: 36.6 (30 Dec 2018 11:55)  T(F): 97.9 (30 Dec 2018 11:55), Max: 97.9 (30 Dec 2018 11:55)  HR: 92 (30 Dec 2018 11:55) (88 - 99)  BP: 91/58 (30 Dec 2018 11:55) (82/55 - 92/65)  BP(mean): --  RR: 18 (30 Dec 2018 11:55) (16 - 18)  SpO2: 96% (30 Dec 2018 11:55) (95% - 96%)    PHYSICAL EXAM:    GENERAL: NAD,   HEAD:  Atraumatic, Normocephalic  EYES: EOMI, PERRLA, conjunctiva and sclera clear    NECK: Supple, No JVD, Normal thyroid  NERVOUS SYSTEM:    CHEST/LUNG: Clear to percussion bilaterally; No rales, rhonchi,   HEART: Regular rate and rhythm;   ABDOMEN: Soft, Nontender.  EXTREMITIES:   edema:-  LYMPH: No lymphadenopathy noted        LABS:                        13.2   23.18 )-----------( 163      ( 30 Dec 2018 06:09 )             40.2     12-30    127<L>  |  97  |  38<H>  ----------------------------<  166<H>  4.4   |  19<L>  |  1.10    Ca    8.0<L>      30 Dec 2018 06:09    TPro  5.2<L>  /  Alb  2.4<L>  /  TBili  0.6  /  DBili  x   /  AST  25  /  ALT  29  /  AlkPhos  201<H>  12-29    PT/INR - ( 30 Dec 2018 06:09 )   PT: 38.0 sec;   INR: 3.30 ratio                 RADIOLOGY & ADDITIONAL STUDIES:    PATHOLOGY:

## 2018-12-30 NOTE — PROGRESS NOTE ADULT - ASSESSMENT
CLL / stable   Hypogammaglobulinemia  Pneumonia    A/P    IV IG 30 Gm to be administered today ( ordered)  Obinutuzumab for CLL: On hold for now/ resume as outpatient

## 2018-12-31 NOTE — PROGRESS NOTE ADULT - SUBJECTIVE AND OBJECTIVE BOX
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 Wife also reports he had a low grade fever ~100 F a few days ago. He has a productive cough w/ yellow sputum. He is unable to ambulate due to back pain from compression fractures, and even though the pain is helped w/ medications he still feels very weak with associated decrease in appetite and PO intake. No chills, N/V, constipation, diarrhea, dysuria. + chest pain associated w/ cough. He has worsening cough when he lays flat so sleeps sitting up.      12/31:     REVIEW OF SYSTEMS:  CONSTITUTIONAL: No weakness, fevers or chills  EYES/ENT: No visual changes;  No vertigo or throat pain   NECK: No pain or stiffness  RESPIRATORY: No cough, wheezing, hemoptysis; No shortness of breath  CARDIOVASCULAR: No chest pain or palpitations  GASTROINTESTINAL: No abdominal or epigastric pain. No nausea, vomiting, or hematemesis; No diarrhea or constipation. No melena or hematochezia.  GENITOURINARY: No dysuria, frequency or hematuria  NEUROLOGICAL: No numbness or weakness  SKIN: No itching, burning, rashes, or lesions   All other review of systems is negative unless indicated above    Vital Signs Last 24 Hrs  T(C): 36.3 (31 Dec 2018 04:52), Max: 36.8 (30 Dec 2018 15:10)  T(F): 97.3 (31 Dec 2018 04:52), Max: 98.2 (30 Dec 2018 15:10)  HR: 92 (31 Dec 2018 04:52) (87 - 97)  BP: 90/65 (31 Dec 2018 04:52) (90/65 - 122/78)  RR: 17 (31 Dec 2018 04:52) (16 - 18)  SpO2: 94% (31 Dec 2018 04:52) (94% - 98%)    I&O's Summary    30 Dec 2018 07:01  -  31 Dec 2018 07:00  --------------------------------------------------------  IN: 0 mL / OUT: 900 mL / NET: -900 mL        CAPILLARY BLOOD GLUCOSE      POCT Blood Glucose.: 212 mg/dL (31 Dec 2018 07:39)  POCT Blood Glucose.: 259 mg/dL (30 Dec 2018 21:04)  POCT Blood Glucose.: 157 mg/dL (30 Dec 2018 16:29)  POCT Blood Glucose.: 199 mg/dL (30 Dec 2018 12:40)  POCT Blood Glucose.: 211 mg/dL (30 Dec 2018 11:10)      PHYSICAL EXAM:  Constitutional: NAD, awake and alert, well-developed  HEENT: PERR, EOMI, Normal Hearing, MMM  Neck: Soft and supple, No LAD, No JVD  Respiratory: Breath sounds are clear bilaterally, No wheezing, rales or rhonchi  Cardiovascular: S1 and S2, regular rate and rhythm, no Murmurs, gallops or rubs  Gastrointestinal: Bowel Sounds present, soft, nontender, nondistended, no guarding, no rebound  Extremities: No peripheral edema  Vascular: 2+ peripheral pulses  Neurological: A/O x 3, no focal deficits  Musculoskeletal: 5/5 strength b/l upper and lower extremities  Skin: No rashes    MEDICATIONS:  MEDICATIONS  (STANDING):  amiodarone    Tablet 100 milliGRAM(s) Oral daily  ascorbic acid 500 milliGRAM(s) Oral two times a day  atorvastatin 10 milliGRAM(s) Oral at bedtime  cefepime  Injectable. 1000 milliGRAM(s) IV Push every 12 hours  dextrose 5%. 1000 milliLiter(s) (50 mL/Hr) IV Continuous <Continuous>  dextrose 50% Injectable 12.5 Gram(s) IV Push once  dextrose 50% Injectable 25 Gram(s) IV Push once  dextrose 50% Injectable 25 Gram(s) IV Push once  doxycycline hyclate Capsule 100 milliGRAM(s) Oral every 12 hours  fluconAZOLE   Tablet 150 milliGRAM(s) Oral daily  gabapentin 300 milliGRAM(s) Oral three times a day  insulin glargine Injectable (LANTUS) 5 Unit(s) SubCutaneous at bedtime  insulin lispro (HumaLOG) corrective regimen sliding scale   SubCutaneous three times a day before meals  metoprolol tartrate 25 milliGRAM(s) Oral two times a day  montelukast 10 milliGRAM(s) Oral at bedtime  multivitamin 1 Tablet(s) Oral daily  nystatin Cream 1 Application(s) Topical two times a day  sodium chloride 0.9%. 1000 milliLiter(s) (50 mL/Hr) IV Continuous <Continuous>  spironolactone 25 milliGRAM(s) Oral daily  tenofovir disoproxil fumarate (VIREAD) 300 milliGRAM(s) Oral daily  zinc sulfate 220 milliGRAM(s) Oral two times a day      LABS: All Labs Reviewed:                        12.4   21.86 )-----------( 148      ( 31 Dec 2018 04:34 )             38.2     12-31    125<L>  |  96  |  42<H>  ----------------------------<  218<H>  5.5<H>   |  19<L>  |  1.44<H>    Ca    8.0<L>      31 Dec 2018 04:34      PT/INR - ( 31 Dec 2018 04:34 )   PT: 32.6 sec;   INR: 2.84 ratio               Blood Culture: 12-26 @ 23:06  Organism --  Gram Stain Blood -- Gram Stain --  Specimen Source .Urine None  Culture-Blood --    12-26 @ 19:38  Organism --  Gram Stain Blood -- Gram Stain --  Specimen Source .Blood Blood-Peripheral  Culture-Blood --    12-26 @ 17:36  Organism --  Gram Stain Blood -- Gram Stain --  Specimen Source .Blood Blood-Peripheral  Culture-Blood --        RADIOLOGY/EKG:    DVT PPX:    ADVANCED DIRECTIVE:    DISPOSITION: SUBJECTIVE: 78M w/ PMH of chronic PNA, COPD on home O2 2.5L, afib on coumadin, chronic diastolic CHF, DM2 on insulin, HTN, Hep B infection, 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 Wife also reports he had a low grade fever ~100 F a few days ago. He has a productive cough w/ yellow sputum. He is unable to ambulate due to back pain from compression fractures, and even though the pain is helped w/ medications he still feels very weak with associated decrease in appetite and PO intake. No chills, N/V, constipation, diarrhea, dysuria. + chest pain associated w/ cough. He has worsening cough when he lays flat so sleeps sitting up.    12/31: Pt was seen and evaluated at bedside. Pt feels stronger. Pt was sitting on a chair and talking with family. Reports no pain. Denies weakness, fever, chills, nausea. vomiting.     REVIEW OF SYSTEMS:  CONSTITUTIONAL: No weakness, fevers or chills  EYES/ENT: No visual changes;  No vertigo or throat pain   NECK: No pain or stiffness  RESPIRATORY: No cough, wheezing, hemoptysis; No shortness of breath  CARDIOVASCULAR: No chest pain or palpitations  GASTROINTESTINAL: No abdominal or epigastric pain. No nausea, vomiting, or hematemesis; No diarrhea or constipation. No melena or hematochezia.  GENITOURINARY: No dysuria, frequency or hematuria  NEUROLOGICAL: No numbness or weakness  SKIN: No itching, burning, rashes, or lesions   All other review of systems is negative unless indicated above    Vital Signs Last 24 Hrs  T(C): 36.3 (31 Dec 2018 04:52), Max: 36.8 (30 Dec 2018 15:10)  T(F): 97.3 (31 Dec 2018 04:52), Max: 98.2 (30 Dec 2018 15:10)  HR: 92 (31 Dec 2018 04:52) (87 - 97)  BP: 90/65 (31 Dec 2018 04:52) (90/65 - 122/78)  RR: 17 (31 Dec 2018 04:52) (16 - 18)  SpO2: 94% (31 Dec 2018 04:52) (94% - 98%)    I&O's Summary    30 Dec 2018 07:01  -  31 Dec 2018 07:00  --------------------------------------------------------  IN: 0 mL / OUT: 900 mL / NET: -900 mL        CAPILLARY BLOOD GLUCOSE      POCT Blood Glucose.: 212 mg/dL (31 Dec 2018 07:39)  POCT Blood Glucose.: 259 mg/dL (30 Dec 2018 21:04)  POCT Blood Glucose.: 157 mg/dL (30 Dec 2018 16:29)  POCT Blood Glucose.: 199 mg/dL (30 Dec 2018 12:40)  POCT Blood Glucose.: 211 mg/dL (30 Dec 2018 11:10)      PHYSICAL EXAM:  Constitutional: NAD, awake and alert, well-developed  HEENT: PERR, EOMI, Normal Hearing, MMM  Neck: Soft and supple, No LAD, No JVD  Respiratory: Breath sounds are clear bilaterally, No wheezing, rales or rhonchi  Cardiovascular: S1 and S2, regular rate and rhythm, no Murmurs, gallops or rubs  Gastrointestinal: Bowel Sounds present, soft, nontender, nondistended, no guarding, no rebound  Extremities: No peripheral edema  Vascular: 2+ peripheral pulses  Neurological: A/O x 3, no focal deficits  Musculoskeletal: FROM  Skin: No rashes    MEDICATIONS:  MEDICATIONS  (STANDING):  amiodarone    Tablet 100 milliGRAM(s) Oral daily  ascorbic acid 500 milliGRAM(s) Oral two times a day  atorvastatin 10 milliGRAM(s) Oral at bedtime  cefepime  Injectable. 1000 milliGRAM(s) IV Push every 12 hours  dextrose 5%. 1000 milliLiter(s) (50 mL/Hr) IV Continuous <Continuous>  dextrose 50% Injectable 12.5 Gram(s) IV Push once  dextrose 50% Injectable 25 Gram(s) IV Push once  dextrose 50% Injectable 25 Gram(s) IV Push once  doxycycline hyclate Capsule 100 milliGRAM(s) Oral every 12 hours  fluconAZOLE   Tablet 150 milliGRAM(s) Oral daily  gabapentin 300 milliGRAM(s) Oral three times a day  insulin glargine Injectable (LANTUS) 5 Unit(s) SubCutaneous at bedtime  insulin lispro (HumaLOG) corrective regimen sliding scale   SubCutaneous three times a day before meals  metoprolol tartrate 25 milliGRAM(s) Oral two times a day  montelukast 10 milliGRAM(s) Oral at bedtime  multivitamin 1 Tablet(s) Oral daily  nystatin Cream 1 Application(s) Topical two times a day  sodium chloride 0.9%. 1000 milliLiter(s) (50 mL/Hr) IV Continuous <Continuous>  spironolactone 25 milliGRAM(s) Oral daily  tenofovir disoproxil fumarate (VIREAD) 300 milliGRAM(s) Oral daily  zinc sulfate 220 milliGRAM(s) Oral two times a day      LABS: All Labs Reviewed:                        12.4   21.86 )-----------( 148      ( 31 Dec 2018 04:34 )             38.2     12-31    125<L>  |  96  |  42<H>  ----------------------------<  218<H>  5.5<H>   |  19<L>  |  1.44<H>    Ca    8.0<L>      31 Dec 2018 04:34      PT/INR - ( 31 Dec 2018 04:34 )   PT: 32.6 sec;   INR: 2.84 ratio               Blood Culture: 12-26 @ 23:06  Organism --  Gram Stain Blood -- Gram Stain --  Specimen Source .Urine None  Culture-Blood --    12-26 @ 19:38  Organism --  Gram Stain Blood -- Gram Stain --  Specimen Source .Blood Blood-Peripheral  Culture-Blood --    12-26 @ 17:36  Organism --  Gram Stain Blood -- Gram Stain --  Specimen Source .Blood Blood-Peripheral  Culture-Blood --

## 2018-12-31 NOTE — PROGRESS NOTE ADULT - ASSESSMENT
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 Wife also reports he had a low grade fever ~100 F a few days ago. He has a productive cough w/ yellow sputum. He is unable to ambulate due to back pain from compression fractures, and even though the pain is helped w/ medications he still feels very weak with associated decrease in appetite and PO intake. Palliative Medicine Consult to establish GOC.   12/28/18 Seen and examined at bedside with wife and daughter present. C/O persistent back/right hip pain and mild dyspnea at rest.      Assessment and Plan:    1) HCAP  - leukocytosis   -ID eval noted  -Abx as per ID  -CT chest= Small bilateral pleural effusions with adjacent passive atelectasis.  Peribronchial thickening in the right lower middle lobes with adjacent   patchy airspace opacity, concerning for pneumonia.  Mediastinal and axillary lymphadenopathy, without significant change   from prior exam.   Splenomegaly  -Entero/Rhinovirus +  -Rapid RVP +      2) Dyspnea  -R/T Pneumonia  -Supplemental O2 PRN  -Currently on nasal canula   -Supportive care    3) Pain  -R/T Compression fx T11  -Cont Tylenol as ordered PRN mild pain  -Add Oxycodone 5 mg PO Q4H PRN mod pain  -Add Oxycodone 10 mg PO Q4H PRN severe pain  -Xray right hip =no fx    4) Weakness  -R/T Pain  -Pneumonia  -PT eval  -Recently at [facility]    5) CLL/BCell lymphoma  -Currently on chemo/IVIG  -Onc eval noted    6) Advanced Directives  -Pt with capacity ( [place] primary language)  -Daughter Maira named HCP  -GOC discussion 1/2/18 130P

## 2018-12-31 NOTE — PROGRESS NOTE ADULT - ASSESSMENT
78M w/ PMH as above admitted with worsening weakness.    # HCAP  - Labile leukocytosis  - Rhino/Enterovirus +   - Continue Cefepime 1G BID and Doxycycline 100mg BID (day 4 of abx)  - ID recommendations appreciated    # Hyponatremia 2/2 dehydration  - not improving  - Pt on IV NS @50cc/hr x 12 hours  - will continue to monitor    #Severe Protein Malnutrition  - COntinue fluid restriction.  - Glucerna supplements TID  - Zinc supplements BID x 10 days  - Vitamin C supplements daily  - Daily weights  - Nutrition recommendations appreciated    # CLL, B cell lymphoma  - S/p IVIG today  - Heme-onc recommendations appreciated- at baseline; can resume therapy on discharge as outpatient  - Palliative Care recommendations appreciated    #Lower Back Pain 2/2 Compression fractures  - Tylenol PRN mild pain  - Oxycodone 5mg Q4 PRN moderate pain  - Oxycodone 10mg q4 PRN severe pain  - Palliative Care recommendations appreciated    #Fungal infection of skin  - wide spread infection possibly tinea corporis  - Continue Fluconazole 150mg PO daily (2/7)  - Continue Nystatin cream BID on rash in groin  - Infectious disease recommendations appreciated    #CHRISTINA  - likely pre-renal  - Creatinine improved  - will continue to monitor with daily BMP  - encourage PO hydration    #Chronic dCHF  - Lasix held  - Continue aldactone 25 qd  - Strict I&O    #COPD  - Currently no active disease  - Continue supplemental O2 as needed  - Continue albuterol PRN, montelukast    #DM2  - continue lantus, humalog and ISS  - Continue gabapentin for neuropathic pain    #Hepatitis B  Continue tenofovir home med    #HLD  Continue statin    #Atrial fibrillation  - continue lopressor 25 BID w/ holding parameters  - Cont amiodarone  - Hold Warfarin 2.5mg daily as INR remains elevated  - Goal INR 2-3  - Daily INR    #DVT prophylaxis  - on Coumadin  - Cont SCDs 78M w/ PMH as above admitted with worsening weakness.    #HCAP  - Labile leukocytosis  - Rhino/Enterovirus +   - Continue Cefepime 1G BID and Doxycycline 100mg BID (day 5 of abx)  - Will f/u cultures   - Will f/u serial cbc   - Continue to monitor temperature   - 500ml bolus NS given.  - supportive care   - ID recommendations appreciated    #Hyponatremia 2/2 dehydration  - not improving  - 500ml bolus NS given.  - will continue to monitor    #Hyperkalemia  - 500mlNS bolus given.   - F/u EKG  - Continue to monitor    #Severe Protein Malnutrition  - Continue fluid restriction.  - Glucerna supplements TID  - Zinc supplements BID x 10 days  - Vitamin C supplements daily  - Daily weights  - Nutrition recommendations appreciated    #CLL, B cell lymphoma  - S/p IVIG (12/30)  - Heme-onc recommendations appreciated- at baseline; can resume therapy on discharge as outpatient  - Palliative Care recommendations appreciated    #Lower Back Pain 2/2 Compression fractures  - Tylenol PRN mild pain  - Oxycodone 5mg Q4 PRN moderate pain  - Oxycodone 10mg q4 PRN severe pain  - Palliative Care recommendations appreciated    #Fungal infection of skin  - wide spread infection possibly tinea corporis  - Continue Fluconazole 150mg PO daily (3/7)  - Continue Nystatin cream BID on rash in groin  - Infectious disease recommendations appreciated    #CHRISTINA  - likely pre-renal  - Creatinine worsened  - will continue to monitor with daily BMP  - S/p 500ml bolus   - encourage PO hydration  - Will continue to monitor.    #Chronic dCHF  - Lasix held  - Continue aldactone 25 qd  - Strict I&O    #COPD  - Currently no active disease  - Continue supplemental O2 and albuterol PRN  - Continue montelukast    #DM2  - continue lantus, humalog and ISS  - Continue gabapentin for neuropathic pain    #Hepatitis B  Continue tenofovir home med    #HLD  Continue statin    #Atrial fibrillation  - continue lopressor 25 BID w/ holding parameters  - Cont amiodarone  - INR therapeutic.   - Can resume warfarin  - Goal INR 2-3  - Daily INR    #DVT prophylaxis  - on Coumadin  - Cont SCDs

## 2018-12-31 NOTE — PROGRESS NOTE ADULT - ASSESSMENT
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  1. Patient admitted with pneumonia which will treat as health care associated pneumonia given recent hospitalizations; also noted with leukocytosis most likely secondary to infection  - follow up cultures   - serial cbc and monitor temperature   - iv hydration and supportive care   - reviewed prior medical records to evaluate for resistant or atypical pathogens   - oxygen and nebs as needed   - day #5 cefepime and doxycycline  - tolerating antibiotics without rashes or side effects   - continue viread for hepatitis B  2. 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

## 2018-12-31 NOTE — PROGRESS NOTE ADULT - SUBJECTIVE AND OBJECTIVE BOX
Patient is a 78y old  Male who presents with a chief complaint of Weakness (27 Dec 2018 08:56)    Date of service: 12-31-18 @ 12:12      Patient sitting in chair; weak and frail appearing      ROS unable to obtain secondary to patient medical condition     MEDICATIONS  (STANDING):  amiodarone    Tablet 100 milliGRAM(s) Oral daily  ascorbic acid 500 milliGRAM(s) Oral two times a day  atorvastatin 10 milliGRAM(s) Oral at bedtime  cefepime  Injectable. 1000 milliGRAM(s) IV Push every 12 hours  dextrose 5%. 1000 milliLiter(s) (50 mL/Hr) IV Continuous <Continuous>  dextrose 50% Injectable 12.5 Gram(s) IV Push once  dextrose 50% Injectable 25 Gram(s) IV Push once  dextrose 50% Injectable 25 Gram(s) IV Push once  doxycycline hyclate Capsule 100 milliGRAM(s) Oral every 12 hours  fluconAZOLE   Tablet 150 milliGRAM(s) Oral daily  gabapentin 300 milliGRAM(s) Oral three times a day  insulin glargine Injectable (LANTUS) 5 Unit(s) SubCutaneous at bedtime  insulin lispro (HumaLOG) corrective regimen sliding scale   SubCutaneous three times a day before meals  metoprolol tartrate 25 milliGRAM(s) Oral two times a day  montelukast 10 milliGRAM(s) Oral at bedtime  multivitamin 1 Tablet(s) Oral daily  nystatin Cream 1 Application(s) Topical two times a day  sodium chloride 0.9% Bolus 500 milliLiter(s) IV Bolus once  sodium chloride 0.9%. 1000 milliLiter(s) (50 mL/Hr) IV Continuous <Continuous>  spironolactone 25 milliGRAM(s) Oral daily  tenofovir disoproxil fumarate (VIREAD) 300 milliGRAM(s) Oral daily  zinc sulfate 220 milliGRAM(s) Oral two times a day    MEDICATIONS  (PRN):  acetaminophen   Tablet .. 650 milliGRAM(s) Oral every 6 hours PRN Temp greater or equal to 38C (100.4F), Mild Pain (1 - 3)  ALBUTerol    90 MICROgram(s) HFA Inhaler 1 Puff(s) Inhalation every 4 hours PRN Shortness of Breath and/or Wheezing  dextrose 40% Gel 15 Gram(s) Oral once PRN Blood Glucose LESS THAN 70 milliGRAM(s)/deciliter  diphenhydrAMINE   Injectable 50 milliGRAM(s) IntraMuscular every 6 hours PRN Rash and/or Itching  glucagon  Injectable 1 milliGRAM(s) IntraMuscular once PRN Glucose LESS THAN 70 milligrams/deciliter  oxyCODONE    IR 5 milliGRAM(s) Oral every 4 hours PRN Moderate Pain (4 - 6)  oxyCODONE    IR 10 milliGRAM(s) Oral every 4 hours PRN Severe Pain (7 - 10)  senna 2 Tablet(s) Oral at bedtime PRN Constipation      Vital Signs Last 24 Hrs  T(C): 36.3 (31 Dec 2018 04:52), Max: 36.8 (30 Dec 2018 15:10)  T(F): 97.3 (31 Dec 2018 04:52), Max: 98.2 (30 Dec 2018 15:10)  HR: 92 (31 Dec 2018 04:52) (87 - 97)  BP: 90/65 (31 Dec 2018 04:52) (90/65 - 122/78)  BP(mean): --  RR: 17 (31 Dec 2018 04:52) (16 - 17)  SpO2: 94% (31 Dec 2018 04:52) (94% - 98%)    Physical Exam:          PE:    Constitutional: frail looking  HEENT: NC/AT, EOMI, PERRLA, conjunctivae clear; ears and nose atraumatic; pharynx clear  Neck: supple; thyroid not palpable  Back: no tenderness  Respiratory: respiratory effort normal; diminished breath sounds at bases  Cardiovascular: S1S2 regular, no murmurs  Abdomen: soft, not tender, not distended, positive BS; no liver or spleen organomegaly  Genitourinary: no suprapubic tenderness  Musculoskeletal: no muscle tenderness, no joint swelling or tenderness  Neurological/ Psychiatric: AxOx3, judgement and insight normal;  moving all extremities  Skin: no rashes; no palpable lesions    Labs: all available labs reviewed                        Labs:                        12.4   21.86 )-----------( 148      ( 31 Dec 2018 04:34 )             38.2     12-31    125<L>  |  96  |  42<H>  ----------------------------<  218<H>  5.5<H>   |  19<L>  |  1.44<H>    Ca    8.0<L>      31 Dec 2018 04:34             Cultures:       Culture - Urine (collected 12-26-18 @ 23:06)  Source: .Urine None  Final Report (12-28-18 @ 10:54):    No growth    Culture - Blood (collected 12-26-18 @ 19:38)  Source: .Blood Blood-Peripheral  Preliminary Report (12-28-18 @ 01:03):    No growth to date.    Culture - Blood (collected 12-26-18 @ 17:36)  Source: .Blood Blood-Peripheral  Preliminary Report (12-28-18 @ 01:03):    No growth to date.            < from: CT Chest No Cont (12.27.18 @ 11:26) >    EXAM:  CT CHEST                            PROCEDURE DATE:  12/27/2018          INTERPRETATION:  CLINICAL INFORMATION: 78-year-old male with leukocytosis   and yellow sputum. History of B-cell lymphoma.    COMPARISON: Chest CT scan 11/2/2018 and 3/28/2016    PROCEDURE:   CT of the Chest was performed without intravenous contrast.  Sagittal and coronal reformats were performed.      FINDINGS:    CHEST:     LUNGS AND LARGE AIRWAYS: Patent central airways.  The left greater than   right pleural effusion with adjacent passive atelectasis. Mild   peribronchial thickening is present in the partially collapsed right   lower lobe with additional findings of mild patchy airspace opacity,   raising a question of underlying pneumonia. Similar findingsof   peribronchial thickening and patchy airspace opacity are present in the   right middle lobe.    A right perihilar pulmonary nodule measures 7 mm, unchanged from prior   examination 11/2/2018 but new compared to prior examination 1/4/2018.   (3:60)  PLEURA: As above  VESSELS: Atherosclerotic calcifications.  HEART: Heart size is normal. No pericardial effusion.  MEDIASTINUM AND JEFFREY: Nonspecific mediastinal adenopathy, without   significant change from prior exam.  CHEST WALL AND LOWER NECK: Bilateral enlarged axillary lymph nodes. Small   left lower pole thyroid nodule.  VISUALIZED UPPER ABDOMEN: The spleen is enlarged, measuring 14.1 cm in   craniocaudal diameter.  BONES: The bones are osteopenic. There are old bilateral rib fractures.    IMPRESSION:   *  Small bilateral pleural effusions with adjacent passive atelectasis.  *  Peribronchial thickening in the right lower middle lobes with adjacent   patchy airspace opacity, concerning for pneumonia.  *  Mediastinal and axillary lymphadenopathy, without significant change   from prior exam.     < end of copied text >        Radiology: all available radiological tests reviewed    Advanced directives addressed: full resuscitation

## 2018-12-31 NOTE — PROGRESS NOTE ADULT - SUBJECTIVE AND OBJECTIVE BOX
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 Wife also reports he had a low grade fever ~100 F a few days ago. He has a productive cough w/ yellow sputum. He is unable to ambulate due to back pain from compression fractures, and even though the pain is helped w/ medications he still feels very weak with associated decrease in appetite and PO intake. Palliative Medicine Consult to establish GOC.   18 Seen and examined at bedside with wife and daughter present. C/O persistent back/right hip pain and mild dyspnea at rest.    18 Seen and examined at bedside with wife and daughter present. OOB/chair. Denies pain or dyspnea  PAIN: (X )Yes   ( )No  Level: mod/improved  Location: lower back/hip  Intensity:   5 /10  Quality: constant  Aggravating Factors: standing/ambulating  Alleviating Factors: Tylenol   Radiation: right hip  Duration/Timing:  Impact on ADLs: severe    DYSPNEA: (X ) Yes  ( ) No  Level: mild at rest/improved    PAST MEDICAL & SURGICAL HISTORY:  Hyperlipidemia  Hypogammaglobulinemia: received IVIG  Hepatitis B virus infection, unspecified chronicity: retrovirals  Chronic diastolic congestive heart failure  Essential hypertension  DM type 2 (diabetes mellitus, type 2)  B-cell lymphoma, unspecified B-cell lymphoma type, unspecified body region: CLL stage 4/SLL; met NHL  Atrial fibrillation, unspecified type  COPD (chronic obstructive pulmonary disease)  History of esophageal dilatation  S/P cholecystectomy  H/O prostatectomy      SOCIAL HX:    Hx opiate tolerance ( )YES  (X )NO    Baseline ADLs  (Prior to Admission)  ( ) Independent   (X )Dependent    FAMILY HISTORY:  Family history of liver cancer (Father): father  85 liver CA  Family history of coronary arteriosclerosis (Mother): Mom  of MI age 68      Review of Systems:    Anxiety-mild  Depression-situational  Physical Discomfort-mod  Dyspnea-mild  Constipation-denies  Diarrhea-denies  Anorexia-mod-severe  Weight Loss-   Cough-mod  Secretions-mod  Fatigue-severe  Weakness-severe      All other systems reviewed and negative        PHYSICAL EXAM:  ICU Vital Signs Last 24 Hrs  T(C): 36.7 (31 Dec 2018 13:31), Max: 36.8 (30 Dec 2018 15:10)  T(F): 98.1 (31 Dec 2018 13:31), Max: 98.2 (30 Dec 2018 15:10)  HR: 95 (31 Dec 2018 13:31) (87 - 97)  BP: 97/696 (31 Dec 2018 13:31) (90/65 - 122/78)  BP(mean): --  ABP: --  ABP(mean): --  RR: 18 (31 Dec 2018 13:31) (16 - 18)  SpO2: 94% (31 Dec 2018 13:31) (94% - 98%)    Daily Weight in k (28 Dec 2018 07:57)    PPSV2: 20-30 %      General: Elderly male in chair in NAD  Mental Status: alert and oriented X3  HEENT: nasal O2 intact  Lungs: clear to auscultation  Cardiac: S1S2+  GI: abd soft NT ND + BS  : voids  Ext: Right YASHIRA externally rotated/no edema BLE/BUE=strength  Neuro: no focal def      LABS:                              12.4   21.86 )-----------( 148      ( 31 Dec 2018 04:34 )             38.2                     125<L>  |  96  |  42<H>  ----------------------------<  218<H>  5.5<H>   |  19<L>  |  1.44<H>    Ca    8.0<L>      31 Dec 2018 04:34    PT/INR - ( 28 Dec 2018 05:53 )   PT: 52.6 sec;   INR: 4.52 ratio         PTT - ( 26 Dec 2018 18:55 )  PTT:56.8 sec  Albumin:     Allergies    No Known Allergies    Intolerances      MEDICATIONS  (STANDING):  amiodarone    Tablet 100 milliGRAM(s) Oral daily  atorvastatin 10 milliGRAM(s) Oral at bedtime  cefepime  Injectable. 1000 milliGRAM(s) IV Push every 12 hours  clotrimazole 1% Cream 1 Application(s) Topical two times a day  dextrose 5%. 1000 milliLiter(s) (50 mL/Hr) IV Continuous <Continuous>  dextrose 50% Injectable 12.5 Gram(s) IV Push once  dextrose 50% Injectable 25 Gram(s) IV Push once  dextrose 50% Injectable 25 Gram(s) IV Push once  doxycycline hyclate Capsule 100 milliGRAM(s) Oral every 12 hours  gabapentin 300 milliGRAM(s) Oral three times a day  insulin glargine Injectable (LANTUS) 10 Unit(s) SubCutaneous at bedtime  insulin lispro (HumaLOG) corrective regimen sliding scale   SubCutaneous three times a day before meals  metoprolol tartrate 25 milliGRAM(s) Oral two times a day  montelukast 10 milliGRAM(s) Oral at bedtime  multivitamin 1 Tablet(s) Oral daily  sodium chloride 0.9%. 1000 milliLiter(s) (50 mL/Hr) IV Continuous <Continuous>  sodium chloride 0.9%. 1000 milliLiter(s) (250 mL/Hr) IV Continuous <Continuous>  spironolactone 25 milliGRAM(s) Oral daily  tenofovir disoproxil fumarate (VIREAD) 300 milliGRAM(s) Oral daily    MEDICATIONS  (PRN):  acetaminophen   Tablet .. 650 milliGRAM(s) Oral every 6 hours PRN Temp greater or equal to 38C (100.4F), Mild Pain (1 - 3)  ALBUTerol    90 MICROgram(s) HFA Inhaler 1 Puff(s) Inhalation every 4 hours PRN Shortness of Breath and/or Wheezing  dextrose 40% Gel 15 Gram(s) Oral once PRN Blood Glucose LESS THAN 70 milliGRAM(s)/deciliter  glucagon  Injectable 1 milliGRAM(s) IntraMuscular once PRN Glucose LESS THAN 70 milligrams/deciliter  oxyCODONE    IR 5 milliGRAM(s) Oral every 4 hours PRN Moderate Pain (4 - 6)  oxyCODONE    IR 10 milliGRAM(s) Oral every 4 hours PRN Severe Pain (7 - 10)  senna 2 Tablet(s) Oral at bedtime PRN Constipation      RADIOLOGY/ADDITIONAL STUDIES:    < from: CT Chest No Cont (12.27.18 @ 11:26) >  EXAM:  CT CHEST                            PROCEDURE DATE:  2018          INTERPRETATION:  CLINICAL INFORMATION: 78-year-old male with leukocytosis   and yellow sputum. History of B-cell lymphoma.    COMPARISON: Chest CT scan 2018 and 3/28/2016    PROCEDURE:   CT of the Chest was performed without intravenous contrast.  Sagittal and coronal reformats were performed.      FINDINGS:    CHEST:     LUNGS AND LARGE AIRWAYS: Patent central airways.  The left greater than   right pleural effusion with adjacent passive atelectasis. Mild   peribronchial thickening is present in the partially collapsed right   lower lobe with additional findings of mild patchy airspace opacity,   raising a question of underlying pneumonia. Similar findingsof   peribronchial thickening and patchy airspace opacity are present in the   right middle lobe.    A right perihilar pulmonary nodule measures 7 mm, unchanged from prior   examination 2018 but new compared to prior examination 2018.   (3:60)  PLEURA: As above  VESSELS: Atherosclerotic calcifications.  HEART: Heart size is normal. No pericardial effusion.  MEDIASTINUM AND JEFFREY: Nonspecific mediastinal adenopathy, without   significant change from prior exam.  CHEST WALL AND LOWER NECK: Bilateral enlarged axillary lymph nodes. Small   left lower pole thyroid nodule.  VISUALIZED UPPER ABDOMEN: The spleen is enlarged, measuring 14.1 cm in   craniocaudal diameter.  BONES: The bones are osteopenic. There are old bilateral rib fractures.    IMPRESSION:   *  Small bilateral pleural effusions with adjacent passive atelectasis.  *  Peribronchial thickening in the right lower middle lobes with adjacent   patchy airspace opacity, concerning for pneumonia.  *  Mediastinal and axillary lymphadenopathy, without significant change   from prior exam.   *  Splenomegaly.

## 2019-01-01 ENCOUNTER — EMERGENCY (EMERGENCY)
Facility: HOSPITAL | Age: 79
LOS: 0 days | End: 2019-01-11
Attending: EMERGENCY MEDICINE | Admitting: EMERGENCY MEDICINE
Payer: MEDICARE

## 2019-01-01 ENCOUNTER — TRANSCRIPTION ENCOUNTER (OUTPATIENT)
Age: 79
End: 2019-01-01

## 2019-01-01 VITALS
OXYGEN SATURATION: 99 % | TEMPERATURE: 98 F | RESPIRATION RATE: 18 BRPM | HEART RATE: 98 BPM | DIASTOLIC BLOOD PRESSURE: 64 MMHG | SYSTOLIC BLOOD PRESSURE: 96 MMHG

## 2019-01-01 DIAGNOSIS — M48.50XA COLLAPSED VERTEBRA, NOT ELSEWHERE CLASSIFIED, SITE UNSPECIFIED, INITIAL ENCOUNTER FOR FRACTURE: ICD-10-CM

## 2019-01-01 DIAGNOSIS — Z79.01 LONG TERM (CURRENT) USE OF ANTICOAGULANTS: ICD-10-CM

## 2019-01-01 DIAGNOSIS — Z99.81 DEPENDENCE ON SUPPLEMENTAL OXYGEN: ICD-10-CM

## 2019-01-01 DIAGNOSIS — I11.0 HYPERTENSIVE HEART DISEASE WITH HEART FAILURE: ICD-10-CM

## 2019-01-01 DIAGNOSIS — Z90.79 ACQUIRED ABSENCE OF OTHER GENITAL ORGAN(S): Chronic | ICD-10-CM

## 2019-01-01 DIAGNOSIS — J18.9 PNEUMONIA, UNSPECIFIED ORGANISM: ICD-10-CM

## 2019-01-01 DIAGNOSIS — Z98.890 OTHER SPECIFIED POSTPROCEDURAL STATES: Chronic | ICD-10-CM

## 2019-01-01 DIAGNOSIS — I48.91 UNSPECIFIED ATRIAL FIBRILLATION: ICD-10-CM

## 2019-01-01 DIAGNOSIS — I46.9 CARDIAC ARREST, CAUSE UNSPECIFIED: ICD-10-CM

## 2019-01-01 DIAGNOSIS — D80.1 NONFAMILIAL HYPOGAMMAGLOBULINEMIA: ICD-10-CM

## 2019-01-01 DIAGNOSIS — Z87.891 PERSONAL HISTORY OF NICOTINE DEPENDENCE: ICD-10-CM

## 2019-01-01 DIAGNOSIS — C91.10 CHRONIC LYMPHOCYTIC LEUKEMIA OF B-CELL TYPE NOT HAVING ACHIEVED REMISSION: ICD-10-CM

## 2019-01-01 DIAGNOSIS — E86.0 DEHYDRATION: ICD-10-CM

## 2019-01-01 DIAGNOSIS — E78.5 HYPERLIPIDEMIA, UNSPECIFIED: ICD-10-CM

## 2019-01-01 DIAGNOSIS — B19.10 UNSPECIFIED VIRAL HEPATITIS B WITHOUT HEPATIC COMA: ICD-10-CM

## 2019-01-01 DIAGNOSIS — B35.4 TINEA CORPORIS: ICD-10-CM

## 2019-01-01 DIAGNOSIS — Z90.49 ACQUIRED ABSENCE OF OTHER SPECIFIED PARTS OF DIGESTIVE TRACT: Chronic | ICD-10-CM

## 2019-01-01 DIAGNOSIS — E87.5 HYPERKALEMIA: ICD-10-CM

## 2019-01-01 DIAGNOSIS — E11.9 TYPE 2 DIABETES MELLITUS WITHOUT COMPLICATIONS: ICD-10-CM

## 2019-01-01 DIAGNOSIS — Z51.5 ENCOUNTER FOR PALLIATIVE CARE: ICD-10-CM

## 2019-01-01 DIAGNOSIS — J44.9 CHRONIC OBSTRUCTIVE PULMONARY DISEASE, UNSPECIFIED: ICD-10-CM

## 2019-01-01 DIAGNOSIS — E43 UNSPECIFIED SEVERE PROTEIN-CALORIE MALNUTRITION: ICD-10-CM

## 2019-01-01 DIAGNOSIS — K74.60 UNSPECIFIED CIRRHOSIS OF LIVER: ICD-10-CM

## 2019-01-01 DIAGNOSIS — I50.32 CHRONIC DIASTOLIC (CONGESTIVE) HEART FAILURE: ICD-10-CM

## 2019-01-01 DIAGNOSIS — N17.9 ACUTE KIDNEY FAILURE, UNSPECIFIED: ICD-10-CM

## 2019-01-01 DIAGNOSIS — E87.1 HYPO-OSMOLALITY AND HYPONATREMIA: ICD-10-CM

## 2019-01-01 LAB
ADD ON TEST-SPECIMEN IN LAB: SIGNIFICANT CHANGE UP
ANION GAP SERPL CALC-SCNC: 10 MMOL/L — SIGNIFICANT CHANGE UP (ref 5–17)
ANION GAP SERPL CALC-SCNC: 11 MMOL/L — SIGNIFICANT CHANGE UP (ref 5–17)
ANION GAP SERPL CALC-SCNC: 9 MMOL/L — SIGNIFICANT CHANGE UP (ref 5–17)
BUN SERPL-MCNC: 33 MG/DL — HIGH (ref 7–23)
BUN SERPL-MCNC: 37 MG/DL — HIGH (ref 7–23)
BUN SERPL-MCNC: 44 MG/DL — HIGH (ref 7–23)
CALCIUM SERPL-MCNC: 7.7 MG/DL — LOW (ref 8.5–10.1)
CALCIUM SERPL-MCNC: 8 MG/DL — LOW (ref 8.5–10.1)
CALCIUM SERPL-MCNC: 8.1 MG/DL — LOW (ref 8.5–10.1)
CHLORIDE SERPL-SCNC: 101 MMOL/L — SIGNIFICANT CHANGE UP (ref 96–108)
CHLORIDE SERPL-SCNC: 102 MMOL/L — SIGNIFICANT CHANGE UP (ref 96–108)
CHLORIDE SERPL-SCNC: 98 MMOL/L — SIGNIFICANT CHANGE UP (ref 96–108)
CO2 SERPL-SCNC: 20 MMOL/L — LOW (ref 22–31)
CO2 SERPL-SCNC: 20 MMOL/L — LOW (ref 22–31)
CO2 SERPL-SCNC: 21 MMOL/L — LOW (ref 22–31)
CREAT SERPL-MCNC: 1.01 MG/DL — SIGNIFICANT CHANGE UP (ref 0.5–1.3)
CREAT SERPL-MCNC: 1.11 MG/DL — SIGNIFICANT CHANGE UP (ref 0.5–1.3)
CREAT SERPL-MCNC: 1.47 MG/DL — HIGH (ref 0.5–1.3)
CULTURE RESULTS: SIGNIFICANT CHANGE UP
CULTURE RESULTS: SIGNIFICANT CHANGE UP
GLUCOSE BLDC GLUCOMTR-MCNC: 123 MG/DL — HIGH (ref 70–99)
GLUCOSE BLDC GLUCOMTR-MCNC: 145 MG/DL — HIGH (ref 70–99)
GLUCOSE BLDC GLUCOMTR-MCNC: 149 MG/DL — HIGH (ref 70–99)
GLUCOSE BLDC GLUCOMTR-MCNC: 158 MG/DL — HIGH (ref 70–99)
GLUCOSE BLDC GLUCOMTR-MCNC: 169 MG/DL — HIGH (ref 70–99)
GLUCOSE BLDC GLUCOMTR-MCNC: 177 MG/DL — HIGH (ref 70–99)
GLUCOSE BLDC GLUCOMTR-MCNC: 194 MG/DL — HIGH (ref 70–99)
GLUCOSE BLDC GLUCOMTR-MCNC: 230 MG/DL — HIGH (ref 70–99)
GLUCOSE BLDC GLUCOMTR-MCNC: 242 MG/DL — HIGH (ref 70–99)
GLUCOSE SERPL-MCNC: 108 MG/DL — HIGH (ref 70–99)
GLUCOSE SERPL-MCNC: 153 MG/DL — HIGH (ref 70–99)
GLUCOSE SERPL-MCNC: 168 MG/DL — HIGH (ref 70–99)
HCT VFR BLD CALC: 37.3 % — LOW (ref 39–50)
HCT VFR BLD CALC: 40.1 % — SIGNIFICANT CHANGE UP (ref 39–50)
HCT VFR BLD CALC: 40.5 % — SIGNIFICANT CHANGE UP (ref 39–50)
HGB BLD-MCNC: 12.1 G/DL — LOW (ref 13–17)
HGB BLD-MCNC: 13 G/DL — SIGNIFICANT CHANGE UP (ref 13–17)
HGB BLD-MCNC: 13.2 G/DL — SIGNIFICANT CHANGE UP (ref 13–17)
INR BLD: 3 RATIO — HIGH (ref 0.88–1.16)
INR BLD: 3.53 RATIO — HIGH (ref 0.88–1.16)
INR BLD: 3.55 RATIO — HIGH (ref 0.88–1.16)
MCHC RBC-ENTMCNC: 25.9 PG — LOW (ref 27–34)
MCHC RBC-ENTMCNC: 26 PG — LOW (ref 27–34)
MCHC RBC-ENTMCNC: 26 PG — LOW (ref 27–34)
MCHC RBC-ENTMCNC: 32.4 GM/DL — SIGNIFICANT CHANGE UP (ref 32–36)
MCHC RBC-ENTMCNC: 32.4 GM/DL — SIGNIFICANT CHANGE UP (ref 32–36)
MCHC RBC-ENTMCNC: 32.6 GM/DL — SIGNIFICANT CHANGE UP (ref 32–36)
MCV RBC AUTO: 79.9 FL — LOW (ref 80–100)
MCV RBC AUTO: 80 FL — SIGNIFICANT CHANGE UP (ref 80–100)
MCV RBC AUTO: 80 FL — SIGNIFICANT CHANGE UP (ref 80–100)
NRBC # BLD: 0 /100 WBCS — SIGNIFICANT CHANGE UP (ref 0–0)
OSMOLALITY SERPL: 280 MOS/KG — SIGNIFICANT CHANGE UP (ref 275–300)
OSMOLALITY UR: 356 MOS/KG — SIGNIFICANT CHANGE UP (ref 50–1200)
PLATELET # BLD AUTO: 111 K/UL — LOW (ref 150–400)
PLATELET # BLD AUTO: 122 K/UL — LOW (ref 150–400)
PLATELET # BLD AUTO: 142 K/UL — LOW (ref 150–400)
POTASSIUM SERPL-MCNC: 4.5 MMOL/L — SIGNIFICANT CHANGE UP (ref 3.5–5.3)
POTASSIUM SERPL-MCNC: 4.6 MMOL/L — SIGNIFICANT CHANGE UP (ref 3.5–5.3)
POTASSIUM SERPL-MCNC: 4.7 MMOL/L — SIGNIFICANT CHANGE UP (ref 3.5–5.3)
POTASSIUM SERPL-SCNC: 4.5 MMOL/L — SIGNIFICANT CHANGE UP (ref 3.5–5.3)
POTASSIUM SERPL-SCNC: 4.6 MMOL/L — SIGNIFICANT CHANGE UP (ref 3.5–5.3)
POTASSIUM SERPL-SCNC: 4.7 MMOL/L — SIGNIFICANT CHANGE UP (ref 3.5–5.3)
PROTHROM AB SERPL-ACNC: 34.4 SEC — HIGH (ref 10–12.9)
PROTHROM AB SERPL-ACNC: 40.8 SEC — HIGH (ref 10–12.9)
PROTHROM AB SERPL-ACNC: 41 SEC — HIGH (ref 10–12.9)
RBC # BLD: 4.66 M/UL — SIGNIFICANT CHANGE UP (ref 4.2–5.8)
RBC # BLD: 5.01 M/UL — SIGNIFICANT CHANGE UP (ref 4.2–5.8)
RBC # BLD: 5.07 M/UL — SIGNIFICANT CHANGE UP (ref 4.2–5.8)
RBC # FLD: 19.5 % — HIGH (ref 10.3–14.5)
RBC # FLD: 19.9 % — HIGH (ref 10.3–14.5)
RBC # FLD: 20 % — HIGH (ref 10.3–14.5)
SODIUM SERPL-SCNC: 128 MMOL/L — LOW (ref 135–145)
SODIUM SERPL-SCNC: 131 MMOL/L — LOW (ref 135–145)
SODIUM SERPL-SCNC: 133 MMOL/L — LOW (ref 135–145)
SPECIMEN SOURCE: SIGNIFICANT CHANGE UP
SPECIMEN SOURCE: SIGNIFICANT CHANGE UP
WBC # BLD: 21.01 K/UL — HIGH (ref 3.8–10.5)
WBC # BLD: 21.54 K/UL — HIGH (ref 3.8–10.5)
WBC # BLD: 24.08 K/UL — HIGH (ref 3.8–10.5)
WBC # FLD AUTO: 21.01 K/UL — HIGH (ref 3.8–10.5)
WBC # FLD AUTO: 21.54 K/UL — HIGH (ref 3.8–10.5)
WBC # FLD AUTO: 24.08 K/UL — HIGH (ref 3.8–10.5)

## 2019-01-01 PROCEDURE — 99285 EMERGENCY DEPT VISIT HI MDM: CPT

## 2019-01-01 RX ORDER — FUROSEMIDE 40 MG
1 TABLET ORAL
Qty: 0 | Refills: 0 | COMMUNITY

## 2019-01-01 RX ORDER — SPIRONOLACTONE 25 MG/1
1 TABLET, FILM COATED ORAL
Qty: 0 | Refills: 0 | COMMUNITY

## 2019-01-01 RX ORDER — VANCOMYCIN HCL 1 G
1000 VIAL (EA) INTRAVENOUS ONCE
Qty: 0 | Refills: 0 | Status: COMPLETED | OUTPATIENT
Start: 2019-01-01 | End: 2019-01-01

## 2019-01-01 RX ORDER — NYSTATIN CREAM 100000 [USP'U]/G
1 CREAM TOPICAL
Qty: 1 | Refills: 0 | OUTPATIENT
Start: 2019-01-01 | End: 2019-04-02

## 2019-01-01 RX ORDER — CEFUROXIME AXETIL 250 MG
250 TABLET ORAL EVERY 12 HOURS
Qty: 0 | Refills: 0 | Status: DISCONTINUED | OUTPATIENT
Start: 2019-01-01 | End: 2019-01-01

## 2019-01-01 RX ORDER — SODIUM CHLORIDE 9 MG/ML
500 INJECTION INTRAMUSCULAR; INTRAVENOUS; SUBCUTANEOUS ONCE
Qty: 0 | Refills: 0 | Status: COMPLETED | OUTPATIENT
Start: 2019-01-01 | End: 2019-01-01

## 2019-01-01 RX ORDER — CEFUROXIME AXETIL 250 MG
1 TABLET ORAL
Qty: 4 | Refills: 0 | OUTPATIENT
Start: 2019-01-01 | End: 2019-01-01

## 2019-01-01 RX ORDER — ASCORBIC ACID 60 MG
1 TABLET,CHEWABLE ORAL
Qty: 0 | Refills: 0 | COMMUNITY
Start: 2019-01-01

## 2019-01-01 RX ORDER — FLUCONAZOLE 150 MG/1
1 TABLET ORAL
Qty: 2 | Refills: 0 | OUTPATIENT
Start: 2019-01-01 | End: 2019-01-01

## 2019-01-01 RX ADMIN — ZINC SULFATE TAB 220 MG (50 MG ZINC EQUIVALENT) 220 MILLIGRAM(S): 220 (50 ZN) TAB at 05:45

## 2019-01-01 RX ADMIN — SENNA PLUS 2 TABLET(S): 8.6 TABLET ORAL at 21:31

## 2019-01-01 RX ADMIN — TENOFOVIR DISOPROXIL FUMARATE 300 MILLIGRAM(S): 300 TABLET, FILM COATED ORAL at 13:26

## 2019-01-01 RX ADMIN — ZINC SULFATE TAB 220 MG (50 MG ZINC EQUIVALENT) 220 MILLIGRAM(S): 220 (50 ZN) TAB at 05:21

## 2019-01-01 RX ADMIN — GABAPENTIN 300 MILLIGRAM(S): 400 CAPSULE ORAL at 17:27

## 2019-01-01 RX ADMIN — ZINC SULFATE TAB 220 MG (50 MG ZINC EQUIVALENT) 220 MILLIGRAM(S): 220 (50 ZN) TAB at 18:29

## 2019-01-01 RX ADMIN — AMIODARONE HYDROCHLORIDE 100 MILLIGRAM(S): 400 TABLET ORAL at 05:20

## 2019-01-01 RX ADMIN — GABAPENTIN 300 MILLIGRAM(S): 400 CAPSULE ORAL at 13:26

## 2019-01-01 RX ADMIN — CEFEPIME 1000 MILLIGRAM(S): 1 INJECTION, POWDER, FOR SOLUTION INTRAMUSCULAR; INTRAVENOUS at 05:19

## 2019-01-01 RX ADMIN — SENNA PLUS 2 TABLET(S): 8.6 TABLET ORAL at 21:36

## 2019-01-01 RX ADMIN — CEFEPIME 1000 MILLIGRAM(S): 1 INJECTION, POWDER, FOR SOLUTION INTRAMUSCULAR; INTRAVENOUS at 18:30

## 2019-01-01 RX ADMIN — MONTELUKAST 10 MILLIGRAM(S): 4 TABLET, CHEWABLE ORAL at 21:31

## 2019-01-01 RX ADMIN — Medication 250 MILLIGRAM(S): at 18:30

## 2019-01-01 RX ADMIN — Medication 1 TABLET(S): at 11:57

## 2019-01-01 RX ADMIN — GABAPENTIN 300 MILLIGRAM(S): 400 CAPSULE ORAL at 21:32

## 2019-01-01 RX ADMIN — INSULIN GLARGINE 5 UNIT(S): 100 INJECTION, SOLUTION SUBCUTANEOUS at 21:36

## 2019-01-01 RX ADMIN — Medication 1: at 12:40

## 2019-01-01 RX ADMIN — Medication 100 MILLIGRAM(S): at 05:45

## 2019-01-01 RX ADMIN — OXYCODONE HYDROCHLORIDE 5 MILLIGRAM(S): 5 TABLET ORAL at 10:56

## 2019-01-01 RX ADMIN — ATORVASTATIN CALCIUM 10 MILLIGRAM(S): 80 TABLET, FILM COATED ORAL at 21:36

## 2019-01-01 RX ADMIN — FLUCONAZOLE 150 MILLIGRAM(S): 150 TABLET ORAL at 11:58

## 2019-01-01 RX ADMIN — Medication 2: at 18:28

## 2019-01-01 RX ADMIN — Medication 100 MILLIGRAM(S): at 05:20

## 2019-01-01 RX ADMIN — ATORVASTATIN CALCIUM 10 MILLIGRAM(S): 80 TABLET, FILM COATED ORAL at 21:31

## 2019-01-01 RX ADMIN — GABAPENTIN 300 MILLIGRAM(S): 400 CAPSULE ORAL at 05:45

## 2019-01-01 RX ADMIN — TENOFOVIR DISOPROXIL FUMARATE 300 MILLIGRAM(S): 300 TABLET, FILM COATED ORAL at 12:33

## 2019-01-01 RX ADMIN — Medication 1 TABLET(S): at 12:33

## 2019-01-01 RX ADMIN — Medication 100 MILLIGRAM(S): at 18:31

## 2019-01-01 RX ADMIN — Medication 500 MILLIGRAM(S): at 05:20

## 2019-01-01 RX ADMIN — GABAPENTIN 300 MILLIGRAM(S): 400 CAPSULE ORAL at 16:00

## 2019-01-01 RX ADMIN — Medication 25 MILLIGRAM(S): at 05:20

## 2019-01-01 RX ADMIN — NYSTATIN CREAM 1 APPLICATION(S): 100000 CREAM TOPICAL at 05:35

## 2019-01-01 RX ADMIN — MONTELUKAST 10 MILLIGRAM(S): 4 TABLET, CHEWABLE ORAL at 21:36

## 2019-01-01 RX ADMIN — Medication 500 MILLIGRAM(S): at 05:45

## 2019-01-01 RX ADMIN — NYSTATIN CREAM 1 APPLICATION(S): 100000 CREAM TOPICAL at 05:43

## 2019-01-01 RX ADMIN — SODIUM CHLORIDE 500 MILLILITER(S): 9 INJECTION INTRAMUSCULAR; INTRAVENOUS; SUBCUTANEOUS at 14:45

## 2019-01-01 RX ADMIN — ZINC SULFATE TAB 220 MG (50 MG ZINC EQUIVALENT) 220 MILLIGRAM(S): 220 (50 ZN) TAB at 17:27

## 2019-01-01 RX ADMIN — NYSTATIN CREAM 1 APPLICATION(S): 100000 CREAM TOPICAL at 21:39

## 2019-01-01 RX ADMIN — Medication 1 TABLET(S): at 13:26

## 2019-01-01 RX ADMIN — NYSTATIN CREAM 1 APPLICATION(S): 100000 CREAM TOPICAL at 18:33

## 2019-01-01 RX ADMIN — SPIRONOLACTONE 25 MILLIGRAM(S): 25 TABLET, FILM COATED ORAL at 05:20

## 2019-01-01 RX ADMIN — GABAPENTIN 300 MILLIGRAM(S): 400 CAPSULE ORAL at 05:20

## 2019-01-01 RX ADMIN — Medication 250 MILLIGRAM(S): at 17:27

## 2019-01-01 RX ADMIN — Medication 1: at 07:49

## 2019-01-01 RX ADMIN — Medication 1: at 12:27

## 2019-01-01 RX ADMIN — CEFEPIME 1000 MILLIGRAM(S): 1 INJECTION, POWDER, FOR SOLUTION INTRAMUSCULAR; INTRAVENOUS at 05:46

## 2019-01-01 RX ADMIN — FLUCONAZOLE 150 MILLIGRAM(S): 150 TABLET ORAL at 12:33

## 2019-01-01 RX ADMIN — Medication 250 MILLIGRAM(S): at 05:57

## 2019-01-01 RX ADMIN — FLUCONAZOLE 150 MILLIGRAM(S): 150 TABLET ORAL at 13:26

## 2019-01-01 RX ADMIN — SPIRONOLACTONE 25 MILLIGRAM(S): 25 TABLET, FILM COATED ORAL at 13:26

## 2019-01-01 RX ADMIN — Medication 500 MILLIGRAM(S): at 17:27

## 2019-01-01 RX ADMIN — GABAPENTIN 300 MILLIGRAM(S): 400 CAPSULE ORAL at 21:36

## 2019-01-01 RX ADMIN — Medication 500 MILLIGRAM(S): at 18:29

## 2019-01-01 RX ADMIN — TENOFOVIR DISOPROXIL FUMARATE 300 MILLIGRAM(S): 300 TABLET, FILM COATED ORAL at 11:57

## 2019-01-01 RX ADMIN — INSULIN GLARGINE 5 UNIT(S): 100 INJECTION, SOLUTION SUBCUTANEOUS at 21:32

## 2019-01-01 RX ADMIN — AMIODARONE HYDROCHLORIDE 100 MILLIGRAM(S): 400 TABLET ORAL at 05:43

## 2019-01-01 RX ADMIN — NYSTATIN CREAM 1 APPLICATION(S): 100000 CREAM TOPICAL at 13:26

## 2019-01-01 NOTE — PROGRESS NOTE ADULT - SUBJECTIVE AND OBJECTIVE BOX
Patient is a 78y old  Male who presents with a chief complaint of Weakness (27 Dec 2018 08:56)      Date of service: 01-01-19 @ 09:17      Patient lying in bed  Eating breakfast; weak appearing      ROS unable to obtain secondary to patient medical condition     MEDICATIONS  (STANDING):  amiodarone    Tablet 100 milliGRAM(s) Oral daily  ascorbic acid 500 milliGRAM(s) Oral two times a day  atorvastatin 10 milliGRAM(s) Oral at bedtime  cefepime  Injectable. 1000 milliGRAM(s) IV Push every 12 hours  dextrose 5%. 1000 milliLiter(s) (50 mL/Hr) IV Continuous <Continuous>  dextrose 50% Injectable 12.5 Gram(s) IV Push once  dextrose 50% Injectable 25 Gram(s) IV Push once  dextrose 50% Injectable 25 Gram(s) IV Push once  doxycycline hyclate Capsule 100 milliGRAM(s) Oral every 12 hours  fluconAZOLE   Tablet 150 milliGRAM(s) Oral daily  gabapentin 300 milliGRAM(s) Oral three times a day  insulin glargine Injectable (LANTUS) 5 Unit(s) SubCutaneous at bedtime  insulin lispro (HumaLOG) corrective regimen sliding scale   SubCutaneous three times a day before meals  metoprolol tartrate 25 milliGRAM(s) Oral two times a day  montelukast 10 milliGRAM(s) Oral at bedtime  multivitamin 1 Tablet(s) Oral daily  nystatin Cream 1 Application(s) Topical two times a day  sodium chloride 0.9%. 1000 milliLiter(s) (50 mL/Hr) IV Continuous <Continuous>  spironolactone 25 milliGRAM(s) Oral daily  tenofovir disoproxil fumarate (VIREAD) 300 milliGRAM(s) Oral daily  vancomycin  IVPB 1000 milliGRAM(s) IV Intermittent once  zinc sulfate 220 milliGRAM(s) Oral two times a day    MEDICATIONS  (PRN):  acetaminophen   Tablet .. 650 milliGRAM(s) Oral every 6 hours PRN Temp greater or equal to 38C (100.4F), Mild Pain (1 - 3)  ALBUTerol    90 MICROgram(s) HFA Inhaler 1 Puff(s) Inhalation every 4 hours PRN Shortness of Breath and/or Wheezing  dextrose 40% Gel 15 Gram(s) Oral once PRN Blood Glucose LESS THAN 70 milliGRAM(s)/deciliter  diphenhydrAMINE   Injectable 50 milliGRAM(s) IntraMuscular every 6 hours PRN Rash and/or Itching  glucagon  Injectable 1 milliGRAM(s) IntraMuscular once PRN Glucose LESS THAN 70 milligrams/deciliter  oxyCODONE    IR 5 milliGRAM(s) Oral every 4 hours PRN Moderate Pain (4 - 6)  oxyCODONE    IR 10 milliGRAM(s) Oral every 4 hours PRN Severe Pain (7 - 10)  senna 2 Tablet(s) Oral at bedtime PRN Constipation      Vital Signs Last 24 Hrs  T(C): 36.2 (01 Jan 2019 04:22), Max: 36.7 (31 Dec 2018 13:31)  T(F): 97.1 (01 Jan 2019 04:22), Max: 98.1 (31 Dec 2018 13:31)  HR: 92 (01 Jan 2019 04:22) (66 - 95)  BP: 109/70 (01 Jan 2019 04:22) (88/57 - 109/70)  BP(mean): --  RR: 17 (01 Jan 2019 04:22) (17 - 18)  SpO2: 94% (01 Jan 2019 04:22) (94% - 96%)    Physical Exam:        Physical Exam:          PE:    Constitutional: frail looking  HEENT: NC/AT, EOMI, PERRLA, conjunctivae clear; ears and nose atraumatic; pharynx clear  Neck: supple; thyroid not palpable  Back: no tenderness  Respiratory: respiratory effort normal; diminished breath sounds at bases  Cardiovascular: S1S2 regular, no murmurs  Abdomen: soft, not tender, not distended, positive BS; no liver or spleen organomegaly  Genitourinary: no suprapubic tenderness  Musculoskeletal: no muscle tenderness, no joint swelling or tenderness  Neurological/ Psychiatric: AxOx3, judgement and insight normal;  moving all extremities  Skin: no rashes; no palpable lesions    Labs: all available labs reviewed                        Labs:                       Labs:                        12.1   24.08 )-----------( 142      ( 01 Jan 2019 06:26 )             37.3     01-01    128<L>  |  98  |  44<H>  ----------------------------<  168<H>  4.5   |  21<L>  |  1.47<H>    Ca    8.1<L>      01 Jan 2019 06:26             Cultures:       Culture - Urine (collected 12-26-18 @ 23:06)  Source: .Urine None  Final Report (12-28-18 @ 10:54):    No growth    Culture - Blood (collected 12-26-18 @ 19:38)  Source: .Blood Blood-Peripheral  Final Report (01-01-19 @ 01:02):    No growth at 5 days.    Culture - Blood (collected 12-26-18 @ 17:36)  Source: .Blood Blood-Peripheral  Final Report (01-01-19 @ 01:02):    No growth at 5 days.              < from: CT Chest No Cont (12.27.18 @ 11:26) >    EXAM:  CT CHEST                            PROCEDURE DATE:  12/27/2018          INTERPRETATION:  CLINICAL INFORMATION: 78-year-old male with leukocytosis   and yellow sputum. History of B-cell lymphoma.    COMPARISON: Chest CT scan 11/2/2018 and 3/28/2016    PROCEDURE:   CT of the Chest was performed without intravenous contrast.  Sagittal and coronal reformats were performed.      FINDINGS:    CHEST:     LUNGS AND LARGE AIRWAYS: Patent central airways.  The left greater than   right pleural effusion with adjacent passive atelectasis. Mild   peribronchial thickening is present in the partially collapsed right   lower lobe with additional findings of mild patchy airspace opacity,   raising a question of underlying pneumonia. Similar findingsof   peribronchial thickening and patchy airspace opacity are present in the   right middle lobe.    A right perihilar pulmonary nodule measures 7 mm, unchanged from prior   examination 11/2/2018 but new compared to prior examination 1/4/2018.   (3:60)  PLEURA: As above  VESSELS: Atherosclerotic calcifications.  HEART: Heart size is normal. No pericardial effusion.  MEDIASTINUM AND JEFFREY: Nonspecific mediastinal adenopathy, without   significant change from prior exam.  CHEST WALL AND LOWER NECK: Bilateral enlarged axillary lymph nodes. Small   left lower pole thyroid nodule.  VISUALIZED UPPER ABDOMEN: The spleen is enlarged, measuring 14.1 cm in   craniocaudal diameter.  BONES: The bones are osteopenic. There are old bilateral rib fractures.    IMPRESSION:   *  Small bilateral pleural effusions with adjacent passive atelectasis.  *  Peribronchial thickening in the right lower middle lobes with adjacent   patchy airspace opacity, concerning for pneumonia.  *  Mediastinal and axillary lymphadenopathy, without significant change   from prior exam.     < end of copied text >        Radiology: all available radiological tests reviewed    Advanced directives addressed: full resuscitation

## 2019-01-01 NOTE — PROGRESS NOTE ADULT - ASSESSMENT
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  1. Patient admitted with pneumonia which will treat as health care associated pneumonia given recent hospitalizations; also noted with leukocytosis most likely secondary to infection  - follow up cultures   - serial cbc and monitor temperature   - iv hydration and supportive care   - reviewed prior medical records to evaluate for resistant or atypical pathogens   - oxygen and nebs as needed   - day #6 cefepime and doxycycline  - will give vancomycin one gram times one today  - tolerating antibiotics without rashes or side effects   - continue viread for hepatitis B  - palliative care evaluation in progress  2. 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

## 2019-01-01 NOTE — PROGRESS NOTE ADULT - ASSESSMENT
78M w/ PMH as above admitted with worsening weakness.    #HCAP  - Labile leukocytosis  - Rhino/Enterovirus +   - Continue Cefepime 1G BID and Doxycycline 100mg BID (day 6 of abx)  - S/p1 dose of Vancomycin 1g   - Will f/u cultures   - Will f/u serial cbc   - Continue to monitor temperature   - Will give 500ml bolus NS.  - supportive care   - ID recommendations appreciated    #Hyponatremia 2/2 dehydration  - not improving  - 500ml bolus NS today.  - will continue to monitor    #Hyperkalemia  - Resolved  - Continue to monitor    #Severe Protein Malnutrition  - Continue fluid restriction.  - Glucerna supplements TID  - Zinc supplements BID x 10 days  - Vitamin C supplements daily  - Daily weights  - Nutrition recommendations appreciated    #CLL, B cell lymphoma  - S/p IVIG (12/30)  - Heme-onc recommendations appreciated- at baseline; can resume therapy on discharge as outpatient  - Palliative Care recommendations appreciated    #Lower Back Pain 2/2 Compression fractures  - Tylenol PRN mild pain  - Oxycodone 5mg Q4 PRN moderate pain  - Oxycodone 10mg q4 PRN severe pain  - Palliative Care recommendations appreciated    #Fungal infection of skin  - wide spread infection possibly tinea corporis  - Continue Fluconazole 150mg PO daily (4/7)  - Continue Nystatin cream BID on rash in groin  - Infectious disease recommendations appreciated    #CHRISTINA  - likely pre-renal  - Creatinine plateaued  - will continue to monitor with daily BMP  - 500ml NS bolus   - encourage PO hydration  - Will continue to monitor.    #Chronic dCHF  - Lasix held  - Continue aldactone 25 qd  - Strict I&O    #COPD  - Currently no active disease  - Continue supplemental O2 and albuterol PRN  - Continue montelukast    #DM2  - continue lantus, humalog and ISS  - Continue gabapentin for neuropathic pain    #Hepatitis B  Continue tenofovir home med    #HLD  Continue statin    #Atrial fibrillation  - continue lopressor 25 BID w/ holding parameters  - Cont amiodarone  - INR supratherapeutic.   - Will Hold warfarin today.  - Goal INR 2-3  - Daily INR    #Palliative Care  - Daughter Maira named HCP  - [facility] discussion 1/2    #DVT prophylaxis  - on Coumadin  - Cont SCDs

## 2019-01-01 NOTE — PROGRESS NOTE ADULT - SUBJECTIVE AND OBJECTIVE BOX
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 Wife also reports he had a low grade fever ~100 F a few days ago. He has a productive cough w/ yellow sputum. He is unable to ambulate due to back pain from compression fractures, and even though the pain is helped w/ medications he still feels very weak with associated decrease in appetite and PO intake. No chills, N/V, constipation, diarrhea, dysuria. + chest pain associated w/ cough. He has worsening cough when he lays flat so sleeps sitting up.    1/1/19: Pt was seen and examined. Pt reports he feels weak. Pt coughed up his breakfast today and reports he been feeling weak and tired since then. Earlier this morning prior to episode he was feeling better he reported. Reports no pain. Denies weakness, fever, chills, nausea. vomiting. Skin is improving.     REVIEW OF SYSTEMS:  CONSTITUTIONAL: + weakness. No fevers or chills  EYES/ENT: No visual changes;  No vertigo or throat pain   NECK: No pain or stiffness  RESPIRATORY: No cough, wheezing, hemoptysis; No shortness of breath  CARDIOVASCULAR: No chest pain or palpitations  GASTROINTESTINAL: No abdominal or epigastric pain. No nausea, vomiting, or hematemesis; No diarrhea or constipation. No melena or hematochezia.  GENITOURINARY: No dysuria, frequency or hematuria  NEUROLOGICAL: No numbness or weakness  SKIN: No itching, burning, rashes, or lesions   All other review of systems is negative unless indicated above    Vital Signs Last 24 Hrs  T(C): 37.1 (01 Jan 2019 13:16), Max: 37.1 (01 Jan 2019 13:16)  T(F): 98.7 (01 Jan 2019 13:16), Max: 98.7 (01 Jan 2019 13:16)  HR: 86 (01 Jan 2019 13:16) (66 - 92)  BP: 82/55 (01 Jan 2019 13:16) (82/55 - 109/70)  RR: 17 (01 Jan 2019 13:16) (17 - 18)  SpO2: 95% (01 Jan 2019 13:16) (94% - 96%)    I&O's Summary    31 Dec 2018 07:01  -  01 Jan 2019 07:00  --------------------------------------------------------  IN: 0 mL / OUT: 475 mL / NET: -475 mL    01 Jan 2019 07:01  -  01 Jan 2019 14:57  --------------------------------------------------------  IN: 120 mL / OUT: 0 mL / NET: 120 mL        CAPILLARY BLOOD GLUCOSE      POCT Blood Glucose.: 194 mg/dL (01 Jan 2019 12:37)  POCT Blood Glucose.: 169 mg/dL (01 Jan 2019 07:47)  POCT Blood Glucose.: 265 mg/dL (31 Dec 2018 21:02)  POCT Blood Glucose.: 226 mg/dL (31 Dec 2018 17:41)      PHYSICAL EXAM:  Constitutional: NAD, awake and alert, well-developed  HEENT: PERR, EOMI, Normal Hearing, MMM  Neck: Soft and supple, No LAD, No JVD  Respiratory: Breath sounds are clear bilaterally, No wheezing, rales or rhonchi  Cardiovascular: S1 and S2, regular rate and rhythm, no Murmurs, gallops or rubs  Gastrointestinal: Bowel Sounds present, soft, nontender, nondistended, no guarding, no rebound  Extremities: No peripheral edema  Vascular: 2+ peripheral pulses  Neurological: A/O x 3, no focal deficits  Musculoskeletal: Weakness in lower extremities  Skin: Ringworm rashes at back skin.     MEDICATIONS:  MEDICATIONS  (STANDING):  amiodarone    Tablet 100 milliGRAM(s) Oral daily  ascorbic acid 500 milliGRAM(s) Oral two times a day  atorvastatin 10 milliGRAM(s) Oral at bedtime  cefepime  Injectable. 1000 milliGRAM(s) IV Push every 12 hours  dextrose 5%. 1000 milliLiter(s) (50 mL/Hr) IV Continuous <Continuous>  dextrose 50% Injectable 12.5 Gram(s) IV Push once  dextrose 50% Injectable 25 Gram(s) IV Push once  dextrose 50% Injectable 25 Gram(s) IV Push once  doxycycline hyclate Capsule 100 milliGRAM(s) Oral every 12 hours  fluconAZOLE   Tablet 150 milliGRAM(s) Oral daily  gabapentin 300 milliGRAM(s) Oral three times a day  insulin glargine Injectable (LANTUS) 5 Unit(s) SubCutaneous at bedtime  insulin lispro (HumaLOG) corrective regimen sliding scale   SubCutaneous three times a day before meals  metoprolol tartrate 25 milliGRAM(s) Oral two times a day  montelukast 10 milliGRAM(s) Oral at bedtime  multivitamin 1 Tablet(s) Oral daily  nystatin Cream 1 Application(s) Topical two times a day  sodium chloride 0.9% Bolus 500 milliLiter(s) IV Bolus once  sodium chloride 0.9%. 1000 milliLiter(s) (50 mL/Hr) IV Continuous <Continuous>  spironolactone 25 milliGRAM(s) Oral daily  tenofovir disoproxil fumarate (VIREAD) 300 milliGRAM(s) Oral daily  zinc sulfate 220 milliGRAM(s) Oral two times a day      LABS: All Labs Reviewed:                        12.1   24.08 )-----------( 142      ( 01 Jan 2019 06:26 )             37.3     01-01    128<L>  |  98  |  44<H>  ----------------------------<  168<H>  4.5   |  21<L>  |  1.47<H>    Ca    8.1<L>      01 Jan 2019 06:26      PT/INR - ( 01 Jan 2019 06:26 )   PT: 40.8 sec;   INR: 3.53 ratio

## 2019-01-01 NOTE — PROGRESS NOTE ADULT - ATTENDING COMMENTS
Patient seen and examined with Family Medicine Residents Kristina Reynolds and Lay Roth on the Family Medicine Teaching Service.  Agree with history, physical, labs and plan which were reviewed in detail after a face to face encounter with the patient.
Patient seen and examined with Family Medicine Residents Kristina Reynolds, Tejal Wayne, Mona Harley and Lay Roth on the Family Medicine Teaching Service.  Agree with history, physical, labs and plan which were reviewed in detail after a face to face encounter with the patient.
Patient seen and examined with Family Medicine Residents Kristina Reynolds and Lay Roth on the Family Medicine Teaching Service.  Agree with history, physical, labs and plan which were reviewed in detail after a face to face encounter with the patient.
Patient seen and examined with Family Medicine Residents Kristina Alfaro and Lisbeth Mccollum on the Family Medicine Teaching Service.  Agree with history, physical, labs and plan which were reviewed in detail after a face to face encounter with the patient.
Patient seen and examined with Family Medicine Residents Kristina Denise,  Eloise Alfaro, Zaida Barnett and Lisbeth Mccollum on the Family Medicine Teaching Service.  Agree with history, physical, labs and plan which were reviewed in detail after a face to face encounter with the patient.

## 2019-01-02 NOTE — PROGRESS NOTE ADULT - ASSESSMENT
78M w/ PMH as above admitted with worsening weakness.    #HCAP  - Leukocytosis is improving  - Rhino/Enterovirus +   - Antibiotics switched to ceftin 250 BID for additional 3 days. (1/3)  - Will f/u cultures   - Will f/u serial cbc   - Continue to monitor temperature   - supportive care   - ID recommendations appreciated  - Anticipating D/C tomorrow with [facility]    #Hyponatremia 2/2 dehydration  - improving  - will continue to monitor    #Hyperkalemia  - Resolved  - Continue to monitor    #Severe Protein Malnutrition  - Continue fluid restriction.  - Glucerna supplements TID  - Zinc supplements BID x 10 days  - Vitamin C supplements daily  - Daily weights  - Nutrition recommendations appreciated    #CLL, B cell lymphoma  - S/p IVIG (12/30)  - Heme-onc recommendations appreciated- at baseline; can resume therapy on discharge as outpatient  - Palliative Care recommendations appreciated    #Lower Back Pain 2/2 Compression fractures  - Tylenol PRN mild pain  - Oxycodone 5mg Q4 PRN moderate pain  - Oxycodone 10mg q4 PRN severe pain  - Palliative Care recommendations appreciated    #Fungal infection of skin  - wide spread infection possibly tinea corporis  - Continue Fluconazole 150mg PO daily (5/7)  - Continue Nystatin cream BID on rash in groin  - Infectious disease recommendations appreciated    #CHRISTINA  - likely pre-renal  - Creatinine improved to WNL/  - will continue to monitor with daily BMP  - encourage PO hydration  - Will continue to monitor.    #Chronic dCHF  - Lasix held  - Continue aldactone 25 qd  - Strict I&O    #COPD  - Currently no active disease  - Continue supplemental O2 and albuterol PRN  - Continue montelukast    #DM2  - continue lantus, humalog and ISS  - Continue gabapentin for neuropathic pain    #Hepatitis B  Continue tenofovir home med    #HLD  Continue statin    #Atrial fibrillation  - continue lopressor 25 BID w/ holding parameters  - Cont amiodarone  - INR supratherapeutic.   - Continue to Hold warfarin today.  - Goal INR 2-3  - Daily INR    #Palliative Care  - Daughter Maira named HCP  - GOC discussion  done today (1/2)  - Pt currently full resuscitation  - Awaiting D/C tomorrow 1/2.     #DVT prophylaxis  - Cont SCDs

## 2019-01-02 NOTE — PROGRESS NOTE ADULT - SUBJECTIVE AND OBJECTIVE BOX
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 Wife also reports he had a low grade fever ~100 F a few days ago. He has a productive cough w/ yellow sputum. He is unable to ambulate due to back pain from compression fractures, and even though the pain is helped w/ medications he still feels very weak with associated decrease in appetite and PO intake. Palliative Medicine Consult to establish GOC.   18 Seen and examined at bedside with wife and daughter present. C/O persistent back/right hip pain and mild dyspnea at rest.    18 Seen and examined at bedside with wife and daughter present. OOB/chair. Denies pain or dyspnea  18 Seen and examined at bedside with family present. Pt appears comfortable. Denies pain or dyspnea     PAIN: (X )Yes   ( )No  Level: mod/improved  Location: lower back/hip  Intensity:   3 /10  Quality: constant  Aggravating Factors: standing/ambulating  Alleviating Factors: Tylenol   Radiation: right hip  Duration/Timing:  Impact on ADLs: severe    DYSPNEA: (X ) Yes  ( ) No  Level: mild at rest/improved    PAST MEDICAL & SURGICAL HISTORY:  Hyperlipidemia  Hypogammaglobulinemia: received IVIG  Hepatitis B virus infection, unspecified chronicity: retrovirals  Chronic diastolic congestive heart failure  Essential hypertension  DM type 2 (diabetes mellitus, type 2)  B-cell lymphoma, unspecified B-cell lymphoma type, unspecified body region: CLL stage 4/SLL; met NHL  Atrial fibrillation, unspecified type  COPD (chronic obstructive pulmonary disease)  History of esophageal dilatation  S/P cholecystectomy  H/O prostatectomy      SOCIAL HX:  Lives with wife  Hx opiate tolerance ( )YES  (X )NO    Baseline ADLs  (Prior to Admission)  ( ) Independent   (X )Dependent    FAMILY HISTORY:  Family history of liver cancer (Father): father  85 liver CA  Family history of coronary arteriosclerosis (Mother): Mom  of MI age 68      Review of Systems:    Anxiety-mild  Depression-situational  Physical Discomfort-mod  Dyspnea-mild  Constipation-denies  Diarrhea-denies  Anorexia-mod-severe  Weight Loss-   Cough-mod  Secretions-mod  Fatigue-severe  Weakness-severe      All other systems reviewed and negative        PHYSICAL EXAM:  ICU Vital Signs Last 24 Hrs  T(C): 36.9 (2019 05:37), Max: 37.1 (2019 13:16)  T(F): 98.4 (2019 05:37), Max: 98.7 (2019 13:16)  HR: 94 (2019 05:37) (86 - 94)  BP: 84/61 (2019 05:37) (82/55 - 94/64)  BP(mean): --  ABP: --  ABP(mean): --  RR: 18 (2019 05:37) (17 - 18)  SpO2: 97% (2019 05:37) (94% - 97%)    Daily Weight in k (28 Dec 2018 07:57)    PPSV2: 20-30 %      General: Elderly male in chair in NAD  Mental Status: alert and oriented X3  HEENT: nasal O2 intact  Lungs: clear to auscultation  Cardiac: S1S2+  GI: abd soft NT ND + BS  : voids  Ext: Right YASHIRA externally rotated/no edema BLE/BUE=strength  Neuro: no focal def      LABS:                              13.2   21.01 )-----------( 122      ( 2019 06:30 )             40.5           01-02    131<L>  |  101  |  37<H>  ----------------------------<  153<H>  4.7   |  20<L>  |  1.01    Ca    7.7<L>      2019 06:30      PT/INR - ( 28 Dec 2018 05:53 )   PT: 52.6 sec;   INR: 4.52 ratio         PTT - ( 26 Dec 2018 18:55 )  PTT:56.8 sec  Albumin:     Allergies    No Known Allergies    Intolerances

## 2019-01-02 NOTE — PROGRESS NOTE ADULT - ASSESSMENT
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 Wife also reports he had a low grade fever ~100 F a few days ago. He has a productive cough w/ yellow sputum. He is unable to ambulate due to back pain from compression fractures, and even though the pain is helped w/ medications he still feels very weak with associated decrease in appetite and PO intake. Palliative Medicine Consult to establish GOC.   12/28/18 Seen and examined at bedside with wife and daughter present. C/O persistent back/right hip pain and mild dyspnea at rest.      Assessment and Plan:    1) HCAP  -leukocytosis persists  -ID eval noted  -Abx as per ID  -CT chest= Small bilateral pleural effusions with adjacent passive atelectasis.  Peribronchial thickening in the right lower middle lobes with adjacent   patchy airspace opacity, concerning for pneumonia.  Mediastinal and axillary lymphadenopathy, without significant change   from prior exam.   Splenomegaly  -Entero/Rhinovirus +  -Rapid RVP +      2) Dyspnea  -R/T Pneumonia  -Supplemental O2 PRN  -Currently on nasal canula   -Supportive care    3) Pain  -R/T Compression fx T11  -Cont Tylenol as ordered PRN mild pain  -Cont Oxycodone 5 mg PO Q4H PRN mod pain  -Cont Oxycodone 10 mg PO Q4H PRN severe pain  -Xray right hip =no fx    4) Weakness  -R/T Pain  -Pneumonia  -PT eval  -Declines NIRMAL    5) CLL/ BCell lymphoma  -Currently on chemo/IVIG  -Onc eval noted    6) Advanced Directives  -Pt with capacity ( Croatian primary language)  -Daughter Maira named HCP  -GOC discussion 1/2/18 1100  -Transition home with [facility] care  -Discussed resuscitation/no limits at this time

## 2019-01-02 NOTE — PROGRESS NOTE ADULT - SUBJECTIVE AND OBJECTIVE BOX
Patient is a 78y old  Male who presents with a chief complaint of Weakness (27 Dec 2018 08:56)    Date of service: 01-02-19 @ 09:38      Patient weak appearing; family feeding him breakfast as he lies in bed      ROS unable to obtain secondary to patient medical condition     MEDICATIONS  (STANDING):  amiodarone    Tablet 100 milliGRAM(s) Oral daily  ascorbic acid 500 milliGRAM(s) Oral two times a day  atorvastatin 10 milliGRAM(s) Oral at bedtime  cefepime  Injectable. 1000 milliGRAM(s) IV Push every 12 hours  dextrose 5%. 1000 milliLiter(s) (50 mL/Hr) IV Continuous <Continuous>  dextrose 50% Injectable 12.5 Gram(s) IV Push once  dextrose 50% Injectable 25 Gram(s) IV Push once  dextrose 50% Injectable 25 Gram(s) IV Push once  doxycycline hyclate Capsule 100 milliGRAM(s) Oral every 12 hours  fluconAZOLE   Tablet 150 milliGRAM(s) Oral daily  gabapentin 300 milliGRAM(s) Oral three times a day  insulin glargine Injectable (LANTUS) 5 Unit(s) SubCutaneous at bedtime  insulin lispro (HumaLOG) corrective regimen sliding scale   SubCutaneous three times a day before meals  metoprolol tartrate 25 milliGRAM(s) Oral two times a day  montelukast 10 milliGRAM(s) Oral at bedtime  multivitamin 1 Tablet(s) Oral daily  nystatin Cream 1 Application(s) Topical two times a day  spironolactone 25 milliGRAM(s) Oral daily  tenofovir disoproxil fumarate (VIREAD) 300 milliGRAM(s) Oral daily  zinc sulfate 220 milliGRAM(s) Oral two times a day    MEDICATIONS  (PRN):  acetaminophen   Tablet .. 650 milliGRAM(s) Oral every 6 hours PRN Temp greater or equal to 38C (100.4F), Mild Pain (1 - 3)  ALBUTerol    90 MICROgram(s) HFA Inhaler 1 Puff(s) Inhalation every 4 hours PRN Shortness of Breath and/or Wheezing  dextrose 40% Gel 15 Gram(s) Oral once PRN Blood Glucose LESS THAN 70 milliGRAM(s)/deciliter  diphenhydrAMINE   Injectable 50 milliGRAM(s) IntraMuscular every 6 hours PRN Rash and/or Itching  glucagon  Injectable 1 milliGRAM(s) IntraMuscular once PRN Glucose LESS THAN 70 milligrams/deciliter  oxyCODONE    IR 5 milliGRAM(s) Oral every 4 hours PRN Moderate Pain (4 - 6)  oxyCODONE    IR 10 milliGRAM(s) Oral every 4 hours PRN Severe Pain (7 - 10)  senna 2 Tablet(s) Oral at bedtime PRN Constipation      Vital Signs Last 24 Hrs  T(C): 36.9 (02 Jan 2019 05:37), Max: 37.1 (01 Jan 2019 13:16)  T(F): 98.4 (02 Jan 2019 05:37), Max: 98.7 (01 Jan 2019 13:16)  HR: 94 (02 Jan 2019 05:37) (86 - 94)  BP: 84/61 (02 Jan 2019 05:37) (82/55 - 94/64)  BP(mean): --  RR: 18 (02 Jan 2019 05:37) (17 - 18)  SpO2: 97% (02 Jan 2019 05:37) (94% - 97%)    Physical Exam:          PE:    Constitutional: frail looking  HEENT: NC/AT, EOMI, PERRLA, conjunctivae clear; ears and nose atraumatic; pharynx clear  Neck: supple; thyroid not palpable  Back: no tenderness  Respiratory: respiratory effort normal; diminished breath sounds at bases  Cardiovascular: S1S2 regular, no murmurs  Abdomen: soft, not tender, not distended, positive BS; no liver or spleen organomegaly  Genitourinary: no suprapubic tenderness  Musculoskeletal: no muscle tenderness, no joint swelling or tenderness  Neurological/ Psychiatric: AxOx3, judgement and insight normal;  moving all extremities  Skin: no rashes; no palpable lesions    Labs: all available labs reviewed                        Labs:           Labs:                        13.2   21.01 )-----------( 122      ( 02 Jan 2019 06:30 )             40.5     01-02    131<L>  |  101  |  37<H>  ----------------------------<  153<H>  4.7   |  20<L>  |  1.01    Ca    7.7<L>      02 Jan 2019 06:30             Cultures:       Culture - Urine (collected 12-26-18 @ 23:06)  Source: .Urine None  Final Report (12-28-18 @ 10:54):    No growth    Culture - Blood (collected 12-26-18 @ 19:38)  Source: .Blood Blood-Peripheral  Final Report (01-01-19 @ 01:02):    No growth at 5 days.    Culture - Blood (collected 12-26-18 @ 17:36)  Source: .Blood Blood-Peripheral  Final Report (01-01-19 @ 01:02):    No growth at 5 days.                < from: CT Chest No Cont (12.27.18 @ 11:26) >    EXAM:  CT CHEST                            PROCEDURE DATE:  12/27/2018          INTERPRETATION:  CLINICAL INFORMATION: 78-year-old male with leukocytosis   and yellow sputum. History of B-cell lymphoma.    COMPARISON: Chest CT scan 11/2/2018 and 3/28/2016    PROCEDURE:   CT of the Chest was performed without intravenous contrast.  Sagittal and coronal reformats were performed.      FINDINGS:    CHEST:     LUNGS AND LARGE AIRWAYS: Patent central airways.  The left greater than   right pleural effusion with adjacent passive atelectasis. Mild   peribronchial thickening is present in the partially collapsed right   lower lobe with additional findings of mild patchy airspace opacity,   raising a question of underlying pneumonia. Similar findingsof   peribronchial thickening and patchy airspace opacity are present in the   right middle lobe.    A right perihilar pulmonary nodule measures 7 mm, unchanged from prior   examination 11/2/2018 but new compared to prior examination 1/4/2018.   (3:60)  PLEURA: As above  VESSELS: Atherosclerotic calcifications.  HEART: Heart size is normal. No pericardial effusion.  MEDIASTINUM AND JEFFREY: Nonspecific mediastinal adenopathy, without   significant change from prior exam.  CHEST WALL AND LOWER NECK: Bilateral enlarged axillary lymph nodes. Small   left lower pole thyroid nodule.  VISUALIZED UPPER ABDOMEN: The spleen is enlarged, measuring 14.1 cm in   craniocaudal diameter.  BONES: The bones are osteopenic. There are old bilateral rib fractures.    IMPRESSION:   *  Small bilateral pleural effusions with adjacent passive atelectasis.  *  Peribronchial thickening in the right lower middle lobes with adjacent   patchy airspace opacity, concerning for pneumonia.  *  Mediastinal and axillary lymphadenopathy, without significant change   from prior exam.     < end of copied text >        Radiology: all available radiological tests reviewed    Advanced directives addressed: full resuscitation

## 2019-01-02 NOTE — PROGRESS NOTE ADULT - ASSESSMENT
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  1. Patient admitted with pneumonia which will treat as health care associated pneumonia given recent hospitalizations; also noted with leukocytosis most likely secondary to infection  - follow up cultures   - serial cbc and monitor temperature   - iv hydration and supportive care   - reviewed prior medical records to evaluate for resistant or atypical pathogens   - oxygen and nebs as needed   - day #7 cefepime and doxycycline  - will change to ceftin 250 mg po q 12 hours for 3 more days  - tolerating antibiotics without rashes or side effects   - continue viread for hepatitis B  - palliative care evaluation in progress  2. 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

## 2019-01-02 NOTE — PROGRESS NOTE ADULT - SUBJECTIVE AND OBJECTIVE BOX
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     PHYSICAL EXAM:  Vital Signs Last 24 Hrs  T(C): 37 (02 Jan 2019 13:49), Max: 37 (02 Jan 2019 13:49)  T(F): 98.6 (02 Jan 2019 13:49), Max: 98.6 (02 Jan 2019 13:49)  HR: 100 (02 Jan 2019 13:49) (88 - 100)  BP: 95/68 (02 Jan 2019 13:49) (84/61 - 95/68)  BP(mean): --  RR: 18 (02 Jan 2019 13:49) (18 - 18)  SpO2: 99% (02 Jan 2019 13:49) (94% - 99%)  general- comfortable, chronically ill appearance   -rs- aeeb, crackles present   -cvs-s1s2 normal   -p/a- soft,bs+       A/P    # HCAP- ct abx, supportive care     #discharge plan

## 2019-01-02 NOTE — GOALS OF CARE CONVERSATION - PERSONAL ADVANCE DIRECTIVE - CONVERSATION DETAILS
The role of Palliative Medicine was reviewed with the pt's wife and daughter as the pt defers to them. We discussed home care options as he does not want to got to [facility]. He is very weak and needs assistance transferring to chair or wheelchair. They are hopeful with home PT he will optimize strength and return to oncology to cont treatment. We discussed the MOLST and resuscitation and they would like to have a conversation with the pt prior to placing any limits on care. 45 minutes spent on advanced care planning discussion. Referral for [facility] home care with [facility] to be placed. The role of Palliative Medicine was reviewed with the pt's wife and daughter as the pt defers to them. We discussed home care options as he does not want to got to [facility]. He is very weak and needs assistance transferring to chair or wheelchair. They are hopeful with home PT he will optimize strength and return to oncology to cont treatment. We discussed the MOLST and resuscitation and they would like to have a conversation with the pt prior to placing any limits on care. 45 minutes spent on advanced care planning discussion. Referral for [facility] home care with [facility] to be placed.  ID # [id number]

## 2019-01-02 NOTE — CHART NOTE - NSCHARTNOTEFT_GEN_A_CORE
HPI:  78M w/ PMH of chronic PNA, COPD on home O2 2.5L, afib on coumadin, chronic diastolic CHF, DM2 on insulin, HTN, Hep B infection, 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  (27 Dec 2018 08:56)      PERTINENT PMH REVIEWED:  [ X ] YES [ ] NO           Primary Contact:  Pts family reports daughter Maira Cristobal [phone] is HCP, we need copy     Mental Status: [ X ] Alert  [ X ] Oriented [  ] Confused [  ] Lethargic [  ]  Concerns of Depression [  ]- none identified   Anxiety [   ]- none identified   Baseline ADLs (prior to admission):  Independent [ ] moderately [ ] fully   Dependent   [ X ] moderately [ ]fully    Family Meeting attendees: Pt. deferred to his wife and daughter and did not want to be apart of the meeting.  phone ID # [phone] (pt. is Malaysian speaking), pts wife, daughter, Therese Norwood NP, Don Abbasi Palliative [facility]    Anticipated Grief: Patient [  ] Family [ X ]    Goals of Care: Continue with treatment     ADVANCE DIRECTIVES:  Pt. is full resuscitation. Pts family will speak more with pt. about this     Anticipated D/C Plan: home with [facility]                    Summary:    Team met with pts family to discuss goals of care, assist with planning and provide supportive counseling. Pt. deferred to his family. Pt. resides home with his wife. His Oncologist is Dr. Houser.    Pts current medical condition and goals of care discussed. Pts family was clear that pt. wishes to continue with treatment. They recognize that pt. is weak and needs PT however, pt. does not want to go to [facility] they plan for pt. to return home. We discussed different levels of home care including Palliative and hospice services. We explained pt. would not be eligible for hospice at this time as he is still pursuing aggressive treatment, however, we reviewed this services for the future if needed. Pts family would like a referral place for [facility]. They are working on have Medicaid MLTC plan in place for aides at home however, they report it is taking some time.     Advanced directives discussed. Pts family reports pts daughter is HCP, which they completed at Oncologists office. We need copy. We discussed pts wishes regarding resuscitation and intubation. Pts family reports pt. wouldn't want "tubes" but would agree to being resuscitated. We explained that intubation is apart of the resuscitation process. Pts family expressed understanding and will speak with pt. again about his wishes. At this time pt. is full resuscitation.     Plan at this time is for pt. to follow up with Dr. Houser to pursue further treatment and return home with [facility]. Emotional support provided. Our team will continue to follow HPI:  78M w/ PMH of chronic PNA, COPD on home O2 2.5L, afib on coumadin, chronic diastolic CHF, DM2 on insulin, HTN, Hep B infection, 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  (27 Dec 2018 08:56)      PERTINENT PMH REVIEWED:  [ X ] YES [ ] NO           Primary Contact:  Pts family reports daughter Maira Cristobal [phone] is HCP, we need copy     Mental Status: [ X ] Alert  [ X ] Oriented [  ] Confused [  ] Lethargic [  ]  Concerns of Depression [  ]- none identified   Anxiety [   ]- none identified   Baseline ADLs (prior to admission):  Independent [ ] moderately [ ] fully   Dependent   [ X ] moderately [ ]fully    Family Meeting attendees: Pt. deferred to his wife and daughter and did not want to be apart of the meeting.  phone ID # [phone] (pt. is Icelandic speaking), pts wife, daughter, Therese Norwood NP, Don [facility]asi [facility]    Anticipated Grief: Patient [  ] Family [ X ]    Goals of Care: Continue with treatment     ADVANCE DIRECTIVES:  Pt. is full resuscitation. Pts family will speak more with pt. about this     Anticipated D/C Plan: home with [facility]                    Summary:    Team met with pts family to discuss goals of care, assist with planning and provide supportive counseling. Pt. deferred to his family. Pt. resides home with his wife. His Oncologist is Dr. Houser.    Pts current medical condition and goals of care discussed. Pts family was clear that pt. wishes to continue with treatment. They recognize that pt. is weak and needs PT however, pt. does not want to go to [facility] they plan for pt. to return home. We discussed different levels of home care including Palliative and hospice services. We explained pt. would not be eligible for hospice at this time as he is still pursuing aggressive treatment, however, we reviewed this services for the future if needed. Pts family would like a referral place for [facility]. They are working on have Medicaid MLTC plan in place for aides at home however, they report it is taking some time.     Advanced directives discussed. Pts family reports pts daughter is HCP, which they completed at Oncologists office. We need copy. We discussed pts wishes regarding resuscitation and intubation. Pts family reports pt. wouldn't want "tubes" but would agree to being resuscitated. We explained that intubation is apart of the resuscitation process. Pts family expressed understanding and will speak with pt. again about his wishes. At this time pt. is full resuscitation.     Plan at this time is for pt. to follow up with Dr. Houser to pursue further treatment and return home with [facility]. Emotional support provided. Our team will continue to follow

## 2019-01-02 NOTE — PROGRESS NOTE ADULT - SUBJECTIVE AND OBJECTIVE BOX
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 Wife also reports he had a low grade fever ~100 F a few days ago. He has a productive cough w/ yellow sputum. He is unable to ambulate due to back pain from compression fractures, and even though the pain is helped w/ medications he still feels very weak with associated decrease in appetite and PO intake. No chills, N/V, constipation, diarrhea, dysuria. + chest pain associated w/ cough. He has worsening cough when he lays flat so sleeps sitting up.    1/2/19: Pt was seen and examined. Pt reports he feel weak. Pt was tolerating diet. He denies pain, SOB, CP.     REVIEW OF SYSTEMS:  CONSTITUTIONAL: No fevers or chills. + weakness  EYES/ENT: No visual changes;  No vertigo or throat pain   NECK: No pain or stiffness  RESPIRATORY: No cough, wheezing, hemoptysis; No shortness of breath  CARDIOVASCULAR: No chest pain or palpitations  GASTROINTESTINAL: No abdominal or epigastric pain. No nausea, vomiting, or hematemesis; No diarrhea or constipation. No melena or hematochezia.  GENITOURINARY: No dysuria, frequency or hematuria  NEUROLOGICAL: No numbness or weakness  SKIN: No itching, burning, rashes, or lesions   All other review of systems is negative unless indicated above    Vital Signs Last 24 Hrs  T(C): 37 (02 Jan 2019 13:49), Max: 37 (02 Jan 2019 13:49)  T(F): 98.6 (02 Jan 2019 13:49), Max: 98.6 (02 Jan 2019 13:49)  HR: 100 (02 Jan 2019 13:49) (88 - 100)  BP: 95/68 (02 Jan 2019 13:49) (84/61 - 95/68)  RR: 18 (02 Jan 2019 13:49) (18 - 18)  SpO2: 99% (02 Jan 2019 13:49) (94% - 99%)    I&O's Summary    01 Jan 2019 07:01 - 02 Jan 2019 07:00  --------------------------------------------------------  IN: 120 mL / OUT: 350 mL / NET: -230 mL        CAPILLARY BLOOD GLUCOSE      POCT Blood Glucose.: 177 mg/dL (02 Jan 2019 11:56)  POCT Blood Glucose.: 230 mg/dL (01 Jan 2019 21:25)  POCT Blood Glucose.: 242 mg/dL (01 Jan 2019 18:27)      PHYSICAL EXAM:  Constitutional: NAD, awake and alert, well-developed  HEENT: PERR, EOMI, Normal Hearing, MMM  Neck: Soft and supple, No LAD, No JVD  Respiratory: Mild crackles in lung bases.   Cardiovascular: S1 and S2, regular rate and rhythm, no Murmurs, gallops or rubs  Gastrointestinal: Bowel Sounds present, soft, nontender, nondistended, no guarding, no rebound  Extremities: No peripheral edema  Vascular: 2+ peripheral pulses  Neurological: A/O x 3, no focal deficits  Musculoskeletal: Limited by pain in LE.  Skin: Ringworm rashes in back    MEDICATIONS:  MEDICATIONS  (STANDING):  amiodarone    Tablet 100 milliGRAM(s) Oral daily  ascorbic acid 500 milliGRAM(s) Oral two times a day  atorvastatin 10 milliGRAM(s) Oral at bedtime  cefuroxime   Tablet 250 milliGRAM(s) Oral every 12 hours  dextrose 5%. 1000 milliLiter(s) (50 mL/Hr) IV Continuous <Continuous>  dextrose 50% Injectable 12.5 Gram(s) IV Push once  dextrose 50% Injectable 25 Gram(s) IV Push once  dextrose 50% Injectable 25 Gram(s) IV Push once  fluconAZOLE   Tablet 150 milliGRAM(s) Oral daily  gabapentin 300 milliGRAM(s) Oral three times a day  insulin glargine Injectable (LANTUS) 5 Unit(s) SubCutaneous at bedtime  insulin lispro (HumaLOG) corrective regimen sliding scale   SubCutaneous three times a day before meals  metoprolol tartrate 25 milliGRAM(s) Oral two times a day  montelukast 10 milliGRAM(s) Oral at bedtime  multivitamin 1 Tablet(s) Oral daily  nystatin Cream 1 Application(s) Topical two times a day  spironolactone 25 milliGRAM(s) Oral daily  tenofovir disoproxil fumarate (VIREAD) 300 milliGRAM(s) Oral daily  zinc sulfate 220 milliGRAM(s) Oral two times a day      LABS: All Labs Reviewed:                        13.2   21.01 )-----------( 122      ( 02 Jan 2019 06:30 )             40.5     01-02    131<L>  |  101  |  37<H>  ----------------------------<  153<H>  4.7   |  20<L>  |  1.01    Ca    7.7<L>      02 Jan 2019 06:30      PT/INR - ( 02 Jan 2019 06:30 )   PT: 41.0 sec;   INR: 3.55 ratio

## 2019-01-03 NOTE — PROGRESS NOTE ADULT - SUBJECTIVE AND OBJECTIVE BOX
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     PHYSICAL EXAM:    general- comfortable, chronically ill appearance   -rs- aeeb, crackles present   -cvs-s1s2 normal   -p/a- soft,bs+       A/P    #d/c today, time spent 70 minutes

## 2019-01-03 NOTE — DISCHARGE NOTE ADULT - ADDITIONAL INSTRUCTIONS
#Diabetes Mellitus type 2  You should continue your home medications and continue gabapentin for neuropathic pain    #Hepatitis B  You should continue tenofovir     #Hyperlipidemia  You should continue taking statin    #Atrial fibrillation  Your INR is therapeutic. Your warfarin has been stopped. You should follow up with your cardiologist and Primary doctor next week regarding restarting your warfarin.   Your Metoprolol was also stopped because of your low blood pressure. Follow up with your PCP and cardiologist regarding restarting it.  You should continue taking amiodarone #Diabetes Mellitus type 2  You should continue your home medications and continue gabapentin for neuropathic pain    #Hepatitis B  You should continue tenofovir     #Hyperlipidemia  You should continue taking statin    #Atrial fibrillation  Your INR is therapeutic. You should not take your warfarin today. You should resume taking your warfarin tomorrow if your INR is between 2-3. You should follow up with your cardiologist and Primary doctor next week regarding your warfarin  Your Metoprolol was also stopped because of your low blood pressure. Follow up with your PCP and cardiologist regarding restarting it.  You should continue taking amiodarone

## 2019-01-03 NOTE — DISCHARGE NOTE ADULT - PATIENT PORTAL LINK FT
You can access the [facility] Patient Portal, offered by [facility], by registering with the following website: [email or web address]

## 2019-01-03 NOTE — DISCHARGE NOTE ADULT - SECONDARY DIAGNOSIS.
CHRISTINA (acute kidney injury) Chronic diastolic congestive heart failure COPD (chronic obstructive pulmonary disease) Malnutrition CLL (chronic lymphocytic leukemia) Fungal infection

## 2019-01-03 NOTE — DISCHARGE NOTE ADULT - CARE PROVIDERS DIRECT ADDRESSES
[email or web address],DirectAddress_Unknown,DirectAddress_Unknown,DirectAddress_Unknown

## 2019-01-03 NOTE — PROGRESS NOTE ADULT - PROVIDER SPECIALTY LIST ADULT
Family Medicine
Heme/Onc
Hospitalist
Hospitalist
Infectious Disease
Palliative Care
Heme/Onc
Palliative Care

## 2019-01-03 NOTE — CHART NOTE - NSCHARTNOTEFT_GEN_A_CORE
Assessment:       Diet Prescription: Diet, Regular:   1500mL Fluid Restriction ([id number])  Supplement Feeding Modality:  Oral  Glucerna Shake Cans or Servings Per Day:  1       Frequency:  Three Times a day (12-28-18 @ 14:05)    Initial assessment 12.28   Pt meets criteria for severe protein-calorie malnutrition in context of chronic disease. Nutrition focused physical exam reveals severe muscle wasting over most of body, moderate/severe fat loss (triceps, rib.)  PO intake < 75% nutritional needs > one month.  Wife reports pt lost 12% of UBW in past 7 weeks (25 lbs.)amount unknown.   Suggest liberalize diet to regular to maximize caloric intake and nutrient intake.  HgbA1c is 7.2 Add Glucerna 8 oz tid.  Feeding assist.  Daily weights.  Add Zn 220 mg BID for 10 days.  Add vit C 500 mg BID.  Add Gelatein one container tid.    Will monitor PO intake, tolerance, labs and weight.    · Weight Used for Calculation  current    Estimated Energy Needs (30-35 calories/kg):  · Weight  (lbs)  174.1 lb  · Weight (kg)  79 kg  · From (30 bob/kg)  2370  · To (35 calories/kg)  2765    Other Calculation:  · Other Calculation  Ht.     69 "        Wt.    79    kg               BMI     25.9        IBW  73     kg               Pt is at  108 %  IBW                 Estimated Protein Needs (1.6-1.8 gm/kg):  · Weight  (lbs)  174.1   · Weight (kg)  79 kg  · From (1.6 g/kg)  126.4   · To (1.8 g/kg)  142.2                         Estimated Fluid Needs (other amt-specify):  · Other ml/kg  1500       Wt Hx:  Height (cm): 175.26 (12-26-18 @ 17:15)  Weight (kg): 79.4 (12-26-18 @ 17:15)  BMI (kg/m2): 25.8 (12-26-18 @ 17:15)    Pertinent Medications: MEDICATIONS  (STANDING):  amiodarone    Tablet 100 milliGRAM(s) Oral daily  ascorbic acid 500 milliGRAM(s) Oral two times a day  atorvastatin 10 milliGRAM(s) Oral at bedtime  cefuroxime   Tablet 250 milliGRAM(s) Oral every 12 hours  dextrose 5%. 1000 milliLiter(s) (50 mL/Hr) IV Continuous <Continuous>  dextrose 50% Injectable 12.5 Gram(s) IV Push once  dextrose 50% Injectable 25 Gram(s) IV Push once  dextrose 50% Injectable 25 Gram(s) IV Push once  fluconAZOLE   Tablet 150 milliGRAM(s) Oral daily  gabapentin 300 milliGRAM(s) Oral three times a day  insulin glargine Injectable (LANTUS) 5 Unit(s) SubCutaneous at bedtime  insulin lispro (HumaLOG) corrective regimen sliding scale   SubCutaneous three times a day before meals  metoprolol tartrate 25 milliGRAM(s) Oral two times a day  montelukast 10 milliGRAM(s) Oral at bedtime  multivitamin 1 Tablet(s) Oral daily  nystatin Cream 1 Application(s) Topical two times a day  spironolactone 25 milliGRAM(s) Oral daily  tenofovir disoproxil fumarate (VIREAD) 300 milliGRAM(s) Oral daily  zinc sulfate 220 milliGRAM(s) Oral two times a day    MEDICATIONS  (PRN):  acetaminophen   Tablet .. 650 milliGRAM(s) Oral every 6 hours PRN Temp greater or equal to 38C (100.4F), Mild Pain (1 - 3)  ALBUTerol    90 MICROgram(s) HFA Inhaler 1 Puff(s) Inhalation every 4 hours PRN Shortness of Breath and/or Wheezing  dextrose 40% Gel 15 Gram(s) Oral once PRN Blood Glucose LESS THAN 70 milliGRAM(s)/deciliter  diphenhydrAMINE   Injectable 50 milliGRAM(s) IntraMuscular every 6 hours PRN Rash and/or Itching  glucagon  Injectable 1 milliGRAM(s) IntraMuscular once PRN Glucose LESS THAN 70 milligrams/deciliter  oxyCODONE    IR 5 milliGRAM(s) Oral every 4 hours PRN Moderate Pain (4 - 6)  oxyCODONE    IR 10 milliGRAM(s) Oral every 4 hours PRN Severe Pain (7 - 10)  senna 2 Tablet(s) Oral at bedtime PRN Constipation    Pertinent Labs: 01-03 Na133 mmol/L<L> Glu 108 mg/dL<H> K+ 4.6 mmol/L Cr  1.11 mg/dL BUN 33 mg/dL<H> 12-29 Alb 2.4 g/dL<L>     CAPILLARY BLOOD GLUCOSE      POCT Blood Glucose.: 123 mg/dL (03 Jan 2019 07:42)  POCT Blood Glucose.: 158 mg/dL (02 Jan 2019 21:12)  POCT Blood Glucose.: 145 mg/dL (02 Jan 2019 17:17)  POCT Blood Glucose.: 177 mg/dL (02 Jan 2019 11:56)      Skin: heide score =       Estimated Needs:   [ ] no change since previous assessment        Nutrition Diagnosis is [ ] ongoing  [ ] resolved [ ] not applicable       Previous Goal is [ ] met [ ] partially met [ ] not met [ ] no longer applicable [ ] improving    New Nutrition Diagnosis: [ ] not applicable     Recommendations:      Monitoring and Evaluation:   [x] PO intake/Nutr support infusion [ x ] Tolerance to Nutr [ x ] weights [ x ] labs[ x ] follow up per protocol  [ ] other: Assessment:     PT seen by palliative, is now on regular diet with 1500 cc FR  Plan to transition home with palliative home care  Pt has better appetite, better PO intake, pt's family feeds him  Suggest continue regular diet, glucerna  Will continue to monitor intake, wt, labs and meds      Diet Prescription: Diet, Regular:   1500mL Fluid Restriction ([id number])  Supplement Feeding Modality:  Oral  Glucerna Shake Cans or Servings Per Day:  1       Frequency:  Three Times a day (12-28-18 @ 14:05)    Initial assessment 12.28   Pt meets criteria for severe protein-calorie malnutrition in context of chronic disease. Nutrition focused physical exam reveals severe muscle wasting over most of body, moderate/severe fat loss (triceps, rib.)  PO intake < 75% nutritional needs > one month.  Wife reports pt lost 12% of UBW in past 7 weeks (25 lbs.)amount unknown.   Suggest liberalize diet to regular to maximize caloric intake and nutrient intake.  HgbA1c is 7.2 Add Glucerna 8 oz tid.  Feeding assist.  Daily weights.  Add Zn 220 mg BID for 10 days.  Add vit C 500 mg BID.  Add Gelatein one container tid.    Will monitor PO intake, tolerance, labs and weight.    · Weight Used for Calculation  current    Estimated Energy Needs (30-35 calories/kg):  · Weight  (lbs)  174.1 lb  · Weight (kg)  79 kg  · From (30 bob/kg)  2370  · To (35 calories/kg)  2765    Other Calculation:  · Other Calculation  Ht.     69 "        Wt.    79    kg               BMI     25.9        IBW  73     kg               Pt is at  108 %  IBW                 Estimated Protein Needs (1.6-1.8 gm/kg):  · Weight  (lbs)  174.1   · Weight (kg)  79 kg  · From (1.6 g/kg)  126.4   · To (1.8 g/kg)  142.2                         Estimated Fluid Needs (other amt-specify):  · Other ml/kg  1500       Wt Hx:  Height (cm): 175.26 (12-26-18 @ 17:15)  Weight (kg): 79.4 (12-26-18 @ 17:15)  BMI (kg/m2): 25.8 (12-26-18 @ 17:15)    Pertinent Medications: MEDICATIONS  (STANDING):  amiodarone    Tablet 100 milliGRAM(s) Oral daily  ascorbic acid 500 milliGRAM(s) Oral two times a day  atorvastatin 10 milliGRAM(s) Oral at bedtime  cefuroxime   Tablet 250 milliGRAM(s) Oral every 12 hours  dextrose 5%. 1000 milliLiter(s) (50 mL/Hr) IV Continuous <Continuous>  dextrose 50% Injectable 12.5 Gram(s) IV Push once  dextrose 50% Injectable 25 Gram(s) IV Push once  dextrose 50% Injectable 25 Gram(s) IV Push once  fluconAZOLE   Tablet 150 milliGRAM(s) Oral daily  gabapentin 300 milliGRAM(s) Oral three times a day  insulin glargine Injectable (LANTUS) 5 Unit(s) SubCutaneous at bedtime  insulin lispro (HumaLOG) corrective regimen sliding scale   SubCutaneous three times a day before meals  metoprolol tartrate 25 milliGRAM(s) Oral two times a day  montelukast 10 milliGRAM(s) Oral at bedtime  multivitamin 1 Tablet(s) Oral daily  nystatin Cream 1 Application(s) Topical two times a day  spironolactone 25 milliGRAM(s) Oral daily  tenofovir disoproxil fumarate (VIREAD) 300 milliGRAM(s) Oral daily  zinc sulfate 220 milliGRAM(s) Oral two times a day    MEDICATIONS  (PRN):  acetaminophen   Tablet .. 650 milliGRAM(s) Oral every 6 hours PRN Temp greater or equal to 38C (100.4F), Mild Pain (1 - 3)  ALBUTerol    90 MICROgram(s) HFA Inhaler 1 Puff(s) Inhalation every 4 hours PRN Shortness of Breath and/or Wheezing  dextrose 40% Gel 15 Gram(s) Oral once PRN Blood Glucose LESS THAN 70 milliGRAM(s)/deciliter  diphenhydrAMINE   Injectable 50 milliGRAM(s) IntraMuscular every 6 hours PRN Rash and/or Itching  glucagon  Injectable 1 milliGRAM(s) IntraMuscular once PRN Glucose LESS THAN 70 milligrams/deciliter  oxyCODONE    IR 5 milliGRAM(s) Oral every 4 hours PRN Moderate Pain (4 - 6)  oxyCODONE    IR 10 milliGRAM(s) Oral every 4 hours PRN Severe Pain (7 - 10)  senna 2 Tablet(s) Oral at bedtime PRN Constipation    Pertinent Labs: 01-03 Na133 mmol/L<L> Glu 108 mg/dL<H> K+ 4.6 mmol/L Cr  1.11 mg/dL BUN 33 mg/dL<H> 12-29 Alb 2.4 g/dL<L>     CAPILLARY BLOOD GLUCOSE      POCT Blood Glucose.: 123 mg/dL (03 Jan 2019 07:42)  POCT Blood Glucose.: 158 mg/dL (02 Jan 2019 21:12)  POCT Blood Glucose.: 145 mg/dL (02 Jan 2019 17:17)  POCT Blood Glucose.: 177 mg/dL (02 Jan 2019 11:56)      Skin: heide score =       Estimated Needs:   [ ] no change since previous assessment        Nutrition Diagnosis is [ ] ongoing  [ ] resolved [ ] not applicable       Previous Goal is [ ] met [ ] partially met [ ] not met [ ] no longer applicable [ ] improving    New Nutrition Diagnosis: [ ] not applicable     Recommendations:      Monitoring and Evaluation:   [x] PO intake/Nutr support infusion [ x ] Tolerance to Nutr [ x ] weights [ x ] labs[ x ] follow up per protocol  [ ] other:

## 2019-01-03 NOTE — DISCHARGE NOTE ADULT - CARE PLAN
Principal Discharge DX:	HCAP (healthcare-associated pneumonia)  Goal:	Resolved  Assessment and plan of treatment:	You were noted to have pneumonia. Please continue to take antibiotics Ceftin 250mg every 12 hours for 2 additional days.   You should follow up with your primary doctor.  Secondary Diagnosis:	Malnutrition  Goal:	Stable  Assessment and plan of treatment:	You should continue fluid restriction. You should also continue to supplement his diet with Glucerna supplements 3 times a day, Zinc supplements 2 times a day for 3 additional days  Secondary Diagnosis:	CLL (chronic lymphocytic leukemia)  Goal:	Stable  Assessment and plan of treatment:	You should follow up with Dr. Houser.  Secondary Diagnosis:	Fungal infection  Goal:	Resolved  Assessment and plan of treatment:	You have possibly ringworm infection at your back. Please continue to take Fluconazole 150mg once daily for 2 additional days. Also continue using the Nystatin cream 2 times in a day on rash in the groin.  Secondary Diagnosis:	CHRISTINA (acute kidney injury)  Goal:	Resolved  Assessment and plan of treatment:	You were noted to have an acute kidney injury. Your kidney has improved. Continue to drink enough water.  Secondary Diagnosis:	Chronic diastolic congestive heart failure  Goal:	Controlled  Assessment and plan of treatment:	Your lasix and aldactone was stopped because of your kidney injury and your blood pressure was low. Please follow up with your cardiologist regarding restarting your Lasix.  Secondary Diagnosis:	COPD (chronic obstructive pulmonary disease)  Goal:	Controlled  Assessment and plan of treatment:	You should continue montelukast Principal Discharge DX:	HCAP (healthcare-associated pneumonia)  Goal:	Resolved  Assessment and plan of treatment:	You were noted to have pneumonia. Please continue to take antibiotics Ceftin 250mg every 12 hours for 2 additional days.   You should follow up with your primary doctor.  Secondary Diagnosis:	Malnutrition  Goal:	Stable  Assessment and plan of treatment:	You should drink enough water as per nutritionist. You should also continue to supplement his diet with Glucerna supplements 3 times a day, Zinc supplements 2 times a day for 3 additional days  Secondary Diagnosis:	CLL (chronic lymphocytic leukemia)  Goal:	Stable  Assessment and plan of treatment:	You should follow up with Dr. Houser.  Secondary Diagnosis:	Fungal infection  Goal:	Resolved  Assessment and plan of treatment:	You have possibly ringworm infection at your back. Please continue to take Fluconazole 150mg once daily for 2 additional days. Also continue using the Nystatin cream 2 times in a day on rash in the groin.  Secondary Diagnosis:	CHRISTINA (acute kidney injury)  Goal:	Resolved  Assessment and plan of treatment:	You were noted to have an acute kidney injury. Your kidney has improved. Continue to drink enough water.  Secondary Diagnosis:	Chronic diastolic congestive heart failure  Goal:	Controlled  Assessment and plan of treatment:	Your lasix and aldactone was stopped because of your kidney injury and your blood pressure was low. Please follow up with your cardiologist regarding restarting your Lasix.  Secondary Diagnosis:	COPD (chronic obstructive pulmonary disease)  Goal:	Controlled  Assessment and plan of treatment:	You should continue montelukast

## 2019-01-03 NOTE — DISCHARGE NOTE ADULT - MEDICATION SUMMARY - MEDICATIONS TO TAKE
I will START or STAY ON the medications listed below when I get home from the hospital:    amiodarone 200 mg oral tablet  -- 0.5 tab(s) by mouth once a day  -- Indication: For Atrial fibrillation, unspecified type    warfarin 2.5 mg oral tablet  -- 1 tab(s) by mouth once a day (at bedtime)    -- Indication: For Anticoagulation    gabapentin 300 mg oral capsule  -- 1 cap(s) by mouth 3 times a day  -- Indication: For Neuropathy    insulin degludec 100 units/mL subcutaneous solution  -- 20 unit(s) subcutaneous once a day  -- Indication: For DM type 2 (diabetes mellitus, type 2)    Diflucan 150 mg oral tablet  -- 1 tab(s) by mouth once a day   -- Indication: For Fungal infection    pravastatin 10 mg oral tablet  -- 1 tab(s) by mouth once a day  -- Indication: For Hyperlipidemia    tenofovir disoproxil fumarate 300 mg oral tablet  -- 1 tab(s) by mouth once a day  -- Indication: For Hepatitis B virus infection, unspecified chronicity    albuterol 2.5 mg/3 mL (0.083%) inhalation solution  -- inhaled once a day  -- Indication: For Asthma    cefuroxime 250 mg oral tablet  -- 1 tab(s) by mouth every 12 hours   -- Finish all this medication unless otherwise directed by prescriber.  Medication should be taken with plenty of water.  Take with food or milk.    -- Indication: For Antibiotics    nystatin 100,000 units/g topical cream  -- 1 application on skin 2 times a day  -- Indication: For topical    montelukast 10 mg oral tablet  -- 1 tab(s) by mouth once a day (at bedtime)  -- Indication: For COPD (chronic obstructive pulmonary disease)    multivitamin  -- 1 tab(s) by mouth once a day  -- Indication: For Supplement    ascorbic acid 500 mg oral tablet  -- 1 tab(s) by mouth 2 times a day  -- Indication: For Supplement

## 2019-01-03 NOTE — DISCHARGE NOTE ADULT - MEDICATION SUMMARY - MEDICATIONS TO STOP TAKING
I will STOP taking the medications listed below when I get home from the hospital:    metoprolol tartrate 25 mg oral tablet  -- 1 tab(s) by mouth 2 times a day    furosemide 20 mg oral tablet  -- 1 tab(s) by mouth once a day

## 2019-01-03 NOTE — DISCHARGE NOTE ADULT - PLAN OF CARE
You have possibly ringworm infection at your back. Please continue to take Fluconazole 150mg once daily for 2 additional days. Also continue using the Nystatin cream 2 times in a day on rash in the groin. Resolved You were noted to have an acute kidney injury. Your kidney has improved. Continue to drink enough water. Controlled Your lasix and aldactone was stopped because of your kidney injury and your blood pressure was low. Please follow up with your cardiologist regarding restarting your Lasix. You should continue montelukast You were noted to have pneumonia. Please continue to take antibiotics Ceftin 250mg every 12 hours for 2 additional days.   You should follow up with your primary doctor. Stable You should continue fluid restriction. You should also continue to supplement his diet with Glucerna supplements 3 times a day, Zinc supplements 2 times a day for 3 additional days You should follow up with Dr. Houser. You should drink enough water as per nutritionist. You should also continue to supplement his diet with Glucerna supplements 3 times a day, Zinc supplements 2 times a day for 3 additional days

## 2019-01-03 NOTE — DISCHARGE NOTE ADULT - HOSPITAL COURSE
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 Wife also reports he had a low grade fever ~100 F a few days prior associated with productive cough with yellow sputum. Pt reported chest pain associated with cough. He is unable to ambulate due to back pain from compression fractures, and even though the pain is helped w/ medications he still feels very weak with associated decrease in appetite and PO intake. Over his hospital course pt was noted to have hyponatremia and hyperkalemia which improved. Pt received antibiotics for pneumonia. Pt also noted to have fungal infection on the skin and is on antifungal medication. Pt has greatly improved and will be d/c with [facility].    1/3: Pt was seen and examined. Pt feels well. Tolerating po diet. He denies pain, SOB, CP, chills, N/V, constipation, diarrhea, dysuria.  Called PCP, Dr Ibarra, will follow up with pt regarding restarting his warfarin, metoprolol and lasix.     REVIEW OF SYSTEMS:  CONSTITUTIONAL: No fevers or chills. + weakness  EYES/ENT: No visual changes;  No vertigo or throat pain   NECK: No pain or stiffness  RESPIRATORY: No cough, wheezing, hemoptysis; No shortness of breath  CARDIOVASCULAR: No chest pain or palpitations  GASTROINTESTINAL: No abdominal or epigastric pain. No nausea, vomiting, or hematemesis; No diarrhea or constipation. No melena or hematochezia.  GENITOURINARY: No dysuria, frequency or hematuria  NEUROLOGICAL: No numbness or weakness  SKIN: No itching, burning, rashes, or lesions   All other review of systems is negative unless indicated above      Vital Signs Last 24 Hrs  T(C): 36.2 (03 Jan 2019 05:11), Max: 37 (02 Jan 2019 13:49)  T(F): 97.1 (03 Jan 2019 05:11), Max: 98.6 (02 Jan 2019 13:49)  HR: 94 (03 Jan 2019 05:11) (94 - 100)  BP: 96/67 (03 Jan 2019 05:11) (95/68 - 103/67)  RR: 18 (03 Jan 2019 05:11) (18 - 18)  SpO2: 98% (03 Jan 2019 05:11) (98% - 100%)    PHYSICAL EXAM:  Constitutional: NAD, awake and alert, well-developed  HEENT: PERR, EOMI, Normal Hearing, MMM  Neck: Soft and supple, No LAD, No JVD  Respiratory: Mild crackles in lung bases.   Cardiovascular: S1 and S2, regular rate and rhythm, no Murmurs, gallops or rubs  Gastrointestinal: Bowel Sounds present, soft, nontender, nondistended, no guarding, no rebound  Extremities: No peripheral edema  Vascular: 2+ peripheral pulses  Neurological: A/O x 3, no focal deficits  Musculoskeletal: Limited by pain in LE.  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 Wife also reports he had a low grade fever ~100 F a few days prior associated with productive cough with yellow sputum. Pt reported chest pain associated with cough. He is unable to ambulate due to back pain from compression fractures, and even though the pain is helped w/ medications he still feels very weak with associated decrease in appetite and PO intake. Over his hospital course pt was noted to have hyponatremia and hyperkalemia which improved. Pt received antibiotics for pneumonia. Pt also noted to have fungal infection on the skin and is on antifungal medication. Pt has greatly improved and will be d/c with [facility].    1/3: Pt was seen and examined. Pt feels well. Tolerating po diet. He denies pain, SOB, CP, chills, N/V, constipation, diarrhea, dysuria.  Called PCP, Dr Ibarra, will follow up with pt regarding restarting his aldactone, metoprolol and lasix.       Discussed with pt using [facility]  ID# [id number], to not take warfarin for today and resume warfarin tomorrow. It has also been discussed with pt to stop taking his bp medications including lasix and aldactone and to follow up with Dr. Ibarra next regarding restarting it. Pt is also aware of his antibiotic regimen. Pt pt demonstrates understanding.     REVIEW OF SYSTEMS:  CONSTITUTIONAL: No fevers or chills. + weakness  EYES/ENT: No visual changes;  No vertigo or throat pain   NECK: No pain or stiffness  RESPIRATORY: No cough, wheezing, hemoptysis; No shortness of breath  CARDIOVASCULAR: No chest pain or palpitations  GASTROINTESTINAL: No abdominal or epigastric pain. No nausea, vomiting, or hematemesis; No diarrhea or constipation. No melena or hematochezia.  GENITOURINARY: No dysuria, frequency or hematuria  NEUROLOGICAL: No numbness or weakness  SKIN: No itching, burning, rashes, or lesions   All other review of systems is negative unless indicated above      Vital Signs Last 24 Hrs  T(C): 36.2 (03 Jan 2019 05:11), Max: 37 (02 Jan 2019 13:49)  T(F): 97.1 (03 Jan 2019 05:11), Max: 98.6 (02 Jan 2019 13:49)  HR: 94 (03 Jan 2019 05:11) (94 - 100)  BP: 96/67 (03 Jan 2019 05:11) (95/68 - 103/67)  RR: 18 (03 Jan 2019 05:11) (18 - 18)  SpO2: 98% (03 Jan 2019 05:11) (98% - 100%)    PHYSICAL EXAM:  Constitutional: NAD, awake and alert, well-developed  HEENT: PERR, EOMI, Normal Hearing, MMM  Neck: Soft and supple, No LAD, No JVD  Respiratory: Mild crackles in lung bases.   Cardiovascular: S1 and S2, regular rate and rhythm, no Murmurs, gallops or rubs  Gastrointestinal: Bowel Sounds present, soft, nontender, nondistended, no guarding, no rebound  Extremities: No peripheral edema  Vascular: 2+ peripheral pulses  Neurological: A/O x 3, no focal deficits  Musculoskeletal: Limited by pain in LE.  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 Wife reported he had a low grade fever ~100 F a few days prior associated with productive cough with yellow sputum. Pt reported chest pain associated with cough. He was unable to ambulate due to back pain from compression fractures, and even though the pain was helped w/ medications he still felt very weak. He also experienced decrease in appetite and PO intake. Over his hospital course pt was noted to have hyponatremia and hyperkalemia which improved. Pt received antibiotics for pneumonia. Pt also noted to have fungal infection on the skin and is on antifungal medication. Pt has greatly improved and will be d/c with [facility].    1/3: Pt was seen and examined. Pt feels well. Tolerating po diet. He denies pain, SOB, CP, chills, N/V, constipation, diarrhea, dysuria.  Called PCP, Dr Ibarra, will follow up with pt regarding restarting his aldactone, metoprolol and lasix.       Discussed with pt using [facility]  ID# [id number], to not take warfarin for today and resume warfarin tomorrow. It has also been discussed with pt to stop taking his bp medications including lasix and aldactone and to follow up with Dr. Ibarra next regarding restarting it. Pt is also aware of his antibiotic regimen. Pt pt demonstrates understanding.     REVIEW OF SYSTEMS:  CONSTITUTIONAL: No fevers or chills. + weakness  EYES/ENT: No visual changes;  No vertigo or throat pain   NECK: No pain or stiffness  RESPIRATORY: No cough, wheezing, hemoptysis; No shortness of breath  CARDIOVASCULAR: No chest pain or palpitations  GASTROINTESTINAL: No abdominal or epigastric pain. No nausea, vomiting, or hematemesis; No diarrhea or constipation. No melena or hematochezia.  GENITOURINARY: No dysuria, frequency or hematuria  NEUROLOGICAL: No numbness or weakness  SKIN: No itching, burning, rashes, or lesions   All other review of systems is negative unless indicated above      Vital Signs Last 24 Hrs  T(C): 36.2 (03 Jan 2019 05:11), Max: 37 (02 Jan 2019 13:49)  T(F): 97.1 (03 Jan 2019 05:11), Max: 98.6 (02 Jan 2019 13:49)  HR: 94 (03 Jan 2019 05:11) (94 - 100)  BP: 96/67 (03 Jan 2019 05:11) (95/68 - 103/67)  RR: 18 (03 Jan 2019 05:11) (18 - 18)  SpO2: 98% (03 Jan 2019 05:11) (98% - 100%)    PHYSICAL EXAM:  Constitutional: NAD, awake and alert, well-developed  HEENT: PERR, EOMI, Normal Hearing, MMM  Neck: Soft and supple, No LAD, No JVD  Respiratory: Mild crackles in lung bases.   Cardiovascular: S1 and S2, regular rate and rhythm, no Murmurs, gallops or rubs  Gastrointestinal: Bowel Sounds present, soft, nontender, nondistended, no guarding, no rebound  Extremities: No peripheral edema  Vascular: 2+ peripheral pulses  Neurological: A/O x 3, no focal deficits  Musculoskeletal: Limited by pain in LE.  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 Wife reported he had a low grade fever ~100 F a few days prior associated with productive cough with yellow sputum. Pt reported chest pain associated with cough. He was unable to ambulate due to back pain from compression fractures, and even though the pain was helped w/ medications he still felt very weak.  Over his hospital course pt was noted to have hyponatremia and hyperkalemia which improved. Pt received antibiotics for pneumonia. Pt also noted to have fungal infection on the skin and is on antifungal medication. Pt has greatly improved and will be d/c with [facility].    1/3: Pt was seen and examined. Pt feels well. Tolerating po diet. He denies pain, SOB, CP, chills, N/V, constipation, diarrhea, dysuria.  Called PCP, Dr Ibarra, will follow up with pt regarding restarting his aldactone, metoprolol and lasix.       Discussed with pt using [facility]  ID# [id number], to not take warfarin for today and resume warfarin tomorrow. It has also been discussed with pt to stop taking his bp medications including lasix and aldactone and to follow up with Dr. Ibarra next regarding restarting it. Pt is also aware of his antibiotic regimen. Pt pt demonstrates understanding.     REVIEW OF SYSTEMS:  CONSTITUTIONAL: No fevers or chills. + weakness  EYES/ENT: No visual changes;  No vertigo or throat pain   NECK: No pain or stiffness  RESPIRATORY: No cough, wheezing, hemoptysis; No shortness of breath  CARDIOVASCULAR: No chest pain or palpitations  GASTROINTESTINAL: No abdominal or epigastric pain. No nausea, vomiting, or hematemesis; No diarrhea or constipation. No melena or hematochezia.  GENITOURINARY: No dysuria, frequency or hematuria  NEUROLOGICAL: No numbness or weakness  SKIN: No itching, burning, rashes, or lesions   All other review of systems is negative unless indicated above      Vital Signs Last 24 Hrs  T(C): 36.2 (03 Jan 2019 05:11), Max: 37 (02 Jan 2019 13:49)  T(F): 97.1 (03 Jan 2019 05:11), Max: 98.6 (02 Jan 2019 13:49)  HR: 94 (03 Jan 2019 05:11) (94 - 100)  BP: 96/67 (03 Jan 2019 05:11) (95/68 - 103/67)  RR: 18 (03 Jan 2019 05:11) (18 - 18)  SpO2: 98% (03 Jan 2019 05:11) (98% - 100%)    PHYSICAL EXAM:  Constitutional: NAD, awake and alert, well-developed  HEENT: PERR, EOMI, Normal Hearing, MMM  Neck: Soft and supple, No LAD, No JVD  Respiratory: Mild crackles in lung bases.   Cardiovascular: S1 and S2, regular rate and rhythm, no Murmurs, gallops or rubs  Gastrointestinal: Bowel Sounds present, soft, nontender, nondistended, no guarding, no rebound  Extremities: No peripheral edema  Vascular: 2+ peripheral pulses  Neurological: A/O x 3, no focal deficits  Musculoskeletal: Limited by pain in LE.  Skin: Ringworm rashes in back

## 2019-01-03 NOTE — PROGRESS NOTE ADULT - REASON FOR ADMISSION
Weakness

## 2019-01-03 NOTE — DISCHARGE NOTE ADULT - CARE PROVIDER_API CALL
Co, Miguel Angel CHOI), Internal Medicine  [address] [place] [address]  Phone: [phone]  Fax: [phone]    Ale Houser), Hematology; Internal Medicine; Medical Oncology  [address] [place] [address]  Phone: [phone]  Fax: [phone]    KELLY Lakhani (), Pulmonary Disease; Sleep Medicine  [address] [place] [address]  Phone: [phone]  Fax: [phone]    Raul Mchugh), Gastroenterology  [address] [place] [address]  Phone: [phone]  Fax: [phone]

## 2019-01-11 NOTE — ED PROVIDER NOTE - ENMT, MLM
Anthony tube in place with breath sounds equal b/l. Mouth with normal mucosa. Throat has no vesicles, no oropharyngeal exudates and uvula is midline.

## 2019-01-11 NOTE — ED PROVIDER NOTE - OBJECTIVE STATEMENT
77 y/o male with a PMHx of Afib, COPD, HTN, HLD, DM, Hepatitis B, hypogammaglobulinemia, CHF, lung CA presents to the ED in cardiac arrest. Pt received 4 epis and 3 shocks PTA by EMS.

## 2019-01-11 NOTE — ED PROVIDER NOTE - PMH
Atrial fibrillation, unspecified type    B-cell lymphoma, unspecified B-cell lymphoma type, unspecified body region  CLL stage 4/SLL; met NHL  CHF (congestive heart failure)    Chronic diastolic congestive heart failure    COPD (chronic obstructive pulmonary disease)    DM type 2 (diabetes mellitus, type 2)    Essential hypertension    Hepatitis B virus infection, unspecified chronicity  retrovirals  Hyperlipidemia    Hypogammaglobulinemia  received IVIG  Lung cancer

## 2019-01-11 NOTE — ED ADULT NURSE NOTE - OBJECTIVE STATEMENT
15:11 patient arrive sto ED with thumper on. ETCO2 56. 15:14- pulse check- asystole, 15:15- 300mg amiodarone via R IO. 15:17 pulse check. 15:18 1mg epinephrine via R IO. 15:19 F.A.S.T/Sono no cardiac motion. 15:20  assist, 121/64 BP 1 gram calcium chloride via R IO. 15:22 pulse check, HR 40bpm, BP 57/32 pacer/defib pads placed. 15:24 CPR resumed, 1 mg epinephrine administered via R IO. 15:26 Compressions suspended- as per family. 15:27 patient pronounced.

## 2019-01-11 NOTE — ED PROVIDER NOTE - PROGRESS NOTE DETAILS
Lucy Gomez for attending Dr. Munroe: Spoke with daughter. Pt had a fever last night and this morning. Pt was bradycardic. Pt did not want to come to hospital. Family does not want any further efforts. CPR stopped at 3:27pm. Lucy Gomez for attending Dr. Munroe: Time of Death: 3:27pm.

## 2019-03-20 NOTE — DISCHARGE NOTE ADULT - NS AS DC FU [facility] EJECTION FRACTION >40 %
Spoke with patient in regards to below.   Patient is aware of results and recommendations.   Patient wants to check with his job to see if he can get off    Awaiting call back at this time   no

## 2019-06-03 NOTE — PROGRESS NOTE ADULT - SUBJECTIVE AND OBJECTIVE BOX
Patient is a 78y old  Male who presents with a chief complaint of s/p fall  SOB (28 May 2018 09:09)      HPI:  History obtained from prior chart, patient and daughter.  Translation by daughter.    78 y.o. male with complex past medical history including CLL/lymphoma on treatment, h/o recurrent pneumonia, Afib, HFpEF, Hep B, COPD, type 2DM, H/o PE DVT presents with s/p fall. Pt was walking up stairs when he tripped and fell on the right side. Pt reports right rib and shoulder pain. Denies lightheadedness or dizziness prior to falling. Pt has been short of breath that has been gradually worsening. He saw his pulmonologist yesterday and was noted to have fluid around the lung. Pt denies any subjective fever, chills, sore throat. Reports sharp on/off chest pain that's worse with coughing. Reports chronic cough and current cough is not new. +worsening abdominal distension over the past 2-3 weeks. Denies diarrhea or dysuria. (26 May 2018 19:39)    Patient comfortable, does complain of fatigue and dyspnea on exertion. Negative nausea vomiting.  Negative chest pain  Case discussed with hospitalist as well as pulmonary      PAST MEDICAL & SURGICAL HISTORY:  Hypogammaglobulinemia: received IVIG  Hepatitis B virus infection, unspecified chronicity: retrovirals  Chronic diastolic congestive heart failure  Essential hypertension  DM type 2 (diabetes mellitus, type 2)  B-cell lymphoma, unspecified B-cell lymphoma type, unspecified body region: CLL stage 4/SLL; met NHL  Atrial fibrillation, unspecified type  COPD (chronic obstructive pulmonary disease)  History of esophageal dilatation  S/P cholecystectomy  H/O prostatectomy      MEDICATIONS  (STANDING):  amiodarone    Tablet 50 milliGRAM(s) Oral daily  dextrose 5%. 1000 milliLiter(s) (50 mL/Hr) IV Continuous <Continuous>  dextrose 50% Injectable 12.5 Gram(s) IV Push once  dextrose 50% Injectable 25 Gram(s) IV Push once  dextrose 50% Injectable 25 Gram(s) IV Push once  furosemide    Tablet 20 milliGRAM(s) Oral two times a day  gabapentin 300 milliGRAM(s) Oral three times a day  insulin glargine Injectable (LANTUS) 20 Unit(s) SubCutaneous every morning  insulin lispro (HumaLOG) corrective regimen sliding scale   SubCutaneous three times a day before meals  insulin lispro (HumaLOG) corrective regimen sliding scale   SubCutaneous at bedtime  metoprolol tartrate 25 milliGRAM(s) Oral two times a day  montelukast 10 milliGRAM(s) Oral at bedtime  predniSONE   Tablet 20 milliGRAM(s) Oral two times a day  simvastatin 10 milliGRAM(s) Oral at bedtime  tenofovir disoproxil fumarate (VIREAD) 300 milliGRAM(s) Oral daily    MEDICATIONS  (PRN):  acetaminophen   Tablet 650 milliGRAM(s) Oral every 6 hours PRN For Temp greater than 38 C (100.4 F), mild pain, HA  ALBUTerol/ipratropium for Nebulization 3 milliLiter(s) Nebulizer every 6 hours PRN Shortness of Breath and/or Wheezing  dextrose 40% Gel 15 Gram(s) Oral once PRN Blood Glucose LESS THAN 70 milliGRAM(s)/deciliter  glucagon  Injectable 1 milliGRAM(s) IntraMuscular once PRN Glucose LESS THAN 70 milligrams/deciliter  morphine  - Injectable 2 milliGRAM(s) IV Push every 6 hours PRN Severe Pain (7 - 10)      Allergies    Privigen (Other (Severe))    Intolerances        SOCIAL HISTORY:NC    FAMILY HISTORY:  Family history of liver cancer (Father): father  85 liver CA  Family history of coronary arteriosclerosis (Mother): Mom  of MI age 68      REVIEW OF SYSTEMS:    CONSTITUTIONAL: No weakness, fevers or chills  EYES/ENT: No visual changes;  No vertigo or throat pain   NECK: No pain or stiffness  RESPIRATORY: No cough, wheezing, hemoptysis; No shortness of breath  CARDIOVASCULAR: No chest pain or palpitations  GENITOURINARY: No dysuria, frequency or hematuria  NEUROLOGICAL: No numbness or weakness  SKIN: No itching, burning, rashes, or lesions   All other review of systems is negative unless indicated above.    Vital Signs Last 24 Hrs  T(C): 36.1 (30 May 2018 06:02), Max: 37 (29 May 2018 16:22)  T(F): 97 (30 May 2018 06:02), Max: 98.6 (29 May 2018 16:22)  HR: 68 (30 May 2018 06:00) (61 - 77)  BP: 118/59 (30 May 2018 06:00) (118/59 - 145/68)  BP(mean): 72 (30 May 2018 06:00) (72 - 90)  RR: 15 (30 May 2018 06:00) (13 - 19)  SpO2: 97% (30 May 2018 06:00) (90% - 99%)    PHYSICAL EXAM:    Constitutional: NAD, well-developed  HEENT: EOMI, throat clear  Neck: No LAD, supple  Respiratory: CTA and P  Cardiovascular: S1 and S2, RRR, no M  Gastrointestinal: BS+, soft, NT/pos ascites, neg HSM,  Extremities: pos peripheral edema, neg clubing, cyanosis  Vascular: 2+ peripheral pulses  Neurological: A/O x 3, no focal deficits  Psychiatric: Normal mood, normal affect  Skin: No rashes    LABS:  CBC Full  -  ( 30 May 2018 04:21 )  WBC Count : 36.65 K/uL  Hemoglobin : 11.0 g/dL  Hematocrit : 34.7 %  Platelet Count - Automated : 86 K/uL  Mean Cell Volume : 91.6 fl  Mean Cell Hemoglobin : 29.0 pg  Mean Cell Hemoglobin Concentration : 31.7 gm/dL  Auto Neutrophil # : x  Auto Lymphocyte # : x  Auto Monocyte # : x  Auto Eosinophil # : x  Auto Basophil # : x  Auto Neutrophil % : x  Auto Lymphocyte % : x  Auto Monocyte % : x  Auto Eosinophil % : x  Auto Basophil % : x    05-30    140  |  104  |  35<H>  ----------------------------<  213<H>  5.0   |  30  |  1.07    Ca    8.3<L>      30 May 2018 04:21    TPro  5.6<L>  /  Alb  2.9<L>  /  TBili  0.5  /  DBili  x   /  AST  28  /  ALT  35  /  AlkPhos  96  05-30    PT/INR - ( 30 May 2018 04:21 )   PT: 20.1 sec;   INR: 1.84 ratio         PTT - ( 30 May 2018 04:21 )  PTT:30.5 sec        RADIOLOGY & ADDITIONAL STUDIES: Detail Level: Generalized

## 2019-07-03 NOTE — CONSULT NOTE ADULT - SUBJECTIVE AND OBJECTIVE BOX
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 Cough is productive in nature with yellowish/whitish sputum. Associated symptoms include SOB and Low-grade Fevers (~100s). Denies sick contacts.    In the ED, patient was empirically treated with IV Vanc, Zosyn, and 2.5L IV NS. RVP positive for Entero/Rhinovirus. (12 Sep 2017 16:42)      PAST MEDICAL & SURGICAL HISTORY:  Hypogammaglobulinemia: received IVIG  Hepatitis B virus infection, unspecified chronicity: retrovirals  Chronic diastolic congestive heart failure  Essential hypertension  DM type 2 (diabetes mellitus, type 2)  B-cell lymphoma, unspecified B-cell lymphoma type, unspecified body region: CLL stage 4/SLL; met NHL  Atrial fibrillation, unspecified type  COPD (chronic obstructive pulmonary disease)  History of esophageal dilatation  S/P cholecystectomy  H/O prostatectomy      MEDICATIONS  (STANDING):  amiodarone    Tablet 100 milliGRAM(s) Oral daily  gabapentin 300 milliGRAM(s) Oral three times a day  simvastatin 10 milliGRAM(s) Oral at bedtime  tenofovir 300 milliGRAM(s) Oral daily  metoprolol 25 milliGRAM(s) Oral two times a day  docusate sodium 100 milliGRAM(s) Oral three times a day  dextrose 5%. 1000 milliLiter(s) (50 mL/Hr) IV Continuous <Continuous>  ALBUTerol/ipratropium for Nebulization 3 milliLiter(s) Nebulizer every 6 hours  dextrose 50% Injectable 12.5 Gram(s) IV Push once  dextrose 50% Injectable 25 Gram(s) IV Push once  dextrose 50% Injectable 25 Gram(s) IV Push once  insulin lispro Injectable (HumaLOG) 4 Unit(s) SubCutaneous three times a day before meals  insulin lispro (HumaLOG) corrective regimen sliding scale   SubCutaneous three times a day before meals  insulin lispro (HumaLOG) corrective regimen sliding scale   SubCutaneous at bedtime  cefepime  IVPB 2000 milliGRAM(s) IV Intermittent every 12 hours  cefepime  IVPB      doxycycline hyclate Capsule 100 milliGRAM(s) Oral every 12 hours  warfarin 3 milliGRAM(s) Oral daily  insulin glargine Injectable (LANTUS) 40 Unit(s) SubCutaneous at bedtime    MEDICATIONS  (PRN):  acetaminophen   Tablet 650 milliGRAM(s) Oral every 6 hours PRN For Temp greater than 38 C (100.4 F)  dextrose Gel 1 Dose(s) Oral once PRN Blood Glucose LESS THAN 70 milliGRAM(s)/deciliter  glucagon  Injectable 1 milliGRAM(s) IntraMuscular once PRN Glucose LESS THAN 70 milligrams/deciliter  guaiFENesin   Syrup  (Sugar-Free) 100 milliGRAM(s) Oral every 6 hours PRN Cough      Allergies    Privigen (Other (Severe))    Intolerances        SOCIAL HISTORY:    FAMILY HISTORY:  Family history of liver cancer (Father): father  85 liver CA  Family history of coronary arteriosclerosis (Mother): Mom  of MI age 68        Feels better  less cough   No chills  Chronic fatigue  No abdominal pains    Vital Signs Last 24 Hrs  T(C): 36.7 (19 Sep 2017 05:12), Max: 36.8 (18 Sep 2017 10:52)  T(F): 98.1 (19 Sep 2017 05:12), Max: 98.2 (18 Sep 2017 10:52)  HR: 82 (19 Sep 2017 09:02) (82 - 111)  BP: 120/63 (19 Sep 2017 05:12) (109/63 - 120/63)  BP(mean): 77 (19 Sep 2017 05:12) (77 - 77)  RR: 18 (18 Sep 2017 10:52) (18 - 18)  SpO2: 90% (19 Sep 2017 05:12) (90% - 99%)        NAD  Afebrile HEENT neg  Lungs rhochi  Abd soft  Ext neg neuro neg    LABS:                        12.2   30.5  )-----------( 129      ( 19 Sep 2017 05:58 )             36.9     -18    141  |  106  |  40<H>  ----------------------------<  87  3.6   |  27  |  1.18    Ca    8.0<L>      18 Sep 2017 06:06      PT/INR - ( 19 Sep 2017 05:58 )   PT: 21.7 sec;   INR: 1.98 ratio         RADIOLOGY & ADDITIONAL STUDIES:    < from: CT Chest No Cont (17 @ 08:20) >  INTERPRETATION:  CT CHEST    HISTORY:  Left lower lobe atelectasis, chest pain, shortness of breath        TECHNIQUE: Noncontrast CT of the chest was performed. [place]   sagittal images were reconstructed. This study was performed using   automatic exposure control (radiation dose reduction software) to obtain   a diagnostic image quality scan with patient dose as low as reasonably   achievable.    COMPARISON: Chest x-ray 22 hours prior, chest CT 2017    FINDINGS:    LUNGS, AIRWAYS: The central airways are patent. Improving right middle   and right lower lobe pneumonia, newly resolved. Bibasilar areas of linear   platelike atelectasis have increased. There is new groundglass airspace   opacity at the left upper lobe.    PLEURA: No pleural abnormality.    HEART AND VESSELS: Normal heart size. No pericardial effusion. Coronary   artery calcifications are present. Normal caliber thoracic aorta.    MEDIASTINUM AND JEFFREY: Stable mediastinal and bilateral axillary   adenopathy.    UPPER ABDOMEN: Left adrenal adenoma again noted. Retroperitoneal and   mesenteric adenopathy again partially visualized. Nodular surface contour   of the liver suggestive of cirrhotic change.    BONES AND SOFT TISSUES: Chronic bilateral rib deformities again noted.    IMPRESSION:     Improving right lower right middle lobe pneumonia. There is new   nonspecific groundglass opacity in the left upper lobe. Increasing   bibasilar linear and platelike atelectasis.    Multiple enlarged lymph nodes in the chest and upper abdomen consist    < end of copied text >
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 Cough is productive in nature with yellowish/whitish sputum. Associated symptoms include SOB and Low-grade Fevers (~100s). Denies sick contacts.    In the ED, patient was empirically treated with IV Vanc, Zosyn, and 2.5L IV NS. RVP positive for Entero/Rhinovirus. (12 Sep 2017 16:42)  pt is well known to me from multiple prior admissions   at bedside  no hemoptysis  no cp  no difficulty breathing today      PAST MEDICAL & SURGICAL HISTORY:  Hypogammaglobulinemia: received IVIG  Hepatitis B virus infection, unspecified chronicity: retrovirals  Chronic diastolic congestive heart failure  Essential hypertension  DM type 2 (diabetes mellitus, type 2)  B-cell lymphoma, unspecified B-cell lymphoma type, unspecified body region: CLL stage 4/SLL; met NHL  Atrial fibrillation, unspecified type  COPD (chronic obstructive pulmonary disease)  History of esophageal dilatation  S/P cholecystectomy  H/O prostatectomy      PREVIOUS DIAGNOSTIC TESTING:      MEDICATIONS  (STANDING):  amiodarone    Tablet 100 milliGRAM(s) Oral daily  warfarin 2.5 milliGRAM(s) Oral daily  gabapentin 300 milliGRAM(s) Oral three times a day  simvastatin 10 milliGRAM(s) Oral at bedtime  tenofovir 300 milliGRAM(s) Oral daily  metoprolol 25 milliGRAM(s) Oral two times a day  docusate sodium 100 milliGRAM(s) Oral three times a day  dextrose 5%. 1000 milliLiter(s) (50 mL/Hr) IV Continuous <Continuous>  cefepime  IVPB 1000 milliGRAM(s) IV Intermittent every 24 hours  ALBUTerol/ipratropium for Nebulization 3 milliLiter(s) Nebulizer every 6 hours  enoxaparin Injectable 90 milliGRAM(s) SubCutaneous every 12 hours  insulin lispro (HumaLOG) corrective regimen sliding scale   SubCutaneous three times a day before meals  dextrose 50% Injectable 12.5 Gram(s) IV Push once  dextrose 50% Injectable 25 Gram(s) IV Push once  dextrose 50% Injectable 25 Gram(s) IV Push once    MEDICATIONS  (PRN):  acetaminophen   Tablet 650 milliGRAM(s) Oral every 6 hours PRN For Temp greater than 38 C (100.4 F)  dextrose Gel 1 Dose(s) Oral once PRN Blood Glucose LESS THAN 70 milliGRAM(s)/deciliter  glucagon  Injectable 1 milliGRAM(s) IntraMuscular once PRN Glucose LESS THAN 70 milligrams/deciliter  guaiFENesin   Syrup  (Sugar-Free) 100 milliGRAM(s) Oral every 6 hours PRN Cough      FAMILY HISTORY:  Family history of liver cancer (Father): father  85 liver CA  Family history of coronary arteriosclerosis (Mother): Mom  of MI age 68      SOCIAL HISTORY:  ***    REVIEW OF SYSTEM:  Pertinent items are noted in HPI.  Constitutional negative for chills,pos fevers, no sweats and weight loss  throat, and face:  negative for epistaxis, nasal congestion, sore throat and   tinnitus  Respiratory: pos for cough, dyspnea on exertion, pleuritic chest pain  and wheezing  Cardiovascular:  negative for chest pain, pos dyspnea and palpitations  Gastrointestinal: negative for abdominal pain, diarrhea, nausea and vomiting  Genitourinary: negative for dysuria, frequency and urinary incontinence  Skin:  negative for redness, rash, pruritus, swelling, dryness and   fissures  Hematologic/lymphatic: negative for bleeding and easy bruising  Musculoskeletal: negative for arthralgias, back pain and muscle weakness  Neurological: negative for dizziness, headaches, seizures and tremors  Behavioral/Psych:  negative for mood change, depression, suicidal attempts    Allergic/Immunologic: negative for anaphylaxis, angioedema and urticaria    Vital Signs Last 24 Hrs  T(C): 37.1 (13 Sep 2017 04:46), Max: 38 (12 Sep 2017 12:31)  T(F): 98.8 (13 Sep 2017 04:46), Max: 100.4 (12 Sep 2017 12:31)  HR: 88 (13 Sep 2017 04:46) (88 - 109)  BP: 140/68 (13 Sep 2017 04:46) (128/72 - 150/76)  BP(mean): --  RR: 16 (12 Sep 2017 22:10) (16 - 18)  SpO2: 94% (13 Sep 2017 04:46) (92% - 100%)    I&O's Summary    PHYSICAL EXAM  General Appearance: cooperative, no acute distress,   HEENT: PERRL, conjunctiva clear, EOM's intact, non injected pharynx,  Neck: Supple, , mild adenopathy,   Lungs: Clear to auscultation bilateral,no adventitious breath sounds, prolonged   expiratory phase, few scattered rhonchi  Heart: Regular rate and rhythm, S1, S2 normal, no murmur, rub or gallop  Abdomen: Soft, non-tender, bowel sounds active , no hepatosplenomegaly  Extremities: no cyanosis or edema, no joint swelling  Skin: Skin color, texture normal, no rashes   Neurologic: Alert and oriented X3 , cranial nerves intact, sensory and motor normal,    ECG:    LABS:                          12.4   15.9  )-----------( 112      ( 13 Sep 2017 06:07 )             36.5     -12    137  |  104  |  16  ----------------------------<  243<H>  4.5   |  27  |  1.30    Ca    8.3<L>      12 Sep 2017 12:40  Phos  3.4       Mg     2.0         TPro  6.0  /  Alb  3.6  /  TBili  0.7  /  DBili  x   /  AST  29  /  ALT  39  /  AlkPhos  124<H>  12            PT/INR - ( 13 Sep 2017 06:07 )   PT: 20.8 sec;   INR: 1.90 ratio         PTT - ( 12 Sep 2017 12:40 )  PTT:32.8 sec  Urinalysis Basic - ( 12 Sep 2017 15:24 )    Color: Yellow / Appearance: Clear / S.005 / pH: x  Gluc: x / Ketone: Negative  / Bili: Negative / Urobili: 1 mg/dL   Blood: x / Protein: 15 mg/dL / Nitrite: Negative   Leuk Esterase: Trace / RBC: 0-2 /HPF / WBC 3-5   Sq Epi: x / Non Sq Epi: Moderate / Bacteria: Few            RADIOLOGY & ADDITIONAL STUDIES:    < from: Xray Chest 1 View AP/PA. (17 @ 13:25) >  FINDINGS /   IMPRESSION:     There is no acute consolidation.  There are no pleural effusion. Cardiac   and mediastinal structures are within normal limits.    There are chronic   deformity of right lower RIBs.     < end of copied text >
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), hx of hypogammaglobulinemia s/p failed IVIG infusions 2/2 to immediate type hypersensitivity reactions, HFpEF (LVEF 55-60% in 2016), COPD, T2DM, HTN, Hepatitis B, Hx PE/DVT, and Recurrent PNA, now admitted on  for evaluation of productive cough, mild shortness of breath and fever; initially thought to be viral infection, however, now developing leukocytosis, still short of breath even while wearing oxygen, imaging consistent with pneumonia            PMH: as above  PSH: as above  Meds: per reconcilation sheet, noted below  MEDICATIONS  (STANDING):  amiodarone    Tablet 100 milliGRAM(s) Oral daily  gabapentin 300 milliGRAM(s) Oral three times a day  simvastatin 10 milliGRAM(s) Oral at bedtime  tenofovir 300 milliGRAM(s) Oral daily  metoprolol 25 milliGRAM(s) Oral two times a day  docusate sodium 100 milliGRAM(s) Oral three times a day  dextrose 5%. 1000 milliLiter(s) (50 mL/Hr) IV Continuous <Continuous>  ALBUTerol/ipratropium for Nebulization 3 milliLiter(s) Nebulizer every 6 hours  dextrose 50% Injectable 12.5 Gram(s) IV Push once  dextrose 50% Injectable 25 Gram(s) IV Push once  dextrose 50% Injectable 25 Gram(s) IV Push once  insulin glargine Injectable (LANTUS) 30 Unit(s) SubCutaneous at bedtime  insulin lispro Injectable (HumaLOG) 4 Unit(s) SubCutaneous three times a day before meals  insulin lispro (HumaLOG) corrective regimen sliding scale   SubCutaneous three times a day before meals  insulin lispro (HumaLOG) corrective regimen sliding scale   SubCutaneous at bedtime  cefepime  IVPB 2000 milliGRAM(s) IV Intermittent every 12 hours  cefepime  IVPB      doxycycline hyclate Capsule 100 milliGRAM(s) Oral every 12 hours    MEDICATIONS  (PRN):  acetaminophen   Tablet 650 milliGRAM(s) Oral every 6 hours PRN For Temp greater than 38 C (100.4 F)  dextrose Gel 1 Dose(s) Oral once PRN Blood Glucose LESS THAN 70 milliGRAM(s)/deciliter  glucagon  Injectable 1 milliGRAM(s) IntraMuscular once PRN Glucose LESS THAN 70 milligrams/deciliter  guaiFENesin   Syrup  (Sugar-Free) 100 milliGRAM(s) Oral every 6 hours PRN Cough    Allergies    Privigen (Other (Severe))    Intolerances      Social: no smoking, no alcohol, no illegal drugs; no recent travel, no exposure to TB  FAMILY HISTORY:  Family history of liver cancer (Father): father  85 liver CA  Family history of coronary arteriosclerosis (Mother): Mom  of MI age 68    ROS: the patient has no chills, no HA, no dizziness, no sore throat, no blurry vision, no CP, no palpitations, no abdominal pain, no diarrhea, no N/V, no dysuria, no leg pain, no claudication, no rash, no joint aches, no rectal pain or bleeding, no night sweats  Vital Signs Last 24 Hrs  T(C): 36.4 (16 Sep 2017 11:21), Max: 36.5 (15 Sep 2017 17:02)  T(F): 97.5 (16 Sep 2017 11:21), Max: 97.7 (15 Sep 2017 17:02)  HR: 83 (16 Sep 2017 11:21) (67 - 89)  BP: 135/66 (16 Sep 2017 11:21) (102/60 - 135/66)  BP(mean): --  RR: 18 (16 Sep 2017 11:21) (16 - 18)  SpO2: 95% (16 Sep 2017 11:21) (94% - 98%)  Daily     Daily Weight in k.8 (16 Sep 2017 05:30)  Constitutional: frail looking  HEENT: NC/AT, EOMI, PERRLA  Neck: supple  Respiratory: bilateral rhonchi  Cardiovascular: S1S2 regular, no murmurs  Abdomen: soft, not tender, not distended, positive BS  Genitourinary: deferred  Rectal: deferred  Musculoskeletal: no muscle tenderness, no joint swelling or tenderness  Neurological: AxOx3, moving all extremities, no focal deficits  Skin: no rashes                          11.8   24.8  )-----------( 133      ( 16 Sep 2017 05:55 )             35.2     09-16    139  |  105  |  35<H>  ----------------------------<  268<H>  4.6   |  28  |  1.22    Ca    8.1<L>      16 Sep 2017 05:55               Radiology:< from: CT Chest No Cont (17 @ 08:20) >    EXAM:  CT CHEST                            PROCEDURE DATE:  2017          INTERPRETATION:  CT CHEST    HISTORY:  Left lower lobe atelectasis, chest pain, shortness of breath        TECHNIQUE: Noncontrast CT of the chest was performed. [place]   sagittal images were reconstructed. This study was performed using   automatic exposure control (radiation dose reduction software) to obtain   a diagnostic image quality scan with patient dose as low as reasonably   achievable.    COMPARISON: Chest x-ray 22 hours prior, chest CT 2017    FINDINGS:    LUNGS, AIRWAYS: The central airways are patent. Improving right middle   and right lower lobe pneumonia, newly resolved. Bibasilar areas of linear   platelike atelectasis have increased. There is new groundglass airspace   opacity at the left upper lobe.    PLEURA: No pleural abnormality.    HEART AND VESSELS: Normal heart size. No pericardial effusion. Coronary   artery calcifications are present. Normal caliber thoracic aorta.    MEDIASTINUM AND JEFFREY: Stable mediastinal and bilateral axillary   adenopathy.    UPPER ABDOMEN: Left adrenal adenoma again noted. Retroperitoneal and   mesenteric adenopathy again partially visualized. Nodular surface contour   of the liver suggestive of cirrhotic change.    BONES AND SOFT TISSUES: Chronic bilateral rib deformities again noted.    IMPRESSION:     Improving right lower right middle lobe pneumonia. There is new   nonspecific groundglass opacity in the left upper lobe. Increasing   bibasilar linear and platelike atelectasis.    Multiple enlarged lymph nodes in the chest and upper abdomen consistent   with lymphoma.    < end of copied text >      Advanced directive addressed: full resuscitation
Stage 1: Number Of Blocks?: 1

## 2019-09-30 NOTE — PATIENT PROFILE ADULT. - NS PRO TALK SOMEONE YN
no Complex Repair And O-L Flap Text: The defect edges were debeveled with a #15 scalpel blade.  The primary defect was closed partially with a complex linear closure.  Given the location of the remaining defect, shape of the defect and the proximity to free margins an O-L flap was deemed most appropriate for complete closure of the defect.  Using a sterile surgical marker, an appropriate flap was drawn incorporating the defect and placing the expected incisions within the relaxed skin tension lines where possible.    The area thus outlined was incised deep to adipose tissue with a #15 scalpel blade.  The skin margins were undermined to an appropriate distance in all directions utilizing iris scissors.

## 2020-05-26 NOTE — PROGRESS NOTE ADULT - ASSESSMENT
Ongoing SW/CM Assessment/Plan of Care Note     See SW/CM flowsheets for goals and other objective data.    Patient/Family discharge goal (s):  Goal #1: Establish present or future health care decison-maker          PT Recommendation:       OT Recommendation:       SLP Recommendation:  Recommendations for Discharge: SLP: pending decision/timing of surgery    Disposition:  Planned Discharge Destination: Home/apartment with Services/Support    Progress note: DC planning. Per OFT and chart, pt ambulating independently and has laryngectomy scheduled for Friday, May 29th. Anticipate pt may be returning home with alma upon dc per OFT. No immediate SW needs identified at this time. Please re-consult SW if pt's status changes and/or further needs arise.     78 year old male as above     * back pain with compression fracture L1  -continue brace when OOB  -pain management  -IV Tylenol prn    Multiple Medical problems as per Medicine  #shortness of breath   #copd  # diabetes  # chf- acute on chronic present on admit   #hld   #hepatitis b w/ ascites-for drainage today?  #afib  #dvt prophylaxis

## 2020-09-09 NOTE — PROVIDER CONTACT NOTE (OTHER) - SITUATION
Gazyva completed at 2030, taken down. BP 74/39. Pt comfortable in no s/sx of distress. 09-Sep-2020 07:33

## 2020-10-01 NOTE — DIETITIAN INITIAL EVALUATION ADULT. - FLUIDS
Patient returned call results are given he had no questions and agrees with plan, he did share that he was called today to set the appt with Dr Mei and is putting it off until January 2021 as he is not available before that time when he gets new insurance, Dr Mei's office noted in chart and will follow up, we will let NP know as well.   [phone]

## 2020-10-16 NOTE — ED PROVIDER NOTE - SCRIBE NAME
Anesthesia Post Evaluation    Patient: Emily Mc    
Brandon Hahn

## 2020-12-09 NOTE — DISCHARGE NOTE ADULT - NS AS DC VTE INSTRUCTION
Infusion Nursing Note:  Roberto Spence presents today for Cycle 1 Day 8 Rituximab- abbs.    Patient seen by provider today: No- will see Dr. Wright after infusion today   present during visit today: Not Applicable.    Note: VSS, denies pain, any new shortness of breath, fevers, chest pain, numbness or tingling. Pt reports no new changes since last infusion appointment. No changes with voiding/bowels or change in appetite. Tolerating current regimen well.     Rituxan ran by the 50's today due to reaction with first cycle, pt tolerated without issue    Intravenous Access:  Peripheral IV placed.    Treatment Conditions:  Lab Results   Component Value Date    HGB 16.8 12/09/2020     Lab Results   Component Value Date    WBC 6.1 12/09/2020      Lab Results   Component Value Date    ANEU 3.3 12/09/2020     Lab Results   Component Value Date     12/09/2020      Results reviewed, labs MET treatment parameters, ok to proceed with treatment.    Post Infusion Assessment:  Patient tolerated infusion without incident.  Blood return noted pre and post infusion.  No evidence of extravasations.  Access discontinued per protocol.     Discharge Plan:   Patient declined prescription refills.  AVS to patient via [employer].  Patient will return 12/16/20 for next appointment.   Patient discharged in stable condition accompanied by: self.  Departure Mode: Ambulatory.  Face to Face time: 0.    Marissa Raymond RN                       Coumadin/Warfarin - Dietary Advice.../Coumadin/Warfarin - Follow-up monitoring.../Coumadin/Warfarin - Compliance.../Coumadin/Warfarin - Potential for adverse drug reactions and interactions

## 2021-10-08 NOTE — H&P ADULT - NSCORESITESY/N_GEN_A_CORE_RD
[FreeTextEntry1] : Mahad Isabelgayatri presents for follow-up of his right otitis externa which has resolved. Cerumen impacted removed from left ear. His symptoms have resolved.\par \par - Follow up as needed. No

## 2021-11-17 NOTE — H&P ADULT - PROBLEM SELECTOR PLAN 7
Bed: [facility]  Expected date:   Expected time:   Means of arrival:   Comments:  Lobby/art Hart RN  11/16/21 1936 - BP Stable  - Continue BP Meds

## 2022-01-10 NOTE — DISCHARGE NOTE ADULT - SECONDARY DIAGNOSIS.
PT HAD AN UNEVENTFUL DAY. NO OTHER CONCERNS. DM type 2 (diabetes mellitus, type 2) Essential hypertension Atrial fibrillation, unspecified type B-cell lymphoma, unspecified B-cell lymphoma type, unspecified body region Closed compression fracture of first lumbar vertebra, initial encounter Hyperlipidemia

## 2022-02-21 NOTE — ED PROVIDER NOTE - CPE EDP EYES NORM
Pt has further questions about her birth control pill, asking to talk to Rody again. Please call pt at    normal...

## 2022-06-28 NOTE — PROGRESS NOTE ADULT - SUBJECTIVE AND OBJECTIVE BOX
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 Cough is productive in nature with yellowish/whitish sputum. Associated symptoms include SOB and Low-grade Fevers (~100s).     9/14: still complains of intermittent wheezing and cob.cough  9/15: dyspnea slowly improving although wbc rising quickly on steroids.   9/16: slow to improve although wheezing has dissipated  9/17: status quo  9/18: leukocytosis worsening although patient endorses some improvement.   9/19: status quo. however patient feels well.   9/20: fever spike 102, no change in resp status  9/21: afebrile, no complaints, leukocytosis regressing        Vital Signs Last 24 Hrs  T(C): 37.1 (13 Sep 2017 04:46), Max: 38 (12 Sep 2017 12:31)  T(F): 98.8 (13 Sep 2017 04:46), Max: 100.4 (12 Sep 2017 12:31)  HR: 88 (13 Sep 2017 04:46) (88 - 109)  BP: 140/68 (13 Sep 2017 04:46) (128/72 - 150/76)  BP(mean): --  RR: 16 (12 Sep 2017 22:10) (16 - 18)  SpO2: 94% (13 Sep 2017 04:46) (92% - 100%)        PHYSICAL EXAM  General Appearance: cooperative, no acute distress,   HEENT: PERRL, conjunctiva clear, EOM's intact, non injected pharynx,  Neck: Supple, , mild adenopathy,   Lungs: adventitious lung sounds at bases at bases  expiratory phase, few scattered rhonchi  Heart: Regular rate and rhythm, S1, S2 normal, no murmur, rub or gallop  Abdomen: Soft, non-tender, bowel sounds active , no hepatosplenomegaly  Extremities: no cyanosis or edema, no joint swelling  Skin: Skin color, texture normal, no rashes   Neurologic: Alert and oriented X3 , cranial nerves intact, sensory and motor normal,    ECG:    LABS:                          12.4   15.9  )-----------( 112      ( 13 Sep 2017 06:07 )             36.5     09-12    137  |  104  |  16  ----------------------------<  243<H>  4.5   |  27  |  1.30    Ca    8.3<L>      12 Sep 2017 12:40  Phos  3.4     09-13  Mg     2.0     09-13    TPro  6.0  /  Alb  3.6  /  TBili  0.7  /  DBili  x   /  AST  29  /  ALT  39  /  AlkPhos  124<H>  09-12    A/P:     * Viral PNA/EV.RV/HCAP  - leukocytosis regressing-> modify abx to vanco/meropenem for broader coverage, suspected GNR/anarobes/resistant bacteria  - Hematology consult- smudge cells present. outpt chemo  - Multifocal pna, immunocompromised patient, hx of CLL/Rituxan  - Hold steroids. , c/w nebs  - mild thrombocytopenia probable consumption  - cont current lantus dose, premeal coverage/iss    * AF: stable, in NSR  - on low dose amio  - INR 1.5, 5mg coumadin add lovenox bridge    * h/o chr CLL  outpt treatment English

## 2022-07-05 NOTE — PATIENT PROFILE ADULT. - AS SC BRADEN [facility]RICTION
IBD CLINIC VISIT     ASSESSMENT/PLAN  22 year old reji with Ulcerative pancolitis on Humira and Azathioprine.     1. Ulcerative colitis, pancolitis:   Current UC medications:   - Adalimumab every week (started Nov 2018, dose escalated Nov 2019)   - Azathioprine 150mg (sepetmebr 2018)  Current clinical disease activity: in remission  Last endoscopic disease activity:flex sig (9/2021) Rocha 0, Veronica 0    We discussed overall trajectory and goal of getting off azathioprine given long term side effects. Discussed risk of antibodies to adalimumab and risk of injection reactions and flare. Patient in agreement to stop azathioprine and continue adalimumab monotherapy. Given patients prior reaction to infliximab, will write for an epi-pen for patient to have at hand. Will have IBD pharmacist call patient to review use and signs and symptoms of antibodies to adalimumab.     Should she develop antibodies or flare, we could try a newer biologic with a lower risk of antibodies, or consider a small molecule (e.g., ozanimod).     -- Continue Humira weekly   -- Stop azathioprine  -- Labs in 8-12 weeks (off azathioprine)  -- Fecal calprotectin in 8-12 weeks (off azathioprine)  -- Epipen written for to have on hand  -- IBD pharmacist check in, 1-2 weeks to assess for adalimumab induced drug reactions.     2. Iron deficiency anemia, low on 2/2021, resolved on 6/2021 recheck  Not currently on iron supplement after last recheck. Celiac serology negative. Could be slow occult bleeding from pseudopolyps.  -- Trend for anemia    3. History of C Diff infection, one episode in 2018, treated with oral vancomycin: in remission.    4. Dysplasia surveillance: Last colonoscopy 6/2019. Start at 8 years after IBD diagnosis.  -- Surveillance colonoscopy in 2023    Return to clinic in 6 months.    Thank you for this consultation.  It was a pleasure to participate in the care of this patient; please contact us with any further questions.  A total of  31 minutes was spent with this patient on the date of the encounter, 2022, in the following care activities: Chart review, patient care and documentation.      Theodore Moeller MD MS    [facility]  Inflammatory Bowel Disease Program  Division of Gastroenterology, Hepatology and Nutrition  Pager: [phone]    IBD HISTORY  Age at diagnosis: 16  Extent of disease: pancolitis  Prior UC surgeries:  Prior IBD Medications:  - Canasa  - Infliximab 5mg/kg (400mg) q6     DRUG MONITORING  TPMT enzyme activity:      6-TGN/6-MMPN levels:  7/15/20: 6-TGN: 137, 6MMPN 3888     Biologic concentration:  7/3/18: IFX: <1, Ab 30 (normal < 50) - Rocha test (q8 weeks, monotherapy)  18: IFX: Not detected, antibodies: 14     11/15/19: Adalimumab: 3.2, no antibodies     HPI:   Question about decreasing dose of azathiorpine    She is feeling well. Having 2 stools a day. No blood on her sotol. Stool are formed. No urgency. No upper GI symptoms.     She will get a cold sore 3-4 times a year, but self resolves and stable.     Rocha score:   Stool freq: 0 (baseline stools frequency)  Rectal bleedin (None)  PGA: 0 (normal)  Endoscopy: Not done    Remission: <3   Mild disease: 3-5  Moderate disease: 6-10  Severe disease: >10     Extra intestinal manifestations of IBD:  No uveitis/episcleritis  No: aphthous ulcers   No arthritis   No erythema nodosum/pyoderma gangrenosum.     sIBDQ:  IBDQ Score Date IBDQ - Total Score  (Higher score better)   11/15/2019 57      Last endoscopic activity:  Flexible sigmoidoscopy 2021:    Patho: A. TRANSVERSE/DESCENDING  COLON, POLYP:   Colonic mucosa with no no neoplastic or hyperplastic polyp; also no evidence of cryptitis (Veronica grade 0) or dysplasia    B. RECTUM, POLYPS: Inflammatory (pseudo)polyps; negative for dysplasia or malignancy     Colonoscopy 2019:  Moderate inflammation in distal rectum approx 2cm with eMayo score 2, diffuse pseudopolyps throughout the  colon.    Pertinent labs / imaging:   ESR 10, CRP 2.8 in Feb 2021    ROS:    Constitutional, HEENT, cardiovascular, pulmonary, GI, , musculoskeletal, neuro, skin, endocrine and psych systems are negative, except as otherwise noted.    PHYSICAL EXAMINATION:  Not performed    PERTINENT PAST MEDICAL HISTORY:  Past Medical History:   Diagnosis Date     Ulcerative colitis (H)        PREVIOUS SURGERIES:  No past surgical history on file.    ALLERGIES:     Allergies   Allergen Reactions     Infliximab Nausea and Other (See Comments)     FACE FLUSHING WITH ITCHY/SCRATCHY THROAT.        PERTINENT MEDICATIONS:    Current Outpatient Medications:      adalimumab (HUMIRA *CF* PEN) 40 MG/0.4ML pen kit, Inject 0.4 mLs (40 mg) Subcutaneous every 7 days, Disp: 4 each, Rfl: 5     azaTHIOprine (IMURAN) 50 MG tablet, Take 3 tablets (150 mg) by mouth daily, Disp: 90 tablet, Rfl: 3     UCERIS 2 MG/ACT FOAM, , Disp: , Rfl:     SOCIAL HISTORY:  Social History     Socioeconomic History     Marital status: Single     Spouse name: Not on file     Number of children: Not on file     Years of education: Not on file     Highest education level: Not on file   Occupational History     Not on file   Tobacco Use     Smoking status: Never Smoker     Smokeless tobacco: Never Used   Substance and Sexual Activity     Alcohol use: Not Currently     Drug use: Not Currently     Sexual activity: Not on file   Other Topics Concern     Not on file   Social History Narrative     Not on file     Social Determinants of Health     Financial Resource Strain: Not on file   Food Insecurity: Not on file   Transportation Needs: Not on file   Physical Activity: Not on file   Stress: Not on file   Social Connections: Not on file   Intimate Partner Violence: Not on file   Housing Stability: Not on file     FAMILY HISTORY:  No FH of IBD/CRC.  No family history on file.    Past/family/social history reviewed and no changes     (3) no apparent problem

## 2022-07-27 NOTE — PATIENT PROFILE ADULT. - BLOOD TRANSFUSION, PREVIOUS, PROFILE
Dear: Karson Lawton MD  [address] [place] [address],  We had the pleasure of seeing your patient in the Pediatric Nephrology Clinic in [facility] on 07/27/22. Please find our consultation summary below.    Kai Eduardo is a 7 year old male who presents for a  a follow up visit regarding:    Chief Complaint   Patient presents with   • Follow-up     Solitary Kidney        Kai is accompanied and history obtained by:  Mother.    History of present illness:  7 year old referred to pediatric nephrology for evaluation and management of a solitary kidney. He was seen before by Dr Agarwal and more recently by Dr. Yeh in 2019. He has been doing well, mom denied hematuria, dysuria, infections, or other symptoms. No history of hypertension, headache or visual disturbances. He is been evaluated by his PCP for obesity and hyperlipidemia though has not yet met with a dietician.     Mother notes that he occasionally had nighttime wetting. No daytime wetting. No stool incontinence. Occasional constipation. Mother stated that he seems to void more frequently than other people in the family though exact frequency unclear. Primarily drinks water, occasional juice.     Family history:  Maternal grandmother and maternal aunts with hypertension    US 12/4/19:  According to the standards of [facility] and [place], the normal renal length for a 4-year-old is approximately 7.9 cm (+/- 1.5 cm), for a range including 2 standard deviations in both directions of approximately 6.4 cm - 9.4 cm.     Right:  There is no normal right kidney, as on prior studies.  There is a small amount of tissue in the   right renal fossa, measuring approximately 2 cm.     Left:  Length: 9.5 cm, borderline enlarged for patient age. Previously 9.2 cm.  Parenchyma: Normal echogenicity with normal corticomedullary differentiation. No cortical   thinning or focal defect.  Cysts, masses: None.  Pelvicalyceal dilatation:  None.  Calculi: None.  Perinephric: No perinephric fluid or collection.  Ureter: Normal, no ureteral dilation.     Urinary bladder:  Partially distended. No debris, wall thickening, or focal wall abnormality.     Other findings: No free fluid or pelvic mass identified.     IMPRESSION:     Small tissue in the right renal fossa is nonspecific and may be adrenal tissue or may represent a   markedly atrophic dysplastic kidney.     Compensatory hypertrophy of the left kidney with otherwise normal appearance.    Labs in 11/2019 indicated Vit D deficiency and hyperlipidemia. Renal filtration function was normal. UA normal with no proteinuria.    Interval history:  Has not yet met with dietician, has not yet made significant changes to diet/activity. Labs and imaging not completed prior to visit. Mom reports continuing occasional constipation, not on stool softener or probiotic. Improvement in frequency of nighttime bedwetting.    Current Outpatient Medications   Medication Sig Dispense Refill   • amoxicillin (AMOXIL) 400 MG/5ML suspension 7 ml PO BID x 10 days 1 each 0     No current facility-administered medications for this visit.       ALLERGIES:  No Known Allergies    Past Medical History:   Diagnosis Date   • Chronic kidney disease        Past Surgical History:   Procedure Laterality Date   • Circumcision baby     • Skin graft         Family History   Problem Relation Age of Onset   • Hypertension Maternal Grandmother        Social History     Socioeconomic History   • Marital status: Single     Spouse name: Not on file   • Number of children: Not on file   • Years of education: Not on file   • Highest education level: Not on file   Occupational History   • Not on file   Tobacco Use   • Smoking status: Never Smoker   • Smokeless tobacco: Never Used   Substance and Sexual Activity   • Alcohol use: Not on file   • Drug use: Not on file   • Sexual activity: Not on file   Other Topics Concern   • Not on file   Social  History Narrative   • Not on file     Social Determinants of Health     Financial Resource Strain: Not on file   Food Insecurity: Not on file   Transportation Needs: Not on file   Physical Activity: Not on file   Stress: Not on file   Social Connections: Not on file   Intimate Partner Violence: Not on file       Review of Systems   Gastrointestinal: Positive for constipation.   Genitourinary: Positive for enuresis.   All other systems reviewed and are negative.      Vitals:    22 1123 22 1126   BP: 107/70 104/64   BP Location: LUE - Left upper extremity LUE - Left upper extremity   Patient Position: Sitting Sitting   Cuff Size: Regular Regular   Pulse: 88 89   Weight: (!) 61.7 kg (136 lb 2.1 oz)    Height: 4' 8.02\" (1.423 m)      Blood pressure percentiles are 68 % systolic and 61 % diastolic based on the 2017 AAP Clinical Practice Guideline. Blood pressure percentile targets: 90: 112/74, 95: 117/77, 95 + 12 mmH/89. This reading is in the normal blood pressure range.  BSA: 1.51 meters squared  Growth percentiles: 89 %ile (Z= 1.23) based on CDC (Boys, 2-20 Years) Stature-for-age data based on Stature recorded on 2022. >99 %ile (Z= 2.82) based on CDC (Boys, 2-20 Years) weight-for-age data using vitals from 2022.     Physical Exam  Vitals and nursing note reviewed.   Constitutional:       General: He is active. He is not in acute distress.     Appearance: Normal appearance. He is obese.   HENT:      Head: Normocephalic and atraumatic.   Eyes:      Conjunctiva/sclera: Conjunctivae normal.   Cardiovascular:      Rate and Rhythm: Normal rate and regular rhythm.   Pulmonary:      Effort: Pulmonary effort is normal. No respiratory distress.      Breath sounds: Normal breath sounds.   Abdominal:      General: There is no distension.      Tenderness: There is no abdominal tenderness.   Musculoskeletal:         General: No swelling or deformity.      Cervical back: Neck supple.   Skin:     General:  Skin is warm and dry.      Findings: No rash.   Neurological:      Mental Status: He is alert and oriented for age.   Psychiatric:         Mood and Affect: Mood normal.         Behavior: Behavior normal.         Lab and Imaging Results  Recent Results (from the past 4200 hour(s))   POCT INFLUENZA A/B    Collection Time: 03/16/22  9:49 AM   Result Value Ref Range    Rapid Influenza A Ag Negative Negative, Indeterminate    Rapid Influenza B Ag Negative Negative, Indeterminate       Assessment and Plan:  Problem List Items Addressed This Visit        Cardiac and Vasculature    Mixed hyperlipidemia       Endocrine and Metabolic    BMI (body mass index), pediatric, > 99% for age    Relevant Orders    GLYCOHEMOGLOBIN    Vitamin D deficiency    Relevant Orders    VITAMIN D -25 HYDROXY       Genitourinary and Reproductive    Solitary kidney, congenital - Primary    Relevant Orders    COMPREHENSIVE METABOLIC PANEL    MAGNESIUM    PHOSPHORUS    URINALYSIS WITH MICROSCOPY EXAM W/O C/S    PROTEIN/CREATININE RATIO, URINE    MICROALBUMIN URINE RANDOM    US KIDNEY(S) AND BLADDER URINARY TRACT      Other Visit Diagnoses     Hyperlipidemia, mixed        Relevant Orders    LIPID PANEL WITHOUT REFLEX      Counseled in detail on precautions around management of solitary kidney, including importance of hydration, avoiding urine/stool holding, avoiding constipation, seeking early medical attention for signs and symptoms of urinary tract infection, avoiding routine NSAID use, and avoiding high impact sports/activities in the future.    Counseled in detail on the need for improved diet and lifestyle choices as well as long term implications of obesity on cardiovascular and renal health. Provided educational resources including MyPlate and DASH diet websites. Suggested nutrition visit.     Provided constipation handout, discussed increasing dietary fiber and potentially using a stool softener as needed to achieve 1-2 soft stools  daily.    Ordered repeat US and labs following visit, including lipids and Vit D given previous abnormalities.     I spent 45 minutes on face-to-face time with patient and/or family. Greater than 50% of the visit was spent on counseling.     I reviewed the above recommendations with patient/family who expressed understanding and agreed with this plan.    This visit was conducted by audio/visual telehealth as a precaution due to the COVID19 pandemic. Patient and/or family consent was acquired prior to the visit and the patient was at home while the physician was in a secure setting. Physical exam was not possible and this report is based on my conversation with the family. Results should be interpreted accordingly. Date and time of patient contact is consistent with the patient's scheduled appointment time.    Thank you very much for allowing me to participate in the care of this patient. If you have any questions, please do not hesitate to contact me.     Ainsley Li MD  Pediatric Nephrology  [facility]/ [facility]   yes

## 2022-08-30 NOTE — PATIENT PROFILE ADULT. - HAS THE PATIENT USED TOBACCO IN THE PAST 30 DAYS?
No Render In Strict Bullet Format?: No Detail Level: Zone Initiate Treatment: -ketoconazole 2 % shampoo \\nSig: Apply once a week for maintenance then as needed to scalp. Leave in 5 minutes before rinsing. Initiate Treatment: -Epiduo 0.1 %-2.5 % topical gel with pump \\nSig: Apply to affected areas of the face once daily.

## 2022-09-21 NOTE — ED PROVIDER NOTE - PRINCIPAL DIAGNOSIS
----- Message from Isreal Mcpherson MD sent at 9/21/2022  2:58 PM EDT -----  Please let the patient know that her nuclear stress test was a normal and low risk study. ----- Message -----  From: Pepper Hemphill LPN  Sent: 6/24/9710  10:37 AM EDT  To: Isreal Mcpherson MD    Per your note - Dyspnea on exertion. This may just be due to deconditioning, but given her strong family history of coronary disease and multiple cardiac risk factors, have recommended repeating a pharmacologic nuclear stress test for further restratification. Hyponatremia

## 2022-10-18 NOTE — PHYSICAL THERAPY INITIAL EVALUATION ADULT - LEVEL OF INDEPENDENCE: SIT/STAND, REHAB EVAL
Medicare Annual Wellness Visit    Heri Tomas is here for Medicare AWV    Assessment & Plan   Medicare annual wellness visit, subsequent  Essential hypertension  -     CT CARDIAC CALCIUM SCORING; Future  Mixed hyperlipidemia  -     CT CARDIAC CALCIUM SCORING; Future    Recommendations for Preventive Services Due: see orders and patient instructions/AVS.  Recommended screening schedule for the next 5-10 years is provided to the patient in written form: see Patient Instructions/AVS.     Return for Medicare Annual Wellness Visit in 1 year. Subjective   The following acute and/or chronic problems were also addressed today:    Less carbs in diet. More consistent with exercise. Patient's complete Health Risk Assessment and screening values have been reviewed and are found in Flowsheets. The following problems were reviewed today and where indicated follow up appointments were made and/or referrals ordered. Positive Risk Factor Screenings with Interventions:        Alcohol Screening:  Alcohol Use: Heavy Drinker    Frequency of Alcohol Consumption: 4 or more times a week    Average Number of Drinks: 1 or 2    Frequency of Binge Drinking: Less than monthly     AUDIT-C Score: 5  AUDIT Total Score: 5  An AUDIT-C score of 3-7 indicates potential alcohol risk. An AUDIT Total score of 8 or more is associated with harmful or hazardous drinking. A score of 13 or more in women, and 15 or more in men, is likely to indicate alcohol dependence.   Substance Use - Alcohol Interventions:  Reviewed recommended alcohol consumption guidelines with the patient    Drug Use Screening: DAST-10 Score: 0  DAST-10 Score Interpretation:  1-2: Low level - Monitor, re-assess at a later date; 3-5: Moderate level - Further Investigation; 6-8: Substantial level - Intensive Assessment; 9-10: Severe level - Intensive Assessment  Substance Use - Drug Use Interventions:  patient is not ready to change his/her recreational drug use behavior at this time       [facility] and ACP:  General  In general, how would you say your health is?: Very Good  In the past 7 days, have you experienced any of the following: New or Increased Pain, New or Increased Fatigue, Loneliness, Social Isolation, Stress or Anger?: No  Do you get the social and emotional support that you need?: Yes  Do you have a Living Will?: Yes    Advance Directives       Power of  Living Will ACP-Advance Directive ACP-Power of     Not on File Not on File Not on File Not on File          General Health Risk Interventions: Will get living will on file              Objective   Vitals:    10/18/22 1323   BP: 124/72   Site: Left Upper Arm   Position: Sitting   Cuff Size: Medium Adult   Pulse: 62   SpO2: 98%   Weight: 177 lb 3.2 oz (80.4 kg)   Height: 5' 8.4\" (1.737 m)      Body mass index is 26.63 kg/m². General Appearance: alert and oriented to person, place and time, well-developed and well-nourished, in no acute distress  Neck: neck supple and non tender without mass, no thyromegaly or thyroid nodules, no cervical lymphadenopathy   Pulmonary/Chest: clear to auscultation bilaterally- no wheezes, rales or rhonchi, normal air movement, no respiratory distress  Cardiovascular: normal rate, normal S1 and S2, no gallops, intact distal pulses, and no carotid bruits       Allergies   Allergen Reactions    Aspirin      Prior to Visit Medications    Medication Sig Taking?  Authorizing Provider   methylcellulose (CITRUCEL) 500 MG TABS Take 1 tablet by mouth daily Yes Historical Provider, MD   tamsulosin (FLOMAX) 0.4 MG capsule Take 0.4 mg by mouth daily Yes Historical Provider, MD   lisinopril (PRINIVIL;ZESTRIL) 10 MG tablet Take 1 tablet by mouth daily Yes Krish Diaz MD   simvastatin (ZOCOR) 10 MG tablet Take 1 tablet by mouth nightly Yes Krish Diaz MD   fluticasone [facility]) 50 MCG/ACT nasal spray 2 sprays by Nasal route daily Yes Krish Diaz MD   NONFORMULARY Take 10 mg by mouth daily Yes Historical Provider, MD   Cholecalciferol (VITAMIN D3) 2000 UNITS CAPS Take 1 capsule by mouth daily. Yes Historical Provider, MD   Multiple Vitamin (MULTIVITAMINS PO) Take  by mouth. Yes Megan Garcia MD   fish oil-omega-3 fatty acids 1000 MG capsule Take 2 g by mouth daily.  Yes Megan Garcia MD       CareTe (Including outside providers/suppliers regularly involved in providing care):   Patient Care Team:  Chito Raymond MD as PCP - General (Family Medicine)  Chito Raymond MD as PCP - [facility] Empaneled Provider  South Pedersen MD as Consulting Physician (Gastroenterology)     Reviewed and updated this visit:  Tobacco  Allergies  Meds  Med Hx  Surg Hx  Soc Hx  Fam Hx supervision

## 2022-10-31 NOTE — ED PROVIDER NOTE - PMH
Breath sounds clear and equal bilaterally.
Atrial fibrillation, unspecified type    COPD (chronic obstructive pulmonary disease)  Lymphoma   Atrial fibrillation  Lymphoma, unspecified body region, unspecified lymphoma type

## 2023-02-07 NOTE — PATIENT PROFILE ADULT - FALL HARM RISK CONCLUSION
Mona is a 13 y.o. female with Crohn's disease.  Her Crohns phenotype is inflammatory, non-penetrating, non-stricturing.    Extent of disease involvement   Macroscopic lower tract involvement: ileal only  Macroscopic upper GI tract disease proximal to Ligament of Treitz: no      Macroscopic upper GI tract disease distal to Ligament of Treitz: no      Perianal disease: yes      Current symptoms (on the worst day in past 7 days)  She reports on the worst day her general well-being is fair.     Limitations in daily activities were described as: occasional.    Abdominal pain: mild.    Stool number on the worst day in past 7 days: 1  .  The number of liquid/watery stools per day was 1  .  Most of the stools were described as formed.     Nocturnal diarrhea: no  .  She reported no bloody stools  .   .    Extraintestinal manifestations:   Fever greater than 38.5C for 3 of last 7 days: no    Definite arthritis: yes    Uveitis: no    Erythema nodosum:  no     Pyoderma gangrenosum: no        Current meds/therapies:    Current Outpatient Medications:     clindamycin (CLEOCIN T) 1 % external solution, Apply to affected areas of face BID prn acne., Disp: 30 mL, Rfl: 3    mesalamine (PENTASA) 500 MG CR capsule, Take 2 capsules (1,000 mg total) by mouth 3 (three) times daily., Disp: 180 capsule, Rfl: 11    polyethylene glycol (GLYCOLAX) 17 gram PwPk, Take by mouth., Disp: , Rfl:     tretinoin (RETIN-A) 0.025 % cream, Apply topically every evening. Apply a pea-sized amount to entire face qhs., Disp: 20 g, Rfl: 5    ferrous sulfate 324 mg (65 mg iron) TbEC, Take 1 tablet (324 mg total) by mouth 2 (two) times daily. (Patient not taking: Reported on 8/24/2022), Disp: 60 tablet, Rfl: 3    norethindrone-ethinyl estradiol (MICROGESTIN 1/20) 1-20 mg-mcg per tablet, TAKE 1 TABLET BY MOUTH EVERY DAY (Patient not taking: Reported on 2/2/2023), Disp: 63 tablet, Rfl: 4    pimecrolimus (ELIDEL) 1 % cream, Apply to affected areas of  "face/eyelids BID prn rash. (Patient not taking: Reported on 7/6/2022), Disp: 30 g, Rfl: 3   Enteral supplement: is not on an enteral supplement  .     .    Objective:  /65   Pulse 74   Temp 97.6 °F (36.4 °C)   Ht 5' 6.14" (1.68 m)   Wt 50.8 kg (111 lb 14.1 oz)   BMI 17.98 kg/m²   See above    Assessment:  Based on current information, my global assessment of current disease status is her disease is moderate.   Monas growth status is satisfactory.  The overall nutritional status is satisfactory.      Plan:  Her primary gastroenterologist will be  .  Kajal Barboza NP          " Fall with Harm Risk

## 2023-03-06 NOTE — PATIENT PROFILE ADULT. - DATE/TIME OF ACCEPTANCE
Chief Complaint  Asthma    Subjective    History of Present Illness     Kami Villanueva presents to [facility] for:    History of Present Illness  Management of her asthma.  She is a never smoker.  She is followed by Dr. 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  She indicates her most recent checkup went well.  She has occasional shortness of breath and coughing.  She has Arnuity available to use as needed.  She does not take it routinely due to hoarseness and problems with her vocal cords.  She has not required it at all since I saw her last.       Prior to Admission medications    Medication Sig Start Date End Date Taking? Authorizing Provider   acetaminophen-codeine (TYLENOL with CODEINE #3) 300-30 MG per tablet acetaminophen 300 mg-codeine 30 mg tablet    ProviderCortez MD   carvedilol (COREG) 3.125 MG tablet Take 1 tablet by mouth 2 (Two) Times a Day. 12/23/18   Roger Tate MD   Coenzyme Q10 (CoQ10) 100 MG capsule Take 100 mg by mouth Daily.    ProviderCortez MD   Cyanocobalamin (Vitamin B 12) 500 MCG tablet Take 500 mcg by mouth 2 (Two) Times a Day.    Cortez Corbett MD   furosemide (LASIX) 40 MG tablet Take 40 mg by mouth Daily.    Cortez Corbett MD   guaiFENesin (MUCINEX) 600 MG 12 hr tablet Take 1 capsule by mouth Daily As Needed (congestion).    Emergency, Nurse Epic, RN   levothyroxine (SYNTHROID, LEVOTHROID) 100 MCG tablet Take 100 mcg by mouth Daily.    Cortez Corbett MD   levothyroxine (SYNTHROID, LEVOTHROID) 112 MCG tablet Synthroid 112 mcg tablet   Take 1 tablet every day by oral route.    Cortez Corbett MD   Mirabegron ER (MYRBETRIQ) 50 MG tablet sustained-release 24 hour 24 hr tablet Take 50 mg by mouth Daily.    Cortez Corbett MD   Multiple Vitamin tablet Take 1 tablet by mouth.    Cortez Corbett MD   nystatin (MYCOSTATIN) 038763 UNIT/ML  "suspension nystatin 100,000 unit/mL oral suspension 12/13/19   Cortez Corbett MD   oxybutynin XL (DITROPAN-XL) 5 MG 24 hr tablet oxybutynin chloride ER 5 mg tablet,extended release 24 hr    Cortez Corbett MD   potassium chloride ER (K-TAB) 20 MEQ tablet controlled-release ER tablet Take 20 mEq by mouth 2 (Two) Times a Day. 7/15/21   Cortez Corbett MD   Probiotic Product (PROBIOTIC-10 PO) Take 10 mg by mouth Daily.    Cortez Corbett MD       Social History     Socioeconomic History   • Marital status:    Tobacco Use   • Smoking status: Never   • Smokeless tobacco: Never   Substance and Sexual Activity   • Alcohol use: No   • Drug use: No   • Sexual activity: Defer       Objective   Vital Signs:   /72   Pulse 92   Ht 165.1 cm (65\")   Wt 81.2 kg (179 lb)   SpO2 96% Comment: ROCHELLE  BMI 29.79 kg/m²     Physical Exam  Constitutional:       Interventions: Face mask in place.   HENT:      Head:     Eyes:      Comments: Glasses   Cardiovascular:      Rate and Rhythm: Normal rate and regular rhythm.      Heart sounds: No murmur heard.  Pulmonary:      Effort: Pulmonary effort is normal.      Breath sounds: Normal breath sounds.   Chest:       Musculoskeletal:      Right lower leg: No edema.      Left lower leg: No edema.   Neurological:      Mental Status: She is alert and oriented to person, place, and time.        Result Review :    PFT Values        Some values may be hidden. Unless noted otherwise, only the newest values recorded on each date are displayed.         Old Values PFT Results 3/15/22 3/21/23   No data to display.      Pre Drug PFT Results 3/15/22 3/21/23    103   FEV1 102 98   FEF 25-75% 107 108   FEV1/FVC 72 71      Post Drug PFT Results 3/15/22 3/21/23   No data to display.      Other Tests PFT Results 3/15/22 3/21/23   No data to display.           Results for orders placed in visit on 03/21/23    Pulmonary Function Test    Narrative  Pulmonary Function " Test  Performed by: Emelina Avery, DOMINGUEZ  Authorized by: Olivia Quiroz APRN    Pre Drug % Predicted  FVC: 103%  FEV1: 98%  FEF 25-75%: 108%  FEV1/FVC: 71%    Interpretation  Spirometry  Spirometry shows normal results. Post-bronchodilator the ratio shows normal results.  Review of FVL curve  Patient's effort is normal.      Results for orders placed in visit on 03/15/22    Pulmonary Function Test    Narrative  Pulmonary Function Test  Performed by: Emelina Avery, RRT  Authorized by: Olivia Quiroz APRN    Pre Drug % Predicted  FVC: 106%  FEV1: 102%  FEF 25-75%: 107%  FEV1/FVC: 72%    Interpretation  Spirometry  Spirometry shows normal results.  Review of FVL curve  Patient's effort is normal. There is a flattening of the inspiratory curve, suggesting variable extrathoracic obstruction.  Overall comments: Patient has known vocal cord dysfunction      Results for orders placed in visit on 08/01/19    Pulmonary Function Test        My interpretation of imaging:  none    My interpretation of labs: none      Assessment and Plan     Diagnoses and all orders for this visit:    1. Mild intermittent asthma without complication (Primary)  Comments:  PFTs normal.  Not requiring inhaler.  No more PFTs at this point.  Call if getting worse otherwise annual follow-up is sufficient  Orders:  -     Pulmonary Function Test    2. Diffuse large B-cell lymphoma of lymph nodes of head (HCC)  Comments:  Contributes to vocal cord paralysis which contributes to cough and shortness of breath.  Keep follow-up with oncology    3. Complete paralysis of vocal cord  Comments:  Could contribute to coughing and shortness of breath.  She is aware.      Body mass index is 29.79 kg/m².      GLENDA Greenfield  3/21/2023  16:09 CDT    Follow Up   Return in about 1 year (around 3/21/2024).    Patient was given instructions and counseling regarding her condition or for health maintenance advice. Please see specific information  pulled into the AVS if appropriate.      01-Jan-2018 18:01

## 2023-05-22 NOTE — PATIENT PROFILE ADULT. - NS PRO ABUSE SCREEN SUSPICION NEGLECT YN
no Intermediate Repair And Graft Additional Text (Will Appearing After The Standard Complex Repair Text): The intermediate repair was not sufficient to completely close the primary defect. The remaining additional defect was repaired with the graft mentioned below.

## 2023-05-31 NOTE — DIETITIAN INITIAL EVALUATION ADULT. - ORAL INTAKE PTA
PT Evaluation     Today's date: 2023  Patient name: Laura Marks  : 1976  MRN: [id number]  Referring provider: Kezia Rodriguez  Dx:   Encounter Diagnosis     ICD-10-CM    1  Status post surgery  [id number] Ambulatory referral to Physical Therapy      2  Lumbar back pain with radiculopathy affecting left lower extremity  [id number] Ambulatory referral to Physical Therapy                     Assessment  Assessment details: Pt demonstrates post-operative impairments of lumbar ROM, hip ROM, core stability, sensation, LLE strength, and pain  He will benefit from skilled PT services to address his impairments in order to decrease pain and restore function  Impairments: abnormal or restricted ROM, abnormal movement, activity intolerance, impaired physical strength, lacks appropriate home exercise program, pain with function, weight-bearing intolerance, poor posture  and poor body mechanics  Understanding of Dx/Px/POC: good   Prognosis: good    Goals  STG - 2-4 wks  1) pt will improved lumbar extension ROM 50%  2) pt will improve pain 50%    LTG - 4-8 wks  1) pt will normalize lumbar extension ROM  2) pt will RTW  3) pt will improve pain 90%    Plan  Planned modality interventions: low level laser therapy  Planned therapy interventions: abdominal trunk stabilization, joint mobilization, manual therapy, neuromuscular re-education, patient education, postural training, strengthening, stretching, therapeutic exercise, therapeutic activities, home exercise program, functional ROM exercises, flexibility and body mechanics training  Frequency: 2x week  Duration in weeks: 8  Treatment plan discussed with: patient        Subjective Evaluation    History of Present Illness  Mechanism of injury: Pt is a 48 y o  male with PMHx significant for GERD  He presents to PT s/p L3/4, 4/5 L hemilaminotomy, foraminotomy, discectomy 23   He reports less intense and frequent L anterior, medial, and lateral thigh pain and improved LLE strength since sx but continues to have constant anterior L LL numbness      Pain  Current pain ratin  At best pain ratin  At worst pain ratin  Quality: dull ache and radiating  Relieving factors: rest and change in position  Aggravating factors: standing, walking and lifting  Progression: improved    Social Support    Employment status: not working ([employer] [employer] [facility] [employer] [address])  Treatments  No previous or current treatments  Patient Goals  Patient goals for therapy: decreased pain, increased strength, independence with ADLs/IADLs, return to work and return to [employer] activities          Objective     Postural Observations  Seated posture: poor  Standing posture: poor  Correction of posture: has no consistent effect (improves LBP, no change LLE)    Additional Postural Observation Details  Lumbar area: possible stitch coming out at superior end of incision, otherwise healed, no s/s infection    Neurological Testing     Sensation     Lumbar   Left   Diminished: light touch    Right   Intact: light touch    Comments   Left light touch: anterior, medial, lateral LL and dorsal foot    Reflexes   Left   Patellar (L4): trace (1+)  Achilles (S1): trace (1+)    Right   Patellar (L4): trace (1+)  Achilles (S1): trace (1+)    Active Range of Motion     Lumbar   Flexion:  WFL  Extension:  Restriction level: maximal  Left lateral flexion:  WFL  Right lateral flexion:  WFL  Left rotation:  WFL  Right rotation:  WFL    Passive Range of Motion   Left Hip   Flexion: 95 degrees   External rotation (90/90): 40 degrees   Internal rotation (90/90): 20 degrees     Right Hip   Flexion: 100 degrees   External rotation (90/90): 45 degrees   Internal rotation (90/90): 12 degrees   Mechanical Assessment    Cervical      Thoracic      Lumbar    Lying extension: repeated movements  Pain location: no change    Strength/Myotome Testing     Left Hip   Planes of Motion   Flexion: 4-    Right Hip   Planes of Motion   Flexion: "4    Left Knee   Flexion: 4  Extension: 4-    Right Knee   Flexion: 4+  Extension: 4+    Left Ankle/Foot   Dorsiflexion: 4  Plantar flexion: 4  Great toe extension: 4+    Right Ankle/Foot   Dorsiflexion: 4+  Plantar flexion: 4+  Great toe extension: 4+    Tests     Lumbar     Left   Negative passive SLR (68 deg) and slump test      Right   Negative passive SLR (80 deg) and slump test               Precautions:  PMHx: GERD  No lifting > 15 lbs  Dx: s/p L L3/4, 4/5 hemilaminectomy, foraminotomy, discectomy 5/12/23    Manuals 5/31                                                                Neuro Re-Ed             Abdominal bracing 5\"x15            Supine DLS marches 1C31            bridges 3x10            birddog             Prone press-ups 3x10            L seated sciatic nerve flossing                          Ther Ex             SKC             B supine glute stretch                                                                                           Ther Activity                                       Gait Training                                       Modalities                                          " good

## 2023-09-07 NOTE — PROGRESS NOTE ADULT - SUBJECTIVE AND OBJECTIVE BOX
HPI:  78 y.o. male with Diabetes COPD, Arrythmia , Hypogammaglobulinemia, multiple pneumonias, prior + Hepatitis B, Cirrhosis and  CLL/lymphoma for the last 14 years; treatment has been minimal in terms of chemotherapy ( CVP many years ago) / Gammaglobulin infusions ( For recurrent pneumonia) ; Over the last year there has been a concern By Pulmonary medicine/ GI regarding progressive adenopathy / SLL contributing to his morbidity ; therefore we suggested he start on  Obinutuzumab ( Anti CD 20) single agent; admitted for first cycle in anticipation of side effects ( Respiratory, blood pressure/ etc) ; other therapies  were felt to be potentially risky, for example Ibrutinib ( Arrythmia, on anticoagulation ), Bendamustine ( Immune suppression and so on    had his first dose of Obinutuzumab yesterday with no major side effects. he did get his lasix and became hypotensive to 70/- but was not symptomatic    feeling well today    no shortness of breath or chest pain, no fever      PAST MEDICAL & SURGICAL HISTORY:  Hypogammaglobulinemia: received IVIG  Hepatitis B virus infection, unspecified chronicity: retrovirals  Chronic diastolic congestive heart failure  Essential hypertension  DM type 2 (diabetes mellitus, type 2)  B-cell lymphoma, unspecified B-cell lymphoma type, unspecified body region: CLL stage 4/SLL; met NHL  Atrial fibrillation, unspecified type  COPD (chronic obstructive pulmonary disease)  History of esophageal dilatation  S/P cholecystectomy  H/O prostatectomy            REVIEW OF SYSTEMS      General: Good appetite, no weight loss;  active;  able to care for self    Skin: No rash, no pruritis, no hives  	  Ophthalmologic: No visual blurring, no diplopia  	  HEENT: No neck pains, sore throat, hearing difficulty, tinnitus    Respiratory and Thorax:  No dyspnea, cough, sputum production, hoarseness, hemoptysis. No pleurisy, chest pains  	  Cardiovascular: No palpitations, chest pains, dyspnea on exertion; no PND, orthopnea    Gastrointestinal: No abdominal pains, bloating. No reflux symptoms, dysphagia; No change in bowel habits,   diarrhea, constipation ;No blood in stools    Genitourinary: No dysuria, frequency, hematuria. No flank pains    Musculoskeletal: No musculoskeletal pains, no joint pains or swelling    Neurological: No headaches, diplopia, dysarthria, dysphagia, weakness, parasthesia	    Psychiatric: No depression, anxiety	    Hematology/Lymphatics: No swollen glands, masses, edema	    Allergic/Immunologic:	Negative  PHYSICAL EXAM:      Vital Signs Last 24 Hrs  T(C): 36.3 (28 Jun 2018 04:58), Max: 37.2 (27 Jun 2018 11:32)  T(F): 97.4 (28 Jun 2018 04:58), Max: 98.9 (27 Jun 2018 11:32)  HR: 83 (28 Jun 2018 09:03) (80 - 108)  BP: 96/64 (28 Jun 2018 09:03) (74/39 - 127/70)  BP(mean): --  RR: 17 (28 Jun 2018 09:03) (16 - 18)  SpO2: 92% (28 Jun 2018 09:03) (92% - 98%)      Constitutional: Appears comfortable, in  NAD, A/O X 3     Eyes: EOMI, PERRLA ;No icterus    ENMT:  No oral lesions, thrush; pharynx not injected    Neck:  Supple; No masses, lymph nodes, no bruits    Breasts: NA    Back: No tenderness     Respiratory:  Clear to A/P, No wheezes, rhonchi, rales. No dullness to percussion    Cardiovascular: Normal rate and rhythm; normal S1 and S2; No murmurs. No gallop    Gastrointestinal: No distension, normal bowl sounds; No tendeness , guarding, rebound;  no masses, no hepatosplenimegaly, no fluid wave    Genitourinary: No flank pains, no inguninal adenopathy    Rectal: NA    Extremities:  No phlebitis, no edema    Vascular: No acrocyanosis, no edema    Neurological: Alert and oriented. Cranial nerves II-XII WNL, Upper extremity / lower extremity  strength WNL;  Reflexes WNL, Plantar downgoing ; No cerebellar signs    Skin: No rash, petichae, purpura, echymoses    Lymph Nodes: No cervical, supraclavicular, axillary, inguinal adenopathy    Musculoskeletal: No joint swelling    Psychiatric: Alert, oriented, normal affect        LABS: Pending                          11.3   16.12 )-----------( 103      ( 28 Jun 2018 05:41 )             35.0     06-28    133<L>  |  100  |  29<H>  ----------------------------<  276<H>  4.6   |  23  |  1.45<H>    Ca    8.1<L>      28 Jun 2018 05:41    TPro  5.6<L>  /  Alb  2.9<L>  /  TBili  0.4  /  DBili  x   /  AST  23  /  ALT  19  /  AlkPhos  94  06-27    LIVER FUNCTIONS - ( 27 Jun 2018 09:37 )  Alb: 2.9 g/dL / Pro: 5.6 gm/dL / ALK PHOS: 94 U/L / ALT: 19 U/L / AST: 23 U/L / GGT: x           PT/INR - ( 28 Jun 2018 05:41 )   PT: 35.4 sec;   INR: 3.20 ratio         PTT - ( 27 Jun 2018 10:29 )  PTT:37.7 sec Dapsone Counseling: I discussed with the patient the risks of dapsone including but not limited to hemolytic anemia, agranulocytosis, rashes, methemoglobinemia, kidney failure, peripheral neuropathy, headaches, GI upset, and liver toxicity.  Patients who start dapsone require monitoring including baseline LFTs and weekly CBCs for the first month, then every month thereafter.  The patient verbalized understanding of the proper use and possible adverse effects of dapsone.  All of the patient's questions and concerns were addressed.

## 2024-03-01 NOTE — ED ADULT NURSE NOTE - NS ED NOTE ABUSE RESPONSE YN
- Reviewing, and interpreting labs and testing.  - Independently obtaining a review of systems and performing a physical exam  - Counselling and educating patient and family regarding interpretation of aforementioned items and plan of care. Yes

## 2024-03-13 NOTE — PROVIDER CONTACT NOTE (OTHER) - REASON
Department of Anesthesiology  Postprocedure Note    Patient: Deja Meza  MRN: [id number]  YOB: 2001  Date of evaluation: 4/26/2023      Procedure Summary     Date: 04/26/23 Room / Location: [facility] / [facility]    Anesthesia Start: 4378 Anesthesia Stop: 0188    Procedure: EGD BIOPSY Diagnosis:       Gastroesophageal reflux disease without esophagitis      (Gastroesophageal reflux disease without esophagitis [K21.9])    Surgeons: Lucian Mccabe DO Responsible Provider: Jose Elias Mccann MD    Anesthesia Type: MAC ASA Status: 3          Anesthesia Type: No value filed.     James Phase I: James Score: 10    James Phase II:        Anesthesia Post Evaluation    Patient location during evaluation: bedside  Patient participation: complete - patient participated  Level of consciousness: awake and alert  Airway patency: patent  Nausea & Vomiting: no nausea and no vomiting  Complications: no  Cardiovascular status: blood pressure returned to baseline  Respiratory status: acceptable  Hydration status: euvolemic Infectious Disease Consult normal

## 2024-04-29 NOTE — DIETITIAN INITIAL EVALUATION ADULT. - PATIENT PROFILE REVIEWED
yes have been continued, modifications in meds and other orders/labs as follows:                 Encounter Medications        Orders Placed This Encounter   Medications    ARIPiprazole (ABILIFY) 10 MG tablet       Sig: Take 1 tablet by mouth daily       Dispense:  30 tablet       Refill:  0    atomoxetine (STRATTERA) 25 MG capsule       Sig: Take 1 capsule by mouth daily       Dispense:  30 capsule       Refill:  0                     No orders of the defined types were placed in this encounter.        9. Additional comments: Continue therapy, discussed sleep hygiene, discussed the use of coping skills and relaxation strategies to manage symptoms.            10.Over 50% of the total visit time of   30  minutes was spent on counseling and/or coordination of care of:                         1. Psychosis, unspecified psychosis type (HCC)    2. Attention deficit hyperactivity disorder (ADHD), unspecified ADHD type                           Psychotherapy Topics: mood/medication effectiveness, hallucinations     DESTINY Menjivar - CNP

## 2024-08-19 NOTE — PROGRESS NOTE ADULT - ASSESSMENT
80M with CLL/SLL, here for initiation of chemo with high risk for  respiratory side effects/hypotension       *CLL/Lymphoma  for last 14 years for initiation of chemo   -admit to [facility]  -Onc Dr. Houser - can be discharged and f/u in 1 week   -Obinutuzumab trial   Obinutuzumab ( Gazyva ) 100 mg IV day 1, then 900 mg IV x 1 on day 2   Further therapy on days 8, 15, of 28 day cycle for cycle 1   -prn benadryl, steroids, nebs  -monitor respiratory status, hemodynamic stability    * Episodes of hypotension  * CHRISTINA   - hold lasix, BB  - IV hydration @ 50 overnight  - inpatient monitoring     *Hx of Hep B with Cirrhosis, Chronic Ascites  -well controlled  -c/w viread, hold aldactone, lasix due to hypotension     *AFib on coumadin  -well controlled, c/w coumadin, metoprolol  -daily INR, goal 2-3  - INR >3, hold coumadin tonight   - Pt/INR in am      *DM2: uncontrolled due to steroids   -ISS ac/hs  -check HgA1c 8.4  - add  lantus 5 hs    *COPD  -stable respirator status  -c/w Spiriva, Singulair  -prn nebs      *DVT Px  -therapeutic INR on coumadin [Diarrhea: Grade 0] : Diarrhea: Grade 0 [Negative] : Allergic/Immunologic

## 2024-11-09 NOTE — PROGRESS NOTE ADULT - SUBJECTIVE AND OBJECTIVE BOX
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 Wife also reports he had a low grade fever ~100 F a few days ago. He has a productive cough w/ yellow sputum. He is unable to ambulate due to back pain from compression fractures, and even though the pain is helped w/ medications he still feels very weak with associated decrease in appetite and PO intake. No chills, N/V, constipation, diarrhea, dysuria. + chest pain associated w/ cough. He has worsening cough when he lays flat so sleeps sitting up.    12/30: Pt was seen and evaluated. Pt is more alert. Pt reports hes feeling stronger. Denies pain, SOB, CP, abdominal pain, fever.     REVIEW OF SYSTEMS:  CONSTITUTIONAL: + weakness. No fevers or chills  EYES/ENT: No visual changes;  No vertigo or throat pain   NECK: No pain or stiffness  RESPIRATORY: No cough, wheezing, hemoptysis; No shortness of breath  CARDIOVASCULAR: No chest pain or palpitations  GASTROINTESTINAL: No abdominal or epigastric pain. No nausea, vomiting, or hematemesis; No diarrhea or constipation. No melena or hematochezia.  GENITOURINARY: No dysuria, frequency or hematuria  NEUROLOGICAL: No numbness or weakness  SKIN: No itching, burning, rashes, or lesions   All other review of systems is negative unless indicated above    Vital Signs Last 24 Hrs  T(C): 36.7 (30 Dec 2018 15:28), Max: 36.8 (30 Dec 2018 15:10)  T(F): 98.1 (30 Dec 2018 15:28), Max: 98.2 (30 Dec 2018 15:10)  HR: 92 (30 Dec 2018 17:15) (88 - 99)  BP: 122/78 (30 Dec 2018 17:15) (82/55 - 122/78)  BP(mean): --  RR: 16 (30 Dec 2018 17:15) (16 - 18)  SpO2: 95% (30 Dec 2018 17:15) (95% - 97%)    I&O's Summary    29 Dec 2018 07:01  -  30 Dec 2018 07:00  --------------------------------------------------------  IN: 240 mL / OUT: 500 mL / NET: -260 mL    30 Dec 2018 07:01  -  30 Dec 2018 20:12  --------------------------------------------------------  IN: 0 mL / OUT: 250 mL / NET: -250 mL        CAPILLARY BLOOD GLUCOSE      POCT Blood Glucose.: 157 mg/dL (30 Dec 2018 16:29)  POCT Blood Glucose.: 199 mg/dL (30 Dec 2018 12:40)  POCT Blood Glucose.: 211 mg/dL (30 Dec 2018 11:10)  POCT Blood Glucose.: 191 mg/dL (30 Dec 2018 07:52)  POCT Blood Glucose.: 235 mg/dL (29 Dec 2018 21:53)      PHYSICAL EXAM:  Constitutional: NAD, awake and alert, well-developed  HEENT: PERR, EOMI, Normal Hearing, MMM  Neck: Soft and supple, No LAD, No JVD  Respiratory: Breath sounds are clear bilaterally.  Cardiovascular: S1 and S2, regular rate and rhythm, no Murmurs, gallops or rubs  Gastrointestinal: Bowel Sounds present, soft, nontender, nondistended, no guarding, no rebound  Extremities: No peripheral edema  Vascular: 2+ peripheral pulses  Neurological: A/O x 3, no focal deficits  Musculoskeletal: FROM in b/l upper and lower extremities  Skin: No rashes    MEDICATIONS:  MEDICATIONS  (STANDING):  amiodarone    Tablet 100 milliGRAM(s) Oral daily  ascorbic acid 500 milliGRAM(s) Oral two times a day  atorvastatin 10 milliGRAM(s) Oral at bedtime  cefepime  Injectable. 1000 milliGRAM(s) IV Push every 12 hours  dextrose 5%. 1000 milliLiter(s) (50 mL/Hr) IV Continuous <Continuous>  dextrose 50% Injectable 12.5 Gram(s) IV Push once  dextrose 50% Injectable 25 Gram(s) IV Push once  dextrose 50% Injectable 25 Gram(s) IV Push once  doxycycline hyclate Capsule 100 milliGRAM(s) Oral every 12 hours  fluconAZOLE   Tablet 150 milliGRAM(s) Oral daily  gabapentin 300 milliGRAM(s) Oral three times a day  insulin glargine Injectable (LANTUS) 5 Unit(s) SubCutaneous at bedtime  insulin lispro (HumaLOG) corrective regimen sliding scale   SubCutaneous three times a day before meals  metoprolol tartrate 25 milliGRAM(s) Oral two times a day  montelukast 10 milliGRAM(s) Oral at bedtime  multivitamin 1 Tablet(s) Oral daily  nystatin Cream 1 Application(s) Topical two times a day  sodium chloride 0.9%. 1000 milliLiter(s) (50 mL/Hr) IV Continuous <Continuous>  spironolactone 25 milliGRAM(s) Oral daily  tenofovir disoproxil fumarate (VIREAD) 300 milliGRAM(s) Oral daily  zinc sulfate 220 milliGRAM(s) Oral two times a day      LABS: All Labs Reviewed:                        13.2   23.18 )-----------( 163      ( 30 Dec 2018 06:09 )             40.2     12-30    127<L>  |  97  |  38<H>  ----------------------------<  166<H>  4.4   |  19<L>  |  1.10    Ca    8.0<L>      30 Dec 2018 06:09    TPro  5.2<L>  /  Alb  2.4<L>  /  TBili  0.6  /  DBili  x   /  AST  25  /  ALT  29  /  AlkPhos  201<H>  12-29    PT/INR - ( 30 Dec 2018 06:09 )   PT: 38.0 sec;   INR: 3.30 ratio               Blood Culture: 12-26 @ 23:06  Organism --  Gram Stain Blood -- Gram Stain --  Specimen Source .Urine None  Culture-Blood --    12-26 @ 19:38  Organism --  Gram Stain Blood -- Gram Stain --  Specimen Source .Blood Blood-Peripheral  Culture-Blood --    12-26 @ 17:36  Organism --  Gram Stain Blood -- Gram Stain --  Specimen Source .Blood Blood-Peripheral  Culture-Blood -- show 16-Aug-2021

## 2024-11-12 NOTE — ED PROVIDER NOTE - PMH
Detail Level: Zone
Photo Preface (Leave Blank If You Do Not Want): Photographs were obtained today
Atrial fibrillation, unspecified type    B-cell lymphoma, unspecified B-cell lymphoma type, unspecified body region  CLL stage 4/SLL; met NHL  Chronic diastolic congestive heart failure    COPD (chronic obstructive pulmonary disease)    DM type 2 (diabetes mellitus, type 2)    Essential hypertension    Hepatitis B virus infection, unspecified chronicity  retrovirals  Hypogammaglobulinemia  received IVIG

## 2025-01-04 NOTE — ED ADULT TRIAGE NOTE - NS ED NURSE AMBULANCES
[facility] Toma SHIRLEY Suzanna SHIRLEY Foreign RN- 7 uris

## 2025-01-28 NOTE — ED ADULT NURSE NOTE - IS THE PATIENT ABLE TO BE SCREENED?
Called patient on 1/28/2025 at 4:46 pm. He was offered an appointment for 2/4/2025 with the NP in the clinic. Patient declined the appointment. He stated that he is urine is now clear and that the appointment was necessary.    ANGELICA Power      ----- Message from Roberto Trejo sent at 1/25/2025  3:42 PM CST -----  Regarding: follow up  Hi,    Could you please schedule this patient for follow up in one to two weeks with Dr. Correa to discuss a gross hematuria workup. Thank you so much!    Thanks,  Andrez  
Yes

## 2025-01-29 NOTE — DIETITIAN INITIAL EVALUATION ADULT. - NS AS NUTRI DX ORAL SUPORT IN2
Last Office Visit   11/20/2024  Upcoming: Visit date not found    Last Refill    Outpatient Medication Detail     Disp Refills Start End    traMADol (ULTRAM) 50 MG tablet 90 tablet 0 12/19/2024 --    Sig - Route: Take 1 tablet by mouth every 6 hours as needed for Pain. - Oral    Sent to pharmacy as: traMADol HCl 50 MG Oral Tablet (ULTRAM)    Class: Eprescribe    E-Prescribing Status: Receipt confirmed by pharmacy (12/19/2024 10:31 AM CST)      No Protocol  Medication has been pended for provider review.    Inadequate oral intake
